# Patient Record
Sex: FEMALE | Race: WHITE | Employment: OTHER | URBAN - METROPOLITAN AREA
[De-identification: names, ages, dates, MRNs, and addresses within clinical notes are randomized per-mention and may not be internally consistent; named-entity substitution may affect disease eponyms.]

---

## 2017-11-07 ENCOUNTER — TELEPHONE (OUTPATIENT)
Dept: BARIATRICS/WEIGHT MGMT | Age: 52
End: 2017-11-07

## 2019-01-12 ENCOUNTER — APPOINTMENT (OUTPATIENT)
Dept: CT IMAGING | Age: 54
End: 2019-01-12
Payer: COMMERCIAL

## 2019-01-12 ENCOUNTER — APPOINTMENT (OUTPATIENT)
Dept: GENERAL RADIOLOGY | Age: 54
End: 2019-01-12
Payer: COMMERCIAL

## 2019-01-12 ENCOUNTER — HOSPITAL ENCOUNTER (EMERGENCY)
Age: 54
Discharge: HOME | End: 2019-01-13
Attending: EMERGENCY MEDICINE
Payer: COMMERCIAL

## 2019-01-12 DIAGNOSIS — R07.9 CHEST PAIN, UNSPECIFIED TYPE: ICD-10-CM

## 2019-01-12 DIAGNOSIS — R51.9 ACUTE NONINTRACTABLE HEADACHE, UNSPECIFIED HEADACHE TYPE: ICD-10-CM

## 2019-01-12 DIAGNOSIS — M79.10 MYALGIA: Primary | ICD-10-CM

## 2019-01-12 PROCEDURE — 85025 COMPLETE CBC W/AUTO DIFF WBC: CPT

## 2019-01-12 PROCEDURE — 80053 COMPREHEN METABOLIC PANEL: CPT

## 2019-01-12 PROCEDURE — 85730 THROMBOPLASTIN TIME PARTIAL: CPT

## 2019-01-12 PROCEDURE — 84484 ASSAY OF TROPONIN QUANT: CPT

## 2019-01-12 PROCEDURE — 70450 CT HEAD/BRAIN W/O DYE: CPT

## 2019-01-12 PROCEDURE — 93005 ELECTROCARDIOGRAM TRACING: CPT | Performed by: EMERGENCY MEDICINE

## 2019-01-12 PROCEDURE — 99285 EMERGENCY DEPT VISIT HI MDM: CPT

## 2019-01-12 PROCEDURE — 85610 PROTHROMBIN TIME: CPT

## 2019-01-12 PROCEDURE — 71046 X-RAY EXAM CHEST 2 VIEWS: CPT

## 2019-01-12 RX ORDER — OXCARBAZEPINE 600 MG/1
600 TABLET, FILM COATED ORAL 2 TIMES DAILY
Status: ON HOLD | COMMUNITY
End: 2020-01-21

## 2019-01-12 RX ORDER — DEXAMETHASONE SODIUM PHOSPHATE 10 MG/ML
10 INJECTION, SOLUTION INTRAMUSCULAR; INTRAVENOUS ONCE
Status: COMPLETED | OUTPATIENT
Start: 2019-01-12 | End: 2019-01-13

## 2019-01-12 RX ORDER — METOCLOPRAMIDE HYDROCHLORIDE 5 MG/ML
10 INJECTION INTRAMUSCULAR; INTRAVENOUS ONCE
Status: COMPLETED | OUTPATIENT
Start: 2019-01-12 | End: 2019-01-13

## 2019-01-12 RX ORDER — ACETAMINOPHEN 500 MG
1000 TABLET ORAL ONCE
Status: COMPLETED | OUTPATIENT
Start: 2019-01-12 | End: 2019-01-13

## 2019-01-12 RX ORDER — SPIRONOLACTONE 25 MG/1
25 TABLET ORAL 2 TIMES DAILY
Status: ON HOLD | COMMUNITY
End: 2020-01-21

## 2019-01-12 RX ORDER — 0.9 % SODIUM CHLORIDE 0.9 %
1000 INTRAVENOUS SOLUTION INTRAVENOUS ONCE
Status: COMPLETED | OUTPATIENT
Start: 2019-01-12 | End: 2019-01-13

## 2019-01-12 RX ORDER — CARVEDILOL 6.25 MG/1
12.5 TABLET ORAL 2 TIMES DAILY WITH MEALS
Status: ON HOLD | COMMUNITY
End: 2020-06-04 | Stop reason: DRUGHIGH

## 2019-01-12 RX ORDER — DIPHENHYDRAMINE HYDROCHLORIDE 50 MG/ML
12.5 INJECTION INTRAMUSCULAR; INTRAVENOUS ONCE
Status: COMPLETED | OUTPATIENT
Start: 2019-01-12 | End: 2019-01-13

## 2019-01-12 ASSESSMENT — ENCOUNTER SYMPTOMS
DIARRHEA: 0
WHEEZING: 0
RHINORRHEA: 0
VOMITING: 0
COUGH: 0
SHORTNESS OF BREATH: 0
ABDOMINAL PAIN: 0
NAUSEA: 0

## 2019-01-12 ASSESSMENT — PAIN DESCRIPTION - LOCATION: LOCATION: CHEST

## 2019-01-12 ASSESSMENT — PAIN SCALES - GENERAL: PAINLEVEL_OUTOF10: 8

## 2019-01-12 ASSESSMENT — PAIN DESCRIPTION - PAIN TYPE: TYPE: ACUTE PAIN

## 2019-01-13 VITALS
WEIGHT: 248 LBS | RESPIRATION RATE: 18 BRPM | SYSTOLIC BLOOD PRESSURE: 152 MMHG | BODY MASS INDEX: 43.94 KG/M2 | HEART RATE: 63 BPM | TEMPERATURE: 98.2 F | HEIGHT: 63 IN | OXYGEN SATURATION: 90 % | DIASTOLIC BLOOD PRESSURE: 74 MMHG

## 2019-01-13 LAB
A/G RATIO: 1.2 (ref 1.1–2.2)
ALBUMIN SERPL-MCNC: 3.7 G/DL (ref 3.4–5)
ALP BLD-CCNC: 84 U/L (ref 40–129)
ALT SERPL-CCNC: 17 U/L (ref 10–40)
ANION GAP SERPL CALCULATED.3IONS-SCNC: 10 MMOL/L (ref 3–16)
APTT: 36.1 SEC (ref 26–36)
AST SERPL-CCNC: 18 U/L (ref 15–37)
BASOPHILS ABSOLUTE: 0.1 K/UL (ref 0–0.2)
BASOPHILS RELATIVE PERCENT: 1.7 %
BILIRUB SERPL-MCNC: <0.2 MG/DL (ref 0–1)
BILIRUBIN URINE: NEGATIVE
BLOOD, URINE: NEGATIVE
BUN BLDV-MCNC: 13 MG/DL (ref 7–20)
CALCIUM SERPL-MCNC: 8.6 MG/DL (ref 8.3–10.6)
CHLORIDE BLD-SCNC: 97 MMOL/L (ref 99–110)
CLARITY: CLEAR
CO2: 26 MMOL/L (ref 21–32)
COLOR: YELLOW
CREAT SERPL-MCNC: <0.5 MG/DL (ref 0.6–1.1)
EKG ATRIAL RATE: 64 BPM
EKG DIAGNOSIS: NORMAL
EKG P AXIS: 57 DEGREES
EKG P-R INTERVAL: 172 MS
EKG Q-T INTERVAL: 426 MS
EKG QRS DURATION: 96 MS
EKG QTC CALCULATION (BAZETT): 439 MS
EKG R AXIS: 17 DEGREES
EKG T AXIS: -22 DEGREES
EKG VENTRICULAR RATE: 64 BPM
EOSINOPHILS ABSOLUTE: 0.3 K/UL (ref 0–0.6)
EOSINOPHILS RELATIVE PERCENT: 3.8 %
GFR AFRICAN AMERICAN: >60
GFR NON-AFRICAN AMERICAN: >60
GLOBULIN: 3.1 G/DL
GLUCOSE BLD-MCNC: 98 MG/DL (ref 70–99)
GLUCOSE URINE: NEGATIVE MG/DL
HCT VFR BLD CALC: 38 % (ref 36–48)
HEMOGLOBIN: 12.4 G/DL (ref 12–16)
INR BLD: 1.4 (ref 0.86–1.14)
KETONES, URINE: NEGATIVE MG/DL
LEUKOCYTE ESTERASE, URINE: NEGATIVE
LYMPHOCYTES ABSOLUTE: 2.7 K/UL (ref 1–5.1)
LYMPHOCYTES RELATIVE PERCENT: 34.4 %
MCH RBC QN AUTO: 28.5 PG (ref 26–34)
MCHC RBC AUTO-ENTMCNC: 32.6 G/DL (ref 31–36)
MCV RBC AUTO: 87.4 FL (ref 80–100)
MICROSCOPIC EXAMINATION: NORMAL
MONOCYTES ABSOLUTE: 0.5 K/UL (ref 0–1.3)
MONOCYTES RELATIVE PERCENT: 5.9 %
NEUTROPHILS ABSOLUTE: 4.3 K/UL (ref 1.7–7.7)
NEUTROPHILS RELATIVE PERCENT: 54.2 %
NITRITE, URINE: NEGATIVE
PDW BLD-RTO: 14.7 % (ref 12.4–15.4)
PH UA: 7
PLATELET # BLD: 213 K/UL (ref 135–450)
PMV BLD AUTO: 8.1 FL (ref 5–10.5)
POTASSIUM SERPL-SCNC: 3.9 MMOL/L (ref 3.5–5.1)
PROTEIN UA: NEGATIVE MG/DL
PROTHROMBIN TIME: 16 SEC (ref 9.8–13)
RBC # BLD: 4.35 M/UL (ref 4–5.2)
SODIUM BLD-SCNC: 133 MMOL/L (ref 136–145)
SPECIFIC GRAVITY UA: 1.01
TOTAL PROTEIN: 6.8 G/DL (ref 6.4–8.2)
TROPONIN: <0.01 NG/ML
URINE REFLEX TO CULTURE: NORMAL
URINE TYPE: NORMAL
UROBILINOGEN, URINE: 0.2 E.U./DL
WBC # BLD: 7.9 K/UL (ref 4–11)

## 2019-01-13 PROCEDURE — 93010 ELECTROCARDIOGRAM REPORT: CPT | Performed by: INTERNAL MEDICINE

## 2019-01-13 PROCEDURE — 6370000000 HC RX 637 (ALT 250 FOR IP): Performed by: PHYSICIAN ASSISTANT

## 2019-01-13 PROCEDURE — 96374 THER/PROPH/DIAG INJ IV PUSH: CPT

## 2019-01-13 PROCEDURE — 81003 URINALYSIS AUTO W/O SCOPE: CPT

## 2019-01-13 PROCEDURE — 96375 TX/PRO/DX INJ NEW DRUG ADDON: CPT

## 2019-01-13 PROCEDURE — 2580000003 HC RX 258: Performed by: PHYSICIAN ASSISTANT

## 2019-01-13 PROCEDURE — 6360000002 HC RX W HCPCS: Performed by: PHYSICIAN ASSISTANT

## 2019-01-13 RX ADMIN — SODIUM CHLORIDE 1000 ML: 9 INJECTION, SOLUTION INTRAVENOUS at 00:06

## 2019-01-13 RX ADMIN — ACETAMINOPHEN 1000 MG: 500 TABLET, FILM COATED ORAL at 00:05

## 2019-01-13 RX ADMIN — DEXAMETHASONE SODIUM PHOSPHATE 10 MG: 10 INJECTION, SOLUTION INTRAMUSCULAR; INTRAVENOUS at 00:06

## 2019-01-13 RX ADMIN — DIPHENHYDRAMINE HYDROCHLORIDE 12.5 MG: 50 INJECTION, SOLUTION INTRAMUSCULAR; INTRAVENOUS at 00:06

## 2019-01-13 RX ADMIN — METOCLOPRAMIDE 10 MG: 5 INJECTION, SOLUTION INTRAMUSCULAR; INTRAVENOUS at 00:06

## 2019-01-13 ASSESSMENT — PAIN SCALES - GENERAL
PAINLEVEL_OUTOF10: 6
PAINLEVEL_OUTOF10: 8

## 2019-01-14 ENCOUNTER — APPOINTMENT (OUTPATIENT)
Dept: CT IMAGING | Age: 54
DRG: 351 | End: 2019-01-14
Payer: MEDICAID

## 2019-01-14 ENCOUNTER — HOSPITAL ENCOUNTER (INPATIENT)
Age: 54
LOS: 2 days | Discharge: HOME | DRG: 351 | End: 2019-01-17
Attending: EMERGENCY MEDICINE | Admitting: INTERNAL MEDICINE
Payer: MEDICAID

## 2019-01-14 ENCOUNTER — APPOINTMENT (OUTPATIENT)
Dept: GENERAL RADIOLOGY | Age: 54
DRG: 351 | End: 2019-01-14
Payer: MEDICAID

## 2019-01-14 DIAGNOSIS — M54.50 CHRONIC LOW BACK PAIN WITHOUT SCIATICA, UNSPECIFIED BACK PAIN LATERALITY: ICD-10-CM

## 2019-01-14 DIAGNOSIS — R20.0 BILATERAL LEG NUMBNESS: Primary | ICD-10-CM

## 2019-01-14 DIAGNOSIS — G89.29 CHRONIC LOW BACK PAIN WITHOUT SCIATICA, UNSPECIFIED BACK PAIN LATERALITY: ICD-10-CM

## 2019-01-14 LAB
A/G RATIO: 1.3 (ref 1.1–2.2)
ALBUMIN SERPL-MCNC: 3.9 G/DL (ref 3.4–5)
ALP BLD-CCNC: 78 U/L (ref 40–129)
ALT SERPL-CCNC: 19 U/L (ref 10–40)
ANION GAP SERPL CALCULATED.3IONS-SCNC: 10 MMOL/L (ref 3–16)
APTT: 31.6 SEC (ref 26–36)
AST SERPL-CCNC: 17 U/L (ref 15–37)
BASOPHILS ABSOLUTE: 0.1 K/UL (ref 0–0.2)
BASOPHILS RELATIVE PERCENT: 0.6 %
BILIRUB SERPL-MCNC: <0.2 MG/DL (ref 0–1)
BILIRUBIN URINE: NEGATIVE
BLOOD, URINE: NEGATIVE
BUN BLDV-MCNC: 13 MG/DL (ref 7–20)
CALCIUM SERPL-MCNC: 8.7 MG/DL (ref 8.3–10.6)
CHLORIDE BLD-SCNC: 97 MMOL/L (ref 99–110)
CLARITY: CLEAR
CO2: 28 MMOL/L (ref 21–32)
COLOR: YELLOW
CREAT SERPL-MCNC: 0.7 MG/DL (ref 0.6–1.1)
EKG ATRIAL RATE: 57 BPM
EKG DIAGNOSIS: NORMAL
EKG P AXIS: 33 DEGREES
EKG P-R INTERVAL: 156 MS
EKG Q-T INTERVAL: 470 MS
EKG QRS DURATION: 90 MS
EKG QTC CALCULATION (BAZETT): 457 MS
EKG R AXIS: 0 DEGREES
EKG T AXIS: -6 DEGREES
EKG VENTRICULAR RATE: 57 BPM
EOSINOPHILS ABSOLUTE: 0.1 K/UL (ref 0–0.6)
EOSINOPHILS RELATIVE PERCENT: 1 %
GFR AFRICAN AMERICAN: >60
GFR NON-AFRICAN AMERICAN: >60
GLOBULIN: 3.1 G/DL
GLUCOSE BLD-MCNC: 83 MG/DL (ref 70–99)
GLUCOSE URINE: NEGATIVE MG/DL
HCG QUALITATIVE: NEGATIVE
HCT VFR BLD CALC: 37.1 % (ref 36–48)
HEMOGLOBIN: 12.2 G/DL (ref 12–16)
INR BLD: 1.1 (ref 0.86–1.14)
KETONES, URINE: NEGATIVE MG/DL
LEUKOCYTE ESTERASE, URINE: NEGATIVE
LYMPHOCYTES ABSOLUTE: 4 K/UL (ref 1–5.1)
LYMPHOCYTES RELATIVE PERCENT: 40.1 %
MCH RBC QN AUTO: 28.9 PG (ref 26–34)
MCHC RBC AUTO-ENTMCNC: 32.8 G/DL (ref 31–36)
MCV RBC AUTO: 88 FL (ref 80–100)
MICROSCOPIC EXAMINATION: NORMAL
MONOCYTES ABSOLUTE: 0.5 K/UL (ref 0–1.3)
MONOCYTES RELATIVE PERCENT: 5.5 %
NEUTROPHILS ABSOLUTE: 5.2 K/UL (ref 1.7–7.7)
NEUTROPHILS RELATIVE PERCENT: 52.8 %
NITRITE, URINE: NEGATIVE
PDW BLD-RTO: 14.6 % (ref 12.4–15.4)
PH UA: 6.5
PLATELET # BLD: 212 K/UL (ref 135–450)
PMV BLD AUTO: 8.7 FL (ref 5–10.5)
POTASSIUM REFLEX MAGNESIUM: 3.7 MMOL/L (ref 3.5–5.1)
PROTEIN UA: NEGATIVE MG/DL
PROTHROMBIN TIME: 12.5 SEC (ref 9.8–13)
RBC # BLD: 4.21 M/UL (ref 4–5.2)
SODIUM BLD-SCNC: 135 MMOL/L (ref 136–145)
SPECIFIC GRAVITY UA: 1.01
TOTAL PROTEIN: 7 G/DL (ref 6.4–8.2)
TROPONIN: <0.01 NG/ML
URINE REFLEX TO CULTURE: NORMAL
URINE TYPE: NORMAL
UROBILINOGEN, URINE: 0.2 E.U./DL
WBC # BLD: 9.9 K/UL (ref 4–11)

## 2019-01-14 PROCEDURE — G0378 HOSPITAL OBSERVATION PER HR: HCPCS

## 2019-01-14 PROCEDURE — 36415 COLL VENOUS BLD VENIPUNCTURE: CPT

## 2019-01-14 PROCEDURE — 70450 CT HEAD/BRAIN W/O DYE: CPT

## 2019-01-14 PROCEDURE — 99285 EMERGENCY DEPT VISIT HI MDM: CPT

## 2019-01-14 PROCEDURE — 96375 TX/PRO/DX INJ NEW DRUG ADDON: CPT

## 2019-01-14 PROCEDURE — 94760 N-INVAS EAR/PLS OXIMETRY 1: CPT

## 2019-01-14 PROCEDURE — 93010 ELECTROCARDIOGRAM REPORT: CPT | Performed by: INTERNAL MEDICINE

## 2019-01-14 PROCEDURE — 81003 URINALYSIS AUTO W/O SCOPE: CPT

## 2019-01-14 PROCEDURE — 84484 ASSAY OF TROPONIN QUANT: CPT

## 2019-01-14 PROCEDURE — 85610 PROTHROMBIN TIME: CPT

## 2019-01-14 PROCEDURE — 96374 THER/PROPH/DIAG INJ IV PUSH: CPT

## 2019-01-14 PROCEDURE — 83036 HEMOGLOBIN GLYCOSYLATED A1C: CPT

## 2019-01-14 PROCEDURE — 73110 X-RAY EXAM OF WRIST: CPT

## 2019-01-14 PROCEDURE — 6370000000 HC RX 637 (ALT 250 FOR IP): Performed by: INTERNAL MEDICINE

## 2019-01-14 PROCEDURE — 84703 CHORIONIC GONADOTROPIN ASSAY: CPT

## 2019-01-14 PROCEDURE — 85730 THROMBOPLASTIN TIME PARTIAL: CPT

## 2019-01-14 PROCEDURE — 6360000002 HC RX W HCPCS: Performed by: EMERGENCY MEDICINE

## 2019-01-14 PROCEDURE — 80053 COMPREHEN METABOLIC PANEL: CPT

## 2019-01-14 PROCEDURE — 71045 X-RAY EXAM CHEST 1 VIEW: CPT

## 2019-01-14 PROCEDURE — 93005 ELECTROCARDIOGRAM TRACING: CPT | Performed by: EMERGENCY MEDICINE

## 2019-01-14 PROCEDURE — 85025 COMPLETE CBC W/AUTO DIFF WBC: CPT

## 2019-01-14 PROCEDURE — 2580000003 HC RX 258: Performed by: INTERNAL MEDICINE

## 2019-01-14 RX ORDER — DIPHENHYDRAMINE HYDROCHLORIDE 50 MG/ML
25 INJECTION INTRAMUSCULAR; INTRAVENOUS ONCE
Status: COMPLETED | OUTPATIENT
Start: 2019-01-14 | End: 2019-01-14

## 2019-01-14 RX ORDER — SODIUM CHLORIDE 0.9 % (FLUSH) 0.9 %
10 SYRINGE (ML) INJECTION PRN
Status: DISCONTINUED | OUTPATIENT
Start: 2019-01-14 | End: 2019-01-17 | Stop reason: HOSPADM

## 2019-01-14 RX ORDER — SPIRONOLACTONE 25 MG/1
25 TABLET ORAL 2 TIMES DAILY
Status: DISCONTINUED | OUTPATIENT
Start: 2019-01-14 | End: 2019-01-17 | Stop reason: HOSPADM

## 2019-01-14 RX ORDER — GABAPENTIN 400 MG/1
1200 CAPSULE ORAL 3 TIMES DAILY
Status: DISCONTINUED | OUTPATIENT
Start: 2019-01-14 | End: 2019-01-17 | Stop reason: HOSPADM

## 2019-01-14 RX ORDER — FAMOTIDINE 20 MG/1
20 TABLET, FILM COATED ORAL 2 TIMES DAILY
Status: DISCONTINUED | OUTPATIENT
Start: 2019-01-14 | End: 2019-01-17 | Stop reason: HOSPADM

## 2019-01-14 RX ORDER — METOCLOPRAMIDE HYDROCHLORIDE 5 MG/ML
10 INJECTION INTRAMUSCULAR; INTRAVENOUS ONCE
Status: COMPLETED | OUTPATIENT
Start: 2019-01-14 | End: 2019-01-14

## 2019-01-14 RX ORDER — CARVEDILOL 6.25 MG/1
6.25 TABLET ORAL 2 TIMES DAILY WITH MEALS
Status: DISCONTINUED | OUTPATIENT
Start: 2019-01-15 | End: 2019-01-17 | Stop reason: HOSPADM

## 2019-01-14 RX ORDER — ONDANSETRON 2 MG/ML
4 INJECTION INTRAMUSCULAR; INTRAVENOUS EVERY 6 HOURS PRN
Status: DISCONTINUED | OUTPATIENT
Start: 2019-01-14 | End: 2019-01-17 | Stop reason: HOSPADM

## 2019-01-14 RX ORDER — PANTOPRAZOLE SODIUM 40 MG/1
40 TABLET, DELAYED RELEASE ORAL
Status: DISCONTINUED | OUTPATIENT
Start: 2019-01-15 | End: 2019-01-17 | Stop reason: HOSPADM

## 2019-01-14 RX ORDER — ONDANSETRON 4 MG/1
4 TABLET, ORALLY DISINTEGRATING ORAL EVERY 8 HOURS PRN
Status: DISCONTINUED | OUTPATIENT
Start: 2019-01-14 | End: 2019-01-17 | Stop reason: HOSPADM

## 2019-01-14 RX ORDER — SODIUM CHLORIDE 0.9 % (FLUSH) 0.9 %
10 SYRINGE (ML) INJECTION EVERY 12 HOURS SCHEDULED
Status: DISCONTINUED | OUTPATIENT
Start: 2019-01-14 | End: 2019-01-17 | Stop reason: HOSPADM

## 2019-01-14 RX ORDER — OXCARBAZEPINE 300 MG/1
600 TABLET, FILM COATED ORAL 2 TIMES DAILY
Status: DISCONTINUED | OUTPATIENT
Start: 2019-01-14 | End: 2019-01-17 | Stop reason: HOSPADM

## 2019-01-14 RX ORDER — TIZANIDINE 4 MG/1
4 TABLET ORAL EVERY 6 HOURS PRN
Status: ON HOLD | COMMUNITY
End: 2020-01-27 | Stop reason: HOSPADM

## 2019-01-14 RX ORDER — DEXAMETHASONE SODIUM PHOSPHATE 10 MG/ML
10 INJECTION, SOLUTION INTRAMUSCULAR; INTRAVENOUS ONCE
Status: COMPLETED | OUTPATIENT
Start: 2019-01-14 | End: 2019-01-14

## 2019-01-14 RX ORDER — ALLOPURINOL 100 MG/1
100 TABLET ORAL DAILY
Status: DISCONTINUED | OUTPATIENT
Start: 2019-01-15 | End: 2019-01-17 | Stop reason: HOSPADM

## 2019-01-14 RX ADMIN — RIVAROXABAN 20 MG: 20 TABLET, FILM COATED ORAL at 22:40

## 2019-01-14 RX ADMIN — GABAPENTIN 1200 MG: 400 CAPSULE ORAL at 22:36

## 2019-01-14 RX ADMIN — FAMOTIDINE 20 MG: 20 TABLET ORAL at 22:36

## 2019-01-14 RX ADMIN — SPIRONOLACTONE 25 MG: 25 TABLET, FILM COATED ORAL at 22:36

## 2019-01-14 RX ADMIN — Medication 10 ML: at 22:37

## 2019-01-14 RX ADMIN — DEXAMETHASONE SODIUM PHOSPHATE 10 MG: 10 INJECTION, SOLUTION INTRAMUSCULAR; INTRAVENOUS at 17:16

## 2019-01-14 RX ADMIN — OXCARBAZEPINE 600 MG: 300 TABLET, FILM COATED ORAL at 22:36

## 2019-01-14 RX ADMIN — DIPHENHYDRAMINE HYDROCHLORIDE 25 MG: 50 INJECTION, SOLUTION INTRAMUSCULAR; INTRAVENOUS at 17:16

## 2019-01-14 RX ADMIN — METOCLOPRAMIDE 10 MG: 5 INJECTION, SOLUTION INTRAMUSCULAR; INTRAVENOUS at 17:16

## 2019-01-14 ASSESSMENT — PAIN DESCRIPTION - DESCRIPTORS
DESCRIPTORS_2: STABBING
DESCRIPTORS: SHARP;SHOOTING
DESCRIPTORS_2: SHARP;SHOOTING

## 2019-01-14 ASSESSMENT — PAIN DESCRIPTION - PAIN TYPE
TYPE: ACUTE PAIN
TYPE: ACUTE PAIN
TYPE_2: NEUROPATHIC

## 2019-01-14 ASSESSMENT — PAIN DESCRIPTION - LOCATION
LOCATION_2: NECK
LOCATION_2: NECK
LOCATION: SHOULDER
LOCATION: LEG

## 2019-01-14 ASSESSMENT — PAIN DESCRIPTION - INTENSITY
RATING_2: 8
RATING_2: 8

## 2019-01-14 ASSESSMENT — PAIN SCALES - GENERAL
PAINLEVEL_OUTOF10: 4
PAINLEVEL_OUTOF10: 8

## 2019-01-14 ASSESSMENT — PAIN DESCRIPTION - ORIENTATION: ORIENTATION: RIGHT;LEFT

## 2019-01-14 ASSESSMENT — PAIN DESCRIPTION - PROGRESSION: CLINICAL_PROGRESSION: GRADUALLY IMPROVING

## 2019-01-14 ASSESSMENT — PAIN DESCRIPTION - FREQUENCY: FREQUENCY: CONTINUOUS

## 2019-01-14 ASSESSMENT — PAIN DESCRIPTION - ONSET: ONSET: ON-GOING

## 2019-01-14 ASSESSMENT — PAIN DESCRIPTION - DIRECTION: RADIATING_TOWARDS: LOWER SPINE

## 2019-01-15 ENCOUNTER — APPOINTMENT (OUTPATIENT)
Dept: MRI IMAGING | Age: 54
DRG: 351 | End: 2019-01-15
Payer: MEDICAID

## 2019-01-15 PROBLEM — R20.0 BILATERAL LEG NUMBNESS: Status: ACTIVE | Noted: 2019-01-15

## 2019-01-15 LAB
CHOLESTEROL, TOTAL: 268 MG/DL (ref 0–199)
ESTIMATED AVERAGE GLUCOSE: 119.8 MG/DL
HBA1C MFR BLD: 5.8 %
HDLC SERPL-MCNC: 55 MG/DL (ref 40–60)
LDL CHOLESTEROL CALCULATED: 192 MG/DL
TRIGL SERPL-MCNC: 103 MG/DL (ref 0–150)
VLDLC SERPL CALC-MCNC: 21 MG/DL

## 2019-01-15 PROCEDURE — 36415 COLL VENOUS BLD VENIPUNCTURE: CPT

## 2019-01-15 PROCEDURE — 1200000000 HC SEMI PRIVATE

## 2019-01-15 PROCEDURE — 6370000000 HC RX 637 (ALT 250 FOR IP): Performed by: INTERNAL MEDICINE

## 2019-01-15 PROCEDURE — 2580000003 HC RX 258: Performed by: PSYCHIATRY & NEUROLOGY

## 2019-01-15 PROCEDURE — A9579 GAD-BASE MR CONTRAST NOS,1ML: HCPCS | Performed by: PSYCHIATRY & NEUROLOGY

## 2019-01-15 PROCEDURE — G8979 MOBILITY GOAL STATUS: HCPCS

## 2019-01-15 PROCEDURE — 80061 LIPID PANEL: CPT

## 2019-01-15 PROCEDURE — G0378 HOSPITAL OBSERVATION PER HR: HCPCS

## 2019-01-15 PROCEDURE — 6360000004 HC RX CONTRAST MEDICATION: Performed by: PSYCHIATRY & NEUROLOGY

## 2019-01-15 PROCEDURE — 99255 IP/OBS CONSLTJ NEW/EST HI 80: CPT | Performed by: PSYCHIATRY & NEUROLOGY

## 2019-01-15 PROCEDURE — 97166 OT EVAL MOD COMPLEX 45 MIN: CPT

## 2019-01-15 PROCEDURE — G8978 MOBILITY CURRENT STATUS: HCPCS

## 2019-01-15 PROCEDURE — 2580000003 HC RX 258: Performed by: INTERNAL MEDICINE

## 2019-01-15 PROCEDURE — 97162 PT EVAL MOD COMPLEX 30 MIN: CPT

## 2019-01-15 PROCEDURE — 6370000000 HC RX 637 (ALT 250 FOR IP): Performed by: NURSE PRACTITIONER

## 2019-01-15 PROCEDURE — 70553 MRI BRAIN STEM W/O & W/DYE: CPT

## 2019-01-15 PROCEDURE — 97535 SELF CARE MNGMENT TRAINING: CPT

## 2019-01-15 RX ORDER — ACETAMINOPHEN 325 MG/1
650 TABLET ORAL EVERY 4 HOURS PRN
Status: DISCONTINUED | OUTPATIENT
Start: 2019-01-15 | End: 2019-01-17 | Stop reason: HOSPADM

## 2019-01-15 RX ORDER — LOPERAMIDE HYDROCHLORIDE 2 MG/1
2 CAPSULE ORAL 4 TIMES DAILY PRN
Status: DISCONTINUED | OUTPATIENT
Start: 2019-01-15 | End: 2019-01-17 | Stop reason: HOSPADM

## 2019-01-15 RX ORDER — TIZANIDINE 4 MG/1
4 TABLET ORAL EVERY 6 HOURS PRN
Status: DISCONTINUED | OUTPATIENT
Start: 2019-01-15 | End: 2019-01-17 | Stop reason: HOSPADM

## 2019-01-15 RX ORDER — 0.9 % SODIUM CHLORIDE 0.9 %
10 VIAL (ML) INJECTION
Status: COMPLETED | OUTPATIENT
Start: 2019-01-15 | End: 2019-01-15

## 2019-01-15 RX ADMIN — LOPERAMIDE HYDROCHLORIDE 2 MG: 2 CAPSULE ORAL at 15:30

## 2019-01-15 RX ADMIN — SPIRONOLACTONE 25 MG: 25 TABLET, FILM COATED ORAL at 10:00

## 2019-01-15 RX ADMIN — PANTOPRAZOLE SODIUM 40 MG: 40 TABLET, DELAYED RELEASE ORAL at 08:35

## 2019-01-15 RX ADMIN — OXCARBAZEPINE 600 MG: 300 TABLET, FILM COATED ORAL at 08:35

## 2019-01-15 RX ADMIN — Medication 10 ML: at 09:46

## 2019-01-15 RX ADMIN — CARVEDILOL 6.25 MG: 6.25 TABLET, FILM COATED ORAL at 08:35

## 2019-01-15 RX ADMIN — FAMOTIDINE 20 MG: 20 TABLET ORAL at 21:05

## 2019-01-15 RX ADMIN — FAMOTIDINE 20 MG: 20 TABLET ORAL at 08:35

## 2019-01-15 RX ADMIN — Medication 10 ML: at 08:35

## 2019-01-15 RX ADMIN — GABAPENTIN 1200 MG: 400 CAPSULE ORAL at 21:04

## 2019-01-15 RX ADMIN — ACETAMINOPHEN 650 MG: 325 TABLET, FILM COATED ORAL at 14:00

## 2019-01-15 RX ADMIN — GABAPENTIN 1200 MG: 400 CAPSULE ORAL at 08:35

## 2019-01-15 RX ADMIN — RIVAROXABAN 20 MG: 20 TABLET, FILM COATED ORAL at 17:05

## 2019-01-15 RX ADMIN — GABAPENTIN 1200 MG: 400 CAPSULE ORAL at 14:00

## 2019-01-15 RX ADMIN — OXCARBAZEPINE 600 MG: 300 TABLET, FILM COATED ORAL at 21:05

## 2019-01-15 RX ADMIN — TIZANIDINE 4 MG: 4 TABLET ORAL at 15:05

## 2019-01-15 RX ADMIN — CARVEDILOL 6.25 MG: 6.25 TABLET, FILM COATED ORAL at 17:05

## 2019-01-15 RX ADMIN — SPIRONOLACTONE 25 MG: 25 TABLET, FILM COATED ORAL at 21:05

## 2019-01-15 RX ADMIN — Medication 10 ML: at 21:05

## 2019-01-15 RX ADMIN — TIZANIDINE 4 MG: 4 TABLET ORAL at 00:47

## 2019-01-15 RX ADMIN — ALLOPURINOL 100 MG: 100 TABLET ORAL at 08:35

## 2019-01-15 RX ADMIN — TIZANIDINE 4 MG: 4 TABLET ORAL at 08:35

## 2019-01-15 RX ADMIN — GADOTERIDOL 22 ML: 279.3 INJECTION, SOLUTION INTRAVENOUS at 09:46

## 2019-01-15 ASSESSMENT — PAIN DESCRIPTION - PAIN TYPE
TYPE: ACUTE PAIN
TYPE: ACUTE PAIN
TYPE_2: NEUROPATHIC
TYPE: CHRONIC PAIN
TYPE: CHRONIC PAIN

## 2019-01-15 ASSESSMENT — PAIN DESCRIPTION - LOCATION
LOCATION: NECK
LOCATION: NECK
LOCATION_2: NECK
LOCATION: BACK;NECK
LOCATION: HEAD
LOCATION: SHOULDER

## 2019-01-15 ASSESSMENT — PAIN SCALES - GENERAL
PAINLEVEL_OUTOF10: 7
PAINLEVEL_OUTOF10: 5
PAINLEVEL_OUTOF10: 7
PAINLEVEL_OUTOF10: 5
PAINLEVEL_OUTOF10: 6
PAINLEVEL_OUTOF10: 5
PAINLEVEL_OUTOF10: 7

## 2019-01-15 ASSESSMENT — PAIN DESCRIPTION - ONSET: ONSET: ON-GOING

## 2019-01-15 ASSESSMENT — PAIN DESCRIPTION - DESCRIPTORS
DESCRIPTORS_2: STABBING
DESCRIPTORS: STABBING

## 2019-01-15 ASSESSMENT — PAIN DESCRIPTION - DIRECTION: RADIATING_TOWARDS: LOWER SPINE

## 2019-01-15 ASSESSMENT — PAIN DESCRIPTION - INTENSITY: RATING_2: 5

## 2019-01-15 ASSESSMENT — PAIN DESCRIPTION - FREQUENCY: FREQUENCY: CONTINUOUS

## 2019-01-15 ASSESSMENT — PAIN DESCRIPTION - ORIENTATION: ORIENTATION: LEFT

## 2019-01-15 ASSESSMENT — PAIN DESCRIPTION - PROGRESSION
CLINICAL_PROGRESSION: GRADUALLY IMPROVING
CLINICAL_PROGRESSION: GRADUALLY IMPROVING

## 2019-01-16 ENCOUNTER — APPOINTMENT (OUTPATIENT)
Dept: MRI IMAGING | Age: 54
DRG: 351 | End: 2019-01-16
Payer: MEDICAID

## 2019-01-16 PROCEDURE — 72146 MRI CHEST SPINE W/O DYE: CPT

## 2019-01-16 PROCEDURE — 97110 THERAPEUTIC EXERCISES: CPT

## 2019-01-16 PROCEDURE — 1200000000 HC SEMI PRIVATE

## 2019-01-16 PROCEDURE — 97530 THERAPEUTIC ACTIVITIES: CPT

## 2019-01-16 PROCEDURE — 72141 MRI NECK SPINE W/O DYE: CPT

## 2019-01-16 PROCEDURE — 6360000002 HC RX W HCPCS: Performed by: INTERNAL MEDICINE

## 2019-01-16 PROCEDURE — 2580000003 HC RX 258: Performed by: INTERNAL MEDICINE

## 2019-01-16 PROCEDURE — 6370000000 HC RX 637 (ALT 250 FOR IP): Performed by: INTERNAL MEDICINE

## 2019-01-16 PROCEDURE — 97535 SELF CARE MNGMENT TRAINING: CPT

## 2019-01-16 PROCEDURE — 97116 GAIT TRAINING THERAPY: CPT

## 2019-01-16 PROCEDURE — 99233 SBSQ HOSP IP/OBS HIGH 50: CPT | Performed by: PSYCHIATRY & NEUROLOGY

## 2019-01-16 PROCEDURE — 6370000000 HC RX 637 (ALT 250 FOR IP): Performed by: NURSE PRACTITIONER

## 2019-01-16 RX ORDER — HYDROCODONE BITARTRATE AND ACETAMINOPHEN 5; 325 MG/1; MG/1
1 TABLET ORAL ONCE
Status: COMPLETED | OUTPATIENT
Start: 2019-01-16 | End: 2019-01-16

## 2019-01-16 RX ADMIN — GABAPENTIN 1200 MG: 400 CAPSULE ORAL at 13:38

## 2019-01-16 RX ADMIN — ACETAMINOPHEN 650 MG: 325 TABLET, FILM COATED ORAL at 09:47

## 2019-01-16 RX ADMIN — OXCARBAZEPINE 600 MG: 300 TABLET, FILM COATED ORAL at 21:50

## 2019-01-16 RX ADMIN — TIZANIDINE 4 MG: 4 TABLET ORAL at 18:52

## 2019-01-16 RX ADMIN — GABAPENTIN 1200 MG: 400 CAPSULE ORAL at 09:47

## 2019-01-16 RX ADMIN — CARVEDILOL 6.25 MG: 6.25 TABLET, FILM COATED ORAL at 09:48

## 2019-01-16 RX ADMIN — CARVEDILOL 6.25 MG: 6.25 TABLET, FILM COATED ORAL at 17:32

## 2019-01-16 RX ADMIN — FAMOTIDINE 20 MG: 20 TABLET ORAL at 21:50

## 2019-01-16 RX ADMIN — Medication 10 ML: at 21:51

## 2019-01-16 RX ADMIN — FAMOTIDINE 20 MG: 20 TABLET ORAL at 09:48

## 2019-01-16 RX ADMIN — GABAPENTIN 1200 MG: 400 CAPSULE ORAL at 21:50

## 2019-01-16 RX ADMIN — HYDROCODONE BITARTRATE AND ACETAMINOPHEN 1 TABLET: 5; 325 TABLET ORAL at 13:38

## 2019-01-16 RX ADMIN — TIZANIDINE 4 MG: 4 TABLET ORAL at 01:27

## 2019-01-16 RX ADMIN — ACETAMINOPHEN 650 MG: 325 TABLET, FILM COATED ORAL at 01:27

## 2019-01-16 RX ADMIN — SPIRONOLACTONE 25 MG: 25 TABLET, FILM COATED ORAL at 21:50

## 2019-01-16 RX ADMIN — SPIRONOLACTONE 25 MG: 25 TABLET, FILM COATED ORAL at 09:47

## 2019-01-16 RX ADMIN — ONDANSETRON 4 MG: 2 INJECTION INTRAMUSCULAR; INTRAVENOUS at 09:48

## 2019-01-16 RX ADMIN — TIZANIDINE 4 MG: 4 TABLET ORAL at 09:47

## 2019-01-16 RX ADMIN — ALLOPURINOL 100 MG: 100 TABLET ORAL at 09:47

## 2019-01-16 RX ADMIN — RIVAROXABAN 20 MG: 20 TABLET, FILM COATED ORAL at 17:32

## 2019-01-16 RX ADMIN — OXCARBAZEPINE 600 MG: 300 TABLET, FILM COATED ORAL at 09:47

## 2019-01-16 RX ADMIN — PANTOPRAZOLE SODIUM 40 MG: 40 TABLET, DELAYED RELEASE ORAL at 06:18

## 2019-01-16 RX ADMIN — Medication 10 ML: at 09:48

## 2019-01-16 ASSESSMENT — PAIN DESCRIPTION - LOCATION
LOCATION: BACK
LOCATION_2: NECK
LOCATION: BACK;HEAD
LOCATION: BACK;HEAD
LOCATION: BACK
LOCATION_2: NECK
LOCATION: BACK;HEAD
LOCATION_2: NECK
LOCATION: BACK;HEAD
LOCATION: BACK

## 2019-01-16 ASSESSMENT — PAIN DESCRIPTION - DESCRIPTORS
DESCRIPTORS: STABBING
DESCRIPTORS_2: JABBING;STABBING
DESCRIPTORS_2: SHARP;SHOOTING
DESCRIPTORS: SHARP;SHOOTING
DESCRIPTORS_2: JABBING;STABBING
DESCRIPTORS: STABBING

## 2019-01-16 ASSESSMENT — PAIN SCALES - GENERAL
PAINLEVEL_OUTOF10: 8
PAINLEVEL_OUTOF10: 5
PAINLEVEL_OUTOF10: 5
PAINLEVEL_OUTOF10: 8
PAINLEVEL_OUTOF10: 5
PAINLEVEL_OUTOF10: 6
PAINLEVEL_OUTOF10: 6

## 2019-01-16 ASSESSMENT — PAIN DESCRIPTION - ORIENTATION
ORIENTATION: MID

## 2019-01-16 ASSESSMENT — PAIN DESCRIPTION - PAIN TYPE
TYPE: ACUTE PAIN
TYPE_2: NEUROPATHIC
TYPE: ACUTE PAIN
TYPE_2: NEUROPATHIC
TYPE: ACUTE PAIN

## 2019-01-16 ASSESSMENT — PAIN DESCRIPTION - INTENSITY
RATING_2: 8
RATING_2: 5
RATING_2: 8

## 2019-01-16 ASSESSMENT — PAIN DESCRIPTION - FREQUENCY: FREQUENCY: CONTINUOUS

## 2019-01-16 ASSESSMENT — PAIN DESCRIPTION - DIRECTION: RADIATING_TOWARDS: LOWER SPINE

## 2019-01-16 ASSESSMENT — PAIN DESCRIPTION - ONSET: ONSET: ON-GOING

## 2019-01-17 VITALS
WEIGHT: 245.8 LBS | SYSTOLIC BLOOD PRESSURE: 155 MMHG | BODY MASS INDEX: 43.55 KG/M2 | HEIGHT: 63 IN | DIASTOLIC BLOOD PRESSURE: 77 MMHG | OXYGEN SATURATION: 94 % | RESPIRATION RATE: 16 BRPM | HEART RATE: 64 BPM | TEMPERATURE: 98.6 F

## 2019-01-17 PROCEDURE — 97116 GAIT TRAINING THERAPY: CPT

## 2019-01-17 PROCEDURE — 6370000000 HC RX 637 (ALT 250 FOR IP): Performed by: INTERNAL MEDICINE

## 2019-01-17 PROCEDURE — 2580000003 HC RX 258: Performed by: INTERNAL MEDICINE

## 2019-01-17 PROCEDURE — 97530 THERAPEUTIC ACTIVITIES: CPT

## 2019-01-17 PROCEDURE — 6370000000 HC RX 637 (ALT 250 FOR IP): Performed by: NURSE PRACTITIONER

## 2019-01-17 RX ORDER — HYDROCODONE BITARTRATE AND ACETAMINOPHEN 5; 325 MG/1; MG/1
1 TABLET ORAL DAILY PRN
Status: DISCONTINUED | OUTPATIENT
Start: 2019-01-17 | End: 2019-01-17 | Stop reason: HOSPADM

## 2019-01-17 RX ORDER — HYDROCODONE BITARTRATE AND ACETAMINOPHEN 5; 325 MG/1; MG/1
1 TABLET ORAL DAILY PRN
Qty: 10 TABLET | Refills: 0 | Status: SHIPPED | OUTPATIENT
Start: 2019-01-17 | End: 2019-01-24

## 2019-01-17 RX ADMIN — ALLOPURINOL 100 MG: 100 TABLET ORAL at 09:04

## 2019-01-17 RX ADMIN — TIZANIDINE 4 MG: 4 TABLET ORAL at 00:59

## 2019-01-17 RX ADMIN — OXCARBAZEPINE 600 MG: 300 TABLET, FILM COATED ORAL at 09:04

## 2019-01-17 RX ADMIN — ACETAMINOPHEN 650 MG: 325 TABLET, FILM COATED ORAL at 00:59

## 2019-01-17 RX ADMIN — GABAPENTIN 1200 MG: 400 CAPSULE ORAL at 09:04

## 2019-01-17 RX ADMIN — SPIRONOLACTONE 25 MG: 25 TABLET, FILM COATED ORAL at 09:04

## 2019-01-17 RX ADMIN — PANTOPRAZOLE SODIUM 40 MG: 40 TABLET, DELAYED RELEASE ORAL at 06:06

## 2019-01-17 RX ADMIN — CARVEDILOL 6.25 MG: 6.25 TABLET, FILM COATED ORAL at 09:04

## 2019-01-17 RX ADMIN — FAMOTIDINE 20 MG: 20 TABLET ORAL at 09:04

## 2019-01-17 RX ADMIN — HYDROCODONE BITARTRATE AND ACETAMINOPHEN 1 TABLET: 5; 325 TABLET ORAL at 13:43

## 2019-01-17 RX ADMIN — GABAPENTIN 1200 MG: 400 CAPSULE ORAL at 13:45

## 2019-01-17 RX ADMIN — CARVEDILOL 6.25 MG: 6.25 TABLET, FILM COATED ORAL at 17:33

## 2019-01-17 RX ADMIN — TIZANIDINE 4 MG: 4 TABLET ORAL at 09:04

## 2019-01-17 RX ADMIN — Medication 10 ML: at 09:04

## 2019-01-17 ASSESSMENT — PAIN DESCRIPTION - FREQUENCY
FREQUENCY: CONTINUOUS

## 2019-01-17 ASSESSMENT — PAIN DESCRIPTION - INTENSITY
RATING_2: 7
RATING_2: 7
RATING_2: 5
RATING_2: 7

## 2019-01-17 ASSESSMENT — PAIN DESCRIPTION - PAIN TYPE
TYPE_2: ACUTE PAIN
TYPE_2: NEUROPATHIC
TYPE: ACUTE PAIN
TYPE_2: ACUTE PAIN

## 2019-01-17 ASSESSMENT — PAIN DESCRIPTION - ONSET
ONSET: ON-GOING

## 2019-01-17 ASSESSMENT — PAIN DESCRIPTION - ORIENTATION
ORIENTATION: MID

## 2019-01-17 ASSESSMENT — PAIN DESCRIPTION - LOCATION
LOCATION: HEAD
LOCATION_2: NECK
LOCATION: HEAD;BACK
LOCATION_2: NECK
LOCATION: HEAD;BACK
LOCATION: BACK
LOCATION_2: NECK

## 2019-01-17 ASSESSMENT — PAIN SCALES - GENERAL
PAINLEVEL_OUTOF10: 7
PAINLEVEL_OUTOF10: 7
PAINLEVEL_OUTOF10: 3
PAINLEVEL_OUTOF10: 6
PAINLEVEL_OUTOF10: 7
PAINLEVEL_OUTOF10: 5
PAINLEVEL_OUTOF10: 7

## 2019-01-17 ASSESSMENT — PAIN DESCRIPTION - DESCRIPTORS
DESCRIPTORS: STABBING
DESCRIPTORS_2: STABBING

## 2019-01-17 ASSESSMENT — PAIN DESCRIPTION - PROGRESSION
CLINICAL_PROGRESSION: NOT CHANGED
CLINICAL_PROGRESSION: NOT CHANGED

## 2019-12-03 ENCOUNTER — OFFICE VISIT (OUTPATIENT)
Dept: BARIATRICS/WEIGHT MGMT | Age: 54
End: 2019-12-03
Payer: MEDICARE

## 2019-12-03 VITALS
SYSTOLIC BLOOD PRESSURE: 135 MMHG | WEIGHT: 256.2 LBS | BODY MASS INDEX: 45.39 KG/M2 | DIASTOLIC BLOOD PRESSURE: 79 MMHG | OXYGEN SATURATION: 98 % | HEIGHT: 63 IN | HEART RATE: 77 BPM | RESPIRATION RATE: 18 BRPM

## 2019-12-03 DIAGNOSIS — E78.2 MIXED HYPERLIPIDEMIA: ICD-10-CM

## 2019-12-03 DIAGNOSIS — G47.30 SLEEP APNEA, UNSPECIFIED TYPE: ICD-10-CM

## 2019-12-03 DIAGNOSIS — E11.69 DIABETES MELLITUS TYPE 2 IN OBESE (HCC): ICD-10-CM

## 2019-12-03 DIAGNOSIS — E66.9 DIABETES MELLITUS TYPE 2 IN OBESE (HCC): ICD-10-CM

## 2019-12-03 DIAGNOSIS — Z98.84 S/P LAPAROSCOPIC SLEEVE GASTRECTOMY: ICD-10-CM

## 2019-12-03 DIAGNOSIS — E66.01 MORBID OBESITY WITH BMI OF 45.0-49.9, ADULT (HCC): ICD-10-CM

## 2019-12-03 DIAGNOSIS — I10 ESSENTIAL HYPERTENSION: ICD-10-CM

## 2019-12-03 DIAGNOSIS — Z98.84 S/P LAPAROSCOPIC SLEEVE GASTRECTOMY: Primary | ICD-10-CM

## 2019-12-03 LAB
A/G RATIO: 1.3 (ref 1.1–2.2)
ALBUMIN SERPL-MCNC: 3.9 G/DL (ref 3.4–5)
ALP BLD-CCNC: 78 U/L (ref 40–129)
ALT SERPL-CCNC: 11 U/L (ref 10–40)
ANION GAP SERPL CALCULATED.3IONS-SCNC: 13 MMOL/L (ref 3–16)
AST SERPL-CCNC: 13 U/L (ref 15–37)
BASOPHILS ABSOLUTE: 0.1 K/UL (ref 0–0.2)
BASOPHILS RELATIVE PERCENT: 1 %
BILIRUB SERPL-MCNC: <0.2 MG/DL (ref 0–1)
BUN BLDV-MCNC: 14 MG/DL (ref 7–20)
CALCIUM SERPL-MCNC: 8.7 MG/DL (ref 8.3–10.6)
CHLORIDE BLD-SCNC: 102 MMOL/L (ref 99–110)
CHOLESTEROL, TOTAL: 171 MG/DL (ref 0–199)
CO2: 26 MMOL/L (ref 21–32)
CREAT SERPL-MCNC: <0.5 MG/DL (ref 0.6–1.1)
EOSINOPHILS ABSOLUTE: 0.2 K/UL (ref 0–0.6)
EOSINOPHILS RELATIVE PERCENT: 3.6 %
FOLATE: >20 NG/ML (ref 4.78–24.2)
GFR AFRICAN AMERICAN: >60
GFR NON-AFRICAN AMERICAN: >60
GLOBULIN: 2.9 G/DL
GLUCOSE BLD-MCNC: 103 MG/DL (ref 70–99)
HCT VFR BLD CALC: 34.1 % (ref 36–48)
HDLC SERPL-MCNC: 41 MG/DL (ref 40–60)
HEMOGLOBIN: 10.7 G/DL (ref 12–16)
IRON SATURATION: 11 % (ref 15–50)
IRON: 32 UG/DL (ref 37–145)
LDL CHOLESTEROL CALCULATED: 98 MG/DL
LYMPHOCYTES ABSOLUTE: 2.5 K/UL (ref 1–5.1)
LYMPHOCYTES RELATIVE PERCENT: 36.6 %
MCH RBC QN AUTO: 24.4 PG (ref 26–34)
MCHC RBC AUTO-ENTMCNC: 31.3 G/DL (ref 31–36)
MCV RBC AUTO: 77.8 FL (ref 80–100)
MONOCYTES ABSOLUTE: 0.3 K/UL (ref 0–1.3)
MONOCYTES RELATIVE PERCENT: 5.1 %
NEUTROPHILS ABSOLUTE: 3.7 K/UL (ref 1.7–7.7)
NEUTROPHILS RELATIVE PERCENT: 53.7 %
PDW BLD-RTO: 16.5 % (ref 12.4–15.4)
PLATELET # BLD: 266 K/UL (ref 135–450)
PMV BLD AUTO: 8.6 FL (ref 5–10.5)
POTASSIUM SERPL-SCNC: 3.7 MMOL/L (ref 3.5–5.1)
RBC # BLD: 4.38 M/UL (ref 4–5.2)
SODIUM BLD-SCNC: 141 MMOL/L (ref 136–145)
TOTAL IRON BINDING CAPACITY: 295 UG/DL (ref 260–445)
TOTAL PROTEIN: 6.8 G/DL (ref 6.4–8.2)
TRIGL SERPL-MCNC: 162 MG/DL (ref 0–150)
TSH REFLEX: 2.55 UIU/ML (ref 0.27–4.2)
VITAMIN B-12: 809 PG/ML (ref 211–911)
VITAMIN D 25-HYDROXY: 30.6 NG/ML
VLDLC SERPL CALC-MCNC: 32 MG/DL
WBC # BLD: 6.9 K/UL (ref 4–11)

## 2019-12-03 PROCEDURE — 3044F HG A1C LEVEL LT 7.0%: CPT | Performed by: NURSE PRACTITIONER

## 2019-12-03 PROCEDURE — G8427 DOCREV CUR MEDS BY ELIG CLIN: HCPCS | Performed by: NURSE PRACTITIONER

## 2019-12-03 PROCEDURE — G8598 ASA/ANTIPLAT THER USED: HCPCS | Performed by: NURSE PRACTITIONER

## 2019-12-03 PROCEDURE — 3017F COLORECTAL CA SCREEN DOC REV: CPT | Performed by: NURSE PRACTITIONER

## 2019-12-03 PROCEDURE — 99213 OFFICE O/P EST LOW 20 MIN: CPT | Performed by: NURSE PRACTITIONER

## 2019-12-03 PROCEDURE — 2022F DILAT RTA XM EVC RTNOPTHY: CPT | Performed by: NURSE PRACTITIONER

## 2019-12-03 PROCEDURE — 1036F TOBACCO NON-USER: CPT | Performed by: NURSE PRACTITIONER

## 2019-12-03 PROCEDURE — G8484 FLU IMMUNIZE NO ADMIN: HCPCS | Performed by: NURSE PRACTITIONER

## 2019-12-03 PROCEDURE — G8417 CALC BMI ABV UP PARAM F/U: HCPCS | Performed by: NURSE PRACTITIONER

## 2019-12-03 RX ORDER — ESOMEPRAZOLE MAGNESIUM 40 MG/1
40 FOR SUSPENSION ORAL DAILY
Status: ON HOLD | COMMUNITY
End: 2020-06-04

## 2019-12-03 RX ORDER — FLUTICASONE PROPIONATE 50 MCG
1 SPRAY, SUSPENSION (ML) NASAL DAILY
COMMUNITY

## 2019-12-03 RX ORDER — FUROSEMIDE 40 MG/1
40 TABLET ORAL DAILY
Status: ON HOLD | COMMUNITY
End: 2020-06-08 | Stop reason: SDUPTHER

## 2019-12-03 RX ORDER — LIDOCAINE 50 MG/G
1 PATCH TOPICAL DAILY PRN
COMMUNITY

## 2019-12-03 RX ORDER — LOPERAMIDE HYDROCHLORIDE 2 MG/1
2 TABLET ORAL
Status: ON HOLD | COMMUNITY
Start: 2019-11-26 | End: 2020-06-04

## 2019-12-03 RX ORDER — METHOCARBAMOL 750 MG/1
750 TABLET, FILM COATED ORAL 4 TIMES DAILY
Status: ON HOLD | COMMUNITY
End: 2020-06-08 | Stop reason: SDUPTHER

## 2019-12-03 RX ORDER — GLUC/MSM/COLGN2/HYAL/ANTIARTH3 375-375-20
TABLET ORAL
Status: ON HOLD | COMMUNITY
Start: 2019-11-26 | End: 2020-06-04 | Stop reason: DRUGHIGH

## 2019-12-03 RX ORDER — TRAMADOL HYDROCHLORIDE 50 MG/1
50 TABLET ORAL EVERY 6 HOURS PRN
Status: ON HOLD | COMMUNITY
End: 2020-06-04 | Stop reason: ALTCHOICE

## 2019-12-03 ASSESSMENT — ENCOUNTER SYMPTOMS
RESPIRATORY NEGATIVE: 1
VOMITING: 1
EYES NEGATIVE: 1

## 2019-12-04 LAB
ESTIMATED AVERAGE GLUCOSE: 142.7 MG/DL
HBA1C MFR BLD: 6.6 %

## 2019-12-06 LAB
ALPHA-TOCOPHEROL: 11 MG/L (ref 5.5–18)
GAMMA-TOCOPHEROL: 3.2 MG/L (ref 0–6)
RETINYL PALMITATE: 0.03 MG/L (ref 0–0.1)
VITAMIN A LEVEL: 0.35 MG/L (ref 0.3–1.2)
VITAMIN A, INTERP: NORMAL

## 2019-12-07 LAB — VITAMIN B1 WHOLE BLOOD: 156 NMOL/L (ref 70–180)

## 2019-12-08 LAB — VITAMIN K1: 0.74 NMOL/L (ref 0.22–4.88)

## 2019-12-12 ENCOUNTER — TELEPHONE (OUTPATIENT)
Dept: BARIATRICS/WEIGHT MGMT | Age: 54
End: 2019-12-12

## 2019-12-12 DIAGNOSIS — D50.8 OTHER IRON DEFICIENCY ANEMIA: Primary | ICD-10-CM

## 2019-12-20 ENCOUNTER — TELEPHONE (OUTPATIENT)
Dept: BARIATRICS/WEIGHT MGMT | Age: 54
End: 2019-12-20

## 2019-12-20 NOTE — TELEPHONE ENCOUNTER
Patient called and stated that her pcp would not give her an iron infusion. Spoke with Meg Almodovar and he recommended the pt to take 325mg of iron pills 2 times a day.

## 2020-01-02 NOTE — TELEPHONE ENCOUNTER
Agree, she can try that first. She had previously told me that she did not tolerate oral iron. Please advise patient that she should follow up with her PCP and/or GI doctor for a recheck of her iron levels in the next 1-2 months, sooner if she is not tolerating the oral iron.  Thanks

## 2020-01-07 ENCOUNTER — OFFICE VISIT (OUTPATIENT)
Dept: BARIATRICS/WEIGHT MGMT | Age: 55
End: 2020-01-07
Payer: MEDICARE

## 2020-01-07 VITALS
HEART RATE: 76 BPM | HEIGHT: 63 IN | DIASTOLIC BLOOD PRESSURE: 76 MMHG | SYSTOLIC BLOOD PRESSURE: 123 MMHG | BODY MASS INDEX: 44.72 KG/M2 | WEIGHT: 252.4 LBS

## 2020-01-07 PROCEDURE — G8427 DOCREV CUR MEDS BY ELIG CLIN: HCPCS | Performed by: NURSE PRACTITIONER

## 2020-01-07 PROCEDURE — 3046F HEMOGLOBIN A1C LEVEL >9.0%: CPT | Performed by: NURSE PRACTITIONER

## 2020-01-07 PROCEDURE — G8484 FLU IMMUNIZE NO ADMIN: HCPCS | Performed by: NURSE PRACTITIONER

## 2020-01-07 PROCEDURE — 3017F COLORECTAL CA SCREEN DOC REV: CPT | Performed by: NURSE PRACTITIONER

## 2020-01-07 PROCEDURE — 1036F TOBACCO NON-USER: CPT | Performed by: NURSE PRACTITIONER

## 2020-01-07 PROCEDURE — G8417 CALC BMI ABV UP PARAM F/U: HCPCS | Performed by: NURSE PRACTITIONER

## 2020-01-07 PROCEDURE — 99213 OFFICE O/P EST LOW 20 MIN: CPT | Performed by: NURSE PRACTITIONER

## 2020-01-07 PROCEDURE — 2022F DILAT RTA XM EVC RTNOPTHY: CPT | Performed by: NURSE PRACTITIONER

## 2020-01-07 ASSESSMENT — ENCOUNTER SYMPTOMS
RESPIRATORY NEGATIVE: 1
EYES NEGATIVE: 1
VOMITING: 1

## 2020-01-07 NOTE — PROGRESS NOTES
Dietary Assessment Note    Vitals:   Vitals:    20 0816   BP: 123/76   Pulse: 76   Weight: 252 lb 6.4 oz (114.5 kg)   Height: 5' 3\" (1.6 m)    Patient lost 3.8 lbs over past past month. Total Weight Loss: 67.3 lbs    Labs reviewed: No new nutrition related labs     Protein intake: ~60 g/pro     Fluid intake: currently on a 72 oz fluid restriction per cardiologist and states she is following     Multivitamin/mineral intake: MVI 2 x day, Super B complex 2 x day     Calcium intake: Rx Calcium 600 mg 2 x day     Other: Fe    Exercise: Chair exercises 5-6 x week for 1 hour    Nutrition Assessment: 4 year 7 mo s/p sleeve post-op visit. Pt recently provided with 1200 kcal post op meal plan. Pt reports she started receiving \"renal\" frozen meals from [facility] [employer] that she eats for both lunch and dinner and plans to keep receiving. Breakfast: smoothie- slimfast powder made with fairlife milk     Snack: nothing     Lunch: [employer] meals - meat and 2 veggies with extra cheese     Snack: nothing     Dinner: [employer] meals - meat and 2 veggies with extra cheese     Snack: nothing     Fluids: decaf coffee with occasional sweetener, water with carl, soda, limited seltzer water, slimfast smoothies    Amount able to eat per sittin/2 - 3/4 cup/sitting. Pt states she is consuming smaller portions     Following 30/30/30 rule: Slowing down eating, does not eat and drink at the same time, takes sips prn     Food Intolerances/issues: tomato sauce, bread, some red meat, most juices, asparagus. Pt is allergic to shellfish. Client Concerns: states she throws up about 6 x week, constant GERD- improving with new medication.      Goals:   Avoid carbonated beverages  Continue with exercise  Continue meal planning and prepping  Add in protein based snack    Pt requesting additional copy of 1200 calorie post op meal plan and provided to pt    Plan: F/U annually and prn     Gilbert Olivas
 Iodine Anaphylaxis, Hives and Shortness Of Breath     INCLUDES BETADINE  Iv dye. Pt has not had any reaction w/ dye from CT scans for 10 years. Per dr. Guadalupe Goddard contrast (omni 300) is fine to use. 8/14/19 - lilibeth      Lurasidone Other (See Comments)    Lurasidone Hcl      Other reaction(s): Unknown    Nitrofurantoin Hives and Shortness Of Breath    Povidone-Iodine Hives and Nausea And Vomiting    Shellfish Allergy Anaphylaxis    Shellfish-Derived Products Anaphylaxis    Iodides      Pre treat with benadryl    Lamotrigine Hives    Macrobid [Nitrofurantoin Macrocrystal] Hives    Morphine      -Narcotics LOWERS SIEZURE THRESHLD    Pyridium [Phenazopyridine Hcl] Hives    Topiramate Other (See Comments)     Confusion      Vitals:    01/07/20 0816   BP: 123/76   Pulse: 76   Weight: 252 lb 6.4 oz (114.5 kg)   Height: 5' 3\" (1.6 m)       Body mass index is 44.71 kg/m².       Current Outpatient Medications:     esomeprazole Magnesium (NEXIUM) 40 MG PACK, Take 40 mg by mouth daily, Disp: , Rfl:     methocarbamol (ROBAXIN) 750 MG tablet, Take 750 mg by mouth 4 times daily, Disp: , Rfl:     furosemide (LASIX) 40 MG tablet, Take 40 mg by mouth 2 times daily, Disp: , Rfl:     metFORMIN (GLUCOPHAGE) 500 MG tablet, Take 500 mg by mouth 2 times daily (with meals), Disp: , Rfl:     lidocaine (LIDODERM) 5 %, Place 1 patch onto the skin daily 12 hours on, 12 hours off., Disp: , Rfl:     fluticasone (FLONASE) 50 MCG/ACT nasal spray, 1 spray by Each Nostril route daily, Disp: , Rfl:     diclofenac sodium (VOLTAREN) 1 % GEL, Apply 2 g topically 2 times daily, Disp: , Rfl:     traMADol (ULTRAM) 50 MG tablet, Take 50 mg by mouth every 6 hours as needed for Pain., Disp: , Rfl:     loperamide (IMODIUM A-D) 2 MG tablet, Take 2 tablets by mouth, Disp: , Rfl:     Calcium Carbonate-Vitamin D 600-200 MG-UNIT CAPS, One tablet 2 times a day., Disp: , Rfl:     tiZANidine (ZANAFLEX) 4 MG tablet, Take 4 mg by mouth every 6 hours

## 2020-01-20 ENCOUNTER — HOSPITAL ENCOUNTER (INPATIENT)
Age: 55
LOS: 7 days | Discharge: HOME | DRG: 885 | End: 2020-01-27
Attending: PSYCHIATRY & NEUROLOGY | Admitting: PSYCHIATRY & NEUROLOGY
Payer: MEDICARE

## 2020-01-20 ENCOUNTER — HOSPITAL ENCOUNTER (EMERGENCY)
Age: 55
Discharge: PSYCHIATRIC OR SUBSTANCE USE FACILITY | End: 2020-01-20
Attending: EMERGENCY MEDICINE
Payer: MEDICARE

## 2020-01-20 VITALS
TEMPERATURE: 97.2 F | WEIGHT: 252 LBS | OXYGEN SATURATION: 95 % | DIASTOLIC BLOOD PRESSURE: 84 MMHG | HEIGHT: 63 IN | BODY MASS INDEX: 44.65 KG/M2 | RESPIRATION RATE: 18 BRPM | SYSTOLIC BLOOD PRESSURE: 141 MMHG | HEART RATE: 78 BPM

## 2020-01-20 PROBLEM — F32.2 MDD (MAJOR DEPRESSIVE DISORDER), SEVERE (HCC): Status: ACTIVE | Noted: 2020-01-20

## 2020-01-20 LAB
A/G RATIO: 1 (ref 1.1–2.2)
ACETAMINOPHEN LEVEL: <5 UG/ML (ref 10–30)
ALBUMIN SERPL-MCNC: 3.6 G/DL (ref 3.4–5)
ALP BLD-CCNC: 71 U/L (ref 40–129)
ALT SERPL-CCNC: 16 U/L (ref 10–40)
AMPHETAMINE SCREEN, URINE: NORMAL
ANION GAP SERPL CALCULATED.3IONS-SCNC: 11 MMOL/L (ref 3–16)
AST SERPL-CCNC: 18 U/L (ref 15–37)
BARBITURATE SCREEN URINE: NORMAL
BASOPHILS ABSOLUTE: 0.1 K/UL (ref 0–0.2)
BASOPHILS RELATIVE PERCENT: 1.3 %
BENZODIAZEPINE SCREEN, URINE: NORMAL
BILIRUB SERPL-MCNC: <0.2 MG/DL (ref 0–1)
BILIRUBIN URINE: NEGATIVE
BLOOD, URINE: NEGATIVE
BUN BLDV-MCNC: 10 MG/DL (ref 7–20)
CALCIUM SERPL-MCNC: 8.7 MG/DL (ref 8.3–10.6)
CANNABINOID SCREEN URINE: NORMAL
CHLORIDE BLD-SCNC: 103 MMOL/L (ref 99–110)
CLARITY: CLEAR
CO2: 26 MMOL/L (ref 21–32)
COCAINE METABOLITE SCREEN URINE: NORMAL
COLOR: YELLOW
CREAT SERPL-MCNC: 0.5 MG/DL (ref 0.6–1.1)
EKG ATRIAL RATE: 81 BPM
EKG DIAGNOSIS: NORMAL
EKG P AXIS: 62 DEGREES
EKG P-R INTERVAL: 156 MS
EKG Q-T INTERVAL: 438 MS
EKG QRS DURATION: 92 MS
EKG QTC CALCULATION (BAZETT): 508 MS
EKG R AXIS: -10 DEGREES
EKG T AXIS: -65 DEGREES
EKG VENTRICULAR RATE: 81 BPM
EOSINOPHILS ABSOLUTE: 0.3 K/UL (ref 0–0.6)
EOSINOPHILS RELATIVE PERCENT: 4.3 %
ETHANOL: NORMAL MG/DL (ref 0–0.08)
GFR AFRICAN AMERICAN: >60
GFR NON-AFRICAN AMERICAN: >60
GLOBULIN: 3.5 G/DL
GLUCOSE BLD-MCNC: 127 MG/DL (ref 70–99)
GLUCOSE URINE: NEGATIVE MG/DL
HCT VFR BLD CALC: 37.6 % (ref 36–48)
HEMOGLOBIN: 12 G/DL (ref 12–16)
KETONES, URINE: NEGATIVE MG/DL
LEUKOCYTE ESTERASE, URINE: NEGATIVE
LYMPHOCYTES ABSOLUTE: 2.3 K/UL (ref 1–5.1)
LYMPHOCYTES RELATIVE PERCENT: 32 %
Lab: NORMAL
MCH RBC QN AUTO: 25.3 PG (ref 26–34)
MCHC RBC AUTO-ENTMCNC: 31.8 G/DL (ref 31–36)
MCV RBC AUTO: 79.7 FL (ref 80–100)
METHADONE SCREEN, URINE: NORMAL
MICROSCOPIC EXAMINATION: NORMAL
MONOCYTES ABSOLUTE: 0.4 K/UL (ref 0–1.3)
MONOCYTES RELATIVE PERCENT: 4.9 %
NEUTROPHILS ABSOLUTE: 4.2 K/UL (ref 1.7–7.7)
NEUTROPHILS RELATIVE PERCENT: 57.5 %
NITRITE, URINE: NEGATIVE
OPIATE SCREEN URINE: NORMAL
OXYCODONE URINE: NORMAL
PDW BLD-RTO: 22.6 % (ref 12.4–15.4)
PH UA: 5.5
PH UA: 5.5 (ref 5–8)
PHENCYCLIDINE SCREEN URINE: NORMAL
PLATELET # BLD: 236 K/UL (ref 135–450)
PMV BLD AUTO: 8.1 FL (ref 5–10.5)
POTASSIUM SERPL-SCNC: 3.4 MMOL/L (ref 3.5–5.1)
PROPOXYPHENE SCREEN: NORMAL
PROTEIN UA: NEGATIVE MG/DL
RBC # BLD: 4.72 M/UL (ref 4–5.2)
SALICYLATE, SERUM: <0.3 MG/DL (ref 15–30)
SODIUM BLD-SCNC: 140 MMOL/L (ref 136–145)
SPECIFIC GRAVITY UA: 1 (ref 1–1.03)
TOTAL PROTEIN: 7.1 G/DL (ref 6.4–8.2)
URINE REFLEX TO CULTURE: NORMAL
URINE TYPE: NORMAL
UROBILINOGEN, URINE: 0.2 E.U./DL
WBC # BLD: 7.3 K/UL (ref 4–11)

## 2020-01-20 PROCEDURE — G0480 DRUG TEST DEF 1-7 CLASSES: HCPCS

## 2020-01-20 PROCEDURE — 80053 COMPREHEN METABOLIC PANEL: CPT

## 2020-01-20 PROCEDURE — 93005 ELECTROCARDIOGRAM TRACING: CPT | Performed by: PHYSICIAN ASSISTANT

## 2020-01-20 PROCEDURE — 85025 COMPLETE CBC W/AUTO DIFF WBC: CPT

## 2020-01-20 PROCEDURE — 1240000000 HC EMOTIONAL WELLNESS R&B

## 2020-01-20 PROCEDURE — 80307 DRUG TEST PRSMV CHEM ANLYZR: CPT

## 2020-01-20 PROCEDURE — 93010 ELECTROCARDIOGRAM REPORT: CPT | Performed by: INTERNAL MEDICINE

## 2020-01-20 PROCEDURE — 81003 URINALYSIS AUTO W/O SCOPE: CPT

## 2020-01-20 PROCEDURE — 99291 CRITICAL CARE FIRST HOUR: CPT

## 2020-01-20 ASSESSMENT — ENCOUNTER SYMPTOMS
WHEEZING: 0
ABDOMINAL DISTENTION: 0
NAUSEA: 0
SHORTNESS OF BREATH: 0
COUGH: 0
DIARRHEA: 0
STRIDOR: 0
CONSTIPATION: 0
ABDOMINAL PAIN: 0
VOMITING: 0

## 2020-01-20 NOTE — ED NOTES
Provided resources for mental health treatment services at [facility]. Patient was receptive to information provided.       Oliver Catherine  01/20/20 9449

## 2020-01-20 NOTE — ED NOTES
Pt. Clothing and belongings collected, room made safe by removing all cords and equipment. ED tech at bedside for constant .       Cristhian Cruz RN  01/20/20 6496

## 2020-01-20 NOTE — ED PROVIDER NOTES
[facility]        Pt Name: Oscar Robles  MRN: [id number]  [place] 1965  Date of evaluation: 1/20/2020  Provider: Reese Velez PA-C  PCP: Phoebe Cadena MD    This patient was seen and evaluated by the attending physician Abdullahi Fang MD.      [address] [place] [address]       Chief Complaint   Patient presents with    Depression     pt states she has been battling depression x 1 week but worse the last 24hrs. pt has experienced this once before approx 3yrs ago. pt states she has thought out a plan. HISTORY OF PRESENT ILLNESS   (Location/Symptom, Timing/Onset, Context/Setting, Quality, Duration, Modifying Factors, Severity)  Note limiting factors. Oscar Robles is a 47 y.o. female with history of depression who presents to the emergency department stating that she has been feeling very depressed for the past week. There are certain circumstances in her life that are making her depression worse. Particularly, with family and financial issues, she has had difficulty coping with this. She has been feeling suicidal for the past 24 hours and has had several plans including running her car off the road, slitting her wrists or stabbing her abdomen, or putting a plastic bag over her head to suffocate herself. She has a family doctor but no current psychiatrist.    Nursing Notes were all reviewed and agreed with or any disagreements were addressed in the HPI. REVIEW OF SYSTEMS    (2-9 systems for level 4, 10 or more for level 5)     Review of Systems   Constitutional: Negative for chills and fever. HENT: Negative. Eyes: Negative for visual disturbance. Respiratory: Negative for cough, shortness of breath, wheezing and stridor. Cardiovascular: Negative for chest pain, palpitations and leg swelling.    Gastrointestinal: Negative for abdominal distention, abdominal pain, constipation, diarrhea, nausea and Physical Exam  Vitals signs and nursing note reviewed. Constitutional:       Appearance: Normal appearance. She is well-developed. She is not toxic-appearing or diaphoretic. HENT:      Head: Normocephalic and atraumatic. Right Ear: External ear normal.      Left Ear: External ear normal.      Nose: Nose normal.      Mouth/Throat:      Mouth: Mucous membranes are moist.      Pharynx: Oropharynx is clear. Eyes:      General: No scleral icterus. Right eye: No discharge. Left eye: No discharge. Extraocular Movements: Extraocular movements intact. Conjunctiva/sclera: Conjunctivae normal.      Pupils: Pupils are equal, round, and reactive to light. Neck:      Musculoskeletal: Normal range of motion. Cardiovascular:      Rate and Rhythm: Normal rate. Pulmonary:      Effort: Pulmonary effort is normal.      Breath sounds: Normal breath sounds. Abdominal:      General: Bowel sounds are normal.      Palpations: Abdomen is soft. Tenderness: There is no tenderness. Musculoskeletal: Normal range of motion. Skin:     General: Skin is warm and dry. Capillary Refill: Capillary refill takes less than 2 seconds. Coloration: Skin is not jaundiced or pale. Findings: No bruising, erythema, lesion or rash. Neurological:      General: No focal deficit present. Mental Status: She is alert and oriented to person, place, and time. Psychiatric:         Attention and Perception: Attention and perception normal.         Mood and Affect: Mood is anxious and depressed. Speech: Speech normal.         Behavior: Behavior normal. Behavior is cooperative. Thought Content: Thought content includes suicidal ideation. Thought content includes suicidal plan.          Cognition and Memory: Cognition and memory normal.         Judgment: Judgment normal.         DIAGNOSTIC RESULTS   LABS:    Labs Reviewed   CBC WITH AUTO DIFFERENTIAL - Abnormal; Notable for the Non-plain film images such as CT, Ultrasound and MRI are read by the radiologist. Plain radiographic images are visualized and preliminarily interpreted by the  ED Provider with the below findings:        Interpretation per the Radiologist below, if available at the time of this note:    No orders to display     No results found. PROCEDURES   Unless otherwise noted below, none     Procedures    CRITICAL CARE TIME   Critical Care  There was a high probability of life-threatening clinical deterioration in the patient's condition requiring my urgent intervention. Total critical care time with the patient was 31 minutes excluding separately reportable procedures. Critical care required due to patients SI with severe depression after a lengthy conversation with the patient, consideration of medical management, workup for clearance, suicide precautions and consultation for transfer. CONSULTS:  Consult for psych. I spoke with Dr. Lonny Elder from [facility] psych at [phone] and discussed the case in length. He has accepted this patient for transport to their facility. EMERGENCY DEPARTMENT COURSE and DIFFERENTIAL DIAGNOSIS/MDM:   Vitals:    Vitals:    01/20/20 1307 01/20/20 1422   BP: (!) 141/99 116/67   Pulse: 79 81   Resp: 17 16   Temp: 97.2 °F (36.2 °C)    SpO2: 96% 97%   Weight: 252 lb (114.3 kg)    Height: 5' 3\" (1.6 m)        Patient was given thefollowing medications:  Medications - No data to display    This patient presents with suicidal ideation and severe depression. She does have a plan. We do feel transfer to psychiatric facility is appropriate at this time given her presentation. My suspicion is low for overdose, DKA, hypoglycemia, acute intracranial abnormality or other concerning pathology. FINAL IMPRESSION      1.  Depression with suicidal ideation          DISPOSITION/PLAN   DISPOSITION Decision To Transfer 01/20/2020 02:36:14 PM      PATIENT REFERREDTO:  No follow-up provider specified.     DISCHARGE MEDICATIONS:  New Prescriptions    No medications on file       DISCONTINUED MEDICATIONS:  Discontinued Medications    No medications on file              (Please note that portions of this note were completed with a voice recognition program.  Efforts were made to edit the dictations but occasionally words are mis-transcribed.)    Patty Hull PA-C (electronically signed)            Patty Hull PA-C  01/20/20 [address] [place] [address], PRASHANTH  01/20/20 2567

## 2020-01-20 NOTE — ED PROVIDER NOTES
I independently performed a history and physical on Beth Branch. All diagnostic, treatment, and disposition decisions were made by myself in conjunction with the advanced practice provider. Briefly, this is a 47 y.o. female here for depression. For the past several days to weeks she is been feeling down and having progressive thoughts of suicide. A lot of family and psychosocial life stresses from job and from family has never had any attempted suicide in the past has not seen a psychiatrist here locally and is getting to a place where she is concerned she may actually attempt. On exam, WDWN F< NAD, + SI. Heart RRR, Lungs CTAB. No r/r/w. EKG  EKG reviewed by myself. Dated today at [id number]. Rate 81 sinus rhythm. Some ST depressions in lead II and some inversions in V2 and V3. These are new from 14 January 2019. Screenings            MDM  SI. Benign exam.  Doing medical clearance, will call psychiatry. Patient Referrals:  No follow-up provider specified. Discharge Medications:  New Prescriptions    No medications on file       FINAL IMPRESSION  1. Depression with suicidal ideation        Blood pressure 130/69, pulse 64, temperature 97.2 °F (36.2 °C), resp. rate 18, height 5' 3\" (1.6 m), weight 252 lb (114.3 kg), SpO2 98 %, not currently breastfeeding. For further details of [facility] emergency department encounter, please see documentation by advanced practice provider, Debbie Jones.        Mick Marshall MD  01/20/20 8710

## 2020-01-21 PROBLEM — E87.6 HYPOKALEMIA: Status: ACTIVE | Noted: 2020-01-21

## 2020-01-21 PROBLEM — I48.0 PAF (PAROXYSMAL ATRIAL FIBRILLATION) (HCC): Status: ACTIVE | Noted: 2020-01-21

## 2020-01-21 LAB
ANION GAP SERPL CALCULATED.3IONS-SCNC: 12 MMOL/L (ref 3–16)
BUN BLDV-MCNC: 11 MG/DL (ref 7–20)
CALCIUM SERPL-MCNC: 9.2 MG/DL (ref 8.3–10.6)
CHLORIDE BLD-SCNC: 99 MMOL/L (ref 99–110)
CO2: 28 MMOL/L (ref 21–32)
CREAT SERPL-MCNC: 0.6 MG/DL (ref 0.6–1.1)
EKG ATRIAL RATE: 72 BPM
EKG DIAGNOSIS: NORMAL
EKG P AXIS: 51 DEGREES
EKG P-R INTERVAL: 158 MS
EKG Q-T INTERVAL: 398 MS
EKG QRS DURATION: 92 MS
EKG QTC CALCULATION (BAZETT): 435 MS
EKG R AXIS: 7 DEGREES
EKG T AXIS: -66 DEGREES
EKG VENTRICULAR RATE: 72 BPM
GFR AFRICAN AMERICAN: >60
GFR NON-AFRICAN AMERICAN: >60
GLUCOSE BLD-MCNC: 96 MG/DL (ref 70–99)
MAGNESIUM: 2.1 MG/DL (ref 1.8–2.4)
POTASSIUM SERPL-SCNC: 3.9 MMOL/L (ref 3.5–5.1)
SODIUM BLD-SCNC: 139 MMOL/L (ref 136–145)

## 2020-01-21 PROCEDURE — 97535 SELF CARE MNGMENT TRAINING: CPT

## 2020-01-21 PROCEDURE — 6370000000 HC RX 637 (ALT 250 FOR IP): Performed by: PHYSICIAN ASSISTANT

## 2020-01-21 PROCEDURE — 93005 ELECTROCARDIOGRAM TRACING: CPT | Performed by: PSYCHIATRY & NEUROLOGY

## 2020-01-21 PROCEDURE — 80048 BASIC METABOLIC PNL TOTAL CA: CPT

## 2020-01-21 PROCEDURE — 6370000000 HC RX 637 (ALT 250 FOR IP): Performed by: PSYCHIATRY & NEUROLOGY

## 2020-01-21 PROCEDURE — 99223 1ST HOSP IP/OBS HIGH 75: CPT | Performed by: PSYCHIATRY & NEUROLOGY

## 2020-01-21 PROCEDURE — 1240000000 HC EMOTIONAL WELLNESS R&B

## 2020-01-21 PROCEDURE — 36415 COLL VENOUS BLD VENIPUNCTURE: CPT

## 2020-01-21 PROCEDURE — 93010 ELECTROCARDIOGRAM REPORT: CPT | Performed by: INTERNAL MEDICINE

## 2020-01-21 PROCEDURE — 83735 ASSAY OF MAGNESIUM: CPT

## 2020-01-21 PROCEDURE — 97165 OT EVAL LOW COMPLEX 30 MIN: CPT

## 2020-01-21 PROCEDURE — 99222 1ST HOSP IP/OBS MODERATE 55: CPT | Performed by: PHYSICIAN ASSISTANT

## 2020-01-21 RX ORDER — BENZTROPINE MESYLATE 1 MG/ML
2 INJECTION INTRAMUSCULAR; INTRAVENOUS 2 TIMES DAILY PRN
Status: DISCONTINUED | OUTPATIENT
Start: 2020-01-21 | End: 2020-01-27 | Stop reason: HOSPADM

## 2020-01-21 RX ORDER — HYDROXYZINE PAMOATE 25 MG/1
50 CAPSULE ORAL EVERY 6 HOURS PRN
Status: DISCONTINUED | OUTPATIENT
Start: 2020-01-21 | End: 2020-01-27 | Stop reason: HOSPADM

## 2020-01-21 RX ORDER — LIDOCAINE 4 G/G
1 PATCH TOPICAL DAILY
Status: DISCONTINUED | OUTPATIENT
Start: 2020-01-21 | End: 2020-01-27 | Stop reason: HOSPADM

## 2020-01-21 RX ORDER — GABAPENTIN 400 MG/1
1200 CAPSULE ORAL 3 TIMES DAILY
Status: DISCONTINUED | OUTPATIENT
Start: 2020-01-21 | End: 2020-01-27 | Stop reason: HOSPADM

## 2020-01-21 RX ORDER — CARVEDILOL 6.25 MG/1
12.5 TABLET ORAL 2 TIMES DAILY WITH MEALS
Status: DISCONTINUED | OUTPATIENT
Start: 2020-01-21 | End: 2020-01-27 | Stop reason: HOSPADM

## 2020-01-21 RX ORDER — DULOXETIN HYDROCHLORIDE 30 MG/1
30 CAPSULE, DELAYED RELEASE ORAL 2 TIMES DAILY
Status: DISCONTINUED | OUTPATIENT
Start: 2020-01-21 | End: 2020-01-24

## 2020-01-21 RX ORDER — CETIRIZINE HYDROCHLORIDE 10 MG/1
10 TABLET ORAL DAILY
Status: DISCONTINUED | OUTPATIENT
Start: 2020-01-21 | End: 2020-01-27 | Stop reason: HOSPADM

## 2020-01-21 RX ORDER — FLUTICASONE PROPIONATE 50 MCG
1 SPRAY, SUSPENSION (ML) NASAL DAILY
Status: DISCONTINUED | OUTPATIENT
Start: 2020-01-21 | End: 2020-01-27 | Stop reason: HOSPADM

## 2020-01-21 RX ORDER — METHOCARBAMOL 500 MG/1
750 TABLET, FILM COATED ORAL 4 TIMES DAILY
Status: DISCONTINUED | OUTPATIENT
Start: 2020-01-21 | End: 2020-01-22 | Stop reason: DRUGHIGH

## 2020-01-21 RX ORDER — OLANZAPINE 10 MG/1
10 INJECTION, POWDER, LYOPHILIZED, FOR SOLUTION INTRAMUSCULAR 3 TIMES DAILY PRN
Status: DISCONTINUED | OUTPATIENT
Start: 2020-01-21 | End: 2020-01-27 | Stop reason: HOSPADM

## 2020-01-21 RX ORDER — PANTOPRAZOLE SODIUM 40 MG/1
40 TABLET, DELAYED RELEASE ORAL
Status: DISCONTINUED | OUTPATIENT
Start: 2020-01-21 | End: 2020-01-27 | Stop reason: HOSPADM

## 2020-01-21 RX ORDER — OXCARBAZEPINE 300 MG/1
600 TABLET, FILM COATED ORAL 2 TIMES DAILY
Status: DISCONTINUED | OUTPATIENT
Start: 2020-01-21 | End: 2020-01-21

## 2020-01-21 RX ORDER — TRAMADOL HYDROCHLORIDE 50 MG/1
50 TABLET ORAL 2 TIMES DAILY PRN
Status: DISCONTINUED | OUTPATIENT
Start: 2020-01-21 | End: 2020-01-27 | Stop reason: HOSPADM

## 2020-01-21 RX ORDER — FUROSEMIDE 40 MG/1
40 TABLET ORAL DAILY
Status: DISCONTINUED | OUTPATIENT
Start: 2020-01-21 | End: 2020-01-27 | Stop reason: HOSPADM

## 2020-01-21 RX ORDER — TRAZODONE HYDROCHLORIDE 50 MG/1
50 TABLET ORAL NIGHTLY PRN
Status: DISCONTINUED | OUTPATIENT
Start: 2020-01-21 | End: 2020-01-27 | Stop reason: HOSPADM

## 2020-01-21 RX ORDER — LOPERAMIDE HYDROCHLORIDE 2 MG/1
2 CAPSULE ORAL 4 TIMES DAILY PRN
Status: DISCONTINUED | OUTPATIENT
Start: 2020-01-21 | End: 2020-01-27 | Stop reason: HOSPADM

## 2020-01-21 RX ORDER — MAGNESIUM HYDROXIDE/ALUMINUM HYDROXICE/SIMETHICONE 120; 1200; 1200 MG/30ML; MG/30ML; MG/30ML
30 SUSPENSION ORAL EVERY 6 HOURS PRN
Status: DISCONTINUED | OUTPATIENT
Start: 2020-01-21 | End: 2020-01-27 | Stop reason: HOSPADM

## 2020-01-21 RX ORDER — ACETAMINOPHEN 325 MG/1
650 TABLET ORAL EVERY 4 HOURS PRN
Status: DISCONTINUED | OUTPATIENT
Start: 2020-01-21 | End: 2020-01-27 | Stop reason: HOSPADM

## 2020-01-21 RX ORDER — CALCIUM CARBONATE 200(500)MG
500 TABLET,CHEWABLE ORAL DAILY
Status: DISCONTINUED | OUTPATIENT
Start: 2020-01-21 | End: 2020-01-27 | Stop reason: HOSPADM

## 2020-01-21 RX ORDER — OLANZAPINE 5 MG/1
5 TABLET ORAL EVERY 4 HOURS PRN
Status: DISCONTINUED | OUTPATIENT
Start: 2020-01-21 | End: 2020-01-27 | Stop reason: HOSPADM

## 2020-01-21 RX ORDER — SPIRONOLACTONE 25 MG/1
25 TABLET ORAL 2 TIMES DAILY
Status: DISCONTINUED | OUTPATIENT
Start: 2020-01-21 | End: 2020-01-21

## 2020-01-21 RX ORDER — GABAPENTIN 400 MG/1
1200 CAPSULE ORAL DAILY PRN
Status: DISCONTINUED | OUTPATIENT
Start: 2020-01-21 | End: 2020-01-27 | Stop reason: HOSPADM

## 2020-01-21 RX ADMIN — METFORMIN HYDROCHLORIDE 500 MG: 500 TABLET ORAL at 17:40

## 2020-01-21 RX ADMIN — METHOCARBAMOL TABLETS 750 MG: 500 TABLET, COATED ORAL at 20:22

## 2020-01-21 RX ADMIN — GABAPENTIN 1200 MG: 400 CAPSULE ORAL at 15:26

## 2020-01-21 RX ADMIN — CARVEDILOL 12.5 MG: 6.25 TABLET, FILM COATED ORAL at 17:40

## 2020-01-21 RX ADMIN — CALCIUM CARBONATE 500 MG: 500 TABLET, CHEWABLE ORAL at 15:33

## 2020-01-21 RX ADMIN — DULOXETINE HYDROCHLORIDE 30 MG: 30 CAPSULE, DELAYED RELEASE ORAL at 13:09

## 2020-01-21 RX ADMIN — FLUTICASONE PROPIONATE 1 SPRAY: 50 SPRAY, METERED NASAL at 13:13

## 2020-01-21 RX ADMIN — PANTOPRAZOLE SODIUM 40 MG: 40 TABLET, DELAYED RELEASE ORAL at 13:09

## 2020-01-21 RX ADMIN — FUROSEMIDE 40 MG: 40 TABLET ORAL at 12:24

## 2020-01-21 RX ADMIN — B-COMPLEX W/ C & FOLIC ACID TAB 1 TABLET: TAB at 13:10

## 2020-01-21 RX ADMIN — CARVEDILOL 12.5 MG: 6.25 TABLET, FILM COATED ORAL at 12:24

## 2020-01-21 RX ADMIN — CETIRIZINE HYDROCHLORIDE 10 MG: 10 TABLET ORAL at 13:10

## 2020-01-21 RX ADMIN — METHOCARBAMOL TABLETS 750 MG: 500 TABLET, COATED ORAL at 17:40

## 2020-01-21 RX ADMIN — DULOXETINE HYDROCHLORIDE 30 MG: 30 CAPSULE, DELAYED RELEASE ORAL at 20:21

## 2020-01-21 RX ADMIN — GABAPENTIN 1200 MG: 400 CAPSULE ORAL at 20:21

## 2020-01-21 RX ADMIN — RIVAROXABAN 20 MG: 20 TABLET, FILM COATED ORAL at 13:09

## 2020-01-21 RX ADMIN — METHOCARBAMOL TABLETS 750 MG: 500 TABLET, COATED ORAL at 13:10

## 2020-01-21 ASSESSMENT — SLEEP AND FATIGUE QUESTIONNAIRES
DO YOU USE A SLEEP AID: YES
DIFFICULTY FALLING ASLEEP: YES
SLEEP PATTERN: DIFFICULTY FALLING ASLEEP;DISTURBED/INTERRUPTED SLEEP;INSOMNIA;RESTLESSNESS
DO YOU HAVE DIFFICULTY SLEEPING: YES
AVERAGE NUMBER OF SLEEP HOURS: 6
DIFFICULTY FALLING ASLEEP: YES
SLEEP PATTERN: DIFFICULTY FALLING ASLEEP;DISTURBED/INTERRUPTED SLEEP;EARLY AWAKENING
DIFFICULTY ARISING: NO
DO YOU USE A SLEEP AID: NO
RESTFUL SLEEP: NO
DIFFICULTY STAYING ASLEEP: YES
DIFFICULTY STAYING ASLEEP: YES
DO YOU HAVE DIFFICULTY SLEEPING: YES
RESTFUL SLEEP: NO

## 2020-01-21 ASSESSMENT — PAIN SCALES - GENERAL: PAINLEVEL_OUTOF10: 3

## 2020-01-21 ASSESSMENT — PATIENT HEALTH QUESTIONNAIRE - PHQ9
2. FEELING DOWN, DEPRESSED OR HOPELESS: 2
3. TROUBLE FALLING OR STAYING ASLEEP: 0
1. LITTLE INTEREST OR PLEASURE IN DOING THINGS: 2
8. MOVING OR SPEAKING SO SLOWLY THAT OTHER PEOPLE COULD HAVE NOTICED. OR THE OPPOSITE, BEING SO FIGETY OR RESTLESS THAT YOU HAVE BEEN MOVING AROUND A LOT MORE THAN USUAL: 0
SUM OF ALL RESPONSES TO PHQ9 QUESTIONS 1 & 2: 4
SUM OF ALL RESPONSES TO PHQ QUESTIONS 1-9: 8
SUM OF ALL RESPONSES TO PHQ QUESTIONS 1-9: 8
6. FEELING BAD ABOUT YOURSELF - OR THAT YOU ARE A FAILURE OR HAVE LET YOURSELF OR YOUR FAMILY DOWN: 2
4. FEELING TIRED OR HAVING LITTLE ENERGY: 0
7. TROUBLE CONCENTRATING ON THINGS, SUCH AS READING THE NEWSPAPER OR WATCHING TELEVISION: 1
10. IF YOU CHECKED OFF ANY PROBLEMS, HOW DIFFICULT HAVE THESE PROBLEMS MADE IT FOR YOU TO DO YOUR WORK, TAKE CARE OF THINGS AT HOME, OR GET ALONG WITH OTHER PEOPLE: 1
9. THOUGHTS THAT YOU WOULD BE BETTER OFF DEAD, OR OF HURTING YOURSELF: 1
5. POOR APPETITE OR OVEREATING: 0

## 2020-01-21 ASSESSMENT — PAIN DESCRIPTION - PROGRESSION: CLINICAL_PROGRESSION: GRADUALLY WORSENING

## 2020-01-21 ASSESSMENT — LIFESTYLE VARIABLES
HISTORY_ALCOHOL_USE: NO
HISTORY_ALCOHOL_USE: NO

## 2020-01-21 ASSESSMENT — PAIN DESCRIPTION - ONSET: ONSET: ON-GOING

## 2020-01-21 ASSESSMENT — PAIN - FUNCTIONAL ASSESSMENT: PAIN_FUNCTIONAL_ASSESSMENT: ACTIVITIES ARE NOT PREVENTED

## 2020-01-21 ASSESSMENT — PAIN DESCRIPTION - ORIENTATION: ORIENTATION: LOWER

## 2020-01-21 ASSESSMENT — PAIN DESCRIPTION - DESCRIPTORS: DESCRIPTORS: ACHING

## 2020-01-21 ASSESSMENT — PAIN DESCRIPTION - PAIN TYPE: TYPE: CHRONIC PAIN

## 2020-01-21 ASSESSMENT — PAIN DESCRIPTION - LOCATION: LOCATION: BACK

## 2020-01-21 ASSESSMENT — PAIN DESCRIPTION - FREQUENCY: FREQUENCY: CONTINUOUS
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
## 2020-01-21 NOTE — PROGRESS NOTES
applicable and completed (first 3 are required if patient doesn't refuse):            ( )  Recognizing danger situations (included triggers and roadblocks)                    ( )  Coping skills (new ways to manage stress, exercise, relaxation techniques, changing routine, distraction)                                                           ( )  Basic information about quitting (benefits of quitting, techniques in how to quit, available resources  ( ) Referral for counseling faxed to [facility]                                           ( ) Patient refused counseling  (X ) Patient has not smoked in the last 30 days    Metabolic Screening:    Lab Results   Component Value Date    LABA1C 6.6 12/03/2019       Lab Results   Component Value Date    CHOL 171 12/03/2019    CHOL 268 (H) 01/15/2019     Lab Results   Component Value Date    TRIG 162 (H) 12/03/2019    TRIG 103 01/15/2019     Lab Results   Component Value Date    HDL 41 12/03/2019    HDL 55 01/15/2019     No components found for: LDLCAL  Lab Results   Component Value Date    LABVLDL 32 12/03/2019    LABVLDL 21 01/15/2019         There is no height or weight on file to calculate BMI. BP Readings from Last 2 Encounters:   01/21/20 (!) 161/81   01/20/20 (!) 141/84                 No medications were brought to the [facility] with her tonight. Patient oriented to surroundings and program expectations and copy of patient rights given. Received admission packet:  YES. Consents reviewed, signed YES. Refused NO. Patient verbalize understanding:  YES.     Patient education on precautions: YES                     Goal for Hospitalization:  \"I hope to feel better, and I'd like to get into outpatient counseling for when I leave the hospital.\"    Nick Pak RN

## 2020-01-21 NOTE — PLAN OF CARE
Pt has been visible and social on the milieu. She has been frustrated by some other patients who are being loud and talking inappropriately. She has attended groups and is focusing on feeling better. Will monitor.   Ericka BALTAZAR, RN-BC

## 2020-01-21 NOTE — GROUP NOTE
Group Therapy Note    Date: 1/21/2020    Group Start Time: 9253  Group End Time: 8701  [facility] [address] [facility]        Group Therapy Note    Attendees: 10         Patient's Goal:  Patient will complete worksheet on boundaries and will discuss how they apply to mental wellbeing. Notes:  Patient attended group. Completed the worksheet and discussed in group. Verbalized a basic understanding of boundaries and talked about problems she has had in the past with poor boundaries. Status After Intervention:  Improved    Participation Level:  Active Listener and Interactive    Participation Quality: Appropriate and Attentive    Speech:  normal    Thought Process/Content: Logical    Affective Functioning: Congruent    Mood: anxious, depressed     Level of consciousness:  Oriented x4    Response to Learning: Able to verbalize current knowledge/experience and Able to verbalize/acknowledge new learning    Endings: None Reported    Modes of Intervention: Education, Support, Socialization and Exploration    Discipline Responsible: /Counselor    Signature:  [facility]
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
## 2020-01-21 NOTE — PROGRESS NOTES
Inpatient Occupational Therapy  Evaluation and Treatment    Unit:  [facility]  Date:  1/21/2020  Patient Name:    Anjali Hair  Admitting diagnosis:  MDD (major depressive disorder), severe ([facility] [F32.2]  Admit Date:  1/20/2020  Precautions/Restrictions/WB Status/ Lines/ Wounds/ Oxygen:  Up as tolerated; hx seizures  Treatment Time:  10:24-11:45  Treatment Number:  1    Patient Goals for Therapy:  \" Learn better coping skills. \"  \"Improve communication with  and daughters. \"    Recommendations made to nsg for pts [facility] stay:  Recommend shower chair with back, BSC over toilet, hospital bed and supervision for shower. RW for use at night/mornings secondary to patients report of feeling more unstable when getting out of bed. Nsg verbalized understanding. Discharge Recommendations:  Home with assist prn    DME needs for discharge:   NA     AM-PAC Score:   22     Home Health S4 Level:   NA     ACLS:  Mode 4.6  Engaging Abilities and Following Safety Precautions When the Person Can Scan the Environment     DESCRIPTION:    30% Cognitive Assistance: The person may live alone with daily assistance to monitor personal safety and provide a daily allowance. Bills and other money management concerns require assistance. Reminders may be required to do household chores, attend familiar community events, or any other additions to household routines (30% minimum cognitive assistance). 8% Physical Assistance is needed for fine motor activities. PMHx:  Pt. Reports MS like symptoms for years ie balance issues, leg cramps, ileana horses. Plaques on temporal lobe found 2018. Back compression fx 2019. DDD, RA    Preadmission Environment:    Pt. Lives with  and has a service dog for seizures.   Home environment:   [address] Apartment     Steps to enter first floor:     No steps        Bathroom: Walk in [employer]  with Grab bars; emergency pull cords  Equipment owned: Rollator, manual W/C,  Shower Chair with back, [address],

## 2020-01-21 NOTE — ED NOTES
Report given to [facility] Care transport team.      Ish Bonilla, SALUD  01/20/20 5664

## 2020-01-21 NOTE — ED NOTES
Report received from THE [facility], pt has sitter at bedside due to sudical ideations, pt in gown with ties cut off. All removable equipment removed from room, call light within reach. Pt resting quietly in bed, no s/s distress noted. Pt states that she is ready to go and be settled, updated pt on poc. Will continue to monitor.              Gaby Kirkpatrick RN  01/20/20 1932

## 2020-01-22 PROCEDURE — 1240000000 HC EMOTIONAL WELLNESS R&B

## 2020-01-22 PROCEDURE — 2500000003 HC RX 250 WO HCPCS: Performed by: PSYCHIATRY & NEUROLOGY

## 2020-01-22 PROCEDURE — 6370000000 HC RX 637 (ALT 250 FOR IP): Performed by: PHYSICIAN ASSISTANT

## 2020-01-22 PROCEDURE — 99233 SBSQ HOSP IP/OBS HIGH 50: CPT | Performed by: PSYCHIATRY & NEUROLOGY

## 2020-01-22 PROCEDURE — 6370000000 HC RX 637 (ALT 250 FOR IP): Performed by: PSYCHIATRY & NEUROLOGY

## 2020-01-22 RX ORDER — QUETIAPINE FUMARATE 25 MG/1
25 TABLET, FILM COATED ORAL NIGHTLY
Status: DISCONTINUED | OUTPATIENT
Start: 2020-01-22 | End: 2020-01-23

## 2020-01-22 RX ORDER — METHOCARBAMOL 500 MG/1
1000 TABLET, FILM COATED ORAL 4 TIMES DAILY
Status: DISCONTINUED | OUTPATIENT
Start: 2020-01-22 | End: 2020-01-24

## 2020-01-22 RX ADMIN — PANTOPRAZOLE SODIUM 40 MG: 40 TABLET, DELAYED RELEASE ORAL at 05:01

## 2020-01-22 RX ADMIN — MICONAZOLE NITRATE: 20 POWDER TOPICAL at 12:21

## 2020-01-22 RX ADMIN — CETIRIZINE HYDROCHLORIDE 10 MG: 10 TABLET ORAL at 08:24

## 2020-01-22 RX ADMIN — RIVAROXABAN 20 MG: 20 TABLET, FILM COATED ORAL at 08:25

## 2020-01-22 RX ADMIN — CALCIUM CARBONATE 500 MG: 500 TABLET, CHEWABLE ORAL at 08:22

## 2020-01-22 RX ADMIN — DULOXETINE HYDROCHLORIDE 30 MG: 30 CAPSULE, DELAYED RELEASE ORAL at 21:10

## 2020-01-22 RX ADMIN — TRAMADOL HYDROCHLORIDE 50 MG: 50 TABLET, FILM COATED ORAL at 04:56

## 2020-01-22 RX ADMIN — DICLOFENAC 2 G: 10 GEL TOPICAL at 12:19

## 2020-01-22 RX ADMIN — DICLOFENAC 2 G: 10 GEL TOPICAL at 21:12

## 2020-01-22 RX ADMIN — METHOCARBAMOL TABLETS 1000 MG: 500 TABLET, COATED ORAL at 21:10

## 2020-01-22 RX ADMIN — METFORMIN HYDROCHLORIDE 500 MG: 500 TABLET ORAL at 17:40

## 2020-01-22 RX ADMIN — METHOCARBAMOL TABLETS 750 MG: 500 TABLET, COATED ORAL at 08:23

## 2020-01-22 RX ADMIN — GABAPENTIN 1200 MG: 400 CAPSULE ORAL at 08:22

## 2020-01-22 RX ADMIN — METHOCARBAMOL TABLETS 1000 MG: 500 TABLET, COATED ORAL at 17:40

## 2020-01-22 RX ADMIN — CARVEDILOL 12.5 MG: 6.25 TABLET, FILM COATED ORAL at 17:40

## 2020-01-22 RX ADMIN — B-COMPLEX W/ C & FOLIC ACID TAB 1 TABLET: TAB at 08:22

## 2020-01-22 RX ADMIN — CARVEDILOL 12.5 MG: 6.25 TABLET, FILM COATED ORAL at 08:22

## 2020-01-22 RX ADMIN — METFORMIN HYDROCHLORIDE 500 MG: 500 TABLET ORAL at 08:23

## 2020-01-22 RX ADMIN — GABAPENTIN 1200 MG: 400 CAPSULE ORAL at 14:43

## 2020-01-22 RX ADMIN — DULOXETINE HYDROCHLORIDE 30 MG: 30 CAPSULE, DELAYED RELEASE ORAL at 08:22

## 2020-01-22 RX ADMIN — GABAPENTIN 1200 MG: 400 CAPSULE ORAL at 21:10

## 2020-01-22 RX ADMIN — FUROSEMIDE 40 MG: 40 TABLET ORAL at 08:23

## 2020-01-22 RX ADMIN — FLUTICASONE PROPIONATE 1 SPRAY: 50 SPRAY, METERED NASAL at 08:24

## 2020-01-22 RX ADMIN — QUETIAPINE FUMARATE 25 MG: 25 TABLET ORAL at 21:10

## 2020-01-22 ASSESSMENT — PAIN SCALES - GENERAL
PAINLEVEL_OUTOF10: 0
PAINLEVEL_OUTOF10: 9

## 2020-01-22 NOTE — PROGRESS NOTES
HGB 12.0 01/20/2020    HCT 37.6 01/20/2020     01/20/2020    MCV 79.7 01/20/2020    MCH 25.3 01/20/2020    MCHC 31.8 01/20/2020    RDW 22.6 01/20/2020    LYMPHOPCT 32.0 01/20/2020    MONOPCT 4.9 01/20/2020    BASOPCT 1.3 01/20/2020    MONOSABS 0.4 01/20/2020    LYMPHSABS 2.3 01/20/2020    EOSABS 0.3 01/20/2020    BASOSABS 0.1 01/20/2020       Medications  Current Facility-Administered Medications: QUEtiapine (SEROQUEL) tablet 25 mg, 25 mg, Oral, Nightly  miconazole (MICOTIN) 2 % powder, , Topical, BID  acetaminophen (TYLENOL) tablet 650 mg, 650 mg, Oral, Q4H PRN  hydrOXYzine (VISTARIL) capsule 50 mg, 50 mg, Oral, Q6H PRN  OLANZapine (ZYPREXA) tablet 5 mg, 5 mg, Oral, Q4H PRN **OR** OLANZapine (ZYPREXA) injection 10 mg, 10 mg, Intramuscular, TID PRN  sterile water injection 2.1 mL, 2.1 mL, Intramuscular, Q4H PRN  traZODone (DESYREL) tablet 50 mg, 50 mg, Oral, Nightly PRN  benztropine mesylate (COGENTIN) injection 2 mg, 2 mg, Intramuscular, BID PRN  magnesium hydroxide (MILK OF MAGNESIA) 400 MG/5ML suspension 30 mL, 30 mL, Oral, Daily PRN  aluminum & magnesium hydroxide-simethicone (MAALOX) 200-200-20 MG/5ML suspension 30 mL, 30 mL, Oral, Q6H PRN  carvedilol (COREG) tablet 12.5 mg, 12.5 mg, Oral, BID WC  furosemide (LASIX) tablet 40 mg, 40 mg, Oral, Daily  rivaroxaban (XARELTO) tablet 20 mg, 20 mg, Oral, Daily with breakfast  DULoxetine (CYMBALTA) extended release capsule 30 mg, 30 mg, Oral, BID  gabapentin (NEURONTIN) capsule 1,200 mg, 1,200 mg, Oral, TID  gabapentin (NEURONTIN) capsule 1,200 mg, 1,200 mg, Oral, Daily PRN  traMADol (ULTRAM) tablet 50 mg, 50 mg, Oral, BID PRN  metFORMIN (GLUCOPHAGE) tablet 500 mg, 500 mg, Oral, BID WC  pantoprazole (PROTONIX) tablet 40 mg, 40 mg, Oral, QAM AC  fluticasone (FLONASE) 50 MCG/ACT nasal spray 1 spray, 1 spray, Each Nostril, Daily  cetirizine (ZYRTEC) tablet 10 mg, 10 mg, Oral, Daily  lidocaine 4 % external patch 1 patch, 1 patch, Transdermal, Daily  diclofenac sodium 1 % gel 2 g, 2 g, Topical, BID  loperamide (IMODIUM) capsule 2 mg, 2 mg, Oral, 4x Daily PRN  methocarbamol (ROBAXIN) tablet 750 mg, 750 mg, Oral, 4x Daily  vitamin B complex w/C 1 tablet, 1 tablet, Oral, Daily  calcium carbonate (TUMS) chewable tablet 500 mg, 500 mg, Oral, Daily    ASSESSMENT AND PLAN    Axis I: MDD, recurrent, severe, no psychosis. TRISTEN. Insomnia, secondary. Axis II: deferred  Axis III: see medical hx  Axis IV: poor coping skills, financial and occupational stressors, relational problems, poor support, medical issues      Tx plan:     1. Continue to monitor for effectiveness and possible side effects related to Cymbalta   2. Seroquel started, first dose this QHS  3. Pt to remain on unit at this time and will re -assess tomorrow     Prevent self injury, stabilize affect, restore sleep, treat depression, treat anxiety, establish/maintain aftercare, increase coping mechanisms, improve medication compliance. All conditions present on admission are being treated while pt is hospitalized.    Discussed PHP after discharge as part of transition back to the community.

## 2020-01-22 NOTE — GROUP NOTE
Group Therapy Note    Date: 1/22/2020    Group Start Time: 1100  Group End Time: 2579  Group Topic: Psychotherapy    [facility]    Jacquie Mcgarry, [facility]        Group Therapy Note    Attendees: 9        Patient's Goal:  Pt will improve interpersonal interactions through group processing and agenda setting.      Notes:  Pt participated in group discussion,offered supportive feedback and addressed her agenda within the group. Status After Intervention:  Improved    Participation Level:  Active Listener and Interactive    Participation Quality: Appropriate, Attentive, Sharing and Supportive      Speech:  normal      Thought Process/Content: Logical  Linear      Affective Functioning: Congruent      Mood: anxious and depressed      Level of consciousness:  Alert, Oriented x4 and Attentive      Response to Learning: Able to verbalize current knowledge/experience and Progressing to goal      Endings: None Reported    Modes of Intervention: Education, Support, Socialization, Exploration, Clarifying, Problem-solving, Activity, Movement, Confrontation, Limit-setting and Reality-testing      Discipline Responsible: /Counselor      Signature:  Jacquie Mcgarry, [facility]

## 2020-01-22 NOTE — PLAN OF CARE
[facility]  Initial Interdisciplinary Treatment Plan NOTE    Review Date & Time: 01/21/20 2879    Patient was not in treatment team    Admission Type:   Admission Type: Voluntary    Reason for admission:  Reason for Admission: depression is increasing over the last week      Estimated Length of Stay Update:  3-5 days  Estimated Discharge Date Update: 1/24/20-1/25/20    PATIENT STRENGTHS:  Patient Strengths Strengths: Communication, Positive Support, Social Skills, Employment, Medication Compliance  Patient Strengths and Limitations:Limitations: External locus of control, Difficulty problem solving/relies on others to help solve problems  Addictive Behavior:Addictive Behavior  In the past 3 months, have you felt or has someone told you that you have a problem with:  : None  Do you have a history of Chemical Use?: No  Do you have a history of Alcohol Use?: No  Do you have a history of Street Drug Abuse?: No  Histroy of Prescripton Drug Abuse?: No  Medical Problems:  Past Medical History:   Diagnosis Date    Depression     Diabetes mellitus ([facility] [address] [facility]     Fibromyalgia     Hyperlipidemia     Hypertension     Rheumatoid arthritis ([facility] [address] [facility]     Seizure ([facility] 7/14    NEUROLOGIST [facility]    Sleep apnea     cpap    Unspecified cerebral artery occlusion with cerebral infarction     7/12  NOT DEFINITE       EDUCATION:   Learner Progress Toward Treatment Goals: Reviewed goals and plan of care    Method: Individual    Outcome: Verbalized understanding    PATIENT GOALS: to get a game plan together    PLAN/TREATMENT RECOMMENDATIONS UPDATE: evaluate for treatment    GOALS UPDATE:   Time frame for Short-Term Goals: 2 days    Perla Flores RN

## 2020-01-22 NOTE — GROUP NOTE
Group Therapy Note    Date: 1/22/2020    Group Start Time: 1079  Group End Time: 1500  Group Topic: [address]        Group Therapy Note    Attendees: 9    Patient's Goal: to actively listen to music utilized during Mood Elevation intervention to discuss utilization of music as a coping skill and improve mood. Notes: Beth identified feeling \"painful\" at the beginning of group. Pt's goal was to feel \"less pain. \" Makayla Ferguson left group early and did not return. Status After Intervention:  Unchanged    Participation Level:  Active Listener    Participation Quality: Appropriate      Speech:  normal      Thought Process/Content: Linear      Affective Functioning: Constricted/Restricted      Mood: anxious and depressed      Level of consciousness:  Alert, Oriented x4 and Attentive      Response to Learning: Progressing to goal      Endings: None Reported    Modes of Intervention: Education, Support, Problem-solving, Activity and Media      Discipline Responsible: Psychoeducational Specialist      Signature:  REGGIE Granados

## 2020-01-22 NOTE — PLAN OF CARE
Pt has been visible and medication compliant. She has been participate in group and socializes with peers. Will continue to monitor.   Yoli BALTAZAR, RN-BC

## 2020-01-23 PROCEDURE — 6370000000 HC RX 637 (ALT 250 FOR IP): Performed by: PSYCHIATRY & NEUROLOGY

## 2020-01-23 PROCEDURE — 99233 SBSQ HOSP IP/OBS HIGH 50: CPT | Performed by: PSYCHIATRY & NEUROLOGY

## 2020-01-23 PROCEDURE — 6370000000 HC RX 637 (ALT 250 FOR IP): Performed by: PHYSICIAN ASSISTANT

## 2020-01-23 PROCEDURE — 1240000000 HC EMOTIONAL WELLNESS R&B

## 2020-01-23 RX ORDER — DIMETHICONE, OXYBENZONE, AND PADIMATE O 2; 2.5; 6.6 G/100G; G/100G; G/100G
STICK TOPICAL PRN
Status: DISCONTINUED | OUTPATIENT
Start: 2020-01-23 | End: 2020-01-27 | Stop reason: HOSPADM

## 2020-01-23 RX ORDER — QUETIAPINE FUMARATE 25 MG/1
50 TABLET, FILM COATED ORAL NIGHTLY
Status: DISCONTINUED | OUTPATIENT
Start: 2020-01-23 | End: 2020-01-24

## 2020-01-23 RX ORDER — LIDOCAINE 4 G/G
1 PATCH TOPICAL DAILY
Status: DISCONTINUED | OUTPATIENT
Start: 2020-01-23 | End: 2020-01-27 | Stop reason: HOSPADM

## 2020-01-23 RX ADMIN — MICONAZOLE NITRATE: 20 POWDER TOPICAL at 22:56

## 2020-01-23 RX ADMIN — FLUTICASONE PROPIONATE 1 SPRAY: 50 SPRAY, METERED NASAL at 08:41

## 2020-01-23 RX ADMIN — GABAPENTIN 1200 MG: 400 CAPSULE ORAL at 21:09

## 2020-01-23 RX ADMIN — CETIRIZINE HYDROCHLORIDE 10 MG: 10 TABLET ORAL at 08:33

## 2020-01-23 RX ADMIN — QUETIAPINE FUMARATE 50 MG: 25 TABLET ORAL at 21:09

## 2020-01-23 RX ADMIN — Medication: at 13:05

## 2020-01-23 RX ADMIN — MICONAZOLE NITRATE: 20 POWDER TOPICAL at 08:39

## 2020-01-23 RX ADMIN — DICLOFENAC 2 G: 10 GEL TOPICAL at 22:57

## 2020-01-23 RX ADMIN — DULOXETINE HYDROCHLORIDE 30 MG: 30 CAPSULE, DELAYED RELEASE ORAL at 08:31

## 2020-01-23 RX ADMIN — FUROSEMIDE 40 MG: 40 TABLET ORAL at 08:33

## 2020-01-23 RX ADMIN — METHOCARBAMOL TABLETS 1000 MG: 500 TABLET, COATED ORAL at 08:33

## 2020-01-23 RX ADMIN — GABAPENTIN 1200 MG: 400 CAPSULE ORAL at 14:43

## 2020-01-23 RX ADMIN — DULOXETINE HYDROCHLORIDE 30 MG: 30 CAPSULE, DELAYED RELEASE ORAL at 21:09

## 2020-01-23 RX ADMIN — PANTOPRAZOLE SODIUM 40 MG: 40 TABLET, DELAYED RELEASE ORAL at 05:38

## 2020-01-23 RX ADMIN — METHOCARBAMOL TABLETS 1000 MG: 500 TABLET, COATED ORAL at 13:06

## 2020-01-23 RX ADMIN — GABAPENTIN 1200 MG: 400 CAPSULE ORAL at 08:34

## 2020-01-23 RX ADMIN — METFORMIN HYDROCHLORIDE 500 MG: 500 TABLET ORAL at 08:33

## 2020-01-23 RX ADMIN — RIVAROXABAN 20 MG: 20 TABLET, FILM COATED ORAL at 08:40

## 2020-01-23 RX ADMIN — METHOCARBAMOL TABLETS 1000 MG: 500 TABLET, COATED ORAL at 18:26

## 2020-01-23 RX ADMIN — METHOCARBAMOL TABLETS 1000 MG: 500 TABLET, COATED ORAL at 21:09

## 2020-01-23 RX ADMIN — CARVEDILOL 12.5 MG: 6.25 TABLET, FILM COATED ORAL at 08:29

## 2020-01-23 RX ADMIN — LOPERAMIDE HYDROCHLORIDE 2 MG: 2 CAPSULE ORAL at 08:55

## 2020-01-23 RX ADMIN — CALCIUM CARBONATE 500 MG: 500 TABLET, CHEWABLE ORAL at 08:34

## 2020-01-23 RX ADMIN — B-COMPLEX W/ C & FOLIC ACID TAB 1 TABLET: TAB at 08:31

## 2020-01-23 RX ADMIN — METFORMIN HYDROCHLORIDE 500 MG: 500 TABLET ORAL at 18:27

## 2020-01-23 RX ADMIN — CARVEDILOL 12.5 MG: 6.25 TABLET, FILM COATED ORAL at 18:27

## 2020-01-23 RX ADMIN — DICLOFENAC 2 G: 10 GEL TOPICAL at 08:36

## 2020-01-23 NOTE — GROUP NOTE
Group Therapy Note    Date: 1/22/2020    Group Start Time: 2030  Group End Time: 2100  Group Topic: Wrap-Up    [facility]        Group Therapy Note    Attendees: Goals and importance of goal setting discussed. Night time milieu activities discussed.            Patient's Goal:  Try to make it to all groups, learn new tools to deal with life, focus on my own boat    Notes:  Successful     Status After Intervention:  Improved    Participation Level: Interactive    Participation Quality: Appropriate      Speech:  normal      Thought Process/Content: Logical      Affective Functioning: Congruent      Mood: good      Level of consciousness:  Alert and Oriented x4      Response to Learning: Progressing to goal      Endings: None Reported    Modes of Intervention: Support      Discipline Responsible: [address], [facility] [employer]      Signature:  Bailey Robertson

## 2020-01-23 NOTE — PLAN OF CARE
Problem: Altered Mood, Depressive Behavior:  Goal: Able to verbalize and/or display a decrease in depressive symptoms  Description  Able to verbalize and/or display a decrease in depressive symptoms  Outcome: Ongoing   Patient stated that her pain was worse today but did say that her mood is somewhat better and the admission is helping her. Patient is pleasant, cooperative, attending groups denies suicidal ideation.

## 2020-01-23 NOTE — PROGRESS NOTES
PRESSURE RANGE:  Systolic (21UKR), ROLO:823 , Min:114 , GU   ; Diastolic (55IOD), PAR:09, Min:63, Max:81    Medications  Current Facility-Administered Medications: QUEtiapine (SEROQUEL) tablet 50 mg, 50 mg, Oral, Nightly  lidocaine 4 % external patch 1 patch, 1 patch, Transdermal, Daily  medicated lip balm (BLISTEX/CARMEX) stick, , Topical, PRN  miconazole (MICOTIN) 2 % powder, , Topical, BID  methocarbamol (ROBAXIN) tablet 1,000 mg, 1,000 mg, Oral, 4x Daily  acetaminophen (TYLENOL) tablet 650 mg, 650 mg, Oral, Q4H PRN  hydrOXYzine (VISTARIL) capsule 50 mg, 50 mg, Oral, Q6H PRN  OLANZapine (ZYPREXA) tablet 5 mg, 5 mg, Oral, Q4H PRN **OR** OLANZapine (ZYPREXA) injection 10 mg, 10 mg, Intramuscular, TID PRN  sterile water injection 2.1 mL, 2.1 mL, Intramuscular, Q4H PRN  traZODone (DESYREL) tablet 50 mg, 50 mg, Oral, Nightly PRN  benztropine mesylate (COGENTIN) injection 2 mg, 2 mg, Intramuscular, BID PRN  magnesium hydroxide (MILK OF MAGNESIA) 400 MG/5ML suspension 30 mL, 30 mL, Oral, Daily PRN  aluminum & magnesium hydroxide-simethicone (MAALOX) 200-200-20 MG/5ML suspension 30 mL, 30 mL, Oral, Q6H PRN  carvedilol (COREG) tablet 12.5 mg, 12.5 mg, Oral, BID WC  furosemide (LASIX) tablet 40 mg, 40 mg, Oral, Daily  rivaroxaban (XARELTO) tablet 20 mg, 20 mg, Oral, Daily with breakfast  DULoxetine (CYMBALTA) extended release capsule 30 mg, 30 mg, Oral, BID  gabapentin (NEURONTIN) capsule 1,200 mg, 1,200 mg, Oral, TID  gabapentin (NEURONTIN) capsule 1,200 mg, 1,200 mg, Oral, Daily PRN  traMADol (ULTRAM) tablet 50 mg, 50 mg, Oral, BID PRN  metFORMIN (GLUCOPHAGE) tablet 500 mg, 500 mg, Oral, BID WC  pantoprazole (PROTONIX) tablet 40 mg, 40 mg, Oral, QAM AC  fluticasone (FLONASE) 50 MCG/ACT nasal spray 1 spray, 1 spray, Each Nostril, Daily  cetirizine (ZYRTEC) tablet 10 mg, 10 mg, Oral, Daily  lidocaine 4 % external patch 1 patch, 1 patch, Transdermal, Daily  diclofenac sodium 1 % gel 2 g, 2 g, Topical, BID  loperamide (IMODIUM) capsule 2 mg, 2 mg, Oral, 4x Daily PRN  vitamin B complex w/C 1 tablet, 1 tablet, Oral, Daily  calcium carbonate (TUMS) chewable tablet 500 mg, 500 mg, Oral, Daily    ASSESSMENT AND PLAN    Axis I: MDD, recurrent, severe, no psychosis. TRISTEN. Insomnia, secondary. Axis II: deferred  Axis III: see medical hx  Axis IV: poor coping skills, financial and occupational stressors, relational problems, poor support, medical issues      Tx plan:    1. Continue to monitor for effectiveness and possible side effects related to Cymbalta   2. Increased available seroquel  3. Pt to remain on unit at this time and will re -assess tomorrow     Prevent self injury, stabilize affect, restore sleep, treat depression, treat anxiety, establish/maintain aftercare, increase coping mechanisms, improve medication compliance.  All conditions present on admission are being treated while pt is hospitalized. Discussed PHP after discharge as part of transition back to the community.      Total time with patient was 35 minutes and more than 50 % of that time was spent counseling the patient on their symptoms, treatment, and expected goals.

## 2020-01-23 NOTE — PLAN OF CARE
Pt has been visible on unit, social with female peers. Pleasant. Denied SI/HI. Denied hallucinations, paranoia. Said that today was [facility] what a baby does in a diaper\" because she was in pain all day. Talked abt her spinal injuries and her job (she secretly hoped that her boss would fire her). She reported has a verbally abusive  and that when she complained to the boss it was minimized. 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 Dislikes it, looking for another type of work. Attended group. Went to bed shortly after.

## 2020-01-24 PROCEDURE — 99231 SBSQ HOSP IP/OBS SF/LOW 25: CPT | Performed by: PHYSICIAN ASSISTANT

## 2020-01-24 PROCEDURE — 6370000000 HC RX 637 (ALT 250 FOR IP): Performed by: PSYCHIATRY & NEUROLOGY

## 2020-01-24 PROCEDURE — 99233 SBSQ HOSP IP/OBS HIGH 50: CPT | Performed by: PSYCHIATRY & NEUROLOGY

## 2020-01-24 PROCEDURE — 1240000000 HC EMOTIONAL WELLNESS R&B

## 2020-01-24 PROCEDURE — 6370000000 HC RX 637 (ALT 250 FOR IP): Performed by: PHYSICIAN ASSISTANT

## 2020-01-24 RX ORDER — QUETIAPINE FUMARATE 100 MG/1
100 TABLET, FILM COATED ORAL NIGHTLY
Status: DISCONTINUED | OUTPATIENT
Start: 2020-01-24 | End: 2020-01-27 | Stop reason: HOSPADM

## 2020-01-24 RX ORDER — METHOCARBAMOL 500 MG/1
1500 TABLET, FILM COATED ORAL EVERY 6 HOURS PRN
Status: DISCONTINUED | OUTPATIENT
Start: 2020-01-24 | End: 2020-01-27 | Stop reason: HOSPADM

## 2020-01-24 RX ORDER — DULOXETIN HYDROCHLORIDE 30 MG/1
60 CAPSULE, DELAYED RELEASE ORAL 2 TIMES DAILY
Status: DISCONTINUED | OUTPATIENT
Start: 2020-01-24 | End: 2020-01-27 | Stop reason: HOSPADM

## 2020-01-24 RX ADMIN — METHOCARBAMOL TABLETS 1500 MG: 500 TABLET, COATED ORAL at 20:56

## 2020-01-24 RX ADMIN — RIVAROXABAN 20 MG: 20 TABLET, FILM COATED ORAL at 08:26

## 2020-01-24 RX ADMIN — B-COMPLEX W/ C & FOLIC ACID TAB 1 TABLET: TAB at 08:27

## 2020-01-24 RX ADMIN — CARVEDILOL 12.5 MG: 6.25 TABLET, FILM COATED ORAL at 16:30

## 2020-01-24 RX ADMIN — CARVEDILOL 12.5 MG: 6.25 TABLET, FILM COATED ORAL at 08:27

## 2020-01-24 RX ADMIN — TRAMADOL HYDROCHLORIDE 50 MG: 50 TABLET, FILM COATED ORAL at 10:12

## 2020-01-24 RX ADMIN — CETIRIZINE HYDROCHLORIDE 10 MG: 10 TABLET ORAL at 08:26

## 2020-01-24 RX ADMIN — METHOCARBAMOL TABLETS 1500 MG: 500 TABLET, COATED ORAL at 14:01

## 2020-01-24 RX ADMIN — METFORMIN HYDROCHLORIDE 500 MG: 500 TABLET ORAL at 16:30

## 2020-01-24 RX ADMIN — DULOXETINE HYDROCHLORIDE 60 MG: 30 CAPSULE, DELAYED RELEASE ORAL at 20:53

## 2020-01-24 RX ADMIN — MICONAZOLE NITRATE: 20 POWDER TOPICAL at 20:52

## 2020-01-24 RX ADMIN — CALCIUM CARBONATE 500 MG: 500 TABLET, CHEWABLE ORAL at 08:27

## 2020-01-24 RX ADMIN — FLUTICASONE PROPIONATE 1 SPRAY: 50 SPRAY, METERED NASAL at 08:24

## 2020-01-24 RX ADMIN — GABAPENTIN 1200 MG: 400 CAPSULE ORAL at 08:26

## 2020-01-24 RX ADMIN — QUETIAPINE FUMARATE 100 MG: 100 TABLET ORAL at 20:53

## 2020-01-24 RX ADMIN — METFORMIN HYDROCHLORIDE 500 MG: 500 TABLET ORAL at 08:27

## 2020-01-24 RX ADMIN — MICONAZOLE NITRATE: 20 POWDER TOPICAL at 08:25

## 2020-01-24 RX ADMIN — METHOCARBAMOL TABLETS 1000 MG: 500 TABLET, COATED ORAL at 08:27

## 2020-01-24 RX ADMIN — FUROSEMIDE 40 MG: 40 TABLET ORAL at 08:26

## 2020-01-24 RX ADMIN — DICLOFENAC 2 G: 10 GEL TOPICAL at 08:24

## 2020-01-24 RX ADMIN — GABAPENTIN 1200 MG: 400 CAPSULE ORAL at 14:01

## 2020-01-24 RX ADMIN — PANTOPRAZOLE SODIUM 40 MG: 40 TABLET, DELAYED RELEASE ORAL at 06:17

## 2020-01-24 RX ADMIN — GABAPENTIN 1200 MG: 400 CAPSULE ORAL at 20:52

## 2020-01-24 RX ADMIN — DULOXETINE HYDROCHLORIDE 30 MG: 30 CAPSULE, DELAYED RELEASE ORAL at 08:27

## 2020-01-24 ASSESSMENT — PAIN DESCRIPTION - PAIN TYPE: TYPE: CHRONIC PAIN

## 2020-01-24 ASSESSMENT — PAIN DESCRIPTION - FREQUENCY: FREQUENCY: CONTINUOUS

## 2020-01-24 ASSESSMENT — PAIN DESCRIPTION - LOCATION: LOCATION: BACK

## 2020-01-24 ASSESSMENT — PAIN DESCRIPTION - PROGRESSION: CLINICAL_PROGRESSION: GRADUALLY WORSENING

## 2020-01-24 ASSESSMENT — PAIN - FUNCTIONAL ASSESSMENT: PAIN_FUNCTIONAL_ASSESSMENT: ACTIVITIES ARE NOT PREVENTED

## 2020-01-24 ASSESSMENT — PAIN DESCRIPTION - ORIENTATION: ORIENTATION: LOWER

## 2020-01-24 ASSESSMENT — PAIN SCALES - GENERAL
PAINLEVEL_OUTOF10: 9
PAINLEVEL_OUTOF10: 0

## 2020-01-24 ASSESSMENT — PAIN DESCRIPTION - DESCRIPTORS: DESCRIPTORS: ACHING

## 2020-01-24 ASSESSMENT — PAIN DESCRIPTION - ONSET: ONSET: ON-GOING

## 2020-01-24 NOTE — GROUP NOTE
Group Therapy Note    Date: 1/24/2020    Group Start Time: 1000  Group End Time: 4049  Group Topic: Cognitive Skills    [address] [facility], [facility]; [place]      Patient's Goal:  Identify triggers and coping skills. Attendees: 12    Pts engaged in a cognitive skills group talking about what triggers them and coping skills to deal with the anxiety that comes from the triggers. Beth left group after 10 minutes. Status After Intervention:  Improved    Participation Level:  Active Listener    Participation Quality: Appropriate      Speech:  normal      Thought Process/Content: Logical      Affective Functioning: Constricted/Restricted      Mood: depressed and irritable      Level of consciousness:  Alert and Oriented x4      Response to Learning: Able to retain information      Endings: None Reported    Modes of Intervention: Education, Support, Socialization, Exploration and Problem-solving      Discipline Responsible: /Counselor      Signature:  ZORAIDA Baltazar

## 2020-01-24 NOTE — GROUP NOTE
Group Therapy Note    Date: 1/24/2020    Group Start Time: 1110  Group End Time: 4615  Group Topic: Psychotherapy    [facility]    Jacquie Mcgarry, [facility]        Group Therapy Note    Attendees: 12             Patient's Goal:  Pt will improve interpersonal interactions through group processing and agenda setting.      Notes:  Pt participated in group discussion,offered supportive feedback and addressed her agenda within the group. Status After Intervention:  Improved    Participation Level:  Active Listener and Interactive    Participation Quality: Appropriate, Attentive, Sharing and Supportive      Speech:  normal      Thought Process/Content: Logical  Linear      Affective Functioning: Congruent      Mood: anxious and depressed      Level of consciousness:  Alert, Oriented x4 and Attentive      Response to Learning: Able to verbalize current knowledge/experience, Able to verbalize/acknowledge new learning, Able to retain information, Capable of insight, Able to change behavior and Progressing to goal      Endings: None Reported    Modes of Intervention: Education, Support, Socialization, Exploration, Clarifying, Problem-solving, Activity, Movement, Confrontation, Limit-setting and Reality-testing      Discipline Responsible: /Counselor      Signature:  Jacquie Mcgarry, [facility]

## 2020-01-24 NOTE — GROUP NOTE
Group Therapy Note    Date: 1/23/2020    Group Start Time: 2020  Group End Time: 2050  Group Topic: Wrap-Up    [facility]        Group Therapy Note    Attendees: Goals and importance of goal setting discussed. Night time milieu activities discussed.          Patient's Goal:  Med change    Notes:  Successful     Status After Intervention:  Improved    Participation Level: Interactive    Participation Quality: Appropriate      Speech:  normal      Thought Process/Content: Logical      Affective Functioning: Congruent      Mood: dysphoric      Level of consciousness:  Alert and Oriented x4      Response to Learning: Progressing to goal      Endings: None Reported    Modes of Intervention: Support      Discipline Responsible: [employer]      Signature:  Praneeth Patel

## 2020-01-24 NOTE — PLAN OF CARE
Problem: Altered Mood, Depressive Behavior:  Goal: Able to verbalize and/or display a decrease in depressive symptoms  Description  Able to verbalize and/or display a decrease in depressive symptoms  1/24/2020 1501 by Claudeen Caves, RN  Outcome: Ongoing   Patient has been quietly visible on unit and social with select peers this shift. Pt denies SI/HI/AVH at this time. Pt reports mood as \"Good. I think the meds I'm on are finally working. \" Pt has been compliant with meds and cooperative with staff. Pt engaged in tx and is attending groups. Will continue to monitor for safety.

## 2020-01-24 NOTE — PROGRESS NOTES
500 mg, Oral, Daily    ASSESSMENT AND PLAN    Axis I: MDD, recurrent, severe, no psychosis. TRISTEN. Insomnia, secondary. Axis II: deferred  Axis III: see medical hx  Axis IV: poor coping skills, financial and occupational stressors, relational problems, poor support, medical issues      Tx plan:    1. Continue to monitor for effectiveness and possible side effects related to Cymbalta   2. Increased Cymbalta and seroquel  3. Pt to remain on unit at this time and will re -assess MONday with possible discharge MONday     Prevent self injury, stabilize affect, restore sleep, treat depression, treat anxiety, establish/maintain aftercare, increase coping mechanisms, improve medication compliance.  All conditions present on admission are being treated while pt is hospitalized.        Total time with patient was 35 minutes and more than 50 % of that time was spent counseling the patient on their symptoms, treatment, and expected goals.

## 2020-01-25 PROCEDURE — 99233 SBSQ HOSP IP/OBS HIGH 50: CPT | Performed by: NURSE PRACTITIONER

## 2020-01-25 PROCEDURE — 6370000000 HC RX 637 (ALT 250 FOR IP): Performed by: PHYSICIAN ASSISTANT

## 2020-01-25 PROCEDURE — 1240000000 HC EMOTIONAL WELLNESS R&B

## 2020-01-25 PROCEDURE — 6370000000 HC RX 637 (ALT 250 FOR IP): Performed by: PSYCHIATRY & NEUROLOGY

## 2020-01-25 RX ADMIN — METFORMIN HYDROCHLORIDE 500 MG: 500 TABLET ORAL at 08:39

## 2020-01-25 RX ADMIN — QUETIAPINE FUMARATE 100 MG: 100 TABLET ORAL at 21:25

## 2020-01-25 RX ADMIN — METFORMIN HYDROCHLORIDE 500 MG: 500 TABLET ORAL at 17:57

## 2020-01-25 RX ADMIN — METHOCARBAMOL TABLETS 1500 MG: 500 TABLET, COATED ORAL at 15:50

## 2020-01-25 RX ADMIN — CETIRIZINE HYDROCHLORIDE 10 MG: 10 TABLET ORAL at 08:39

## 2020-01-25 RX ADMIN — CALCIUM CARBONATE 500 MG: 500 TABLET, CHEWABLE ORAL at 08:39

## 2020-01-25 RX ADMIN — FLUTICASONE PROPIONATE 1 SPRAY: 50 SPRAY, METERED NASAL at 10:45

## 2020-01-25 RX ADMIN — B-COMPLEX W/ C & FOLIC ACID TAB 1 TABLET: TAB at 08:39

## 2020-01-25 RX ADMIN — METHOCARBAMOL TABLETS 1500 MG: 500 TABLET, COATED ORAL at 22:23

## 2020-01-25 RX ADMIN — DULOXETINE HYDROCHLORIDE 60 MG: 30 CAPSULE, DELAYED RELEASE ORAL at 21:24

## 2020-01-25 RX ADMIN — CARVEDILOL 12.5 MG: 6.25 TABLET, FILM COATED ORAL at 10:45

## 2020-01-25 RX ADMIN — PANTOPRAZOLE SODIUM 40 MG: 40 TABLET, DELAYED RELEASE ORAL at 06:11

## 2020-01-25 RX ADMIN — TRAMADOL HYDROCHLORIDE 50 MG: 50 TABLET, FILM COATED ORAL at 22:23

## 2020-01-25 RX ADMIN — GABAPENTIN 1200 MG: 400 CAPSULE ORAL at 13:07

## 2020-01-25 RX ADMIN — CARVEDILOL 12.5 MG: 6.25 TABLET, FILM COATED ORAL at 17:57

## 2020-01-25 RX ADMIN — MICONAZOLE NITRATE: 20 POWDER TOPICAL at 08:45

## 2020-01-25 RX ADMIN — FUROSEMIDE 40 MG: 40 TABLET ORAL at 08:39

## 2020-01-25 RX ADMIN — METHOCARBAMOL TABLETS 1500 MG: 500 TABLET, COATED ORAL at 08:44

## 2020-01-25 RX ADMIN — GABAPENTIN 1200 MG: 400 CAPSULE ORAL at 08:39

## 2020-01-25 RX ADMIN — TRAMADOL HYDROCHLORIDE 50 MG: 50 TABLET, FILM COATED ORAL at 10:50

## 2020-01-25 RX ADMIN — GABAPENTIN 1200 MG: 400 CAPSULE ORAL at 21:25

## 2020-01-25 RX ADMIN — DULOXETINE HYDROCHLORIDE 60 MG: 30 CAPSULE, DELAYED RELEASE ORAL at 08:39

## 2020-01-25 ASSESSMENT — PAIN SCALES - GENERAL
PAINLEVEL_OUTOF10: 4
PAINLEVEL_OUTOF10: 8
PAINLEVEL_OUTOF10: 5
PAINLEVEL_OUTOF10: 5

## 2020-01-25 ASSESSMENT — PAIN - FUNCTIONAL ASSESSMENT: PAIN_FUNCTIONAL_ASSESSMENT: ACTIVITIES ARE NOT PREVENTED

## 2020-01-25 ASSESSMENT — PAIN DESCRIPTION - FREQUENCY: FREQUENCY: CONTINUOUS

## 2020-01-25 ASSESSMENT — PAIN DESCRIPTION - PAIN TYPE: TYPE: CHRONIC PAIN

## 2020-01-25 ASSESSMENT — PAIN DESCRIPTION - LOCATION: LOCATION: BACK;NECK

## 2020-01-25 ASSESSMENT — PAIN DESCRIPTION - DESCRIPTORS: DESCRIPTORS: ACHING

## 2020-01-25 ASSESSMENT — PAIN DESCRIPTION - ONSET: ONSET: ON-GOING

## 2020-01-25 NOTE — PLAN OF CARE
Cleo Ariza has been visible on this unit tonight. She is pleasant and cooperative. She denies suicidal or homicidal ideations. She denies any form of hallucinations. She states her depression has improved but is still experiencing some anxiety, mostly over certain patients in the milieu being monopolizing in group. She does endorse continued neck and back discomfort and has taken robaxin for pain/spasm.

## 2020-01-25 NOTE — PLAN OF CARE
Problem: Altered Mood, Depressive Behavior:  Goal: Able to verbalize and/or display a decrease in depressive symptoms  Description  Able to verbalize and/or display a decrease in depressive symptoms  Outcome: Ongoing  Note:   Pt denies SI/HI/AVH, states she feels better overall. Pt has been pleasant and cooperative. Visible and social, more so with staff than with peers. Has voiced no complaints. Reports sleeping well last night. Medication compliant.

## 2020-01-25 NOTE — PROGRESS NOTES
Comments: + interactions, + contribution, and + engagement.         Time: 3857-6063      Type of Group: wrap-up/relaxation      Level of Participation: 12/23

## 2020-01-26 PROCEDURE — 6370000000 HC RX 637 (ALT 250 FOR IP): Performed by: PSYCHIATRY & NEUROLOGY

## 2020-01-26 PROCEDURE — 6370000000 HC RX 637 (ALT 250 FOR IP): Performed by: PHYSICIAN ASSISTANT

## 2020-01-26 PROCEDURE — 1240000000 HC EMOTIONAL WELLNESS R&B

## 2020-01-26 RX ADMIN — CALCIUM CARBONATE 500 MG: 500 TABLET, CHEWABLE ORAL at 09:02

## 2020-01-26 RX ADMIN — ACETAMINOPHEN 650 MG: 325 TABLET, FILM COATED ORAL at 18:34

## 2020-01-26 RX ADMIN — MICONAZOLE NITRATE: 20 POWDER TOPICAL at 09:04

## 2020-01-26 RX ADMIN — CETIRIZINE HYDROCHLORIDE 10 MG: 10 TABLET ORAL at 09:02

## 2020-01-26 RX ADMIN — CARVEDILOL 12.5 MG: 6.25 TABLET, FILM COATED ORAL at 09:03

## 2020-01-26 RX ADMIN — CARVEDILOL 12.5 MG: 6.25 TABLET, FILM COATED ORAL at 18:31

## 2020-01-26 RX ADMIN — QUETIAPINE FUMARATE 100 MG: 100 TABLET ORAL at 20:21

## 2020-01-26 RX ADMIN — METFORMIN HYDROCHLORIDE 500 MG: 500 TABLET ORAL at 18:31

## 2020-01-26 RX ADMIN — MICONAZOLE NITRATE: 20 POWDER TOPICAL at 20:21

## 2020-01-26 RX ADMIN — TRAMADOL HYDROCHLORIDE 50 MG: 50 TABLET, FILM COATED ORAL at 10:40

## 2020-01-26 RX ADMIN — DULOXETINE HYDROCHLORIDE 60 MG: 30 CAPSULE, DELAYED RELEASE ORAL at 20:21

## 2020-01-26 RX ADMIN — GABAPENTIN 1200 MG: 400 CAPSULE ORAL at 09:03

## 2020-01-26 RX ADMIN — METHOCARBAMOL TABLETS 1500 MG: 500 TABLET, COATED ORAL at 18:34

## 2020-01-26 RX ADMIN — DICLOFENAC 2 G: 10 GEL TOPICAL at 20:23

## 2020-01-26 RX ADMIN — FUROSEMIDE 40 MG: 40 TABLET ORAL at 09:03

## 2020-01-26 RX ADMIN — PANTOPRAZOLE SODIUM 40 MG: 40 TABLET, DELAYED RELEASE ORAL at 06:43

## 2020-01-26 RX ADMIN — GABAPENTIN 1200 MG: 400 CAPSULE ORAL at 13:25

## 2020-01-26 RX ADMIN — B-COMPLEX W/ C & FOLIC ACID TAB 1 TABLET: TAB at 09:02

## 2020-01-26 RX ADMIN — METHOCARBAMOL TABLETS 1500 MG: 500 TABLET, COATED ORAL at 09:27

## 2020-01-26 RX ADMIN — METFORMIN HYDROCHLORIDE 500 MG: 500 TABLET ORAL at 09:03

## 2020-01-26 RX ADMIN — GABAPENTIN 1200 MG: 400 CAPSULE ORAL at 20:21

## 2020-01-26 RX ADMIN — DULOXETINE HYDROCHLORIDE 60 MG: 30 CAPSULE, DELAYED RELEASE ORAL at 09:02

## 2020-01-26 RX ADMIN — FLUTICASONE PROPIONATE 1 SPRAY: 50 SPRAY, METERED NASAL at 09:05

## 2020-01-26 ASSESSMENT — PAIN SCALES - GENERAL
PAINLEVEL_OUTOF10: 4
PAINLEVEL_OUTOF10: 8
PAINLEVEL_OUTOF10: 2
PAINLEVEL_OUTOF10: 4

## 2020-01-26 ASSESSMENT — PAIN DESCRIPTION - DESCRIPTORS: DESCRIPTORS: ACHING

## 2020-01-26 ASSESSMENT — PAIN DESCRIPTION - ONSET: ONSET: ON-GOING

## 2020-01-26 ASSESSMENT — PAIN DESCRIPTION - PAIN TYPE: TYPE: CHRONIC PAIN

## 2020-01-26 ASSESSMENT — PAIN DESCRIPTION - FREQUENCY: FREQUENCY: CONTINUOUS

## 2020-01-26 ASSESSMENT — PAIN DESCRIPTION - LOCATION: LOCATION: BACK

## 2020-01-26 ASSESSMENT — PAIN DESCRIPTION - PROGRESSION: CLINICAL_PROGRESSION: NOT CHANGED

## 2020-01-26 ASSESSMENT — PAIN - FUNCTIONAL ASSESSMENT: PAIN_FUNCTIONAL_ASSESSMENT: ACTIVITIES ARE NOT PREVENTED

## 2020-01-26 ASSESSMENT — PAIN DESCRIPTION - ORIENTATION: ORIENTATION: UPPER;MID;LOWER

## 2020-01-26 NOTE — PROGRESS NOTES
methocarbamol, medicated lip balm, acetaminophen, hydrOXYzine, OLANZapine **OR** OLANZapine, sterile water, traZODone, benztropine mesylate, magnesium hydroxide, aluminum & magnesium hydroxide-simethicone, gabapentin, traMADol, loperamide     Objective:     PE:    BP (!) 143/64   Pulse 81   Temp 98.1 °F (36.7 °C) (Oral)   Resp 16   SpO2 95%       Motor / Gait: Uses walker to ambulate, normal gait and station    Mental Status Examination:    Appearance: WF, appears stated age, in chair, wearing personal attire, good grooming and hygiene   Behavior/Attitude Toward Examiner: Cooperative, attentive and good eye contact  Speech: Spontaneous, normal rate, normal volume and well articulated   Mood: \"Things are going better\"  Affect: Blunted   Thought Processes: Logical, linear  Thought Content: Denies SI, denies HI, no delusions voiced, no obsessions  Perceptions: Denies AVH, did not appear to be RTIS  Attention: Intact  Abstraction: Intact  Cognition: Average SHIRA, alert and oriented to person, place, time, and situation, recall intact  Insight: Fair insight   Judgment: Fair judgment      LAB: Reviewed labs from last 24 hours      Dx:   Primary Psychiatric (DSM V) Diagnosis: MDD, recurrent, severe, no psychosis  Secondary Psychiatric (DSM V) Diagnoses: TRISTEN, insomnia  Chemical Dependency Diagnoses: None  Active Medical Diagnoses: Paroxysmal a-fib, abnormal EKG, HTN, chronic diastolic CHF, hypokalemia, DM type II, seizure disorder, chronic pain, history of CVA, history of gastric sleeve procedure    All conditions detailed above are being treated while patient is hospitalized. Tx Plan: Generally: prevent self injury/aggression towards others, stabilize mood/anxiety/psychotic/behavioral disturbance, establish/maintain aftercare, increase coping mechanisms, improve medication compliance. All conditions present on admission are being treated while pt is hospitalized. Primary Psychiatric Issues:   1.   MDD, recurrent, severe, no psychosis  - Currently on Cymbalta 60 mg BID, Gabapentin 1200 mg TID, Seroquel 100 mg HS; reports feeling that medications are helpful, will continue unchanged at this time    Chemical Dependency Issues:  - No issues    Medical Problems:  Internal medicine has been consulted. Appreciate recs. #Paroxysmal atrial fibrillation  -She is currently in sinus rhythm. Continue Coreg and Xarelto     #Abnormal EKG   - with T wave abnormalities in anterior and lateral leads. I requested an EKG from her primary cardiologist office, on review of this EKG which was obtained 1/2019, no significant changes are noted  -She has no chest pain or any other cardiac symptoms     #Hypertension  -Resume Coreg     #Chronic diastolic CHF  -Continue Lasix 40 mg daily     #Hypokalemia  -Resolved on repeat labs     #Diabetes mellitus type 2  -Resume metformin twice daily     #Seizure disorder  -Continue Neurontin     #Chronic pain  -Continue medications as she takes at home     #History of CVA  -Continue Xarelto     #history of gastric sleeve procedure 2015  - cont vitamins    Code Status: Full Code    Disposition:    -Housing: Lives with   -Current outpatient follow-up: None    Estimated Length of Stay: 2-3 days     Criteria for Discharge:  Not suicidal, not homicidal, not grossly psychotic, behavioral disturbance improved, sleeping well, mood/affect stable, eating well, aftercare arranged. Total face to face time with patient was 30 minutes and more than 50 % of that time was spent counseling the patient on their symptoms, treatment and expected goals.     Tino Harper, MPH, APRN, PMHNP-BC  01/26/20

## 2020-01-26 NOTE — PLAN OF CARE
Problem: Altered Mood, Depressive Behavior:  Goal: Able to verbalize and/or display a decrease in depressive symptoms  Description  Able to verbalize and/or display a decrease in depressive symptoms  1/26/2020 1248 by Oneyda Veliz RN  Outcome: Ongoing  Note:   Pt denies SI/HI/AVH. Reports stress and anxiety related to  and his drinking. States overall she feels better, just needs to get things figured out with her husbands situation. Pleasant and cooperative with staff. Visible and social with peers and staff.

## 2020-01-26 NOTE — GROUP NOTE
Group Therapy Note    Date: 1/26/2020    Group Start Time: 1550  Group End Time: 6961  Group Topic: Healthy Living/Wellness    [facility], RN        Group Therapy Note    Attendees: 15/23         Patient's Goal:  Create a \"wellness wall\" of positivity and coping skills. Status After Intervention:  Unchanged    Participation Level:  Active Listener and Interactive    Participation Quality: Appropriate, Attentive, Sharing and Supportive      Speech:  normal      Thought Process/Content: Logical  Linear      Affective Functioning: Congruent      Mood: euthymic      Level of consciousness:  Alert, Oriented x4 and Attentive      Response to Learning: Able to verbalize current knowledge/experience, Able to verbalize/acknowledge new learning and Able to retain information      Endings: None Reported    Modes of Intervention: Support, Socialization and Exploration      Discipline Responsible: Registered Nurse      Signature:  Billy Plascencia RN

## 2020-01-26 NOTE — BH NOTE
Group Therapy Note    Date: 1/26/2020  Start Time: 20:00  End Time:  21:00  Number of Participants: 20    Type of Group: Recreational  Wrap up  Wellness Binder Information  Module Name:  /  Session Number:  /    Patient's Cornelia Romero coping skills    Notes:  Continuing to work on goal    Status After Intervention:  Unchanged    Participation Level:  Active Listener and Interactive    Participation Quality: Appropriate, Attentive and Sharing      Speech:  pressured      Thought Process/Content: Logical      Affective Functioning: Blunted      Mood: anxious and depressed      Level of consciousness:  Alert, Oriented x4 and Attentive      Response to Learning: Able to change behavior      Endings: None Reported    Modes of Intervention: Socialization and Problem-solving      Discipline Responsible: [address], [employer] Tech      Signature:  Rosellen Severe

## 2020-01-27 VITALS
SYSTOLIC BLOOD PRESSURE: 118 MMHG | RESPIRATION RATE: 16 BRPM | TEMPERATURE: 98.2 F | OXYGEN SATURATION: 95 % | DIASTOLIC BLOOD PRESSURE: 59 MMHG | HEART RATE: 67 BPM

## 2020-01-27 PROCEDURE — 6370000000 HC RX 637 (ALT 250 FOR IP): Performed by: PHYSICIAN ASSISTANT

## 2020-01-27 PROCEDURE — 5130000000 HC BRIDGE APPOINTMENT

## 2020-01-27 PROCEDURE — 99239 HOSP IP/OBS DSCHRG MGMT >30: CPT | Performed by: PSYCHIATRY & NEUROLOGY

## 2020-01-27 PROCEDURE — 6370000000 HC RX 637 (ALT 250 FOR IP): Performed by: PSYCHIATRY & NEUROLOGY

## 2020-01-27 RX ORDER — QUETIAPINE FUMARATE 100 MG/1
100 TABLET, FILM COATED ORAL NIGHTLY
Qty: 30 TABLET | Refills: 0 | Status: ON HOLD | OUTPATIENT
Start: 2020-01-27 | End: 2020-06-08 | Stop reason: SDUPTHER

## 2020-01-27 RX ORDER — DULOXETIN HYDROCHLORIDE 60 MG/1
60 CAPSULE, DELAYED RELEASE ORAL 2 TIMES DAILY
Qty: 60 CAPSULE | Refills: 0 | Status: ON HOLD | OUTPATIENT
Start: 2020-01-27 | End: 2020-06-04 | Stop reason: DRUGHIGH

## 2020-01-27 RX ADMIN — METHOCARBAMOL TABLETS 1500 MG: 500 TABLET, COATED ORAL at 09:06

## 2020-01-27 RX ADMIN — B-COMPLEX W/ C & FOLIC ACID TAB 1 TABLET: TAB at 09:06

## 2020-01-27 RX ADMIN — CALCIUM CARBONATE 500 MG: 500 TABLET, CHEWABLE ORAL at 09:06

## 2020-01-27 RX ADMIN — TRAMADOL HYDROCHLORIDE 50 MG: 50 TABLET, FILM COATED ORAL at 00:51

## 2020-01-27 RX ADMIN — FUROSEMIDE 40 MG: 40 TABLET ORAL at 09:07

## 2020-01-27 RX ADMIN — CARVEDILOL 12.5 MG: 6.25 TABLET, FILM COATED ORAL at 09:06

## 2020-01-27 RX ADMIN — CETIRIZINE HYDROCHLORIDE 10 MG: 10 TABLET ORAL at 09:06

## 2020-01-27 RX ADMIN — GABAPENTIN 1200 MG: 400 CAPSULE ORAL at 14:03

## 2020-01-27 RX ADMIN — TRAMADOL HYDROCHLORIDE 50 MG: 50 TABLET, FILM COATED ORAL at 09:08

## 2020-01-27 RX ADMIN — METFORMIN HYDROCHLORIDE 500 MG: 500 TABLET ORAL at 09:07

## 2020-01-27 RX ADMIN — PANTOPRAZOLE SODIUM 40 MG: 40 TABLET, DELAYED RELEASE ORAL at 06:22

## 2020-01-27 RX ADMIN — DULOXETINE HYDROCHLORIDE 60 MG: 30 CAPSULE, DELAYED RELEASE ORAL at 09:06

## 2020-01-27 RX ADMIN — GABAPENTIN 1200 MG: 400 CAPSULE ORAL at 09:07

## 2020-01-27 RX ADMIN — FLUTICASONE PROPIONATE 1 SPRAY: 50 SPRAY, METERED NASAL at 09:11

## 2020-01-27 ASSESSMENT — PAIN SCALES - GENERAL
PAINLEVEL_OUTOF10: 8
PAINLEVEL_OUTOF10: 7
PAINLEVEL_OUTOF10: 3

## 2020-01-27 NOTE — GROUP NOTE
Group Therapy Note    Date: 1/27/2020    Group Start Time: 1415  Group End Time: 9086  Group Topic: Psychoeducation    [facility]        Group Therapy Note    Attendees: 18         Patient's Goal:  Patients were invited to participate in an Art Therapy / Psycho-Education group. Patients were invited to reflect on, with an art material of their choosing, what they wanted / needed to let go of and what they wanted to hold on to in their lives. Towards the end of session, patients were invited to share and process their work with the group. Notes:  Melodie Walton engaged in art making, participated in appropriate conversation with peers, and shared artwork with the group. Status After Intervention:  Improved    Participation Level:  Active Listener and Interactive    Participation Quality: Appropriate, Attentive and Sharing      Speech:  normal      Thought Process/Content: Logical      Affective Functioning: Constricted/Restricted      Mood: anxious and depressed      Level of consciousness:  Alert, Oriented x4 and Attentive      Response to Learning: Able to verbalize current knowledge/experience, Able to verbalize/acknowledge new learning, Able to retain information and Capable of insight      Endings: None Reported    Modes of Intervention: Education, Support, Socialization and Exploration      Discipline Responsible: Psychoeducational Specialist      Signature:  Katrina Rodríguez [facility]

## 2020-01-27 NOTE — BH NOTE
Group Therapy Note    Date: 1/27/2020  Start Time: 20:00  End Time:  21:00  Number of Participants: 19    Type of Group: Recreational  Wrap up    Jamin Dawson Information  Module Name:  /  Session Number:  /    Patient's Goal:  Go to group    Notes:  Goal completed    Status After Intervention:  Improved    Participation Level:  Active Listener and Interactive    Participation Quality: Appropriate, Attentive and Sharing      Speech:  pressured      Thought Process/Content: Logical      Affective Functioning: Exaggerated      Mood: anxious      Level of consciousness:  Alert and Attentive      Response to Learning: Able to change behavior      Endings: None Reported    Modes of Intervention: Socialization and Problem-solving      Discipline Responsible: [address], AQUA P[employer] Tech      Signature:  Brendan Douglas

## 2020-01-27 NOTE — FLOWSHEET NOTE
Group Therapy Note    Date: 1/27/2020  Start Time: 1300  End Time:  1400  Number of Participants: 7    Type of Group: Spiritism  Service    Notes:  Pt present for [employer]. Pt actively participated and shared during group. Participation Level:  Active Listener and Interactive    Participation Quality: Appropriate, Attentive and Sharing      Speech:  normal and hesitant      Affective Functioning: Blunted      Endings: None Reported    Modes of Intervention: Support, Socialization and Exploration      Discipline Responsible: Chaplain Andrew Mendiola       01/27/20 1356   Encounter Summary   Services provided to: Patient   Continue Visiting   (1/27 Spiritism Service)   Complexity of Encounter Moderate   Length of Encounter 1 hour

## 2020-01-27 NOTE — BH NOTE
[facility][1]  Discharge Note    Pt discharged with followings belongings:       Valuables sent home with patient. Valuables retrieved from safe, Security envelope number:  yes and returned to patient. Patient education on aftercare instructions: yes  Information faxed to NA by NA Patient verbalize understanding of AVS:  yes.     Status EXAM upon discharge:  Status and Exam  Normal: Yes  Facial Expression: Brightened  Affect: Appropriate  Level of Consciousness: Alert  Mood:Normal: Yes  Mood: Depressed  Motor Activity:Normal: Yes  Motor Activity: Decreased  Interview Behavior: Cooperative  Preception: [place] to Person, Nabila Cables to Time, [place] to Place, [place] to Situation  Attention:Normal: Yes  Attention: Distractible  Thought Processes: Circumstantial  Thought Content:Normal: Yes  Thought Content: Preoccupations(worried about husbands alcoholism)  Hallucinations: None  Delusions: No  Memory:Normal: Yes  Insight and Judgment: No  Insight and Judgment: Poor Insight  Present Suicidal Ideation: No  Present Homicidal Ideation: No      Metabolic Screening:    Lab Results   Component Value Date    LABA1C 6.6 12/03/2019       Lab Results   Component Value Date    CHOL 171 12/03/2019    CHOL 268 (H) 01/15/2019     Lab Results   Component Value Date    TRIG 162 (H) 12/03/2019    TRIG 103 01/15/2019     Lab Results   Component Value Date    HDL 41 12/03/2019    HDL 55 01/15/2019     No components found for: [facility]  Lab Results   Component Value Date    LABVLDL 32 12/03/2019    LABVLDL 21 01/15/2019       Jill Cano RN
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
## 2020-02-10 ENCOUNTER — HOSPITAL ENCOUNTER (OUTPATIENT)
Dept: NUCLEAR MEDICINE | Age: 55
Discharge: HOME | End: 2020-02-10
Payer: MEDICARE

## 2020-02-10 PROCEDURE — 78264 GASTRIC EMPTYING IMG STUDY: CPT

## 2020-02-10 PROCEDURE — A9541 TC99M SULFUR COLLOID: HCPCS | Performed by: INTERNAL MEDICINE

## 2020-02-10 PROCEDURE — 3430000000 HC RX DIAGNOSTIC RADIOPHARMACEUTICAL: Performed by: INTERNAL MEDICINE

## 2020-02-10 RX ADMIN — Medication 0.7 MILLICURIE: at 08:19

## 2020-02-11 ENCOUNTER — OFFICE VISIT (OUTPATIENT)
Dept: BARIATRICS/WEIGHT MGMT | Age: 55
End: 2020-02-11
Payer: MEDICARE

## 2020-02-11 VITALS
SYSTOLIC BLOOD PRESSURE: 100 MMHG | BODY MASS INDEX: 44.01 KG/M2 | WEIGHT: 248.4 LBS | DIASTOLIC BLOOD PRESSURE: 68 MMHG | HEIGHT: 63 IN | HEART RATE: 70 BPM

## 2020-02-11 PROCEDURE — G8484 FLU IMMUNIZE NO ADMIN: HCPCS | Performed by: NURSE PRACTITIONER

## 2020-02-11 PROCEDURE — 3017F COLORECTAL CA SCREEN DOC REV: CPT | Performed by: NURSE PRACTITIONER

## 2020-02-11 PROCEDURE — 99213 OFFICE O/P EST LOW 20 MIN: CPT | Performed by: NURSE PRACTITIONER

## 2020-02-11 PROCEDURE — 1036F TOBACCO NON-USER: CPT | Performed by: NURSE PRACTITIONER

## 2020-02-11 PROCEDURE — G8417 CALC BMI ABV UP PARAM F/U: HCPCS | Performed by: NURSE PRACTITIONER

## 2020-02-11 PROCEDURE — 1111F DSCHRG MED/CURRENT MED MERGE: CPT | Performed by: NURSE PRACTITIONER

## 2020-02-11 PROCEDURE — G8427 DOCREV CUR MEDS BY ELIG CLIN: HCPCS | Performed by: NURSE PRACTITIONER

## 2020-02-11 ASSESSMENT — ENCOUNTER SYMPTOMS
EYES NEGATIVE: 1
VOMITING: 1
RESPIRATORY NEGATIVE: 1

## 2020-02-11 NOTE — PROGRESS NOTES
Bridget Burns lost 4 lbs over the past month    Current treatment plan:   Patient is not on medication. Negative side effects from medications? no  Patient is not on Optifast.   Patient is  on diet and exercise only plan. Treatment plan details: 1200 calorie post op meal plan    Is patient adhering to diet/meal plan?: no    Carbohydrate consumption: n/a    Breakfast: protein shake - Nectar with water    Snack: none OR Ritz pb crax    Lunch: tomato soup - 1/2c OR pb and banana and apple sandwich    Snack: none    Dinner: beef veg soup  - homemade low sodium - 1/2cup    Snack: none OR protein shake    Fluids: water    Consuming at least 64oz of calorie free fluids? Yes; 72oz fluid restriction    Participating in intentional exercise?  Yes Details: 4x week chair aerobics OR Timothy Armaan    Plan/Goals: focus on a variety of foods as tolerated; small protein based foods as tolerated    Handouts: none    María Quintanilla

## 2020-02-11 NOTE — PATIENT INSTRUCTIONS
release gas and can expand the stomach.  Continue to keep temptation from your kitchen- Keep your pantry and kitchen cabinets cleaned out of those dangerous foods that might tempt you after surgery (chips, cookies, candy, etc.).  Continue to increase your exercise program- Increase your daily physical activity. Aim for 5-6 days per week for 30 minutes. Walking is an easy way to get started with exercising. Exercise is going to be a regular part of your life after surgery.  Make sure you have a good support system- There will be many changes and adjustments to make after surgery. It is important to have a supportive friend, family member or co-worker, etc. with whom you can talk. Continue to attend [facility]) Weight Management support groups as they can be helpful in maintaining behaviors. In addition, it is the responsibility of the patient to schedule and follow up on labs and tests completed after surgery. Results will be reviewed at each visit. **You may receive a survey regarding the care you received during your visit. Your input is valuable to us. We encourage you to complete and return your survey. We hope you will choose us in the future for your healthcare needs.

## 2020-02-11 NOTE — PROGRESS NOTES
[facility]) Physicians   [employer] [facility] [employer]    Subjective:      Patient ID: Pat Benavides is a 47 y.o. female    HPI    4 years 9 months s/p sleeve gastrectomy (surgery 2019). She is here for medical weight loss. She has the 1200 calorie post op meal plan to follow. Pat Benavides is a very pleasant 47 y.o. female , Body mass index is 44 kg/m². [facility] Patient denies any nausea, fevers, chills, shortness of breath, chest pain,constipation or urinary symptoms. Denies any heartburn nor dysphagia. She has seen [facility] for her vomiting. She had an EGD done and a gastric emptying study. She will follow up with them today about the results. She is still vomiting but has been more intentional about getting her nutrition, especially protein in. She was recently in ER and hospitalized for depression. She reports her medications were changed and she is now feeling really well. She feels motivated towards her weight loss and excited she lost weight.     Past Medical History:   Diagnosis Date    Depression     Diabetes mellitus ([address]     Fibromyalgia     Hyperlipidemia     Hypertension     Rheumatoid arthritis ([address]     Seizure ([facility] [address]     NEUROLOGIST ATA    Sleep apnea     cpap    Unspecified cerebral artery occlusion with cerebral infarction       NOT DEFINITE     Past Surgical History:   Procedure Laterality Date    APPENDECTOMY      BREAST BIOPSY  [id number]   [place]    HYSTERECTOMY  2010    LAPAROTOMY  ,     SLEEVE GASTRECTOMY  5/19/15    GASTRECTOMY SLEEVE LAPAROSCOPIC           TONSILLECTOMY  1985    UPPER GASTROINTESTINAL ENDOSCOPY  1/30/15     Family History   Adopted: Yes     Social History     Tobacco Use    Smoking status: Former Smoker     Last attempt to quit: 2004     Years since quittin.1    Smokeless tobacco: Never Used   Substance Use Topics    Alcohol use: Not Currently     I counseled the patient on the importance of not smoking and risks of ETOH. Allergies   Allergen Reactions    Ace Inhibitors Anaphylaxis, Other (See Comments), Hives and Shortness Of Breath     ANGIOEDEMA  lisinopril  angiodema  ANGIOEDEMA  ANGIOEDEMA      Iodine Anaphylaxis, Hives and Shortness Of Breath     INCLUDES BETADINE  Iv dye. Pt has not had any reaction w/ dye from CT scans for 10 years. Per dr. Francheska Rucker contrast (omni 300) is fine to use. 8/14/19 - lilibeth      Lurasidone Other (See Comments)    Lurasidone Hcl      Other reaction(s): Unknown    Nitrofurantoin Hives and Shortness Of Breath    Povidone-Iodine Hives and Nausea And Vomiting    Shellfish Allergy Anaphylaxis    Shellfish-Derived Products Anaphylaxis    Iodides      Pre treat with benadryl    Lamotrigine Hives    Macrobid [Nitrofurantoin Macrocrystal] Hives    Morphine      -Narcotics LOWERS SIEZURE THRESHLD    Pyridium [Phenazopyridine Hcl] Hives    Topiramate Other (See Comments)     Confusion      Vitals:    02/11/20 0928   BP: 100/68   Pulse: 70   Weight: 248 lb 6.4 oz (112.7 kg)   Height: 5' 3\" (1.6 m)       Body mass index is 44 kg/m².       Current Outpatient Medications:     DULoxetine (CYMBALTA) 60 MG extended release capsule, Take 1 capsule by mouth 2 times daily, Disp: 60 capsule, Rfl: 0    QUEtiapine (SEROQUEL) 100 MG tablet, Take 1 tablet by mouth nightly, Disp: 30 tablet, Rfl: 0    esomeprazole Magnesium (NEXIUM) 40 MG PACK, Take 40 mg by mouth daily, Disp: , Rfl:     methocarbamol (ROBAXIN) 750 MG tablet, Take 750-1,500 mg by mouth every 6 hours as needed (Muscle spasm) , Disp: , Rfl:     furosemide (LASIX) 40 MG tablet, Take 40 mg by mouth daily , Disp: , Rfl:     metFORMIN (GLUCOPHAGE) 500 MG tablet, Take 500 mg by mouth 2 times daily (with meals), Disp: , Rfl:     lidocaine (LIDODERM) 5 %, Place 1 patch onto the skin daily 12 hours on, 12 hours off., Disp: , Rfl:     fluticasone (FLONASE) 50 MCG/ACT nasal spray, 1 spray by Each Nostril route daily, Disp: , Rfl:     diclofenac sodium (VOLTAREN) 1 % GEL, Apply 2 g topically 2 times daily, Disp: , Rfl:     traMADol (ULTRAM) 50 MG tablet, Take 50 mg by mouth every 6 hours as needed for Pain., Disp: , Rfl:     loperamide (IMODIUM A-D) 2 MG tablet, Take 2 tablets by mouth, Disp: , Rfl:     Calcium Carbonate-Vitamin D 600-200 MG-UNIT CAPS, One tablet 2 times a day., Disp: , Rfl:     carvedilol (COREG) 6.25 MG tablet, Take 12.5 mg by mouth 2 times daily (with meals) , Disp: , Rfl:     rivaroxaban (XARELTO) 20 MG TABS tablet, Take 20 mg by mouth daily (with breakfast) , Disp: , Rfl:     Ondansetron HCl (ZOFRAN PO), Take 1 tablet by mouth as needed, Disp: , Rfl:     allopurinol (ZYLOPRIM) 100 MG tablet, Take 100 mg by mouth daily. , Disp: , Rfl:     gabapentin (NEURONTIN) 300 MG capsule, Take 1,200 mg by mouth 3 times daily. ., Disp: , Rfl:     B Complex-C (SUPER B COMPLEX PO), Take 1 tablet by mouth every morning., Disp: , Rfl:         Review of Systems   Constitutional: Negative. HENT: Negative. Eyes: Negative. Respiratory: Negative. Cardiovascular: Negative. Gastrointestinal: Positive for vomiting. Skin: Negative. Neurological: Negative. Objective:    Physical Exam  Constitutional:       Appearance: She is well-developed. HENT:      Head: Normocephalic and atraumatic. Eyes:      Pupils: Pupils are equal, round, and reactive to light. Neck:      Musculoskeletal: Normal range of motion. Cardiovascular:      Rate and Rhythm: Normal rate. Pulmonary:      Effort: Pulmonary effort is normal.   Abdominal:      Palpations: Abdomen is soft. Tenderness: There is no abdominal tenderness. Musculoskeletal: Normal range of motion. Neurological:      Mental Status: She is alert and oriented to person, place, and time. Psychiatric:         Behavior: Behavior normal.         Thought Content:  Thought content normal.         Judgment: Judgment normal. Assessment and Plan:   Patient is 4  Years 9 months s/p sleeve gastrectomy, down 4lbs with a total weight loss of 71.3lbs. She is here for medical weight management. The patient's current Body mass index is 44 kg/m². (2/11/20). She is doing well, denies dysphagia or reflux. She will continue to see [place] [facility] for her vomiting symptoms. She  is getting adequate fluids. She was given 1200 calorie postop meal plan to follow. She is not yet following this but trying to be more mindful. .She  is exercising daily at home. Encouraged continued physical activity. We discussed possibility of doing the [place] wellness program at her next visit if she was tolerating more of her foods by then (ensure she is getting enough calories to exercise). She will focus on following the meal plan. She will follow up with GI about her test results. She will follow up with psychiatry about her mood. She  did meet with the registered dietitian for continued follow up. I agree with recommendations and plan. We will see her back in 2 months for continued follow up. I have spent 15 min counseling patient.

## 2020-06-03 ENCOUNTER — HOSPITAL ENCOUNTER (INPATIENT)
Age: 55
LOS: 5 days | Discharge: HOME | DRG: 885 | End: 2020-06-08
Attending: PSYCHIATRY & NEUROLOGY | Admitting: PSYCHIATRY & NEUROLOGY
Payer: COMMERCIAL

## 2020-06-03 ENCOUNTER — HOSPITAL ENCOUNTER (EMERGENCY)
Age: 55
Discharge: ANOTHER HOSPITAL | End: 2020-06-03
Attending: EMERGENCY MEDICINE
Payer: COMMERCIAL

## 2020-06-03 VITALS
TEMPERATURE: 98.6 F | BODY MASS INDEX: 40.04 KG/M2 | SYSTOLIC BLOOD PRESSURE: 120 MMHG | RESPIRATION RATE: 18 BRPM | HEIGHT: 63 IN | WEIGHT: 226 LBS | DIASTOLIC BLOOD PRESSURE: 70 MMHG | OXYGEN SATURATION: 96 % | HEART RATE: 90 BPM

## 2020-06-03 PROBLEM — F33.9 MAJOR DEPRESSIVE DISORDER, RECURRENT (HCC): Status: ACTIVE | Noted: 2020-06-03

## 2020-06-03 LAB
ABSOLUTE EOS #: 0.21 K/UL (ref 0–0.44)
ABSOLUTE IMMATURE GRANULOCYTE: 0.04 K/UL (ref 0–0.3)
ABSOLUTE LYMPH #: 2.75 K/UL (ref 1.1–3.7)
ABSOLUTE MONO #: 0.5 K/UL (ref 0.1–1.2)
AMPHETAMINE SCREEN URINE: NEGATIVE
BARBITURATE SCREEN URINE: NEGATIVE
BASOPHILS # BLD: 1 % (ref 0–2)
BASOPHILS ABSOLUTE: 0.07 K/UL (ref 0–0.2)
BENZODIAZEPINE SCREEN, URINE: NEGATIVE
BUPRENORPHINE URINE: NORMAL
CANNABINOID SCREEN URINE: NEGATIVE
COCAINE METABOLITE, URINE: NEGATIVE
DIFFERENTIAL TYPE: ABNORMAL
EOSINOPHILS RELATIVE PERCENT: 2 % (ref 1–4)
HCT VFR BLD CALC: 41.2 % (ref 36.3–47.1)
HEMOGLOBIN: 12.9 G/DL (ref 11.9–15.1)
IMMATURE GRANULOCYTES: 0 %
LYMPHOCYTES # BLD: 29 % (ref 24–43)
MCH RBC QN AUTO: 28.2 PG (ref 25.2–33.5)
MCHC RBC AUTO-ENTMCNC: 31.3 G/DL (ref 28.4–34.8)
MCV RBC AUTO: 90.2 FL (ref 82.6–102.9)
MDMA URINE: NORMAL
METHADONE SCREEN, URINE: NEGATIVE
METHAMPHETAMINE, URINE: NORMAL
MONOCYTES # BLD: 5 % (ref 3–12)
NRBC AUTOMATED: 0 PER 100 WBC
OPIATES, URINE: NEGATIVE
OXYCODONE SCREEN URINE: NEGATIVE
PDW BLD-RTO: 15.3 % (ref 11.8–14.4)
PHENCYCLIDINE, URINE: NEGATIVE
PLATELET # BLD: 225 K/UL (ref 138–453)
PLATELET ESTIMATE: ABNORMAL
PMV BLD AUTO: 10.4 FL (ref 8.1–13.5)
PROPOXYPHENE, URINE: NORMAL
RBC # BLD: 4.57 M/UL (ref 3.95–5.11)
RBC # BLD: ABNORMAL 10*6/UL
SARS-COV-2, PCR: NORMAL
SARS-COV-2, RAPID: NOT DETECTED
SARS-COV-2: NORMAL
SEG NEUTROPHILS: 63 % (ref 36–65)
SEGMENTED NEUTROPHILS ABSOLUTE COUNT: 5.92 K/UL (ref 1.5–8.1)
SOURCE: NORMAL
TEST INFORMATION: NORMAL
TRICYCLIC ANTIDEPRESSANTS, UR: NORMAL
WBC # BLD: 9.5 K/UL (ref 3.5–11.3)
WBC # BLD: ABNORMAL 10*3/UL

## 2020-06-03 PROCEDURE — 80307 DRUG TEST PRSMV CHEM ANLYZR: CPT

## 2020-06-03 PROCEDURE — 99284 EMERGENCY DEPT VISIT MOD MDM: CPT

## 2020-06-03 PROCEDURE — 1240000000 HC EMOTIONAL WELLNESS R&B

## 2020-06-03 PROCEDURE — 80076 HEPATIC FUNCTION PANEL: CPT

## 2020-06-03 PROCEDURE — 80048 BASIC METABOLIC PNL TOTAL CA: CPT

## 2020-06-03 PROCEDURE — 85025 COMPLETE CBC W/AUTO DIFF WBC: CPT

## 2020-06-03 PROCEDURE — U0002 COVID-19 LAB TEST NON-CDC: HCPCS

## 2020-06-03 PROCEDURE — G0480 DRUG TEST DEF 1-7 CLASSES: HCPCS

## 2020-06-03 RX ORDER — NICOTINE 21 MG/24HR
1 PATCH, TRANSDERMAL 24 HOURS TRANSDERMAL DAILY
Status: DISCONTINUED | OUTPATIENT
Start: 2020-06-04 | End: 2020-06-08 | Stop reason: HOSPADM

## 2020-06-03 RX ORDER — TRAZODONE HYDROCHLORIDE 50 MG/1
50 TABLET ORAL NIGHTLY PRN
Status: DISCONTINUED | OUTPATIENT
Start: 2020-06-04 | End: 2020-06-08 | Stop reason: HOSPADM

## 2020-06-03 ASSESSMENT — ENCOUNTER SYMPTOMS
ABDOMINAL PAIN: 0
CONSTIPATION: 0
COUGH: 0
SINUS PRESSURE: 0
NAUSEA: 0
COLOR CHANGE: 0
WHEEZING: 0
VOMITING: 0
SORE THROAT: 0
DIARRHEA: 0
RHINORRHEA: 0
SHORTNESS OF BREATH: 0

## 2020-06-04 PROBLEM — F33.2 SEVERE EPISODE OF RECURRENT MAJOR DEPRESSIVE DISORDER, WITHOUT PSYCHOTIC FEATURES (HCC): Status: ACTIVE | Noted: 2020-06-03

## 2020-06-04 PROCEDURE — 90792 PSYCH DIAG EVAL W/MED SRVCS: CPT | Performed by: REGISTERED NURSE

## 2020-06-04 PROCEDURE — 6370000000 HC RX 637 (ALT 250 FOR IP): Performed by: REGISTERED NURSE

## 2020-06-04 PROCEDURE — 6370000000 HC RX 637 (ALT 250 FOR IP): Performed by: PSYCHIATRY & NEUROLOGY

## 2020-06-04 PROCEDURE — 1240000000 HC EMOTIONAL WELLNESS R&B

## 2020-06-04 RX ORDER — PANTOPRAZOLE SODIUM 40 MG/1
40 TABLET, DELAYED RELEASE ORAL
Status: DISCONTINUED | OUTPATIENT
Start: 2020-06-05 | End: 2020-06-08 | Stop reason: HOSPADM

## 2020-06-04 RX ORDER — DICLOFENAC POTASSIUM 50 MG/1
50 TABLET, FILM COATED ORAL DAILY PRN
COMMUNITY
End: 2021-08-18

## 2020-06-04 RX ORDER — IBUPROFEN 400 MG/1
400 TABLET ORAL 2 TIMES DAILY PRN
Status: DISCONTINUED | OUTPATIENT
Start: 2020-06-04 | End: 2020-06-08 | Stop reason: HOSPADM

## 2020-06-04 RX ORDER — ONDANSETRON 4 MG/1
4 TABLET, ORALLY DISINTEGRATING ORAL EVERY 8 HOURS PRN
Status: DISCONTINUED | OUTPATIENT
Start: 2020-06-04 | End: 2020-06-08 | Stop reason: HOSPADM

## 2020-06-04 RX ORDER — MAGNESIUM OXIDE 400 MG/1
400 TABLET ORAL DAILY
Status: DISCONTINUED | OUTPATIENT
Start: 2020-06-04 | End: 2020-06-04 | Stop reason: CLARIF

## 2020-06-04 RX ORDER — UBIDECARENONE 100 MG
100 CAPSULE ORAL 3 TIMES DAILY
COMMUNITY
End: 2021-07-15

## 2020-06-04 RX ORDER — HYDROXYZINE PAMOATE 25 MG/1
25 CAPSULE ORAL 3 TIMES DAILY PRN
Status: ON HOLD | COMMUNITY
End: 2020-06-08 | Stop reason: HOSPADM

## 2020-06-04 RX ORDER — METHOCARBAMOL 750 MG/1
750 TABLET, FILM COATED ORAL 4 TIMES DAILY
Status: DISCONTINUED | OUTPATIENT
Start: 2020-06-04 | End: 2020-06-08 | Stop reason: HOSPADM

## 2020-06-04 RX ORDER — LOPERAMIDE HYDROCHLORIDE 2 MG/1
2 CAPSULE ORAL 4 TIMES DAILY PRN
COMMUNITY

## 2020-06-04 RX ORDER — ESOMEPRAZOLE MAGNESIUM 40 MG/1
40 CAPSULE, DELAYED RELEASE ORAL
Status: ON HOLD | COMMUNITY
End: 2020-06-08 | Stop reason: SDUPTHER

## 2020-06-04 RX ORDER — ONDANSETRON 4 MG/1
4 TABLET, ORALLY DISINTEGRATING ORAL EVERY 8 HOURS PRN
COMMUNITY

## 2020-06-04 RX ORDER — QUETIAPINE FUMARATE 100 MG/1
100 TABLET, FILM COATED ORAL NIGHTLY
Status: DISCONTINUED | OUTPATIENT
Start: 2020-06-04 | End: 2020-06-08 | Stop reason: HOSPADM

## 2020-06-04 RX ORDER — CARVEDILOL 12.5 MG/1
12.5 TABLET ORAL 2 TIMES DAILY WITH MEALS
Status: ON HOLD | COMMUNITY
End: 2020-06-08 | Stop reason: SDUPTHER

## 2020-06-04 RX ORDER — FUROSEMIDE 40 MG/1
40 TABLET ORAL DAILY
Status: DISCONTINUED | OUTPATIENT
Start: 2020-06-04 | End: 2020-06-08 | Stop reason: HOSPADM

## 2020-06-04 RX ORDER — GABAPENTIN 600 MG/1
600 TABLET ORAL 3 TIMES DAILY
Status: DISCONTINUED | OUTPATIENT
Start: 2020-06-04 | End: 2020-06-08 | Stop reason: HOSPADM

## 2020-06-04 RX ORDER — LIDOCAINE 4 G/G
1 PATCH TOPICAL DAILY
Status: DISCONTINUED | OUTPATIENT
Start: 2020-06-04 | End: 2020-06-08 | Stop reason: HOSPADM

## 2020-06-04 RX ORDER — UBIDECARENONE 100 MG
100 CAPSULE ORAL DAILY
Status: DISCONTINUED | OUTPATIENT
Start: 2020-06-04 | End: 2020-06-08 | Stop reason: HOSPADM

## 2020-06-04 RX ORDER — DICLOFENAC POTASSIUM 50 MG/1
50 TABLET, FILM COATED ORAL DAILY PRN
Status: DISCONTINUED | OUTPATIENT
Start: 2020-06-04 | End: 2020-06-04 | Stop reason: CLARIF

## 2020-06-04 RX ORDER — AMITRIPTYLINE HYDROCHLORIDE 10 MG/1
10 TABLET, FILM COATED ORAL NIGHTLY
Status: ON HOLD | COMMUNITY
End: 2020-06-08 | Stop reason: SDUPTHER

## 2020-06-04 RX ORDER — CETIRIZINE HYDROCHLORIDE 10 MG/1
10 TABLET ORAL DAILY
Status: DISCONTINUED | OUTPATIENT
Start: 2020-06-04 | End: 2020-06-08 | Stop reason: HOSPADM

## 2020-06-04 RX ORDER — DULOXETIN HYDROCHLORIDE 30 MG/1
60 CAPSULE, DELAYED RELEASE ORAL 2 TIMES DAILY
Status: DISCONTINUED | OUTPATIENT
Start: 2020-06-04 | End: 2020-06-08 | Stop reason: HOSPADM

## 2020-06-04 RX ORDER — AMITRIPTYLINE HYDROCHLORIDE 10 MG/1
10 TABLET, FILM COATED ORAL NIGHTLY
Status: DISCONTINUED | OUTPATIENT
Start: 2020-06-04 | End: 2020-06-08 | Stop reason: HOSPADM

## 2020-06-04 RX ORDER — GABAPENTIN 600 MG/1
1200 TABLET ORAL 3 TIMES DAILY
Status: ON HOLD | COMMUNITY
End: 2020-06-08 | Stop reason: HOSPADM

## 2020-06-04 RX ORDER — MAGNESIUM OXIDE 400 MG/1
400 TABLET ORAL DAILY
Status: ON HOLD | COMMUNITY
End: 2020-06-08 | Stop reason: HOSPADM

## 2020-06-04 RX ORDER — CARVEDILOL 12.5 MG/1
12.5 TABLET ORAL 2 TIMES DAILY WITH MEALS
Status: DISCONTINUED | OUTPATIENT
Start: 2020-06-04 | End: 2020-06-08 | Stop reason: HOSPADM

## 2020-06-04 RX ORDER — LOPERAMIDE HYDROCHLORIDE 2 MG/1
2 CAPSULE ORAL 4 TIMES DAILY PRN
Status: DISCONTINUED | OUTPATIENT
Start: 2020-06-04 | End: 2020-06-08 | Stop reason: HOSPADM

## 2020-06-04 RX ORDER — CETIRIZINE HYDROCHLORIDE 10 MG/1
10 TABLET ORAL DAILY
COMMUNITY

## 2020-06-04 RX ORDER — DULOXETIN HYDROCHLORIDE 30 MG/1
60 CAPSULE, DELAYED RELEASE ORAL 2 TIMES DAILY
Status: ON HOLD | COMMUNITY
End: 2020-06-08 | Stop reason: HOSPADM

## 2020-06-04 RX ADMIN — CARVEDILOL 12.5 MG: 12.5 TABLET, FILM COATED ORAL at 11:13

## 2020-06-04 RX ADMIN — CETIRIZINE HYDROCHLORIDE 10 MG: 10 TABLET, FILM COATED ORAL at 12:41

## 2020-06-04 RX ADMIN — QUETIAPINE FUMARATE 100 MG: 100 TABLET ORAL at 21:16

## 2020-06-04 RX ADMIN — METHOCARBAMOL 750 MG: 750 TABLET ORAL at 17:40

## 2020-06-04 RX ADMIN — RIVAROXABAN 20 MG: 20 TABLET, FILM COATED ORAL at 17:40

## 2020-06-04 RX ADMIN — METFORMIN HYDROCHLORIDE 500 MG: 500 TABLET ORAL at 10:32

## 2020-06-04 RX ADMIN — DULOXETINE 60 MG: 30 CAPSULE, DELAYED RELEASE ORAL at 12:41

## 2020-06-04 RX ADMIN — Medication 100 MG: at 12:41

## 2020-06-04 RX ADMIN — DULOXETINE 60 MG: 30 CAPSULE, DELAYED RELEASE ORAL at 21:16

## 2020-06-04 RX ADMIN — AMITRIPTYLINE HYDROCHLORIDE 10 MG: 10 TABLET, FILM COATED ORAL at 21:16

## 2020-06-04 RX ADMIN — FUROSEMIDE 40 MG: 40 TABLET ORAL at 12:42

## 2020-06-04 RX ADMIN — GABAPENTIN 600 MG: 600 TABLET, FILM COATED ORAL at 12:41

## 2020-06-04 RX ADMIN — TRAZODONE HYDROCHLORIDE 50 MG: 50 TABLET ORAL at 21:16

## 2020-06-04 RX ADMIN — GABAPENTIN 600 MG: 600 TABLET, FILM COATED ORAL at 21:16

## 2020-06-04 RX ADMIN — METFORMIN HYDROCHLORIDE 500 MG: 500 TABLET ORAL at 17:40

## 2020-06-04 RX ADMIN — METHOCARBAMOL 750 MG: 750 TABLET ORAL at 21:16

## 2020-06-04 RX ADMIN — Medication 400 MG: at 10:32

## 2020-06-04 RX ADMIN — METHOCARBAMOL 750 MG: 750 TABLET ORAL at 12:41

## 2020-06-04 ASSESSMENT — SLEEP AND FATIGUE QUESTIONNAIRES
DO YOU USE A SLEEP AID: YES
DIFFICULTY FALLING ASLEEP: YES
DO YOU HAVE DIFFICULTY SLEEPING: NO
RESTFUL SLEEP: NO
DIFFICULTY ARISING: NO
SLEEP PATTERN: DIFFICULTY FALLING ASLEEP
DIFFICULTY STAYING ASLEEP: YES
AVERAGE NUMBER OF SLEEP HOURS: 8

## 2020-06-04 ASSESSMENT — LIFESTYLE VARIABLES
HISTORY_ALCOHOL_USE: NO
HISTORY_ALCOHOL_USE: NO

## 2020-06-04 ASSESSMENT — PAIN SCALES - GENERAL: PAINLEVEL_OUTOF10: 0

## 2020-06-04 NOTE — GROUP NOTE
Group Therapy Note    Date: 6/4/2020    Group Start Time: 1330  Group End Time: 1532  Group Topic: Psychoeducation    [facility]    Ni Trejo        Group Therapy Note    Attendees: 6/17         Patient's Goal:  To increase interpersonal interaction     Notes:      Status After Intervention:  Improved    Participation Level:  Active Listener and Interactive    Participation Quality: Appropriate, Attentive and Sharing      Speech:  normal      Thought Process/Content: Logical  Linear      Affective Functioning: Congruent      Mood: euthymic      Level of consciousness:  Alert, Oriented x4 and Attentive      Response to Learning: Able to verbalize current knowledge/experience, Able to verbalize/acknowledge new learning, Able to retain information and Progressing to goal      Endings: None Reported    Modes of Intervention: Education, Support, Socialization, Exploration, Clarifying, Problem-solving and Activity      Discipline Responsible: Psychoeducational Specialist      Signature:  Ni Trejo

## 2020-06-04 NOTE — PROGRESS NOTES
Pharmacy Medication History Note      List of current medications patient is taking is complete. Source of information: [address], [employer], [employer], [employer], Patient    Changes made to medication list:  Medications removed (include reason, ex. therapy complete or physician discontinued, noncompliance):  Diclofenac gel (list clean up), Loperamide (list clean up), Tramadol (therapy completed)    Medications added/doses adjusted: Added Amitriptyline 10 mg nightly  Added Cetirizine 10 mg daily  Added Co-Enzyme Q10 100 mg daily  Added Diclofenac 50 mg daily as needed, may repeat in 6 hours (max 3 tablets per day)  Added Hydroxyzine 25 mg three times daily as needed  Added Magnesium 400 mg daily  Adjusted Carvedilol to 12.5 mg twice daily    Other notes (ex. Recent course of antibiotics, Coumadin dosing):  Patient reports a history of gastric sleeve. Patient requested Calcium with Vitamin D removed from her list as she prefers to use a specific brand and does not want it ordered during admission. Patient reports she is on Xarelto for a history of Afib and stroke. Denies use of other OTC or herbal medications. Please let me know if you have any questions about this encounter. Thank you!     Electronically signed by Hesham Aldana, [facility] on 6/4/2020 at 9:32 AM
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
## 2020-06-04 NOTE — PLAN OF CARE
Problem: Altered Mood, Depressive Behavior:  Goal: Able to verbalize and/or display a decrease in depressive symptoms  Description: Able to verbalize and/or display a decrease in depressive symptoms  6/4/2020 1308 by Karli Calderon RN  Outcome: Ongoing  Pt states that she has been feeling depressed. She said that she has been having a lot of pain. She said that she has disc in her back and knees that cause her pain. She said that she has been living in a shelter. She talked about the pain and stress from housing has been increasing her depression and anxiety. Problem: Altered Mood, Depressive Behavior:  Goal: Ability to disclose and discuss suicidal ideas will improve  Description: Ability to disclose and discuss suicidal ideas will improve  6/4/2020 1308 by Karli Alvarado RN  Outcome: Ongoing  Pt denied current suicidal ideations. Problem: Altered Mood, Depressive Behavior:  Goal: Able to verbalize support systems  Description: Able to verbalize support systems  6/4/2020 1308 by Karli Calderon RN  Outcome: Ongoing  Pt states that she has a support system. Problem: Pain:  Goal: Pain level will decrease  Description: Pain level will decrease  6/4/2020 1308 by Karli Alvarado RN  Outcome: Ongoing  Pt continues to have pain. Problem: Pain:  Goal: Control of acute pain  Description: Control of acute pain  6/4/2020 1308 by Karli Calderon RN  Outcome: Ongoing  Pt continues to have pain. Problem: Pain:  Goal: Control of chronic pain  Description: Control of chronic pain  6/4/2020 1308 by Karli Calderon RN  Outcome: Ongoing  Pt continues to have pain.

## 2020-06-04 NOTE — GROUP NOTE
Group Therapy Note    Date: 6/4/2020    Group Start Time: 1430  Group End Time: 7664  Group Topic: Healthy Living/Wellness    [facility]    Patient refused to attend health and wellness group at 1430 after encouragement from staff. 1:1 talk time offered by staff as alternative to group session.

## 2020-06-04 NOTE — ED PROVIDER NOTES
Abnormal; Notable for the following components:       Result Value    RDW 15.3 (*)     All other components within normal limits   BASIC METABOLIC PANEL - Abnormal; Notable for the following components:    Glucose 141 (*)     Potassium 3.5 (*)     All other components within normal limits   HEPATIC FUNCTION PANEL - Abnormal; Notable for the following components: Total Bilirubin 0.19 (*)     All other components within normal limits   TOX SCR, BLD, ED - Abnormal; Notable for the following components:    Acetaminophen Level <12 (*)     Salicylate Lvl <1 (*)     All other components within normal limits   URINE DRUG SCREEN       All other labs were within normal range or not returned as of this dictation. EMERGENCY DEPARTMENT COURSE and DIFFERENTIAL DIAGNOSIS/MDM:   Vitals:    Vitals:    06/03/20 1855   BP: 120/70   Pulse: 90   Resp: 18   Temp: 98.6 °F (37 °C)   TempSrc: Oral   SpO2: 96%   Weight: 226 lb (102.5 kg)   Height: 5' 3\" (1.6 m)       Medical Decision Making: medically cleared for psych. SPoke with Marlin Park and she accepts pt at [address] [place] [address],[address]      1. Suicidal ideations    2. Depression, unspecified depression type          DISPOSITION/PLAN   DISPOSITION        PATIENT REFERRED TO:   No follow-up provider specified.     DISCHARGE MEDICATIONS:     New Prescriptions    No medications on file           (Please note that portions of this note were completed with a voice recognition program.  Efforts were made to edit the dictations but occasionally words are mis-transcribed.)    [facility] NP, APRN - CNP  Certified Nurse Practitioner          KANG Salazar CNP  06/03/20 6240

## 2020-06-04 NOTE — GROUP NOTE
Group Therapy Note    Date: 6/4/2020    Group Start Time: 1100  Group End Time: 7900  Group Topic: Psychoeducation    NIRMAL Sexton    Patient refused to attend self-esteem group at 1100 after encouragement from staff. 1:1 talk time offered by staff as alternative to group session.

## 2020-06-04 NOTE — PLAN OF CARE
[facility]  Initial Interdisciplinary Treatment Plan NO      Original treatment plan Date & Time: 6/4/2020 0950    Admission Type:  Admission Type:  Involuntary    Reason for admission:   Reason for Admission: Patient suicidal to cut wrists    Estimated Length of Stay:  5-7days  Estimated Discharge Date: to be determined by physician    PATIENT STRENGTHS:  Patient Strengths:Strengths: Medication Compliance, Positive Support, Motivated  Patient Strengths and Limitations:Limitations: Multiple barriers to leisure interests, Difficult relationships / poor social skills  Addictive Behavior: Addictive Behavior  In the past 3 months, have you felt or has someone told you that you have a problem with:  : None  Do you have a history of Chemical Use?: No  Do you have a history of Alcohol Use?: No  Do you have a history of Street Drug Abuse?: No  Histroy of Prescripton Drug Abuse?: No  Medical Problems:  Past Medical History:   Diagnosis Date    Cancer ([facility]     Depression     [facility]     Fibromyalgia     Hyperlipidemia     Hypertension     Rheumatoid arthritis ([facility]     Seizure ([facility] 7/14    NEUROLOGIST ATA    Sleep apnea     cpap    Unspecified cerebral artery occlusion with cerebral infarction     7/12  NOT DEFINITE     Status EXAM:Status and Exam  Normal: Yes  Facial Expression: Flat  Affect: Appropriate  Level of Consciousness: Alert  Mood:Normal: No  Mood: Depressed, Sad, Anxious  Motor Activity:Normal: Yes  Interview Behavior: Cooperative  Preception: [place] to Person, Othelia Grim to Time, [place] to Place, [place] to Situation  Attention:Normal: Yes  Thought Content:Normal: Yes  Hallucinations: None  Delusions: No  Memory:Normal: Yes  Insight and Judgment: No  Insight and Judgment: Poor Judgment, Poor Insight  Present Suicidal Ideation: No  Present Homicidal Ideation: No    EDUCATION:   Learner Progress Toward Treatment Goals: reviewed group plans and strategies for care    Method:group therapy, medication compliance, individualized assessments and care planning    Outcome: needs reinforcement    PATIENT GOALS: to be discussed with patient within 72 hours    PLAN/TREATMENT RECOMMENDATIONS:     continue group therapy , medications compliance, goal setting, individualized assessments and care, continue to monitor pt on unit      SHORT-TERM GOALS:   Time frame for Short-Term Goals: 5-7 days    LONG-TERM GOALS:  Time frame for Long-Term Goals: 6 months  Members Present in Team Meeting: See Signature Sheet    Warren Villafuerte

## 2020-06-04 NOTE — VIRTUAL HEALTH
occlusion with cerebral infarction       NOT DEFINITE      Past Surgical History:   Procedure Laterality Date    APPENDECTOMY  1985    BREAST BIOPSY  [id number]     SECTION      CHOLECYSTECTOMY  1985    HYSTERECTOMY  2010    [address] [address]    SLEEVE GASTRECTOMY  5/19/15    GASTRECTOMY SLEEVE LAPAROSCOPIC           TONSILLECTOMY      UPPER GASTROINTESTINAL ENDOSCOPY  1/30/15     Neurologic Exam    See H&P for further physical examination. SOCIAL HISTORY   Carla Burch reports she was adopted at birth and raised by both adopted parents. She reports she graduated high school and has some college. 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 She states she is  but  from her second . She states she recently moved to the [facility] area with her daughter and two grandchildren. The apartment they had secured fell through so they are currently living in a shelter.      Social History     Socioeconomic History    Marital status:      Spouse name: Not on file    Number of children: Not on file    Years of education: Not on file    Highest education level: Not on file   Occupational History    Occupation: Disabled   Social Needs    Financial resource strain: Not on file    Food insecurity     Worry: Not on file     Inability: Not on file   [employer] needs     Medical: Not on file     Non-medical: Not on file   Tobacco Use    Smoking status: Former Smoker     Last attempt to quit: 2004     Years since quittin.4    Smokeless tobacco: Never Used   Substance and Sexual Activity    Alcohol use: Yes     Comment: occassion    Drug use: No    Sexual activity: Yes     Partners: Male   Lifestyle    Physical activity     Days per week: Not on file     Minutes per session: Not on file    Stress: Not on file   Relationships    Social connections     Talks on phone: Not on file     Gets together: Not on file     Attends Sabianism service: Not on file Active member of club or organization: Not on file     Attends meetings of clubs or organizations: Not on file     Relationship status: Not on file    Intimate partner violence     Fear of current or ex partner: Not on file     Emotionally abused: Not on file     Physically abused: Not on file     Forced sexual activity: Not on file   Other Topics Concern    Not on file   Social History Narrative    Not on file         Mental Status  Pt. was alert, fully oriented, and cooperative. Appearance and hygiene weredisheveled, poor hygiene . Mood was depressed. Affect was \"dysthymic and poorly reactive Thought process was linear and well-organized. Patient denied any hallucinations or paranoia. Patient denied suicidal ideations. Patient denied homicidal ideations . Patient's gross cognitive functions were impaired. Insight and judgement were poor. Both recent and remote memory were intact. Psychomotor status was slowed     Labs  Recent Results (from the past 72 hour(s))   Urine Drug Screen    Collection Time: 06/03/20  7:23 PM   Result Value Ref Range    Amphetamine Screen, Ur NEGATIVE NEGATIVE    Barbiturate Screen, Ur NEGATIVE NEGATIVE    Benzodiazepine Screen, Urine NEGATIVE NEGATIVE    Cocaine Metabolite, Urine NEGATIVE NEGATIVE    Methadone Screen, Urine NEGATIVE NEGATIVE    Opiates, Urine NEGATIVE NEGATIVE    Phencyclidine, Urine NEGATIVE NEGATIVE    Propoxyphene, Urine NOT REPORTED NEGATIVE    Cannabinoid Scrn, Ur NEGATIVE NEGATIVE    Oxycodone Screen, Ur NEGATIVE NEGATIVE    Methamphetamine, Urine NOT REPORTED NEGATIVE    Tricyclic Antidepressants, Urine NOT REPORTED NEGATIVE    MDMA, Urine NOT REPORTED NEGATIVE    Buprenorphine Urine NOT REPORTED NEGATIVE    Test Information       Assay provides medical screening only. The absence of expected drug(s) and/or metabolite(s) may indicate diluted or adulterated urine, limitations of testing or timing of collection.    CBC Auto Differential    Collection Collection Time: 06/03/20  7:35 PM   Result Value Ref Range    Acetaminophen Level <10 (L) 10 - 30 ug/mL    Ethanol <10 <10 mg/dL    Ethanol percent <0.566 <5.572 %    Salicylate Lvl <1 (L) 3 - 10 mg/dL    Toxic Tricyclic Sc,Blood POSITIVE (A) NEGATIVE   COVID-19    Collection Time: 06/03/20  9:15 PM   Result Value Ref Range    SARS-CoV-2          SARS-CoV-2, Rapid Not Detected Not Detected    Source . NASOPHARYNGEAL SWAB     SARS-CoV-2, PCR               Diagnostic Impression  Active Problems:    Major depressive disorder, recurrent ([facility]  Resolved Problems:    * No resolved hospital problems. *          Medications   nicotine  1 patch Transdermal Daily     nicotine polacrilex, traZODone    Treatment Plan:     Admit to inpatient psychiatric treatment   Supportive therapy with medication management. Reviewed risks and benefits as well as potential side effects with patient.  Restart home medications.  Therapeutic activities and groups   Follow up at [facility] after symptoms stabilized    Estimated length of stay: 5-7 days    KANG Gilbert - [facility]  Psychiatric Advanced Practice Nurse  Unique voice recognition software used in portions of this document. Occasionally words are mis-transcribed      Patient Location:  [address] [place] [address]    Provider Location ([facility]): [place], [place]    This virtual visit was conducted via interactive/real-time audio/video.

## 2020-06-04 NOTE — BH NOTE
`[facility]  Admission Note     Admission Type:   Admission Type:  Involuntary    Reason for admission:  Reason for Admission: Patient suicidal to cut wrists    PATIENT STRENGTHS:  Strengths: Medication Compliance, Positive Support, Motivated    Patient Strengths and Limitations:  Limitations: Difficult relationships / poor social skills, Multiple barriers to leisure interests    Addictive Behavior:   Addictive Behavior  In the past 3 months, have you felt or has someone told you that you have a problem with:  : None  Do you have a history of Chemical Use?: No  Do you have a history of Alcohol Use?: No  Do you have a history of Street Drug Abuse?: No  Histroy of Prescripton Drug Abuse?: No    Medical Problems:   Past Medical History:   Diagnosis Date    [facility]     Depression     [facility] ([facility]     Fibromyalgia     Hyperlipidemia     Hypertension     [facility] ([facility]     Seizure ([facility] 7/14    NEUROLOGIST ATA    Sleep apnea     cpap    Unspecified cerebral artery occlusion with cerebral infarction     7/12  NOT DEFINITE       Status EXAM:  Status and Exam  Normal: Yes  Facial Expression: Flat  Affect: Appropriate  Level of Consciousness: Alert  Mood:Normal: No  Mood: Depressed, Sad, Anxious  Motor Activity:Normal: Yes  Interview Behavior: Cooperative  Preception: [place] to Person, Faythe Chimes to Time, [place] to Place, [place] to Situation  Attention:Normal: Yes  Thought Content:Normal: Yes  Hallucinations: None  Delusions: No  Memory:Normal: Yes  Insight and Judgment: No  Insight and Judgment: Poor Judgment, Poor Insight  Present Suicidal Ideation: No  Present Homicidal Ideation: No    Tobacco Screening:  Practical Counseling, on admission, kristina X, if applicable and completed (first 3 are required if patient doesn't refuse):            ( )  Recognizing danger situations (included triggers and roadblocks)                    ( )  Coping skills (new ways to manage stress, exercise, relaxation techniques, changing routine, distraction)                                                           ( )  Basic information about quitting (benefits of quitting, techniques in how to quit, available resources  ( ) Referral for counseling faxed to [facility]                                           ( ) Patient refused counseling  (X ) Patient has not smoked in the last 30 days    Metabolic Screening:    Lab Results   Component Value Date    LABA1C 6.6 12/03/2019       Lab Results   Component Value Date    CHOL 171 12/03/2019    CHOL 268 (H) 01/15/2019     Lab Results   Component Value Date    TRIG 162 (H) 12/03/2019    TRIG 103 01/15/2019     Lab Results   Component Value Date    HDL 41 12/03/2019    HDL 55 01/15/2019     No components found for: LDLCAL  Lab Results   Component Value Date    LABVLDL 32 12/03/2019    LABVLDL 21 01/15/2019         Body mass index is 40.39 kg/m². BP Readings from Last 2 Encounters:   06/04/20 117/79   06/03/20 120/70           Pt admitted with followings belongings:  Dentures: None  Vision - Corrective Lenses: Glasses  Hearing Aid: None  Jewelry: None  Body Piercings Removed: No  Clothing: Footwear, Dress, Undergarments (Comment), Sweater  Were All Patient Medications Collected?: No  Other Valuables: Cell phone, Other (Comment)(charging cord with adapter)     Valuables placed in safe in security envelope, number:  [id number]. Patient oriented to surroundings and program expectations and copy of patient rights given. Received admission packet:  yes. Consents reviewed, signed Yes. Refused No. Patient verbalize understanding:  Yes    Patient education on precautions: Yes    Patient was pink[facility]. Patient was verbalizing suicidal thoughts to cut wrists because feeling overwhelmed due to sudden change in health insurance which the patient states is not a good as her previous health insurance coverage.  Patient denies any homicidal thoughts or any audio or visual hallucination. Patient was oriented to the unit. Patient was offered food and water. Patient was changed into hospital pants, gown and socks. Patient was wanded for safety. Patient denies any additional needs at this time. 15 minute safety checks were initiated.                     Khoa Glasgow RN

## 2020-06-05 PROCEDURE — 99232 SBSQ HOSP IP/OBS MODERATE 35: CPT | Performed by: PSYCHIATRY & NEUROLOGY

## 2020-06-05 PROCEDURE — 6370000000 HC RX 637 (ALT 250 FOR IP): Performed by: PSYCHIATRY & NEUROLOGY

## 2020-06-05 PROCEDURE — 1240000000 HC EMOTIONAL WELLNESS R&B

## 2020-06-05 PROCEDURE — 6370000000 HC RX 637 (ALT 250 FOR IP): Performed by: REGISTERED NURSE

## 2020-06-05 RX ORDER — CARVEDILOL 12.5 MG/1
12.5 TABLET ORAL ONCE
Status: COMPLETED | OUTPATIENT
Start: 2020-06-05 | End: 2020-06-05

## 2020-06-05 RX ADMIN — METHOCARBAMOL 750 MG: 750 TABLET ORAL at 18:02

## 2020-06-05 RX ADMIN — Medication 400 MG: at 08:14

## 2020-06-05 RX ADMIN — RIVAROXABAN 20 MG: 20 TABLET, FILM COATED ORAL at 18:02

## 2020-06-05 RX ADMIN — CETIRIZINE HYDROCHLORIDE 10 MG: 10 TABLET, FILM COATED ORAL at 08:14

## 2020-06-05 RX ADMIN — METFORMIN HYDROCHLORIDE 500 MG: 500 TABLET ORAL at 18:01

## 2020-06-05 RX ADMIN — TRAZODONE HYDROCHLORIDE 50 MG: 50 TABLET ORAL at 21:20

## 2020-06-05 RX ADMIN — CARVEDILOL 12.5 MG: 12.5 TABLET, FILM COATED ORAL at 22:09

## 2020-06-05 RX ADMIN — METHOCARBAMOL 750 MG: 750 TABLET ORAL at 21:20

## 2020-06-05 RX ADMIN — METHOCARBAMOL 750 MG: 750 TABLET ORAL at 08:15

## 2020-06-05 RX ADMIN — Medication 100 MG: at 08:15

## 2020-06-05 RX ADMIN — GABAPENTIN 600 MG: 600 TABLET, FILM COATED ORAL at 21:21

## 2020-06-05 RX ADMIN — METHOCARBAMOL 750 MG: 750 TABLET ORAL at 13:43

## 2020-06-05 RX ADMIN — AMITRIPTYLINE HYDROCHLORIDE 10 MG: 10 TABLET, FILM COATED ORAL at 21:21

## 2020-06-05 RX ADMIN — DULOXETINE 60 MG: 30 CAPSULE, DELAYED RELEASE ORAL at 08:14

## 2020-06-05 RX ADMIN — FUROSEMIDE 40 MG: 40 TABLET ORAL at 08:18

## 2020-06-05 RX ADMIN — PANTOPRAZOLE SODIUM 40 MG: 40 TABLET, DELAYED RELEASE ORAL at 08:17

## 2020-06-05 RX ADMIN — GABAPENTIN 600 MG: 600 TABLET, FILM COATED ORAL at 13:43

## 2020-06-05 RX ADMIN — METFORMIN HYDROCHLORIDE 500 MG: 500 TABLET ORAL at 08:15

## 2020-06-05 RX ADMIN — DULOXETINE 60 MG: 30 CAPSULE, DELAYED RELEASE ORAL at 21:21

## 2020-06-05 RX ADMIN — QUETIAPINE FUMARATE 100 MG: 100 TABLET ORAL at 21:20

## 2020-06-05 RX ADMIN — GABAPENTIN 600 MG: 600 TABLET, FILM COATED ORAL at 08:14

## 2020-06-05 NOTE — GROUP NOTE
Group Therapy Note    Date: 6/5/2020    Group Start Time: 8771  Group End Time: 6974  Group Topic: Psychoeducation    BARBARA JacoboS    Group Therapy Note    Attendees: 7    Patient's Goal:  Pt will demonstrate improved interpersonal skills    Notes:  Pt attended group and participated    Status After Intervention:  Improved    Participation Level:  Active Listener and Interactive    Participation Quality: Appropriate, Attentive, Sharing and Supportive      Speech:  normal      Thought Process/Content: Logical  Linear      Affective Functioning: Constricted/Restricted      Mood: Euthymic      Level of consciousness:  Alert, Oriented x4 and Attentive      Response to Learning: Able to verbalize current knowledge/experience, Able to verbalize/acknowledge new learning, Able to retain information, Capable of insight, Able to change behavior and Progressing to goal      Endings: None Reported    Modes of Intervention: Education, Support, Socialization, Exploration, Problem-solving and Activity      Discipline Responsible: Psychoeducational Specialist      Signature:  Santiago Vasquez [place]

## 2020-06-05 NOTE — GROUP NOTE
Group Therapy Note    Date: 6/5/2020    Group Start Time: 1100  Group End Time: 3182  Group Topic: Recreational    [facility]    Ni Trejo        Group Therapy Note    Attendees: 5/17         Patient's Goal: To increase interpersonal interaction      Notes:      Status After Intervention:  Improved    Participation Level:  Active Listener and Interactive    Participation Quality: Appropriate, Attentive and Sharing      Speech:  normal      Thought Process/Content: Logical  Linear      Affective Functioning: Congruent      Mood: euthymic      Level of consciousness:  Alert, Oriented x4 and Attentive      Response to Learning: Able to verbalize current knowledge/experience, Able to verbalize/acknowledge new learning, Able to retain information and Progressing to goal      Endings: None Reported    Modes of Intervention: Education, Support, Socialization, Exploration, Clarifying, Problem-solving and Activity      Discipline Responsible: Psychoeducational Specialist      Signature:  Ni Trejo

## 2020-06-05 NOTE — PLAN OF CARE
Problem: Altered Mood, Depressive Behavior:  Goal: Ability to disclose and discuss suicidal ideas will improve  Description: Ability to disclose and discuss suicidal ideas will improve  6/5/2020 1105 by Liliana Stewart LPN  Outcome: Ongoing     Problem: Altered Mood, Depressive Behavior:  Goal: Able to verbalize support systems  Description: Able to verbalize support systems  6/5/2020 1105 by Liliana Stewart LPN  Outcome: Ongoing    Patient denies thoughts of self harm and is agreeable to seeking out staff should thoughts of self harm arise. Safe environment maintained. 15 minute checks for safety continued per unit policy. Will continue to monitor for safety and provide support and reassurance as needed.

## 2020-06-05 NOTE — BH NOTE
1:1 interview done via telehealth with Dr. Ashley Haywood discussed meds and plan for after care. She would like to get discharged.

## 2020-06-05 NOTE — PROGRESS NOTES
mg, Oral, BID  pantoprazole (PROTONIX) tablet 40 mg, 40 mg, Oral, QAM AC  furosemide (LASIX) tablet 40 mg, 40 mg, Oral, Daily  gabapentin (NEURONTIN) tablet 600 mg, 600 mg, Oral, TID  lidocaine 4 % external patch 1 patch, 1 patch, Topical, Daily  metFORMIN (GLUCOPHAGE) tablet 500 mg, 500 mg, Oral, BID WC  methocarbamol (ROBAXIN) tablet 750 mg, 750 mg, Oral, 4x Daily  ondansetron (ZOFRAN-ODT) disintegrating tablet 4 mg, 4 mg, Oral, Q8H PRN  QUEtiapine (SEROQUEL) tablet 100 mg, 100 mg, Oral, Nightly  magnesium oxide (MAG-OX) tablet 400 mg, 400 mg, Oral, Daily  rivaroxaban (XARELTO) tablet 20 mg, 20 mg, Oral, Dinner  loperamide (IMODIUM) capsule 2 mg, 2 mg, Oral, 4x Daily PRN  ibuprofen (ADVIL;MOTRIN) tablet 400 mg, 400 mg, Oral, BID PRN  nicotine (NICODERM CQ) 14 MG/24HR 1 patch, 1 patch, Transdermal, Daily  nicotine polacrilex (NICORETTE) gum 2 mg, 2 mg, Oral, PRN  traZODone (DESYREL) tablet 50 mg, 50 mg, Oral, Nightly PRN    Recent Results (from the past 72 hour(s))   Urine Drug Screen    Collection Time: 06/03/20  7:23 PM   Result Value Ref Range    Amphetamine Screen, Ur NEGATIVE NEGATIVE    Barbiturate Screen, Ur NEGATIVE NEGATIVE    Benzodiazepine Screen, Urine NEGATIVE NEGATIVE    Cocaine Metabolite, Urine NEGATIVE NEGATIVE    Methadone Screen, Urine NEGATIVE NEGATIVE    Opiates, Urine NEGATIVE NEGATIVE    Phencyclidine, Urine NEGATIVE NEGATIVE    Propoxyphene, Urine NOT REPORTED NEGATIVE    Cannabinoid Scrn, Ur NEGATIVE NEGATIVE    Oxycodone Screen, Ur NEGATIVE NEGATIVE    Methamphetamine, Urine NOT REPORTED NEGATIVE    Tricyclic Antidepressants, Urine NOT REPORTED NEGATIVE    MDMA, Urine NOT REPORTED NEGATIVE    Buprenorphine Urine NOT REPORTED NEGATIVE    Test Information       Assay provides medical screening only. The absence of expected drug(s) and/or metabolite(s) may indicate diluted or adulterated urine, limitations of testing or timing of collection.    CBC Auto Differential    Collection Time: Collection Time: 06/03/20  7:35 PM   Result Value Ref Range    Acetaminophen Level <10 (L) 10 - 30 ug/mL    Ethanol <10 <10 mg/dL    Ethanol percent <9.966 <3.204 %    Salicylate Lvl <1 (L) 3 - 10 mg/dL    Toxic Tricyclic Sc,Blood POSITIVE (A) NEGATIVE   COVID-19    Collection Time: 06/03/20  9:15 PM   Result Value Ref Range    SARS-CoV-2          SARS-CoV-2, Rapid Not Detected Not Detected    Source . NASOPHARYNGEAL SWAB     SARS-CoV-2, PCR             ASSESSMENT     Principal Problem:    Severe episode of recurrent major depressive disorder, without psychotic features ([facility]  Resolved Problems:    * No resolved hospital problems. *      PLAN    · Continue current medications. · Continue unit milieu and group psychotherapy.     Electronically Signed by Brenda Figueroa MD , 6/5/2020 4:07 PM

## 2020-06-05 NOTE — PLAN OF CARE
[address] [place] [address]  Day 3 Interdisciplinary Treatment Plan NOTE    Review Date & Time: 6/5/2020 1321    Admission Type:   Admission Type:  Involuntary    Reason for admission:  Reason for Admission: Patient suicidal to cut wrists  Estimated Length of Stay: 5-7 days  Estimated Discharge Date Update: to be determined by physician    PATIENT STRENGTHS:  Patient Strengths Strengths: Communication, No significant Physical Illness  Patient Strengths and Limitations:Limitations: Hopeless about future, Tendency to isolate self  Addictive Behavior:Addictive Behavior  In the past 3 months, have you felt or has someone told you that you have a problem with:  : None  Do you have a history of Chemical Use?: No  Do you have a history of Alcohol Use?: No  Do you have a history of Street Drug Abuse?: No  Histroy of Prescripton Drug Abuse?: No  Medical Problems:  Past Medical History:   Diagnosis Date    Breast cancer ([facility]     Colon cancer ([facility]     stage IV    Depression     Diabetes mellitus ([facility]     Fibromyalgia     Hyperlipidemia     Hypertension     Rheumatoid arthritis ([facility]     Seizure ([facility] 7/14    NEUROLOGIST ATA    Sleep apnea     cpap    Unspecified cerebral artery occlusion with cerebral infarction     7/12  NOT DEFINITE       Risk:  Fall RiskTotal: 55  Pj Scale Pj Scale Score: 23  BVC Total: 0  Change in scores no Changes to plan of Care no    Status EXAM:   Status and Exam  Normal: Yes  Facial Expression: Brightened  Affect: Appropriate  Level of Consciousness: Alert  Mood:Normal: Yes  Mood: Depressed, Anxious, Sad  Motor Activity:Normal: Yes  Interview Behavior: Cooperative  Preception: [place] to Person, [place] to Time, [place] to Place, [place] to Situation  Attention:Normal: Yes  Thought Processes: Blocking  Thought Content:Normal: Yes  Hallucinations: None  Delusions: No  Memory:Normal: Yes  Insight and Judgment: Yes  Insight and Judgment: Poor Judgment  Present Suicidal Ideation: No  Present

## 2020-06-06 PROCEDURE — 6370000000 HC RX 637 (ALT 250 FOR IP): Performed by: PSYCHIATRY & NEUROLOGY

## 2020-06-06 PROCEDURE — 6370000000 HC RX 637 (ALT 250 FOR IP): Performed by: REGISTERED NURSE

## 2020-06-06 PROCEDURE — 1240000000 HC EMOTIONAL WELLNESS R&B

## 2020-06-06 RX ADMIN — TRAZODONE HYDROCHLORIDE 50 MG: 50 TABLET ORAL at 21:44

## 2020-06-06 RX ADMIN — QUETIAPINE FUMARATE 100 MG: 100 TABLET ORAL at 21:44

## 2020-06-06 RX ADMIN — DULOXETINE 60 MG: 30 CAPSULE, DELAYED RELEASE ORAL at 21:44

## 2020-06-06 RX ADMIN — METFORMIN HYDROCHLORIDE 500 MG: 500 TABLET ORAL at 17:50

## 2020-06-06 RX ADMIN — METHOCARBAMOL 750 MG: 750 TABLET ORAL at 21:44

## 2020-06-06 RX ADMIN — METHOCARBAMOL 750 MG: 750 TABLET ORAL at 12:58

## 2020-06-06 RX ADMIN — CETIRIZINE HYDROCHLORIDE 10 MG: 10 TABLET, FILM COATED ORAL at 08:36

## 2020-06-06 RX ADMIN — GABAPENTIN 600 MG: 600 TABLET, FILM COATED ORAL at 14:10

## 2020-06-06 RX ADMIN — GABAPENTIN 600 MG: 600 TABLET, FILM COATED ORAL at 21:43

## 2020-06-06 RX ADMIN — METHOCARBAMOL 750 MG: 750 TABLET ORAL at 17:50

## 2020-06-06 RX ADMIN — CARVEDILOL 12.5 MG: 12.5 TABLET, FILM COATED ORAL at 17:50

## 2020-06-06 RX ADMIN — METHOCARBAMOL 750 MG: 750 TABLET ORAL at 08:37

## 2020-06-06 RX ADMIN — Medication 100 MG: at 08:37

## 2020-06-06 RX ADMIN — DULOXETINE 60 MG: 30 CAPSULE, DELAYED RELEASE ORAL at 08:36

## 2020-06-06 RX ADMIN — GABAPENTIN 600 MG: 600 TABLET, FILM COATED ORAL at 08:37

## 2020-06-06 RX ADMIN — CARVEDILOL 12.5 MG: 12.5 TABLET, FILM COATED ORAL at 08:37

## 2020-06-06 RX ADMIN — PANTOPRAZOLE SODIUM 40 MG: 40 TABLET, DELAYED RELEASE ORAL at 08:37

## 2020-06-06 RX ADMIN — Medication 400 MG: at 08:36

## 2020-06-06 RX ADMIN — IBUPROFEN 400 MG: 400 TABLET, FILM COATED ORAL at 17:53

## 2020-06-06 RX ADMIN — AMITRIPTYLINE HYDROCHLORIDE 10 MG: 10 TABLET, FILM COATED ORAL at 21:44

## 2020-06-06 RX ADMIN — METFORMIN HYDROCHLORIDE 500 MG: 500 TABLET ORAL at 08:36

## 2020-06-06 RX ADMIN — IBUPROFEN 400 MG: 400 TABLET, FILM COATED ORAL at 11:38

## 2020-06-06 RX ADMIN — RIVAROXABAN 20 MG: 20 TABLET, FILM COATED ORAL at 17:50

## 2020-06-06 ASSESSMENT — PAIN SCALES - GENERAL
PAINLEVEL_OUTOF10: 6
PAINLEVEL_OUTOF10: 6

## 2020-06-06 NOTE — VIRTUAL HEALTH
Department of Psychiatry  Attending Progress Note      SUBJECTIVE:  Found her in better mood. Her mood has started improving gradually. Using coping skills. Eating and sleeping well. OBJECTIVE    Physical  VITALS:  BP (!) 117/56   Pulse 69   Temp 97.5 °F (36.4 °C) (Oral)   Resp 14   Ht 5' 3\" (1.6 m)   Wt 228 lb (103.4 kg)   BMI 40.39 kg/m²     Mental Status Examination:    Pt. was alert, fully oriented, and cooperative. Appearance and hygiene weredisheveled, poor hygiene . Mood was depressed. Affect was \"dysthymic and poorly reactive Thought process was linear and well-organized. Patient denied any hallucinations or paranoia. Patient denied suicidal ideations. Patient denied homicidal ideations . Patient's gross cognitive functions were impaired. Insight and judgement were poor. Both recent and remote memory were intact.     Psychomotor status was slowed       Medications  Current Facility-Administered Medications: amitriptyline (ELAVIL) tablet 10 mg, 10 mg, Oral, Nightly  carvedilol (COREG) tablet 12.5 mg, 12.5 mg, Oral, BID WC  cetirizine (ZYRTEC) tablet 10 mg, 10 mg, Oral, Daily  coenzyme Q10 capsule 100 mg, 100 mg, Oral, Daily  DULoxetine (CYMBALTA) extended release capsule 60 mg, 60 mg, Oral, BID  pantoprazole (PROTONIX) tablet 40 mg, 40 mg, Oral, QAM AC  furosemide (LASIX) tablet 40 mg, 40 mg, Oral, Daily  gabapentin (NEURONTIN) tablet 600 mg, 600 mg, Oral, TID  lidocaine 4 % external patch 1 patch, 1 patch, Topical, Daily  metFORMIN (GLUCOPHAGE) tablet 500 mg, 500 mg, Oral, BID WC  methocarbamol (ROBAXIN) tablet 750 mg, 750 mg, Oral, 4x Daily  ondansetron (ZOFRAN-ODT) disintegrating tablet 4 mg, 4 mg, Oral, Q8H PRN  QUEtiapine (SEROQUEL) tablet 100 mg, 100 mg, Oral, Nightly  magnesium oxide (MAG-OX) tablet 400 mg, 400 mg, Oral, Daily  rivaroxaban (XARELTO) tablet 20 mg, 20 mg, Oral, Dinner  loperamide (IMODIUM) capsule 2 mg, 2 mg, Oral, 4x Daily PRN  ibuprofen (ADVIL;MOTRIN) tablet 400 mg, 400 mg,

## 2020-06-07 PROCEDURE — 1240000000 HC EMOTIONAL WELLNESS R&B

## 2020-06-07 PROCEDURE — 6370000000 HC RX 637 (ALT 250 FOR IP): Performed by: PSYCHIATRY & NEUROLOGY

## 2020-06-07 PROCEDURE — 6370000000 HC RX 637 (ALT 250 FOR IP): Performed by: REGISTERED NURSE

## 2020-06-07 RX ADMIN — METHOCARBAMOL 750 MG: 750 TABLET ORAL at 12:55

## 2020-06-07 RX ADMIN — DULOXETINE 60 MG: 30 CAPSULE, DELAYED RELEASE ORAL at 22:02

## 2020-06-07 RX ADMIN — RIVAROXABAN 20 MG: 20 TABLET, FILM COATED ORAL at 17:43

## 2020-06-07 RX ADMIN — GABAPENTIN 600 MG: 600 TABLET, FILM COATED ORAL at 13:48

## 2020-06-07 RX ADMIN — PANTOPRAZOLE SODIUM 40 MG: 40 TABLET, DELAYED RELEASE ORAL at 08:18

## 2020-06-07 RX ADMIN — QUETIAPINE FUMARATE 100 MG: 100 TABLET ORAL at 22:03

## 2020-06-07 RX ADMIN — METHOCARBAMOL 750 MG: 750 TABLET ORAL at 17:43

## 2020-06-07 RX ADMIN — METHOCARBAMOL 750 MG: 750 TABLET ORAL at 22:02

## 2020-06-07 RX ADMIN — METHOCARBAMOL 750 MG: 750 TABLET ORAL at 08:17

## 2020-06-07 RX ADMIN — METFORMIN HYDROCHLORIDE 500 MG: 500 TABLET ORAL at 17:43

## 2020-06-07 RX ADMIN — Medication 400 MG: at 08:17

## 2020-06-07 RX ADMIN — Medication 100 MG: at 08:17

## 2020-06-07 RX ADMIN — GABAPENTIN 600 MG: 600 TABLET, FILM COATED ORAL at 08:18

## 2020-06-07 RX ADMIN — CETIRIZINE HYDROCHLORIDE 10 MG: 10 TABLET, FILM COATED ORAL at 08:18

## 2020-06-07 RX ADMIN — TRAZODONE HYDROCHLORIDE 50 MG: 50 TABLET ORAL at 22:03

## 2020-06-07 RX ADMIN — METFORMIN HYDROCHLORIDE 500 MG: 500 TABLET ORAL at 08:18

## 2020-06-07 RX ADMIN — AMITRIPTYLINE HYDROCHLORIDE 10 MG: 10 TABLET, FILM COATED ORAL at 22:03

## 2020-06-07 RX ADMIN — LOPERAMIDE HYDROCHLORIDE 2 MG: 2 CAPSULE ORAL at 10:09

## 2020-06-07 RX ADMIN — GABAPENTIN 600 MG: 600 TABLET, FILM COATED ORAL at 22:03

## 2020-06-07 RX ADMIN — FUROSEMIDE 40 MG: 40 TABLET ORAL at 08:22

## 2020-06-07 RX ADMIN — CARVEDILOL 12.5 MG: 12.5 TABLET, FILM COATED ORAL at 17:43

## 2020-06-07 RX ADMIN — CARVEDILOL 12.5 MG: 12.5 TABLET, FILM COATED ORAL at 08:18

## 2020-06-07 RX ADMIN — DULOXETINE 60 MG: 30 CAPSULE, DELAYED RELEASE ORAL at 08:18

## 2020-06-07 NOTE — GROUP NOTE
Group Therapy Note    Date: 6/7/2020    Group Start Time: 1000  Group End Time: 6207  Group Topic: Psychoeducation    EZEQUIEL Carter LSW        Group Therapy Note    Attendees: 7/21         Patient offered group therapy but declined.   1:1 offered      Signature:  EZEQUIEL Cloud LSW

## 2020-06-07 NOTE — VIRTUAL HEALTH
Department of Psychiatry  Attending Progress Note      SUBJECTIVE:  Found her in better mood. Her mood has started improving gradually. Using coping skills. Eating and sleeping well. OBJECTIVE    Physical  VITALS:  /74   Pulse 65   Temp 97.6 °F (36.4 °C) (Oral)   Resp 14   Ht 5' 3\" (1.6 m)   Wt 228 lb (103.4 kg)   BMI 40.39 kg/m²     Mental Status Examination:    Pt. was alert, fully oriented, and cooperative. Appearance and hygiene weredisheveled, poor hygiene . Mood was depressed. Affect was \"dysthymic and poorly reactive Thought process was linear and well-organized. Patient denied any hallucinations or paranoia. Patient denied suicidal ideations. Patient denied homicidal ideations . Patient's gross cognitive functions were impaired. Insight and judgement were poor. Both recent and remote memory were intact.     Psychomotor status was slowed       Medications  Current Facility-Administered Medications: amitriptyline (ELAVIL) tablet 10 mg, 10 mg, Oral, Nightly  carvedilol (COREG) tablet 12.5 mg, 12.5 mg, Oral, BID WC  cetirizine (ZYRTEC) tablet 10 mg, 10 mg, Oral, Daily  coenzyme Q10 capsule 100 mg, 100 mg, Oral, Daily  DULoxetine (CYMBALTA) extended release capsule 60 mg, 60 mg, Oral, BID  pantoprazole (PROTONIX) tablet 40 mg, 40 mg, Oral, QAM AC  furosemide (LASIX) tablet 40 mg, 40 mg, Oral, Daily  gabapentin (NEURONTIN) tablet 600 mg, 600 mg, Oral, TID  lidocaine 4 % external patch 1 patch, 1 patch, Topical, Daily  metFORMIN (GLUCOPHAGE) tablet 500 mg, 500 mg, Oral, BID WC  methocarbamol (ROBAXIN) tablet 750 mg, 750 mg, Oral, 4x Daily  ondansetron (ZOFRAN-ODT) disintegrating tablet 4 mg, 4 mg, Oral, Q8H PRN  QUEtiapine (SEROQUEL) tablet 100 mg, 100 mg, Oral, Nightly  magnesium oxide (MAG-OX) tablet 400 mg, 400 mg, Oral, Daily  rivaroxaban (XARELTO) tablet 20 mg, 20 mg, Oral, Dinner  loperamide (IMODIUM) capsule 2 mg, 2 mg, Oral, 4x Daily PRN  ibuprofen (ADVIL;MOTRIN) tablet 400 mg, 400 mg, Oral, BID PRN  nicotine (NICODERM CQ) 14 MG/24HR 1 patch, 1 patch, Transdermal, Daily  nicotine polacrilex (NICORETTE) gum 2 mg, 2 mg, Oral, PRN  traZODone (DESYREL) tablet 50 mg, 50 mg, Oral, Nightly PRN    ASSESSMENT AND PLAN    Will continue to adjust medications accordingly. Patient Location:  [address] [place] [address]    Provider Location ([facility] [place], [place]    This virtual visit was conducted via interactive/real-time audio/video.

## 2020-06-07 NOTE — GROUP NOTE
Group Therapy Note    Date: 6/7/2020    Group Start Time: 0900  Group End Time: 0920  Group Topic: Community Meeting    [facility] [address] [place], [place]        Group Therapy Note    Attendees: 9         Patient's Goal:  To increase interpersonal skills     Notes:  Patient attended group and participated     Status After Intervention:  Improved    Participation Level:  Active Listener and Interactive    Participation Quality: Appropriate, Attentive and Sharing      Speech:  normal      Thought Process/Content: Logical      Affective Functioning: Congruent      Mood: euthymic      Level of consciousness:  Alert and Oriented x4      Response to Learning: Progressing to goal      Endings: None Reported    Modes of Intervention: Education, Support and Socialization      Discipline Responsible: Psychoeducational Specialist      Signature:  Armando Melara

## 2020-06-08 VITALS
RESPIRATION RATE: 14 BRPM | DIASTOLIC BLOOD PRESSURE: 91 MMHG | TEMPERATURE: 97.6 F | BODY MASS INDEX: 40.4 KG/M2 | HEIGHT: 63 IN | HEART RATE: 78 BPM | WEIGHT: 228 LBS | SYSTOLIC BLOOD PRESSURE: 134 MMHG

## 2020-06-08 LAB
ACETAMINOPHEN LEVEL: <10 UG/ML (ref 10–30)
ALBUMIN SERPL-MCNC: 3.7 G/DL (ref 3.5–5.2)
ALBUMIN/GLOBULIN RATIO: 1.1 (ref 1–2.5)
ALP BLD-CCNC: 68 U/L (ref 35–104)
ALT SERPL-CCNC: 11 U/L (ref 5–33)
ANION GAP SERPL CALCULATED.3IONS-SCNC: 16 MMOL/L (ref 9–17)
AST SERPL-CCNC: 18 U/L
BILIRUB SERPL-MCNC: 0.19 MG/DL (ref 0.3–1.2)
BILIRUBIN DIRECT: 0.09 MG/DL
BILIRUBIN, INDIRECT: 0.1 MG/DL (ref 0–1)
BUN BLDV-MCNC: 9 MG/DL (ref 6–20)
BUN/CREAT BLD: ABNORMAL (ref 9–20)
CALCIUM SERPL-MCNC: 9 MG/DL (ref 8.6–10.4)
CHLORIDE BLD-SCNC: 100 MMOL/L (ref 98–107)
CO2: 24 MMOL/L (ref 20–31)
CREAT SERPL-MCNC: 0.67 MG/DL (ref 0.5–0.9)
ETHANOL PERCENT: <0.01 %
ETHANOL: <10 MG/DL
GFR AFRICAN AMERICAN: >60 ML/MIN
GFR NON-AFRICAN AMERICAN: >60 ML/MIN
GFR SERPL CREATININE-BSD FRML MDRD: ABNORMAL ML/MIN/{1.73_M2}
GFR SERPL CREATININE-BSD FRML MDRD: ABNORMAL ML/MIN/{1.73_M2}
GLOBULIN: ABNORMAL G/DL (ref 1.5–3.8)
GLUCOSE BLD-MCNC: 141 MG/DL (ref 70–99)
POTASSIUM SERPL-SCNC: 3.5 MMOL/L (ref 3.7–5.3)
SALICYLATE LEVEL: <1 MG/DL (ref 3–10)
SODIUM BLD-SCNC: 140 MMOL/L (ref 135–144)
TOTAL PROTEIN: 7 G/DL (ref 6.4–8.3)
TOXIC TRICYCLIC SC,BLOOD: NEGATIVE

## 2020-06-08 PROCEDURE — 6370000000 HC RX 637 (ALT 250 FOR IP): Performed by: REGISTERED NURSE

## 2020-06-08 PROCEDURE — 99238 HOSP IP/OBS DSCHRG MGMT 30/<: CPT | Performed by: PSYCHIATRY & NEUROLOGY

## 2020-06-08 RX ORDER — QUETIAPINE FUMARATE 100 MG/1
100 TABLET, FILM COATED ORAL NIGHTLY
Qty: 30 TABLET | Refills: 0 | Status: SHIPPED | OUTPATIENT
Start: 2020-06-08 | End: 2021-08-18

## 2020-06-08 RX ORDER — METHOCARBAMOL 750 MG/1
750 TABLET, FILM COATED ORAL 4 TIMES DAILY
Qty: 40 TABLET | Refills: 0 | Status: SHIPPED | OUTPATIENT
Start: 2020-06-08 | End: 2020-06-18

## 2020-06-08 RX ORDER — CARVEDILOL 12.5 MG/1
12.5 TABLET ORAL 2 TIMES DAILY WITH MEALS
Qty: 60 TABLET | Refills: 0 | Status: SHIPPED | OUTPATIENT
Start: 2020-06-08

## 2020-06-08 RX ORDER — ESOMEPRAZOLE MAGNESIUM 40 MG/1
40 CAPSULE, DELAYED RELEASE ORAL
Qty: 30 CAPSULE | Refills: 0 | Status: SHIPPED | OUTPATIENT
Start: 2020-06-08

## 2020-06-08 RX ORDER — GABAPENTIN 600 MG/1
600 TABLET ORAL 3 TIMES DAILY
Qty: 90 TABLET | Refills: 0 | Status: SHIPPED | OUTPATIENT
Start: 2020-06-08 | End: 2022-03-28

## 2020-06-08 RX ORDER — AMITRIPTYLINE HYDROCHLORIDE 10 MG/1
10 TABLET, FILM COATED ORAL NIGHTLY
Qty: 30 TABLET | Refills: 0 | Status: SHIPPED | OUTPATIENT
Start: 2020-06-08 | End: 2020-07-31

## 2020-06-08 RX ORDER — DULOXETIN HYDROCHLORIDE 60 MG/1
60 CAPSULE, DELAYED RELEASE ORAL 2 TIMES DAILY
Qty: 60 CAPSULE | Refills: 0 | Status: SHIPPED | OUTPATIENT
Start: 2020-06-08 | End: 2021-12-21

## 2020-06-08 RX ORDER — TRAZODONE HYDROCHLORIDE 50 MG/1
50 TABLET ORAL NIGHTLY PRN
Qty: 30 TABLET | Refills: 0 | Status: SHIPPED | OUTPATIENT
Start: 2020-06-08

## 2020-06-08 RX ORDER — FUROSEMIDE 40 MG/1
40 TABLET ORAL DAILY
Qty: 30 TABLET | Refills: 0 | Status: SHIPPED | OUTPATIENT
Start: 2020-06-08

## 2020-06-08 RX ADMIN — METFORMIN HYDROCHLORIDE 500 MG: 500 TABLET ORAL at 08:38

## 2020-06-08 RX ADMIN — DULOXETINE 60 MG: 30 CAPSULE, DELAYED RELEASE ORAL at 08:37

## 2020-06-08 RX ADMIN — CETIRIZINE HYDROCHLORIDE 10 MG: 10 TABLET, FILM COATED ORAL at 08:37

## 2020-06-08 RX ADMIN — Medication 100 MG: at 08:43

## 2020-06-08 RX ADMIN — PANTOPRAZOLE SODIUM 40 MG: 40 TABLET, DELAYED RELEASE ORAL at 08:38

## 2020-06-08 RX ADMIN — METHOCARBAMOL 750 MG: 750 TABLET ORAL at 09:26

## 2020-06-08 RX ADMIN — Medication 400 MG: at 08:37

## 2020-06-08 RX ADMIN — FUROSEMIDE 40 MG: 40 TABLET ORAL at 08:39

## 2020-06-08 RX ADMIN — CARVEDILOL 12.5 MG: 12.5 TABLET, FILM COATED ORAL at 08:42

## 2020-06-08 RX ADMIN — GABAPENTIN 600 MG: 600 TABLET, FILM COATED ORAL at 08:37

## 2020-06-08 NOTE — GROUP NOTE
Group Therapy Note    Date: 6/8/2020    Group Start Time: 1100  Group End Time: 9467  Group Topic: Psychoeducation    MARY JANE BHI JEREMIAH    China Jamil        Group Therapy Note    Attendees: 9/21         Patient's Goal: To increase interpersonal interaction      Notes:      Status After Intervention:  Improved    Participation Level:  Active Listener and Interactive    Participation Quality: Appropriate, Attentive and Sharing      Speech:  normal      Thought Process/Content: Logical  Linear      Affective Functioning: Congruent      Mood: euthymic      Level of consciousness:  Alert, Oriented x4 and Attentive      Response to Learning: Able to verbalize current knowledge/experience, Able to verbalize/acknowledge new learning, Able to retain information and Progressing to goal      Endings: None Reported    Modes of Intervention: Education, Support, Socialization, Exploration, Clarifying, Problem-solving and Activity      Discipline Responsible: Psychoeducational Specialist      Signature:  China Jamil

## 2020-06-09 NOTE — DISCHARGE SUMMARY
retardation. She also reports she has been very down and frustrated with life. She reports she has a number of medical illnesses and has had difficulty finding new medical providers since moving to [place]. She states she has not been able to get refills of her medications she she has been taking half doses. She states when she took Cymbalta and Seroquel as prescribed her mood was very stable. Hospital Course:   Upon admission, Henrry Lundberg was provided a safe secure environment, introduced to unit milieu. Patient participated in groups and individual therapies. Medications where adjusted to improve her depression. The patient tolerated the changes with no side effects. .       After few days of hospital care, patient began to feel improvement. Depression lifted, thoughts to harm self ceased. Sleep improved, appetite was good. On morning rounds 6/8/2020, patient endorsed feeling ready for discharge. Patient denies suicidal or homicidal ideations, denies hallucinations or delusions. Denies SE's from meds. It was decided that pt had achieved maximum benefit from hospital care and can be discharged. Consults:   None    Significant Diagnostic Studies: Routine labs and diagnostics    Treatments: Psychotropic medications, therapy with group, milieu, and 1:1 with nurses, social workers, PA-C/CNP, and Attending physician. Discharge Medications:  Discharge Medication List as of 6/8/2020 11:25 AM      START taking these medications    Details   traZODone (DESYREL) 50 MG tablet Take 1 tablet by mouth nightly as needed for Sleep, Disp-30 tablet, R-0Normal      magnesium oxide (MAG-OX) 400 (241.3 Mg) MG TABS tablet Take 1 tablet by mouth daily, Disp-30 tablet, R-0Normal         CONTINUE these medications which have CHANGED    Details   gabapentin (NEURONTIN) 600 MG tablet Take 1 tablet by mouth 3 times daily for 30 days. , Disp-90 tablet, R-0Normal      amitriptyline (ELAVIL) 10 MG tablet Take 1 tablet by mouth nightly, Disp-30 tablet, R-0Normal      DULoxetine (CYMBALTA) 60 MG extended release capsule Take 1 capsule by mouth 2 times daily, Disp-60 capsule, R-0Normal      metFORMIN (GLUCOPHAGE) 500 MG tablet Take 1 tablet by mouth 2 times daily (with meals), Disp-60 tablet, R-0Normal      rivaroxaban (XARELTO) 20 MG TABS tablet Take 1 tablet by mouth Daily with supper, Disp-30 tablet, R-0Normal      QUEtiapine (SEROQUEL) 100 MG tablet Take 1 tablet by mouth nightly, Disp-30 tablet, R-0Normal      carvedilol (COREG) 12.5 MG tablet Take 1 tablet by mouth 2 times daily (with meals), Disp-60 tablet, R-0Normal      furosemide (LASIX) 40 MG tablet Take 1 tablet by mouth daily, Disp-30 tablet, R-0Normal      methocarbamol (ROBAXIN) 750 MG tablet Take 1 tablet by mouth 4 times daily for 10 days, Disp-40 tablet, R-0Normal      esomeprazole (NEXIUM) 40 MG delayed release capsule Take 1 capsule by mouth every morning (before breakfast), Disp-30 capsule, R-0Normal         CONTINUE these medications which have NOT CHANGED    Details   ondansetron (ZOFRAN-ODT) 4 MG disintegrating tablet Take 4 mg by mouth every 8 hours as needed for Nausea or VomitingHistorical Med      cetirizine (ZYRTEC) 10 MG tablet Take 10 mg by mouth dailyHistorical Med      coenzyme Q10 100 MG CAPS capsule Take 100 mg by mouth 3 times dailyHistorical Med      diclofenac (CATAFLAM) 50 MG tablet Take 50 mg by mouth daily as needed (headache) May repeat in 6 hours, max 3 tablets per dayHistorical Med      SUPER B COMPLEX/C PO Take by mouth dailyHistorical Med      loperamide (IMODIUM) 2 MG capsule Take 2 mg by mouth 4 times daily as needed for Diarrhea Takes two capsules at onsetHistorical Med      lidocaine (LIDODERM) 5 % Place 1 patch onto the skin daily 12 hours on, 12 hours off. Historical Med      fluticasone (FLONASE) 50 MCG/ACT nasal spray 1 spray by Each Nostril route dailyHistorical Med         STOP taking these medications       calcium carbonate-vitamin D (CALCIUM 600 + D) 600-400 MG-UNIT TABS per tab Comments:   Reason for Stopping:         hydrOXYzine (VISTARIL) 25 MG capsule Comments:   Reason for Stopping:         magnesium oxide (MAG-OX) 400 MG tablet Comments:   Reason for Stopping:         esomeprazole Magnesium (NEXIUM) 40 MG PACK Comments:   Reason for Stopping:         diclofenac sodium (VOLTAREN) 1 % GEL Comments:   Reason for Stopping:         loperamide (IMODIUM A-D) 2 MG tablet Comments:   Reason for Stopping:                  Core Measures statement:   Not applicable      Discharge Exam:  Level of consciousness:  Within normal limits  Appearance: Street clothes, seated, with good grooming  Behavior/Motor: No abnormalities noted  Attitude toward examiner:  Cooperative, attentive, good eye contact  Speech:  spontaneous, normal rate, normal volume and well articulated  Mood:  euthymic  Affect:  Mood-congruent with normal range  Thought processes:  linear, goal directed and coherent  Thought content:  No Homocidal ideation  Suicidal Ideation:  No suicidal ideation  Delusions:  no evidence of delusions  Perceptual Disturbance:  denies any perceptual disturbance  Cognition:  Intact  Memory: age appropriate  Insight & Judgement: fair  Medication side effects:  Denies any. Disposition: home    Patient Instructions: Activity: activity as tolerated    Follow-up as scheduled with psychiatric care within 1 week (see discharge papers)    Time Spent: 35 minutes    Engagement: Patient displayed a good level of engagement with the treatments offered during this admission.        Discharge planning, findings, and recommendations were discussed with and approved by Dr. Ashley Crabtree MD    Signed:  Ashley Crabtree   6/8/2020  11:44 PM

## 2020-06-09 NOTE — CARE COORDINATION
spray  Commonly known as:  FLONASE  Notes to patient:  Allergies      furosemide 40 MG tablet  Commonly known as:  LASIX  Take 1 tablet by mouth daily  Notes to patient:  Water pill      lidocaine 5 %  Commonly known as:  LIDODERM  Notes to patient:  pain     loperamide 2 MG capsule  Commonly known as:  IMODIUM  Notes to patient:   For loose stools      metFORMIN 500 MG tablet  Commonly known as:  GLUCOPHAGE  Take 1 tablet by mouth 2 times daily (with meals)  Notes to patient:  Blood sugar      methocarbamol 750 MG tablet  Commonly known as:  ROBAXIN  Take 1 tablet by mouth 4 times daily for 10 days  Notes to patient:  Relax muscles      ondansetron 4 MG disintegrating tablet  Commonly known as:  ZOFRAN-ODT  Notes to patient:  Nausea      QUEtiapine 100 MG tablet  Commonly known as:  SEROQUEL  Take 1 tablet by mouth nightly  Notes to patient:  Clear thoughts      SUPER B COMPLEX/C PO  Notes to patient:  Supplement         STOP taking these medications    hydrOXYzine 25 MG capsule  Commonly known as:  VISTARIL     magnesium oxide 400 MG tablet  Commonly known as:  MAG-OX     traMADol 50 MG tablet  Commonly known as:  ULTRAM           Where to Get Your Medications      These medications were sent to [address] [phone] - F [phone]  [address] [place] [address], [address] [place] [address]    Phone:  [phone]   · amitriptyline 10 MG tablet  · carvedilol 12.5 MG tablet  · DULoxetine 60 MG extended release capsule  · esomeprazole 40 MG delayed release capsule  · furosemide 40 MG tablet  · gabapentin 600 MG tablet  · magnesium oxide 400 (241.3 Mg) MG Tabs tablet  · metFORMIN 500 MG tablet  · methocarbamol 750 MG tablet  · QUEtiapine 100 MG tablet  · rivaroxaban 20 MG Tabs tablet  · traZODone 50 MG tablet         Follow Up Appointment: [facility]   via phone   On 6/9/2020  @ 4pm for intake and 5pm for [address] [facility] [address]    Boyfriend will transport

## 2020-07-10 ENCOUNTER — TELEPHONE (OUTPATIENT)
Dept: GYNECOLOGIC ONCOLOGY | Age: 55
End: 2020-07-10

## 2020-07-10 NOTE — TELEPHONE ENCOUNTER
Called pt to get her scheduled with a different provider. Patient states she is being seen at the [facility].

## 2020-07-20 ENCOUNTER — OFFICE VISIT (OUTPATIENT)
Dept: ORTHOPEDIC SURGERY | Age: 55
End: 2020-07-20
Payer: COMMERCIAL

## 2020-07-20 VITALS
BODY MASS INDEX: 38.99 KG/M2 | SYSTOLIC BLOOD PRESSURE: 112 MMHG | HEART RATE: 72 BPM | WEIGHT: 228.4 LBS | HEIGHT: 64 IN | DIASTOLIC BLOOD PRESSURE: 74 MMHG

## 2020-07-20 PROCEDURE — 99203 OFFICE O/P NEW LOW 30 MIN: CPT | Performed by: ORTHOPAEDIC SURGERY

## 2020-07-20 ASSESSMENT — ENCOUNTER SYMPTOMS
ABDOMINAL PAIN: 0
CHEST TIGHTNESS: 0
APNEA: 0
COLOR CHANGE: 0
VOMITING: 0
SHORTNESS OF BREATH: 0
CONSTIPATION: 0
NAUSEA: 0
ABDOMINAL DISTENTION: 0
COUGH: 0
DIARRHEA: 0

## 2020-07-20 NOTE — PROGRESS NOTES
for chest pain, palpitations and leg swelling. Gastrointestinal: Negative for abdominal distention, abdominal pain, constipation, diarrhea, nausea and vomiting. Genitourinary: Negative for difficulty urinating, dysuria and hematuria. Musculoskeletal: Positive for arthralgias, gait problem and joint swelling. Negative for myalgias. Skin: Negative for color change and rash. Neurological: Negative for dizziness, weakness, numbness and headaches. Psychiatric/Behavioral: Positive for sleep disturbance. Past Medical History:    Past Medical History:   Diagnosis Date    [facility]     Colon cancer ([facility]     stage IV    Depression     Diabetes mellitus ([facility]     Fibromyalgia     Hyperlipidemia     Hypertension     Rheumatoid arthritis ([facility]     [facility] 7/14    NEUROLOGIST ATA    Sleep apnea     cpap    Unspecified cerebral artery occlusion with cerebral infarction     7/12  NOT DEFINITE       Past Surgical History:    Past Surgical History:   Procedure Laterality Date    APPENDECTOMY  1985    BREAST BIOPSY  [id number]   [facility]    HYSTERECTOMY  2010    LAPAROTOMY  1988, 1990    SLEEVE GASTRECTOMY  5/19/15    GASTRECTOMY SLEEVE LAPAROSCOPIC           TONSILLECTOMY  1985    UPPER GASTROINTESTINAL ENDOSCOPY  1/30/15       CurrentMedications:   Current Outpatient Medications   Medication Sig Dispense Refill    gabapentin (NEURONTIN) 600 MG tablet Take 1 tablet by mouth 3 times daily for 30 days.  90 tablet 0    amitriptyline (ELAVIL) 10 MG tablet Take 1 tablet by mouth nightly 30 tablet 0    DULoxetine (CYMBALTA) 60 MG extended release capsule Take 1 capsule by mouth 2 times daily 60 capsule 0    traZODone (DESYREL) 50 MG tablet Take 1 tablet by mouth nightly as needed for Sleep 30 tablet 0    metFORMIN (GLUCOPHAGE) 500 MG tablet Take 1 tablet by mouth 2 times daily (with meals) 60 tablet 0    rivaroxaban (XARELTO) 20 MG TABS tablet Take 1 tablet by mouth Daily with supper 30 tablet 0    QUEtiapine (SEROQUEL) 100 MG tablet Take 1 tablet by mouth nightly 30 tablet 0    carvedilol (COREG) 12.5 MG tablet Take 1 tablet by mouth 2 times daily (with meals) 60 tablet 0    furosemide (LASIX) 40 MG tablet Take 1 tablet by mouth daily 30 tablet 0    magnesium oxide (MAG-OX) 400 (241.3 Mg) MG TABS tablet Take 1 tablet by mouth daily 30 tablet 0    esomeprazole (NEXIUM) 40 MG delayed release capsule Take 1 capsule by mouth every morning (before breakfast) 30 capsule 0    ondansetron (ZOFRAN-ODT) 4 MG disintegrating tablet Take 4 mg by mouth every 8 hours as needed for Nausea or Vomiting      cetirizine (ZYRTEC) 10 MG tablet Take 10 mg by mouth daily      coenzyme Q10 100 MG CAPS capsule Take 100 mg by mouth 3 times daily      diclofenac (CATAFLAM) 50 MG tablet Take 50 mg by mouth daily as needed (headache) May repeat in 6 hours, max 3 tablets per day      SUPER B COMPLEX/C PO Take by mouth daily      loperamide (IMODIUM) 2 MG capsule Take 2 mg by mouth 4 times daily as needed for Diarrhea Takes two capsules at onset      lidocaine (LIDODERM) 5 % Place 1 patch onto the skin daily 12 hours on, 12 hours off.  fluticasone (FLONASE) 50 MCG/ACT nasal spray 1 spray by Each Nostril route daily       No current facility-administered medications for this visit. Allergies:    Ace inhibitors; Iodine; Lurasidone; Lurasidone hcl; Nitrofurantoin; Povidone-iodine; Shellfish allergy; Shellfish-derived products; Iodides; Lamotrigine; Macrobid [nitrofurantoin macrocrystal];  Morphine; Pyridium [phenazopyridine hcl]; and Topiramate    Social History:   Social History     Socioeconomic History    Marital status:      Spouse name: None    Number of children: None    Years of education: None    Highest education level: None   Occupational History    Occupation: Disabled   Social Needs    Financial resource strain: None    Food insecurity     Worry: None     Inability: None    Transportation needs     Medical: None     Non-medical: None   Tobacco Use    Smoking status: Former Smoker     Last attempt to quit: 2004     Years since quittin.5    Smokeless tobacco: Never Used   Substance and Sexual Activity    Alcohol use: Yes     Comment: occassion    Drug use: No    Sexual activity: Yes     Partners: Male   Lifestyle    Physical activity     Days per week: None     Minutes per session: None    Stress: None   Relationships    Social connections     Talks on phone: None     Gets together: None     Attends Bahai service: None     Active member of club or organization: None     Attends meetings of clubs or organizations: None     Relationship status: None    Intimate partner violence     Fear of current or ex partner: None     Emotionally abused: None     Physically abused: None     Forced sexual activity: None   Other Topics Concern    None   Social History Narrative    None       Family History:  Family History   Adopted: Yes       Vitals:   /74   Pulse 72   Ht 5' 3.75\" (1.619 m)   Wt 228 lb 6.4 oz (103.6 kg)   BMI 39.51 kg/m²  Body mass index is 39.51 kg/m². Physical Examination:     Orthopedics:    GENERAL: Alert and oriented X3 in no acute distress. SKIN: Intact without lesions or ulcerations. NEURO: Intact to sensory and motor testing. VASC: Capillary refill is less than 3 seconds. KNEE EXAM    LOCATION: Right Knee  GEN: Alert and oriented X 3, in no acute distress. GAIT: The patient's gait was observed while entering the exam room and was noted to be antalgic. The extremity is in valgus alignment. SKIN: Intact without rashes, lesions, or ulcerations. No obvious deformity or swelling. NEURO: The patient responds to light touch throughout right LE. Patellar and Achilles reflexes are 2/4. VASC: The right LE is neurovascularly intact with 2/4 DP and 2/4 PT pulses. Brisk capillary refill.   ROM: 0/120 degrees. There is mild effusion. MUSC: Good quad tone. LIGAMENT: Lachman's test is 1+ with Good endpoint. Anterior drawer Negative. Posterior drawer Negative. There is No varus instability at 0 degrees and No varus instability at 30 degrees. There is No valgus instability at 0 degrees and No valgus instability at 30 degrees. SPECIAL: Conor test is positive with an audible click, no clunks, no crepitation, and with pain. PALP: There is medial and lateral joint line pain. Mainly medial joint pain noted. Palpable Baker's Cyst on the posterior aspect of the right knee. Medial patellofemoral pain noted. KNEE EXAM    LOCATION: Left Knee  GEN: Alert and oriented X 3, in no acute distress. GAIT: The patient's gait was observed while entering the exam room and was noted to be antalgic. The extremity is in valgus alignment. SKIN: Intact without rashes, lesions, or ulcerations. No obvious deformity or swelling. Spider veins noted on the posterior lateral aspect of the left knee. NEURO: The patient responds to light touch throughout left LE. Patellar and Achilles reflexes are 2/4. VASC: The left LE is neurovascularly intact with 2/4 DP and 2/4 PT pulses. Brisk capillary refill. ROM: 0/125 degrees. There is mild effusion. MUSC: Good quad tone. LIGAMENT: Lachman's test is 1+ with Good endpoint. Anterior drawer Negative. Posterior drawer Negative. There is No varus instability at 0 degrees and No varus instability at 30 degrees. There is No valgus instability at 0 degrees and No valgus instability at 30 degrees. SPECIAL: Cnoor test is negative with no clunks, no crepitation, but with pain. PALP: There is medial and lateral joint line pain. More lateral joint line pain. Mild palpable Baker's cyst noted. Lateral calf pain tenderness. Assessment:     1. Tear of medial meniscus of right knee, unspecified tear type, unspecified whether old or current tear, initial encounter    2.  Primary osteoarthritis of both knees      Procedures:    Procedure: no  Radiology:   X-rays taken today were reviewed with the patient. Previous x-rays and MRI were also reviewed with the patient. The previous MRI was done on 05/13/2020 revealing a small tear of the right medial meniscus. There is thinning and fissuring of the cartilage in the central medial femoral condyle and a small focus of chondrosis at the anterior medial femoral condyle. KNEE X-RAY    4 views of the bilateral knees including AP, bilateral tunnel, and lateral in the upright position, and skyline views reveal anatomic alignment with no fracture or dislocation. Kellgren grade 1 changes of osteoarthritis (joint space narrowing, osteophyte, subchondral sclerosis, bony deformity/cyst) of the medial compartment(s). No osseous loose bodies. No bony erosion or periosteal reaction. No soft tissue masses. Impression: Mild osteoarthritis of the bilateral knees. Plan:   Treatment : I reviewed the X-ray and MRI with the patient. We discussed the etiologies and natural histories of medial meniscus tear and osteoarthritis of the right knee. We discussed the various treatment alternatives including anti-inflammatory medications, physical therapy, injections, further imaging studies and as a last result surgery. During today's visit, I explained to the patient that the MRI does show that there is a medial meniscus tear present. I explained to the patient that I can look in with a lighted telescope to either trim out the tear or repair the meniscus tear. The knee scope procedure will help with the catching, locking, and sharp stabbing pain she is experiencing. However, the knee scope will not fix her pre-existing arthritis, so it is normal to still have the arthritic type of pain post surgery.  I then explained to the patient that if she is not ready for surgery then we can try more cortisone injections and go back to physical therapy for strengthening exercises. The patient states that she is tired with how her right knee currently is and does not want to continue to live with how it is. Therefore, the patient has opted for a Right knee arthroscopy. The risks/benefits/alternatives and potential complications have been discussed with the patient. We also had a long discussion regarding the procedure itself and expectation of the recovery. All questions and concerns with addressed. Patient was instructed to call our office with any question or concerns prior to the procedure occurs. A physical therapy prescription was not given. Patient should return to the clinic one week before surgery for her pre-operative discussion to follow up with Rasta Valentin D.O. . The patient will call the office immediately with any problems. No orders of the defined types were placed in this encounter. No orders of the defined types were placed in this encounter. Parminder Prado MS, AT, ATC, am scribing for and in the presence of Rasta Valentin D.O.. 7/26/2020  5:22 PM        Electronically signed by Manuel Abad DO, on 7/26/2020 at 5:22 PM             I, Rasta Valentin DO, have personally seen this patient and I have reviewed the CC, PMH, FHX and Social History as provided by other clinical staff. I reassessed the HPI and ROS as scribed by Anat Barr ATC in my presence and it is both accurate and complete. Thereafter, I personally performed the PE, reviewed the imaging and established the DX and POC. I agree with the documentation provided by the . I have reviewed all documentation in its entirety prior to providing my signature indicating agreement. Any areas of disagreement are noted on the chart.     Electronically signed by Manuel Abad DO on 7/26/2020 at 5:23 PM

## 2020-07-20 NOTE — PATIENT INSTRUCTIONS
Patient Education        Knee Arthritis: Exercises  Introduction  Here are some examples of exercises for you to try. The exercises may be suggested for a condition or for rehabilitation. Start each exercise slowly. Ease off the exercises if you start to have pain. You will be told when to start these exercises and which ones will work best for you. How to do the exercises  Knee flexion with heel slide   1. Lie on your back with your knees bent. 2. Slide your heel back by bending your affected knee as far as you can. Then hook your other foot around your ankle to help pull your heel even farther back. 3. Hold for about 6 seconds, then rest for up to 10 seconds. 4. Repeat 8 to 12 times. 5. Switch legs and repeat steps 1 through 4, even if only one knee is sore. Quad sets   1. Sit with your affected leg straight and supported on the floor or a firm bed. Place a small, rolled-up towel under your knee. Your other leg should be bent, with that foot flat on the floor. 2. Tighten the thigh muscles of your affected leg by pressing the back of your knee down into the towel. 3. Hold for about 6 seconds, then rest for up to 10 seconds. 4. Repeat 8 to 12 times. 5. Switch legs and repeat steps 1 through 4, even if only one knee is sore. Straight-leg raises to the front   1. Lie on your back with your good knee bent so that your foot rests flat on the floor. Your affected leg should be straight. Make sure that your low back has a normal curve. You should be able to slip your hand in between the floor and the small of your back, with your palm touching the floor and your back touching the back of your hand. 2. Tighten the thigh muscles in your affected leg by pressing the back of your knee flat down to the floor. Hold your knee straight. 3. Keeping the thigh muscles tight and your leg straight, lift your affected leg up so that your heel is about 12 inches off the floor.  Hold for about 6 seconds, then lower slowly. 4. Relax for up to 10 seconds between repetitions. 5. Repeat 8 to 12 times. 6. Switch legs and repeat steps 1 through 5, even if only one knee is sore. Active knee flexion   1. Lie on your stomach with your knees straight. If your kneecap is uncomfortable, roll up a washcloth and put it under your leg just above your kneecap. 2. Lift the foot of your affected leg by bending the knee so that you bring the foot up toward your buttock. If this motion hurts, try it without bending your knee quite as far. This may help you avoid any painful motion. 3. Slowly move your leg up and down. 4. Repeat 8 to 12 times. 5. Switch legs and repeat steps 1 through 4, even if only one knee is sore. Quadriceps stretch (facedown)   1. Lie flat on your stomach, and rest your face on the floor. 2. Wrap a towel or belt strap around the lower part of your affected leg. Then use the towel or belt strap to slowly pull your heel toward your buttock until you feel a stretch. 3. Hold for about 15 to 30 seconds, then relax your leg against the towel or belt strap. 4. Repeat 2 to 4 times. 5. Switch legs and repeat steps 1 through 4, even if only one knee is sore. Stationary exercise bike   1. If you do not have a stationary exercise bike at home, you can find one to ride at your local health club or community center. 2. Adjust the height of the bike seat so that your knee is slightly bent when your leg is extended downward. If your knee hurts when the pedal reaches the top, you can raise the seat so that your knee does not bend as much. 3. Start slowly. At first, try to do 5 to 10 minutes of cycling with little to no resistance. Then increase your time and the resistance bit by bit until you can do 20 to 30 minutes without pain. 4. If you start to have pain, rest your knee until your pain gets back to the level that is normal for you. Or cycle for less time or with less effort.     Follow-up care is a key part of your treatment and safety. Be sure to make and go to all appointments, and call your doctor if you are having problems. It's also a good idea to know your test results and keep a list of the medicines you take. Where can you learn more? Go to [email or web address]://woodyeb.[email or web address]. org and sign in to your [employer] account. Enter C159 in the [employer] box to learn more about \"Knee Arthritis: Exercises. \"     If you do not have an account, please click on the \"Sign Up Now\" link. Current as of: March 2, 2020               Content Version: 12.5  © 3869-9370 [employer]. Care instructions adapted under license by [address] If you have questions about a medical condition or this instruction, always ask your healthcare professional. [address] any warranty or liability for your use of this information.

## 2020-07-31 ENCOUNTER — HOSPITAL ENCOUNTER (OUTPATIENT)
Dept: PREADMISSION TESTING | Age: 55
Discharge: HOME | End: 2020-08-04
Payer: COMMERCIAL

## 2020-07-31 VITALS
RESPIRATION RATE: 16 BRPM | HEIGHT: 63 IN | TEMPERATURE: 97.1 F | BODY MASS INDEX: 39.84 KG/M2 | OXYGEN SATURATION: 99 % | HEART RATE: 80 BPM | WEIGHT: 224.87 LBS

## 2020-07-31 LAB
ANION GAP SERPL CALCULATED.3IONS-SCNC: 9 MMOL/L (ref 9–17)
BUN BLDV-MCNC: 10 MG/DL (ref 6–20)
BUN/CREAT BLD: 16 (ref 9–20)
CALCIUM SERPL-MCNC: 8.9 MG/DL (ref 8.6–10.4)
CHLORIDE BLD-SCNC: 102 MMOL/L (ref 98–107)
CO2: 30 MMOL/L (ref 20–31)
CREAT SERPL-MCNC: 0.61 MG/DL (ref 0.5–0.9)
ESTIMATED AVERAGE GLUCOSE: 114 MG/DL
GFR AFRICAN AMERICAN: >60 ML/MIN
GFR NON-AFRICAN AMERICAN: >60 ML/MIN
GFR SERPL CREATININE-BSD FRML MDRD: ABNORMAL ML/MIN/{1.73_M2}
GFR SERPL CREATININE-BSD FRML MDRD: ABNORMAL ML/MIN/{1.73_M2}
GLUCOSE BLD-MCNC: 104 MG/DL (ref 70–99)
HBA1C MFR BLD: 5.6 % (ref 4–6)
HCT VFR BLD CALC: 41.6 % (ref 36.3–47.1)
HEMOGLOBIN: 12.8 G/DL (ref 11.9–15.1)
MCH RBC QN AUTO: 28 PG (ref 25.2–33.5)
MCHC RBC AUTO-ENTMCNC: 30.8 G/DL (ref 28.4–34.8)
MCV RBC AUTO: 91 FL (ref 82.6–102.9)
NRBC AUTOMATED: 0 PER 100 WBC
PDW BLD-RTO: 14.3 % (ref 11.8–14.4)
PLATELET # BLD: 211 K/UL (ref 138–453)
PMV BLD AUTO: 10.4 FL (ref 8.1–13.5)
POTASSIUM SERPL-SCNC: 3.8 MMOL/L (ref 3.7–5.3)
RBC # BLD: 4.57 M/UL (ref 3.95–5.11)
SODIUM BLD-SCNC: 141 MMOL/L (ref 135–144)
WBC # BLD: 7.4 K/UL (ref 3.5–11.3)

## 2020-07-31 PROCEDURE — 85027 COMPLETE CBC AUTOMATED: CPT

## 2020-07-31 PROCEDURE — 83036 HEMOGLOBIN GLYCOSYLATED A1C: CPT

## 2020-07-31 PROCEDURE — 36415 COLL VENOUS BLD VENIPUNCTURE: CPT

## 2020-07-31 PROCEDURE — 93005 ELECTROCARDIOGRAM TRACING: CPT | Performed by: ANESTHESIOLOGY

## 2020-07-31 PROCEDURE — 80048 BASIC METABOLIC PNL TOTAL CA: CPT

## 2020-07-31 RX ORDER — METHOCARBAMOL 750 MG/1
1500 TABLET, FILM COATED ORAL 3 TIMES DAILY
COMMUNITY
End: 2022-02-22

## 2020-07-31 RX ORDER — CEFAZOLIN SODIUM 2 G/50ML
2 SOLUTION INTRAVENOUS ONCE
Status: CANCELLED | OUTPATIENT
Start: 2020-08-14

## 2020-07-31 ASSESSMENT — PAIN SCALES - GENERAL: PAINLEVEL_OUTOF10: 7

## 2020-07-31 ASSESSMENT — PAIN DESCRIPTION - ONSET: ONSET: ON-GOING

## 2020-07-31 ASSESSMENT — PAIN DESCRIPTION - ORIENTATION: ORIENTATION: RIGHT

## 2020-07-31 ASSESSMENT — PAIN DESCRIPTION - LOCATION: LOCATION: KNEE

## 2020-07-31 ASSESSMENT — PAIN DESCRIPTION - PAIN TYPE: TYPE: CHRONIC PAIN

## 2020-07-31 ASSESSMENT — PAIN DESCRIPTION - DESCRIPTORS: DESCRIPTORS: STABBING

## 2020-07-31 ASSESSMENT — PAIN DESCRIPTION - PROGRESSION: CLINICAL_PROGRESSION: GRADUALLY WORSENING

## 2020-07-31 ASSESSMENT — PAIN DESCRIPTION - FREQUENCY: FREQUENCY: CONTINUOUS

## 2020-07-31 NOTE — H&P
History and Physical    Pt Name: Taryn Pierre  MRN: [id number]  YOB: 1965  Date of evaluation: 7/31/2020    PROGRESS NOTE:    THIS IS BRAYDON HEAD ,A 48 YO PATIENT OF DR. STEEN WHO PRESENTS TO PRE-ADMISSION TESTING PRIOR TO A RIGHT KNEE ARTHROSCOPY WITH PARTIAL MENISCECTOMY WITH DEBRIDEMENT. THE PATIENT HAS HAD KNEE PAIN 29 YEARS WITH A FALL AND KNEE INJURY. AT THE TIME OF THE FALL THE PATIENT WAS MORBIDLY OBESE. SHE HAS HAD SUBSEQUENT GASTRIC SLEEVE BYPASS. SHE CURRENTLY HAS A BMI OF 39.8. SHE WILL  SEE A CARDIOLOGIST AND A NEUROLOGIST FOR CLEARANCES PRIOR TO THE SURGERY. THE PATIENT HAD A STROKE IN 2012, SHE HAS HAD EPISODIC ATRIAL FIBRILLATION OVER THE YEARS. SHE IS ON XARELTO  CURRENTLY. SHE HAS A LOOP RECORDER. SHE WILL SEE THE CARDIOLOGIST ON Wednesday 8/5/2020. SHE WILL SEE DR Yaa Blank ON Monday 8/3/2020. SHE ALSO HAS DIABETES. SHE MONITORS BLOOD SUGARS IN THE AM , RANGES 68 TO 75. FUNCTIONAL ASSESSMENT:   1. IS ABLE TO WALK 2 CITY BLOCKS WITHOUT SHORTNESS OF BREATH OF CHEST PAIN. 2. SHE WOULD BE SHORT OF BREATH BUT WITHOUT CHEST PAIN CLIMBING 2 FLIGHTS OF STAIRS. 3.SHE IS ABLE TO WALK UP AN INCLINE WITHOUT SHORTNESS OF BREATH OR CHEST PAIN. SHE DOES HAVE CHRONIC KNEE PAIN WITH POPPING AND CRACKLING. THE PAIN IS AT 6/10 WHILE SITTING AND UP TO 8 OR 9/10 WHEN WALKING. SHE DOES HAVE A CONTUSION WITH ABRASION ON HER RIGHT KNEE FROM A FALL DURING A SEIZURE , WHICH IS KNOWN TO DR. STEEN. SHE IS TREATING IT WITH TRIPLE ANTIBIOTIC OINTMENT AND SCRUBS. SHE WILL SEE DR. STEEN AGAIN PRIOR TO SURGERY. PHYSICAL EXAM:  ALERT . ORIENTED PLEASANT. HEART REGULAR RATE AND RHYTHM   LUNGS CLEAR   ABDOMEN OBESE , SOFT , NON-TENDER, BOWEL SOUNDS AUDIBLE X 4   PEDAL PULSES + 2 BILATERALLY NO CALF TENDERNESS NO EDEMA  KNEES ARE ENLARGED BILATERALLY.       [x] Please see the ORTHOPEDIC OFFICE PROGRESS NOTE OF 7/20/2020 by Dr. Nicholas Rodríguez  in [facility] which meets the criteria for the admission History and Physical and is copied below. KANG Dhillon, SPARKLEP-BC  Electronically signed 7/31/2020 at 10:00 AM                    Related encounter: Office Visit from 7/20/2020 in [facility] and [facility] All Collapse All     Show:Clear all  [x]Manual[x]Template[]Copied    Added by:  Aurelio Ulloa 20, DO    []Lizbeth for details        [facility] [address]  Dept: [phone]  Dept Fax: [phone]                                                                    Bilateral Knee - New Patient      Subjective:           Chief Complaint   Patient presents with    Knee Pain       Bilateral Knee pain     HPI:      Freedom Perrin presents today for Bilateral knee pain. The pain has been present for 2 weeks with the left knee and at least 30 years for the right knee. The patient states that 29 years ago she fractured her right patella by a fall while carrying her children. The patient has tried knee braces, crutches, heat/ice, physical therapy, rest, Tramadol, Gabapentin, Mobic, and Motrin with no improvement. The pain is now described as Achy and Sharp. There is pain with weight bearing. The knee has swelled. There is painful popping and clicking. The knee has caught or locked up like something is getting stuck deep inside her knee. The knee has not given out. It is stiff upon arising from sitting. It is painful to go up and down stairs and sit for a prolonged time. The patient has had a cortisone injection. Her last cortisone injection was about a year ago. The patient has not tried a lubrication injection. The patient has tried physical therapy in 2016 and 2018 for her right knee. The patient has not had surgery. The patient has a history of MS. She also is on medications to treat for seizures. The patient's right knee is worse than her left. There is more painful popping, locking, and sharp stabbing pain in her right knee versus left knee. Physical therapy and injections have not helped with the patient's right knee pain. ROS:   Review of Systems   Constitutional: Positive for activity change. Negative for appetite change, fatigue and fever. Respiratory: Negative for apnea, cough, chest tightness and shortness of breath. Cardiovascular: Negative for chest pain, palpitations and leg swelling. Gastrointestinal: Negative for abdominal distention, abdominal pain, constipation, diarrhea, nausea and vomiting. Genitourinary: Negative for difficulty urinating, dysuria and hematuria. Musculoskeletal: Positive for arthralgias, gait problem and joint swelling. Negative for myalgias. Skin: Negative for color change and rash. Neurological: Negative for dizziness, weakness, numbness and headaches. Psychiatric/Behavioral: Positive for sleep disturbance.          Past Medical History:    Past Medical History[]Expand by Default        Past Medical History:   Diagnosis Date    [facility]      Colon cancer ([facility] [facility]       stage IV    Depression      Diabetes mellitus ([facility] [facility]      Fibromyalgia      Hyperlipidemia      Hypertension      Rheumatoid arthritis ([facility] [facility]      Seizure ([facility] [address]      NEUROLOGIST ATA    Sleep apnea       cpap    Unspecified cerebral artery occlusion with cerebral infarction         NOT DEFINITE           Past Surgical History:    Past Surgical History[]Expand by Default         Past Surgical History:   Procedure Laterality Date    APPENDECTOMY       BREAST BIOPSY   [id number]     [facility]    HYSTERECTOMY   2010    LAPAROTOMY   ,     SLEEVE GASTRECTOMY   5/19/15     GASTRECTOMY SLEEVE LAPAROSCOPIC           TONSILLECTOMY       UPPER GASTROINTESTINAL ENDOSCOPY   1/30/15           CurrentMedications:   Current Facility-Administered Medications Current Outpatient Medications   Medication Sig Dispense Refill    gabapentin (NEURONTIN) 600 MG tablet Take 1 tablet by mouth 3 times daily for 30 days. 90 tablet 0    amitriptyline (ELAVIL) 10 MG tablet Take 1 tablet by mouth nightly 30 tablet 0    DULoxetine (CYMBALTA) 60 MG extended release capsule Take 1 capsule by mouth 2 times daily 60 capsule 0    traZODone (DESYREL) 50 MG tablet Take 1 tablet by mouth nightly as needed for Sleep 30 tablet 0    metFORMIN (GLUCOPHAGE) 500 MG tablet Take 1 tablet by mouth 2 times daily (with meals) 60 tablet 0    rivaroxaban (XARELTO) 20 MG TABS tablet Take 1 tablet by mouth Daily with supper 30 tablet 0    QUEtiapine (SEROQUEL) 100 MG tablet Take 1 tablet by mouth nightly 30 tablet 0    carvedilol (COREG) 12.5 MG tablet Take 1 tablet by mouth 2 times daily (with meals) 60 tablet 0    furosemide (LASIX) 40 MG tablet Take 1 tablet by mouth daily 30 tablet 0    magnesium oxide (MAG-OX) 400 (241.3 Mg) MG TABS tablet Take 1 tablet by mouth daily 30 tablet 0    esomeprazole (NEXIUM) 40 MG delayed release capsule Take 1 capsule by mouth every morning (before breakfast) 30 capsule 0    ondansetron (ZOFRAN-ODT) 4 MG disintegrating tablet Take 4 mg by mouth every 8 hours as needed for Nausea or Vomiting        cetirizine (ZYRTEC) 10 MG tablet Take 10 mg by mouth daily        coenzyme Q10 100 MG CAPS capsule Take 100 mg by mouth 3 times daily        diclofenac (CATAFLAM) 50 MG tablet Take 50 mg by mouth daily as needed (headache) May repeat in 6 hours, max 3 tablets per day        SUPER B COMPLEX/C PO Take by mouth daily        loperamide (IMODIUM) 2 MG capsule Take 2 mg by mouth 4 times daily as needed for Diarrhea Takes two capsules at onset        lidocaine (LIDODERM) 5 % Place 1 patch onto the skin daily 12 hours on, 12 hours off.         fluticasone (FLONASE) 50 MCG/ACT nasal spray 1 spray by Each Nostril route daily          No current facility-administered capillary refill. ROM: 0/125 degrees. There is mild effusion. MUSC: Good quad tone. LIGAMENT: Lachman's test is 1+ with Good endpoint. Anterior drawer Negative. Posterior drawer Negative. There is No varus instability at 0 degrees and No varus instability at 30 degrees. There is No valgus instability at 0 degrees and No valgus instability at 30 degrees. SPECIAL: Conor test is negative with no clunks, no crepitation, but with pain. PALP: There is medial and lateral joint line pain. More lateral joint line pain. Mild palpable Baker's cyst noted. Lateral calf pain tenderness. Assessment:      1. Tear of medial meniscus of right knee, unspecified tear type, unspecified whether old or current tear, initial encounter    2. Primary osteoarthritis of both knees      Procedures:    Procedure: no  Radiology:   X-rays taken today were reviewed with the patient. Previous x-rays and MRI were also reviewed with the patient. The previous MRI was done on 05/13/2020 revealing a small tear of the right medial meniscus. There is thinning and fissuring of the cartilage in the central medial femoral condyle and a small focus of chondrosis at the anterior medial femoral condyle. KNEE X-RAY     4 views of the right knee including AP, bilateral tunnel, and lateral in the upright position, and skyline views reveal anatomic alignment with no fracture or dislocation. Kellgren grade 1 changes of osteoarthritis (joint space narrowing, osteophyte, subchondral sclerosis, bony deformity/cyst) of the medial compartment(s). No osseous loose bodies. No bony erosion or periosteal reaction. No soft tissue masses. Impression: Mild osteoarthritis of the right knee. Plan:   Treatment : I reviewed the X-ray and MRI with the patient. We discussed the etiologies and natural histories of medial meniscus tear and osteoarthritis of the right knee.  We discussed the various treatment alternatives including anti-inflammatory medications, physical therapy, injections, further imaging studies and as a last result surgery. During today's visit, I explained to the patient that the MRI does show that there is a medial meniscus tear present. I explained to the patient that I can look in with a lighted telescope to either trim out the tear or repair the meniscus tear. The knee scope procedure will help with the catching, locking, and sharp stabbing pain she is experiencing. However, the knee scope will not fix her pre-existing arthritis, so it is normal to still have the arthritic type of pain post surgery. I then explained to the patient that if she is not ready for surgery then we can try more cortisone injections and go back to physical therapy for strengthening exercises. The patient states that she is tired with how her right knee currently is and does not want to continue to live with how it is. Therefore, the patient has opted for a Right knee arthroscopy. The risks/benefits/alternatives and potential complications have been discussed with the patient. We also had a long discussion regarding the procedure itself and expectation of the recovery. All questions and concerns with addressed. Patient was instructed to call our office with any question or concerns prior to the procedure occurs. A physical therapy prescription was not given. Patient should return to the clinic one week before surgery for her pre-operative discussion to follow up with Javi Burns D.O. . The patient will call the office immediately with any problems. Encounter Medications    No orders of the defined types were placed in this encounter. No orders of the defined types were placed in this encounter.        Grace Kerr MS, AT, ATC, am scribing for and in the presence of Javi Bunrs D.O.. 7/26/2020  5:22 PM           Electronically signed by Charlene Gillespie DO, on 7/26/2020 at 5:22 PM                  IJavi DO, have personally seen this patient and I have reviewed the CC, PMH, FHX and Social History as provided by other clinical staff. I reassessed the HPI and ROS as scribed by Rishi Amador ATC in my presence and it is both accurate and complete. Thereafter, I personally performed the PE, reviewed the imaging and established the DX and POC. I agree with the documentation provided by the . I have reviewed all documentation in its entirety prior to providing my signature indicating agreement. Any areas of disagreement are noted on the chart.      Electronically signed by Mane Lion DO on 7/26/2020 at 5:23 PM                  Revision History

## 2020-07-31 NOTE — H&P (VIEW-ONLY)
History and Physical    Pt Name: Charlie Morrison  MRN: [id number]  YOB: 1965  Date of evaluation: 7/31/2020    PROGRESS NOTE:    THIS IS BRAYDON HEAD ,A 46 YO PATIENT OF DR. STEEN WHO PRESENTS TO PRE-ADMISSION TESTING PRIOR TO A RIGHT KNEE ARTHROSCOPY WITH PARTIAL MENISCECTOMY WITH DEBRIDEMENT. THE PATIENT HAS HAD KNEE PAIN 29 YEARS WITH A FALL AND KNEE INJURY. AT THE TIME OF THE FALL THE PATIENT WAS MORBIDLY OBESE. SHE HAS HAD SUBSEQUENT GASTRIC SLEEVE BYPASS. SHE CURRENTLY HAS A BMI OF 39.8. SHE WILL  SEE A CARDIOLOGIST AND A NEUROLOGIST FOR CLEARANCES PRIOR TO THE SURGERY. THE PATIENT HAD A STROKE IN 2012, SHE HAS HAD EPISODIC ATRIAL FIBRILLATION OVER THE YEARS. SHE IS ON XARELTO  CURRENTLY. SHE HAS A LOOP RECORDER. SHE WILL SEE THE CARDIOLOGIST ON Wednesday 8/5/2020. SHE WILL SEE DR Eri Roman ON Monday 8/3/2020. SHE ALSO HAS DIABETES. SHE MONITORS BLOOD SUGARS IN THE AM , RANGES 68 TO 75. FUNCTIONAL ASSESSMENT:   1. IS ABLE TO WALK 2 CITY BLOCKS WITHOUT SHORTNESS OF BREATH OF CHEST PAIN. 2. SHE WOULD BE SHORT OF BREATH BUT WITHOUT CHEST PAIN CLIMBING 2 FLIGHTS OF STAIRS. 3.SHE IS ABLE TO WALK UP AN INCLINE WITHOUT SHORTNESS OF BREATH OR CHEST PAIN. SHE DOES HAVE CHRONIC KNEE PAIN WITH POPPING AND CRACKLING. THE PAIN IS AT 6/10 WHILE SITTING AND UP TO 8 OR 9/10 WHEN WALKING. SHE DOES HAVE A CONTUSION WITH ABRASION ON HER RIGHT KNEE FROM A FALL DURING A SEIZURE , WHICH IS KNOWN TO DR. STEEN. SHE IS TREATING IT WITH TRIPLE ANTIBIOTIC OINTMENT AND SCRUBS. SHE WILL SEE DR. STEEN AGAIN PRIOR TO SURGERY. PHYSICAL EXAM:  ALERT . ORIENTED PLEASANT. HEART REGULAR RATE AND RHYTHM   LUNGS CLEAR   ABDOMEN OBESE , SOFT , NON-TENDER, BOWEL SOUNDS AUDIBLE X 4   PEDAL PULSES + 2 BILATERALLY NO CALF TENDERNESS NO EDEMA  KNEES ARE ENLARGED BILATERALLY.       [x] Please see the ORTHOPEDIC OFFICE PROGRESS NOTE OF 7/20/2020 by Dr. Zohreh Cedillo  in [facility] which meets the criteria for the admission History and Physical and is copied below. KANG Peng, TK-BC  Electronically signed 7/31/2020 at 10:00 AM                    Related encounter: Office Visit from 7/20/2020 in [address] [place] [address] and [facility] All Collapse All     Show:Clear all  [x]Manual[x]Template[]Copied    Added by:  Aurelio Lugo 20, DO    []Lizbeth for details        [place] [address]  Dept: [phone]  Dept Fax: [phone]                                                                    Bilateral Knee - New Patient      Subjective:           Chief Complaint   Patient presents with    Knee Pain       Bilateral Knee pain     HPI:      Cody Cole presents today for Bilateral knee pain. The pain has been present for 2 weeks with the left knee and at least 30 years for the right knee. The patient states that 29 years ago she fractured her right patella by a fall while carrying her children. The patient has tried knee braces, crutches, heat/ice, physical therapy, rest, Tramadol, Gabapentin, Mobic, and Motrin with no improvement. The pain is now described as Achy and Sharp. There is pain with weight bearing. The knee has swelled. There is painful popping and clicking. The knee has caught or locked up like something is getting stuck deep inside her knee. The knee has not given out. It is stiff upon arising from sitting. It is painful to go up and down stairs and sit for a prolonged time. The patient has had a cortisone injection. Her last cortisone injection was about a year ago. The patient has not tried a lubrication injection. The patient has tried physical therapy in 2016 and 2018 for her right knee. The patient has not had surgery. The patient has a history of MS. She also is on medications to treat for seizures. The patient's right knee is worse than her left. Current Outpatient Medications   Medication Sig Dispense Refill    gabapentin (NEURONTIN) 600 MG tablet Take 1 tablet by mouth 3 times daily for 30 days. 90 tablet 0    amitriptyline (ELAVIL) 10 MG tablet Take 1 tablet by mouth nightly 30 tablet 0    DULoxetine (CYMBALTA) 60 MG extended release capsule Take 1 capsule by mouth 2 times daily 60 capsule 0    traZODone (DESYREL) 50 MG tablet Take 1 tablet by mouth nightly as needed for Sleep 30 tablet 0    metFORMIN (GLUCOPHAGE) 500 MG tablet Take 1 tablet by mouth 2 times daily (with meals) 60 tablet 0    rivaroxaban (XARELTO) 20 MG TABS tablet Take 1 tablet by mouth Daily with supper 30 tablet 0    QUEtiapine (SEROQUEL) 100 MG tablet Take 1 tablet by mouth nightly 30 tablet 0    carvedilol (COREG) 12.5 MG tablet Take 1 tablet by mouth 2 times daily (with meals) 60 tablet 0    furosemide (LASIX) 40 MG tablet Take 1 tablet by mouth daily 30 tablet 0    magnesium oxide (MAG-OX) 400 (241.3 Mg) MG TABS tablet Take 1 tablet by mouth daily 30 tablet 0    esomeprazole (NEXIUM) 40 MG delayed release capsule Take 1 capsule by mouth every morning (before breakfast) 30 capsule 0    ondansetron (ZOFRAN-ODT) 4 MG disintegrating tablet Take 4 mg by mouth every 8 hours as needed for Nausea or Vomiting        cetirizine (ZYRTEC) 10 MG tablet Take 10 mg by mouth daily        coenzyme Q10 100 MG CAPS capsule Take 100 mg by mouth 3 times daily        diclofenac (CATAFLAM) 50 MG tablet Take 50 mg by mouth daily as needed (headache) May repeat in 6 hours, max 3 tablets per day        SUPER B COMPLEX/C PO Take by mouth daily        loperamide (IMODIUM) 2 MG capsule Take 2 mg by mouth 4 times daily as needed for Diarrhea Takes two capsules at onset        lidocaine (LIDODERM) 5 % Place 1 patch onto the skin daily 12 hours on, 12 hours off.         fluticasone (FLONASE) 50 MCG/ACT nasal spray 1 spray by Each Nostril route daily          No current facility-administered medications for this visit. Allergies:    Ace inhibitors; Iodine; Lurasidone; Lurasidone hcl; Nitrofurantoin; Povidone-iodine; Shellfish allergy; Shellfish-derived products; Iodides; Lamotrigine; Macrobid [nitrofurantoin macrocrystal]; Morphine; Pyridium [phenazopyridine hcl]; and Topiramate     Social History:   Social History[]Expand by [employer]   Social History            Socioeconomic History    Marital status:        Spouse name: None    Number of children: None    Years of education: None    Highest education level: None   Occupational History    Occupation: Disabled   Social Needs    Financial resource strain: None    Food insecurity       Worry: None       Inability: None    Transportation needs       Medical: None       Non-medical: None   Tobacco Use    Smoking status: Former Smoker       Last attempt to quit: 2004       Years since quittin.5    Smokeless tobacco: Never Used   Substance and Sexual Activity    Alcohol use: Yes       Comment: occassion    Drug use: No    Sexual activity: Yes       Partners: Male   Lifestyle    Physical activity       Days per week: None       Minutes per session: None    Stress: None   Relationships    Social connections       Talks on phone: None       Gets together: None       Attends Mosque service: None       Active member of club or organization: None       Attends meetings of clubs or organizations: None       Relationship status: None    Intimate partner violence       Fear of current or ex partner: None       Emotionally abused: None       Physically abused: None       Forced sexual activity: None   Other Topics Concern    None   Social History Narrative    None           Family History:  Family History[]Expand by [employer]   Family History   Adopted: Yes           Vitals:   /74   Pulse 72   Ht 5' 3.75\" (1.619 m)   Wt 228 lb 6.4 oz (103.6 kg)   BMI 39.51 kg/m²  Body mass index is 39.51 kg/m².   Physical Examination:      Orthopedics:     GENERAL: Alert and oriented X3 in no acute distress. SKIN: Intact without lesions or ulcerations. NEURO: Intact to sensory and motor testing. VASC: Capillary refill is less than 3 seconds. KNEE EXAM     LOCATION: Right Knee  GEN: Alert and oriented X 3, in no acute distress. GAIT: The patient's gait was observed while entering the exam room and was noted to be antalgic. The extremity is in valgus alignment. SKIN: Intact without rashes, lesions, or ulcerations. No obvious deformity or swelling. NEURO: The patient responds to light touch throughout right LE. Patellar and Achilles reflexes are 2/4. VASC: The right LE is neurovascularly intact with 2/4 DP and 2/4 PT pulses. Brisk capillary refill. ROM: 0/120 degrees. There is mild effusion. MUSC: Good quad tone. LIGAMENT: Lachman's test is 1+ with Good endpoint. Anterior drawer Negative. Posterior drawer Negative. There is No varus instability at 0 degrees and No varus instability at 30 degrees. There is No valgus instability at 0 degrees and No valgus instability at 30 degrees. SPECIAL: oCnor test is positive with an audible click, no clunks, no crepitation, and with pain. PALP: There is medial and lateral joint line pain. Mainly medial joint pain noted. Palpable Baker's Cyst on the posterior aspect of the right knee. Medial patellofemoral pain noted. KNEE EXAM     LOCATION: Left Knee  GEN: Alert and oriented X 3, in no acute distress. GAIT: The patient's gait was observed while entering the exam room and was noted to be antalgic. The extremity is in valgus alignment. SKIN: Intact without rashes, lesions, or ulcerations. No obvious deformity or swelling. Spider veins noted on the posterior lateral aspect of the left knee. NEURO: The patient responds to light touch throughout left LE. Patellar and Achilles reflexes are 2/4. VASC: The left LE is neurovascularly intact with 2/4 DP and 2/4 PT pulses.  Brisk capillary refill. ROM: 0/125 degrees. There is mild effusion. MUSC: Good quad tone. LIGAMENT: Lachman's test is 1+ with Good endpoint. Anterior drawer Negative. Posterior drawer Negative. There is No varus instability at 0 degrees and No varus instability at 30 degrees. There is No valgus instability at 0 degrees and No valgus instability at 30 degrees. SPECIAL: Conor test is negative with no clunks, no crepitation, but with pain. PALP: There is medial and lateral joint line pain. More lateral joint line pain. Mild palpable Baker's cyst noted. Lateral calf pain tenderness. Assessment:      1. Tear of medial meniscus of right knee, unspecified tear type, unspecified whether old or current tear, initial encounter    2. Primary osteoarthritis of both knees      Procedures:    Procedure: no  Radiology:   X-rays taken today were reviewed with the patient. Previous x-rays and MRI were also reviewed with the patient. The previous MRI was done on 05/13/2020 revealing a small tear of the right medial meniscus. There is thinning and fissuring of the cartilage in the central medial femoral condyle and a small focus of chondrosis at the anterior medial femoral condyle. KNEE X-RAY     4 views of the right knee including AP, bilateral tunnel, and lateral in the upright position, and skyline views reveal anatomic alignment with no fracture or dislocation. Kellgren grade 1 changes of osteoarthritis (joint space narrowing, osteophyte, subchondral sclerosis, bony deformity/cyst) of the medial compartment(s). No osseous loose bodies. No bony erosion or periosteal reaction. No soft tissue masses. Impression: Mild osteoarthritis of the right knee. Plan:   Treatment : I reviewed the X-ray and MRI with the patient. We discussed the etiologies and natural histories of medial meniscus tear and osteoarthritis of the right knee.  We discussed the various treatment alternatives including anti-inflammatory medications, physical therapy, injections, further imaging studies and as a last result surgery. During today's visit, I explained to the patient that the MRI does show that there is a medial meniscus tear present. I explained to the patient that I can look in with a lighted telescope to either trim out the tear or repair the meniscus tear. The knee scope procedure will help with the catching, locking, and sharp stabbing pain she is experiencing. However, the knee scope will not fix her pre-existing arthritis, so it is normal to still have the arthritic type of pain post surgery. I then explained to the patient that if she is not ready for surgery then we can try more cortisone injections and go back to physical therapy for strengthening exercises. The patient states that she is tired with how her right knee currently is and does not want to continue to live with how it is. Therefore, the patient has opted for a Right knee arthroscopy. The risks/benefits/alternatives and potential complications have been discussed with the patient. We also had a long discussion regarding the procedure itself and expectation of the recovery. All questions and concerns with addressed. Patient was instructed to call our office with any question or concerns prior to the procedure occurs. A physical therapy prescription was not given. Patient should return to the clinic one week before surgery for her pre-operative discussion to follow up with Alexsandra Benjamin D.O. . The patient will call the office immediately with any problems. Encounter Medications    No orders of the defined types were placed in this encounter. No orders of the defined types were placed in this encounter.        Lily Bland MS, AT, ATC, am scribing for and in the presence of Alexsandra Benjamin D.O.. 7/26/2020  5:22 PM           Electronically signed by Kenyon Brittle, DO, on 7/26/2020 at 5:22 PM                  I, Alexsandra Benjamin DO, have personally seen this

## 2020-07-31 NOTE — PRE-PROCEDURE INSTRUCTIONS
[facility] Friday, August 14,2020  at 05:30 AM      When you arrive to hospital pull to front of hospital and call [phone]. No visitors in surgery area    Continue to take your home medications as you normally do up to and including the night before surgery with the exception of any blood thinning medications. Please stop any blood thinning medications as directed by your surgeon or prescribing physician. Failure to stop certain medications may interfere with your scheduled surgery. These may include:  Aspirin, Warfarin (Coumadin), Clopidogrel (Plavix), Ibuprofen (Motrin, Advil), Naproxen (Aleve), Meloxicam (Mobic), Celecoxib (Celebrex), Eliquis, Pradaxa, Xarelto, Effient, Fish Oil, Herbal supplements. . Stop Xarelto per direction of Cardiologist before surgery        Please take the following medication(s) the day of surgery with a small sip of water:  Gabapentin,carvedilol,nexium        Please use your inhaler(s) if needed and bring your inhaler(s) from home the day of surgery. PREPARING FOR YOUR SURGERY:     Before surgery, you can play an important role in your own health. Because skin is not sterile, we need to be sure that your skin is as free of germs as possible before surgery by carefully washing before surgery. Preparing or prepping skin before surgery can reduce the risk of a surgical site infection.   Do not shave the area of your body where your surgery will be performed unless you received specific permission from your physician. You will need to shower at home the night before surgery and the morning of surgery with a special soap called chlorhexidine gluconate (CHG*). *Not to be used by people allergic to Chlorhexidine Gluconate (CHG). Following these instructions will help you be sure that your skin is clean before surgery. Instructions on cleaning your skin before surgery:      The night before your surgery:      You will need to shower with warm water (not hot) and the CHG soap.  Use a clean wash cloth and a clean towel. Have clean clothes available to put on after the shower.   First wash your hair with regular shampoo. Rinse your hair and body thoroughly to remove the shampoo.  Wash your face and genital area (private parts) with your regular soap or water only. Thoroughly rinse your body with warm water from the neck down.  Turn water off to prevent rinsing the soap off too soon.  With a clean wet washcloth and half of the CHG soap in the bottle, lather your entire body from the neck down. Do not use CHG soap near your eyes or ears to avoid injury to those areas.  Wash thoroughly, paying special attention to the area where your surgery will be performed.  Wash your body gently for five (5) minutes. Avoid scrubbing your skin too hard.  Turn the water back on and rinse your body thoroughly.  Pat yourself dry with a clean, soft towel. Do not apply lotion, cream or powder.  Dress with clean freshly washed clothes. The morning of surgery:     Repeat shower following steps above - using remaining half of CHG soap in bottle. Patient Instructions:    Colin If you are having any type of anesthesia you are to have nothing to eat or drink after midnight the night before your surgery. This includes gum, hard candy, mints, water or smoking or chewing tobacco.  The only exception to this is a small sip of water to take with any morning dose of heart, blood pressure, or seizure medications. No alcoholic beverages for 24 hours prior to surgery.  Brush your teeth but do not swallow water.  Bring your eyeglasses and case with you. No contacts are to be worn the day of surgery. You also may bring your hearing aids. Most surgical procedures involving anesthesia will require that you remove your dentures prior to surgery.  .    · Do not wear any jewelry or body piercings day of surgery.   Also, NO lotion, perfume or deodorant to be used the day of surgery. No nail polish on the operative extremity (arm/leg surgeries)    · If you are staying overnight with us, please bring a small bag of necessary personal items.  Please wear loose, comfortable clothing. If you are potentially going to have a cast or brace bring clothing that will fit over them.  In case of illness - If you have cold or flu like symptoms (high fever, runny nose, sore throat, cough, etc.) rash, nausea, vomiting, loose stools, and/or recent contact with someone who has a contagious disease (chicken pox, measles, etc.) Please call your doctor before coming to the hospital.         Day of Surgery/Procedure:    As a patient at [place] [address] you can expect quality medical and nursing care that is centered on your individual needs. Our goal is to make your surgical experience as comfortable as possible    . Transportation After Your Surgery/Procedure: You will need a friend or family member to drive you home after your procedure. Your  must be 25years of age or older and able to sign off on your discharge instructions. A taxi cab or any other form of public transportation is not acceptable. Your friend or family member must stay at the hospital throughout your procedure. Someone must remain with you for the first 24 hours after your surgery if you receive anesthesia or medication. If you do not have someone to stay with you, your procedure may be cancelled.       If you have any other questions regarding your procedure or the day of surgery, please call [phone]      _________________________  ____________________________  Signature (Patient)              Signature (Provider) & date

## 2020-08-01 LAB
EKG ATRIAL RATE: 68 BPM
EKG P AXIS: 49 DEGREES
EKG P-R INTERVAL: 154 MS
EKG Q-T INTERVAL: 448 MS
EKG QRS DURATION: 88 MS
EKG QTC CALCULATION (BAZETT): 476 MS
EKG R AXIS: 11 DEGREES
EKG T AXIS: -98 DEGREES
EKG VENTRICULAR RATE: 68 BPM

## 2020-08-06 ENCOUNTER — OFFICE VISIT (OUTPATIENT)
Dept: ORTHOPEDIC SURGERY | Age: 55
End: 2020-08-06
Payer: COMMERCIAL

## 2020-08-06 VITALS
DIASTOLIC BLOOD PRESSURE: 67 MMHG | SYSTOLIC BLOOD PRESSURE: 113 MMHG | HEIGHT: 63 IN | HEART RATE: 80 BPM | WEIGHT: 224 LBS | BODY MASS INDEX: 39.69 KG/M2

## 2020-08-06 PROCEDURE — G8427 DOCREV CUR MEDS BY ELIG CLIN: HCPCS | Performed by: ORTHOPAEDIC SURGERY

## 2020-08-06 PROCEDURE — G8417 CALC BMI ABV UP PARAM F/U: HCPCS | Performed by: ORTHOPAEDIC SURGERY

## 2020-08-06 PROCEDURE — 99213 OFFICE O/P EST LOW 20 MIN: CPT | Performed by: ORTHOPAEDIC SURGERY

## 2020-08-06 PROCEDURE — 3017F COLORECTAL CA SCREEN DOC REV: CPT | Performed by: ORTHOPAEDIC SURGERY

## 2020-08-06 PROCEDURE — 1036F TOBACCO NON-USER: CPT | Performed by: ORTHOPAEDIC SURGERY

## 2020-08-06 ASSESSMENT — ENCOUNTER SYMPTOMS
VOMITING: 0
COLOR CHANGE: 0
APNEA: 0
CONSTIPATION: 0
SHORTNESS OF BREATH: 0
ABDOMINAL PAIN: 0
DIARRHEA: 0
ABDOMINAL DISTENTION: 0
NAUSEA: 0
CHEST TIGHTNESS: 0
COUGH: 0
RESPIRATORY NEGATIVE: 1

## 2020-08-06 NOTE — PROGRESS NOTES
[address] [facility]  [address] [place] [id number]  Dept: [phone]  Dept Fax: [phone]          Right Knee - Follow Up     Subjective:     Chief Complaint   Patient presents with    Pre-op Exam     right knee arthroscopy DOS- 8/14/20     HPI:     Katie Coker presents today for  right knee pain. The pain has been present for at least 30 years for the right knee. The patient states that 29 years ago she fractured her right patella by a fall while carrying her children. The patient has tried knee braces, crutches, heat/ice, physical therapy, rest, Tramadol, Gabapentin, Mobic, and Motrin with no improvement. The pain is now described as Achy and Sharp. There is pain with weight bearing. The knee has swelled. There is painful popping and clicking. The knee has caught or locked up like something is getting stuck deep inside her knee. The knee has not given out. It is stiff upon arising from sitting. It is painful to go up and down stairs and sit for a prolonged time. The patient has had a cortisone injection. Her last cortisone injection was about a year ago. The patient has not tried a lubrication injection. The patient has tried physical therapy in 2016 and 2018 for her right knee. The patient has not had surgery. The patient has a history of MS. She also is on medications to treat for seizures. The patient's right knee is worse than her left. There is more painful popping, locking, and sharp stabbing pain in her right knee versus left knee. Physical therapy and injections have not helped with the patient's right knee pain. She is here for preop discussion for a right knee arthroscopy with medial meniscectomy and debridement. She has an abrasion on the front of her knee from a fall that is getting better. She is using Hibiclens twice a day on the wound and she has been using Neosporin and covering the wound with a Band-Aid.   She states that it seems like the wound is getting more moist but it is improving daily. ROS:   Review of Systems   Constitutional: Positive for activity change. Negative for appetite change, fatigue and fever. Respiratory: Negative. Negative for apnea, cough, chest tightness and shortness of breath. Cardiovascular: Negative. Negative for chest pain, palpitations and leg swelling. Gastrointestinal: Negative for abdominal distention, abdominal pain, constipation, diarrhea, nausea and vomiting. Genitourinary: Negative for difficulty urinating, dysuria and hematuria. Musculoskeletal: Positive for arthralgias, gait problem and joint swelling. Negative for myalgias. Skin: Negative for color change and rash. Neurological: Negative for dizziness, weakness, numbness and headaches. Psychiatric/Behavioral: Positive for sleep disturbance.        Past Medical History:    Past Medical History:   Diagnosis Date    Atrial fibrillation ([address]     Breast cancer ([address]     bilateral remission since 2012    C. difficile colitis     2015    Colon cancer ([address]     stage IV remission since 2016    Depression     Diabetes mellitus ([address]     Diverticulosis     Fibromyalgia     Hyperlipidemia     Hypertension     Multiple sclerosis ([address]     Rheumatoid arthritis ([address]     Seizure ([address] 7/    last seizure 7/18/2020  first seizure 1992 after head injury    Unspecified cerebral artery occlusion with cerebral infarction     7/12  NOT DEFINITE    Wears glasses        Past Surgical History:    Past Surgical History:   Procedure Laterality Date    APPENDECTOMY  1985    BACK SURGERY      BREAST BIOPSY  [id number]    BREAST LUMPECTOMY      bilateral [id number]     CARDIAC SURGERY      cardiac cath negative in 2018   [address], COLON, DIAGNOSTIC      HYSTERECTOMY  2010   [address] [place] [address] times daily for 30 days. (Patient taking differently: Take 1,200 mg by mouth 3 times daily. ) 90 tablet 0    DULoxetine (CYMBALTA) 60 MG extended release capsule Take 1 capsule by mouth 2 times daily 60 capsule 0     No current facility-administered medications for this visit. Allergies:    Ace inhibitors; Iodine; Lurasidone; Lurasidone hcl; Nitrofurantoin; Povidone-iodine; Shellfish allergy; Shellfish-derived products; Iodides; Lamotrigine; Macrobid [nitrofurantoin macrocrystal];  Morphine; Pyridium [phenazopyridine hcl]; and Topiramate    Social History:   Social History     Socioeconomic History    Marital status:      Spouse name: Not on file    Number of children: Not on file    Years of education: Not on file    Highest education level: Not on file   Occupational History    Occupation: Disabled   Social Needs    Financial resource strain: Not on file    Food insecurity     Worry: Not on file     Inability: Not on file   [employer] needs     Medical: Not on file     Non-medical: Not on file   Tobacco Use    Smoking status: Former Smoker     Last attempt to quit: 2020     Years since quittin.0    Smokeless tobacco: Never Used   Substance and Sexual Activity    Alcohol use: Yes     Comment: occassion    Drug use: No    Sexual activity: Yes     Partners: Male   Lifestyle    Physical activity     Days per week: Not on file     Minutes per session: Not on file    Stress: Not on file   Relationships    Social connections     Talks on phone: Not on file     Gets together: Not on file     Attends [employer] service: Not on file     Active member of club or organization: Not on file     Attends meetings of clubs or organizations: Not on file     Relationship status: Not on file    Intimate partner violence     Fear of current or ex partner: Not on file     Emotionally abused: Not on file     Physically abused: Not on file     Forced sexual activity: Not on file   Other Topics Concern    Not on file   Social History Narrative    Not on file       Family History:  Family History   Adopted: Yes       Vitals:   /67   Pulse 80   Ht 5' 3\" (1.6 m)   Wt 224 lb (101.6 kg)   BMI 39.68 kg/m²  Body mass index is 39.68 kg/m². Physical Examination:     Orthopedics:    GENERAL: Alert and oriented X3 in no acute distress. SKIN: Intact without lesions or ulcerations. NEURO: Intact to sensory and motor testing. VASC: Capillary refill is less than 3 seconds. KNEE EXAM     LOCATION: Right Knee  GEN: Alert and oriented X 3, in no acute distress. GAIT: The patient's gait was observed while entering the exam room and was noted to be antalgic. The extremity is in valgus alignment. SKIN: Anterior skin abrasion healing  (see picture). NEURO: The patient responds to light touch throughout right LE. Patellar and Achilles reflexes are 2/4. VASC: The right LE is neurovascularly intact with 2/4 DP and 2/4 PT pulses. Brisk capillary refill. ROM: 0/120 degrees. There is mild effusion. MUSC: Good quad tone. LIGAMENT: Lachman's test is 1+ with Good endpoint. Anterior drawer Negative. Posterior drawer Negative. There is No varus instability at 0 degrees and No varus instability at 30 degrees. There is No valgus instability at 0 degrees and No valgus instability at 30 degrees. SPECIAL: Conor test is positive with an audible click, no clunks, no crepitation, and with pain. PALP: There is medial and lateral joint line pain. Mainly medial joint pain noted. Palpable Baker's Cyst on the posterior aspect of the right knee. Medial patellofemoral pain noted. Assessment:     1.  Tear of medial meniscus of right knee, unspecified tear type, unspecified whether old or current tear, subsequent encounter      Procedures:    Procedure: no  Radiology:     KNEE X-RAY         4 views of the right knee including AP, bilateral tunnel, and lateral in    the upright position, and skyline views reveal anatomic alignment with no    fracture or dislocation. Kellgren grade 1 changes of osteoarthritis (joint    space narrowing, osteophyte, subchondral sclerosis, bony deformity/cyst)    of the medial compartment(s). No osseous loose bodies. No bony erosion or    periosteal reaction. No soft tissue masses.           Impression: Mild osteoarthritis of the right knee. MRI is in the media. Plan:   Surgical Plan: We discussed the natural etiologies and histories of a medial meniscal tear. We also discussed the various alternatives of medical treatment including physical therapy, injections, anti-inflammatory drugs and as a last resort surgery. During today's face to face encounter, we discussed the details of undergoing a right knee arthroscopy with medial meniscectomy and debridement surgery. I explained to the patient that She should not eat anything after midnight the night before surgery. She should wash her body with the Hibiclens soap that She was given to reduce the risks of infection. I also instructed the patient not to shave at all this week because we do not want any nicks to develop on the skin for it will increase the risk of infection. The patient will need the following durable medical equipment: a front wheeled walker, toilet commode, and a shower chair to aid in their post operative course. In addition, I explained to the patient that like any other surgery, there are risks, such as: infection, pain, nerve and artery damage, bleeding, lack of muscle stimulus, stiffness, and lastly, a need for further surgery. At this time, the patient has opted for and has decided to undergo a right total knee arthroscopy with medial meniscectomy and debridement surgery. A consent form was signed by the patient today. All the details of the procedure were discussed with the patient and all questions and concerns were answered.  The patient was also instructed to make sure that they stop any NSAID's the week before surgery to reduce the risk of complications and to prevent the impairment of the natural healing process of the body. She was also instructed to get up and get moving every hour She is awake to prevent the occurrence of a blood clot. We also discussed the wound on the front of her knee and if that becomes more red or irritated or has not healed before surgery we may need to cancel. She will either send us pictures in my chart or she will come in next Thursday for us to take a peek at it to determine if she can proceed with surgery next Friday. Patient should return to the office 1 week after surgery for a post operative f/u appointment with Cameron Parks PA-C. No orders of the defined types were placed in this encounter. No orders of the defined types were placed in this encounter. Cameron MAN PA-C, am scribing for and in the presence of Maurice WASHINGTON. 8/9/2020  12:37 PM      IMaurice DO, have personally seen this patient and I have reviewed the CC, PMH, FHX and Social History as provided by other clinical staff. I reassessed the HPI and ROS as scribed by Cameron Parks PA-C in my presence and it is both accurate and complete. Thereafter, I personally performed the PE, reviewed the imaging and established the DX and POC. I agree with the documentation provided by the Physician Assistant. I have reviewed all documentation in its entirety prior to providing my signature indicating agreement. Any areas of disagreement are noted on the chart.     Electronically signed by Eduardo Alexis DO on 8/9/2020 at 12:38 PM        Electronically signed by Eduardo Alexis DO, on 8/9/2020 at 12:37 PM

## 2020-08-10 ENCOUNTER — HOSPITAL ENCOUNTER (OUTPATIENT)
Dept: PREADMISSION TESTING | Age: 55
Setting detail: SPECIMEN
Discharge: HOME | End: 2020-08-14
Payer: COMMERCIAL

## 2020-08-10 PROCEDURE — U0003 INFECTIOUS AGENT DETECTION BY NUCLEIC ACID (DNA OR RNA); SEVERE ACUTE RESPIRATORY SYNDROME CORONAVIRUS 2 (SARS-COV-2) (CORONAVIRUS DISEASE [COVID-19]), AMPLIFIED PROBE TECHNIQUE, MAKING USE OF HIGH THROUGHPUT TECHNOLOGIES AS DESCRIBED BY CMS-2020-01-R: HCPCS

## 2020-08-11 LAB — SARS-COV-2, NAA: NOT DETECTED

## 2020-08-13 ENCOUNTER — OFFICE VISIT (OUTPATIENT)
Dept: ORTHOPEDIC SURGERY | Age: 55
End: 2020-08-13
Payer: COMMERCIAL

## 2020-08-13 VITALS
DIASTOLIC BLOOD PRESSURE: 60 MMHG | HEART RATE: 80 BPM | SYSTOLIC BLOOD PRESSURE: 88 MMHG | WEIGHT: 228.2 LBS | HEIGHT: 63 IN | BODY MASS INDEX: 40.43 KG/M2

## 2020-08-13 PROCEDURE — 3017F COLORECTAL CA SCREEN DOC REV: CPT | Performed by: ORTHOPAEDIC SURGERY

## 2020-08-13 PROCEDURE — G8427 DOCREV CUR MEDS BY ELIG CLIN: HCPCS | Performed by: ORTHOPAEDIC SURGERY

## 2020-08-13 PROCEDURE — 99214 OFFICE O/P EST MOD 30 MIN: CPT | Performed by: ORTHOPAEDIC SURGERY

## 2020-08-13 PROCEDURE — 4004F PT TOBACCO SCREEN RCVD TLK: CPT | Performed by: ORTHOPAEDIC SURGERY

## 2020-08-13 PROCEDURE — G8417 CALC BMI ABV UP PARAM F/U: HCPCS | Performed by: ORTHOPAEDIC SURGERY

## 2020-08-13 ASSESSMENT — ENCOUNTER SYMPTOMS
ABDOMINAL PAIN: 0
CONSTIPATION: 0
APNEA: 0
SHORTNESS OF BREATH: 0
ABDOMINAL DISTENTION: 0
COUGH: 0
NAUSEA: 0
CHEST TIGHTNESS: 0
COLOR CHANGE: 0
DIARRHEA: 0
VOMITING: 0

## 2020-08-13 NOTE — PROGRESS NOTES
[address] [facility]  [address] [place] [id number]  Dept: [phone]  Dept Fax: [phone]          Right Knee - Follow Up     Subjective:     Chief Complaint   Patient presents with    Knee Pain     right knee skin check before Sx     HPI:     Alina Smith presents today for Right knee pain. The pain has been present for 30 years. The patient recalls a specific injury where the knee was hit off the ground when she was carrying her children and she fractured her patella. The patient has tried knee braces, crutches, heat, ice, rest, tramadol, Mobic, motrin and gabapentin with no improvement. The pain is now described as Achy and Sharp. There is pain on weight bearing. The knee has swelled. There is painful popping and clicking. The knee has caught and locked up. She states that it feels like something is deep within the knee. The knee has not given out. It is stiff upon arising from sitting. It is painful to go up and down stairs and sit for a prolonged time. The patient has had a cortisone injection a year ago with good pain relief. The patient has not tried a lubrication injection. The patient has not tried physical therapy recently but she has done it in 2016 and 2018. The patient has not had surgery. Patient is here today to have her skin checked from the abrasion that she has over there patella tendon. ROS:   Review of Systems   Constitutional: Positive for activity change. Negative for appetite change, fatigue and fever. Respiratory: Negative for apnea, cough, chest tightness and shortness of breath. Cardiovascular: Negative for chest pain, palpitations and leg swelling. Gastrointestinal: Negative for abdominal distention, abdominal pain, constipation, diarrhea, nausea and vomiting. Genitourinary: Negative for difficulty urinating, dysuria and hematuria. Musculoskeletal: Positive for arthralgias.  Negative for gait problem, joint swelling and myalgias. Skin: Negative for color change and rash. Neurological: Negative for dizziness, weakness, numbness and headaches. Psychiatric/Behavioral: Negative for sleep disturbance.      Past Medical History:    Past Medical History:   Diagnosis Date    [facility]     Breast cancer ([facility]     bilateral remission since 2012    C. difficile colitis     2015    Colon cancer ([facility]     stage IV remission since 2016    Depression     Diabetes mellitus ([facility]     Diverticulosis     Fibromyalgia     Hyperlipidemia     Hypertension     Multiple sclerosis ([facility]     Rheumatoid arthritis ([facility]     Seizure ([facility] 7/    last seizure 7/18/2020  first seizure 1992 after head injury    Unspecified cerebral artery occlusion with cerebral infarction     7/12  NOT DEFINITE    Wears glasses      Past Surgical History:    Past Surgical History:   Procedure Laterality Date    APPENDECTOMY  1985    BACK SURGERY      BREAST BIOPSY  [id number]    BREAST LUMPECTOMY      bilateral [id number]     CARDIAC SURGERY      cardiac cath negative in 2018   [facility]    COLONOSCOPY      ENDOSCOPY, COLON, DIAGNOSTIC      HYSTERECTOMY  2010    INSERTABLE CARDIAC MONITOR      KNEE ARTHROSCOPY Right 08/14/2020    RIGHT KNEE ARTHROSCOPY WITH PARTIAL MEDIAL MENISCECTOMY WITH DEBRIDEMENT (Right     LAPAROTOMY  1988, 1990    SIGMOIDECTOMY  2016    for cancer    SLEEVE GASTRECTOMY  5/19/15    GASTRECTOMY SLEEVE LAPAROSCOPIC           TONSILLECTOMY  1985    UPPER GASTROINTESTINAL ENDOSCOPY  1/30/15     Current Medications:   Current Outpatient Medications   Medication Sig Dispense Refill    methocarbamol (ROBAXIN) 750 MG tablet Take 1,500 mg by mouth 3 times daily      traZODone (DESYREL) 50 MG tablet Take 1 tablet by mouth nightly as needed for Sleep 30 tablet 0    metFORMIN (GLUCOPHAGE) 500 MG tablet Take 1 tablet by mouth 2 times daily (with meals) 60 tablet 0    rivaroxaban (XARELTO) 20 MG TABS tablet Take 1 tablet by mouth Daily with supper 30 tablet 0    QUEtiapine (SEROQUEL) 100 MG tablet Take 1 tablet by mouth nightly 30 tablet 0    carvedilol (COREG) 12.5 MG tablet Take 1 tablet by mouth 2 times daily (with meals) 60 tablet 0    furosemide (LASIX) 40 MG tablet Take 1 tablet by mouth daily 30 tablet 0    magnesium oxide (MAG-OX) 400 (241.3 Mg) MG TABS tablet Take 1 tablet by mouth daily 30 tablet 0    esomeprazole (NEXIUM) 40 MG delayed release capsule Take 1 capsule by mouth every morning (before breakfast) 30 capsule 0    ondansetron (ZOFRAN-ODT) 4 MG disintegrating tablet Take 4 mg by mouth every 8 hours as needed for Nausea or Vomiting      cetirizine (ZYRTEC) 10 MG tablet Take 10 mg by mouth daily      coenzyme Q10 100 MG CAPS capsule Take 100 mg by mouth 3 times daily      diclofenac (CATAFLAM) 50 MG tablet Take 50 mg by mouth daily as needed (headache) May repeat in 6 hours, max 3 tablets per day      SUPER B COMPLEX/C PO Take by mouth daily      loperamide (IMODIUM) 2 MG capsule Take 2 mg by mouth 4 times daily as needed for Diarrhea Takes two capsules at onset      lidocaine (LIDODERM) 5 % Place 1 patch onto the skin daily 12 hours on, 12 hours off.  fluticasone (FLONASE) 50 MCG/ACT nasal spray 1 spray by Each Nostril route daily      HYDROcodone-acetaminophen (NORCO) 5-325 MG per tablet Take 1 tablet by mouth every 6 hours as needed for Pain for up to 7 days. Intended supply: 7 days. Take lowest dose possible to manage pain 28 tablet 0    gabapentin (NEURONTIN) 600 MG tablet Take 1 tablet by mouth 3 times daily for 30 days. (Patient taking differently: Take 1,200 mg by mouth 3 times daily. ) 90 tablet 0    DULoxetine (CYMBALTA) 60 MG extended release capsule Take 1 capsule by mouth 2 times daily 60 capsule 0     No current facility-administered medications for this visit. Allergies:    Ace inhibitors;  Betadine [povidone iodine]; Iodine; Lurasidone; Lurasidone hcl; Nitrofurantoin; Povidone-iodine; Shellfish allergy; Shellfish-derived products; Iodides; Lamotrigine; Macrobid [nitrofurantoin macrocrystal];  Morphine; Pyridium [phenazopyridine hcl]; and Topiramate    Social History:   Social History     Socioeconomic History    Marital status:      Spouse name: Not on file    Number of children: Not on file    Years of education: Not on file    Highest education level: Not on file   Occupational History    Occupation: Disabled   Social Needs    Financial resource strain: Not on file    Food insecurity     Worry: Not on file     Inability: Not on file   [employer] needs     Medical: Not on file     Non-medical: Not on file   Tobacco Use    Smoking status: Current Every Day Smoker     Packs/day: 1.00     Years: 20.00     Pack years: 20.00     Last attempt to quit: 2020     Years since quittin.0    Smokeless tobacco: Never Used   Substance and Sexual Activity    Alcohol use: Yes     Comment: occassion    Drug use: No    Sexual activity: Yes     Partners: Male   Lifestyle    Physical activity     Days per week: Not on file     Minutes per session: Not on file    Stress: Not on file   Relationships    Social connections     Talks on phone: Not on file     Gets together: Not on file     Attends Mu-ism service: Not on file     Active member of club or organization: Not on file     Attends meetings of clubs or organizations: Not on file     Relationship status: Not on file    Intimate partner violence     Fear of current or ex partner: Not on file     Emotionally abused: Not on file     Physically abused: Not on file     Forced sexual activity: Not on file   Other Topics Concern    Not on file   Social History Narrative    Not on file     Family History:  Family History   Adopted: Yes     Vitals:   BP 88/60   Pulse 80   Ht 5' 3\" (1.6 m)   Wt 228 lb 3.2 oz (103.5 kg)   BMI 40.42 kg/m² Body mass index is 40.42 kg/m². Physical Examination:     Orthopedics:    GENERAL: Alert and oriented X3 in no acute distress. SKIN: Intact without lesions or ulcerations. Healing abrasion noted over the patella tendon. NEURO: Intact to sensory and motor testing. VASC: Capillary refill is less than 3 seconds. KNEE EXAM    LOCATION: Right Knee  GEN: Alert and oriented X 3, in no acute distress. GAIT: The patient's gait was observed while entering the exam room and was noted to be non antalgic. The extremity is in anatomic alignment. SKIN: Intact without rashes, lesions, or ulcerations. No obvious deformity or swelling. NEURO: The patient responds to light touch throughout right LE. Patellar and Achilles reflexes are 2/4. VASC: The right LE is neurovascularly intact with 2/4 DP and 2/4 PT pulses. Brisk capillary refill. ROM: 0/122 degrees. There is mild effusion. MUSC: good quad tone  LIGAMENT: Lachman's test is 1+ with Good endpoint. Anterior drawer Negative. Posterior drawer Negative. There is No varus instability at 0 degrees and No varus instability at 30 degrees. There is No valgus instability at 0 degrees and No valgus instability at 30 degrees. SPECIAL: Conor test is positive with no clunks and no crepitation but there is pain. PALP: There is medial joint line pain. Assessment:     1. Tear of medial meniscus of right knee, unspecified tear type, unspecified whether old or current tear, subsequent encounter    2. Primary osteoarthritis of both knees      Procedures:    Procedure: no  Radiology:   X-ray was reviewed from 07/20/2020. Plan:   Surgical Plan: We discussed the natural etiologies and histories of a medial meniscal tear in the right knee. We also discussed the various alternatives of medical treatment including physical therapy, injections, anti-inflammatory drugs and as a last resort surgery.  During today's face to face encounter, we discussed the details of undergoing a right knee arthroscopic surgery. I explained to the patient that we can either give the knee a few more weeks to get better or we can carry out the surgery tomorrow. However, I informed her that if we maria c out the surgery, there are risks that she must consider since the abrasion has not completely healed. The risks include, infection, pain, nerve and or artery damage or a need for a further surgery. I then asked her if she would like to still have the surgery tomorrow and the patient stated that she would like to still have the surgery. I then explained to the patient that she should not eat anything after midnight the night before surgery. She should wash her body with the Hibiclens soap that She was given to reduce the risks of infection. I also instructed the patient not to shave at all this week because we do not want any nicks to develop on the skin for it will increase the risk of infection. In addition, I explained to the patient that like any other surgery, there are risks, such as: infection, pain, nerve and artery damage, bleeding, lack of muscle stimulus, stiffness, and lastly, a need for further surgery. I also explained to the patient that She will be offered a nerve block and I highly recommend it to patients because it helps reduce the pain during surgery and after surgery. However, it is up to the patient to make that choice for the nerve block and the patient was made aware of that. At this time, the patient has opted for and has decided to undergo a right knee arthroscopy with medial meniscectomy and debridement surgery. A consent form was signed by the patient today. All the details of the procedure were discussed with the patient and all questions and concerns were answered. The patient was also instructed to make sure that they stop any NSAID's the week before surgery to reduce the risk of complications and to prevent the impairment of the natural healing process of the body.  Lastly, the patient was informed that She should elevate her leg past the level of her heart to reduce her swelling, she should get up and get moving every hour she is awake to prevent the occurrence of a blood clot. Patient should return to the office 1 week after surgery for a post operative f/u appointment with Rody Alfonso PA-C. No orders of the defined types were placed in this encounter. No orders of the defined types were placed in this encounter. Nathen MAN, am scribing for and in the presence of Renny Lay D.O. 8/16/2020  5:01 PM      IRenny DO, have personally seen this patient and I have reviewed the CC, PMH, FHX and Social History as provided by other clinical staff. I reassessed the HPI and ROS as scribed by Freda Ames [employer]mark in my presence and it is both accurate and complete. Thereafter, I personally performed the PE, reviewed the imaging and established the DX and POC. I agree with the documentation provided by the Medical Scribe. I have reviewed all documentation in its entirety prior to providing my signature indicating agreement. Any areas of disagreement are noted on the chart.     Electronically signed by Adeline Ulloa DO on 8/16/2020 at 5:01 PM        Electronically signed by Adeline Ulloa DO, on 8/16/2020 at 5:01 PM

## 2020-08-14 ENCOUNTER — ANESTHESIA (OUTPATIENT)
Dept: OPERATING ROOM | Age: 55
End: 2020-08-14
Payer: COMMERCIAL

## 2020-08-14 ENCOUNTER — ANESTHESIA EVENT (OUTPATIENT)
Dept: OPERATING ROOM | Age: 55
End: 2020-08-14
Payer: COMMERCIAL

## 2020-08-14 ENCOUNTER — APPOINTMENT (OUTPATIENT)
Dept: GENERAL RADIOLOGY | Age: 55
End: 2020-08-14
Attending: ORTHOPAEDIC SURGERY
Payer: COMMERCIAL

## 2020-08-14 ENCOUNTER — HOSPITAL ENCOUNTER (OUTPATIENT)
Age: 55
Setting detail: OBSERVATION
Discharge: HOME | End: 2020-08-15
Attending: ORTHOPAEDIC SURGERY | Admitting: ORTHOPAEDIC SURGERY
Payer: COMMERCIAL

## 2020-08-14 VITALS — DIASTOLIC BLOOD PRESSURE: 47 MMHG | SYSTOLIC BLOOD PRESSURE: 82 MMHG | TEMPERATURE: 97.7 F | OXYGEN SATURATION: 100 %

## 2020-08-14 PROBLEM — R09.02 POSTOPERATIVE HYPOXIA: Status: ACTIVE | Noted: 2020-08-14

## 2020-08-14 PROBLEM — Z98.890 POSTOPERATIVE HYPOXIA: Status: ACTIVE | Noted: 2020-08-14

## 2020-08-14 LAB
ABSOLUTE EOS #: <0.03 K/UL (ref 0–0.44)
ABSOLUTE IMMATURE GRANULOCYTE: 0.08 K/UL (ref 0–0.3)
ABSOLUTE LYMPH #: 1.19 K/UL (ref 1.1–3.7)
ABSOLUTE MONO #: 0.24 K/UL (ref 0.1–1.2)
ANION GAP SERPL CALCULATED.3IONS-SCNC: 15 MMOL/L (ref 9–17)
BASOPHILS # BLD: 0 % (ref 0–2)
BASOPHILS ABSOLUTE: <0.03 K/UL (ref 0–0.2)
BNP INTERPRETATION: ABNORMAL
BUN BLDV-MCNC: 15 MG/DL (ref 6–20)
BUN/CREAT BLD: 14 (ref 9–20)
CALCIUM SERPL-MCNC: 8.6 MG/DL (ref 8.6–10.4)
CHLORIDE BLD-SCNC: 96 MMOL/L (ref 98–107)
CO2: 26 MMOL/L (ref 20–31)
CREAT SERPL-MCNC: 1.08 MG/DL (ref 0.5–0.9)
DIFFERENTIAL TYPE: ABNORMAL
EOSINOPHILS RELATIVE PERCENT: 0 % (ref 1–4)
FIO2: 28
GFR AFRICAN AMERICAN: >60 ML/MIN
GFR NON-AFRICAN AMERICAN: 53 ML/MIN
GFR SERPL CREATININE-BSD FRML MDRD: ABNORMAL ML/MIN/{1.73_M2}
GFR SERPL CREATININE-BSD FRML MDRD: ABNORMAL ML/MIN/{1.73_M2}
GLUCOSE BLD-MCNC: 148 MG/DL (ref 70–99)
GLUCOSE BLD-MCNC: 98 MG/DL (ref 65–105)
HCT VFR BLD CALC: 37.4 % (ref 36.3–47.1)
HEMOGLOBIN: 11.6 G/DL (ref 11.9–15.1)
IMMATURE GRANULOCYTES: 1 %
LYMPHOCYTES # BLD: 11 % (ref 24–43)
MCH RBC QN AUTO: 28.2 PG (ref 25.2–33.5)
MCHC RBC AUTO-ENTMCNC: 31 G/DL (ref 28.4–34.8)
MCV RBC AUTO: 91 FL (ref 82.6–102.9)
MONOCYTES # BLD: 2 % (ref 3–12)
MYOGLOBIN: 37 NG/ML (ref 25–58)
MYOGLOBIN: 37 NG/ML (ref 25–58)
NEGATIVE BASE EXCESS, ART: ABNORMAL (ref 0–2)
NRBC AUTOMATED: 0 PER 100 WBC
O2 DEVICE/FLOW/%: ABNORMAL
PATIENT TEMP: 37
PDW BLD-RTO: 14.6 % (ref 11.8–14.4)
PLATELET # BLD: 210 K/UL (ref 138–453)
PLATELET ESTIMATE: ABNORMAL
PMV BLD AUTO: 10.5 FL (ref 8.1–13.5)
POC HCO3: 29.9 MMOL/L (ref 22–27)
POC O2 SATURATION: 92 %
POC PCO2 TEMP: ABNORMAL MM HG
POC PCO2: 49 MM HG (ref 32–45)
POC PH TEMP: ABNORMAL
POC PH: 7.4 (ref 7.35–7.45)
POC PO2 TEMP: ABNORMAL MM HG
POC PO2: 66 MM HG (ref 75–95)
POSITIVE BASE EXCESS, ART: 5 (ref 0–2)
POTASSIUM SERPL-SCNC: 4 MMOL/L (ref 3.7–5.3)
PRO-BNP: 770 PG/ML
RBC # BLD: 4.11 M/UL (ref 3.95–5.11)
RBC # BLD: ABNORMAL 10*6/UL
SEG NEUTROPHILS: 86 % (ref 36–65)
SEGMENTED NEUTROPHILS ABSOLUTE COUNT: 9.85 K/UL (ref 1.5–8.1)
SODIUM BLD-SCNC: 137 MMOL/L (ref 135–144)
TCO2 (CALC), ART: 31 MMOL/L (ref 23–28)
TROPONIN INTERP: NORMAL
TROPONIN INTERP: NORMAL
TROPONIN T: NORMAL NG/ML
TROPONIN T: NORMAL NG/ML
TROPONIN, HIGH SENSITIVITY: 6 NG/L (ref 0–14)
TROPONIN, HIGH SENSITIVITY: 7 NG/L (ref 0–14)
WBC # BLD: 11.4 K/UL (ref 3.5–11.3)
WBC # BLD: ABNORMAL 10*3/UL

## 2020-08-14 PROCEDURE — 7100000001 HC PACU RECOVERY - ADDTL 15 MIN: Performed by: ORTHOPAEDIC SURGERY

## 2020-08-14 PROCEDURE — 6360000002 HC RX W HCPCS: Performed by: FAMILY MEDICINE

## 2020-08-14 PROCEDURE — 2580000003 HC RX 258: Performed by: NURSE PRACTITIONER

## 2020-08-14 PROCEDURE — 29881 ARTHRS KNE SRG MNISECTMY M/L: CPT | Performed by: ORTHOPAEDIC SURGERY

## 2020-08-14 PROCEDURE — 3700000000 HC ANESTHESIA ATTENDED CARE: Performed by: ORTHOPAEDIC SURGERY

## 2020-08-14 PROCEDURE — 83880 ASSAY OF NATRIURETIC PEPTIDE: CPT

## 2020-08-14 PROCEDURE — 83874 ASSAY OF MYOGLOBIN: CPT

## 2020-08-14 PROCEDURE — 2500000003 HC RX 250 WO HCPCS: Performed by: NURSE ANESTHETIST, CERTIFIED REGISTERED

## 2020-08-14 PROCEDURE — 6360000002 HC RX W HCPCS: Performed by: ORTHOPAEDIC SURGERY

## 2020-08-14 PROCEDURE — 84484 ASSAY OF TROPONIN QUANT: CPT

## 2020-08-14 PROCEDURE — 82947 ASSAY GLUCOSE BLOOD QUANT: CPT

## 2020-08-14 PROCEDURE — 2709999900 HC NON-CHARGEABLE SUPPLY: Performed by: ORTHOPAEDIC SURGERY

## 2020-08-14 PROCEDURE — 36600 WITHDRAWAL OF ARTERIAL BLOOD: CPT

## 2020-08-14 PROCEDURE — 36415 COLL VENOUS BLD VENIPUNCTURE: CPT

## 2020-08-14 PROCEDURE — 6360000002 HC RX W HCPCS: Performed by: ANESTHESIOLOGY

## 2020-08-14 PROCEDURE — 85025 COMPLETE CBC W/AUTO DIFF WBC: CPT

## 2020-08-14 PROCEDURE — 6360000002 HC RX W HCPCS: Performed by: NURSE ANESTHETIST, CERTIFIED REGISTERED

## 2020-08-14 PROCEDURE — 94761 N-INVAS EAR/PLS OXIMETRY MLT: CPT

## 2020-08-14 PROCEDURE — 3600000013 HC SURGERY LEVEL 3 ADDTL 15MIN: Performed by: ORTHOPAEDIC SURGERY

## 2020-08-14 PROCEDURE — 7100000000 HC PACU RECOVERY - FIRST 15 MIN: Performed by: ORTHOPAEDIC SURGERY

## 2020-08-14 PROCEDURE — 99219 PR INITIAL OBSERVATION CARE/DAY 50 MINUTES: CPT | Performed by: NURSE PRACTITIONER

## 2020-08-14 PROCEDURE — 97162 PT EVAL MOD COMPLEX 30 MIN: CPT

## 2020-08-14 PROCEDURE — 93005 ELECTROCARDIOGRAM TRACING: CPT | Performed by: NURSE PRACTITIONER

## 2020-08-14 PROCEDURE — 71046 X-RAY EXAM CHEST 2 VIEWS: CPT

## 2020-08-14 PROCEDURE — 3600000003 HC SURGERY LEVEL 3 BASE: Performed by: ORTHOPAEDIC SURGERY

## 2020-08-14 PROCEDURE — 82803 BLOOD GASES ANY COMBINATION: CPT

## 2020-08-14 PROCEDURE — 6370000000 HC RX 637 (ALT 250 FOR IP): Performed by: NURSE PRACTITIONER

## 2020-08-14 PROCEDURE — 97110 THERAPEUTIC EXERCISES: CPT

## 2020-08-14 PROCEDURE — G0378 HOSPITAL OBSERVATION PER HR: HCPCS

## 2020-08-14 PROCEDURE — 80048 BASIC METABOLIC PNL TOTAL CA: CPT

## 2020-08-14 PROCEDURE — 2700000000 HC OXYGEN THERAPY PER DAY

## 2020-08-14 PROCEDURE — 97116 GAIT TRAINING THERAPY: CPT

## 2020-08-14 PROCEDURE — 6370000000 HC RX 637 (ALT 250 FOR IP): Performed by: FAMILY MEDICINE

## 2020-08-14 PROCEDURE — 3700000001 HC ADD 15 MINUTES (ANESTHESIA): Performed by: ORTHOPAEDIC SURGERY

## 2020-08-14 PROCEDURE — 2580000003 HC RX 258: Performed by: ANESTHESIOLOGY

## 2020-08-14 RX ORDER — DEXAMETHASONE SODIUM PHOSPHATE 10 MG/ML
INJECTION INTRAMUSCULAR; INTRAVENOUS PRN
Status: DISCONTINUED | OUTPATIENT
Start: 2020-08-14 | End: 2020-08-14 | Stop reason: SDUPTHER

## 2020-08-14 RX ORDER — LIDOCAINE HYDROCHLORIDE 20 MG/ML
INJECTION, SOLUTION EPIDURAL; INFILTRATION; INTRACAUDAL; PERINEURAL PRN
Status: DISCONTINUED | OUTPATIENT
Start: 2020-08-14 | End: 2020-08-14 | Stop reason: SDUPTHER

## 2020-08-14 RX ORDER — GABAPENTIN 400 MG/1
1200 CAPSULE ORAL 3 TIMES DAILY
Status: DISCONTINUED | OUTPATIENT
Start: 2020-08-14 | End: 2020-08-15 | Stop reason: HOSPADM

## 2020-08-14 RX ORDER — ONDANSETRON 2 MG/ML
4 INJECTION INTRAMUSCULAR; INTRAVENOUS
Status: DISCONTINUED | OUTPATIENT
Start: 2020-08-14 | End: 2020-08-14 | Stop reason: HOSPADM

## 2020-08-14 RX ORDER — PROPOFOL 10 MG/ML
INJECTION, EMULSION INTRAVENOUS PRN
Status: DISCONTINUED | OUTPATIENT
Start: 2020-08-14 | End: 2020-08-14 | Stop reason: SDUPTHER

## 2020-08-14 RX ORDER — PANTOPRAZOLE SODIUM 40 MG/1
40 TABLET, DELAYED RELEASE ORAL
Status: DISCONTINUED | OUTPATIENT
Start: 2020-08-15 | End: 2020-08-15 | Stop reason: HOSPADM

## 2020-08-14 RX ORDER — HYDROMORPHONE HCL 110MG/55ML
0.25 PATIENT CONTROLLED ANALGESIA SYRINGE INTRAVENOUS EVERY 5 MIN PRN
Status: DISCONTINUED | OUTPATIENT
Start: 2020-08-14 | End: 2020-08-14 | Stop reason: HOSPADM

## 2020-08-14 RX ORDER — QUETIAPINE FUMARATE 100 MG/1
100 TABLET, FILM COATED ORAL NIGHTLY
Status: DISCONTINUED | OUTPATIENT
Start: 2020-08-14 | End: 2020-08-15 | Stop reason: HOSPADM

## 2020-08-14 RX ORDER — CARVEDILOL 12.5 MG/1
12.5 TABLET ORAL 2 TIMES DAILY WITH MEALS
Status: DISCONTINUED | OUTPATIENT
Start: 2020-08-14 | End: 2020-08-15 | Stop reason: HOSPADM

## 2020-08-14 RX ORDER — SODIUM CHLORIDE 9 MG/ML
INJECTION, SOLUTION INTRAVENOUS CONTINUOUS
Status: DISCONTINUED | OUTPATIENT
Start: 2020-08-14 | End: 2020-08-15 | Stop reason: HOSPADM

## 2020-08-14 RX ORDER — KETOROLAC TROMETHAMINE 30 MG/ML
30 INJECTION, SOLUTION INTRAMUSCULAR; INTRAVENOUS ONCE
Status: COMPLETED | OUTPATIENT
Start: 2020-08-14 | End: 2020-08-14

## 2020-08-14 RX ORDER — POTASSIUM CHLORIDE 20 MEQ/1
40 TABLET, EXTENDED RELEASE ORAL PRN
Status: DISCONTINUED | OUTPATIENT
Start: 2020-08-14 | End: 2020-08-15 | Stop reason: HOSPADM

## 2020-08-14 RX ORDER — PROMETHAZINE HYDROCHLORIDE 12.5 MG/1
12.5 TABLET ORAL EVERY 6 HOURS PRN
Status: DISCONTINUED | OUTPATIENT
Start: 2020-08-14 | End: 2020-08-15 | Stop reason: HOSPADM

## 2020-08-14 RX ORDER — FENTANYL CITRATE 50 UG/ML
INJECTION, SOLUTION INTRAMUSCULAR; INTRAVENOUS PRN
Status: DISCONTINUED | OUTPATIENT
Start: 2020-08-14 | End: 2020-08-14 | Stop reason: SDUPTHER

## 2020-08-14 RX ORDER — MIDAZOLAM HYDROCHLORIDE 1 MG/ML
INJECTION INTRAMUSCULAR; INTRAVENOUS PRN
Status: DISCONTINUED | OUTPATIENT
Start: 2020-08-14 | End: 2020-08-14 | Stop reason: SDUPTHER

## 2020-08-14 RX ORDER — SODIUM CHLORIDE 9 MG/ML
INJECTION, SOLUTION INTRAVENOUS CONTINUOUS
Status: DISCONTINUED | OUTPATIENT
Start: 2020-08-14 | End: 2020-08-14

## 2020-08-14 RX ORDER — CEFAZOLIN SODIUM 2 G/50ML
2 SOLUTION INTRAVENOUS ONCE
Status: DISCONTINUED | OUTPATIENT
Start: 2020-08-14 | End: 2020-08-14 | Stop reason: HOSPADM

## 2020-08-14 RX ORDER — SODIUM CHLORIDE 0.9 % (FLUSH) 0.9 %
10 SYRINGE (ML) INJECTION EVERY 12 HOURS SCHEDULED
Status: DISCONTINUED | OUTPATIENT
Start: 2020-08-14 | End: 2020-08-15 | Stop reason: HOSPADM

## 2020-08-14 RX ORDER — IBUPROFEN 400 MG/1
400 TABLET ORAL DAILY PRN
Status: DISCONTINUED | OUTPATIENT
Start: 2020-08-14 | End: 2020-08-15 | Stop reason: HOSPADM

## 2020-08-14 RX ORDER — FLUTICASONE PROPIONATE 50 MCG
1 SPRAY, SUSPENSION (ML) NASAL DAILY
Status: DISCONTINUED | OUTPATIENT
Start: 2020-08-14 | End: 2020-08-15 | Stop reason: HOSPADM

## 2020-08-14 RX ORDER — MAGNESIUM SULFATE 1 G/100ML
1 INJECTION INTRAVENOUS PRN
Status: DISCONTINUED | OUTPATIENT
Start: 2020-08-14 | End: 2020-08-15 | Stop reason: HOSPADM

## 2020-08-14 RX ORDER — NICOTINE 21 MG/24HR
1 PATCH, TRANSDERMAL 24 HOURS TRANSDERMAL DAILY PRN
Status: DISCONTINUED | OUTPATIENT
Start: 2020-08-14 | End: 2020-08-14

## 2020-08-14 RX ORDER — LOPERAMIDE HYDROCHLORIDE 2 MG/1
2 CAPSULE ORAL 4 TIMES DAILY PRN
Status: DISCONTINUED | OUTPATIENT
Start: 2020-08-14 | End: 2020-08-15 | Stop reason: HOSPADM

## 2020-08-14 RX ORDER — TRAZODONE HYDROCHLORIDE 50 MG/1
50 TABLET ORAL NIGHTLY PRN
Status: DISCONTINUED | OUTPATIENT
Start: 2020-08-14 | End: 2020-08-15 | Stop reason: HOSPADM

## 2020-08-14 RX ORDER — OXYCODONE HYDROCHLORIDE AND ACETAMINOPHEN 5; 325 MG/1; MG/1
1 TABLET ORAL EVERY 4 HOURS PRN
Status: DISCONTINUED | OUTPATIENT
Start: 2020-08-14 | End: 2020-08-15 | Stop reason: HOSPADM

## 2020-08-14 RX ORDER — LIDOCAINE HYDROCHLORIDE 10 MG/ML
1 INJECTION, SOLUTION EPIDURAL; INFILTRATION; INTRACAUDAL; PERINEURAL
Status: DISCONTINUED | OUTPATIENT
Start: 2020-08-14 | End: 2020-08-14 | Stop reason: HOSPADM

## 2020-08-14 RX ORDER — ACETAMINOPHEN 325 MG/1
650 TABLET ORAL EVERY 6 HOURS PRN
Status: DISCONTINUED | OUTPATIENT
Start: 2020-08-14 | End: 2020-08-15 | Stop reason: HOSPADM

## 2020-08-14 RX ORDER — METHOCARBAMOL 750 MG/1
1500 TABLET, FILM COATED ORAL 3 TIMES DAILY
Status: DISCONTINUED | OUTPATIENT
Start: 2020-08-14 | End: 2020-08-15 | Stop reason: HOSPADM

## 2020-08-14 RX ORDER — POTASSIUM CHLORIDE 7.45 MG/ML
10 INJECTION INTRAVENOUS PRN
Status: DISCONTINUED | OUTPATIENT
Start: 2020-08-14 | End: 2020-08-15 | Stop reason: HOSPADM

## 2020-08-14 RX ORDER — FENTANYL CITRATE 50 UG/ML
25 INJECTION, SOLUTION INTRAMUSCULAR; INTRAVENOUS EVERY 5 MIN PRN
Status: DISCONTINUED | OUTPATIENT
Start: 2020-08-14 | End: 2020-08-14 | Stop reason: HOSPADM

## 2020-08-14 RX ORDER — HYDROCODONE BITARTRATE AND ACETAMINOPHEN 5; 325 MG/1; MG/1
1 TABLET ORAL EVERY 6 HOURS PRN
Qty: 28 TABLET | Refills: 0 | Status: SHIPPED | OUTPATIENT
Start: 2020-08-14 | End: 2020-08-21

## 2020-08-14 RX ORDER — CETIRIZINE HYDROCHLORIDE 10 MG/1
10 TABLET ORAL DAILY
Status: DISCONTINUED | OUTPATIENT
Start: 2020-08-14 | End: 2020-08-15 | Stop reason: HOSPADM

## 2020-08-14 RX ORDER — ONDANSETRON 4 MG/1
4 TABLET, ORALLY DISINTEGRATING ORAL EVERY 8 HOURS PRN
Status: DISCONTINUED | OUTPATIENT
Start: 2020-08-14 | End: 2020-08-15 | Stop reason: HOSPADM

## 2020-08-14 RX ORDER — POLYETHYLENE GLYCOL 3350 17 G/17G
17 POWDER, FOR SOLUTION ORAL DAILY PRN
Status: DISCONTINUED | OUTPATIENT
Start: 2020-08-14 | End: 2020-08-15 | Stop reason: HOSPADM

## 2020-08-14 RX ORDER — DULOXETIN HYDROCHLORIDE 60 MG/1
60 CAPSULE, DELAYED RELEASE ORAL 2 TIMES DAILY
Status: DISCONTINUED | OUTPATIENT
Start: 2020-08-14 | End: 2020-08-15 | Stop reason: HOSPADM

## 2020-08-14 RX ORDER — ROPIVACAINE HYDROCHLORIDE 5 MG/ML
INJECTION, SOLUTION EPIDURAL; INFILTRATION; PERINEURAL
Status: DISPENSED
Start: 2020-08-14 | End: 2020-08-14

## 2020-08-14 RX ORDER — FUROSEMIDE 40 MG/1
40 TABLET ORAL DAILY
Status: DISCONTINUED | OUTPATIENT
Start: 2020-08-14 | End: 2020-08-15 | Stop reason: HOSPADM

## 2020-08-14 RX ORDER — SODIUM CHLORIDE 0.9 % (FLUSH) 0.9 %
10 SYRINGE (ML) INJECTION PRN
Status: DISCONTINUED | OUTPATIENT
Start: 2020-08-14 | End: 2020-08-15 | Stop reason: HOSPADM

## 2020-08-14 RX ORDER — ROPIVACAINE HYDROCHLORIDE 5 MG/ML
INJECTION, SOLUTION EPIDURAL; INFILTRATION; PERINEURAL PRN
Status: DISCONTINUED | OUTPATIENT
Start: 2020-08-14 | End: 2020-08-14 | Stop reason: HOSPADM

## 2020-08-14 RX ORDER — ROCURONIUM BROMIDE 10 MG/ML
INJECTION, SOLUTION INTRAVENOUS PRN
Status: DISCONTINUED | OUTPATIENT
Start: 2020-08-14 | End: 2020-08-14 | Stop reason: SDUPTHER

## 2020-08-14 RX ORDER — SODIUM CHLORIDE 0.9 % (FLUSH) 0.9 %
10 SYRINGE (ML) INJECTION PRN
Status: DISCONTINUED | OUTPATIENT
Start: 2020-08-14 | End: 2020-08-14 | Stop reason: HOSPADM

## 2020-08-14 RX ORDER — HYDROMORPHONE HCL 110MG/55ML
0.5 PATIENT CONTROLLED ANALGESIA SYRINGE INTRAVENOUS EVERY 5 MIN PRN
Status: DISCONTINUED | OUTPATIENT
Start: 2020-08-14 | End: 2020-08-14 | Stop reason: HOSPADM

## 2020-08-14 RX ORDER — FENTANYL CITRATE 50 UG/ML
50 INJECTION, SOLUTION INTRAMUSCULAR; INTRAVENOUS EVERY 5 MIN PRN
Status: DISCONTINUED | OUTPATIENT
Start: 2020-08-14 | End: 2020-08-14 | Stop reason: HOSPADM

## 2020-08-14 RX ORDER — SODIUM CHLORIDE, SODIUM LACTATE, POTASSIUM CHLORIDE, CALCIUM CHLORIDE 600; 310; 30; 20 MG/100ML; MG/100ML; MG/100ML; MG/100ML
INJECTION, SOLUTION INTRAVENOUS CONTINUOUS
Status: DISCONTINUED | OUTPATIENT
Start: 2020-08-14 | End: 2020-08-15 | Stop reason: HOSPADM

## 2020-08-14 RX ORDER — ONDANSETRON 2 MG/ML
4 INJECTION INTRAMUSCULAR; INTRAVENOUS EVERY 6 HOURS PRN
Status: DISCONTINUED | OUTPATIENT
Start: 2020-08-14 | End: 2020-08-15 | Stop reason: HOSPADM

## 2020-08-14 RX ORDER — ACETAMINOPHEN 650 MG/1
650 SUPPOSITORY RECTAL EVERY 6 HOURS PRN
Status: DISCONTINUED | OUTPATIENT
Start: 2020-08-14 | End: 2020-08-15 | Stop reason: HOSPADM

## 2020-08-14 RX ORDER — UBIDECARENONE 100 MG
100 CAPSULE ORAL 3 TIMES DAILY
Status: DISCONTINUED | OUTPATIENT
Start: 2020-08-14 | End: 2020-08-14 | Stop reason: RX

## 2020-08-14 RX ORDER — SODIUM CHLORIDE 0.9 % (FLUSH) 0.9 %
10 SYRINGE (ML) INJECTION EVERY 12 HOURS SCHEDULED
Status: DISCONTINUED | OUTPATIENT
Start: 2020-08-14 | End: 2020-08-14 | Stop reason: HOSPADM

## 2020-08-14 RX ADMIN — FENTANYL CITRATE 25 MCG: 50 INJECTION, SOLUTION INTRAMUSCULAR; INTRAVENOUS at 09:26

## 2020-08-14 RX ADMIN — SODIUM CHLORIDE, POTASSIUM CHLORIDE, SODIUM LACTATE AND CALCIUM CHLORIDE: 600; 310; 30; 20 INJECTION, SOLUTION INTRAVENOUS at 06:18

## 2020-08-14 RX ADMIN — PHENYLEPHRINE HYDROCHLORIDE 100 MCG: 10 INJECTION INTRAVENOUS at 07:35

## 2020-08-14 RX ADMIN — SODIUM CHLORIDE: 9 INJECTION, SOLUTION INTRAVENOUS at 14:49

## 2020-08-14 RX ADMIN — MIDAZOLAM 2 MG: 1 INJECTION INTRAMUSCULAR; INTRAVENOUS at 07:10

## 2020-08-14 RX ADMIN — CETIRIZINE HYDROCHLORIDE 10 MG: 10 TABLET, FILM COATED ORAL at 15:33

## 2020-08-14 RX ADMIN — CARVEDILOL 12.5 MG: 12.5 TABLET, FILM COATED ORAL at 15:32

## 2020-08-14 RX ADMIN — OXYCODONE HYDROCHLORIDE AND ACETAMINOPHEN 1 TABLET: 5; 325 TABLET ORAL at 15:19

## 2020-08-14 RX ADMIN — LIDOCAINE HYDROCHLORIDE 100 MG: 20 INJECTION, SOLUTION EPIDURAL; INFILTRATION; INTRACAUDAL; PERINEURAL at 07:14

## 2020-08-14 RX ADMIN — GABAPENTIN 1200 MG: 400 CAPSULE ORAL at 15:31

## 2020-08-14 RX ADMIN — MAGNESIUM GLUCONATE 500 MG ORAL TABLET 400 MG: 500 TABLET ORAL at 15:19

## 2020-08-14 RX ADMIN — TRAZODONE HYDROCHLORIDE 50 MG: 50 TABLET ORAL at 21:32

## 2020-08-14 RX ADMIN — FENTANYL CITRATE 100 MCG: 50 INJECTION, SOLUTION INTRAMUSCULAR; INTRAVENOUS at 07:14

## 2020-08-14 RX ADMIN — QUETIAPINE FUMARATE 100 MG: 100 TABLET ORAL at 20:52

## 2020-08-14 RX ADMIN — OXYCODONE HYDROCHLORIDE AND ACETAMINOPHEN 1 TABLET: 5; 325 TABLET ORAL at 19:15

## 2020-08-14 RX ADMIN — RIVAROXABAN 20 MG: 20 TABLET, FILM COATED ORAL at 17:48

## 2020-08-14 RX ADMIN — PHENYLEPHRINE HYDROCHLORIDE 100 MCG: 10 INJECTION INTRAVENOUS at 07:49

## 2020-08-14 RX ADMIN — PHENYLEPHRINE HYDROCHLORIDE 200 MCG: 10 INJECTION INTRAVENOUS at 07:26

## 2020-08-14 RX ADMIN — PROPOFOL 170 MG: 10 INJECTION, EMULSION INTRAVENOUS at 07:14

## 2020-08-14 RX ADMIN — DEXAMETHASONE SODIUM PHOSPHATE 10 MG: 10 INJECTION INTRAMUSCULAR; INTRAVENOUS at 07:21

## 2020-08-14 RX ADMIN — PHENYLEPHRINE HYDROCHLORIDE 100 MCG: 10 INJECTION INTRAVENOUS at 07:56

## 2020-08-14 RX ADMIN — METHOCARBAMOL TABLETS 1500 MG: 750 TABLET, COATED ORAL at 20:53

## 2020-08-14 RX ADMIN — DULOXETINE HYDROCHLORIDE 60 MG: 60 CAPSULE, DELAYED RELEASE ORAL at 20:52

## 2020-08-14 RX ADMIN — KETOROLAC TROMETHAMINE 30 MG: 30 INJECTION, SOLUTION INTRAMUSCULAR at 15:33

## 2020-08-14 RX ADMIN — FUROSEMIDE 40 MG: 40 TABLET ORAL at 15:19

## 2020-08-14 RX ADMIN — FLUTICASONE PROPIONATE 1 SPRAY: 50 SPRAY, METERED NASAL at 15:19

## 2020-08-14 RX ADMIN — METHOCARBAMOL TABLETS 1500 MG: 750 TABLET, COATED ORAL at 15:34

## 2020-08-14 RX ADMIN — GABAPENTIN 1200 MG: 400 CAPSULE ORAL at 20:52

## 2020-08-14 RX ADMIN — ROCURONIUM BROMIDE 15 MG: 10 INJECTION, SOLUTION INTRAVENOUS at 07:15

## 2020-08-14 RX ADMIN — METFORMIN HYDROCHLORIDE 500 MG: 500 TABLET ORAL at 15:33

## 2020-08-14 ASSESSMENT — PAIN SCALES - GENERAL
PAINLEVEL_OUTOF10: 8
PAINLEVEL_OUTOF10: 4
PAINLEVEL_OUTOF10: 10
PAINLEVEL_OUTOF10: 8
PAINLEVEL_OUTOF10: 0
PAINLEVEL_OUTOF10: 10
PAINLEVEL_OUTOF10: 4
PAINLEVEL_OUTOF10: 6
PAINLEVEL_OUTOF10: 0
PAINLEVEL_OUTOF10: 2
PAINLEVEL_OUTOF10: 2
PAINLEVEL_OUTOF10: 6
PAINLEVEL_OUTOF10: 3
PAINLEVEL_OUTOF10: 4
PAINLEVEL_OUTOF10: 3
PAINLEVEL_OUTOF10: 0

## 2020-08-14 ASSESSMENT — PAIN DESCRIPTION - DESCRIPTORS
DESCRIPTORS: SHARP;THROBBING
DESCRIPTORS: STABBING;ACHING;SHARP
DESCRIPTORS: ACHING

## 2020-08-14 ASSESSMENT — PULMONARY FUNCTION TESTS
PIF_VALUE: 22
PIF_VALUE: 23
PIF_VALUE: 22
PIF_VALUE: 23
PIF_VALUE: 23
PIF_VALUE: 11
PIF_VALUE: 1
PIF_VALUE: 23
PIF_VALUE: 23
PIF_VALUE: 21
PIF_VALUE: 21
PIF_VALUE: 5
PIF_VALUE: 22
PIF_VALUE: 23
PIF_VALUE: 22
PIF_VALUE: 2
PIF_VALUE: 9
PIF_VALUE: 2
PIF_VALUE: 22
PIF_VALUE: 1
PIF_VALUE: 23
PIF_VALUE: 22
PIF_VALUE: 0
PIF_VALUE: 23
PIF_VALUE: 1
PIF_VALUE: 2
PIF_VALUE: 22
PIF_VALUE: 20
PIF_VALUE: 3
PIF_VALUE: 9
PIF_VALUE: 22
PIF_VALUE: 0
PIF_VALUE: 23
PIF_VALUE: 3
PIF_VALUE: 9
PIF_VALUE: 2
PIF_VALUE: 23
PIF_VALUE: 23
PIF_VALUE: 1
PIF_VALUE: 0
PIF_VALUE: 15
PIF_VALUE: 19
PIF_VALUE: 23
PIF_VALUE: 23
PIF_VALUE: 18
PIF_VALUE: 20
PIF_VALUE: 4
PIF_VALUE: 22
PIF_VALUE: 9
PIF_VALUE: 3
PIF_VALUE: 2
PIF_VALUE: 23
PIF_VALUE: 2
PIF_VALUE: 2
PIF_VALUE: 4
PIF_VALUE: 22
PIF_VALUE: 22
PIF_VALUE: 24
PIF_VALUE: 22
PIF_VALUE: 2
PIF_VALUE: 23
PIF_VALUE: 2

## 2020-08-14 ASSESSMENT — PAIN DESCRIPTION - PAIN TYPE
TYPE: SURGICAL PAIN

## 2020-08-14 ASSESSMENT — PAIN - FUNCTIONAL ASSESSMENT
PAIN_FUNCTIONAL_ASSESSMENT: PREVENTS OR INTERFERES SOME ACTIVE ACTIVITIES AND ADLS
PAIN_FUNCTIONAL_ASSESSMENT: 0-10

## 2020-08-14 ASSESSMENT — PAIN DESCRIPTION - LOCATION
LOCATION: KNEE

## 2020-08-14 ASSESSMENT — PAIN DESCRIPTION - PROGRESSION
CLINICAL_PROGRESSION: NOT CHANGED
CLINICAL_PROGRESSION: NOT CHANGED

## 2020-08-14 ASSESSMENT — PAIN DESCRIPTION - FREQUENCY: FREQUENCY: CONTINUOUS

## 2020-08-14 ASSESSMENT — PAIN DESCRIPTION - ONSET: ONSET: ON-GOING

## 2020-08-14 ASSESSMENT — PAIN DESCRIPTION - ORIENTATION
ORIENTATION: RIGHT
ORIENTATION: RIGHT

## 2020-08-14 NOTE — PROGRESS NOTES
Patient seen by Dr Casey Madison at the bedside , pt falls to sleep and her sa02 falls to the low 80s.  She awakens easily , using incentive with out difficulty , remains alert and oriented

## 2020-08-14 NOTE — ANESTHESIA PRE PROCEDURE
Department of Anesthesiology  Preprocedure Note       Name:  Katie Coker   Age:  47 y.o.  :  1965                                          MRN:  [id number]         Date:  2020      Surgeon: Dahlia Kirby):  Lopez Mckeon DO    Procedure: Procedure(s):  RIGHT KNEE ARTHROSCOPY WITH PARTIAL MEDIAL MENISCECTOMY WITH DEBRIDEMENT    Medications prior to admission:   Prior to Admission medications    Medication Sig Start Date End Date Taking? Authorizing Provider   methocarbamol (ROBAXIN) 750 MG tablet Take 1,500 mg by mouth 3 times daily   Yes Historical Provider, MD   gabapentin (NEURONTIN) 600 MG tablet Take 1 tablet by mouth 3 times daily for 30 days. Patient taking differently: Take 1,200 mg by mouth 3 times daily.   20 Yes Annamarie Genao MD   DULoxetine (CYMBALTA) 60 MG extended release capsule Take 1 capsule by mouth 2 times daily 20 Yes Annamarie Genao MD   metFORMIN (GLUCOPHAGE) 500 MG tablet Take 1 tablet by mouth 2 times daily (with meals) 20  Yes Annamarie Genao MD   QUEtiapine (SEROQUEL) 100 MG tablet Take 1 tablet by mouth nightly 20  Yes Annamarie Genao MD   carvedilol (COREG) 12.5 MG tablet Take 1 tablet by mouth 2 times daily (with meals) 20  Yes Annamarie Genao MD   furosemide (LASIX) 40 MG tablet Take 1 tablet by mouth daily 20  Yes Annamarie Genao MD   esomeprazole (NEXIUM) 40 MG delayed release capsule Take 1 capsule by mouth every morning (before breakfast) 20  Yes Annamarie Genao MD   ondansetron (ZOFRAN-ODT) 4 MG disintegrating tablet Take 4 mg by mouth every 8 hours as needed for Nausea or Vomiting   Yes Historical Provider, MD   diclofenac (CATAFLAM) 50 MG tablet Take 50 mg by mouth daily as needed (headache) May repeat in 6 hours, max 3 tablets per day   Yes Historical Provider, MD   SUPER B COMPLEX/C PO Take by mouth daily   Yes Historical Provider, MD   lidocaine (LIDODERM) 5 % Place 1 patch onto the skin daily 12 hours on, 12 hours off. Yes Historical Provider, MD   fluticasone (FLONASE) 50 MCG/ACT nasal spray 1 spray by Each Nostril route daily   Yes Historical Provider, MD   traZODone (DESYREL) 50 MG tablet Take 1 tablet by mouth nightly as needed for Sleep 6/8/20   Krystyna Wallace MD   rivaroxaban (XARELTO) 20 MG TABS tablet Take 1 tablet by mouth Daily with supper 6/8/20   Krystyna Wallace MD   magnesium oxide (MAG-OX) 400 (241.3 Mg) MG TABS tablet Take 1 tablet by mouth daily 6/9/20   Krystyna Wallace MD   cetirizine (ZYRTEC) 10 MG tablet Take 10 mg by mouth daily    Historical Provider, MD   coenzyme Q10 100 MG CAPS capsule Take 100 mg by mouth 3 times daily    Historical Provider, MD   loperamide (IMODIUM) 2 MG capsule Take 2 mg by mouth 4 times daily as needed for Diarrhea Takes two capsules at onset    Historical Provider, MD       Current medications:    Current Facility-Administered Medications   Medication Dose Route Frequency Provider Last Rate Last Dose    lactated ringers infusion   Intravenous Continuous Raffi Brooks  mL/hr at 08/14/20 0618      sodium chloride flush 0.9 % injection 10 mL  10 mL Intravenous 2 times per day Raffi Brooks MD        sodium chloride flush 0.9 % injection 10 mL  10 mL Intravenous PRN Raffi Brooks MD        lidocaine PF 1 % injection 1 mL  1 mL Intradermal Once PRN Raffi Brooks MD        ceFAZolin (ANCEF) 2 g in dextrose 3 % 50 mL IVPB (duplex)  2 g Intravenous Once Pepper Alexander, DO        ropivacaine (NAROPIN) 0.5% injection                Allergies: Allergies   Allergen Reactions    Ace Inhibitors Anaphylaxis, Other (See Comments), Hives and Shortness Of Breath     ANGIOEDEMA  lisinopril  angiodema  ANGIOEDEMA  ANGIOEDEMA      Betadine [Povidone Iodine] Hives    Iodine Anaphylaxis, Hives and Shortness Of Breath     INCLUDES BETADINE  Iv dye. Pt has not had any reaction w/ dye from CT scans for 10 years. Per dr. Stephany Flores contrast (omni 300) is fine to use. 8/14/19 -       Lurasidone Other (See Comments)    Lurasidone Hcl      Other reaction(s): Unknown    Nitrofurantoin Hives and Shortness Of Breath    Povidone-Iodine Hives and Nausea And Vomiting    Shellfish Allergy Anaphylaxis    Shellfish-Derived Products Anaphylaxis    Iodides      Pre treat with benadryl    Lamotrigine Hives    Macrobid [Nitrofurantoin Macrocrystal] Hives    Morphine      -Narcotics LOWERS SIEZURE THRESHLD    Pyridium [Phenazopyridine Hcl] Hives    Topiramate Other (See Comments)     Confusion        Problem List:    Patient Active Problem List   Diagnosis Code    Hypertension I10    Hyperlipidemia E78.5    Urinary incontinence R32    Diabetes ([address] E11.9    Arrhythmia I49.9    Arthritis M19.90    Fibromyalgia M79.7    Sleep apnea G47.30    Stroke ([facility]) I63.9    Migraine G43.909    Seizure ([facility]) R56.9    Back pain, chronic M54.9, G89.29    Gout M10.9    Morbid obesity with BMI of 50.0-59.9, adult ([facility]) E66.01, Z68.43    Gastroparesis K31.84    Leg numbness R20.0    Bilateral leg numbness R20.0    MDD (major depressive disorder), severe ([facility]) F32.2    PAF (paroxysmal atrial fibrillation) ([facility]) I48.0    Hypokalemia E87.6    Chronic pain syndrome G89.4    Severe episode of recurrent major depressive disorder, without psychotic features ([address] F33.2       Past Medical History:        Diagnosis Date    Atrial fibrillation ([address]     Breast cancer ([address]     bilateral remission since 2012    C. difficile colitis     2015    Colon cancer ([address]     stage IV remission since 2016    Depression     Diabetes mellitus ([facility])     Diverticulosis     Fibromyalgia     Hyperlipidemia     Hypertension     Multiple sclerosis ([address]     Rheumatoid arthritis ([address]     Seizure ([address] 7/    last seizure 7/18/2020  first seizure 1992 after head injury    Unspecified cerebral artery occlusion with cerebral infarction     7/12  NOT DEFINITE    Wears glasses        Past Surgical History:        Procedure Laterality Date    APPENDECTOMY  1985    BACK SURGERY      BREAST BIOPSY  3103-6116    BREAST LUMPECTOMY      bilateral [id number]     CARDIAC SURGERY      cardiac cath negative in 2018   [facility]    COLONOSCOPY      ENDOSCOPY, COLON, DIAGNOSTIC      HYSTERECTOMY  2010    INSERTABLE CARDIAC MONITOR      LAPAROTOMY  ,     SIGMOIDECTOMY  2016    for cancer    SLEEVE GASTRECTOMY  5/19/15    GASTRECTOMY SLEEVE LAPAROSCOPIC           TONSILLECTOMY  1985    UPPER GASTROINTESTINAL ENDOSCOPY  1/30/15       Social History:    Social History     Tobacco Use    Smoking status: Former Smoker     Last attempt to quit: 2020     Years since quittin.0    Smokeless tobacco: Never Used   Substance Use Topics    Alcohol use: Yes     Comment: occassion                                Counseling given: Not Answered      Vital Signs (Current):   Vitals:    20 0554 20 0554   BP:  103/62   Pulse:  85   Resp:  18   Temp:  96.2 °F (35.7 °C)   TempSrc:  Temporal   SpO2:  94%   Weight: 224 lb (101.6 kg)    Height: 5' 3\" (1.6 m)                                               BP Readings from Last 3 Encounters:   20 103/62   20 88/60   20 113/67       NPO Status: Time of last liquid consumption: 0000                        Time of last solid consumption:                         Date of last liquid consumption: 20                        Date of last solid food consumption: 20    BMI:   Wt Readings from Last 3 Encounters:   20 224 lb (101.6 kg)   20 228 lb 3.2 oz (103.5 kg)   20 224 lb (101.6 kg)     Body mass index is 39.68 kg/m².     CBC:   Lab Results   Component Value Date    WBC 7.4 2020    RBC 4.57 2020    HGB 12.8 2020    HCT 41.6 2020    MCV 91.0 2020    RDW 14.3 2020     2020       CMP:   Lab Results   Component Value Date  07/31/2020    K 3.8 07/31/2020    K 3.7 01/14/2019     07/31/2020    CO2 30 07/31/2020    BUN 10 07/31/2020    CREATININE 0.61 07/31/2020    GFRAA >60 07/31/2020    AGRATIO 1.0 01/20/2020    LABGLOM >60 07/31/2020    GLUCOSE 104 07/31/2020    PROT 7.0 06/03/2020    CALCIUM 8.9 07/31/2020    BILITOT 0.19 06/03/2020    ALKPHOS 68 06/03/2020    AST 18 06/03/2020    ALT 11 06/03/2020       POC Tests: No results for input(s): POCGLU, POCNA, POCK, POCCL, POCBUN, POCHEMO, POCHCT in the last 72 hours. Coags:   Lab Results   Component Value Date    PROTIME 12.5 01/14/2019    INR 1.10 01/14/2019    APTT 31.6 01/14/2019       HCG (If Applicable): No results found for: PREGTESTUR, PREGSERUM, HCG, HCGQUANT     ABGs: No results found for: PHART, PO2ART, ASC4LGD, XLS0GBF, BEART, U6VJWFJY     Type & Screen (If Applicable):  No results found for: LABABO, LABRH    Drug/Infectious Status (If Applicable):  No results found for: HIV, HEPCAB    COVID-19 Screening (If Applicable):   Lab Results   Component Value Date    COVID19 Not Detected 08/10/2020         Anesthesia Evaluation   no history of anesthetic complications:   Airway: Mallampati: II  TM distance: >3 FB   Neck ROM: full  Mouth opening: > = 3 FB Dental:          Pulmonary:   (+) sleep apnea:                             Cardiovascular:    (+) hypertension:, dysrhythmias: atrial fibrillation, hyperlipidemia    (-)  angina and  BRITTON       Beta Blocker:  Dose within 24 Hrs         Neuro/Psych:   (+) CVA:, neuromuscular disease: multiple sclerosis,             GI/Hepatic/Renal:   (+) GERD:,           Endo/Other:    (+) DiabetesType II DM, , : arthritis: rheumatoid. , .                 Abdominal:           Vascular:                                        Anesthesia Plan      general     ASA 3       Induction: intravenous. Anesthetic plan and risks discussed with patient.                       Jasmeet Chávez MD   8/14/2020

## 2020-08-14 NOTE — H&P
[address]    HISTORY AND PHYSICAL EXAMINATION            Date:   8/14/2020  Patient name:  Oscar Pickens  Date of admission:  8/14/2020  5:44 AM  MRN:   [id number]  Account:  [de-identified]  YOB: 1965  PCP:    Jeremias Smith  Room:   2014/2014-02  Code Status:    Full Code    Chief Complaint:     Positional nocturnal hypoxia    History Obtained From:     patient    History of Present Illness:     Oscar Pickens is a 47 y.o. Non-/non  female who presents with No chief complaint on file. and is admitted to the hospital for the management of Postoperative hypoxia. Patient reports to our facility today for an elective outpatient procedure. Operative notes are reviewed and patient procedure apparently went very well. Patient has been able to ambulate in her postoperative complaints well managed. However while the patient was in recovery she was found to be significantly hypoxic with a resting SPO2 on room air of less than 85% while sleeping. Patient has required supplemental oxygenation since that time. When patient arouses and engages in breathing activities her oxygen saturation stabilizes. Patient has a longstanding history of severe morbid obesity and she has required CPAP for obstructive sleep apnea in the past.  Patient reports that she has not used her sleep apnea machine for several years since she lost a significant amount of weight. Patient reports that she no longer has her sleep apnea machine either. Due to the patient's recent anesthesia event and her complicated medical history primary surgical team and anesthesia has requested that internal medicine admit the patient overnight for observation. As the patient's oxygen saturations will increase with respiratory exercises PE is clinically unlikely. Patient denies chest pain or shortness of breath.   At this time patient's only complaint is mild postsurgical discomfort. Of additional note is the patient reported that over the past several weeks/months she has been experiencing exertional dyspnea that is relieved by rest.  No chest pain during these episodes. Past Medical History:     Past Medical History:   Diagnosis Date    Atrial fibrillation ([address]     Breast cancer ([facility]     bilateral remission since 2012    C. difficile colitis     2015    Colon cancer ([facility]     stage IV remission since 2016    Depression     Diabetes mellitus ([facility]     Diverticulosis     Fibromyalgia     Hyperlipidemia     Hypertension     Multiple sclerosis ([facility]     Rheumatoid arthritis ([facility]     Seizure ([facility] 7/    last seizure 7/18/2020  first seizure 1992 after head injury    Unspecified cerebral artery occlusion with cerebral infarction     7/12  NOT DEFINITE    Wears glasses         Past Surgical History:     Past Surgical History:   Procedure Laterality Date    APPENDECTOMY  1985    BACK SURGERY      BREAST BIOPSY  [id number]    BREAST LUMPECTOMY      bilateral [id number]     CARDIAC SURGERY      card[facility] cath negative in 2018   [facility]    COLONOSCOPY      ENDOSCOPY, COLON, DIAGNOSTIC      HYSTERECTOMY  2010    INSERTABLE CARDIAC MONITOR      KNEE ARTHROSCOPY Right 08/14/2020    RIGHT KNEE ARTHROSCOPY WITH PARTIAL MEDIAL MENISCECTOMY WITH DEBRIDEMENT (Right     LAPAROTOMY  1988, 1990    SIGMOIDECTOMY  2016    for cancer    SLEEVE GASTRECTOMY  5/19/15    GASTRECTOMY SLEEVE LAPAROSCOPIC           TONSILLECTOMY  1985    UPPER GASTROINTESTINAL ENDOSCOPY  1/30/15        Medications Prior to Admission:     Prior to Admission medications    Medication Sig Start Date End Date Taking? Authorizing Provider   HYDROcodone-acetaminophen (NORCO) 5-325 MG per tablet Take 1 tablet by mouth every 6 hours as needed for Pain for up to 7 days. Intended supply: 7 days.  Take lowest dose possible to manage pain MG TABS tablet Take 1 tablet by mouth daily 6/9/20   Guillermo Pagan MD   cetirizine (ZYRTEC) 10 MG tablet Take 10 mg by mouth daily    Historical Provider, MD   coenzyme Q10 100 MG CAPS capsule Take 100 mg by mouth 3 times daily    Historical Provider, MD   loperamide (IMODIUM) 2 MG capsule Take 2 mg by mouth 4 times daily as needed for Diarrhea Takes two capsules at onset    Historical Provider, MD        Allergies:     Ace inhibitors; Betadine [povidone iodine]; Iodine; Lurasidone; Lurasidone hcl; Nitrofurantoin; Povidone-iodine; Shellfish allergy; Shellfish-derived products; Iodides; Lamotrigine; Macrobid [nitrofurantoin macrocrystal]; Morphine; Pyridium [phenazopyridine hcl]; and Topiramate    Social History:     Tobacco:    reports that she quit smoking about 4 weeks ago. She has never used smokeless tobacco.  Alcohol:      reports current alcohol use. Drug Use:  reports no history of drug use. Family History:     Family History   Adopted: Yes       Review of Systems:     Positive and Negative as described in HPI.     CONSTITUTIONAL:  negative for fevers, chills, sweats, fatigue, weight loss  HEENT:  negative for vision, hearing changes, runny nose, throat pain  RESPIRATORY:  negative for shortness of breath, cough, congestion, wheezing  CARDIOVASCULAR:  negative for chest pain, palpitations  GASTROINTESTINAL:  negative for nausea, vomiting, diarrhea, constipation, change in bowel habits, abdominal pain   GENITOURINARY:  negative for difficulty of urination, burning with urination, frequency   INTEGUMENT:  negative for rash, skin lesions, easy bruising   HEMATOLOGIC/LYMPHATIC:  negative for swelling/edema   ALLERGIC/IMMUNOLOGIC:  negative for urticaria , itching  ENDOCRINE:  negative increase in drinking, increase in urination, hot or cold intolerance  MUSCULOSKELETAL:  negative joint pains, muscle aches, swelling of joints  NEUROLOGICAL:  negative for headaches, dizziness, lightheadedness, numbness, pain, tingling extremities  BEHAVIOR/PSYCH:  negative for depression, anxiety    Physical Exam:   BP (!) 147/83   Pulse 72   Temp 98.3 °F (36.8 °C) (Oral)   Resp 18   Ht 5' 3\" (1.6 m)   Wt 224 lb (101.6 kg)   SpO2 96%   BMI 39.68 kg/m²   Temp (24hrs), Av.5 °F (35.3 °C), Min:89.2 °F (31.8 °C), Max:98.3 °F (36.8 °C)    Recent Labs     20  0821   POCGLU 98       Intake/Output Summary (Last 24 hours) at 2020 1503  Last data filed at 2020 1400  Gross per 24 hour   Intake 1120 ml   Output 5 ml   Net 1115 ml       General Appearance:  alert, well appearing, and in no acute distress  Mental status: oriented to person, place, and time  Head:  normocephalic, atraumatic  Eye: no icterus, redness, pupils equal and reactive, extraocular eye movements intact, conjunctiva clear  Ear: normal external ear, no discharge, hearing intact  Nose:  no drainage noted  Mouth: mucous membranes moist  Neck: supple, no carotid bruits, thyroid not palpable  Lungs: Bilateral equal air entry, clear to auscultation, no wheezing, rales or rhonchi, normal effort  Cardiovascular: normal rate, regular rhythm, no murmur, gallop, rub. Abdomen: Soft, nontender, nondistended, normal bowel sounds, no hepatomegaly or splenomegaly  Neurologic: There are no new focal motor or sensory deficits, normal muscle tone and bulk, no abnormal sensation, normal speech, cranial nerves II through XII grossly intact  Skin: No gross lesions, rashes, bruising or bleeding on exposed skin area  Extremities:  peripheral pulses palpable, no pedal edema or calf pain with palpation  Psych: normal affect     Investigations:      Laboratory Testing:  Recent Results (from the past 24 hour(s))   POC Glucose Fingerstick    Collection Time: 20  8:21 AM   Result Value Ref Range    POC Glucose 98 65 - 105 mg/dL       Imaging/Diagnostics:  No results found.     Assessment :      Hospital Problems           Last Modified POA    * (Principal) Postoperative hypoxia 8/14/2020 Yes    Hypertension 8/14/2020 Yes    Hyperlipidemia 8/14/2020 Yes    Fibromyalgia 8/14/2020 Yes    Sleep apnea 8/14/2020 Yes    Multiple sclerosis ([facility] 8/14/2020 Yes    Rheumatoid arthritis ([facility] 8/14/2020 Yes    Diabetes mellitus ([facility] 8/14/2020 Yes          Plan:     Patient status observation in the Med/Surge    Supplemental oxygen as needed  Chest x-ray, add BNP  Check troponin, recommend outpatient echo unless condition change dictates otherwise  Encourage cough and deep breathe exercises  Incentive spirometry  Home medications for hypertension, hyperlipidemia, or myalgia, MS, RA, diabetes  BiPAP during at bedtime if required by respiratory assessment  Will require outpatient PFTs and sleep study. Anticipate discharge within 24 hours provided hypoxia resolves. Consultations:   IP CONSULT TO HOSPITALIST     Patient is admitted as inpatient status because of co-morbidities listed above, severity of signs and symptoms as outlined, requirement for current medical therapies and most importantly because of direct risk to patient if care not provided in a hospital setting. Expected length of stay > 48 hours.     KANG Rodriguez NP  8/14/2020  3:03 PM    Copy sent to Dr. Axel Hinkle

## 2020-08-14 NOTE — ANESTHESIA POSTPROCEDURE EVALUATION
Department of Anesthesiology  Postprocedure Note    Patient: Evelyn Rollins  MRN: [id number]  YOB: 1965  Date of evaluation: 8/14/2020  Time:  2:42 PM     Procedure Summary     Date:  08/14/20 Room / Location:  [address] - INPATIENT    Anesthesia Start:  0710 Anesthesia Stop:  0825    Procedure:  RIGHT KNEE ARTHROSCOPY WITH PARTIAL MEDIAL MENISCECTOMY WITH DEBRIDEMENT (Right ) Diagnosis:  (DX RIGHT KNEE MEDIAL MENISCAL TEAR)    Surgeon:  Elfego Carvajal DO Responsible Provider:  Jah Melara MD    Anesthesia Type:  general ASA Status:  3          Anesthesia Type: general    Kiersten Phase I: Kiersten Score: 9    Kiersten Phase II:      Last vitals: Reviewed and per EMR flowsheets.        Anesthesia Post Evaluation    Patient location during evaluation: PACU  Level of consciousness: awake and alert  Complications: no

## 2020-08-14 NOTE — BRIEF OP NOTE
Brief Postoperative Note      Patient: Daryle Legacy  YOB: 1965  MRN: [id number]    Date of Procedure: 8/14/2020    Pre-Op Diagnosis: RIGHT KNEE MEDIAL MENISCAL TEAR    Post-Op Diagnosis:   1. Right knee posterior horn medial meniscus tear  2. 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 Right knee patellofemoral grade III-IV chondromalacia       Procedure(s):  1. RIGHT KNEE ARTHROSCOPY WITH PARTIAL MEDIAL MENISCECTOMY & DEBRIDEMENT    Surgeon(s):  Werner Dumont DO    Assistant:  Surgical Assistant: Severa Repine  Resident: Morales Mercer DO; Victor Manuel Castaneda DO    Anesthesia: General    Estimated Blood Loss (mL): 5mL    Fluids: 700mL crystalloids    TT: 33UAP    Complications: None    Specimens:   * No specimens in log *    Implants:  * No implants in log *      Drains: * No LDAs found *    Findings: Right knee posterior horn medial meniscus tear, grade IV MFC chondromalacia, grade III-IV patellofemoral chondromlacia. See op note for details.     Electronically signed by Victor Manuel Castaneda DO on 8/14/2020 at 8:15 AM

## 2020-08-14 NOTE — PROGRESS NOTES
Pt admitted to room 2014per good  condition from PACU. Alert & oreinted X 4. 96% on 2L NC. VSS. Pain to right knee 7/10. No order for pain  Meds,  notified  Oneyda Rowan to room and surroundings  Bed in lowest position, wheels locked, 2/4 side rails up  Call light in reach, room free of clutter, adequate lighting provided  Denies any further questions at this time  Instructed to call out with any questions/concerns/new onset of pain and/or n/v   White board updated  Continue to monitor with hourly rounding  STAY WITH ME protocol initiated   Bed alarm on/Fall Risk signs in place/Fall risk sticker to wrist band  Non-skid socks on/at bedside  [address] in place for patient/family to view & ask questions.

## 2020-08-14 NOTE — PLAN OF CARE
Problem: Falls - Risk of:  Goal: Will remain free from falls  Description: Will remain free from falls  Outcome: Ongoing    Siderails up x 2  Hourly rounding  Call light in reach  Instructed to call for assist before attempting out of bed.   Remains free from falls and accidental injury at this time   Floor free from obstacles  Bed is locked and in lowest position  Adequate lighting provided  Bed alarm on, [place] star and Stay with Me signs posted     Problem: Breathing Pattern - Ineffective:  Goal: Ability to achieve and maintain a regular respiratory rate will improve  Description: Ability to achieve and maintain a regular respiratory rate will improve  Outcome: Ongoing        Problem: Falls - Risk of:  Goal: Absence of physical injury  Description: Absence of physical injury  Outcome: Ongoing     Problem: Pain:  Goal: Pain level will decrease  Description: Pain level will decrease  Outcome: Ongoing     Problem: Pain:  Goal: Control of acute pain  Description: Control of acute pain  Outcome: Ongoing     Problem: Pain:  Goal: Control of chronic pain  Description: Control of chronic pain  Outcome: Ongoing

## 2020-08-14 NOTE — PROGRESS NOTES
Direct oral anticoagulant (DOAC) - Initial Pharmacy Review  PLAN    No obvious intervention needed. Benitainio Fothergill Scott  2020  3:00 PM    ASSESSMENT   Patient chart reviewed for indication and appropriateness of dose and therapy of Rivaroxaban.:  Indication: AFib. PATIENT INFORMATION   Body mass index is 39.68 kg/m². Wt Readings from Last 1 Encounters:   20 224 lb (101.6 kg)     Some sources recommend that DOACs be avoided in weight < 50 or > 120 kg, or BMI > 40 whenever possible; however. some smaller studies suggest that DOACs may be safe and efficacious in this population. No results for input(s): CREATININE in the last 72 hours. No results for input(s): HGB, HCT, PLT in the last 72 hours. No results for input(s): INR in the last 72 hours. Weight  kg ? BMI Less than   40 kg/m2 Calculated CrCl  Using ABW (mL/min) Other anticoagulants on MAR Drug interactions  (since admission) reviewed   yes    Wt Readings from Last 1 Encounters:   20 224 lb (101.6 kg)    yes    Body mass index is 39.68 kg/m². 120 ml/min        (140-age)*ABW    72 * sCr    X 0.85 for females No concurrent anticoagulants ordered.  No interactions/no new drug interactions identified requiring action.                 -----------------------------------------------------------------------------------------------------------------    CRCL Calculation for DOACs  (use ABW)    CrCl male:  (140-Age) * ABW           For female:  (140-Age) * ABW * 0.85                       72  *  sCr                                              72 * sCr         Rivaroxaban (Xarelto®)  - (CrCl calculated on ABW)  Indication Dose (Oral)  Adjustment  (CrCl calculated on ABW) Drug Interactions   NVAF 20mg daily   (with evening meal) CrCl <  50 mL/min = 15mg/day (with evening meal)   Strong P-gp/CY inducers (Avoid combination  Apalutamide, CarBAMazepine, Enzalutamide, Fosphenytoin, Lumacaftor, Mitotane, PHENobarbital, Phenytoin, Primidone, RifAMPin    P-gp/CY inhibitors (Avoid combination)  Clarithromycin, Itraconazole, Ketoconazole (systemic), Ombitasvir, Paritaprevir, Ritonavir, Rimini's wort   DVT/PE Treatment 15mg BID x21d, then 20mg daily (take with food) CrCl< 15 =Avoid use    DVT/PE  Recurrence Preventions 20mg daily (or 10 mg daily  after at least 6 months of treatment) CrCl< 15 =Avoid use    DVT prevention PostOp Knee: 10mg daily x 12-14d   (up to 35 days suggested)  Hip: 10mg daily x 35 days CrCl <15 =Avoid use    CAD/PAD  (Chronic, stable) 2.5mg BID with once daily ASA 75-100mg No adjustment for > 15 mL/min. Use with caution in severe renal impairment. * Valery CARRERO et al. J Am Soc Nephrol 2017. doi:10.1681/ASN. [id number]  *Roxi CRUZ et al. Circulation 2018.  WDS:03.2488/KRNXXSOEIBKHTK  Jazmynemark Vargas et al. [facility] 2019  Ansona Cake M, et al. Blood 0581;583:[id number]  Saniya Vigil, et al. CHEST 2018  Laith Huynh, et al. Zohreh Pharmacother 2019  [facility] [place]vivi PAEZ al. Circulation 2018

## 2020-08-14 NOTE — PROGRESS NOTES
Physical Therapy    Facility/Department: [facility]  Initial Assessment    NAME: Kasey Primrose  : 1965  MRN: [id number]    Date of Service: 2020    Discharge Recommendations:  Home with assist PRN, Outpatient PT        Assessment   Body structures, Functions, Activity limitations: Decreased functional mobility ; Decreased ROM; Decreased strength;Decreased endurance;Decreased sensation;Decreased balance; Increased pain  Assessment: Recommend home with assist and OP PT when cleared by physician. Patient will benefit from PT to improve gait, balance, ROM, and strength. Prognosis: Good  Decision Making: Medium Complexity  PT Education: Goals;PT Role;Plan of Care;Home Exercise Program;General Safety;Transfer Training;Gait Training;Precautions  Patient Education: Patient educated on written HEP and WBAT with RW. Patient educated to call for assist getting up to prevent falls. REQUIRES PT FOLLOW UP: Yes  Activity Tolerance  Activity Tolerance: Patient Tolerated treatment well       Patient Diagnosis(es): The encounter diagnosis was S/P arthroscopic partial medial meniscectomy. has a past medical history of Atrial fibrillation ([facility] Breast cancer ([address] C. difficile colitis, Colon cancer ([address] Depression, Diabetes mellitus ([address] Diverticulosis, Fibromyalgia, Hyperlipidemia, Hypertension, Multiple sclerosis ([address] Rheumatoid arthritis ([address] Seizure ([address] Unspecified cerebral artery occlusion with cerebral infarction, and Wears glasses. has a past surgical history that includes Hysterectomy (); Cholecystectomy (); Appendectomy (); Tonsillectomy ();  section (); laparotomy (, ); Upper gastrointestinal endoscopy (1/30/15); Sleeve Gastrectomy (5/19/15); Cardiac surgery; Breast biopsy ([id number]); Breast lumpectomy; Colonoscopy; Endoscopy, colon, diagnostic; back surgery; sigmoidectomy ();  Insertable Cardiac Monitor; and Knee arthroscopy (Right, 08/14/2020). Restrictions  Restrictions/Precautions  Restrictions/Precautions: Weight Bearing, General Precautions  Required Braces or Orthoses?: No  Lower Extremity Weight Bearing Restrictions  Right Lower Extremity Weight Bearing: Weight Bearing As Tolerated  Position Activity Restriction  Other position/activity restrictions: 2 liters 02  Vision/Hearing  Vision: Impaired  Vision Exceptions: Wears glasses at all times  Hearing: Within functional limits     Subjective  General  Chart Reviewed: Yes  Patient assessed for rehabilitation services?: Yes  Additional Pertinent Hx: MS, gastric bypass,  Family / Caregiver Present: No  Diagnosis: Right knee arthroscopy with partial menisectomy, right knee debridement  Follows Commands: Within Functional Limits  General Comment  Comments: SALUD Arriola reports patient appropriate for PT eval.  Subjective  Subjective: Patient pleasant and agreeable to PT.      Pre Treatment Pain Screening  Intervention List: Patient able to continue with treatment    Orientation  Orientation  Overall Orientation Status: Within Normal Limits  Social/Functional History  Social/Functional History  Lives With: Significant other  Type of Home: Apartment(Senior apartment)  Home Layout: One level  Home Access: (1 small threshold)  Bathroom Shower/Tub: Tub/Shower unit  Bathroom Toilet: Handicap height  Bathroom Equipment: Grab bars in [address] [place]: Cane(rollator)  ADL Assistance: Independent  Homemaking Assistance: Independent  Ambulation Assistance: Independent  Transfer Assistance: Independent  Active : Yes  Occupation: Retired  Type of occupation: restaurant  Leisure & Hobbies: wood finishing  Additional Comments: 1 fall after drinking alcohol  Cognition   Cognition  Overall Cognitive Status: WNL    Objective     Observation/Palpation  Observation: Right knee wrapped    AROM RLE (degrees)  RLE General AROM: Hip and ankle WFL, knee limited due to pain due to not having pain medication for several hours because of low SP02 in recovery. AROM LLE (degrees)  LLE AROM : WFL  AROM RUE (degrees)  RUE AROM : WFL  AROM LUE (degrees)  LUE AROM : WFL  Strength RLE  Comment: hip WFL, ankle WFL, knee not assessed due to pain  Strength LLE  Strength LLE: WFL  Strength RUE  Strength RUE: WFL  Strength LUE  Strength LUE: WFL  Tone RLE  RLE Tone: Normotonic  Tone LLE  LLE Tone: Normotonic  Motor Control  Gross Motor?: WFL  Sensation  Overall Sensation Status: (BLE neuropathy)  Bed mobility  Rolling to Left: Independent  Rolling to Right: Independent  Supine to Sit: Modified independent  Sit to Supine: Modified independent  Scooting: Modified independent  Transfers  Sit to Stand: Supervision  Stand to sit: Supervision  Bed to Chair: Supervision(RW)  Stand Pivot Transfers: Supervision(RW)  Ambulation  Ambulation?: Yes  WB Status: WBAT  Ambulation 1  Surface: level tile  Device: Rolling Walker  Assistance: Stand by assistance  Gait Deviations: Slow María Elena; Increased LELIA  Distance: 50 feet  Comments: Distance limited by SOB and knee pain     Balance  Sitting - Static: Good  Sitting - Dynamic: Good  Standing - Static: Good;-(RW)  Standing - Dynamic: Good;-(RW)        Plan   Plan  Times per week: 1-2x/d, 7 d/wk  Current Treatment Recommendations: Strengthening, ROM, Balance Training, Functional Mobility Training, Transfer Training, Gait Training, Safety Education & Training, Patient/Caregiver Education & Training, Home Exercise Program  Safety Devices  Type of devices:  All fall risk precautions in place, Gait belt, Bed alarm in place, Nurse notified, Left in bed, Call light within reach    G-Code       OutComes Score    AM-PAC Score  AM-PAC Inpatient Mobility Raw Score : 18 (08/14/20 1605)  AM-PAC Inpatient T-Scale Score : 43.63 (08/14/20 1605)  Mobility Inpatient CMS 0-100% Score: 46.58 (08/14/20 1605)  Mobility Inpatient CMS G-Code Modifier : CK (08/14/20 1605)          Goals  Short term goals  Time Frame for Short term goals: 12 visits  Short term goal 1: Patient will be indep with transfers. Short term goal 2: Patient will amb 200 feet indep with RW. Short term goal 3: Patient will be indep with HEP.   Patient Goals   Patient goals : Return home       Therapy Time   Individual Concurrent Group Co-treatment   Time In 7946         Time Out 1540         Minutes 49         Timed Code Treatment Minutes: [address] [facility] [address], PT

## 2020-08-14 NOTE — INTERVAL H&P NOTE
History and Physical Update    Pt Name: Helen Guillen  MRN: [id number]  YOB: 1965  Date of evaluation: 8/14/2020      [x] I have reviewed the H&P by Anabelle Schuler CNP dated 7/31/20 which meets the criteria for an Interval History and Physical note. [x] I have examined  Helen Guillen  There are no changes to the patient who is scheduled for a right knee arthroscopy with partial meniscectomy and debridement by Dr Franca Hutchinson for right knee medial meniscal tear. The patient denies health changes, fever, chills, productive cough, SOB, chest pain, open sores or wounds. +DM POC BS 10    Hx Atrial Fib with loop recorder  On anticoagulant therapy Followed by Cardiologist Dr Faustina Collier and preoperatively cleared for surgery \"month ago when last seen\"   Last Xarelto and Co Q10 8/10/20  Hx seizures. Follows by Neurologist Dr Foster Juárez Last seizure weeks ago known to Dr Guerita Kim and sustained a right knee abrasion which has since healed. Vital signs: /62   Pulse 85   Temp 96.2 °F (35.7 °C) (Temporal)   Resp 18   Ht 5' 3\" (1.6 m)   Wt 224 lb (101.6 kg)   SpO2 94%   BMI 39.68 kg/m²     Allergies:  Ace inhibitors; Iodine; Lurasidone; Lurasidone hcl; Nitrofurantoin; Povidone-iodine; Shellfish allergy; Shellfish-derived products; Iodides; Lamotrigine; Macrobid [nitrofurantoin macrocrystal]; Morphine; Pyridium [phenazopyridine hcl]; and Topiramate    Medications:    Prior to Admission medications    Medication Sig Start Date End Date Taking? Authorizing Provider   methocarbamol (ROBAXIN) 750 MG tablet Take 1,500 mg by mouth 3 times daily   Yes Historical Provider, MD   gabapentin (NEURONTIN) 600 MG tablet Take 1 tablet by mouth 3 times daily for 30 days. Patient taking differently: Take 1,200 mg by mouth 3 times daily.   6/8/20 8/14/20 Yes Trell Alberto MD   DULoxetine (CYMBALTA) 60 MG extended release capsule Take 1 capsule by mouth 2 times daily 6/8/20 8/14/20 Yes Trell Alberto MD   metFORMIN oriented x3, in no acute distress. Heart: Heart sounds are normal.  HR 85 regular rate and rhythm at present (Hx A Fib) No murmur, gallop or rub. Lungs: Normal respiratory effort with equal expansion, unlabored and clear to auscultation without wheezes or rales bilaterally   Abdomen: obese with folds, soft, nontender, nondistended with bowel sounds .    Skin: healed right knee abrasion  Skin intact w/o visible lesions or rash      Labs:  Recent Labs     07/31/20  0953   HGB 12.8   HCT 41.6   WBC 7.4   MCV 91.0         K 3.8      CO2 30   BUN 10   CREATININE 0.61   GLUCOSE 104*       Recent Labs     08/10/20  1030   [address] Not Detected       Myak, Vena Human  XANDER KAPADIA-BC  Electronically signed 8/14/2020 at 6:05 AM

## 2020-08-14 NOTE — OP NOTE
[id number] [facility] [address]                [address] [place] [address]                                OPERATIVE REPORT    PATIENT NAME: Karan Grace                    :        1965  MED REC NO:   [id number]                             ROOM:  ACCOUNT NO:   [de-identified]                           ADMIT DATE: 2020  PROVIDER:     Travis Beard    DATE OF PROCEDURE:  2020    PREOPERATIVE DIAGNOSES:  Right knee medial meniscus tear, right knee  chondromalacia. 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 PROCEDURES PERFORMED:  Right knee arthroscopy with partial medial  meniscectomy and debridement, medial femoral condyle, and medial  trochlea and patella. SURGEON:  Gila Hansen. Virginia Ralph DO    ANESTHETIC:  General.    RESIDENTS:  Milka Carlin DO and Farhad Dia DO    BLOOD LOSS:  5 mL. FLUIDS GIVEN:  700 mL of crystalloid. TOTAL TOURNIQUET TIME:  33 minutes. COMPLICATIONS:  No known. DISPOSITION:  To postanesthesia care unit in stable condition. INDICATIONS:  The patient is a 68-year-old female with knee pain,  failing conservative therapy. She understands the risks and benefits of  the procedure. Informed consent was signed. Operative site was marked. Preoperative antibiotics were given. PROCEDURE IN DETAIL:  The patient was taken to the operative suite and  placed in a supine position on the operating table. General inhalation  anesthetic with endotracheal intubation was performed. Exam under  anesthesia revealed a full range of motion with no ligamentous  instability. A well-padded tourniquet was placed on the right proximal  thigh. Right leg was placed in the arthroscopic leg trujillo. Left leg  was padded under the buttock with a pillow under with a free leg trujillo.   The foot of bed was dropped 90 degrees. The patient was prepped and  draped in normal sterile fashion. Surgical time-out was performed. Two-portal arthroscopy technique was performed. Suprapatellar pouch was  free of loose bodies. The medial and lateral gutters were free of loose  bodies. There was a small plica band medially that was excised and that  could be rubbing on the medial trochlea. There was a small area of  grade 3 chondromalacia in superolateral aspect of the patella. There  was a large area of grade 3 and 4 chondromalacia from the medial femoral  trochlear groove to the medial trochlea utilizing a suction shaver. These areas were debrided back to a stable border so that it would not  produce loose bodies or chondral flaps could cause mechanical symptoms. Lateral compartment was entered. Lateral femoral condyle, lateral  tibial plateau and lateral meniscus were intact to visualization and  probing. The interchondral notch was entered. The ACL and PCL were  intact to visualization and probing. Posterior medial compartment was  entered. There were no loose bodies present. Medial compartment was  entered. There was a small tear at the posterior horn of the medial  meniscus from the meniscal root to the posterior third portion of the  meniscus. It could be minimally pulled into the joint utilizing a  suction shaver. This was resected back to a stable border. At this  time, there was a large grade 4 chondromalacia area on the medial  femoral condyle that was 12 x 15 mm. There were chondral flaps that  could cause mechanical symptoms, this was debrided back to a stable  border. The knee was routinely arthroscoped. There were no loose  bodies present through both the medial and lateral portal.  All fluid  was aspirated from the joint, joint was injected with 30 mL of 0.5%  ropivacaine. Portals were closed with 3-0 nylon suture. Tourniquet was  dropped and there was rapid cap refill.   Sterile dressing was placed. Polar Care was placed and Ace wrap was placed. The patient was awakened  by the Department of Anesthesia and transferred to the postanesthesia  care unit in stable condition.         Dulce Maria Armenta    D: 08/14/2020 8:22:50       T: 08/14/2020 9:47:55     [email or web address]  Job#: [id number]     Doc#: [id number]    CC:

## 2020-08-15 VITALS
HEART RATE: 75 BPM | TEMPERATURE: 98.1 F | WEIGHT: 232.5 LBS | RESPIRATION RATE: 16 BRPM | DIASTOLIC BLOOD PRESSURE: 78 MMHG | BODY MASS INDEX: 41.2 KG/M2 | OXYGEN SATURATION: 94 % | SYSTOLIC BLOOD PRESSURE: 143 MMHG | HEIGHT: 63 IN

## 2020-08-15 PROCEDURE — 97110 THERAPEUTIC EXERCISES: CPT

## 2020-08-15 PROCEDURE — G0378 HOSPITAL OBSERVATION PER HR: HCPCS

## 2020-08-15 PROCEDURE — 97116 GAIT TRAINING THERAPY: CPT

## 2020-08-15 PROCEDURE — 6370000000 HC RX 637 (ALT 250 FOR IP): Performed by: NURSE PRACTITIONER

## 2020-08-15 PROCEDURE — 94761 N-INVAS EAR/PLS OXIMETRY MLT: CPT

## 2020-08-15 PROCEDURE — 6370000000 HC RX 637 (ALT 250 FOR IP): Performed by: FAMILY MEDICINE

## 2020-08-15 PROCEDURE — 2580000003 HC RX 258: Performed by: NURSE PRACTITIONER

## 2020-08-15 PROCEDURE — 99225 PR SBSQ OBSERVATION CARE/DAY 25 MINUTES: CPT | Performed by: FAMILY MEDICINE

## 2020-08-15 RX ADMIN — OXYCODONE HYDROCHLORIDE AND ACETAMINOPHEN 1 TABLET: 5; 325 TABLET ORAL at 04:44

## 2020-08-15 RX ADMIN — GABAPENTIN 1200 MG: 400 CAPSULE ORAL at 14:10

## 2020-08-15 RX ADMIN — GABAPENTIN 1200 MG: 400 CAPSULE ORAL at 08:45

## 2020-08-15 RX ADMIN — OXYCODONE HYDROCHLORIDE AND ACETAMINOPHEN 1 TABLET: 5; 325 TABLET ORAL at 00:00

## 2020-08-15 RX ADMIN — FLUTICASONE PROPIONATE 1 SPRAY: 50 SPRAY, METERED NASAL at 08:46

## 2020-08-15 RX ADMIN — OXYCODONE HYDROCHLORIDE AND ACETAMINOPHEN 1 TABLET: 5; 325 TABLET ORAL at 14:10

## 2020-08-15 RX ADMIN — PANTOPRAZOLE SODIUM 40 MG: 40 TABLET, DELAYED RELEASE ORAL at 06:08

## 2020-08-15 RX ADMIN — METFORMIN HYDROCHLORIDE 500 MG: 500 TABLET ORAL at 08:45

## 2020-08-15 RX ADMIN — OXYCODONE HYDROCHLORIDE AND ACETAMINOPHEN 1 TABLET: 5; 325 TABLET ORAL at 08:52

## 2020-08-15 RX ADMIN — METHOCARBAMOL TABLETS 1500 MG: 750 TABLET, COATED ORAL at 14:16

## 2020-08-15 RX ADMIN — METHOCARBAMOL TABLETS 1500 MG: 750 TABLET, COATED ORAL at 08:45

## 2020-08-15 RX ADMIN — MAGNESIUM GLUCONATE 500 MG ORAL TABLET 400 MG: 500 TABLET ORAL at 08:46

## 2020-08-15 RX ADMIN — FUROSEMIDE 40 MG: 40 TABLET ORAL at 08:45

## 2020-08-15 RX ADMIN — DULOXETINE HYDROCHLORIDE 60 MG: 60 CAPSULE, DELAYED RELEASE ORAL at 08:45

## 2020-08-15 RX ADMIN — CETIRIZINE HYDROCHLORIDE 10 MG: 10 TABLET, FILM COATED ORAL at 08:45

## 2020-08-15 RX ADMIN — SODIUM CHLORIDE: 9 INJECTION, SOLUTION INTRAVENOUS at 04:45

## 2020-08-15 RX ADMIN — CARVEDILOL 12.5 MG: 12.5 TABLET, FILM COATED ORAL at 08:45

## 2020-08-15 ASSESSMENT — PAIN DESCRIPTION - DESCRIPTORS
DESCRIPTORS: ACHING

## 2020-08-15 ASSESSMENT — PAIN DESCRIPTION - LOCATION
LOCATION: KNEE

## 2020-08-15 ASSESSMENT — PAIN DESCRIPTION - PAIN TYPE
TYPE: SURGICAL PAIN

## 2020-08-15 ASSESSMENT — ENCOUNTER SYMPTOMS
BLOOD IN STOOL: 0
ABDOMINAL PAIN: 0
VOMITING: 0
RHINORRHEA: 0
CHEST TIGHTNESS: 0
CONSTIPATION: 0
NAUSEA: 0
WHEEZING: 0
COUGH: 0
SHORTNESS OF BREATH: 0
DIARRHEA: 0

## 2020-08-15 ASSESSMENT — PAIN SCALES - GENERAL
PAINLEVEL_OUTOF10: 7
PAINLEVEL_OUTOF10: 5
PAINLEVEL_OUTOF10: 4
PAINLEVEL_OUTOF10: 5
PAINLEVEL_OUTOF10: 4
PAINLEVEL_OUTOF10: 0
PAINLEVEL_OUTOF10: 7
PAINLEVEL_OUTOF10: 0
PAINLEVEL_OUTOF10: 7
PAINLEVEL_OUTOF10: 7

## 2020-08-15 ASSESSMENT — PAIN DESCRIPTION - ORIENTATION
ORIENTATION: RIGHT

## 2020-08-15 ASSESSMENT — PAIN DESCRIPTION - FREQUENCY
FREQUENCY: INTERMITTENT
FREQUENCY: INTERMITTENT

## 2020-08-15 ASSESSMENT — PAIN DESCRIPTION - PROGRESSION
CLINICAL_PROGRESSION: GRADUALLY WORSENING
CLINICAL_PROGRESSION: GRADUALLY WORSENING

## 2020-08-15 ASSESSMENT — PAIN DESCRIPTION - ONSET
ONSET: ON-GOING
ONSET: ON-GOING

## 2020-08-15 NOTE — PROGRESS NOTES
surgery; sigmoidectomy (2016); Insertable Cardiac Monitor; and Knee arthroscopy (Right, 08/14/2020). Restrictions  Restrictions/Precautions  Restrictions/Precautions: Weight Bearing, General Precautions  Required Braces or Orthoses?: No  Lower Extremity Weight Bearing Restrictions  Right Lower Extremity Weight Bearing: Weight Bearing As Tolerated  Position Activity Restriction  Other position/activity restrictions: 2 liters 02  Subjective   General  Chart Reviewed: Yes  Family / Caregiver Present: No  Pain Assessment  Pain Level: 5  Pain Type: Surgical pain  Pain Location: Knee  Pain Orientation: Right       Orientation  Orientation  Overall Orientation Status: Within Normal Limits  Cognition   Cognition  Overall Cognitive Status: WNL  Objective   Bed mobility  Bridging: Supervision  Rolling to Left: Modified independent  Rolling to Right: Modified independent  Supine to Sit: Modified independent  Sit to Supine: Modified independent  Transfers  Sit to Stand: Modified independent  Stand to sit: Modified independent  Stand Pivot Transfers: Modified independent  Squat Pivot Transfers: Modified independent  Lateral Transfers: Modified independent  Ambulation  Ambulation?: Yes  Ambulation 1  Surface: level tile  Device: Rolling Walker  Assistance: Stand by assistance  Quality of Gait: No LOB  Gait Deviations: Decreased step length;Decreased step height;Slow María Elena  Distance: 120 ft  Comments: Pt tolerated WB in R LE better today with pain meds. Educated pt on safety with ambulation with use of the walker to prevent falls. Exercises  Heelslides: x 20 limited AROM due to R knee  Knee Long Arc Quad: x 20  Ankle Pumps: x 20                        G-Code     OutComes Score                                                     AM-PAC Score             Goals  Short term goals  Time Frame for Short term goals: 12 visits  Short term goal 1: Patient will be indep with transfers.   Short term goal 2: Patient will amb 200 feet indep with RW. Short term goal 3: Patient will be indep with HEP. Patient Goals   Patient goals : Return home    Plan    Plan  Times per week: 1-2x/d, 7 d/wk  Current Treatment Recommendations: Strengthening, ROM, Balance Training, Functional Mobility Training, Transfer Training, Gait Training, Safety Education & Training, Patient/Caregiver Education & Training, Home Exercise Program  Safety Devices  Type of devices:  All fall risk precautions in place, Call light within reach, Gait belt, Left in bed     Therapy Time   Individual Concurrent Group Co-treatment   Time In 0945         Time Out 1029         Minutes 40                 Susu Jack, PTA

## 2020-08-15 NOTE — PROGRESS NOTES
[facility]PATIENT      Daily Progress Note     Admit Date: 8/14/2020  Bed/Room No.  2014/2014-02  Admitting Physician : Akhil Cruz MD  Code Status :[address] Day:  LOS: 0 days   Chief Complaint:     No chief complaint on file. Principal Problem:    Postoperative hypoxia  Active Problems:    Hypertension    Hyperlipidemia    Fibromyalgia    Sleep apnea    Multiple sclerosis (HCC)    Rheumatoid arthritis ([address]    Diabetes mellitus ([address]  Resolved Problems:    * No resolved hospital problems. *    Subjective : Interval History/Significant events :  08/15/20    Patient had hypoxia overnight on pulse oximetry. Patient denies any dizziness, lightness, dyspnea at rest.  She is eating and drinking okay. Pain in knee is controlled. Patient denies any chest pain, nausea, vomiting. Vitals - Stable afebrile  Labs -EKG reviewed, negative for ischemia. Troponin is negative. Pulse ox stable on room air. Nursing notes , Consults notes reviewed. Overnight events and updates discussed with Nursing staff . Background History:         Preet Boyd is 47 y.o. female  Who was admitted to the hospital on 8/14/2020 for treatment of Postoperative hypoxia. PMH:  Past Medical History:   Diagnosis Date    Atrial fibrillation ([address]     Breast cancer ([address]     bilateral remission since 2012    C. difficile colitis     2015    Colon cancer ([address]     stage IV remission since 2016    Depression     Diabetes mellitus ([address]     Diverticulosis     Fibromyalgia     Hyperlipidemia     Hypertension     Multiple sclerosis ([address]     Rheumatoid arthritis ([address]     Seizure ([address] 7/    last seizure 7/18/2020  first seizure 1992 after head injury    Unspecified cerebral artery occlusion with cerebral infarction     7/12  NOT DEFINITE    Wears glasses       Allergies:    Allergies   Allergen Reactions    Ace Inhibitors Anaphylaxis, Other (See Comments), Hives and Shortness Of Breath ANGIOEDEMA  lisinopril  angiodema  ANGIOEDEMA  ANGIOEDEMA      Betadine [Povidone Iodine] Hives    Iodine Anaphylaxis, Hives and Shortness Of Breath     INCLUDES BETADINE  Iv dye. Pt has not had any reaction w/ dye from CT scans for 10 years. Per dr. Isaac Red contrast (omni 300) is fine to use. 8/14/19 - lilibeth      Lurasidone Other (See Comments)    Lurasidone Hcl      Other reaction(s): Unknown    Nitrofurantoin Hives and Shortness Of Breath    Povidone-Iodine Hives and Nausea And Vomiting    Shellfish Allergy Anaphylaxis    Shellfish-Derived Products Anaphylaxis    Iodides      Pre treat with benadryl    Lamotrigine Hives    Macrobid [Nitrofurantoin Macrocrystal] Hives    Morphine      -Narcotics LOWERS SIEZURE THRESHLD    Pyridium [Phenazopyridine Hcl] Hives    Topiramate Other (See Comments)     Confusion       Medications :  carvedilol, 12.5 mg, Oral, BID WC  cetirizine, 10 mg, Oral, Daily  DULoxetine, 60 mg, Oral, BID  pantoprazole, 40 mg, Oral, QAM AC  fluticasone, 1 spray, Each Nostril, Daily  furosemide, 40 mg, Oral, Daily  gabapentin, 1,200 mg, Oral, TID  magnesium oxide, 400 mg, Oral, Daily  metFORMIN, 500 mg, Oral, BID WC  methocarbamol, 1,500 mg, Oral, TID  QUEtiapine, 100 mg, Oral, Nightly  rivaroxaban, 20 mg, Oral, Dinner  sodium chloride flush, 10 mL, Intravenous, 2 times per day        Review of Systems   Review of Systems   Constitutional: Negative for appetite change, fatigue, fever and unexpected weight change. HENT: Negative for congestion, rhinorrhea and sneezing. Eyes: Negative for visual disturbance. Respiratory: Negative for cough, chest tightness, shortness of breath and wheezing. Cardiovascular: Negative for chest pain and palpitations. Gastrointestinal: Negative for abdominal pain, blood in stool, constipation, diarrhea, nausea and vomiting. Genitourinary: Negative for dysuria, enuresis, frequency and hematuria.    Musculoskeletal: Negative for arthralgias and myalgias. Skin: Negative for rash. Neurological: Negative for dizziness, weakness, light-headedness and headaches. Hematological: Does not bruise/bleed easily. Psychiatric/Behavioral: Negative for dysphoric mood and sleep disturbance. Objective :      Current Vitals : Temp: 98.1 °F (36.7 °C),  Pulse: 75, Resp: 16, BP: (!) 143/78, SpO2: 93 %  Last 24 Hrs Vitals   Patient Vitals for the past 24 hrs:   BP Temp Temp src Pulse Resp SpO2 Weight   08/15/20 0817 (!) 143/78 98.1 °F (36.7 °C) Oral 75 16 93 % --   08/15/20 0415 117/62 98.1 °F (36.7 °C) Oral 69 18 96 % --   08/15/20 0054 (!) 111/49 97.7 °F (36.5 °C) Oral 68 18 94 % --   08/14/20 2130 -- -- -- -- -- -- 232 lb 8 oz (105.5 kg)   08/14/20 1903 113/61 97.9 °F (36.6 °C) Oral 95 18 93 % --   08/14/20 1710 -- -- -- -- -- 93 % --   08/14/20 1430 (!) 147/83 98.3 °F (36.8 °C) Oral 72 18 96 % --   08/14/20 1400 135/75 97.5 °F (36.4 °C) Temporal 75 18 95 % --   08/14/20 1345 116/66 -- -- 76 18 94 % --   08/14/20 1330 132/75 -- -- 71 15 95 % --   08/14/20 1315 123/72 -- -- 73 15 92 % --   08/14/20 1300 129/71 -- -- 72 14 92 % --   08/14/20 1245 130/71 -- -- 71 14 91 % --   08/14/20 1230 132/73 -- -- 68 13 92 % --   08/14/20 1215 131/71 -- -- 73 23 92 % --   08/14/20 1200 135/77 -- -- 71 12 93 % --   08/14/20 1145 138/71 -- -- 75 15 (!) 79 % --   08/14/20 1130 134/71 -- -- 72 10 92 % --   08/14/20 1115 128/66 -- -- 74 13 93 % --   08/14/20 1100 128/79 -- -- 73 17 91 % --   08/14/20 1045 135/79 -- -- 70 17 98 % --   08/14/20 1030 120/68 -- -- 74 16 (!) 85 % --   08/14/20 1000 112/72 -- -- 80 16 99 % --   08/14/20 0945 102/77 -- -- 76 16 93 % --   08/14/20 0930 120/73 -- -- 79 17 96 % --   08/14/20 0915 125/62 -- -- 80 14 97 % --   08/14/20 0900 115/60 -- -- 78 12 97 % --     Intake / output   08/14 0701 - 08/15 0700  In: 3088.5 [P.O.:1200; I.V.:1888.5]  Out: 2405 [Urine:2400]  Physical Exam:  Physical Exam  Vitals signs and nursing note reviewed.    Constitutional: General: She is not in acute distress. Appearance: She is not diaphoretic. HENT:      Head: Normocephalic and atraumatic. Nose:      Right Sinus: No maxillary sinus tenderness or frontal sinus tenderness. Left Sinus: No maxillary sinus tenderness or frontal sinus tenderness. Mouth/Throat:      Pharynx: No oropharyngeal exudate. Eyes:      General: No scleral icterus. Conjunctiva/sclera: Conjunctivae normal.      Pupils: Pupils are equal, round, and reactive to light. Neck:      Musculoskeletal: Full passive range of motion without pain and neck supple. Thyroid: No thyromegaly. Vascular: No JVD. Cardiovascular:      Rate and Rhythm: Normal rate and regular rhythm. Pulses:           Dorsalis pedis pulses are 2+ on the right side and 2+ on the left side. Heart sounds: Normal heart sounds. No murmur. Pulmonary:      Effort: Pulmonary effort is normal.      Breath sounds: Normal breath sounds. No wheezing or rales. Abdominal:      Palpations: Abdomen is soft. There is no mass. Tenderness: There is no abdominal tenderness. Lymphadenopathy:      Head:      Right side of head: No submandibular adenopathy. Left side of head: No submandibular adenopathy. Cervical: No cervical adenopathy. Skin:     General: Skin is warm. Neurological:      Mental Status: She is alert and oriented to person, place, and time. Motor: No tremor. Psychiatric:         Behavior: Behavior is cooperative.            Laboratory findings:    Recent Labs     08/14/20 2130   WBC 11.4*   HGB 11.6*   HCT 37.4        Recent Labs     08/14/20  2130      K 4.0   CL 96*   CO2 26   GLUCOSE 148*   BUN 15   CREATININE 1.08*   CALCIUM 8.6     No results for input(s): PROT, LABALBU, LABA1C, S9ZZTRA, C9NAIXF, FT4, TSH, AST, ALT, LDH, GGT, ALKPHOS, BILITOT, BILIDIR, AMMONIA, AMYLASE, LIPASE, LACTATE, CHOL, HDL, LDLCHOLESTEROL, CHOLHDLRATIO, TRIG, VLDL, BNP, TROPONINI,

## 2020-08-15 NOTE — PROGRESS NOTES
Pt. Discharged to home with personal belongings and script. No needs at this time. Pt. To contact family medicine re: need for outpatient sleep study. Appropriate

## 2020-08-15 NOTE — DISCHARGE SUMMARY
[facility] - INPATIENT      Discharge Summary     Patient ID: Paige Muniz  :  1965   MRN: [id number]     ACCOUNT:  [de-identified]   Patient Location :   Patient's PCP: Sera Chavis Date: 2020   Discharge Date:  08/15/20     Length of Stay: 0  Code Status:  Full Code  Admitting Physician: Alexa Yousif DO  Discharge Physician: Gala Dancer, MD     Active Discharge Diagnosis :     Primary Problem  Postoperative hypoxia      [place] Problems    Diagnosis Date Noted    Postoperative hypoxia [R09.02, Z98.890] 2020    Multiple sclerosis ([facility] [G35]     Rheumatoid arthritis ([facility] [M06.9]     Diabetes mellitus ([facility] [E11.9]     Fibromyalgia [M79.7] 2014    Hyperlipidemia [E78.5] 2014    Hypertension [I10] 2014    Sleep apnea [G47.30] 2014       Admission Condition:  poor     Discharged Condition: stable    Hospital Stay:     Hospital Course:  Paige Muniz is a 47 y.o. female who was admitted for the management of   Postoperative hypoxia     Patient is scheduled right knee arthroplasty as outpatient. She developed postoperative hypoxia requiring BiPAP. She was admitted for further evaluation observation. Evaluation was negative for acute coronary syndrome, pneumonia, pneumothorax, CHF. ABG showed hypoxemic respiratory failure without hypercarbia. Patient was treated with supplemental oxygen. Oxygen was weaned after breathing improved. Patient was oxygen dependent and had sleep apnea before. She was morbidly obese weighing more than 500 pounds. Patient had significant weight loss and had negative sleep study 7 months before. Home O2 desaturation screen was performed and did not require oxygen at discharge. Significant therapeutic interventions:   Post operative hypoxia due to anesthesia - evaluation negative for CHF, hypercapnic resp failure , Acute coronary syndrome , arrhythmia .   Home O2 desaturation screen performed and does not require home oxygen. Patient has underlying sleep apnea which has resolved after significant weight loss. Patient had a negative to sleep study several months before. MS  Hypertension -well-controlled  Hyperlipidemia-continue statin  H/o Colon cancer - in remission   H/o bilateral breast cancer-in remission  Current smoker-recommend complete abstinence  Fibromyalgia     Significant Diagnostic Studies:   Labs / Micro:/Radiology  Recent Labs     08/14/20  2130 07/31/20  0953   WBC 11.4* 7.4   HGB 11.6* 12.8   HCT 37.4 41.6   MCV 91.0 91.0    211     Labs Renal Latest Ref Rng & Units 8/14/2020 7/31/2020 6/3/2020 1/21/2020 1/20/2020   BUN 6 - 20 mg/dL 15 10 9 11 10   Cr 0.50 - 0.90 mg/dL 1.08(H) 0.61 0.67 0.6 0.5(L)   K 3.7 - 5.3 mmol/L 4.0 3.8 3.5(L) 3.9 3.4(L)   Na 135 - 144 mmol/L 137 141 140 139 140     Lab Results   Component Value Date    ALT 11 06/03/2020    AST 18 06/03/2020    ALKPHOS 68 06/03/2020    BILITOT 0.19 (L) 06/03/2020     No results found for: TSHFT4, TSH  No results found for: HAV, HEPAIGM, HEPBIGM, HEPBCAB, HBEAG, HEPCAB  Lab Results   Component Value Date    COLORU YELLOW 01/20/2020    NITRU Negative 01/20/2020    GLUCOSEU Negative 01/20/2020    KETUA Negative 01/20/2020    UROBILINOGEN 0.2 01/20/2020    BILIRUBINUR Negative 01/20/2020     Lab Results   Component Value Date    LABA1C 5.6 07/31/2020     Lab Results   Component Value Date     07/31/2020     Lab Results   Component Value Date    INR 1.10 01/14/2019    INR 1.40 (H) 01/12/2019    PROTIME 12.5 01/14/2019    PROTIME 16.0 (H) 01/12/2019       Xr Chest (2 Vw)    Result Date: 8/14/2020  No radiographic evidence of acute cardiopulmonary disease.              Consultations:    Consults:     Final Specialist Recommendations/Findings:   IP CONSULT TO HOSPITALIST      The patient was seen and examined on day of discharge and this discharge summary is in conjunction with any daily progress note from day of discharge. Discharge plan:     Disposition: Home    Physician Follow Up:     Lindsey Evelin, [address] [facility]  [phone]    Go to  appointment 8/21/20 @11;15AM    Starr Deal  [address]  [address] [facility]  [address] [place] [address]  [phone]      faxed over the script for shower chair and they can run benefits on monday. Donn Uriarte Requiring Further Evaluation/Follow Up POST HOSPITALIZATION/Incidental Findings: Follow-up with PCP    Diet: diabetic diet    Activity: As tolerated. Instructions to Patient: Smoking cessation    Discharge Medications:      Medication List      START taking these medications    HYDROcodone-acetaminophen 5-325 MG per tablet  Commonly known as:  Norco  Take 1 tablet by mouth every 6 hours as needed for Pain for up to 7 days. Intended supply: 7 days. Take lowest dose possible to manage pain        CHANGE how you take these medications    gabapentin 600 MG tablet  Commonly known as:  NEURONTIN  Take 1 tablet by mouth 3 times daily for 30 days.   What changed:  how much to take        CONTINUE taking these medications    carvedilol 12.5 MG tablet  Commonly known as:  COREG  Take 1 tablet by mouth 2 times daily (with meals)     cetirizine 10 MG tablet  Commonly known as:  ZYRTEC     coenzyme Q10 100 MG Caps capsule     diclofenac 50 MG tablet  Commonly known as:  CATAFLAM     DULoxetine 60 MG extended release capsule  Commonly known as:  CYMBALTA  Take 1 capsule by mouth 2 times daily     esomeprazole 40 MG delayed release capsule  Commonly known as:  NexIUM  Take 1 capsule by mouth every morning (before breakfast)     fluticasone 50 MCG/ACT nasal spray  Commonly known as:  FLONASE     furosemide 40 MG tablet  Commonly known as:  LASIX  Take 1 tablet by mouth daily     lidocaine 5 %  Commonly known as:  LIDODERM     loperamide 2 MG capsule  Commonly known as:  IMODIUM     magnesium oxide 400 (241.3 Mg) MG Tabs tablet  Commonly known as:  MAG-OX  Take 1 tablet by mouth daily     metFORMIN 500 MG tablet  Commonly known as:  GLUCOPHAGE  Take 1 tablet by mouth 2 times daily (with meals)     methocarbamol 750 MG tablet  Commonly known as:  ROBAXIN     ondansetron 4 MG disintegrating tablet  Commonly known as:  ZOFRAN-ODT     QUEtiapine 100 MG tablet  Commonly known as:  SEROQUEL  Take 1 tablet by mouth nightly     rivaroxaban 20 MG Tabs tablet  Commonly known as:  Xarelto  Take 1 tablet by mouth Daily with supper     SUPER B COMPLEX/C PO     traZODone 50 MG tablet  Commonly known as:  DESYREL  Take 1 tablet by mouth nightly as needed for Sleep           Where to Get Your Medications      You can get these medications from any pharmacy    Bring a paper prescription for each of these medications  HYDROcodone-acetaminophen 5-325 MG per tablet         Time Spent on discharge is  33 mins in patient examination, evaluation, counseling as well as medication reconciliation, prescriptions for required medications, discharge plan and follow up. Electronically signed by   Theo rPeston MD  8/15/2020        Thank you Dr. Krishna Liao for the opportunity to be involved in this patient's care.

## 2020-08-15 NOTE — CARE COORDINATION
Case Management Initial Discharge Plan  Parminder Jeana,         Readmission Risk              Risk of Unplanned Readmission:        0             Met with:patient to discuss discharge plans. Information verified: address, contacts, phone number, , insurance Yes  PCP: Marquis Patiño  Date of last visit: last week     Insurance Provider: [address] [place] [address] dual     Discharge Planning  Current Residence:  Apartment A   Living Arrangements:  Spouse/Significant Other        Home is an apartment complex that is a single story dwelling. A half step to enter her apartment     Support Systems:  Spouse/Significant Other , lives with boyfriend Roberta Grandchild [phone]      Current Services PTA:  None    Agency: none       Patient able to perform ADL's:Independent  DME in home:  rollator  DME used to aid ambulation prior to admission:   Garth Libman at times   DME used during admission:  [employer] Needed:  Outpatient PT/OT    Pharmacy: [facility] Medications:  No  Does patient want to participate in local refill/ meds to beds program?  Yes    Patient agreeable to home care: No  [place] of choice provided:  n/a      Type of Home Care Services:  None  Patient expects to be discharged to:  home    Prior SNF/Rehab Placement and Facility: none   Agreeable to SNF/Rehab: No  [place] of choice provided: n/a   Evaluation: n/a    Expected Discharge date:  08/15/20  Follow Up Appointment: Best Day/ Time:  any     Transportation provider: per family  Transportation arrangements needed for discharge: No    Discharge Plan:   Met with patient to follow up on plan of care. Prior to admission independent and active . Her boyfriend and daughter can assist with any needs. She is s/p Right knee arthroscopy with partial medial meniscectomy and debridement, medial femoral condyle, and medial trochlea and patella.  She was up with therapy and they are recommending outpatient therapy       Patient is requesting a shower chair as she gave her chair to her ex. She stated the chair was over a year old. Explained that insurance may not cover a new one but would send to [facility] to have them run benefits. Also explained to patient that [facility] cannot run with insurance until Monday and if accepts shower chair before benefits checked she could end up with OOP expense. She verbalized understanding.     Electronically signed by Eduardo Lim RN on 8/15/20 at 10:15 AM EDT

## 2020-08-16 LAB
EKG ATRIAL RATE: 78 BPM
EKG P AXIS: 72 DEGREES
EKG P-R INTERVAL: 162 MS
EKG Q-T INTERVAL: 428 MS
EKG QRS DURATION: 92 MS
EKG QTC CALCULATION (BAZETT): 487 MS
EKG R AXIS: 26 DEGREES
EKG T AXIS: -103 DEGREES
EKG VENTRICULAR RATE: 78 BPM

## 2020-08-16 PROCEDURE — 93010 ELECTROCARDIOGRAM REPORT: CPT | Performed by: INTERNAL MEDICINE

## 2020-08-17 LAB — GLUCOSE BLD-MCNC: 107 MG/DL (ref 65–105)

## 2020-08-21 ENCOUNTER — OFFICE VISIT (OUTPATIENT)
Dept: ORTHOPEDIC SURGERY | Age: 55
End: 2020-08-21

## 2020-08-21 VITALS
SYSTOLIC BLOOD PRESSURE: 133 MMHG | WEIGHT: 232 LBS | DIASTOLIC BLOOD PRESSURE: 81 MMHG | HEART RATE: 66 BPM | BODY MASS INDEX: 41.11 KG/M2 | HEIGHT: 63 IN

## 2020-08-21 PROCEDURE — 99024 POSTOP FOLLOW-UP VISIT: CPT | Performed by: PHYSICIAN ASSISTANT

## 2020-08-21 ASSESSMENT — ENCOUNTER SYMPTOMS
CONSTIPATION: 0
ABDOMINAL PAIN: 0
DIARRHEA: 0
COUGH: 0
COLOR CHANGE: 0
APNEA: 0
SHORTNESS OF BREATH: 0
NAUSEA: 0
VOMITING: 0
ABDOMINAL DISTENTION: 0
CHEST TIGHTNESS: 0

## 2020-08-21 NOTE — PROGRESS NOTES
[facility]  [address]  [facility] [address]  Dept: [phone]  Dept Fax: [phone]        Post Operative Follow Up    Subjective:     Chief Complaint   Patient presents with    Knee Pain     right knee scope DOS- 8/14/20     Post Op Surgery:     The patient is here for a follow up after having a Right knee arthroscopy with partial medial meniscectomy and debridement, medial femoral condyle, and medial trochlea and patella. The date of surgery was on 08/14/2020. Therefore we are 1 week post op. Currently the patient's pain is controlled with norco. Physical therapy was not started. Patient presents today with a cane that is in good repair. Patient states that she is doing well but she would like to have a physical therapy script for [employer] so she may start going to PT. Review of Systems   Constitutional: Positive for activity change. Negative for appetite change, fatigue and fever. Respiratory: Negative for apnea, cough, chest tightness and shortness of breath. Cardiovascular: Negative for chest pain, palpitations and leg swelling. Gastrointestinal: Negative for abdominal distention, abdominal pain, constipation, diarrhea, nausea and vomiting. Genitourinary: Negative for difficulty urinating, dysuria and hematuria. Musculoskeletal: Positive for arthralgias. Negative for gait problem, joint swelling and myalgias. Skin: Negative for color change and rash. Neurological: Negative for dizziness, weakness, numbness and headaches. Psychiatric/Behavioral: Negative for sleep disturbance. I have reviewed the CC, HPI, ROS, PMH, FHX, Social History, and if not present in this note, I have reviewed in the patient's chart. I agree with the documentation provided by other staff and have reviewed their documentation prior to providing my signature indicating agreement.   Vitals:   /81   Pulse 66   Ht 5' 3\" (1.6 m)   Wt 232 lb (105.2 kg)   BMI 41.10 kg/m²  Body mass index is 41.1 kg/m². Physical Examination:     Orthopedics:    GENERAL: Alert and oriented X3 in no acute distress. SKIN: Intact without lesions or ulcerations. Portal sites are healing well with no signs of infection. NEURO: Intact to sensory and motor testing. VASC: Capillary refill is less than 3 seconds. Post Op Exam:    LOCATION: Right Knee  SITE: Distal neurocirculatory status is intact. EXAM: Sensation is intact to light touch, there is full motor function of the extremity. MUSC: Decreased quad tone  ROM: 0/102 degrees  Procedures:     Procedure:  No    Radiology:   X-ray was reviewed from 07/20/2020. Assessment:     1. Postop check    2. Primary osteoarthritis of both knees    3. Tear of medial meniscus of right knee, unspecified tear type, unspecified whether old or current tear, subsequent encounter      Plan:   Post Op Treatment : Patient had the treatment regimen reviewed today 1 week s/p Right knee arthroscopy with partial medial meniscectomy and debridement, medial femoral condyle, and medial trochlea and patella. I reviewed the films with the patient. We discussed some of the etiologies and natural histories of s/p Right knee arthroscopy. We also discussed the various treatment alternatives including anti-inflammatory medications, physical therapy, injections, further imaging studies and as a last resort surgery. During today's visit, I explained to the patient that the knee looks good but I will make sure that she gets the outpatient physical therapy prescription so she may increase her ROM and the quadriceps strength over the next few weeks. I also told the patient that she should work on elevating the knee past the level of her heart for twenty minutes three times a day to help reduce her swelling in the knee.  I also told her that I will give her a tubigrip as well to help reduce the swelling so she may increase her ROM within the knee. The patient then stated that she understands the plan and at this time, she has opted for a Tubigrip and a outpatient physical therapy prescription. The patient then informed me that she has degenerative disc disease and she has friends who have had a DEXA scan and she was wondering if she should get one. I then asked her if she has had one done before and she stated that she has not. I then told her that she should normally get one at 48years old and then if it is normal, she should get one every 5 years. I also told her that she should follow up with her PCP to get the DEXA scan done. The patient then stated that she understands. Lastly, the patient was informed that they may now wash their incision with soap and water but they should refrain from submerging their incision due to the risk of infection. So that means no pools, baths, lakes, hot tubs or ponds. Patient should return to the office in 3 weeks to f/u with Estela Escobar D.O. . The patient will call the office immediately with any problems that may arise. No orders of the defined types were placed in this encounter. Orders Placed This Encounter   Procedures   [facility] Therapy [facility]     Referral Priority:   Routine     Referral Type:   Eval and Treat     Referral Reason:   Specialty Services Required     Requested Specialty:   Physical Therapy     Number of Visits Requested:   1     Jim MAN, lorenza scribing for and in the presence of Damon Landeros PA-C. 8/21/2020  10:17 AM      Damon MAN PA-C, have personally seen this patient, reviewed the chart including history, and imaging if done. I personally  performed the physical exam and obtained any needed additional history. I placed orders, performed or supervised procedures and developed the treatment plan.     Electronically signed by Shaheed Vences PA-C, on 8/21/2020 at 10:18 AM      Electronically signed by Shaheed Vences PA-C, on 8/21/2020 at 10:17 AM

## 2020-08-25 ENCOUNTER — HOSPITAL ENCOUNTER (OUTPATIENT)
Dept: PHYSICAL THERAPY | Facility: CLINIC | Age: 55
Setting detail: THERAPIES SERIES
Discharge: HOME | End: 2020-08-25
Payer: COMMERCIAL

## 2020-08-25 NOTE — FLOWSHEET NOTE
[] [address]  [address]    [phone]  F: [phone]   [x] [address]  [facility]   [address]  P: [phone]  F: [phone]  [] [place]  [address] [facility]  P: [phone]  F: [phone]  [] [address] [facility]   [address]   [place]: [phone]  F: [phone]   [] [place] [address] [facility] [address]  [place]: [phone]   F: [phone]     Physical Therapy Cancel/No Show note    Date: 2020  Patient: Paige Muniz  : 1965  MRN: [id number]    Cancels/No Shows to date:     For today's appointment patient:    [x]  Cancelled    [] Rescheduled appointment    [] No-show     Reason given by patient:    []  Patient ill    []  Conflicting appointment    [] No transportation      [] Conflict with work    [] No reason given    [] Weather related    [] [id number]    [x] Other:      Comments: conflicting appt; eval  rescheduled for 20       [x] Next appointment was confirmed    Electronically signed by: Nav Kendrick PT

## 2020-08-27 ENCOUNTER — HOSPITAL ENCOUNTER (OUTPATIENT)
Dept: PHYSICAL THERAPY | Facility: CLINIC | Age: 55
Setting detail: THERAPIES SERIES
Discharge: HOME | End: 2020-08-27
Payer: COMMERCIAL

## 2020-08-27 PROCEDURE — 97161 PT EVAL LOW COMPLEX 20 MIN: CPT

## 2020-08-27 NOTE — PLAN OF CARE
[x] [address] [facility]  Outpatient Rehabilitation &  Therapy  [address] [facility] [address]   [address]  P: [phone]  F: [phone]       Physical Therapy Plan of Care    Date:  2020  Patient: William Aschoff  : 1965  MRN: [id number]  Physician: Damon Landeros PA-C                                Insurance: [employer] benefits  Medical Diagnosis: OA B knees; tear medial meniscus R knee  Rehab Codes: M25.561; M25.562; M25.661; R29.3; R29.6; M62.591; M62.592; M25.461  Onset date: 2020                         Next Dr's appt.: 2020     Assessment:  Patient would benefit from skilled physical therapy services in order to: improve flexibility and strength of the R knee so that it can become her main source of weightbearing and stability for when she has surgery on the L knee.     Problems:    [x]? ? Pain: 6/10 R knee with history of locking with activity  [x]? ? ROM: -10A/0-104 supine R knee  [x]? ? Strength: B LE weakness with R quadriceps more involved at this time  [x]? ? Function: 71.87% deficit per LEFI   [x]? Other:  Edema R knee and ecchymosis R LE           STG: (to be met in 6  treatments)  1. ? Pain: below 6/10 with initiation of clinic and home program  2. ? ROM: 10 degrees A/PROM R knee  3. ? Strength: tolerate moderate strength program for R LE (and L) to prepare for increased R weightbearing and L knee surgery  4. ? Function: ambulate on level surfaces without device for at least 1/4 mile without increase in pain  5. Patient to be independent with home exercise program as demonstrated by performance with correct form without cues. 6. Demonstrate Knowledge of fall prevention - pt has phone with her at home and when out. MET 2020  7. Poor balance  LTG: (to be met in 12 treatments)  1. Below 3/10 R knee with daily activities  2. Be able to ascend/descend stairs with full weightbearing through R LE and be able to sit in a chair with good R LE control  3.  ROM R LE at least 0-125 degrees  4. Function below 50% restricted per LEFI  5. No ecchymosis and minimal edema R knee  6. At least 4+/5 strength so she is able to use R LE primarily with stairs and sit/stand  7. SLS at least 5 seconds on R LE so she is able to do daily activities such as go up stairs and have good balance to do so.                    Patient goals:\"full range back\" in R knee     Rehab Potential:  [x]? Good  []? Fair  []? Poor    Suggested Professional Referral:  [x]? No  []? Yes:  Barriers to Goal Achievement:  []? No  [x]? Yes: chronic medical/physical conditions  Domestic Concerns:  [x]? No  []? Yes:     Treatment Plan:  [x]? Therapeutic Exercise   29563                 [x]? Therapeutic Activity  78560 [x]? Vasopneumatic cold with compression  57299               [x]? Gait Training    70011     [x]? Neuromuscular Re-education  [id number]     [x]? Manual Therapy  57914     [x]? Instruction in HEP                 Frequency:  2 x/week for 12 visits         Electronically signed by: Juani Lorenzo PT        Physician Signature:________________________________Date:__________________  By signing above or cosigning this note, I have reviewed this plan of care and certify a need for medically necessary rehabilitation services.      *PLEASE SIGN ABOVE AND FAX BACK ALL PAGES*

## 2020-08-27 NOTE — CONSULTS
[x] [address] [facility]  Outpatient Rehabilitation &  Therapy  [facility] [address]   [address]  P: [phone]  F: [phone]     Physical Therapy Lower Extremity Evaluation    Date:  2020  Patient: Cody Cole  : 1965  MRN: [id number]  Physician: Zeus Haynes PA-C     Insurance: [employer] dual benefits  Medical Diagnosis: OA B knees; tear medial meniscus R knee  Rehab Codes: M25.561; M25.562; M25.661; R29.3; R29.6; M62.591; M62.592; M25.461  Onset date: 2020    Next Dr's appt.: 2020     SURGERY DATE: 2020     Subjective:   CC: painful R inferior knee and posteriorly. Pt with swelling in R knee. Has constant numbness, may be due to MS. Pt recovering from meniscectomy/arthroscopy R knee. HPI: (onset date) pt had R knee surgery mid August and is to have L TKA at the end of September due to severe arthritis. Pt with several baker's cysts posterior R knee. R knee needs to be strong for when she has surgery on the L. Prior to surgery she would have locking R knee, especially with going up steps. She has fallen up steps due to locking. Can only lead with L leg going up/down. PMHx: [x] Diabetes [x] HTN     [x] Heart Fqgwriyl-A-yow-inplanted lip recorder [x] Cancer: colon, breast [x] Arthritis: RA/OA, fibromyalgia, DDD [x] Other: MS, depression; gastrectomy, sigmoidectomy, seizures              [x] Refer to full medical chart  In EPIC       Comorbidities:   [x] Obesity fibromyalgia    DDD   [x] Stroke  MS    [x] Rheumatic disease     Tests: [x] X-Ray: 2020  4 views of the right knee including AP, bilateral tunnel, and lateral in    the upright position, and skyline views reveal anatomic alignment with no    fracture or dislocation. Kellgren grade 1 changes of osteoarthritis (joint    space narrowing, osteophyte, subchondral sclerosis, bony deformity/cyst)    of the medial compartment(s). No osseous loose bodies. No bony erosion or    periosteal reaction.  No soft tissue masses.           Impression: Mild osteoarthritis of the right knee.            Medications: [x] Refer to full medical record  [x] Other: gabapentin, robaxin, trazodone  Allergies:      [x] Refer to full medical record     Function:    Patient lives with: boyfriend   In what type of home [x]  One story   apartment   Employer    Job Status    [x] Disability    Work activities/duties Not very active due to multiple issues: spine/knees;; does some light standing and supine knee exercises 1-2 times per day; likes to do [employer]; has returned to driving       ADL/IADL Previous level of function Current level of function Who currently assists the patient with task   Bathing  [x] Independent [x] Independent Has a grab bar   Dress/grooming [x] Independent [x] Independent      Driving [x] Independent [x] Independent    Grocery shop/meal prep [x] Independent [x] Independent      Gait Prior level of function Current level of function    [x] Independent [x] Independent   Device: [x] Independent [x] Independent - just eliminated use of straight cane 2 days ago     Pain:  [x] Yes   Location: R and L knee Pain Rating: (0-10 scale) 6/10  Pain altered Tx:    [x] No      Symptoms:  [x] Improving   Better:      [x] Lying    [x] Other: elevating legs, ice  Worse:   [x] Bend   [x] Other:unsupported sitting increases back pain; , stairs, squatting-unable      Objective:    ROM  ° A/P STRENGTH    Left Right Left Right   Hip Flex WFL Mild tight 4+ 4+   Ext       ER WFL WFL     IR WFL WFL     ABD       ADD       Knee Flex 132 104* 4 4   Supine Ext 0 0     Sitting Ext -10 -10 4 4-*   Ankle DF  10 4+ 4   PF   4 4   INV   4- 4+   EVER   4- 4+   HS  60     *pain    OBSERVATION No Deficit Deficit Not Tested Comments   Posture       Forward Head [] [x] []    Rounded Shoulders [] [x] []    Kyphosis [x] [] []    Lordosis [] [x] [] decreased   Lateral Shift [x] [] []    Genu Valgus [] [x] []    Genu Varus [x] [] []    Genu Recurvatum [x] prepare for increased R weightbearing and L knee surgery  4. ? Function: ambulate on level surfaces without device for at least 1/4 mile without increase in pain  5. Patient to be independent with home exercise program as demonstrated by performance with correct form without cues. 6. Demonstrate Knowledge of fall prevention - pt has phone with her at home and when out. MET 8/27/2020  7. Poor balance  LTG: (to be met in 12 treatments)  1. Below 3/10 R knee with daily activities  2. Be able to ascend/descend stairs with full weightbearing through R LE and be able to sit in a chair with good R LE control  3. ROM R LE at least 0-125 degrees  4. Function below 50% restricted per LEFI  5. No ecchymosis and minimal edema R knee  6. At least 4+/5 strength so she is able to use R LE primarily with stairs and sit/stand  7. SLS at least 5 seconds on R LE so she is able to do daily activities such as go up stairs and have good balance to do so. Patient goals:\"full range back\" in R knee    Rehab Potential:  [x] Good  [] Fair  [] Poor   Suggested Professional Referral:  [x] No  [] Yes:  Barriers to Goal Achievement:  [] No  [x] Yes: chronic medical/physical conditions  Domestic Concerns:  [x] No  [] Yes:    Pt. Education:  [x] Plans/Goals, Risks/Benefits discussed  [x] Home exercise program- pt doing HEP    Method of Education: [x] Verbal  [x] Demo  [x] Written: K-taping  Comprehension of Education:  [x] Verbalizes understanding. [x] Demonstrates understanding. [] Needs Review. [] Demonstrates/verbalizes understanding of HEP/Ed previously given.     Treatment Plan:  [x] Therapeutic Exercise   67103     [x] Therapeutic Activity  97314 [x] Vasopneumatic cold with compression  95484    [x] Gait Training   [id number]    [x] Neuromuscular Re-education  88870    [x] Manual Therapy  91821    [x] Instruction in HEP         Frequency:  2 x/week for 12 visits        Todays Treatment:  Modalities:   Precautions: L knee severe OA - waiting for TKA  Exercises:  Exercise Reps/ Time Weight/ Level Comments                                 Other: k-taping to R lower leg with drainage to posterior R knee. Instructed in wear/removal.    Pt with understanding of HEP that the physician gave her and is regularly doing them. Specific Instructions for next treatment: progress with R knee patellar mobilizations, ROM, strengthening to tolerance; can include L LE: end with vasocompression. R knee      Evaluation Complexity:  History (Personal factors, comorbidities) [] 0 [] 1-2 [x] 3+   Exam (limitations, restrictions) [] 1-2 [] 3 [x] 4+   Clinical presentation (progression) [x] Stable [] Evolving  [] Unstable   Decision Making [x] Low [] Moderate [] High    [x] Low Complexity [] Moderate Complexity [] High Complexity       Treatment Charges: Mins Units   [x] Evaluation       [x]  Low       []  Moderate       []  High 40 1   []  Modalities     [x]  Ther Exercise 5 0   []  Manual Therapy     []  Ther Activities     []  Aquatics     []  Vasocompression     []  Other     MEDICARE TOTAL as of 8/27/2020: $82.82    TOTAL TREATMENT TIME: 45 min    Time in: 10:10 am   Time Out: 11:01 am    Electronically signed by: Taniya Winters PT        Physician Signature:________________________________Date:__________________  By signing above or cosigning this note, I have reviewed this plan of care and certify a need for medically necessary rehabilitation services.      *PLEASE SIGN ABOVE AND FAX BACK ALL PAGES*

## 2020-08-31 ENCOUNTER — APPOINTMENT (OUTPATIENT)
Dept: PHYSICAL THERAPY | Facility: CLINIC | Age: 55
End: 2020-08-31
Payer: COMMERCIAL

## 2020-09-03 ENCOUNTER — OFFICE VISIT (OUTPATIENT)
Dept: ORTHOPEDIC SURGERY | Age: 55
End: 2020-09-03
Payer: COMMERCIAL

## 2020-09-03 VITALS
HEART RATE: 74 BPM | WEIGHT: 232 LBS | SYSTOLIC BLOOD PRESSURE: 132 MMHG | BODY MASS INDEX: 41.11 KG/M2 | DIASTOLIC BLOOD PRESSURE: 82 MMHG | HEIGHT: 63 IN

## 2020-09-03 PROCEDURE — 99213 OFFICE O/P EST LOW 20 MIN: CPT | Performed by: ORTHOPAEDIC SURGERY

## 2020-09-03 PROCEDURE — 3017F COLORECTAL CA SCREEN DOC REV: CPT | Performed by: ORTHOPAEDIC SURGERY

## 2020-09-03 PROCEDURE — 4004F PT TOBACCO SCREEN RCVD TLK: CPT | Performed by: ORTHOPAEDIC SURGERY

## 2020-09-03 PROCEDURE — G8417 CALC BMI ABV UP PARAM F/U: HCPCS | Performed by: ORTHOPAEDIC SURGERY

## 2020-09-03 PROCEDURE — G8427 DOCREV CUR MEDS BY ELIG CLIN: HCPCS | Performed by: ORTHOPAEDIC SURGERY

## 2020-09-03 ASSESSMENT — ENCOUNTER SYMPTOMS
CONSTIPATION: 0
NAUSEA: 0
RESPIRATORY NEGATIVE: 1
ABDOMINAL PAIN: 0
SHORTNESS OF BREATH: 0
APNEA: 0
ABDOMINAL DISTENTION: 0
COLOR CHANGE: 0
DIARRHEA: 0
VOMITING: 0
COUGH: 0
CHEST TIGHTNESS: 0

## 2020-09-03 NOTE — PROGRESS NOTES
[facility]  [address] [place] [address]  Dept: [phone]  Dept Fax: [phone]          {RIGHT OR LEFT:76727} Knee - {Blank multiple:09442::\"New Patient\",\"Follow Up\"}     Subjective:     Chief Complaint   Patient presents with    Knee Pain     Left knee pain     HPI:     The patient is here for a follow up after having a Right knee arthroscopy with partial medial meniscectomy and debridement, medial femoral condyle, and medial trochlea and patella. The date of surgery was on 08/14/2020. Therefore we are 4.5 weeks post op. Currently the patient's pain is controlled with norco. Physical therapy was not started. Patient presents today with a cane that is in good repair.  Patient states that she is doing well but she would like to have a physical therapy script for [email or web address] so she may start going to PT.    ROS:   Review of Systems    Past Medical History:    Past Medical History:   Diagnosis Date    Atrial fibrillation ([address]     Breast cancer ([address]     bilateral remission since 2012    C. difficile colitis     2015    Colon cancer ([address]     stage IV remission since 2016    Depression     Diabetes mellitus ([address]     Diverticulosis     Fibromyalgia     Hyperlipidemia     Hypertension     Multiple sclerosis ([address]     Rheumatoid arthritis ([address]     Seizure ([address] 7/    last seizure 7/18/2020  first seizure 1992 after head injury    Unspecified cerebral artery occlusion with cerebral infarction     7/12  NOT DEFINITE    Wears glasses        Past Surgical History:    Past Surgical History:   Procedure Laterality Date    APPENDECTOMY  1985    BACK SURGERY      BREAST BIOPSY  9749-9858    BREAST LUMPECTOMY      bilateral [id number]     CARDIAC SURGERY      cardiac cath negative in 2018   [address], COLON, DIAGNOSTIC      HYSTERECTOMY of club or organization: None     Attends meetings of clubs or organizations: None     Relationship status: None    Intimate partner violence     Fear of current or ex partner: None     Emotionally abused: None     Physically abused: None     Forced sexual activity: None   Other Topics Concern    None   Social History Narrative    None       Family History:  Family History   Adopted: Yes       Vitals:   /82   Pulse 74   Ht 5' 3\" (1.6 m)   Wt 232 lb (105.2 kg)   BMI 41.10 kg/m²  Body mass index is 41.1 kg/m². Physical Examination:     Orthopedics:    GENERAL: Alert and oriented X3 in no acute distress. SKIN: Intact without lesions or ulcerations. NEURO: Intact to sensory and motor testing. VASC: Capillary refill is less than 3 seconds. KNEE EXAM    LOCATION: {RIGHT OR LEFT:22001} Knee  GEN: Alert and oriented X 3, in no acute distress. GAIT: The patient's gait was observed while entering the exam room and was noted to be {Blank single:49109::\"non\"} antalgic. The extremity is in {Blank single:64199::\"anatomic\",\"varus\",\"valgus\"} alignment. SKIN: Intact without rashes, lesions, or ulcerations. No obvious deformity or swelling. NEURO: The patient responds to light touch throughout {RIGHT LEFT:48839} LE. Patellar and Achilles reflexes are 2/4. VASC: The {RIGHT LEFT:32366} LE is neurovascularly intact with 2/4 DP and 2/4 PT pulses. Brisk capillary refill. ROM: ***/*** degrees. There is {mild/mod/sev:746933} effusion. MUSC: {DECREASED/INCREASED/UNCHANGED:15617::\"good\",\"slightly decreased\"} quad tone  LIGAMENT: Lachman's test is {Pos/neg/trace/123:105260::\"Negative\"} with {Prognosis:1673836860} endpoint. Anterior drawer {Pos/neg/trace/123:984384::\"Negative\"}. Posterior drawer {Pos/neg/trace/123:576054::\"Negative\"}. There is {NO/1+/2+:19598} varus instability at 0 degrees and {NO/1+/2+:19598} varus instability at 30 degrees.  There is {NO/1+/2+:45020} valgus instability at 0 degrees and {NO/1+/2+:15554} valgus instability at 30 degrees. SPECIAL: Conor test is {Blank single:19197::\"negative\",\"positive\"} with *** clunks, *** crepitation, and *** pain. PALP: There is {Blank single:19197::\"medial\",\"lateral\"} joint line pain. Assessment:   No diagnosis found. Procedures:    Procedure: {YES/NO:06039}  Radiology:   No results found. Plan:   Treatment : I reviewed the {Blank multiple:19196::\"X-ray\",\"MRI\",\"CT Scan\"} with the patient and I informed them that the *** . We discussed the etiologies and natural histories of ***. We discussed the various treatment alternatives including anti-inflammatory medications, physical therapy, injections, further imaging studies and as a last result surgery. During today's visit, *** . The patient has opted for ***. A physical therapy prescription {WAS/WAS NOT:0472629039::\"was not\"} given. A Rx of *** {WAS/WAS NOT:5277539213::\"was not\"} given. Patient should return to the clinic in {#:21285} {days/wks/mos/yrs:781212} to follow up with {Blank single:19197::\"Paul Saez Pam. O.\",\" Jill Torres PA-C\"}. The patient will call the office immediately with any problems. No orders of the defined types were placed in this encounter. No orders of the defined types were placed in this encounter. I, {Blank single:19197::\"Edward Day V\",\"Xiao Platt, MS, AT, ATC\",\"Gabino Soares, MS, AT, ATC\"}, am scribing for and in the presence of {Blank single:19197::\"Kirk Lindalou Goldberg D. O.\",\"Xiao Nunez PA-C\"}.  9/3/2020  4:18 PM    ***    Electronically signed by Lisseth Allen PA-C, on 9/3/2020 at 4:18 PM

## 2020-09-03 NOTE — PROGRESS NOTES
132/82   Pulse 74   Ht 5' 3\" (1.6 m)   Wt 232 lb (105.2 kg)   BMI 41.10 kg/m²  Body mass index is 41.1 kg/m². Physical Examination:     Orthopedics:    GENERAL: Alert and oriented X3 in no acute distress. SKIN: Intact without lesions or ulcerations. Incision is healing well with no signs of infection   NEURO: Intact to sensory and motor testing. VASC: Capillary refill is less than 3 seconds. Post Op Exam:    LOCATION: Right knee  SITE: Distal neurocirculatory status is intact. EXAM: Sensation is intact to light touch, there is full motor function of the extremity. PALP: mild pain over the incisions  ROM: 0/137 degrees  Procedures:     Procedure:  No    Radiology:   No results found. Assessment:     1. S/P arthroscopic surgery of right knee      Plan:   Post Op Treatment : Patient had the treatment regimen reviewed today 4.5 weeks s/p Right knee arthroscopy with partial medial meniscectomy and debridement, medial femoral condyle, and medial trochlea and patella. I reviewed the films with the patient. We discussed some of the etiologies and natural histories of recovery after a knee arthroscopy. We also discussed the various treatment alternatives including anti-inflammatory medications, physical therapy, injections, further imaging studies and as a last resort surgery. During today's visit, we discussed that she is doing really well. She wanted to discuss options for the left knee. Patient should return to the office in 4 weeks to f/u with  Ny Souza PA-C. The patient will call the office immediately with any problems that may arise. No orders of the defined types were placed in this encounter. No orders of the defined types were placed in this encounter.       John Escoto PA-C, am scribing for and in the presence of Vern Stevens D.OAudrey. 9/3/2020  4:28 PM      ***    Electronically signed by Bhargav Wahl PA-C, on 9/3/2020 at 4:28 PM

## 2020-09-04 ASSESSMENT — ENCOUNTER SYMPTOMS
ABDOMINAL PAIN: 0
COLOR CHANGE: 0
NAUSEA: 0
CHEST TIGHTNESS: 0
COUGH: 0
ABDOMINAL DISTENTION: 0
CONSTIPATION: 0
DIARRHEA: 0
APNEA: 0
SHORTNESS OF BREATH: 0
VOMITING: 0
RESPIRATORY NEGATIVE: 1

## 2020-09-04 NOTE — PROGRESS NOTES
[address] [facility]  [address] [place] [address]  Dept: [phone]  Dept Fax: [phone]          Left Knee - Follow Up     Subjective:     Chief Complaint   Patient presents with    Knee Pain     Left knee pain     HPI:     Seema Parker presents today for  left knee pain. The pain has been present for 2 weeks with the left knee and at least 30 years for the right knee. The patient states that 29 years ago she fractured her right patella by a fall while carrying her children. The patient has tried knee braces, crutches, heat/ice, physical therapy, rest, Tramadol, Gabapentin, Mobic, and Motrin with no improvement. The pain is now described as Achy and Sharp. There is pain with weight bearing. The knee has swelled. There is painful popping and clicking. The knee has caught or locked up like something is getting stuck deep inside her knee. The knee has not given out. It is stiff upon arising from sitting. It is painful to go up and down stairs and sit for a prolonged time. The patient has had a cortisone injection. Her last cortisone injection was about a year ago. The patient has not tried a lubrication injection. The patient has tried physical therapy in 2016 and 2018 for her bilateral knees. The patient has not had surgery. The patient has a history of MS. She also is on medications to treat for seizures. The patient's right knee is worse than her left. There is more painful popping, locking, and sharp stabbing pain in her left knee. Physical therapy and injections have not helped with the patient's left knee pain. Right knee arthroscopy with partial medial meniscectomy and debridement, medial femoral condyle, and medial trochlea and patella. The date of surgery was on 08/14/2020. The patient is feeling much better and would like to consider a surgery on her left knee.       ROS:   Review of Systems   Constitutional: Positive for ARTHROSCOPY WITH PARTIAL MEDIAL MENISCECTOMY WITH DEBRIDEMENT performed by Elfego Carvajal DO at [address] [place] [address] SIGMOIDECTOMY  2016    for cancer    SLEEVE GASTRECTOMY  5/19/15    GASTRECTOMY SLEEVE LAPAROSCOPIC           TONSILLECTOMY  1985    UPPER GASTROINTESTINAL ENDOSCOPY  1/30/15       CurrentMedications:   Current Outpatient Medications   Medication Sig Dispense Refill    methocarbamol (ROBAXIN) 750 MG tablet Take 1,500 mg by mouth 3 times daily      traZODone (DESYREL) 50 MG tablet Take 1 tablet by mouth nightly as needed for Sleep 30 tablet 0    metFORMIN (GLUCOPHAGE) 500 MG tablet Take 1 tablet by mouth 2 times daily (with meals) 60 tablet 0    rivaroxaban (XARELTO) 20 MG TABS tablet Take 1 tablet by mouth Daily with supper 30 tablet 0    QUEtiapine (SEROQUEL) 100 MG tablet Take 1 tablet by mouth nightly 30 tablet 0    carvedilol (COREG) 12.5 MG tablet Take 1 tablet by mouth 2 times daily (with meals) 60 tablet 0    furosemide (LASIX) 40 MG tablet Take 1 tablet by mouth daily 30 tablet 0    magnesium oxide (MAG-OX) 400 (241.3 Mg) MG TABS tablet Take 1 tablet by mouth daily 30 tablet 0    esomeprazole (NEXIUM) 40 MG delayed release capsule Take 1 capsule by mouth every morning (before breakfast) 30 capsule 0    ondansetron (ZOFRAN-ODT) 4 MG disintegrating tablet Take 4 mg by mouth every 8 hours as needed for Nausea or Vomiting      cetirizine (ZYRTEC) 10 MG tablet Take 10 mg by mouth daily      coenzyme Q10 100 MG CAPS capsule Take 100 mg by mouth 3 times daily      diclofenac (CATAFLAM) 50 MG tablet Take 50 mg by mouth daily as needed (headache) May repeat in 6 hours, max 3 tablets per day      SUPER B COMPLEX/C PO Take by mouth daily      loperamide (IMODIUM) 2 MG capsule Take 2 mg by mouth 4 times daily as needed for Diarrhea Takes two capsules at onset      lidocaine (LIDODERM) 5 % Place 1 patch onto the skin daily 12 hours on, 12 hours off.       fluticasone the patient has some osteoarthritis. We discussed the etiologies and natural histories of acute medial meniscal tear. We discussed the various treatment alternatives including anti-inflammatory medications, physical therapy, injections, further imaging studies and as a last result surgery. During today's visit, we discussed that after having her right knee arthroscopy her left knee is the worst at this time. For us to get more information, I believe we need to get an MRI of her left knee. The patient has opted for getting a left knee MRI. Patient should return to the clinic after her MRI to follow up with Glo Luna D.O. . The patient will call the office immediately with any problems. No orders of the defined types were placed in this encounter. Orders Placed This Encounter   Procedures    MRI KNEE LEFT WO CONTRAST     Standing Status:   Future     Standing Expiration Date:   9/3/2021     Order Specific Question:   Reason for exam:     Answer:   mechanical catching and locking       Elbert MAN PA-C, am scribing for and in the presence of Glo Luan D.O.. 9/7/2020  7:16 PM      IGlo DO, have personally seen this patient and I have reviewed the CC, PMH, FHX and Social History as provided by other clinical staff. I reassessed the HPI and ROS as scribed by Elbert Leslie PA-C in my presence and it is both accurate and complete. Thereafter, I personally performed the PE, reviewed the imaging and established the DX and POC. I agree with the documentation provided by the Physician Assistant. I have reviewed all documentation in its entirety prior to providing my signature indicating agreement. Any areas of disagreement are noted on the chart.     Electronically signed by Conchita Apodaca DO on 9/7/2020 at 7:17 PM        Electronically signed by Conchita Apodaca DO, on 9/7/2020 at 7:16 PM

## 2020-09-11 ENCOUNTER — HOSPITAL ENCOUNTER (OUTPATIENT)
Dept: PHYSICAL THERAPY | Facility: CLINIC | Age: 55
Setting detail: THERAPIES SERIES
Discharge: HOME | End: 2020-09-11
Payer: COMMERCIAL

## 2020-09-11 PROCEDURE — 97110 THERAPEUTIC EXERCISES: CPT

## 2020-09-11 PROCEDURE — 97016 VASOPNEUMATIC DEVICE THERAPY: CPT

## 2020-09-11 NOTE — FLOWSHEET NOTE
[] [facility]) [facility] &  Therapy  [address]  [phone]  F: [phone] [x] [address]  [place] [address]   [address]  P: [phone]  F: [phone] [] [address]  Therapy  [address]  P: [phone]  F: [phone] [] [address]  [facility]   [address]   [place]: [phone]  F: [phone]      Physical Therapy Daily Treatment Note    Date:  2020  Patient Name:  Justine Contreras    :  1965  MRN: [id number]  Physician: Adam Nevarez PA-C                                Insurance: [employer] dual benefits (12 visits approved 2020-10/30/2020)  Medical Diagnosis: OA B knees; tear medial meniscus R knee  Rehab Codes: M25.561; M25.562; M25.661; R29.3; R29.6; M62.591; M62.592; M25.461  Onset date: 2020                         Next 's appt.: 2020   Visit# / total visits: 2/    Cancels/No Shows: 1/0    SURGERY DATE: 2020    Subjective:    Pain:  [x] Yes  [] No Location: B knees Pain Rating: (0-10 scale) 6/10 B knees  Pain altered Tx:  [x] No  [] Yes  Action:  Comments: Pt reports she is having pain in B knees today. She reports it is more on the outside of the L knee and inside of the R knee.      Objective:  Modalities: Game Ready to R knee on min pressure and CP on L knee for 15' at end of treatment  Precautions: L knee severe OA - waiting for L TKA; pt is s/p R knee surgery   Exercises: Performed exercises bilaterally   Exercise Reps/ Time Weight/ Level Comments   NuStep 5'           Supine:      HS stretch 3x20\"ea     Quad Set  10\"x10ea     Heel Slides x10ea     SLR x10ea     SAQ x10ea           Sidelying:      Hip abduction  x10ea           Prone:      Hip extension  x10ea     HS curls x10ea           Standing:      Heel raises x15     Squats  x10     3 way hip x10ea     Marching  x10ea     HS curls  x10ea                       Other:     Specific Instructions for next treatment: progress with R knee patellar mobilizations, ROM, strengthening to tolerance; can include L LE: end with vasocompression. R knee    Treatment Charges: Mins Units   []  Modalities     [x]  Ther Exercise 30 2   []  Manual Therapy     []  Ther Activities     []  Aquatics     [x]  Vasocompression 15 1   []  Other     Total Treatment time 45 3   MEDICARE TOTAL as of 9/11/2020: $145.12    Assessment: [x] Progressing toward goals. Pt had a good tolerance to all exercises performed today. All exercises performed today were done bilaterally. Pt reported having a better tolerance to standing exercises versus mat exercises. Pt able to demonstrate good ROM on R knee, but was more painful and limited on L knee. Finished treatment with vascompression to R knee and CP to L knee to help with soreness. Pt reported feeling good at end of treatment. [] No change. [] Other:  [x] Patient would continue to benefit from skilled physical therapy services in order to: increase ROM and strength in B knees and decrease pain. STG: (to be met in 6  treatments)  1. ? Pain: below 6/10 with initiation of clinic and home program  2. ? ROM: 10 degrees A/PROM R knee  3. ? Strength: tolerate moderate strength program for R LE (and L) to prepare for increased R weightbearing and L knee surgery  4. ? Function: ambulate on level surfaces without device for at least 1/4 mile without increase in pain  5. Patient to be independent with home exercise program as demonstrated by performance with correct form without cues. 6. Demonstrate Knowledge of fall prevention - pt has phone with her at home and when out. MET 8/27/2020  7. Poor balance  LTG: (to be met in 12 treatments)  1. Below 3/10 R knee with daily activities  2.  Be able to ascend/descend stairs with full weightbearing through R LE and be able to sit in a chair with good R LE control  3. ROM R LE at least 0-125 degrees  4. Function below 50% restricted per LEFI  5. No ecchymosis and minimal edema R knee  6. At least 4+/5 strength so she is able to use R LE primarily with stairs and sit/stand  7. SLS at least 5 seconds on R LE so she is able to do daily activities such as go up stairs and have good balance to do so. Pt. Education:  [x] Yes  [] No  [x] Reviewed Prior HEP/Ed  Method of Education: [x] Verbal  [x] Demo  [] Written  Comprehension of Education:  [x] Verbalizes understanding. [x] Demonstrates understanding. [] Needs review. [x] Demonstrates/verbalizes HEP/Ed previously given. Plan: [x] Continue current frequency toward long and short term goals. [] Specific Instructions for subsequent treatments: continue to progress as tolerated.        Time FELDER:9052            Time Out: 3406F    Electronically signed by:  Edmond Jay PTA

## 2020-09-14 ENCOUNTER — OFFICE VISIT (OUTPATIENT)
Dept: ORTHOPEDIC SURGERY | Age: 55
End: 2020-09-14

## 2020-09-14 VITALS
BODY MASS INDEX: 41.11 KG/M2 | DIASTOLIC BLOOD PRESSURE: 75 MMHG | HEIGHT: 63 IN | HEART RATE: 88 BPM | SYSTOLIC BLOOD PRESSURE: 120 MMHG | WEIGHT: 232 LBS

## 2020-09-14 PROCEDURE — 99024 POSTOP FOLLOW-UP VISIT: CPT | Performed by: ORTHOPAEDIC SURGERY

## 2020-09-14 ASSESSMENT — ENCOUNTER SYMPTOMS
APNEA: 0
CONSTIPATION: 0
CHEST TIGHTNESS: 0
NAUSEA: 0
COUGH: 0
ABDOMINAL DISTENTION: 0
SHORTNESS OF BREATH: 0
COLOR CHANGE: 0
DIARRHEA: 0
ABDOMINAL PAIN: 0
VOMITING: 0

## 2020-09-14 NOTE — PROGRESS NOTES
[facility]  [address]  [facility] [address]  Dept: [phone]  Dept Fax: [phone]        Post Operative Follow Up    Subjective:     Chief Complaint   Patient presents with    Knee Pain     Right knee scope DOS- 8/14/20     Post Op Surgery:     The patient is here for a follow up after having a Right knee arthroscopy with partial medial meniscectomy and debridement, medial femoral condyle, and medial trochlea and patella. . The date of surgery was on 08/14/2020. Therefore we are 4 weeks post op. Currently the patient's pain is controlled with norco. Physical therapy was started. Patient presents today with no ambulatory devices. Patient states that she is doing well but her knee still tends to hurt sometimes after a lot of activity. She also states that her left knee seems to be getting a bit worse due to her increased activity. Patient states that she tried to get her MRI but she never received a call from the schedulers to schedule the MRI of the left knee. Patient also states that she will be going to [place] in December and she would like to have her knees injected before she attends so she may have good pain relief when she is down there. Review of Systems   Constitutional: Positive for activity change. Negative for appetite change, fatigue and fever. Respiratory: Negative for apnea, cough, chest tightness and shortness of breath. Cardiovascular: Negative for chest pain, palpitations and leg swelling. Gastrointestinal: Negative for abdominal distention, abdominal pain, constipation, diarrhea, nausea and vomiting. Genitourinary: Negative for difficulty urinating, dysuria and hematuria. Musculoskeletal: Positive for arthralgias and joint swelling. Negative for gait problem and myalgias. Skin: Negative for color change and rash. Neurological: Negative for dizziness, weakness, numbness and headaches. Psychiatric/Behavioral: Negative for sleep disturbance. I have reviewed the CC, HPI, ROS, PMH, FHX, Social History, and if not present in this note, I have reviewed in the patient's chart. I agree with the documentation provided by other staff and have reviewed their documentation prior to providing my signature indicating agreement. Vitals:   /75   Pulse 88   Ht 5' 3\" (1.6 m)   Wt 232 lb (105.2 kg)   BMI 41.10 kg/m²  Body mass index is 41.1 kg/m². Physical Examination:     Orthopedics:    GENERAL: Alert and oriented X3 in no acute distress. SKIN: Intact without lesions or ulcerations. Portal sites are well healed with no signs of infection. NEURO: Intact to sensory and motor testing. VASC: Capillary refill is less than 3 seconds. Post Op Exam:    LOCATION: Right knee  SITE: Distal neurocirculatory status is intact. EXAM: Sensation is intact to light touch, there is full motor function of the extremity. GAIT: The patient's gait was observed while entering the exam room and was noted to be non antalgic. The extremity is in anatomic alignment. SKIN: Intact without rashes, lesions, or ulcerations. No obvious deformity or swelling. NEURO: The patient responds to light touch throughout right LE. Patellar and Achilles reflexes are 2/4. VASC: The right LE is neurovascularly intact with 2/4 DP and 2/4 PT pulses. Brisk capillary refill. ROM: 0/126 degrees. There is mild effusion. No extension lag. MUSC: good quad tone  LIGAMENT: Lachman's test is Negative with Good endpoint. Anterior drawer Negative. Posterior drawer Negative. There is No varus instability at 0 degrees and No varus instability at 30 degrees. There is No valgus instability at 0 degrees and No valgus instability at 30 degrees. SPECIAL: Conor test is negative with no clunks, no crepitation, and no pain. PALP: There is no joint line pain.     Procedures:     Procedure:  No    Radiology:   X-ray was reviewed from 07/20/2020. Assessment:     1. S/P left knee arthroscopy    2. Acute medial meniscus tear of left knee, subsequent encounter      Plan:   Post Op Treatment : Patient had the treatment regimen reviewed today 4 weeks s/p Right knee arthroscopy with partial medial meniscectomy and debridement, medial femoral condyle, and medial trochlea and patella. I reviewed the films with the patient. We discussed some of the etiologies and natural histories of s/p Right knee arthroscopy. We also discussed the various treatment alternatives including anti-inflammatory medications, physical therapy, injections, further imaging studies and as a last resort surgery. During today's visit, I explained to the patient that her knee looks good and I feel she should finish up therapy. Then once she is done with therapy, I told her that she should continue doing her home exercises. I further explained to her that we will consider doing cortisone and lubricating injections at a later date to help her get good pain relief. The patient then stated that she should would like to have an MRI ordered for her left knee because her left knee has been giving out and she has been having increased pain as well. I then informed her that we ordered it at her last visit and I do not know why radiology did not call her. From there, I told her that we will try to get the imaging done by calling them ourselves today and then we will get her scheduled as well. The patient then stated that she understands the plan and she will get the MRI of the left knee done. Patient should return to the office after the MRI of the left knee and she is to f/u with [facility] D. O[facility] for an MRI review. The patient will call the office immediately with any problems that may arise. No orders of the defined types were placed in this encounter.     Orders Placed This Encounter   Procedures    MRI KNEE LEFT WO CONTRAST     Standing Status:   Future     Standing Expiration Date: 9/14/2021     Dl MAN Day V, am scribing for and in the presence of Joseph Hamilton D.O. 9/20/2020  12:24 PM        IJoseph DO, have personally seen this patient and I have reviewed the CC, PMH, FHX and Social History as provided by other clinical staff. I reassessed the HPI and ROS as scribed by Freda Moore Scribe in my presence and it is both accurate and complete. Thereafter, I personally performed the PE, reviewed the imaging and established the DX and POC. I agree with the documentation provided by the Medical Scribe. I have reviewed all documentation in its entirety prior to providing my signature indicating agreement. Any areas of disagreement are noted on the chart.     Electronically signed by Effie Munoz DO on 9/20/2020 at 12:24 PM        Electronically signed by Effie Munoz DO, on 9/20/2020 at 12:24 PM

## 2020-09-18 ENCOUNTER — HOSPITAL ENCOUNTER (OUTPATIENT)
Dept: PHYSICAL THERAPY | Facility: CLINIC | Age: 55
Setting detail: THERAPIES SERIES
Discharge: HOME | End: 2020-09-18
Payer: COMMERCIAL

## 2020-09-18 PROCEDURE — 97110 THERAPEUTIC EXERCISES: CPT

## 2020-09-18 NOTE — FLOWSHEET NOTE
[] [facility]  [address]  [phone]  F: [phone] [x] [address]  [facility]   [address]  P: [phone]  F: [phone] [] [address]  Therapy  [address]  P: [phone]  F: [phone] [] [address]  [facility]   [address]   [place]: [phone]  F: [phone]      Physical Therapy Daily Treatment Note    Date:  2020  Patient Name:  Khari Fulton    :  1965  MRN: [id number]  Physician: Shay Alford PA-C                                Insurance: [employer] dual benefits (12 visits approved 2020-10/30/2020)  Medical Diagnosis: OA B knees; tear medial meniscus R knee  Rehab Codes: M25.561; M25.562; M25.661; R29.3; R29.6; M62.591; M62.592; M25.461  Onset date: 2020                         Next Dr's appt.: 2020   Visit# / total visits: 3    Cancels/No Shows:     SURGERY DATE: 2020    Subjective:    Pain:  [x] Yes  [] No Location: B knees Pain Rating: (0-10 scale) 4/10 B knees  Pain altered Tx:  [x] No  [] Yes  Action:  Comments: States the doctor said she could discharge from PT and continue with HEP on own, patient states she is also dealing with other medical issues such as possible COPD and is busy with those appointments.     Objective:  Modalities: Game Ready to R knee on min pressure and CP on L knee for 15' at end of treatment Not today  Precautions: L knee severe OA - waiting for L TKA; pt is s/p R knee surgery   Exercises: Performed exercises bilaterally   Exercise Reps/ Time Weight/ Level Comments   NuStep 5'           Supine:      HS stretch 3x20\"ea     Quad Set  10\"x10ea     Heel Slides x10ea     SLR x10ea     SAQ x10ea           Sidelying:      Hip abduction  x10ea           Prone:      Hip extension x10ea     HS curls x10ea           Standing:      Heel raises x15     Squats  3x pain  Unable due to pain in left knee   3 way hip x10ea     Marching  x10ea     HS curls  x10ea                       Other:     Specific Instructions for next treatment:    Treatment Charges: Mins Units   []  Modalities     [x]  Ther Exercise 38 3   []  Manual Therapy     []  Ther Activities     []  Aquatics     []  Vasocompression     []  Other     Total Treatment time 38 3   MEDICARE TOTAL as of 9/18/2020: $209.86    Assessment: [x] Progressing toward goals. All exercises performed today were done bilaterally, patient demonstrated good exercise technique. Unable to complete squats due to left knee pain. Issued updated HEP and reviewed with patient to continue on own. Left knee more painful than right at end of session, rates right knee at 5/10. Patient reports pain under incision site in right knee, feels hard to touch. Improved strength and ROM since initial eval, see STG below. [] No change. [] Other:  [x] Patient would continue to benefit from skilled physical therapy services in order to: increase  strength in B knees and decrease pain. STG: (to be met in 6  treatments)  1. ? Pain: below 6/10 with initiation of clinic and home program 9/18/20- average 6/10 right knee pain with PT  2. ? ROM: 10 degrees A/PROM R knee 9/18/20 MET  0-125 (eval: 0-104)  3. ? Strength: tolerate moderate strength program for R LE (and L) to prepare for increased R weightbearing and L knee surgery 9/18/20- Right hip flexion 4+/5 (eval: 4+/5), right knee flexion 5/5 (4/5), knee knee extension 4+/5 (4-/5*), ankle DF 5/5 (4/5), PF 5/5 (4/5)  4. ? Function: ambulate on level surfaces without device for at least 1/4 mile without increase in pain 9/18/20- can walk ~10 minutes before pain increases to 8/10  5. Patient to be independent with home exercise program as demonstrated by performance with correct form without cues.   6. Demonstrate Knowledge of fall prevention - pt has phone with her at home and when out. MET 2020  7. Poor balance  LTG: (to be met in 12 treatments): NOT MET ONLY SEEN 3 TIMES  1. Below 3/10 R knee with daily activities  2. Be able to ascend/descend stairs with full weightbearing through R LE and be able to sit in a chair with good R LE control  3. ROM R LE at least 0-125 degrees  4. Function below 50% restricted per LEFI  5. No ecchymosis and minimal edema R knee  6. At least 4+/5 strength so she is able to use R LE primarily with stairs and sit/stand  7. SLS at least 5 seconds on R LE so she is able to do daily activities such as go up stairs and have good balance to do so. Pt. Education:  [x] Yes  [] No  [x] Reviewed Prior HEP/Ed  Method of Education: [x] Verbal  [x] Demo  [x] Written  Comprehension of Education:  [x] Verbalizes understanding. [x] Demonstrates understanding. [] Needs review. [x] Demonstrates/verbalizes HEP/Ed previously given. Plan: [] Continue current frequency toward long and short term goals.     [] Specific Instructions for subsequent treatments:     Discharge to HEP    Time B            Time Out: 10:10am    Electronically signed by:  Charmaine Manley PTA

## 2020-09-21 NOTE — DISCHARGE SUMMARY
[x] [address] [facility]  Outpatient Rehabilitation &  Therapy  [facility]   [address]  P: [phone]  F: [phone]     Physical Therapy Discharge Note    Date: 2020      Patient: Evelyn Rollins  : 1965  MRN: [id number]    Cony Blum PA-C                                Insurance: [employer] dual benefits (12 visits approved 2020-10/30/2020)  Medical Diagnosis: OA B knees; tear medial meniscus R knee  Rehab Codes: M25.561; M25.562; M25.661; R29.3; R29.6; M62.591; M62.592; M25.461  Onset date: 2020                         Next Dr's appt.: 2020   Visit# / total visits: 3/12                      Cancels/No Shows:      SURGERY DATE: 2020  Date of initial visit: 2020                Date of final visit: 2020      Subjective:  Pain:  [x]? Yes  []? No   Location: B knees       Pain Rating: (0-10 scale) 4/10 B knees  Pain altered Tx:  [x]? No  []? Yes  Action:  Comments: States the doctor said she could discharge from PT and continue with HEP on own, patient states she is also dealing with other medical issues such as possible COPD and is busy with those appointments. Assessment:   All exercises performed today were done bilaterally, patient demonstrated good exercise technique. Unable to complete squats due to left knee pain. Issued updated HEP and reviewed with patient to continue on own. Left knee more painful than right at end of session, rates right knee at 5/10. Patient reports pain under incision site in right knee, feels hard to touch. Improved strength and ROM since initial eval, see STG below.                     STG: (to be met in 6  treatments)  1. ? Pain: below 6/10 with initiation of clinic and home program 20- average 6/10 right knee pain with PT  2. ? ROM: 10 degrees A/PROM R knee 20 MET  0-125 (eval: 0-104)  3. ? Strength: tolerate moderate strength program for R LE (and L) to prepare for increased R weightbearing and L knee surgery 9/18/20- Right hip flexion 4+/5 (eval: 4+/5), right knee flexion 5/5 (4/5), knee knee extension 4+/5 (4-/5*), ankle DF 5/5 (4/5), PF 5/5 (4/5)  4. ? Function: ambulate on level surfaces without device for at least 1/4 mile without increase in pain 9/18/20- can walk ~10 minutes before pain increases to 8/10  5. Patient to be independent with home exercise program as demonstrated by performance with correct form without cues. 6. Demonstrate Knowledge of fall prevention - pt has phone with her at home and when out. MET 8/27/2020  7. Poor balance  LTG: (to be met in 12 treatments): NOT MET ONLY SEEN 3 TIMES  8. Below 3/10 R knee with daily activities  9. Be able to ascend/descend stairs with full weightbearing through R LE and be able to sit in a chair with good R LE control  10. ROM R LE at least 0-125 degrees  11. Function below 50% restricted per LEFI  12. No ecchymosis and minimal edema R knee  13. At least 4+/5 strength so she is able to use R LE primarily with stairs and sit/stand  14. SLS at least 5 seconds on R LE so she is able to do daily activities such as go up stairs and have good balance to do so.       Treatment to Date:  [x] Therapeutic Exercise      [x] Instruction in Home Exercise Program                       [x] Vasocompression                Discharge Status:        [x] Pt to continue exercise/home instructions independently. Electronically signed by Dinora Tate PT on 9/21/2020 at 6:03 PM      If you have any questions or concerns, please don't hesitate to call.   Thank you for your referral.

## 2020-09-22 ENCOUNTER — APPOINTMENT (OUTPATIENT)
Dept: CT IMAGING | Age: 55
End: 2020-09-22
Payer: COMMERCIAL

## 2020-09-22 ENCOUNTER — HOSPITAL ENCOUNTER (EMERGENCY)
Age: 55
Discharge: HOME | End: 2020-09-22
Attending: EMERGENCY MEDICINE
Payer: COMMERCIAL

## 2020-09-22 VITALS
BODY MASS INDEX: 39.55 KG/M2 | OXYGEN SATURATION: 100 % | TEMPERATURE: 98.2 F | SYSTOLIC BLOOD PRESSURE: 159 MMHG | HEIGHT: 63 IN | DIASTOLIC BLOOD PRESSURE: 95 MMHG | HEART RATE: 74 BPM | RESPIRATION RATE: 14 BRPM | WEIGHT: 223.2 LBS

## 2020-09-22 PROCEDURE — 6370000000 HC RX 637 (ALT 250 FOR IP): Performed by: NURSE PRACTITIONER

## 2020-09-22 PROCEDURE — 99283 EMERGENCY DEPT VISIT LOW MDM: CPT

## 2020-09-22 PROCEDURE — 72131 CT LUMBAR SPINE W/O DYE: CPT

## 2020-09-22 RX ORDER — LIDOCAINE 4 G/G
1 PATCH TOPICAL ONCE
Status: DISCONTINUED | OUTPATIENT
Start: 2020-09-22 | End: 2020-09-22 | Stop reason: HOSPADM

## 2020-09-22 ASSESSMENT — ENCOUNTER SYMPTOMS: BACK PAIN: 1

## 2020-09-22 ASSESSMENT — PAIN SCALES - GENERAL: PAINLEVEL_OUTOF10: 9

## 2020-09-22 NOTE — ED PROVIDER NOTES
[facility] ED  EMERGENCY DEPARTMENT ENCOUNTER      Pt Name: Maria Dolores Brown  MRN: [id number]  [place] 1965  Date of evaluation: 9/22/2020  Provider: Rondell Frankel, [address]       Chief Complaint   Patient presents with    Fall    Back Pain         HISTORY Farida  (Location/Symptom, Timing/Onset, Context/Setting, Quality, Duration, Modifying Factors, Severity.)   Maria Dolores Brown is a 54 y.o. female who presents to the emergency department for evaluation of lower back pain after she missed a step walking out of her daughter's house and fell striking her lower back on a concrete step. Denies hitting her head. No loss of bowel or bladder control. Patient states she has a history of L1 compression fracture. Rates her pain 8 out of 10. Patient states she took tramadol and Robaxin at home prior to arrival.      Nursing Notes were reviewed.     PASTMEDICAL HISTORY     Past Medical History:   Diagnosis Date    Atrial fibrillation ([address]     Breast cancer ([address]     bilateral remission since 2012    C. difficile colitis     2015    Colon cancer ([address]     stage IV remission since 2016    Depression     Diabetes mellitus ([address]     Diverticulosis     Fibromyalgia     Hyperlipidemia     Hypertension     Multiple sclerosis ([address]     Rheumatoid arthritis ([address]     Seizure ([address] 7/    last seizure 7/18/2020  first seizure 1992 after head injury    Unspecified cerebral artery occlusion with cerebral infarction     7/12  NOT DEFINITE    Wears glasses          SURGICAL HISTORY       Past Surgical History:   Procedure Laterality Date    APPENDECTOMY  1985    BACK SURGERY      BREAST BIOPSY  [id number]    BREAST LUMPECTOMY      bilateral [id number]     CARDIAC SURGERY      cardiac cath negative in 2018   [facility], COLON, DIAGNOSTIC      HYSTERECTOMY  2010    INSERTABLE CARDIAC MONITOR      KNEE ARTHROSCOPY Right 08/14/2020    RIGHT KNEE ARTHROSCOPY WITH PARTIAL MEDIAL MENISCECTOMY WITH DEBRIDEMENT (Right     KNEE ARTHROSCOPY Right 8/14/2020    RIGHT KNEE ARTHROSCOPY WITH PARTIAL MEDIAL MENISCECTOMY WITH DEBRIDEMENT performed by Pavan Acuña DO at [address] [place] [address] SIGMOIDECTOMY  2016    for cancer    SLEEVE GASTRECTOMY  5/19/15    GASTRECTOMY SLEEVE [facility]    UPPER GASTROINTESTINAL ENDOSCOPY  1/30/15         CURRENT MEDICATIONS     Current Discharge Medication List      CONTINUE these medications which have NOT CHANGED    Details   methocarbamol (ROBAXIN) 750 MG tablet Take 1,500 mg by mouth 3 times daily      gabapentin (NEURONTIN) 600 MG tablet Take 1 tablet by mouth 3 times daily for 30 days.   Qty: 90 tablet, Refills: 0      DULoxetine (CYMBALTA) 60 MG extended release capsule Take 1 capsule by mouth 2 times daily  Qty: 60 capsule, Refills: 0      traZODone (DESYREL) 50 MG tablet Take 1 tablet by mouth nightly as needed for Sleep  Qty: 30 tablet, Refills: 0      metFORMIN (GLUCOPHAGE) 500 MG tablet Take 1 tablet by mouth 2 times daily (with meals)  Qty: 60 tablet, Refills: 0      rivaroxaban (XARELTO) 20 MG TABS tablet Take 1 tablet by mouth Daily with supper  Qty: 30 tablet, Refills: 0      QUEtiapine (SEROQUEL) 100 MG tablet Take 1 tablet by mouth nightly  Qty: 30 tablet, Refills: 0      carvedilol (COREG) 12.5 MG tablet Take 1 tablet by mouth 2 times daily (with meals)  Qty: 60 tablet, Refills: 0      furosemide (LASIX) 40 MG tablet Take 1 tablet by mouth daily  Qty: 30 tablet, Refills: 0      magnesium oxide (MAG-OX) 400 (241.3 Mg) MG TABS tablet Take 1 tablet by mouth daily  Qty: 30 tablet, Refills: 0      esomeprazole (NEXIUM) 40 MG delayed release capsule Take 1 capsule by mouth every morning (before breakfast)  Qty: 30 capsule, Refills: 0      ondansetron (ZOFRAN-ODT) 4 MG disintegrating tablet Take 4 mg by mouth every 8 hours as needed for Nausea or Vomiting      cetirizine (ZYRTEC) 10 MG tablet Take 10 mg by mouth daily      coenzyme Q10 100 MG CAPS capsule Take 100 mg by mouth 3 times daily      diclofenac (CATAFLAM) 50 MG tablet Take 50 mg by mouth daily as needed (headache) May repeat in 6 hours, max 3 tablets per day      SUPER B COMPLEX/C PO Take by mouth daily      loperamide (IMODIUM) 2 MG capsule Take 2 mg by mouth 4 times daily as needed for Diarrhea Takes two capsules at onset      lidocaine (LIDODERM) 5 % Place 1 patch onto the skin daily 12 hours on, 12 hours off. fluticasone (FLONASE) 50 MCG/ACT nasal spray 1 spray by Each Nostril route daily             ALLERGIES     Ace inhibitors; Betadine [povidone iodine]; Iodine; Lurasidone; Lurasidone hcl; Nitrofurantoin; Povidone-iodine; Shellfish allergy; Shellfish-derived products; Iodides; Lamotrigine; Macrobid [nitrofurantoin macrocrystal];  Morphine; Pyridium [phenazopyridine hcl]; and Topiramate    FAMILY HISTORY       Family History   Adopted: Yes          SOCIAL HISTORY       Social History     Socioeconomic History    Marital status:      Spouse name: None    Number of children: None    Years of education: None    Highest education level: None   Occupational History    Occupation: Disabled   Social Needs    Financial resource strain: None    Food insecurity     Worry: None     Inability: None    Transportation needs     Medical: None     Non-medical: None   Tobacco Use    Smoking status: Current Every Day Smoker     Packs/day: 1.00     Years: 20.00     Pack years: 20.00     Last attempt to quit: 2020     Years since quittin.1    Smokeless tobacco: Never Used   Substance and Sexual Activity    Alcohol use: Yes     Comment: occassion    Drug use: No    Sexual activity: Yes     Partners: Male   Lifestyle    Physical activity     Days per week: None     Minutes per session: None    Stress: None   Relationships    Social connections     Talks on phone: None     Gets together: None     Attends [employer] service: None     Active member of club or organization: None     Attends meetings of clubs or organizations: None     Relationship status: None    Intimate partner violence     Fear of current or ex partner: None     Emotionally abused: None     Physically abused: None     Forced sexual activity: None   Other Topics Concern    None   Social History Narrative    None         REVIEW OF SYSTEMS    (2-9 systems for level 4, 10 or more for level 5)     Review of Systems   Musculoskeletal: Positive for back pain. All other systems reviewed and are negative. Except as noted above the remainder of the review of systems was reviewed and negative. PHYSICAL EXAM    (up to 7 for level 4, 8 or more for level 5)     ED Triage Vitals   BP Temp Temp src Pulse Resp SpO2 Height Weight   09/22/20 1816 09/22/20 1816 -- 09/22/20 1816 09/22/20 1816 09/22/20 1816 09/22/20 1813 09/22/20 1813   (!) 159/95 98.2 °F (36.8 °C)  74 14 100 % 5' 3\" (1.6 m) 223 lb 3.2 oz (101.2 kg)       Physical Exam  Constitutional:       Appearance: Normal appearance. She is well-developed, well-groomed and normal weight. HENT:      Head: Normocephalic. Right Ear: External ear normal.      Left Ear: External ear normal.      Nose: Nose normal.      Mouth/Throat:      Mouth: Mucous membranes are moist.   Eyes:      Extraocular Movements: Extraocular movements intact. Conjunctiva/sclera: Conjunctivae normal.      Pupils: Pupils are equal, round, and reactive to light. Neck:      Musculoskeletal: Normal range of motion. Pulmonary:      Effort: Pulmonary effort is normal. No respiratory distress. Musculoskeletal: Normal range of motion. Lumbar back: She exhibits tenderness and pain. Back:    Skin:     General: Skin is warm and dry. Capillary Refill: Capillary refill takes less than 2 seconds. Findings: No bruising or erythema.    Neurological:      Mental

## 2020-09-23 NOTE — ED PROVIDER NOTES
eMERGENCY dEPARTMENT eNCOUnter   [address] Name: Justine Contreras  MRN: [id number]  [place] 1965  Date of evaluation: 9/23/20     Justine Contreras is a 54 y.o. female with CC: Fall and Back Pain      Based on the medical record the care appears appropriate. I was personally available for consultation in the Emergency Department.     Jg Matthews MD  Attending Emergency Physician                  Angie Montelongo MD  09/23/20 [id number]

## 2020-10-01 ENCOUNTER — HOSPITAL ENCOUNTER (OUTPATIENT)
Dept: MRI IMAGING | Age: 55
Discharge: HOME | End: 2020-10-03
Payer: COMMERCIAL

## 2020-10-01 PROCEDURE — 73721 MRI JNT OF LWR EXTRE W/O DYE: CPT

## 2020-10-05 ENCOUNTER — TELEPHONE (OUTPATIENT)
Dept: ORTHOPEDIC SURGERY | Age: 55
End: 2020-10-05

## 2020-10-05 NOTE — TELEPHONE ENCOUNTER
----- Message from Nancy Whitaker DO sent at 10/4/2020  8:02 PM EDT -----  Call with results, appt to review  ----- Message -----  From: Uzeil Payne Incoming Radiant Results From [email or web address]  Sent: 10/1/2020   7:47 AM EDT  To: Nancy Whitaker DO

## 2020-10-05 NOTE — TELEPHONE ENCOUNTER
Made an appt with [facility]IC \[facility] [place] [facility]\"" for follow up and review of MRI

## 2020-10-15 ENCOUNTER — OFFICE VISIT (OUTPATIENT)
Dept: ORTHOPEDIC SURGERY | Age: 55
End: 2020-10-15
Payer: COMMERCIAL

## 2020-10-15 VITALS — HEIGHT: 63 IN | WEIGHT: 222 LBS | BODY MASS INDEX: 39.34 KG/M2 | TEMPERATURE: 98.1 F

## 2020-10-15 PROCEDURE — G8417 CALC BMI ABV UP PARAM F/U: HCPCS | Performed by: ORTHOPAEDIC SURGERY

## 2020-10-15 PROCEDURE — 99213 OFFICE O/P EST LOW 20 MIN: CPT | Performed by: ORTHOPAEDIC SURGERY

## 2020-10-15 PROCEDURE — 4004F PT TOBACCO SCREEN RCVD TLK: CPT | Performed by: ORTHOPAEDIC SURGERY

## 2020-10-15 PROCEDURE — G8427 DOCREV CUR MEDS BY ELIG CLIN: HCPCS | Performed by: ORTHOPAEDIC SURGERY

## 2020-10-15 PROCEDURE — 3017F COLORECTAL CA SCREEN DOC REV: CPT | Performed by: ORTHOPAEDIC SURGERY

## 2020-10-15 PROCEDURE — G8484 FLU IMMUNIZE NO ADMIN: HCPCS | Performed by: ORTHOPAEDIC SURGERY

## 2020-10-15 RX ORDER — BUDESONIDE AND FORMOTEROL FUMARATE DIHYDRATE 160; 4.5 UG/1; UG/1
AEROSOL RESPIRATORY (INHALATION)
COMMUNITY
Start: 2020-09-17

## 2020-10-15 ASSESSMENT — ENCOUNTER SYMPTOMS
CONSTIPATION: 0
VOMITING: 0
COLOR CHANGE: 0
APNEA: 0
ABDOMINAL DISTENTION: 0
NAUSEA: 0
DIARRHEA: 0
CHEST TIGHTNESS: 0
ABDOMINAL PAIN: 0
SHORTNESS OF BREATH: 0
COUGH: 0

## 2020-10-15 NOTE — PROGRESS NOTES
[address] [facility]ICINE  [address] [place] [address]  Dept: [phone]  Dept Fax: [phone]          Left Knee - Follow Up     Subjective:     Chief Complaint   Patient presents with    Knee Pain     Left     HPI:     Blaine Bunn presents today for Left knee pain. The pain has been present for 8 weeks. The patient recalls no specific injury. The patient has tried knee braces, crutches, heat/ice, physical therapy, rest, Tramadol, Gabapentin, Mobic, and Motrin with minimal improvement. The pain is now described as Achy and Sharp . There is pain on weight bearing. The knee has swelled. There is painful popping and clicking. The knee has caught or locked up and she states the sensation feels like there is something being caught inside the knee The knee has not given out. It is stiff upon arising from sitting. It is painful to go up and down stairs and sit for a prolonged time. The patient has not had a cortisone injection this year. The patient has not tried a lubrication injection. The patient has tried physical therapy. The patient has not had surgery on this knee but she has had surgery on her right knee and she states that her right knee feels great. Since her right knee feels good, the patient states that she would like to consider surgery on her left knee to help alleviate her left knee pain. ROS:   Review of Systems   Constitutional: Positive for activity change. Negative for appetite change, fatigue and fever. Respiratory: Negative for apnea, cough, chest tightness and shortness of breath. Cardiovascular: Negative for chest pain, palpitations and leg swelling. Gastrointestinal: Negative for abdominal distention, abdominal pain, constipation, diarrhea, nausea and vomiting. Genitourinary: Negative for difficulty urinating, dysuria and hematuria. Musculoskeletal: Positive for arthralgias.  Negative for gait problem, joint swelling and myalgias. Skin: Negative for color change and rash. Neurological: Negative for dizziness, weakness, numbness and headaches. Psychiatric/Behavioral: Negative for sleep disturbance.      Past Medical History:    Past Medical History:   Diagnosis Date    [facility] ([facility]     Breast cancer ([facility]     bilateral remission since 2012    C. difficile colitis     2015    Colon cancer ([facility]     stage IV remission since 2016    Depression     Diabetes mellitus ([facility]     Diverticulosis     Fibromyalgia     Hyperlipidemia     Hypertension     Multiple sclerosis ([facility]     Rheumatoid arthritis ([facility]     Seizure ([facility] 7/    last seizure 7/18/2020  first seizure 1992 after head injury    Unspecified cerebral artery occlusion with cerebral infarction     7/12  NOT DEFINITE    Wears glasses      Past Surgical History:    Past Surgical History:   Procedure Laterality Date    APPENDECTOMY  1985    BACK SURGERY      BREAST BIOPSY  [id number]    BREAST LUMPECTOMY      bilateral [id number]     CARDIAC SURGERY      cardiac cath negative in 2018   [address], [place], DIAGNOSTIC      HYSTERECTOMY  2010    INSERTABLE CARDIAC MONITOR      KNEE ARTHROSCOPY Right 08/14/2020    RIGHT KNEE ARTHROSCOPY WITH PARTIAL MEDIAL MENISCECTOMY WITH DEBRIDEMENT (Right     KNEE ARTHROSCOPY Right 8/14/2020    RIGHT KNEE ARTHROSCOPY WITH PARTIAL MEDIAL MENISCECTOMY WITH DEBRIDEMENT performed by Mitchel Pitts DO at [address] [place] [address]  2016    for cancer    SLEEVE GASTRECTOMY  5/19/15    GASTRECTOMY SLEEVE LAPAROSCOPIC           TONSILLECTOMY  1985    UPPER GASTROINTESTINAL ENDOSCOPY  1/30/15     Current Medications:   Current Outpatient Medications   Medication Sig Dispense Refill    budesonide-formoterol (SYMBICORT) 160-4.5 MCG/ACT AERO INHALE 2 PUFFS BID UTD      methocarbamol (ROBAXIN) 750 MG inhibitors; Betadine [povidone iodine]; Iodine; Lurasidone; Lurasidone hcl; Nitrofurantoin; Povidone-iodine; Shellfish allergy; Shellfish-derived products; Iodides; Lamotrigine; Macrobid [nitrofurantoin macrocrystal]; Morphine; Pyridium [phenazopyridine hcl]; and Topiramate    Social History:   Social History     Socioeconomic History    Marital status:      Spouse name: None    Number of children: None    Years of education: None    Highest education level: None   Occupational History    Occupation: Disabled   Social Needs    Financial resource strain: None    Food insecurity     Worry: None     Inability: None    Transportation needs     Medical: None     Non-medical: None   Tobacco Use    Smoking status: Current Every Day Smoker     Packs/day: 1.00     Years: 20.00     Pack years: 20.00     Last attempt to quit: 2020     Years since quittin.2    Smokeless tobacco: Never Used   Substance and Sexual Activity    Alcohol use: Yes     Comment: occassion    Drug use: No    Sexual activity: Yes     Partners: Male   Lifestyle    Physical activity     Days per week: None     Minutes per session: None    Stress: None   Relationships    Social connections     Talks on phone: None     Gets together: None     Attends Sabianism service: None     Active member of club or organization: None     Attends meetings of clubs or organizations: None     Relationship status: None    Intimate partner violence     Fear of current or ex partner: None     Emotionally abused: None     Physically abused: None     Forced sexual activity: None   Other Topics Concern    None   Social History Narrative    None     Family History:  Family History   Adopted: Yes     Vitals:   Temp 98.1 °F (36.7 °C)   Ht 5' 3\" (1.6 m)   Wt 222 lb (100.7 kg)   BMI 39.33 kg/m²  Body mass index is 39.33 kg/m². Physical Examination:     Orthopedics:    GENERAL: Alert and oriented X3 in no acute distress.   SKIN: Intact without lesions or ulcerations. NEURO: Intact to sensory and motor testing. VASC: Capillary refill is less than 3 seconds. KNEE EXAM    LOCATION: Left Knee  GEN: Alert and oriented X 3, in no acute distress. GAIT: The patient's gait was observed while entering the exam room and was noted to be non antalgic. The extremity is in anatomic alignment. SKIN: Intact without rashes, lesions, or ulcerations. No obvious deformity or swelling. NEURO: The patient responds to light touch throughout left LE. Patellar and Achilles reflexes are 2/4. VASC: The left LE is neurovascularly intact with 2/4 DP and 2/4 PT pulses. Brisk capillary refill. ROM: 0/116 degrees. There is no effusion. MUSC: slightly decreased quad tone  LIGAMENT: Lachman's test is Negative with Good endpoint. Anterior drawer Negative. Posterior drawer Negative. There is No varus instability at 0 degrees and No varus instability at 30 degrees. There is No valgus instability at 0 degrees and No valgus instability at 30 degrees. SPECIAL: Conor test is positive with no clunks and no crepitation but there is pain. PALP: There is medial joint line pain. Medial plica pain and medial fat pad pain. Assessment:     1. Acute lateral meniscus tear of left knee, subsequent encounter      Procedures:    Procedure: no  Radiology:   Mri Knee Left Wo Contrast    Result Date: 10/4/2020  No medial meniscus tear identified. Suspected horizontal tear of the lateral meniscus body. The posterior root attachment of the lateral meniscus appears attenuated and may be partially injured. Cruciate and collateral ligaments are intact. Tricompartmental osteoarthrosis with areas of cartilage loss as described above. X-ray was reviewed from 07/20/2020. Plan:   Treatment : I reviewed the X-ray and MRI with the patient. We discussed the etiologies and natural histories of Acute medial meniscus tear in the left knee.  We discussed the various treatment alternatives including anti-inflammatory medications, physical therapy, injections, further imaging studies and as a last result surgery. During today's visit, I explained to the patient that there is a potential that I can help her knee feel better by doing the same surgery on this knee like I did on the other knee. However, I informed her that the knee has arthritis and I am not sure if the surgery with the scope will help alleviate the arthritic pain she has been experiencing. But if I do the arthroscopic surgery, I informed her that my goal would be to eliminate the catching and locking that she has within the knee. Right now at this point in time, I explained to her that I do not feel a total knee replacement would be good to do because her arthritis has not reached the end point but I would say, in about 5-10 years, it is highly likely that she may need a total knee replacement but I do not feel she needs that kind of surgery right now. But I do think she will have at least some pain relief with reduced catching and locking if we do the arthroscopic surgery instead. I then asked the patient what direction she would like to go and she stated that she would like to proceed with the arthroscopic surgery instead of the total knee replacement because she is tired of dealing with the catching and locking that she has in her knee. I then informed her that I understand. So at this time, the patient has opted for and has elected to proceed with a left knee arthroscopic surgery with partial lateral meniscectomy and debridement. The risks/benefits/alternatives and potential complications have been discussed with the patient. We also had a long discussion regarding the procedure itself and expectation of the recovery. All questions and concerns were addressed. Patient was instructed to call our office with any question or concerns prior to the procedure occurs.  Patient should return to the clinic 1 week after surgery to follow up with [facility]. The patient will call the office immediately with any problems. No orders of the defined types were placed in this encounter. No orders of the defined types were placed in this encounter. I, Ileana Hartman, am scribing for and in the presence of Kit Camarena D.O. 10/17/2020  2:36 PM      IKit DO, have personally seen this patient and I have reviewed the CC, PMH, FHX and Social History as provided by other clinical staff. I reassessed the HPI and ROS as scribed by Ileana Hartman Medical Scribe in my presence and it is both accurate and complete. Thereafter, I personally performed the PE, reviewed the imaging and established the DX and POC. I agree with the documentation provided by the Medical Scribe. I have reviewed all documentation in its entirety prior to providing my signature indicating agreement. Any areas of disagreement are noted on the chart.     Electronically signed by Anjel Augustine DO on 10/17/2020 at 2:36 PM        Electronically signed by Anjel Augustine DO, on 10/17/2020 at 2:36 PM

## 2020-10-30 ENCOUNTER — HOSPITAL ENCOUNTER (OUTPATIENT)
Dept: PREADMISSION TESTING | Age: 55
Discharge: HOME | End: 2020-11-03
Payer: COMMERCIAL

## 2020-10-30 LAB
ABSOLUTE EOS #: 0.16 K/UL (ref 0–0.44)
ABSOLUTE IMMATURE GRANULOCYTE: 0.03 K/UL (ref 0–0.3)
ABSOLUTE LYMPH #: 2.54 K/UL (ref 1.1–3.7)
ABSOLUTE MONO #: 0.48 K/UL (ref 0.1–1.2)
ANION GAP SERPL CALCULATED.3IONS-SCNC: 9 MMOL/L (ref 9–17)
BASOPHILS # BLD: 1 % (ref 0–2)
BASOPHILS ABSOLUTE: 0.06 K/UL (ref 0–0.2)
BUN BLDV-MCNC: 19 MG/DL (ref 6–20)
BUN/CREAT BLD: 34 (ref 9–20)
CALCIUM SERPL-MCNC: 8.9 MG/DL (ref 8.6–10.4)
CHLORIDE BLD-SCNC: 97 MMOL/L (ref 98–107)
CO2: 33 MMOL/L (ref 20–31)
CREAT SERPL-MCNC: 0.56 MG/DL (ref 0.5–0.9)
DIFFERENTIAL TYPE: ABNORMAL
EOSINOPHILS RELATIVE PERCENT: 2 % (ref 1–4)
ESTIMATED AVERAGE GLUCOSE: 120 MG/DL
GFR AFRICAN AMERICAN: >60 ML/MIN
GFR NON-AFRICAN AMERICAN: >60 ML/MIN
GFR SERPL CREATININE-BSD FRML MDRD: ABNORMAL ML/MIN/{1.73_M2}
GFR SERPL CREATININE-BSD FRML MDRD: ABNORMAL ML/MIN/{1.73_M2}
GLUCOSE BLD-MCNC: 108 MG/DL (ref 70–99)
HBA1C MFR BLD: 5.8 % (ref 4–6)
HCT VFR BLD CALC: 37.6 % (ref 36.3–47.1)
HEMOGLOBIN: 11.9 G/DL (ref 11.9–15.1)
IMMATURE GRANULOCYTES: 0 %
LYMPHOCYTES # BLD: 29 % (ref 24–43)
MCH RBC QN AUTO: 27.9 PG (ref 25.2–33.5)
MCHC RBC AUTO-ENTMCNC: 31.6 G/DL (ref 28.4–34.8)
MCV RBC AUTO: 88.1 FL (ref 82.6–102.9)
MONOCYTES # BLD: 6 % (ref 3–12)
NRBC AUTOMATED: 0 PER 100 WBC
PDW BLD-RTO: 15.1 % (ref 11.8–14.4)
PLATELET # BLD: 240 K/UL (ref 138–453)
PLATELET ESTIMATE: ABNORMAL
PMV BLD AUTO: 9.6 FL (ref 8.1–13.5)
POTASSIUM SERPL-SCNC: 3.3 MMOL/L (ref 3.7–5.3)
RBC # BLD: 4.27 M/UL (ref 3.95–5.11)
RBC # BLD: ABNORMAL 10*6/UL
SEG NEUTROPHILS: 62 % (ref 36–65)
SEGMENTED NEUTROPHILS ABSOLUTE COUNT: 5.43 K/UL (ref 1.5–8.1)
SODIUM BLD-SCNC: 139 MMOL/L (ref 135–144)
WBC # BLD: 8.7 K/UL (ref 3.5–11.3)
WBC # BLD: ABNORMAL 10*3/UL

## 2020-10-30 PROCEDURE — 85025 COMPLETE CBC W/AUTO DIFF WBC: CPT

## 2020-10-30 PROCEDURE — 36415 COLL VENOUS BLD VENIPUNCTURE: CPT

## 2020-10-30 PROCEDURE — 80048 BASIC METABOLIC PNL TOTAL CA: CPT

## 2020-10-30 PROCEDURE — 83036 HEMOGLOBIN GLYCOSYLATED A1C: CPT

## 2020-10-30 RX ORDER — ALBUTEROL SULFATE 90 UG/1
2 AEROSOL, METERED RESPIRATORY (INHALATION) EVERY 6 HOURS PRN
COMMUNITY

## 2020-10-30 NOTE — PRE-PROCEDURE INSTRUCTIONS
Covid testing on 11/2/20 at 12:10pm main entrance, please stay in your vehicle and a simon will come to you. ARRIVE AT [place] [address] ON Friday, 11/6/20 at 5:30 AM  On arrival please call [phone]. Continue to take your home medications as you normally do up to and including the night before surgery with the exception of any blood thinning medications. Please stop any blood thinning medications as directed by your surgeon or prescribing physician. Failure to stop certain medications may interfere with your scheduled surgery. These may include:  Aspirin, Warfarin (Coumadin), Clopidogrel (Plavix), Ibuprofen (Motrin, Advil), Naproxen (Aleve), Meloxicam (Mobic), Celecoxib (Celebrex), Diclofenac, Eliquis, Pradaxa, Xarelto, Effient, Fish Oil, Herbal supplements. Tylenol/Acetaminophen is okay. If you are diabetic, do not take any of your diabetic medications by mouth the morning of surgery. If you are taking insulin contact the doctor that manages your diabetes for instructions about any changes to your insulin dosages the day before surgery. Do not inject insulin or other injectable diabetic medications the morning of surgery unless otherwise instructed by the doctor who manages your diabetes. Patient instructed to stop Xarelto 3 days prior to surgery per Cardiologist per pt. Please take the following medication(s) the day of surgery with a small sip of water:  Carvedilol, Nexium, Gabapentin, Cymbalta. Please use your inhalers at home the day of surgery. PREPARING FOR YOUR SURGERY:     Before surgery, you can play an important role in your own health. Because skin is not sterile, we need to be sure that your skin is as free of germs as possible before surgery by carefully washing before surgery. Preparing or prepping skin before surgery can reduce the risk of a surgical site infection.   Do not shave the area of your body where your surgery will be performed unless you received specific permission from your physician. You will need to shower at home the night before surgery and the morning of surgery with a special soap called chlorhexidine gluconate (CHG*). *Not to be used by people allergic to Chlorhexidine Gluconate (CHG). Following these instructions will help you be sure that your skin is clean before surgery. Instructions on cleaning your skin before surgery: The night before your surgery:      You will need to shower with warm water (not hot) and the CHG soap.  Use a clean wash cloth and a clean towel. Have clean clothes available to put on after the shower.    First wash your hair with regular shampoo. Rinse your hair and body thoroughly to remove the shampoo. Susiine Gonzales Wash your face with your regular soap or water only. Thoroughly rinse your body with warm water from the neck down.  Turn water off to prevent rinsing the soap off too soon.  With a clean wet washcloth and half of the CHG soap in the bottle, lather your entire body from the neck down. Do not use CHG soap near your eyes or ears to avoid injury to those areas.  Wash thoroughly, paying special attention to the area where your surgery will be performed.  Wash your body gently for five (5) minutes. Avoid scrubbing your skin too hard.  Turn the water back on and rinse your body thoroughly.  Pat yourself dry with a clean, soft towel. Do not apply lotion, cream or powder.  Dress with clean freshly washed clothes. The morning of surgery:     Repeat shower following steps above - using remaining half of CHG soap in bottle. Patient Instructions:    [place] If you are having any type of anesthesia you are to have nothing to eat or drink after midnight the night before your surgery.   This includes gum, mints, water or smoking or chewing tobacco.  The only exception to this is a small sip of water to take with any morning dose of heart, blood pressure, or seizure medications. No alcoholic beverages for 24 hours prior to surgery.  Bring a list of all medications you take, along with the dose of the medications and how often you take it. If more convenient bring the pharmacy bottles in a zip lock bag.  Brush your teeth but do not swallow water.  Bring your eyeglasses and case with you. No contacts are to be worn the day of surgery. You also may bring your hearing aids. Most surgical procedures involving anesthesia will require that you remove your dentures prior to surgery.  If you are on C-PAP or Bi-PAP at home and plan on staying in the hospital overnight for your surgery please bring the machine with you. · Do not wear any jewelry or body piercings day of surgery. Also, NO lotion, perfume or deodorant to be used the day of surgery. No nail polish on the operative extremity (arm/leg surgeries)    · Do not bring any valuables such as jewelry, cash, or credit cards. If you are staying overnight with us, please bring a small bag of personal items.  Please wear loose, comfortable clothing. If you are potentially going to have a cast or brace bring clothing that will fit over them.  In case of illness - If you have cold or flu like symptoms (high fever, runny nose, sore throat, cough, etc.) rash, nausea, vomiting, loose stools, and/or recent contact with someone who has a contagious disease (chicken pox, measles, etc.) Please call your doctor before coming to the hospital.     If your child is having surgery please make arrangements for any other children to be cared for at home on the day of surgery. Other children are not permitted in recovery room and we want you to be able to spend time with the patient. If other arrangements are not available then we suggest that you have a second adult to stay in the waiting room.        Day of Surgery/Procedure:    As a patient at Health system you can expect quality medical and nursing care that is centered on your individual needs. Our goal is to make your surgical experience as comfortable as possible    . Transportation After Your Surgery/Procedure: You will need a friend or family member to drive you home after your procedure. Your  must be 25years of age or older and able to sign off on your discharge instructions. A taxi cab or any other form of public transportation is not acceptable. Your friend or family member must stay at the hospital throughout your procedure. Someone must remain with you for the first 24 hours after your surgery if you receive anesthesia or medication. If you do not have someone to stay with you, your procedure may be cancelled.       If you have any other questions regarding your procedure or the day of surgery, please call [phone]      _________________________  ____________________________  Signature (Patient)              Signature (Provider) & date

## 2020-10-30 NOTE — H&P
History and Physical    Pt Name: Marcos Mireles  MRN: [id number]  YOB: 1965  Date of evaluation: 10/30/2020      THIS IS BRAYDON HEAD ,A 55 YO FEMALE WHO PRESENTS TODAY FOR A PRE-TESTING APPOINTMENT PRIOR TO A LEFT KNEE ARTHROSCOPY WITH PARTIAL MENISCAL REPAIR AND DEBRIDEMENT ON 11/6/2020 BY DR. STEEN FOR TREATMENT OF LEFT KNEE PAIN,AND STIFFNESS. THE KNEE CATCHES , AND LOCKS , CAUSING HER TO LOSE BALANCE AND FALL. SHE HAS FALLEN 4 X DUE TO KNEE PROBLEMS , THE PAIN IS CONTINUALLY AT A 6/10, WORSE WHEN SHE DOES A LOT OF WALKING OR WEIGHT BEARING. SHE PRESENT FOR SURGICAL CORRECTION. THE PATIENT HAS DIABETES, HER LAST A1C WAS 5.7. SHE IS ON XARELTO  DUE TO ATRIAL FIBRILLATION. THIS  WILL BE HELD FOR 3 DAYS PRIOR TO SURGERY. SHE HAS A HISTORY OF STROKE. SHE HAS CARDIAC AND NEUROLOGY CLEARANCES ON THE SHORT CHART. [x] Please see the [facility] ORTHOPEDIC PROGRESS NOTE OF 10/16/2020  by Dr. Gavin Holm  in [facility] which meets the criteria for the admission History and Physical and is copied below. Yun Parker, KANG, ACNP-BC  Electronically signed 10/30/2020 at 1:43 PM                  Physician    Specialty:  Orthopedic Surgery    Progress Notes      Signed    Encounter Date:  10/15/2020          Related encounter: Office Visit from 10/15/2020 in [address] [place] [address] and Sports Medicine          Signed        Expand All Collapse All     Show:Clear all  [x]Manual[x]Template[x]Copied    Added by:  [x]Frederick Guerrero[x]Paul Ly DO    []Lizbeth for details        [facility] [address]  Dept: [phone]  Dept Fax: [phone]            Left Knee - Follow Up      Subjective:           Chief Complaint   Patient presents with    Knee Pain       Left      HPI:      Marcos Mireles presents today for Left knee pain. The pain has been present for 8 weeks. The patient recalls no specific injury.  The patient has tried knee braces, crutches, heat/ice, physical therapy, rest, Tramadol, Gabapentin, Mobic, and Motrin with minimal improvement. The pain is now described as Achy and Sharp . There is pain on weight bearing. The knee has swelled. There is painful popping and clicking. The knee has caught or locked up and she states the sensation feels like there is something being caught inside the knee The knee has not given out. It is stiff upon arising from sitting. It is painful to go up and down stairs and sit for a prolonged time. The patient has not had a cortisone injection this year. The patient has not tried a lubrication injection. The patient has tried physical therapy. The patient has not had surgery on this knee but she has had surgery on her right knee and she states that her right knee feels great. Since her right knee feels good, the patient states that she would like to consider surgery on her left knee to help alleviate her left knee pain. ROS:   Review of Systems   Constitutional: Positive for activity change. Negative for appetite change, fatigue and fever. Respiratory: Negative for apnea, cough, chest tightness and shortness of breath. Cardiovascular: Negative for chest pain, palpitations and leg swelling. Gastrointestinal: Negative for abdominal distention, abdominal pain, constipation, diarrhea, nausea and vomiting. Genitourinary: Negative for difficulty urinating, dysuria and hematuria. Musculoskeletal: Positive for arthralgias. Negative for gait problem, joint swelling and myalgias. Skin: Negative for color change and rash. Neurological: Negative for dizziness, weakness, numbness and headaches. Psychiatric/Behavioral: Negative for sleep disturbance.       Past Medical History:    Past Medical History[]Expand by Default        Past Medical History:   Diagnosis Date    [facility]      [facility]       bilateral remission since 2012    C. difficile colitis       2015    Colon cancer ([facility]       stage IV remission since 2016    Depression      Diabetes mellitus ([facility]      Diverticulosis      Fibromyalgia      Hyperlipidemia      Hypertension      Multiple sclerosis ([facility]      Rheumatoid arthritis ([facility]      Seizure ([facility] 7/     last seizure 7/18/2020  first seizure 1992 after head injury    Unspecified cerebral artery occlusion with cerebral infarction       7/12  NOT DEFINITE    Wears glasses          Past Surgical History:    Past Surgical History[]Expand by Default         Past Surgical History:   Procedure Laterality Date   [facility] BREAST BIOPSY   [id number]    BREAST LUMPECTOMY         bilateral [id number]     CARDIAC SURGERY         cardiac cath negative in 2018   [address], COLON, DIAGNOSTIC        HYSTERECTOMY   2010    INSERTABLE CARDIAC MONITOR        KNEE ARTHROSCOPY Right 08/14/2020     RIGHT KNEE ARTHROSCOPY WITH PARTIAL MEDIAL MENISCECTOMY WITH DEBRIDEMENT (Right     KNEE ARTHROSCOPY Right 8/14/2020     RIGHT KNEE ARTHROSCOPY WITH PARTIAL MEDIAL MENISCECTOMY WITH DEBRIDEMENT performed by Jose Dickey DO at [address]   2016     for cancer    SLEEVE GASTRECTOMY   5/19/15     GASTRECTOMY SLEEVE LAPAROSCOPIC           TONSILLECTOMY   1985    UPPER GASTROINTESTINAL ENDOSCOPY   1/30/15        Current Medications:   Current Facility-Administered Medications          Current Outpatient Medications   Medication Sig Dispense Refill    budesonide-formoterol (SYMBICORT) 160-4.5 MCG/ACT AERO INHALE 2 PUFFS BID UTD        methocarbamol (ROBAXIN) 750 MG tablet Take 1,500 mg by mouth 3 times daily        traZODone (DESYREL) 50 MG tablet Take 1 tablet by mouth nightly as needed for Sleep 30 tablet 0    metFORMIN (GLUCOPHAGE) 500 MG tablet Take 1 tablet by mouth 2 times daily (with meals) 60 tablet 0    rivaroxaban (XARELTO) 20 MG TABS tablet Take 1 tablet by mouth Daily with supper 30 tablet 0    QUEtiapine (SEROQUEL) 100 MG tablet Take 1 tablet by mouth nightly 30 tablet 0    carvedilol (COREG) 12.5 MG tablet Take 1 tablet by mouth 2 times daily (with meals) 60 tablet 0    furosemide (LASIX) 40 MG tablet Take 1 tablet by mouth daily 30 tablet 0    esomeprazole (NEXIUM) 40 MG delayed release capsule Take 1 capsule by mouth every morning (before breakfast) 30 capsule 0    ondansetron (ZOFRAN-ODT) 4 MG disintegrating tablet Take 4 mg by mouth every 8 hours as needed for Nausea or Vomiting        cetirizine (ZYRTEC) 10 MG tablet Take 10 mg by mouth daily        diclofenac (CATAFLAM) 50 MG tablet Take 50 mg by mouth daily as needed (headache) May repeat in 6 hours, max 3 tablets per day        SUPER B COMPLEX/C PO Take by mouth daily        loperamide (IMODIUM) 2 MG capsule Take 2 mg by mouth 4 times daily as needed for Diarrhea Takes two capsules at onset        lidocaine (LIDODERM) 5 % Place 1 patch onto the skin daily 12 hours on, 12 hours off.  fluticasone (FLONASE) 50 MCG/ACT nasal spray 1 spray by Each Nostril route daily        gabapentin (NEURONTIN) 600 MG tablet Take 1 tablet by mouth 3 times daily for 30 days. (Patient taking differently: Take 1,200 mg by mouth 3 times daily. ) 90 tablet 0    DULoxetine (CYMBALTA) 60 MG extended release capsule Take 1 capsule by mouth 2 times daily 60 capsule 0    magnesium oxide (MAG-OX) 400 (241.3 Mg) MG TABS tablet Take 1 tablet by mouth daily 30 tablet 0    coenzyme Q10 100 MG CAPS capsule Take 100 mg by mouth 3 times daily          No current facility-administered medications for this visit. Allergies:    Ace inhibitors; Betadine [povidone iodine]; Iodine; Lurasidone; Lurasidone hcl; Nitrofurantoin; Povidone-iodine; Shellfish allergy; Shellfish-derived products; Iodides; Lamotrigine;  Macrobid [nitrofurantoin macrocrystal]; Morphine; Pyridium [phenazopyridine hcl]; and Topiramate     Social History:   Social History[]Expand by Relayr   Social History            Socioeconomic History    Marital status:        Spouse name: None    Number of children: None    Years of education: None    Highest education level: None   Occupational History    Occupation: Disabled   Social Needs    Financial resource strain: None    Food insecurity       Worry: None       Inability: None    Transportation needs       Medical: None       Non-medical: None   Tobacco Use    Smoking status: Current Every Day Smoker       Packs/day: 1.00       Years: 20.00       Pack years: 20.00       Last attempt to quit: 2020       Years since quittin.2    Smokeless tobacco: Never Used   Substance and Sexual Activity    Alcohol use: Yes       Comment: occassion    Drug use: No    Sexual activity: Yes       Partners: Male   Lifestyle    Physical activity       Days per week: None       Minutes per session: None    Stress: None   Relationships    Social connections       Talks on phone: None       Gets together: None       Attends Restorationism service: None       Active member of club or organization: None       Attends meetings of clubs or organizations: None       Relationship status: None    Intimate partner violence       Fear of current or ex partner: None       Emotionally abused: None       Physically abused: None       Forced sexual activity: None   Other Topics Concern    None   Social History Narrative    None        Family History:  Family History[]Expand by Relayr   Family History   Adopted: Yes        Vitals:   Temp 98.1 °F (36.7 °C)   Ht 5' 3\" (1.6 m)   Wt 222 lb (100.7 kg)   BMI 39.33 kg/m²  Body mass index is 39.33 kg/m². Physical Examination:      Orthopedics:     GENERAL: Alert and oriented X3 in no acute distress. SKIN: Intact without lesions or ulcerations. NEURO: Intact to sensory and motor testing. VASC: Capillary refill is less than 3 seconds. KNEE EXAM     LOCATION: Left Knee  GEN: Alert and oriented X 3, in no acute distress. GAIT: The patient's gait was observed while entering the exam room and was noted to be non antalgic. The extremity is in anatomic alignment. SKIN: Intact without rashes, lesions, or ulcerations. No obvious deformity or swelling. NEURO: The patient responds to light touch throughout left LE. Patellar and Achilles reflexes are 2/4. VASC: The left LE is neurovascularly intact with 2/4 DP and 2/4 PT pulses. Brisk capillary refill. ROM: 0/116 degrees. There is no effusion. MUSC: slightly decreased quad tone  LIGAMENT: Lachman's test is Negative with Good endpoint. Anterior drawer Negative. Posterior drawer Negative. There is No varus instability at 0 degrees and No varus instability at 30 degrees. There is No valgus instability at 0 degrees and No valgus instability at 30 degrees. SPECIAL: Conor test is positive with no clunks and no crepitation but there is pain. PALP: There is medial joint line pain. Medial plica pain and medial fat pad pain. Assessment:      1. Acute lateral meniscus tear of left knee, subsequent encounter       Procedures:    Procedure: no  Radiology:   Mri Knee Left Wo Contrast     Result Date: 10/4/2020  No medial meniscus tear identified. Suspected horizontal tear of the lateral meniscus body. The posterior root attachment of the lateral meniscus appears attenuated and may be partially injured. Cruciate and collateral ligaments are intact. Tricompartmental osteoarthrosis with areas of cartilage loss as described above. X-ray was reviewed from 07/20/2020. Plan:   Treatment : I reviewed the X-ray and MRI with the patient. We discussed the etiologies and natural histories of Acute medial meniscus tear in the left knee.  We discussed the various treatment alternatives including anti-inflammatory medications, physical therapy, injections, further imaging studies and as a last result surgery. During today's visit, I explained to the patient that there is a potential that I can help her knee feel better by doing the same surgery on this knee like I did on the other knee. However, I informed her that the knee has arthritis and I am not sure if the surgery with the scope will help alleviate the arthritic pain she has been experiencing. But if I do the arthroscopic surgery, I informed her that my goal would be to eliminate the catching and locking that she has within the knee. Right now at this point in time, I explained to her that I do not feel a total knee replacement would be good to do because her arthritis has not reached the end point but I would say, in about 5-10 years, it is highly likely that she may need a total knee replacement but I do not feel she needs that kind of surgery right now. But I do think she will have at least some pain relief with reduced catching and locking if we do the arthroscopic surgery instead. I then asked the patient what direction she would like to go and she stated that she would like to proceed with the arthroscopic surgery instead of the total knee replacement because she is tired of dealing with the catching and locking that she has in her knee. I then informed her that I understand. So at this time, the patient has opted for and has elected to proceed with a left knee arthroscopic surgery with partial lateral meniscectomy and debridement. The risks/benefits/alternatives and potential complications have been discussed with the patient. We also had a long discussion regarding the procedure itself and expectation of the recovery. All questions and concerns were addressed. Patient was instructed to call our office with any question or concerns prior to the procedure occurs. Patient should return to the clinic 1 week after surgery to follow up with Wolf Bangura PA-C.  The patient will call the office immediately with any

## 2020-11-02 ENCOUNTER — HOSPITAL ENCOUNTER (OUTPATIENT)
Dept: PREADMISSION TESTING | Age: 55
Setting detail: SPECIMEN
Discharge: HOME | End: 2020-11-06
Payer: COMMERCIAL

## 2020-11-02 PROCEDURE — U0003 INFECTIOUS AGENT DETECTION BY NUCLEIC ACID (DNA OR RNA); SEVERE ACUTE RESPIRATORY SYNDROME CORONAVIRUS 2 (SARS-COV-2) (CORONAVIRUS DISEASE [COVID-19]), AMPLIFIED PROBE TECHNIQUE, MAKING USE OF HIGH THROUGHPUT TECHNOLOGIES AS DESCRIBED BY CMS-2020-01-R: HCPCS

## 2020-11-03 LAB
SARS-COV-2, RAPID: NORMAL
SARS-COV-2: NORMAL
SARS-COV-2: NOT DETECTED
SOURCE: NORMAL

## 2020-11-06 ENCOUNTER — ANESTHESIA (OUTPATIENT)
Dept: OPERATING ROOM | Age: 55
End: 2020-11-06
Payer: COMMERCIAL

## 2020-11-06 ENCOUNTER — HOSPITAL ENCOUNTER (OUTPATIENT)
Age: 55
Setting detail: OUTPATIENT SURGERY
Discharge: HOME | End: 2020-11-06
Attending: ORTHOPAEDIC SURGERY | Admitting: ORTHOPAEDIC SURGERY
Payer: COMMERCIAL

## 2020-11-06 ENCOUNTER — ANESTHESIA EVENT (OUTPATIENT)
Dept: OPERATING ROOM | Age: 55
End: 2020-11-06
Payer: COMMERCIAL

## 2020-11-06 VITALS — SYSTOLIC BLOOD PRESSURE: 119 MMHG | DIASTOLIC BLOOD PRESSURE: 56 MMHG | TEMPERATURE: 97.5 F | OXYGEN SATURATION: 100 %

## 2020-11-06 VITALS
DIASTOLIC BLOOD PRESSURE: 66 MMHG | BODY MASS INDEX: 39.34 KG/M2 | WEIGHT: 222 LBS | TEMPERATURE: 97.7 F | OXYGEN SATURATION: 95 % | RESPIRATION RATE: 17 BRPM | SYSTOLIC BLOOD PRESSURE: 129 MMHG | HEIGHT: 63 IN | HEART RATE: 92 BPM

## 2020-11-06 LAB
GLUCOSE BLD-MCNC: 106 MG/DL (ref 65–105)
GLUCOSE BLD-MCNC: 112 MG/DL (ref 65–105)

## 2020-11-06 PROCEDURE — 6360000002 HC RX W HCPCS: Performed by: NURSE ANESTHETIST, CERTIFIED REGISTERED

## 2020-11-06 PROCEDURE — 2580000003 HC RX 258: Performed by: ANESTHESIOLOGY

## 2020-11-06 PROCEDURE — 7100000001 HC PACU RECOVERY - ADDTL 15 MIN: Performed by: ORTHOPAEDIC SURGERY

## 2020-11-06 PROCEDURE — 2709999900 HC NON-CHARGEABLE SUPPLY: Performed by: ORTHOPAEDIC SURGERY

## 2020-11-06 PROCEDURE — 7100000000 HC PACU RECOVERY - FIRST 15 MIN: Performed by: ORTHOPAEDIC SURGERY

## 2020-11-06 PROCEDURE — 29881 ARTHRS KNE SRG MNISECTMY M/L: CPT | Performed by: ORTHOPAEDIC SURGERY

## 2020-11-06 PROCEDURE — 7100000011 HC PHASE II RECOVERY - ADDTL 15 MIN: Performed by: ORTHOPAEDIC SURGERY

## 2020-11-06 PROCEDURE — 6360000002 HC RX W HCPCS: Performed by: ORTHOPAEDIC SURGERY

## 2020-11-06 PROCEDURE — 82947 ASSAY GLUCOSE BLOOD QUANT: CPT

## 2020-11-06 PROCEDURE — 6360000002 HC RX W HCPCS: Performed by: ANESTHESIOLOGY

## 2020-11-06 PROCEDURE — 7100000010 HC PHASE II RECOVERY - FIRST 15 MIN: Performed by: ORTHOPAEDIC SURGERY

## 2020-11-06 PROCEDURE — 3700000000 HC ANESTHESIA ATTENDED CARE: Performed by: ORTHOPAEDIC SURGERY

## 2020-11-06 PROCEDURE — 3600000013 HC SURGERY LEVEL 3 ADDTL 15MIN: Performed by: ORTHOPAEDIC SURGERY

## 2020-11-06 PROCEDURE — 2500000003 HC RX 250 WO HCPCS: Performed by: NURSE ANESTHETIST, CERTIFIED REGISTERED

## 2020-11-06 PROCEDURE — 3700000001 HC ADD 15 MINUTES (ANESTHESIA): Performed by: ORTHOPAEDIC SURGERY

## 2020-11-06 PROCEDURE — 3600000003 HC SURGERY LEVEL 3 BASE: Performed by: ORTHOPAEDIC SURGERY

## 2020-11-06 RX ORDER — ONDANSETRON 2 MG/ML
4 INJECTION INTRAMUSCULAR; INTRAVENOUS
Status: DISCONTINUED | OUTPATIENT
Start: 2020-11-06 | End: 2020-11-06 | Stop reason: HOSPADM

## 2020-11-06 RX ORDER — FENTANYL CITRATE 50 UG/ML
50 INJECTION, SOLUTION INTRAMUSCULAR; INTRAVENOUS EVERY 5 MIN PRN
Status: DISCONTINUED | OUTPATIENT
Start: 2020-11-06 | End: 2020-11-06 | Stop reason: HOSPADM

## 2020-11-06 RX ORDER — LIDOCAINE HYDROCHLORIDE 10 MG/ML
1 INJECTION, SOLUTION EPIDURAL; INFILTRATION; INTRACAUDAL; PERINEURAL
Status: DISCONTINUED | OUTPATIENT
Start: 2020-11-07 | End: 2020-11-06 | Stop reason: HOSPADM

## 2020-11-06 RX ORDER — MIDAZOLAM HYDROCHLORIDE 1 MG/ML
INJECTION INTRAMUSCULAR; INTRAVENOUS PRN
Status: DISCONTINUED | OUTPATIENT
Start: 2020-11-06 | End: 2020-11-06 | Stop reason: SDUPTHER

## 2020-11-06 RX ORDER — SODIUM CHLORIDE, SODIUM LACTATE, POTASSIUM CHLORIDE, CALCIUM CHLORIDE 600; 310; 30; 20 MG/100ML; MG/100ML; MG/100ML; MG/100ML
INJECTION, SOLUTION INTRAVENOUS CONTINUOUS
Status: DISCONTINUED | OUTPATIENT
Start: 2020-11-07 | End: 2020-11-06 | Stop reason: HOSPADM

## 2020-11-06 RX ORDER — OXYCODONE HYDROCHLORIDE AND ACETAMINOPHEN 5; 325 MG/1; MG/1
1 TABLET ORAL EVERY 6 HOURS PRN
Qty: 28 TABLET | Refills: 0 | Status: SHIPPED | OUTPATIENT
Start: 2020-11-06 | End: 2020-11-13

## 2020-11-06 RX ORDER — HYDROMORPHONE HCL 110MG/55ML
0.5 PATIENT CONTROLLED ANALGESIA SYRINGE INTRAVENOUS EVERY 5 MIN PRN
Status: DISCONTINUED | OUTPATIENT
Start: 2020-11-06 | End: 2020-11-06 | Stop reason: HOSPADM

## 2020-11-06 RX ORDER — PROPOFOL 10 MG/ML
INJECTION, EMULSION INTRAVENOUS PRN
Status: DISCONTINUED | OUTPATIENT
Start: 2020-11-06 | End: 2020-11-06 | Stop reason: SDUPTHER

## 2020-11-06 RX ORDER — PHENYLEPHRINE HCL IN 0.9% NACL 1 MG/10 ML
SYRINGE (ML) INTRAVENOUS PRN
Status: DISCONTINUED | OUTPATIENT
Start: 2020-11-06 | End: 2020-11-06 | Stop reason: SDUPTHER

## 2020-11-06 RX ORDER — FENTANYL CITRATE 50 UG/ML
INJECTION, SOLUTION INTRAMUSCULAR; INTRAVENOUS PRN
Status: DISCONTINUED | OUTPATIENT
Start: 2020-11-06 | End: 2020-11-06 | Stop reason: SDUPTHER

## 2020-11-06 RX ORDER — DEXAMETHASONE SODIUM PHOSPHATE 10 MG/ML
INJECTION, SOLUTION INTRAMUSCULAR; INTRAVENOUS PRN
Status: DISCONTINUED | OUTPATIENT
Start: 2020-11-06 | End: 2020-11-06 | Stop reason: SDUPTHER

## 2020-11-06 RX ORDER — ONDANSETRON 2 MG/ML
INJECTION INTRAMUSCULAR; INTRAVENOUS PRN
Status: DISCONTINUED | OUTPATIENT
Start: 2020-11-06 | End: 2020-11-06 | Stop reason: SDUPTHER

## 2020-11-06 RX ORDER — ROPIVACAINE HYDROCHLORIDE 5 MG/ML
INJECTION, SOLUTION EPIDURAL; INFILTRATION; PERINEURAL PRN
Status: DISCONTINUED | OUTPATIENT
Start: 2020-11-06 | End: 2020-11-06 | Stop reason: ALTCHOICE

## 2020-11-06 RX ORDER — HYDROMORPHONE HCL 110MG/55ML
0.25 PATIENT CONTROLLED ANALGESIA SYRINGE INTRAVENOUS EVERY 5 MIN PRN
Status: DISCONTINUED | OUTPATIENT
Start: 2020-11-06 | End: 2020-11-06 | Stop reason: HOSPADM

## 2020-11-06 RX ORDER — LIDOCAINE HYDROCHLORIDE 20 MG/ML
INJECTION, SOLUTION EPIDURAL; INFILTRATION; INTRACAUDAL; PERINEURAL PRN
Status: DISCONTINUED | OUTPATIENT
Start: 2020-11-06 | End: 2020-11-06 | Stop reason: SDUPTHER

## 2020-11-06 RX ORDER — FENTANYL CITRATE 50 UG/ML
25 INJECTION, SOLUTION INTRAMUSCULAR; INTRAVENOUS EVERY 5 MIN PRN
Status: DISCONTINUED | OUTPATIENT
Start: 2020-11-06 | End: 2020-11-06 | Stop reason: HOSPADM

## 2020-11-06 RX ADMIN — Medication 50 MCG: at 08:14

## 2020-11-06 RX ADMIN — DEXAMETHASONE SODIUM PHOSPHATE 10 MG: 10 INJECTION INTRAMUSCULAR; INTRAVENOUS at 08:07

## 2020-11-06 RX ADMIN — FENTANYL CITRATE 50 MCG: 50 INJECTION, SOLUTION INTRAMUSCULAR; INTRAVENOUS at 09:37

## 2020-11-06 RX ADMIN — Medication 100 MCG: at 08:08

## 2020-11-06 RX ADMIN — SODIUM CHLORIDE, POTASSIUM CHLORIDE, SODIUM LACTATE AND CALCIUM CHLORIDE: 600; 310; 30; 20 INJECTION, SOLUTION INTRAVENOUS at 06:33

## 2020-11-06 RX ADMIN — Medication 50 MCG: at 07:59

## 2020-11-06 RX ADMIN — MIDAZOLAM 4 MG: 1 INJECTION INTRAMUSCULAR; INTRAVENOUS at 07:55

## 2020-11-06 RX ADMIN — PROPOFOL 150 MG: 10 INJECTION, EMULSION INTRAVENOUS at 07:59

## 2020-11-06 RX ADMIN — LIDOCAINE HYDROCHLORIDE 60 MG: 20 INJECTION, SOLUTION EPIDURAL; INFILTRATION; INTRACAUDAL; PERINEURAL at 07:59

## 2020-11-06 RX ADMIN — CEFAZOLIN 2 G: 10 INJECTION, POWDER, FOR SOLUTION INTRAVENOUS at 08:05

## 2020-11-06 RX ADMIN — FENTANYL CITRATE 25 MCG: 50 INJECTION, SOLUTION INTRAMUSCULAR; INTRAVENOUS at 09:53

## 2020-11-06 RX ADMIN — FENTANYL CITRATE 25 MCG: 50 INJECTION, SOLUTION INTRAMUSCULAR; INTRAVENOUS at 09:46

## 2020-11-06 RX ADMIN — ONDANSETRON 4 MG: 2 INJECTION, SOLUTION INTRAMUSCULAR; INTRAVENOUS at 08:07

## 2020-11-06 ASSESSMENT — PAIN DESCRIPTION - LOCATION
LOCATION: KNEE
LOCATION: KNEE
LOCATION: KNEE;LEG

## 2020-11-06 ASSESSMENT — PULMONARY FUNCTION TESTS
PIF_VALUE: 18
PIF_VALUE: 22
PIF_VALUE: 18
PIF_VALUE: 3
PIF_VALUE: 22
PIF_VALUE: 26
PIF_VALUE: 18
PIF_VALUE: 19
PIF_VALUE: 16
PIF_VALUE: 23
PIF_VALUE: 22
PIF_VALUE: 1
PIF_VALUE: 1
PIF_VALUE: 22
PIF_VALUE: 22
PIF_VALUE: 4
PIF_VALUE: 18
PIF_VALUE: 16
PIF_VALUE: 22
PIF_VALUE: 16
PIF_VALUE: 17
PIF_VALUE: 18
PIF_VALUE: 18
PIF_VALUE: 2
PIF_VALUE: 16
PIF_VALUE: 16
PIF_VALUE: 18
PIF_VALUE: 22
PIF_VALUE: 18
PIF_VALUE: 18
PIF_VALUE: 2
PIF_VALUE: 16
PIF_VALUE: 18
PIF_VALUE: 2
PIF_VALUE: 13
PIF_VALUE: 18
PIF_VALUE: 18
PIF_VALUE: 22
PIF_VALUE: 4
PIF_VALUE: 16
PIF_VALUE: 1
PIF_VALUE: 18
PIF_VALUE: 16
PIF_VALUE: 18
PIF_VALUE: 16
PIF_VALUE: 18
PIF_VALUE: 18
PIF_VALUE: 22
PIF_VALUE: 18
PIF_VALUE: 16
PIF_VALUE: 1
PIF_VALUE: 11
PIF_VALUE: 18
PIF_VALUE: 22
PIF_VALUE: 16
PIF_VALUE: 22
PIF_VALUE: 18
PIF_VALUE: 16

## 2020-11-06 ASSESSMENT — PAIN SCALES - GENERAL
PAINLEVEL_OUTOF10: 4
PAINLEVEL_OUTOF10: 9
PAINLEVEL_OUTOF10: 9
PAINLEVEL_OUTOF10: 6
PAINLEVEL_OUTOF10: 4

## 2020-11-06 ASSESSMENT — PAIN DESCRIPTION - ORIENTATION
ORIENTATION: LEFT;OUTER
ORIENTATION: LEFT
ORIENTATION: LEFT

## 2020-11-06 ASSESSMENT — PAIN DESCRIPTION - DESCRIPTORS
DESCRIPTORS: ACHING
DESCRIPTORS: CONSTANT
DESCRIPTORS: CONSTANT;CRAMPING

## 2020-11-06 ASSESSMENT — PAIN DESCRIPTION - PAIN TYPE: TYPE: CHRONIC PAIN

## 2020-11-06 ASSESSMENT — PAIN DESCRIPTION - FREQUENCY: FREQUENCY: CONTINUOUS

## 2020-11-06 NOTE — ANESTHESIA PRE PROCEDURE
Department of Anesthesiology  Preprocedure Note       Name:  Sirisha Mendez   Age:  54 y.o.  :  1965                                          MRN:  [id number]         Date:  2020      Surgeon: Avis Morrison):  Giselle Priest DO    Procedure: Procedure(s):  LEFT KNEE ARTHROSCOPY WITH PARTIAL LATERAL MENISCAL TEAR REPAIR AND DEBRIDENT    Medications prior to admission:   Prior to Admission medications    Medication Sig Start Date End Date Taking? Authorizing Provider   albuterol sulfate HFA (VENTOLIN HFA) 108 (90 Base) MCG/ACT inhaler Inhale 2 puffs into the lungs every 6 hours as needed for Wheezing   Yes Historical Provider, MD   budesonide-formoterol (SYMBICORT) 160-4.5 MCG/ACT AERO INHALE 2 PUFFS BID UTD 20  Yes Historical Provider, MD   methocarbamol (ROBAXIN) 750 MG tablet Take 1,500 mg by mouth 3 times daily   Yes Historical Provider, MD   gabapentin (NEURONTIN) 600 MG tablet Take 1 tablet by mouth 3 times daily for 30 days. Patient taking differently: Take 1,200 mg by mouth 3 times daily.   20 Yes Nita Lama MD   DULoxetine (CYMBALTA) 60 MG extended release capsule Take 1 capsule by mouth 2 times daily 20 Yes Nita Lama MD   traZODone (DESYREL) 50 MG tablet Take 1 tablet by mouth nightly as needed for Sleep 20  Yes Nita Lama MD   metFORMIN (GLUCOPHAGE) 500 MG tablet Take 1 tablet by mouth 2 times daily (with meals) 20  Yes Nita Lama MD   QUEtiapine (SEROQUEL) 100 MG tablet Take 1 tablet by mouth nightly 20  Yes Nita Lama MD   carvedilol (COREG) 12.5 MG tablet Take 1 tablet by mouth 2 times daily (with meals) 20  Yes Nita Lama MD   furosemide (LASIX) 40 MG tablet Take 1 tablet by mouth daily 20  Yes Nita Lama MD   esomeprazole (NEXIUM) 40 MG delayed release capsule Take 1 capsule by mouth every morning (before breakfast) 20  Yes Nita Lama MD   ondansetron (ZOFRAN-ODT) 4 MG INCLUDES BETADINE  Iv dye. Pt has not had any reaction w/ dye from CT scans for 10 years. Per dr. Maurice Krishna contrast (omni 300) is fine to use.  8/14/19 - lilibeth      Lurasidone Other (See Comments)    Lurasidone Hcl      Other reaction(s): Unknown    Shellfish Allergy Anaphylaxis    Shellfish-Derived Products Anaphylaxis    Iodides      Pre treat with benadryl    Lamotrigine Hives    Macrobid [Nitrofurantoin Macrocrystal] Hives    Morphine      -Narcotics LOWERS SIEZURE THRESHLD    Pyridium [Phenazopyridine Hcl] Hives    Topiramate Other (See Comments)     Confusion        Problem List:    Patient Active Problem List   Diagnosis Code    Hypertension I10    Hyperlipidemia E78.5    Urinary incontinence R32    Diabetes ([facility] [address] E11.9    Arrhythmia I49.9    Arthritis M19.90    Fibromyalgia M79.7    Sleep apnea G47.30    Stroke ([facility]) I63.9    Migraine G43.909    Seizure ([facility]) R56.9    Back pain, chronic M54.9, G89.29    Gout M10.9    Morbid obesity with BMI of 50.0-59.9, adult ([facility]) E66.01, Z68.43    Gastroparesis K31.84    Leg numbness R20.0    Bilateral leg numbness R20.0    MDD (major depressive disorder), severe ([facility]) F32.2    PAF (paroxysmal atrial fibrillation) ([facility]) I48.0    Hypokalemia E87.6    Chronic pain syndrome G89.4    Severe episode of recurrent major depressive disorder, without psychotic features ([facility] [address] F33.2    Postoperative hypoxia R09.02, Z98.890    Multiple sclerosis (HCC) G35    Rheumatoid arthritis ([facility] [address] M06.9    Diabetes mellitus ([facility]) E11.9    S/P arthroscopic partial medial meniscectomy Z98.890       Past Medical History:        Diagnosis Date    Atrial fibrillation ([facility] [address]     Breast cancer ([address]     bilateral remission since 2012    C. difficile colitis     2015    Colon cancer ([facility] [address]     stage IV remission since 2016    Depression     Diabetes mellitus (HCC)     Diverticulosis     Fibromyalgia     Hyperlipidemia     Hypertension     Multiple sclerosis ([facility] [address]     Rheumatoid arthritis ([facility] [address]     Seizure ([facility] [address]     last seizure 2020  first seizure 1992 after head injury    Unspecified cerebral artery occlusion with cerebral infarction       NOT DEFINITE    Wears glasses        Past Surgical History:        Procedure Laterality Date    APPENDECTOMY  1985    BACK SURGERY      BREAST BIOPSY  [id number]    BREAST LUMPECTOMY      bilateral [id number],[id number]     CARDIAC SURGERY      cardiac cath negative in 2018   [facility]    COLONOSCOPY      ENDOSCOPY, COLON, DIAGNOSTIC      HYSTERECTOMY      INSERTABLE CARDIAC MONITOR      KNEE ARTHROSCOPY Right 2020    RIGHT KNEE ARTHROSCOPY WITH PARTIAL MEDIAL MENISCECTOMY WITH DEBRIDEMENT (Right     KNEE ARTHROSCOPY Right 2020    RIGHT KNEE ARTHROSCOPY WITH PARTIAL MEDIAL MENISCECTOMY WITH DEBRIDEMENT performed by Storm Jimenez DO at [address] [place] [address] SIGMOIDECTOMY  2016    for cancer    SLEEVE GASTRECTOMY  5/19/15    GASTRECTOMY SLEEVE LAPAROSCOPIC           TONSILLECTOMY  1985    UPPER GASTROINTESTINAL ENDOSCOPY  1/30/15       Social History:    Social History     Tobacco Use    Smoking status: Former Smoker     Packs/day: 1.00     Years: 20.00     Pack years: 20.00     Last attempt to quit: 2020     Years since quittin.3    Smokeless tobacco: Never Used   Substance Use Topics    Alcohol use: Yes     Comment: occassion                                Counseling given: Not Answered      Vital Signs (Current):   Vitals:    20 0615   BP: 122/73   Pulse: 78   Resp: 20   Temp: 96.4 °F (35.8 °C)   TempSrc: Temporal   SpO2: 94%   Weight: 222 lb (100.7 kg)   Height: 5' 3\" (1.6 m)                                              BP Readings from Last 3 Encounters:   20 122/73   20 (!) 159/95   20 120/75       NPO Status: Time of last liquid consumption: 2359                        Time of last solid consumption: 2359                        Date of last liquid consumption: 11/05/20                        Date of last solid food consumption: 11/05/20    BMI:   Wt Readings from Last 3 Encounters:   11/06/20 222 lb (100.7 kg)   10/15/20 222 lb (100.7 kg)   09/22/20 223 lb 3.2 oz (101.2 kg)     Body mass index is 39.33 kg/m².     CBC:   Lab Results   Component Value Date    WBC 8.7 10/30/2020    RBC 4.27 10/30/2020    HGB 11.9 10/30/2020    HCT 37.6 10/30/2020    MCV 88.1 10/30/2020    RDW 15.1 10/30/2020     10/30/2020       CMP:   Lab Results   Component Value Date     10/30/2020    K 3.3 10/30/2020    K 3.7 01/14/2019    CL 97 10/30/2020    CO2 33 10/30/2020    BUN 19 10/30/2020    CREATININE 0.56 10/30/2020    GFRAA >60 10/30/2020    AGRATIO 1.0 01/20/2020    LABGLOM >60 10/30/2020    GLUCOSE 108 10/30/2020    PROT 7.0 06/03/2020    CALCIUM 8.9 10/30/2020    BILITOT 0.19 06/03/2020    ALKPHOS 68 06/03/2020    AST 18 06/03/2020    ALT 11 06/03/2020       POC Tests:   Recent Labs     11/06/20  0625   POCGLU 112*       Coags:   Lab Results   Component Value Date    PROTIME 12.5 01/14/2019    INR 1.10 01/14/2019    APTT 31.6 01/14/2019       HCG (If Applicable): No results found for: PREGTESTUR, PREGSERUM, HCG, HCGQUANT     ABGs: No results found for: PHART, PO2ART, VXI0BHE, PLF1KOD, BEART, V0WHVQTP     Type & Screen (If Applicable):  No results found for: LABABO, LABRH    Drug/Infectious Status (If Applicable):  No results found for: HIV, HEPCAB    COVID-19 Screening (If Applicable):   Lab Results   Component Value Date    COVID19 Not Detected 11/02/2020    COVID19 Not Detected 08/10/2020         Anesthesia Evaluation   no history of anesthetic complications:   Airway: Mallampati: II  TM distance: >3 FB   Neck ROM: full  Mouth opening: > = 3 FB Dental:          Pulmonary:                              Cardiovascular:    (+) hypertension:, dysrhythmias: atrial fibrillation, hyperlipidemia                  Neuro/Psych: (+) seizures: well controlled, CVA: no interval change, neuromuscular disease: multiple sclerosis,             GI/Hepatic/Renal:             Endo/Other:    (+) DiabetesType II DM, , .                 Abdominal:           Vascular:                                        Anesthesia Plan      general     ASA 3       Induction: intravenous. Anesthetic plan and risks discussed with patient.                       Ghazala Del Rio MD   11/6/2020

## 2020-11-06 NOTE — H&P
History and Physical Update    Pt Name: Sepideh Chaves  MRN: [id number]  YOB: 1965  Date of evaluation: 11/6/2020      [x] I have reviewed the orthopedic surgery progress note found in Epic by Dr. Shana Das from 10/15/2020 which meets the criteria for an Interval History and Physical note. [x] I have examined  Sepideh Chaves a 54 y.o., female who is scheduled for a left knee arthroscopy with partial lateral meniscal tear repair and debridement by Dr. Shana Das due to left knee partial lateral meniscal tear. The patient denies health changes since her appointment with Dr. Shana Das on 10/15/2020. Pt denies fever, chills, productive cough, SOB, chest pain, open sores, rashes, and wounds. History of diabetes. Pt's POC blood glucose today is 112. Diclofenac was last taken on 10/23/2020. Coenzyme Q10 was last taken on 10/30/2020. History of A-fib and possible CVA. Xarelto was last taken on 11/02/2020. The pt followed-up with Alpesh Hodges PA-C from cardiology on 10/22/2020 (Please see the cardiology note in Care Everywhere). Vital signs: /73   Pulse 78   Temp 96.4 °F (35.8 °C) (Temporal)   Resp 20   Ht 5' 3\" (1.6 m)   Wt 222 lb (100.7 kg)   SpO2 94%   BMI 39.33 kg/m²      Allergies:  Ace inhibitors; Betadine swab aid [povidone-iodine]; Betadine [povidone iodine]; Iodine; Lurasidone; Lurasidone hcl; Shellfish allergy; Shellfish-derived products; Iodides; Lamotrigine; Macrobid [nitrofurantoin macrocrystal];  Morphine; Pyridium [phenazopyridine hcl]; and Topiramate      Past Medical History:     Past Medical History:   Diagnosis Date    [facility]     Breast cancer ([facility]     bilateral remission since 2012    C. difficile colitis     2015    Colon cancer ([facility]     stage IV remission since 2016    Depression     Diabetes mellitus ([facility]     Diverticulosis     Fibromyalgia     Hyperlipidemia     Hypertension     Multiple sclerosis ([facility]     Rheumatoid arthritis ([facility]  Seizure ([address] 7/    last seizure 7/18/2020  first seizure 1992 after head injury    Unspecified cerebral artery occlusion with cerebral infarction     7/12  NOT DEFINITE    Wears glasses         Past Surgical History:     Past Surgical History:   Procedure Laterality Date    APPENDECTOMY  1985    BACK SURGERY      BREAST BIOPSY  8103-5168    BREAST LUMPECTOMY      bilateral [id number]     CARDIAC SURGERY      cardiac cath negative in 2018   [facility]    COLONOSCOPY      ENDOSCOPY, COLON, DIAGNOSTIC      HYSTERECTOMY  2010    INSERTABLE CARDIAC MONITOR      KNEE ARTHROSCOPY Right 08/14/2020    RIGHT KNEE ARTHROSCOPY WITH PARTIAL MEDIAL MENISCECTOMY WITH DEBRIDEMENT (Right     KNEE ARTHROSCOPY Right 8/14/2020    RIGHT KNEE ARTHROSCOPY WITH PARTIAL MEDIAL MENISCECTOMY WITH DEBRIDEMENT performed by Panfilo Davison DO at [address] [place] [address]  2016    for cancer    SLEEVE GASTRECTOMY  5/19/15    GASTRECTOMY SLEEVE LAPAROSCOPIC           TONSILLECTOMY  1985    UPPER GASTROINTESTINAL ENDOSCOPY  1/30/15        Social History:     Tobacco:    reports that she quit smoking about 4 months ago. She has a 20.00 pack-year smoking history. She has never used smokeless tobacco.  Alcohol:      reports current alcohol use. Drug Use:  reports no history of drug use. Family History:     Family History   Adopted: Yes       Medications:    Prior to Admission medications    Medication Sig Start Date End Date Taking?  Authorizing Provider   albuterol sulfate HFA (VENTOLIN HFA) 108 (90 Base) MCG/ACT inhaler Inhale 2 puffs into the lungs every 6 hours as needed for Wheezing   Yes Historical Provider, MD   budesonide-formoterol (SYMBICORT) 160-4.5 MCG/ACT AERO INHALE 2 PUFFS BID UTD 9/17/20  Yes Historical Provider, MD   methocarbamol (ROBAXIN) 750 MG tablet Take 1,500 mg by mouth 3 times daily   Yes Historical Provider, MD   gabapentin (NEURONTIN) 600 MG tablet Take 1 tablet by mouth 3 times daily for 30 days. Patient taking differently: Take 1,200 mg by mouth 3 times daily. 6/8/20 11/6/20 Yes Niraj Chowdhury MD   DULoxetine (CYMBALTA) 60 MG extended release capsule Take 1 capsule by mouth 2 times daily 6/8/20 11/6/20 Yes Niraj Chowdhury MD   traZODone (DESYREL) 50 MG tablet Take 1 tablet by mouth nightly as needed for Sleep 6/8/20  Yes Niraj Chowdhury MD   metFORMIN (GLUCOPHAGE) 500 MG tablet Take 1 tablet by mouth 2 times daily (with meals) 6/8/20  Yes Niraj Chowdhury MD   QUEtiapine (SEROQUEL) 100 MG tablet Take 1 tablet by mouth nightly 6/8/20  Yes Niraj Chowdhury MD   carvedilol (COREG) 12.5 MG tablet Take 1 tablet by mouth 2 times daily (with meals) 6/8/20  Yes Niraj Chowdhury MD   furosemide (LASIX) 40 MG tablet Take 1 tablet by mouth daily 6/8/20  Yes Niraj Chowdhury MD   esomeprazole (NEXIUM) 40 MG delayed release capsule Take 1 capsule by mouth every morning (before breakfast) 6/8/20  Yes Niraj Chowdhury MD   ondansetron (ZOFRAN-ODT) 4 MG disintegrating tablet Take 4 mg by mouth every 8 hours as needed for Nausea or Vomiting   Yes Historical Provider, MD   cetirizine (ZYRTEC) 10 MG tablet Take 10 mg by mouth daily   Yes Historical Provider, MD   SUPER B COMPLEX/C PO Take by mouth daily   Yes Historical Provider, MD   loperamide (IMODIUM) 2 MG capsule Take 2 mg by mouth 4 times daily as needed for Diarrhea Takes two capsules at onset   Yes Historical Provider, MD   lidocaine (LIDODERM) 5 % Place 1 patch onto the skin daily 12 hours on, 12 hours off.    Yes Historical Provider, MD   fluticasone (FLONASE) 50 MCG/ACT nasal spray 1 spray by Each Nostril route daily   Yes Historical Provider, MD   rivaroxaban (XARELTO) 20 MG TABS tablet Take 1 tablet by mouth Daily with supper 6/8/20   Niraj Chowdhury MD   magnesium oxide (MAG-OX) 400 (241.3 Mg) MG TABS tablet Take 1 tablet by mouth daily 6/9/20   Niraj Chowdhury, MD   coenzyme Q10 100 MG CAPS capsule Take 100 mg by mouth 3 times daily    Historical Provider, MD   diclofenac (CATAFLAM) 50 MG tablet Take 50 mg by mouth daily as needed (headache) May repeat in 6 hours, max 3 tablets per day    Historical Provider, MD       This is a 54 y.o. female who is pleasant, cooperative, alert and oriented x 3, in no acute distress. Obese. Heart: Irregular rhythm. Regular rate without murmur, gallop, or rub. (History of A-fib). Lungs: Normal respiratory effort, unlabored, and clear to auscultation without wheezes or rales bilaterally. Abdomen: Soft, nontender, nondistended with active bowel sounds. Pedal pulses: 2+ bilaterally. Labs:  Recent Labs     10/30/20  1349   HGB 11.9   HCT 37.6   WBC 8.7   MCV 88.1         K 3.3*   CL 97*   CO2 33*   BUN 19   CREATININE 0.56   GLUCOSE 108*       Recent Labs     11/02/20  1100   COVID19 Not Detected                     Lesli KAPADIA, TESS-BC  Electronically signed 11/6/2020 at [address]    Physician    Specialty:  Orthopedic Surgery    Progress Notes      Signed    Encounter Date:  10/15/2020          Related encounter: Office Visit from 10/15/2020 in [address] [place] [address] and Sports Medicine          Signed        Expand All Collapse All     Show:Clear all  [x]Manual[x]Template[x]Copied    Added by:  [x]Frederick Guerrero[x]Paul Anderson DO    []Lizbeth for details        [facility] [address]  Dept: [phone]  Dept Fax: [phone]            Left Knee - Follow Up      Subjective:           Chief Complaint   Patient presents with    Knee Pain       Left      HPI:      Othelia Aase presents today for Left knee pain. The pain has been present for 8 weeks. The patient recalls no specific injury.  The patient has tried knee braces, crutches, heat/ice, physical therapy, rest, Tramadol, Gabapentin, Mobic, and Motrin with minimal improvement. The pain is now described as Achy and Sharp . There is pain on weight bearing. The knee has swelled. There is painful popping and clicking. The knee has caught or locked up and she states the sensation feels like there is something being caught inside the knee The knee has not given out. It is stiff upon arising from sitting. It is painful to go up and down stairs and sit for a prolonged time. The patient has not had a cortisone injection this year. The patient has not tried a lubrication injection. The patient has tried physical therapy. The patient has not had surgery on this knee but she has had surgery on her right knee and she states that her right knee feels great. Since her right knee feels good, the patient states that she would like to consider surgery on her left knee to help alleviate her left knee pain. ROS:   Review of Systems   Constitutional: Positive for activity change. Negative for appetite change, fatigue and fever. Respiratory: Negative for apnea, cough, chest tightness and shortness of breath. Cardiovascular: Negative for chest pain, palpitations and leg swelling. Gastrointestinal: Negative for abdominal distention, abdominal pain, constipation, diarrhea, nausea and vomiting. Genitourinary: Negative for difficulty urinating, dysuria and hematuria. Musculoskeletal: Positive for arthralgias. Negative for gait problem, joint swelling and myalgias. Skin: Negative for color change and rash. Neurological: Negative for dizziness, weakness, numbness and headaches. Psychiatric/Behavioral: Negative for sleep disturbance.       Past Medical History:    Past Medical History        Past Medical History:   Diagnosis Date    Atrial fibrillation ([facility]      [facility] cancer ([facility]       bilateral remission since 2012    C. difficile colitis       2015    Colon cancer ([facility]       stage IV remission since 2016    Depression      Diabetes mellitus ([facility]      Diverticulosis      Fibromyalgia      Hyperlipidemia      Hypertension      Multiple sclerosis ([facility]      Rheumatoid arthritis ([facility]      Seizure ([facility] 7/     last seizure 7/18/2020  first seizure 1992 after head injury    Unspecified cerebral artery occlusion with cerebral infarction       7/12  NOT DEFINITE    Wears glasses          Past Surgical History:    Past Surgical History         Past Surgical History:   Procedure Laterality Date   [address]        BREAST BIOPSY   [id number]    BREAST LUMPECTOMY         bilateral 1994,0003,5074     CARDIAC SURGERY         cardiac cath negative in 2018   [address], COLON, DIAGNOSTIC        HYSTERECTOMY   2010    INSERTABLE CARDIAC MONITOR        KNEE ARTHROSCOPY Right 08/14/2020     RIGHT KNEE ARTHROSCOPY WITH PARTIAL MEDIAL MENISCECTOMY WITH DEBRIDEMENT (Right     KNEE ARTHROSCOPY Right 8/14/2020     RIGHT KNEE ARTHROSCOPY WITH PARTIAL MEDIAL MENISCECTOMY WITH DEBRIDEMENT performed by Jose Ramon Daniel DO at [address] [place] [address] SIGMOIDECTOMY   2016     for cancer    SLEEVE GASTRECTOMY   5/19/15     GASTRECTOMY SLEEVE LAPAROSCOPIC           TONSILLECTOMY   1985    UPPER GASTROINTESTINAL ENDOSCOPY   1/30/15        Current Medications:   Current Facility-Administered Medications          Current Outpatient Medications   Medication Sig Dispense Refill    budesonide-formoterol (SYMBICORT) 160-4.5 MCG/ACT AERO INHALE 2 PUFFS BID UTD        methocarbamol (ROBAXIN) 750 MG tablet Take 1,500 mg by mouth 3 times daily        traZODone (DESYREL) 50 MG tablet Take 1 tablet by mouth nightly as needed for Sleep 30 tablet 0    metFORMIN (GLUCOPHAGE) 500 MG tablet Take 1 tablet by mouth 2 times daily (with meals) 60 tablet 0    rivaroxaban (XARELTO) 20 MG TABS tablet Take 1 tablet by mouth Daily with supper 30 tablet 0  QUEtiapine (SEROQUEL) 100 MG tablet Take 1 tablet by mouth nightly 30 tablet 0    carvedilol (COREG) 12.5 MG tablet Take 1 tablet by mouth 2 times daily (with meals) 60 tablet 0    furosemide (LASIX) 40 MG tablet Take 1 tablet by mouth daily 30 tablet 0    esomeprazole (NEXIUM) 40 MG delayed release capsule Take 1 capsule by mouth every morning (before breakfast) 30 capsule 0    ondansetron (ZOFRAN-ODT) 4 MG disintegrating tablet Take 4 mg by mouth every 8 hours as needed for Nausea or Vomiting        cetirizine (ZYRTEC) 10 MG tablet Take 10 mg by mouth daily        diclofenac (CATAFLAM) 50 MG tablet Take 50 mg by mouth daily as needed (headache) May repeat in 6 hours, max 3 tablets per day        SUPER B COMPLEX/C PO Take by mouth daily        loperamide (IMODIUM) 2 MG capsule Take 2 mg by mouth 4 times daily as needed for Diarrhea Takes two capsules at onset        lidocaine (LIDODERM) 5 % Place 1 patch onto the skin daily 12 hours on, 12 hours off.  fluticasone (FLONASE) 50 MCG/ACT nasal spray 1 spray by Each Nostril route daily        gabapentin (NEURONTIN) 600 MG tablet Take 1 tablet by mouth 3 times daily for 30 days. (Patient taking differently: Take 1,200 mg by mouth 3 times daily. ) 90 tablet 0    DULoxetine (CYMBALTA) 60 MG extended release capsule Take 1 capsule by mouth 2 times daily 60 capsule 0    magnesium oxide (MAG-OX) 400 (241.3 Mg) MG TABS tablet Take 1 tablet by mouth daily 30 tablet 0    coenzyme Q10 100 MG CAPS capsule Take 100 mg by mouth 3 times daily          No current facility-administered medications for this visit. Allergies:    Ace inhibitors; Betadine [povidone iodine]; Iodine; Lurasidone; Lurasidone hcl; Nitrofurantoin; Povidone-iodine; Shellfish allergy; Shellfish-derived products; Iodides; Lamotrigine; Macrobid [nitrofurantoin macrocrystal];  Morphine; Pyridium [phenazopyridine hcl]; and Topiramate     Social History:   Social History   Social History Socioeconomic History    Marital status:        Spouse name: None    Number of children: None    Years of education: None    Highest education level: None   Occupational History    Occupation: Disabled   Social Needs    Financial resource strain: None    Food insecurity       Worry: None       Inability: None    Transportation needs       Medical: None       Non-medical: None   Tobacco Use    Smoking status: Current Every Day Smoker       Packs/day: 1.00       Years: 20.00       Pack years: 20.00       Last attempt to quit: 2020       Years since quittin.2    Smokeless tobacco: Never Used   Substance and Sexual Activity    Alcohol use: Yes       Comment: occassion    Drug use: No    Sexual activity: Yes       Partners: Male   Lifestyle    Physical activity       Days per week: None       Minutes per session: None    Stress: None   Relationships    Social connections       Talks on phone: None       Gets together: None       Attends Judaism service: None       Active member of club or organization: None       Attends meetings of clubs or organizations: None       Relationship status: None    Intimate partner violence       Fear of current or ex partner: None       Emotionally abused: None       Physically abused: None       Forced sexual activity: None   Other Topics Concern    None   Social History Narrative    None        Family History:  Family History   Family History   Adopted: Yes        Vitals:   Temp 98.1 °F (36.7 °C)   Ht 5' 3\" (1.6 m)   Wt 222 lb (100.7 kg)   BMI 39.33 kg/m²  Body mass index is 39.33 kg/m². Physical Examination:      Orthopedics:     GENERAL: Alert and oriented X3 in no acute distress. SKIN: Intact without lesions or ulcerations. NEURO: Intact to sensory and motor testing. VASC: Capillary refill is less than 3 seconds. KNEE EXAM     LOCATION: Left Knee  GEN: Alert and oriented X 3, in no acute distress.   GAIT: The patient's gait was observed while entering the exam room and was noted to be non antalgic. The extremity is in anatomic alignment. SKIN: Intact without rashes, lesions, or ulcerations. No obvious deformity or swelling. NEURO: The patient responds to light touch throughout left LE. Patellar and Achilles reflexes are 2/4. VASC: The left LE is neurovascularly intact with 2/4 DP and 2/4 PT pulses. Brisk capillary refill. ROM: 0/116 degrees. There is no effusion. MUSC: slightly decreased quad tone  LIGAMENT: Lachman's test is Negative with Good endpoint. Anterior drawer Negative. Posterior drawer Negative. There is No varus instability at 0 degrees and No varus instability at 30 degrees. There is No valgus instability at 0 degrees and No valgus instability at 30 degrees. SPECIAL: Conor test is positive with no clunks and no crepitation but there is pain. PALP: There is medial joint line pain. Medial plica pain and medial fat pad pain. Assessment:      1. Acute lateral meniscus tear of left knee, subsequent encounter       Procedures:    Procedure: no  Radiology:   Mri Knee Left Wo Contrast     Result Date: 10/4/2020  No medial meniscus tear identified. Suspected horizontal tear of the lateral meniscus body. The posterior root attachment of the lateral meniscus appears attenuated and may be partially injured. Cruciate and collateral ligaments are intact. Tricompartmental osteoarthrosis with areas of cartilage loss as described above. X-ray was reviewed from 07/20/2020. Plan:   Treatment : I reviewed the X-ray and MRI with the patient. We discussed the etiologies and natural histories of Acute medial meniscus tear in the left knee. We discussed the various treatment alternatives including anti-inflammatory medications, physical therapy, injections, further imaging studies and as a last result surgery.  During today's visit, I explained to the patient that there is a potential that I can help her knee feel better by encounter. I, Prateek Nation, am scribing for and in the presence of Vickey Blake D.O. 10/17/2020  2:36 PM        I, Vickey Blake DO, have personally seen this patient and I have reviewed the CC, PMH, FHX and Social History as provided by other clinical staff. I reassessed the HPI and ROS as scribed by Freda Ingram [employer] in my presence and it is both accurate and complete. Thereafter, I personally performed the PE, reviewed the imaging and established the DX and POC. I agree with the documentation provided by the Medical Scribe. I have reviewed all documentation in its entirety prior to providing my signature indicating agreement. Any areas of disagreement are noted on the chart.      Electronically signed by Oni Lopez DO on 10/17/2020 at 2:36 PM           Electronically signed by Oni Lopez DO, on 10/17/2020 at 2:36 PM            Revision History

## 2020-11-06 NOTE — ANESTHESIA POSTPROCEDURE EVALUATION
Department of Anesthesiology  Postprocedure Note    Patient: Mariaelena Brewer  MRN: [id number]  YOB: 1965  Date of evaluation: 11/6/2020  Time:  10:05 AM     Procedure Summary     Date:  11/06/20 Room / Location:  [address] [place] [address] / [place] [address]    Anesthesia Start:  4324 Anesthesia Stop:  2862    Procedure:  LEFT KNEE ARTHROSCOPY WITH PARTIAL LATERAL MENISCAL TEAR REPAIR AND [address] (Left Knee) Diagnosis:  (DX    LEFT KNEE PARTIAL LATERAL MENISCAL TEAR)    Surgeon:  Jose Ramon Daniel DO Responsible Provider:  Kong Aviles MD    Anesthesia Type:  general ASA Status:  3          Anesthesia Type: general    Kiersten Phase I: Kiersten Score: 8    Kiersten Phase II:      Last vitals: Reviewed and per EMR flowsheets.        Anesthesia Post Evaluation    Patient location during evaluation: PACU  Complications: no

## 2020-11-13 ENCOUNTER — OFFICE VISIT (OUTPATIENT)
Dept: ORTHOPEDIC SURGERY | Age: 55
End: 2020-11-13

## 2020-11-13 VITALS — HEIGHT: 63 IN | BODY MASS INDEX: 39.34 KG/M2 | WEIGHT: 222 LBS | TEMPERATURE: 96.8 F

## 2020-11-13 PROCEDURE — 99024 POSTOP FOLLOW-UP VISIT: CPT | Performed by: PHYSICIAN ASSISTANT

## 2020-11-13 RX ORDER — OXYCODONE HYDROCHLORIDE AND ACETAMINOPHEN 5; 325 MG/1; MG/1
1 TABLET ORAL EVERY 6 HOURS PRN
Qty: 28 TABLET | Refills: 0 | Status: SHIPPED | OUTPATIENT
Start: 2020-11-13 | End: 2020-11-20

## 2020-11-13 ASSESSMENT — ENCOUNTER SYMPTOMS
COUGH: 0
CHEST TIGHTNESS: 0
CONSTIPATION: 0
VOMITING: 0
NAUSEA: 0
APNEA: 0
SHORTNESS OF BREATH: 0
RESPIRATORY NEGATIVE: 1
ABDOMINAL DISTENTION: 0
ABDOMINAL PAIN: 0
DIARRHEA: 0
COLOR CHANGE: 0

## 2020-11-16 ENCOUNTER — HOSPITAL ENCOUNTER (OUTPATIENT)
Dept: PHYSICAL THERAPY | Facility: CLINIC | Age: 55
Setting detail: THERAPIES SERIES
Discharge: HOME | End: 2020-11-16
Payer: COMMERCIAL

## 2020-11-17 ENCOUNTER — HOSPITAL ENCOUNTER (OUTPATIENT)
Dept: PHYSICAL THERAPY | Facility: CLINIC | Age: 55
Setting detail: THERAPIES SERIES
Discharge: HOME | End: 2020-11-17
Payer: COMMERCIAL

## 2020-11-17 NOTE — CARE COORDINATION
[] [facility]) - [facility] &  Therapy  [address]    [phone]  F: [phone]   [] [address]   [address]  P: [phone]  F: [phone]  [] [address] &  Therapy  [address]  P: [phone]  F: [phone] [] [address]  P: [phone]  F: [phone]  [x] [address]  [address] [facility] [address]   [address]   [place]: [phone]  F: [phone]   [] [facility]  [address] [facility] [address]  [place]: [phone]   F: [phone]     Physical Therapy Cancel/No Show note    Date: 2020  Patient: Thang Batres  : 1965  MRN: [id number]    Cancels/No Shows to date: 2    For today's appointment patient:    []  Cancelled    [] Rescheduled appointment    [x] No-show     Reason given by patient:    []  Patient ill    []  Conflicting appointment    [] No transportation      [] Conflict with work    [] No reason given    [] Weather related    [] COVID-19    [x] Other:   Pt cancelled on  and rescheduled for today , no showed this morning for her appt.     Comments:        [] Next appointment was confirmed    Electronically signed by: Chevy Cavazos, PT

## 2020-11-29 NOTE — OP NOTE
Operative Note      Patient: Sirisha Mendez  YOB: 1965  MRN: [id number]    Date of Procedure: 11/6/2020    Pre-Op Diagnosis: DX    LEFT KNEE PARTIAL LATERAL MENISCAL TEAR    Post-Op Diagnosis: Left knee lateral meniscus tear, grade 4 tricompartmental degenerative changes, medial plica band, multiple loose bodies. Procedure(s):  LEFT KNEE ARTHROSCOPY WITH PARTIAL LATERAL MENISCAL TEAR REPAIR AND [id number] [facility],[address]    Surgeon(s):  Giselle Priest DO    Assistant:   Resident: May Velasquez DO; Mark Ontiveros DO    Anesthesia: General    Estimated Blood Loss (mL): Minimal    Complications: None    Specimens:   * No specimens in log *    Implants:  * No implants in log *      Drains: * No LDAs found *    Findings: See postoperative diagnosis    Detailed Description of Procedure:   Patient has left knee pain and mechanical symptoms failing conservative treatment. She understands the risk and benefits of the procedure. Informed consent was signed. Operative site was marked. Preoperative antibiotics were given. Patient was taken to the operative suite and placed in the supine position on the operative table. General inhalation anesthetic with endotracheal intubation was performed. Exam under anesthesia revealed a stable knee. A well-padded tourniquet was placed on the left proximal thigh. Left leg was placed in arthroscopic leg trujillo. Right leg was padded underneath the buttock with a pillow and a free leg trujillo. Foot of the bed was dropped 90 degrees. Patient was prepped and draped in normal sterile fashion. 2 portal arthroscopic technique was utilized. Suprapatellar pouch medial lateral gutters had multiple loose bodies present. They were less than 0.5 mm except for one encounter later in the procedure. They were suctioned from the gutters and suprapatellar pouch. 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 There was grade III chondromalacia of the patella. Utilizing a suction thick shaver through both the medial and lateral portals the undersurface of the patella and femoral trochlea were debrided back to a stable border. There was a large grade C plica band present. It was resected back to stable border so would no longer impinged within the patellofemoral joint. Medial compartment was entered. There was grade II chondromalacia of the medial tibial plateau. There was loose bodies underneath the medial meniscus that were suctioned from the joint. 1 of these was larger than 5 mm and had to be shaved into smaller pieces to be removed. The medial meniscus otherwise was intact. The medial femoral condyle was intact. The intercondylar notch was entered. At the anterior lateral aspect of the intercondylar notch there was tearing of the anterior horn of the lateral meniscus noted. This was involved into the base of the ACL. The ACL itself was intact. This was to visualization probing and stress testing. Lateral compartment was entered. There was a tear as noted of the anterior horn of the lateral meniscus with cystic changes. Utilizing a suction shaver this was resected back to a stable border leaving the anterior horn of the lateral meniscus attachment intact. It removed approximately 25% of the anterior horn. There was a multiple other loose bodies within the lateral compartment that were greater than 5 mm in size. These were required suction shaving to break into smaller pieces to be removed from the joint. There was grade IV chondromalacia on the lateral femoral condyle with chondral flaps. This was extensive and involved approximately three fourths of the weightbearing surface. This was debrided back to a stable border so that could no longer cause loose bodies are mechanical symptoms. At this time the knee was again routinely arthroscope there is no loose bodies present. It was injected with 30 mL of 0.5% ropivacaine.   All instruments removed to the knee. Portals were closed with 3-0 nylon suture. Tourniquet was dropped and there is rapid cap refill. Sterile dressing was placed. Sponge and needle count was correct. Bulky knee dressing was placed and patient was awakened given by department of anesthesia.     Electronically signed by Darlene Wahl DO on 11/29/2020 at 5:31 PM

## 2020-12-03 ENCOUNTER — OFFICE VISIT (OUTPATIENT)
Dept: ORTHOPEDIC SURGERY | Age: 55
End: 2020-12-03
Payer: COMMERCIAL

## 2020-12-03 VITALS
TEMPERATURE: 96.8 F | DIASTOLIC BLOOD PRESSURE: 74 MMHG | WEIGHT: 222 LBS | BODY MASS INDEX: 39.34 KG/M2 | HEIGHT: 63 IN | HEART RATE: 88 BPM | SYSTOLIC BLOOD PRESSURE: 116 MMHG

## 2020-12-03 PROCEDURE — 99024 POSTOP FOLLOW-UP VISIT: CPT | Performed by: ORTHOPAEDIC SURGERY

## 2020-12-03 PROCEDURE — 20611 DRAIN/INJ JOINT/BURSA W/US: CPT | Performed by: ORTHOPAEDIC SURGERY

## 2020-12-03 RX ORDER — METHYLPREDNISOLONE ACETATE 40 MG/ML
40 INJECTION, SUSPENSION INTRA-ARTICULAR; INTRALESIONAL; INTRAMUSCULAR; SOFT TISSUE ONCE
Status: COMPLETED | OUTPATIENT
Start: 2020-12-03 | End: 2020-12-03

## 2020-12-03 RX ORDER — LIDOCAINE HYDROCHLORIDE 10 MG/ML
4 INJECTION, SOLUTION INFILTRATION; PERINEURAL ONCE
Status: COMPLETED | OUTPATIENT
Start: 2020-12-03 | End: 2020-12-03

## 2020-12-03 RX ADMIN — LIDOCAINE HYDROCHLORIDE 4 ML: 10 INJECTION, SOLUTION INFILTRATION; PERINEURAL at 17:38

## 2020-12-03 RX ADMIN — METHYLPREDNISOLONE ACETATE 40 MG: 40 INJECTION, SUSPENSION INTRA-ARTICULAR; INTRALESIONAL; INTRAMUSCULAR; SOFT TISSUE at 17:38

## 2020-12-03 ASSESSMENT — ENCOUNTER SYMPTOMS
SHORTNESS OF BREATH: 0
CONSTIPATION: 0
ABDOMINAL PAIN: 0
COUGH: 0
CHEST TIGHTNESS: 0
VOMITING: 0
APNEA: 0
COLOR CHANGE: 0
ABDOMINAL DISTENTION: 0
NAUSEA: 0
DIARRHEA: 0

## 2020-12-03 NOTE — PROGRESS NOTES
[facility]  [address] [facility] [address]  Dept: [phone]  Dept Fax: [phone]        Post Operative Follow Up    Subjective:     Chief Complaint   Patient presents with    Knee Pain     Left knee scope DOS- 11/6/20     Post Op Surgery:     The patient is here for a follow up after having a Left knee arthroscopy with partial lateral meniscal repair, Plica excision and removal of loose bodies. The date of surgery was on 11/06/2020. Therefore we are 4 weeks post op. Currently the patient's pain is controlled with percocet. Physical therapy was started. Patient presents today with no ambulatory devices. Patient states that she is doing well and her knee feels a lot better now than it did before surgery. Patient also mentioned that her right knee has been a bit more tender recently and it is more tender today than the left. She states that she would like to have her right knee injection. Review of Systems   Constitutional: Positive for activity change. Negative for appetite change, fatigue and fever. Respiratory: Negative for apnea, cough, chest tightness and shortness of breath. Cardiovascular: Negative for chest pain, palpitations and leg swelling. Gastrointestinal: Negative for abdominal distention, abdominal pain, constipation, diarrhea, nausea and vomiting. Genitourinary: Negative for difficulty urinating, dysuria and hematuria. Musculoskeletal: Positive for arthralgias. Negative for gait problem, joint swelling and myalgias. Skin: Negative for color change and rash. Neurological: Negative for dizziness, weakness, numbness and headaches. Psychiatric/Behavioral: Negative for sleep disturbance. I have reviewed the CC, HPI, ROS, PMH, FHX, Social History, and if not present in this note, I have reviewed in the patient's chart.  I agree with the documentation provided by other staff and have reviewed with the documentation provided by other staff and have reviewed their documentation prior to providing my signature indicating agreement. Vitals:   /74   Pulse 88   Temp 96.8 °F (36 °C)   Ht 5' 3\" (1.6 m)   Wt 222 lb (100.7 kg)   BMI 39.33 kg/m²  Body mass index is 39.33 kg/m². Assessment:     1. Primary osteoarthritis of both knees    2. S/P arthroscopic surgery of left knee      Procedure:   Procedure: Yes    Regular Knee Injection    Location: Right Knee  Procedure: Corticosteroid injection into the knee. The patient was placed in the Supine position on the exam table. The superior lateral portal was identified and marked. The skin was prepped with betadine in a sterile fashion. Utilizing ultrasound for precise placement and clean technique with sterile gloves a 5cc solution containing 4cc of 1.0% Lidocaine with 1cc containing 40mg of Depo-medrol  was injected. There was no resistance to the injection. The wound was cleansed and a band-aid was placed. the patient tolerated the procedure without difficulty. Adverse reactions to the injection were discussed with the patient including signs of infection (increasing pain, redness, swelling) and the patient was instructed to call immediately if experiencing any of these symptoms. Plan:   Patient should return to the clinic in 4 weeks to follow up with  Parveen Riggs PA-C for a Synvisc one lubricating injection. The patient will call the office immediately with any problems. Orders Placed This Encounter   Medications    lidocaine 1 % injection 4 mL    methylPREDNISolone acetate (DEPO-MEDROL) injection 40 mg     No orders of the defined types were placed in this encounter. Martha Esquivel Day V, am scribing for and in the presence of Hiro Fitzpatrick D.O. 12/6/2020  4:57 PM      I, Hiro Fitzpatrick DO, have personally seen this patient and I have reviewed the CC, PMH, FHX and Social History as provided by other clinical staff.  I reassessed the HPI and ROS as scribed by Freda Morelibmark in my presence and it is both accurate and complete. Thereafter, I personally performed the PE, reviewed the imaging and established the DX and POC. I agree with the documentation provided by the Medical Scribe. I have reviewed all documentation in its entirety prior to providing my signature indicating agreement. Any areas of disagreement are noted on the chart.     Electronically signed by Janey Moore DO on 12/6/2020 at 4:57 PM        Electronically signed by Janey Moore DO, on 12/6/2020 at 4:57 PM

## 2020-12-16 ENCOUNTER — TELEPHONE (OUTPATIENT)
Dept: ORTHOPEDIC SURGERY | Age: 55
End: 2020-12-16

## 2021-02-19 DIAGNOSIS — M25.561 RIGHT KNEE PAIN, UNSPECIFIED CHRONICITY: Primary | ICD-10-CM

## 2021-02-22 ENCOUNTER — OFFICE VISIT (OUTPATIENT)
Dept: ORTHOPEDIC SURGERY | Age: 56
End: 2021-02-22
Payer: COMMERCIAL

## 2021-02-22 VITALS
HEIGHT: 63 IN | SYSTOLIC BLOOD PRESSURE: 126 MMHG | TEMPERATURE: 97.3 F | HEART RATE: 77 BPM | WEIGHT: 222 LBS | DIASTOLIC BLOOD PRESSURE: 82 MMHG | BODY MASS INDEX: 39.34 KG/M2

## 2021-02-22 DIAGNOSIS — M17.0 PRIMARY OSTEOARTHRITIS OF BOTH KNEES: Primary | ICD-10-CM

## 2021-02-22 DIAGNOSIS — Z98.890 S/P ARTHROSCOPIC SURGERY OF LEFT KNEE: ICD-10-CM

## 2021-02-22 DIAGNOSIS — Z98.890 S/P ARTHROSCOPIC SURGERY OF RIGHT KNEE: ICD-10-CM

## 2021-02-22 PROCEDURE — 3017F COLORECTAL CA SCREEN DOC REV: CPT | Performed by: ORTHOPAEDIC SURGERY

## 2021-02-22 PROCEDURE — G8417 CALC BMI ABV UP PARAM F/U: HCPCS | Performed by: ORTHOPAEDIC SURGERY

## 2021-02-22 PROCEDURE — 99213 OFFICE O/P EST LOW 20 MIN: CPT | Performed by: ORTHOPAEDIC SURGERY

## 2021-02-22 PROCEDURE — 1036F TOBACCO NON-USER: CPT | Performed by: ORTHOPAEDIC SURGERY

## 2021-02-22 PROCEDURE — 20610 DRAIN/INJ JOINT/BURSA W/O US: CPT | Performed by: ORTHOPAEDIC SURGERY

## 2021-02-22 PROCEDURE — G8427 DOCREV CUR MEDS BY ELIG CLIN: HCPCS | Performed by: ORTHOPAEDIC SURGERY

## 2021-02-22 PROCEDURE — G8484 FLU IMMUNIZE NO ADMIN: HCPCS | Performed by: ORTHOPAEDIC SURGERY

## 2021-02-22 RX ORDER — METHYLPHENIDATE HYDROCHLORIDE 20 MG/1
20 TABLET ORAL 2 TIMES DAILY
COMMUNITY

## 2021-02-22 RX ORDER — LIDOCAINE HYDROCHLORIDE 10 MG/ML
8 INJECTION, SOLUTION INFILTRATION; PERINEURAL ONCE
Status: COMPLETED | OUTPATIENT
Start: 2021-02-22 | End: 2021-02-22

## 2021-02-22 RX ORDER — METHYLPREDNISOLONE ACETATE 40 MG/ML
40 INJECTION, SUSPENSION INTRA-ARTICULAR; INTRALESIONAL; INTRAMUSCULAR; SOFT TISSUE ONCE
Status: COMPLETED | OUTPATIENT
Start: 2021-02-22 | End: 2021-02-22

## 2021-02-22 RX ADMIN — METHYLPREDNISOLONE ACETATE 40 MG: 40 INJECTION, SUSPENSION INTRA-ARTICULAR; INTRALESIONAL; INTRAMUSCULAR; SOFT TISSUE at 15:03

## 2021-02-22 RX ADMIN — LIDOCAINE HYDROCHLORIDE 8 ML: 10 INJECTION, SOLUTION INFILTRATION; PERINEURAL at 15:03

## 2021-02-22 ASSESSMENT — ENCOUNTER SYMPTOMS
CONSTIPATION: 0
APNEA: 0
DIARRHEA: 0
NAUSEA: 0
ABDOMINAL DISTENTION: 0
ABDOMINAL PAIN: 0
COLOR CHANGE: 0
COUGH: 0
VOMITING: 0
SHORTNESS OF BREATH: 0
CHEST TIGHTNESS: 0

## 2021-02-22 NOTE — PROGRESS NOTES
[address] [facility]  [address] [place] [id number]  Dept: [phone]  Dept Fax: [phone]                                                      Bilateral Knee - Follow Up     Subjective:     Chief Complaint   Patient presents with    Knee Pain     Bilateral knee pain, left scope DOS- 11/6/20, right cortisone- 12/3/20     HPI:     Mona  presents today with Bilateral knee pain. The pain has been present for 1 month. The patient recalls no specific injury or trauma to either of the knees. The patient has tried Voltaren Gel with mild improvement. The pain is now described as Achy and Sharp. There is pain with weight bearing. The knees have not swelled. There is painful popping and clicking. The knees have caught or locked up. The knees have not given out. It is stiff upon arising from sitting. It is painful to go up and down stairs and sit for a prolonged time. The patient has had a cortisone injection. The patient's last cortisone injection was on 12/3/2020 in the right knee. The patient has not tried a lubrication injection. The patient has tried physical therapy after her surgeries. The patient has had surgery. The patient had a left knee arthroscopy surgery on 11/06/2020 and a right knee arthroscopy surgery on 08/14/2020. The patient cannot take NSAIDs due to a gastric sleeve, and due to her being on blood thinners. ROS:   Review of Systems   Constitutional: Positive for activity change. Negative for appetite change, fatigue and fever. Respiratory: Negative for apnea, cough, chest tightness and shortness of breath. Cardiovascular: Negative for chest pain, palpitations and leg swelling. Gastrointestinal: Negative for abdominal distention, abdominal pain, constipation, diarrhea, nausea and vomiting. Genitourinary: Negative for difficulty urinating, dysuria and hematuria. Musculoskeletal: Positive for arthralgias. Negative for gait problem, joint swelling and myalgias. Skin: Negative for color change and rash. Neurological: Negative for dizziness, weakness, numbness and headaches. Psychiatric/Behavioral: Negative for sleep disturbance.        Past Medical History:    Past Medical History:   Diagnosis Date    Atrial fibrillation ([address]     Breast cancer ([address]     bilateral remission since 2012    C. difficile colitis     2015    Colon cancer ([address]     stage IV remission since 2016    Depression     Diabetes mellitus ([address]     Diverticulosis     Fibromyalgia     Hyperlipidemia     Hypertension     Multiple sclerosis ([address]     Rheumatoid arthritis ([address]     Seizure ([address] 7/    last seizure 7/18/2020  first seizure 1992 after head injury    Unspecified cerebral artery occlusion with cerebral infarction     7/12  NOT DEFINITE    Wears glasses        Past Surgical History:    Past Surgical History:   Procedure Laterality Date    APPENDECTOMY  1985    BACK SURGERY      BREAST BIOPSY  [id number]    BREAST LUMPECTOMY      bilateral [id number]     CARDIAC SURGERY      cardiac cath negative in 2018   [facility]    COLONOSCOPY      ENDOSCOPY, COLON, DIAGNOSTIC      HYSTERECTOMY  2010    INSERTABLE CARDIAC MONITOR      KNEE ARTHROSCOPY Right 08/14/2020    RIGHT KNEE ARTHROSCOPY WITH PARTIAL MEDIAL MENISCECTOMY WITH DEBRIDEMENT (Right     KNEE ARTHROSCOPY Right 8/14/2020    RIGHT KNEE ARTHROSCOPY WITH PARTIAL MEDIAL MENISCECTOMY WITH DEBRIDEMENT performed by Maribel Nickerson DO at [facility] ARTHROSCOPY Left 11/6/2020    LEFT KNEE ARTHROSCOPY WITH PARTIAL LATERAL MENISCAL TEAR REPAIR AND [address] [facility],[address] performed by Maribel Nickerson DO at [place] [address] SIGMOIDECTOMY  2016    for cancer    SLEEVE GASTRECTOMY  5/19/15    GASTRECTOMY SLEEVE LAPAROSCOPIC           TONSILLECTOMY  1985    UPPER GASTROINTESTINAL ENDOSCOPY  1/30/15 CurrentMedications:   Current Outpatient Medications   Medication Sig Dispense Refill    methylphenidate (RITALIN) 20 MG tablet Take 20 mg by mouth 2 times daily.  albuterol sulfate HFA (VENTOLIN HFA) 108 (90 Base) MCG/ACT inhaler Inhale 2 puffs into the lungs every 6 hours as needed for Wheezing      budesonide-formoterol (SYMBICORT) 160-4.5 MCG/ACT AERO INHALE 2 PUFFS BID UTD      methocarbamol (ROBAXIN) 750 MG tablet Take 1,500 mg by mouth 3 times daily      traZODone (DESYREL) 50 MG tablet Take 1 tablet by mouth nightly as needed for Sleep 30 tablet 0    metFORMIN (GLUCOPHAGE) 500 MG tablet Take 1 tablet by mouth 2 times daily (with meals) 60 tablet 0    rivaroxaban (XARELTO) 20 MG TABS tablet Take 1 tablet by mouth Daily with supper 30 tablet 0    QUEtiapine (SEROQUEL) 100 MG tablet Take 1 tablet by mouth nightly 30 tablet 0    carvedilol (COREG) 12.5 MG tablet Take 1 tablet by mouth 2 times daily (with meals) 60 tablet 0    furosemide (LASIX) 40 MG tablet Take 1 tablet by mouth daily 30 tablet 0    magnesium oxide (MAG-OX) 400 (241.3 Mg) MG TABS tablet Take 1 tablet by mouth daily 30 tablet 0    esomeprazole (NEXIUM) 40 MG delayed release capsule Take 1 capsule by mouth every morning (before breakfast) 30 capsule 0    ondansetron (ZOFRAN-ODT) 4 MG disintegrating tablet Take 4 mg by mouth every 8 hours as needed for Nausea or Vomiting      cetirizine (ZYRTEC) 10 MG tablet Take 10 mg by mouth daily      coenzyme Q10 100 MG CAPS capsule Take 100 mg by mouth 3 times daily      diclofenac (CATAFLAM) 50 MG tablet Take 50 mg by mouth daily as needed (headache) May repeat in 6 hours, max 3 tablets per day      SUPER B COMPLEX/C PO Take by mouth daily      loperamide (IMODIUM) 2 MG capsule Take 2 mg by mouth 4 times daily as needed for Diarrhea Takes two capsules at onset      lidocaine (LIDODERM) 5 % Place 1 patch onto the skin daily 12 hours on, 12 hours off.       fluticasone (FLONASE) 50 MCG/ACT nasal spray 1 spray by Each Nostril route daily      gabapentin (NEURONTIN) 600 MG tablet Take 1 tablet by mouth 3 times daily for 30 days. (Patient taking differently: Take 1,200 mg by mouth 3 times daily. ) 90 tablet 0    DULoxetine (CYMBALTA) 60 MG extended release capsule Take 1 capsule by mouth 2 times daily 60 capsule 0     No current facility-administered medications for this visit.         Allergies:    Ace inhibitors, Betadine swab aid [povidone-iodine], Betadine [povidone iodine], Iodine, Lurasidone, Lurasidone hcl, Shellfish allergy, Shellfish-derived products, Iodides, Lamotrigine, Macrobid [nitrofurantoin macrocrystal], Morphine, Pyridium [phenazopyridine hcl], and Topiramate    Social History:   Social History     Socioeconomic History    Marital status: Legally      Spouse name: None    Number of children: None    Years of education: None    Highest education level: None   Occupational History    Occupation: Disabled   Social Needs    Financial resource strain: None    Food insecurity     Worry: None     Inability: None    Transportation needs     Medical: None     Non-medical: None   Tobacco Use    Smoking status: Former Smoker     Packs/day: 1.00     Years: 20.00     Pack years: 20.00     Quit date: 2020     Years since quittin.6    Smokeless tobacco: Never Used   Substance and Sexual Activity    Alcohol use: Yes     Comment: occassion    Drug use: No    Sexual activity: Yes     Partners: Male   Lifestyle    Physical activity     Days per week: None     Minutes per session: None    Stress: None   Relationships    Social connections     Talks on phone: None     Gets together: None     Attends Buddhism service: None     Active member of club or organization: None     Attends meetings of clubs or organizations: None     Relationship status: None    Intimate partner violence     Fear of current or ex partner: None     Emotionally abused: None     Physically abused: None     Forced sexual activity: None   Other Topics Concern    None   Social History Narrative    None       Family History:  Family History   Adopted: Yes       Vitals:   /82   Pulse 77   Temp 97.3 °F (36.3 °C)   Ht 5' 3\" (1.6 m)   Wt 222 lb (100.7 kg)   BMI 39.33 kg/m²  Body mass index is 39.33 kg/m². Physical Examination:     Orthopedics:    GENERAL: Alert and oriented X3 in no acute distress. SKIN: Intact without lesions or ulcerations. NEURO: Intact to sensory and motor testing. VASC: Capillary refill is less than 3 seconds. KNEE EXAM    LOCATION: Bilateral Knee  GEN: Alert and oriented X 3, in no acute distress. GAIT: The patient's gait was observed while entering the exam room and was noted to be antalgic. The extremity is in anatomic alignment. SKIN: Intact without rashes, lesions, or ulcerations. No obvious deformity or swelling. NEURO: The patient responds to light touch throughout bilateral LE. Patellar and Achilles reflexes are 2/4. VASC: The bilateral LE is neurovascularly intact with 2/4 DP and 2/4 PT pulses. Brisk capillary refill. ROM: 0/116 degrees on the right and 0/120 degrees on the left. There is a minimal effusion with both knees. MUSC: Good quad tone. Decreased quad tone on the left. LIGAMENT: Lachman's test is Negative with Good endpoint. Anterior drawer Negative. Posterior drawer Negative. There is No varus instability at 0 degrees and No varus instability at 30 degrees. There is No valgus instability at 0 degrees and No valgus instability at 30 degrees. SPECIAL: Conor test is negative with pain and popping with the right knee and pain with the left knee. PALP: There is medial joint line pain. Assessment:     1. Primary osteoarthritis of both knees    2. S/P arthroscopic surgery of left knee    3.  S/P arthroscopic surgery of right knee      Procedures:    Procedure: yes    Regular Knee Injection    Location: Bilateral Knees  Procedure: Corticosteroid injection into both knees. The patient was placed in the Supine position on the exam table. The superior lateral portal was identified and marked. The skin was prepped with betadine in a sterile fashion. Utilizing ultrasound for precise placement and clean technique with sterile gloves a 5cc solution containing 4cc of 1.0% Lidocaine with 1cc containing 40mg of Depo-medrol  was injected. There was no resistance to the injection. The wound was cleansed and a band-aid was placed. the patient tolerated the procedure without difficulty. Adverse reactions to the injection were discussed with the patient including signs of infection (increasing pain, redness, swelling) and the patient was instructed to call immediately if experiencing any of these symptoms. Radiology:   X-rays taken today were reviewed with the patient. KNEE X-RAY    4 views of the bilateral knee including AP, bilateral tunnel, and lateral in the upright position, and skyline views reveal anatomic alignment with no fracture or dislocation. Kellgren grade II changes of osteoarthritis (joint space narrowing, osteophyte, subchondral sclerosis, bony deformity/cyst) of the tri compartment(s). No osseous loose bodies. No bony erosion or periosteal reaction. No soft tissue masses. Proximal pole of patella is spurring. Impression: Moderate osteoarthritis of both knees. Plan:   Treatment : I reviewed the X-ray with the patient. We discussed the etiologies and natural histories of primary osteoarthritis of both knees. We discussed the various treatment alternatives including anti-inflammatory medications, physical therapy, injections, further imaging studies and as a last result surgery. During today's visit, I explained to the patient that she is approved for a bilateral Synvisc injection. However, we do not have any Synvisc injections in stock so we will have to wait to get the medicine in.  I then told her that we can do cortisone encounter. No orders of the defined types were placed in this encounter. Patrick Salcedo MS, AT, ATC, am scribing for and in the presence of Darrick Schirmer D.O.. 2/22/2021  2:43 PM        Electronically signed by Biju Johnson ATC, on 2/22/2021 at 2:43 PM             I, Darrick Schirmer DO, have personally seen this patient and I have reviewed the CC, PMH, FHX and Social History as provided by other clinical staff. I reassessed the HPI and ROS as scribed by Biju Johnson ATC in my presence and it is both accurate and complete. Thereafter, I personally performed the PE, reviewed the imaging and established the DX and POC. I agree with the documentation provided by the . I have reviewed all documentation in its entirety prior to providing my signature indicating agreement. Any areas of disagreement are noted on the chart.     Electronically signed by Xiao Okeefe DO on 2/28/2021 at 6:24 PM

## 2021-03-11 ENCOUNTER — OFFICE VISIT (OUTPATIENT)
Dept: ORTHOPEDIC SURGERY | Age: 56
End: 2021-03-11
Payer: COMMERCIAL

## 2021-03-11 VITALS
BODY MASS INDEX: 39.16 KG/M2 | HEIGHT: 63 IN | HEART RATE: 77 BPM | WEIGHT: 221 LBS | DIASTOLIC BLOOD PRESSURE: 80 MMHG | TEMPERATURE: 97.3 F | SYSTOLIC BLOOD PRESSURE: 131 MMHG

## 2021-03-11 DIAGNOSIS — M17.12 PRIMARY OSTEOARTHRITIS OF LEFT KNEE: Primary | ICD-10-CM

## 2021-03-11 DIAGNOSIS — S80.12XA CONTUSION OF LEFT LOWER LEG, INITIAL ENCOUNTER: ICD-10-CM

## 2021-03-11 PROCEDURE — G8484 FLU IMMUNIZE NO ADMIN: HCPCS | Performed by: ORTHOPAEDIC SURGERY

## 2021-03-11 PROCEDURE — G8417 CALC BMI ABV UP PARAM F/U: HCPCS | Performed by: ORTHOPAEDIC SURGERY

## 2021-03-11 PROCEDURE — 99213 OFFICE O/P EST LOW 20 MIN: CPT | Performed by: ORTHOPAEDIC SURGERY

## 2021-03-11 PROCEDURE — 3017F COLORECTAL CA SCREEN DOC REV: CPT | Performed by: ORTHOPAEDIC SURGERY

## 2021-03-11 PROCEDURE — 1036F TOBACCO NON-USER: CPT | Performed by: ORTHOPAEDIC SURGERY

## 2021-03-11 PROCEDURE — G8427 DOCREV CUR MEDS BY ELIG CLIN: HCPCS | Performed by: ORTHOPAEDIC SURGERY

## 2021-03-11 ASSESSMENT — ENCOUNTER SYMPTOMS
DIARRHEA: 0
RESPIRATORY NEGATIVE: 1
NAUSEA: 0
COLOR CHANGE: 0
ABDOMINAL PAIN: 0
APNEA: 0
ABDOMINAL DISTENTION: 0
COUGH: 0
VOMITING: 0
SHORTNESS OF BREATH: 0
CHEST TIGHTNESS: 0
CONSTIPATION: 0

## 2021-03-11 NOTE — PROGRESS NOTES
rash.   Neurological: Negative for dizziness, weakness, numbness and headaches. Psychiatric/Behavioral: Negative for sleep disturbance.        Past Medical History:    Past Medical History:   Diagnosis Date    [facility]     Breast cancer ([facility]     bilateral remission since 2012    C. difficile colitis     2015    Colon cancer ([facility]     stage IV remission since 2016    Depression     Diabetes mellitus ([facility]     Diverticulosis     Fibromyalgia     Hyperlipidemia     Hypertension     Multiple sclerosis ([facility]     Rheumatoid arthritis ([facility]     Seizure ([facility] 7/    last seizure 7/18/2020  first seizure 1992 after head injury    Unspecified cerebral artery occlusion with cerebral infarction     7/12  NOT DEFINITE    Wears glasses        Past Surgical History:    Past Surgical History:   Procedure Laterality Date    APPENDECTOMY  1985    BACK SURGERY      BREAST BIOPSY  [id number]    BREAST LUMPECTOMY      bilateral [id number]     CARDIAC SURGERY      cardiac cath negative in 2018   [facility]    COLONOSCOPY      ENDOSCOPY, COLON, DIAGNOSTIC      HYSTERECTOMY  2010    INSERTABLE CARDIAC MONITOR      KNEE ARTHROSCOPY Right 08/14/2020    RIGHT KNEE ARTHROSCOPY WITH PARTIAL MEDIAL MENISCECTOMY WITH DEBRIDEMENT (Right     KNEE ARTHROSCOPY Right 8/14/2020    RIGHT KNEE ARTHROSCOPY WITH PARTIAL MEDIAL MENISCECTOMY WITH DEBRIDEMENT performed by Tatiana Mendoza DO at [facility] ARTHROSCOPY Left 11/6/2020    LEFT KNEE ARTHROSCOPY WITH PARTIAL LATERAL MENISCAL TEAR REPAIR AND [address] [facility] performed by Tatiana Mendoza DO at [place] [address] SIGMOIDECTOMY  2016    for cancer    SLEEVE GASTRECTOMY  5/19/15    GASTRECTOMY SLEEVE LAPAROSCOPIC           TONSILLECTOMY  1985    UPPER GASTROINTESTINAL ENDOSCOPY  1/30/15       CurrentMedications:   Current Outpatient Medications   Medication Sig Dispense Refill    methylphenidate tablet by mouth 3 times daily for 30 days. (Patient taking differently: Take 1,200 mg by mouth 3 times daily. ) 90 tablet 0    DULoxetine (CYMBALTA) 60 MG extended release capsule Take 1 capsule by mouth 2 times daily 60 capsule 0     No current facility-administered medications for this visit.         Allergies:    Ace inhibitors, Betadine swab aid [povidone-iodine], Betadine [povidone iodine], Iodine, Lurasidone, Lurasidone hcl, Shellfish allergy, Shellfish-derived products, Iodides, Lamotrigine, Macrobid [nitrofurantoin macrocrystal], Morphine, Pyridium [phenazopyridine hcl], and Topiramate    Social History:   Social History     Socioeconomic History    Marital status: Legally      Spouse name: None    Number of children: None    Years of education: None    Highest education level: None   Occupational History    Occupation: Disabled   Social Needs    Financial resource strain: None    Food insecurity     Worry: None     Inability: None    Transportation needs     Medical: None     Non-medical: None   Tobacco Use    Smoking status: Former Smoker     Packs/day: 1.00     Years: 20.00     Pack years: 20.00     Quit date: 2020     Years since quittin.6    Smokeless tobacco: Never Used   Substance and Sexual Activity    Alcohol use: Yes     Comment: occassion    Drug use: No    Sexual activity: Yes     Partners: Male   Lifestyle    Physical activity     Days per week: None     Minutes per session: None    Stress: None   Relationships    Social connections     Talks on phone: None     Gets together: None     Attends Yazdanism service: None     Active member of club or organization: None     Attends meetings of clubs or organizations: None     Relationship status: None    Intimate partner violence     Fear of current or ex partner: None     Emotionally abused: None     Physically abused: None     Forced sexual activity: None   Other Topics Concern    None   Social History Narrative    None       Family History:  Family History   Adopted: Yes       Vitals:   /80   Pulse 77   Temp 97.3 °F (36.3 °C)   Ht 5' 3\" (1.6 m)   Wt 221 lb (100.2 kg)   BMI 39.15 kg/m²  Body mass index is 39.15 kg/m². Physical Examination:     Orthopedics:    GENERAL: Alert and oriented X3 in no acute distress. SKIN: Intact without lesions or ulcerations. NEURO: Intact to sensory and motor testing. VASC: Capillary refill is less than 3 seconds. KNEE EXAM    LOCATION: Left Knee  GEN: Alert and oriented X 3, in no acute distress. GAIT: The patient's gait was observed while entering the exam room and was noted to be antalgic. The extremity is in anatomic alignment. SKIN: Intact without rashes, lesions, or ulcerations. No obvious deformity or swelling. NEURO: The patient responds to light touch throughout bilateral LE. Patellar and Achilles reflexes are 2/4. VASC: The bilateral LE is neurovascularly intact with 2/4 DP and 2/4 PT pulses. Brisk capillary refill. ROM: 0/105 degrees. There is mild effusion. MUSC: decreased quad tone  LIGAMENT:  There is No varus instability at 0 degrees and No varus instability at 30 degrees. There is No valgus instability at 0 degrees and No valgus instability at 30 degrees. SPECIAL: Conor test is positive with no clunks, + crepitation, and + pain. PALP: There is lateral joint line pain. Proximal fibula pain to palpation. Pain and swelling of the anterior compartment of the left lower leg. No decrease in pulses or pallor noted  Assessment:     1. Primary osteoarthritis of left knee    2. Contusion of left lower leg, initial encounter      Procedures:    Procedure: no  Radiology:   KNEE X-RAY    4 views of the left knee including AP, bilateral tunnel, and lateral in the upright position, and skyline views reveal anatomic alignment with no fracture or dislocation.   Kellgren grade II changes of osteoarthritis (joint space narrowing, osteophyte, subchondral sclerosis, bony deformity/cyst) of the medial compartment(s). No osseous loose bodies. No bony erosion or periosteal reaction. No soft tissue masses. Impression: Mild osteoarthritis of the left knee. Plan:   Treatment : I reviewed the X-ray with the patient and I informed them that the x-ray showed no fractures. We discussed the etiologies and natural histories of a contusion of the left lower leg. We discussed the various treatment alternatives including anti-inflammatory medications, physical therapy, injections, further imaging studies and as a last result surgery. During today's visit, we discussed that if her leg continues to swell and she begins having numbness in her foot or severe amount of pain then we have to be concerned about a compartment syndrome. I would suggest that she hold her Xarelto tonight, ice and compress. If that area becomes tighter and more painful she should report back to the office first thing tomorrow morning at 8:30 or go directly to [facility] emergency room. The patient has opted for ice, elevation and holding her Xarelto. Patient should return to the clinic tomorrow if her pain gets worse with  Annie Martinez PA-C. The patient will call the office immediately with any problems. No orders of the defined types were placed in this encounter. Orders Placed This Encounter   Procedures    XR KNEE LEFT (MIN 4 VIEWS)     Standing Status:   Future     Number of Occurrences:   1     Standing Expiration Date:   3/11/2022       Annie MAN PA-C, am scribing for and in the presence of Jamari Granger D.O.. 3/12/2021  4:04 PM      Jamari MAN DO, have personally seen this patient and I have reviewed the CC, PMH, FHX and Social History as provided by other clinical staff. I reassessed the HPI and ROS as scribed by Annie Martinez PA-C in my presence and it is both accurate and complete. Thereafter, I personally performed the PE, reviewed the imaging and established the DX and POC.  I agree with the documentation provided by the Physician Assistant. I have reviewed all documentation in its entirety prior to providing my signature indicating agreement. Any areas of disagreement are noted on the chart.     Electronically signed by Grace Krishna DO on 3/14/2021 at 6:30 PM        Electronically signed by Madhavi Mora PA-C, on 3/12/2021 at 4:04 PM

## 2021-03-12 ENCOUNTER — TELEPHONE (OUTPATIENT)
Dept: ORTHOPEDIC SURGERY | Age: 56
End: 2021-03-12

## 2021-03-25 ENCOUNTER — OFFICE VISIT (OUTPATIENT)
Dept: ORTHOPEDIC SURGERY | Age: 56
End: 2021-03-25
Payer: COMMERCIAL

## 2021-03-25 VITALS
TEMPERATURE: 97.2 F | HEART RATE: 78 BPM | OXYGEN SATURATION: 95 % | DIASTOLIC BLOOD PRESSURE: 76 MMHG | SYSTOLIC BLOOD PRESSURE: 135 MMHG | WEIGHT: 227 LBS | HEIGHT: 63 IN | BODY MASS INDEX: 40.22 KG/M2

## 2021-03-25 DIAGNOSIS — M17.11 PRIMARY OSTEOARTHRITIS OF RIGHT KNEE: ICD-10-CM

## 2021-03-25 DIAGNOSIS — S80.12XA CONTUSION OF LEFT LOWER LEG, INITIAL ENCOUNTER: ICD-10-CM

## 2021-03-25 DIAGNOSIS — M17.12 PRIMARY OSTEOARTHRITIS OF LEFT KNEE: Primary | ICD-10-CM

## 2021-03-25 PROCEDURE — 99213 OFFICE O/P EST LOW 20 MIN: CPT | Performed by: ORTHOPAEDIC SURGERY

## 2021-03-25 PROCEDURE — 1036F TOBACCO NON-USER: CPT | Performed by: ORTHOPAEDIC SURGERY

## 2021-03-25 PROCEDURE — G8484 FLU IMMUNIZE NO ADMIN: HCPCS | Performed by: ORTHOPAEDIC SURGERY

## 2021-03-25 PROCEDURE — 20611 DRAIN/INJ JOINT/BURSA W/US: CPT | Performed by: ORTHOPAEDIC SURGERY

## 2021-03-25 PROCEDURE — 3017F COLORECTAL CA SCREEN DOC REV: CPT | Performed by: ORTHOPAEDIC SURGERY

## 2021-03-25 PROCEDURE — G8417 CALC BMI ABV UP PARAM F/U: HCPCS | Performed by: ORTHOPAEDIC SURGERY

## 2021-03-25 PROCEDURE — G8427 DOCREV CUR MEDS BY ELIG CLIN: HCPCS | Performed by: ORTHOPAEDIC SURGERY

## 2021-03-25 RX ORDER — LIDOCAINE HYDROCHLORIDE 10 MG/ML
4 INJECTION, SOLUTION INFILTRATION; PERINEURAL ONCE
Status: COMPLETED | OUTPATIENT
Start: 2021-03-25 | End: 2021-03-25

## 2021-03-25 RX ADMIN — LIDOCAINE HYDROCHLORIDE 4 ML: 10 INJECTION, SOLUTION INFILTRATION; PERINEURAL at 12:07

## 2021-03-25 RX ADMIN — LIDOCAINE HYDROCHLORIDE 4 ML: 10 INJECTION, SOLUTION INFILTRATION; PERINEURAL at 12:06

## 2021-03-25 ASSESSMENT — ENCOUNTER SYMPTOMS
RESPIRATORY NEGATIVE: 1
CONSTIPATION: 0
COUGH: 0
NAUSEA: 0
APNEA: 0
CHEST TIGHTNESS: 0
ABDOMINAL PAIN: 0
ABDOMINAL DISTENTION: 0
DIARRHEA: 0
SHORTNESS OF BREATH: 0
COLOR CHANGE: 0
VOMITING: 0

## 2021-03-25 NOTE — PROGRESS NOTES
[address] AND SPORTS MEDICINE  [address] [place] [id number]  Dept: [phone]  Dept Fax: [phone]          Left Knee - Follow Up     Subjective:     Chief Complaint   Patient presents with    Follow-up     2 wk f/u bilat knee. Pt states her baker cyst have returned. Left knee is the worst. Right knee is tolerable. Cortisone injection gave some relief until pt fell. HPI:     Nan Benson presents today for Left knee pain. The pain has been present for 12 hours. The patient recalls a specific injury where she was cleaning a spare bedroom and she was stepping over a box and hit her leg on a weight. The patient has tried ice, ibuprofen, cane and walker with mild improvement. The pain is now described as sharp on the outside of her leg. There is  pain on weight bearing. The knee has not swelled. There is not painful popping and clicking. The knee has not caught or locked up. The knee has given out. It is  stiff upon arising from sitting. It is  painful to go up and down stairs and sit for a prolonged time. The patient has had a cortisone injection on 2/21/2021 with good relief until the fall. The patient has not tried a lubrication injection. The patient has tried physical therapy. The patient has had surgery on 11/6/2020. She had a LEFT KNEE ARTHROSCOPY WITH PARTIAL LATERAL MENISCAL TEAR REPAIR AND DEBRIDENT. She has had two falls since her last appt. She says the knee is \"popping and locking\". Her leg is still painful 8/10. She presents today with a cane. ROS:   Review of Systems   Constitutional: Positive for activity change. Negative for appetite change, fatigue and fever. Respiratory: Negative. Negative for apnea, cough, chest tightness and shortness of breath. Cardiovascular: Negative. Negative for chest pain, palpitations and leg swelling.    Gastrointestinal: Negative for abdominal distention, abdominal pain, constipation, diarrhea, nausea and vomiting. Genitourinary: Negative for difficulty urinating, dysuria and hematuria. Musculoskeletal: Positive for arthralgias, gait problem and joint swelling. Negative for myalgias. Skin: Negative for color change and rash. Neurological: Positive for numbness (neuropathy). Negative for dizziness, weakness and headaches. Psychiatric/Behavioral: Positive for sleep disturbance.        Past Medical History:    Past Medical History:   Diagnosis Date    Atrial fibrillation ([address]     Breast cancer ([address]     bilateral remission since 2012    C. difficile colitis     2015    Colon cancer ([address]     stage IV remission since 2016    Depression     Diabetes mellitus ([address]     Diverticulosis     Fibromyalgia     Hyperlipidemia     Hypertension     Multiple sclerosis ([address]     Rheumatoid arthritis ([address]     Seizure ([facility] 7/    last seizure 7/18/2020  first seizure 1992 after head injury    Unspecified cerebral artery occlusion with cerebral infarction     7/12  NOT DEFINITE    Wears glasses        Past Surgical History:    Past Surgical History:   Procedure Laterality Date    APPENDECTOMY  1985    BACK SURGERY      BREAST BIOPSY  [id number]    BREAST LUMPECTOMY      bilateral [id number]     CARDIAC SURGERY      cardiac cath negative in 2018   [address], COLON, DIAGNOSTIC      HYSTERECTOMY  2010    INSERTABLE CARDIAC MONITOR      KNEE ARTHROSCOPY Right 08/14/2020    RIGHT KNEE ARTHROSCOPY WITH PARTIAL MEDIAL MENISCECTOMY WITH DEBRIDEMENT (Right     KNEE ARTHROSCOPY Right 8/14/2020    RIGHT KNEE ARTHROSCOPY WITH PARTIAL MEDIAL MENISCECTOMY WITH DEBRIDEMENT performed by Kaela Ronquillo DO at [address] [facility] ARTHROSCOPY Left 11/6/2020    LEFT KNEE ARTHROSCOPY WITH PARTIAL LATERAL MENISCAL TEAR REPAIR AND [address],[address] performed by Kaela Ronquillo DO at [place] [address] SIGMOIDECTOMY  2016 Take by mouth daily      loperamide (IMODIUM) 2 MG capsule Take 2 mg by mouth 4 times daily as needed for Diarrhea Takes two capsules at onset      lidocaine (LIDODERM) 5 % Place 1 patch onto the skin daily 12 hours on, 12 hours off.  fluticasone (FLONASE) 50 MCG/ACT nasal spray 1 spray by Each Nostril route daily      gabapentin (NEURONTIN) 600 MG tablet Take 1 tablet by mouth 3 times daily for 30 days. (Patient taking differently: Take 1,200 mg by mouth 3 times daily. ) 90 tablet 0     No current facility-administered medications for this visit.         Allergies:    Ace inhibitors, Betadine swab aid [povidone-iodine], Betadine [povidone iodine], Iodine, Lurasidone, Lurasidone hcl, Shellfish allergy, Shellfish-derived products, Iodides, Lamotrigine, Macrobid [nitrofurantoin macrocrystal], Morphine, Pyridium [phenazopyridine hcl], and Topiramate    Social History:   Social History     Socioeconomic History    Marital status: Legally      Spouse name: None    Number of children: None    Years of education: None    Highest education level: None   Occupational History    Occupation: Disabled   Social Needs    Financial resource strain: None    Food insecurity     Worry: None     Inability: None    Transportation needs     Medical: None     Non-medical: None   Tobacco Use    Smoking status: Former Smoker     Packs/day: 1.00     Years: 20.00     Pack years: 20.00     Quit date: 2020     Years since quittin.7    Smokeless tobacco: Never Used   Substance and Sexual Activity    Alcohol use: Yes     Comment: occassion    Drug use: No    Sexual activity: Yes     Partners: Male   Lifestyle    Physical activity     Days per week: None     Minutes per session: None    Stress: None   Relationships    Social connections     Talks on phone: None     Gets together: None     Attends Islam service: None     Active member of club or organization: None     Attends meetings of clubs or Procedures:    Procedure: yes    Joint aspiration of the Left knee. The patient was placed in the supine position on the exam table. The superior lateral portal was identified and marked. The skin was prepped with betadine in a sterile fashion. Utilizing ultrasound for precise placement and clean technique with sterile gloves a 5cc solution containing 5cc of 1.0% Lidocaine was injected. There was no resistance to the injection. Thereafter the skin was prepped with betadine in a sterile fashion once again and the joint was aspirated with a 60cc syringe. After aspirating the joint, 15 cc's of yellow tinged synovial fluid was removed from the knee. The wound was then cleansed and a band-aid was placed over the superior lateral portal site. The patient tolerated the procedure without difficulty. Adverse reactions to the aspiration were discussed with the patient including signs of infection (increasing pain, redness, swelling) and the patient was instructed to call immediately if experiencing any of these symptoms. Synvisc One Injection    Location: Bilateral Knee  Procedure: I discussed in detail the risks, benefits and complications of the Synvisc one injection which included but are not limited to infection, skin reactions, hot swollen, and anaphylaxis with the patient. Donal Cunningham verbalized understanding and they have agreed for the Synvisc ONE injection into the bilateral knee. The patient was placed in the supine position on the exam table. The junction of the superior portion of the patella and the lateral portion of the patella was identified and marked. The skin was prepped with betadine in a sterile fashion. Under sterile conditions while utilizing sterile technique, a Panjo ultrasound unit with a variable frequency linear transducer was utilized for precise placement of a 22 gauge needle and 4 cc's of 1% lidocaine was injected as a local anesthetic.  Thereafter, utilizing the Panjo ultrasound, a 18 gauge needle was used to inject 6 ml of Synvisc using the superior lateral approach. There was no resistance to injection. The injection site was cleansed and a Band-Aid was placed over the injection site. The patient tolerated the procedure well without difficulty. Adverse reactions of the injection was discussed with the patient including signs of infection, increasing pain, redness, swelling, warmth, chills or fever and the patient was instructed to call immediately with any of these symptoms. Ultrasound images of the injection site were recorded throughout the procedure and are saved on the SD card which is stored in the Deep-Secure ultrasound unit. All images were downloaded and stored in patient's chart for permanent record. Radiology:   No results found. Plan:   Treatment : I reviewed the X-ray. We discussed the etiologies and natural histories of Primary knee osteoarthritis and hematoma of the left lower leg. We discussed the various treatment alternatives including anti-inflammatory medications, physical therapy, injections, further imaging studies and as a last result surgery. During today's visit, we discussed that I am much less concerned about the tightness that she had in her anterior compartment of her left leg because of being on the blood thinners after her fall couple of weeks ago. It is too soon to do another cortisone injection into the left knee but we do have the Synvisc now that we have been approved for for both knees. We can do that today. I would also suggest aspirating her left knee to get the fluid out of there at so hopefully will be less painful. I also advised that going back to physical therapy would be beneficial due to her multiple falls and for the collection of blood that was in her anterior compartment of her left lower leg. The patient has opted for a Synvisc-One injection into the bilateral knees knee to help promote healthier joint fluid production.  The injection site should never get red, hot, or swollen but if it does, the patient may be experiencing a reaction to the  Synvisc-One injection and should contact our office right away. The patient should not submerge the injection site in water for a minimum of 24 hours to avoid infection. This means no lakes, pools, ponds, or hot tubs for 24 hours. The patient can do the Synvic-One injection once every 6 months and 15 days as needed. If the injections stop working and do not give the patient relief, the patient should consider surgical interventions to produce long term relief. A physical therapy prescription was given. A Rx  was not given. We spent over 20 minutes with this patient. Patient should return to the clinic in 8 weeks to follow up with  Roosevelt Nova PA-C. The patient will call the office immediately with any problems. Orders Placed This Encounter   Medications    Hylan injection 48 mg    lidocaine 1 % injection 4 mL    lidocaine 1 % injection 4 mL    Hylan injection 48 mg       Orders Placed This Encounter   Procedures   [facility] Physical Therapy [facility]     Referral Priority:   Routine     Referral Type:   Eval and Treat     Referral Reason:   Specialty Services Required     Requested Specialty:   Physical Therapy     Number of Visits Requested:   1       Roosevelt MAN PA-C, am scribing for and in the presence of Faustina Seth D.O.. 3/25/2021  2:57 PM      IFaustina DO, have personally seen this patient and I have reviewed the CC, PMH, FHX and Social History as provided by other clinical staff. I reassessed the HPI and ROS as scribed by Roosevelt Nova PA-C in my presence and it is both accurate and complete. Thereafter, I personally performed the PE, reviewed the imaging and established the DX and POC. I agree with the documentation provided by the Physician Assistant. I have reviewed all documentation in its entirety prior to providing my signature indicating agreement.  Any areas of disagreement are noted on the chart.     Electronically signed by Angie Barrera DO on 4/3/2021 at 11:05 PM        Electronically signed by Jair Nazario PA-C, on 3/25/2021 at 2:57 PM

## 2021-03-31 ENCOUNTER — HOSPITAL ENCOUNTER (OUTPATIENT)
Dept: PHYSICAL THERAPY | Facility: CLINIC | Age: 56
Setting detail: THERAPIES SERIES
Discharge: HOME | End: 2021-03-31
Payer: COMMERCIAL

## 2021-03-31 NOTE — FLOWSHEET NOTE
[] [facility]) - [facility] &  Therapy  [address]    [phone]  F: [phone]   [] [address]   [address]  P: [phone]  F: [phone]  [] [address] &  Therapy  [address]  P: [phone]  F: [phone] [] [address]  P: [phone]  F: [phone]  [x] [address]  [address] [address]   [address]   [place]: [phone]  F: [phone]   [] [place] [address] [facility] [address]  [place]: [phone]   F: [phone]     Physical Therapy Cancel/No Show note    Date: 3/31/2021  Patient: Minesh Velez  : 1965  MRN: [id number]    Cancels/No Shows to date: 1    For today's appointment patient:    []  Cancelled    [] Rescheduled appointment    [x] No-show     Reason given by patient:    []  Patient ill    []  Conflicting appointment    [] No transportation      [] Conflict with work    [x] No reason given    [] Weather related    [] OLD-12    [] Other:      Comments:        [] Next appointment was confirmed    Electronically signed by: Froylan Beltrán

## 2021-04-14 ENCOUNTER — TELEPHONE (OUTPATIENT)
Dept: ORTHOPEDIC SURGERY | Age: 56
End: 2021-04-14

## 2021-04-14 NOTE — TELEPHONE ENCOUNTER
Advised patient she should be going to therapy. She states [facility] and she was taking care of him so that's why she canceled her PT appt. She will be calling to schedule another appt with that Monday of next week. Advised tylenol and ibuprofen for her pain as well as ice and elevation. She is using her walker for ambulation at this time.

## 2021-04-14 NOTE — TELEPHONE ENCOUNTER
Patient called having a lot problems/pain with her left knee. Patient asking for phone call to try and figure out what else can be done.

## 2021-05-26 ENCOUNTER — OFFICE VISIT (OUTPATIENT)
Dept: ORTHOPEDIC SURGERY | Age: 56
End: 2021-05-26
Payer: COMMERCIAL

## 2021-05-26 VITALS
HEIGHT: 63 IN | DIASTOLIC BLOOD PRESSURE: 88 MMHG | BODY MASS INDEX: 42.74 KG/M2 | SYSTOLIC BLOOD PRESSURE: 147 MMHG | WEIGHT: 241.2 LBS | HEART RATE: 74 BPM

## 2021-05-26 DIAGNOSIS — M17.11 PRIMARY OSTEOARTHRITIS OF RIGHT KNEE: Primary | ICD-10-CM

## 2021-05-26 PROCEDURE — 3017F COLORECTAL CA SCREEN DOC REV: CPT | Performed by: PHYSICIAN ASSISTANT

## 2021-05-26 PROCEDURE — G8417 CALC BMI ABV UP PARAM F/U: HCPCS | Performed by: PHYSICIAN ASSISTANT

## 2021-05-26 PROCEDURE — G8427 DOCREV CUR MEDS BY ELIG CLIN: HCPCS | Performed by: PHYSICIAN ASSISTANT

## 2021-05-26 PROCEDURE — 20611 DRAIN/INJ JOINT/BURSA W/US: CPT | Performed by: PHYSICIAN ASSISTANT

## 2021-05-26 PROCEDURE — 99213 OFFICE O/P EST LOW 20 MIN: CPT | Performed by: PHYSICIAN ASSISTANT

## 2021-05-26 PROCEDURE — 1036F TOBACCO NON-USER: CPT | Performed by: PHYSICIAN ASSISTANT

## 2021-05-26 RX ORDER — METHYLPREDNISOLONE ACETATE 40 MG/ML
40 INJECTION, SUSPENSION INTRA-ARTICULAR; INTRALESIONAL; INTRAMUSCULAR; SOFT TISSUE ONCE
Status: COMPLETED | OUTPATIENT
Start: 2021-05-26 | End: 2021-05-26

## 2021-05-26 RX ORDER — LIDOCAINE HYDROCHLORIDE 10 MG/ML
4 INJECTION, SOLUTION INFILTRATION; PERINEURAL ONCE
Status: COMPLETED | OUTPATIENT
Start: 2021-05-26 | End: 2021-05-26

## 2021-05-26 RX ADMIN — METHYLPREDNISOLONE ACETATE 40 MG: 40 INJECTION, SUSPENSION INTRA-ARTICULAR; INTRALESIONAL; INTRAMUSCULAR; SOFT TISSUE at 10:54

## 2021-05-26 RX ADMIN — LIDOCAINE HYDROCHLORIDE 4 ML: 10 INJECTION, SOLUTION INFILTRATION; PERINEURAL at 10:53

## 2021-05-26 ASSESSMENT — ENCOUNTER SYMPTOMS
APNEA: 0
COUGH: 0
COLOR CHANGE: 0
SHORTNESS OF BREATH: 0
CHEST TIGHTNESS: 0
CONSTIPATION: 0
VOMITING: 0
NAUSEA: 0
DIARRHEA: 0
ABDOMINAL PAIN: 0
ABDOMINAL DISTENTION: 0

## 2021-05-26 NOTE — PROGRESS NOTES
[address] [facility] SPORTS [facility]ICINE  [address] [place] [id number]  Dept: [phone]  Dept Fax: [phone]          Bilateral Knee - Follow Up     Subjective:     Chief Complaint   Patient presents with    Knee Pain     B knee Synvisc 3/25/21 Cortisone2/21/21      HPI:     Taryn Pierre presents today for Bilateral knee pain where the left knee is worse than the right. The pain has been present for a year in the left knee but it got worse int he last 8 weeks and the pain in the right knee has been present for 1 year but it has gotten worse over the past 3 months. The patient recalls a specific injury where the left knee gave out when she went to [address] [place] [address] in the past 8 weeks but she also has hit her leg on a weight back on 03/10/2021 when she was stepping over a box in her room when she was cleaning her spare bedroom. The right knee also was hit off the ground in the past 8 weeks. The patient has tried a cane, ice, ibuprofen and a walker with no improvement. The pain is now described as [place] and [place]. There is pain on weight bearing. The knees have swelled. There is painful popping and clicking. The left knee has mechanically caught or locked up but her right knee has not caught or locked up. The left knee has given out but not the right knee. They are stiff upon arising from sitting. It is painful to go up and down stairs and sit for a prolonged time. The patient has had a cortisone injection on 02/21/2021 with good pain relief until she fell in March. The patient has tried a Synvisc one lubrication injection on 03/25/2021 with good pain relief. The patient has tried physical therapy but since her two falls she had in the past 4-6 weeks, she has not went back to physical therapy. The patient has had surgery on 11/06/2020 and it was a left knee arthroscopy with partial lateral meniscal tear and debridement.  Therefore she is 6 months post op. The patient also has had a Right knee arthroscopy with partial medial meniscectomy and debridement, medial femoral condyle, and medial trochlea and patella. The date of surgery was on 08/14/2020. Therefore she is 9 months post op. Patient states that she has been having a few issues since her last visit with us. She states that her and her boyfriend is currently breaking up because she has had to take care of him a lot the last three to four months, she also states that her left knee gave out at the food court at [facility] and she also has been admitted at the hospital twice since she last saw us and she states that she was admitted because of depression and manic episodes. ROS:   Review of Systems   Constitutional: Positive for activity change. Negative for appetite change, fatigue and fever. Respiratory: Negative for apnea, cough, chest tightness and shortness of breath. Cardiovascular: Negative for chest pain, palpitations and leg swelling. Gastrointestinal: Negative for abdominal distention, abdominal pain, constipation, diarrhea, nausea and vomiting. Genitourinary: Negative for difficulty urinating, dysuria and hematuria. Musculoskeletal: Positive for arthralgias. Negative for gait problem, joint swelling and myalgias. Skin: Negative for color change and rash. Neurological: Negative for dizziness, weakness, numbness and headaches. Psychiatric/Behavioral: Negative for sleep disturbance.      Past Medical History:    Past Medical History:   Diagnosis Date    Atrial fibrillation ([address]     Breast cancer ([address]     bilateral remission since 2012    C. difficile colitis     2015    Colon cancer ([address]     stage IV remission since 2016    Depression     Diabetes mellitus ([address]     Diverticulosis     Fibromyalgia     Hyperlipidemia     Hypertension     Multiple sclerosis ([address]     Rheumatoid arthritis ([address]     Se[facility] 7/    last seizure 7/18/2020  first seizure 1992 after head injury    Unspecified cerebral artery occlusion with cerebral infarction     7/12  NOT DEFINITE    Wears glasses      Past Surgical History:    Past Surgical History:   Procedure Laterality Date    APPENDECTOMY  1985    BACK SURGERY      BREAST BIOPSY  [id number]    BREAST LUMPECTOMY      bilateral [id number]     CARDIAC SURGERY      card[facility] negative in 2018   [facility]    COLONOSCOPY      ENDOSCOPY, COLON, DIAGNOSTIC      HYSTERECTOMY  2010    INSERTABLE CARDIAC MONITOR      KNEE ARTHROSCOPY Right 08/14/2020    RIGHT KNEE ARTHROSCOPY WITH PARTIAL MEDIAL MENISCECTOMY WITH DEBRIDEMENT (Right     KNEE ARTHROSCOPY Right 8/14/2020    RIGHT KNEE ARTHROSCOPY WITH PARTIAL MEDIAL MENISCECTOMY WITH DEBRIDEMENT performed by Brunilda Ly DO at [facility] ARTHROSCOPY Left 11/6/2020    LEFT KNEE ARTHROSCOPY WITH PARTIAL LATERAL MENISCAL TEAR REPAIR AND [address] performed by Brunilda Ly DO at [address] [place] [address] SIGMOIDECTOMY  2016    for cancer    SLEEVE GASTRECTOMY  5/19/15    GASTRECTOMY SLEEVE LAPAROSCOPIC           TONSILLECTOMY  1985    UPPER GASTROINTESTINAL ENDOSCOPY  1/30/15     Current Medications:   Current Outpatient Medications   Medication Sig Dispense Refill    methylphenidate (RITALIN) 20 MG tablet Take 20 mg by mouth 2 times daily.  albuterol sulfate HFA (VENTOLIN HFA) 108 (90 Base) MCG/ACT inhaler Inhale 2 puffs into the lungs every 6 hours as needed for Wheezing      budesonide-formoterol (SYMBICORT) 160-4.5 MCG/ACT AERO INHALE 2 PUFFS BID UTD      methocarbamol (ROBAXIN) 750 MG tablet Take 1,500 mg by mouth 3 times daily      gabapentin (NEURONTIN) 600 MG tablet Take 1 tablet by mouth 3 times daily for 30 days.  (Patient taking differently: Take 1,200 mg by mouth 3 times daily. ) 90 tablet 0    DULoxetine (CYMBALTA) 60 MG extended release capsule Take 1 capsule by mouth 2 times daily 60 capsule 0    traZODone (DESYREL) 50 MG tablet Take 1 tablet by mouth nightly as needed for Sleep 30 tablet 0    metFORMIN (GLUCOPHAGE) 500 MG tablet Take 1 tablet by mouth 2 times daily (with meals) 60 tablet 0    rivaroxaban (XARELTO) 20 MG TABS tablet Take 1 tablet by mouth Daily with supper 30 tablet 0    QUEtiapine (SEROQUEL) 100 MG tablet Take 1 tablet by mouth nightly 30 tablet 0    carvedilol (COREG) 12.5 MG tablet Take 1 tablet by mouth 2 times daily (with meals) 60 tablet 0    furosemide (LASIX) 40 MG tablet Take 1 tablet by mouth daily 30 tablet 0    magnesium oxide (MAG-OX) 400 (241.3 Mg) MG TABS tablet Take 1 tablet by mouth daily 30 tablet 0    esomeprazole (NEXIUM) 40 MG delayed release capsule Take 1 capsule by mouth every morning (before breakfast) 30 capsule 0    ondansetron (ZOFRAN-ODT) 4 MG disintegrating tablet Take 4 mg by mouth every 8 hours as needed for Nausea or Vomiting      cetirizine (ZYRTEC) 10 MG tablet Take 10 mg by mouth daily      coenzyme Q10 100 MG CAPS capsule Take 100 mg by mouth 3 times daily      diclofenac (CATAFLAM) 50 MG tablet Take 50 mg by mouth daily as needed (headache) May repeat in 6 hours, max 3 tablets per day      SUPER B COMPLEX/C PO Take by mouth daily      loperamide (IMODIUM) 2 MG capsule Take 2 mg by mouth 4 times daily as needed for Diarrhea Takes two capsules at onset      lidocaine (LIDODERM) 5 % Place 1 patch onto the skin daily 12 hours on, 12 hours off.  fluticasone (FLONASE) 50 MCG/ACT nasal spray 1 spray by Each Nostril route daily       No current facility-administered medications for this visit.      Allergies:    Ace inhibitors, Betadine swab aid [povidone-iodine], Betadine [povidone iodine], Iodine, Lurasidone, Lurasidone hcl, Shellfish allergy, Shellfish-derived products, Iodides, Lamotrigine, Macrobid [nitrofurantoin macrocrystal], Morphine, Pyridium [phenazopyridine hcl], and Topiramate    Social History:   Social History Socioeconomic History    Marital status: Legally      Spouse name: None    Number of children: None    Years of education: None    Highest education level: None   Occupational History    Occupation: Disabled   Tobacco Use    Smoking status: Former Smoker     Packs/day: 1.00     Years: 20.00     Pack years: 20.00     Quit date: 2020     Years since quittin.8    Smokeless tobacco: Never Used   Vaping Use    Vaping Use: Never used   Substance and Sexual Activity    Alcohol use: Yes     Comment: occassion    Drug use: No    Sexual activity: Yes     Partners: Male   Other Topics Concern    None   Social History Narrative    None     Social Determinants of Health     Financial Resource Strain:     Difficulty of Paying Living Expenses:    Food Insecurity:     Worried About Running Out of Food in the Last Year:     [address] in the Last Year:    Transportation Needs:     Lack of Transportation (Medical):  Lack of Transportation (Non-Medical):    Physical Activity:     Days of Exercise per Week:     Minutes of Exercise per Session:    Stress:     Feeling of Stress :    Social Connections:     Frequency of Communication with Friends and Family:     Frequency of Social Gatherings with Friends and Family:     Attends Shinto Services:     Active Member of Clubs or Organizations:     Attends Club or Organization Meetings:     Marital Status:    Intimate Partner Violence:     Fear of Current or Ex-Partner:     Emotionally Abused:     Physically Abused:     Sexually Abused:      Family History:  Family History   Adopted: Yes     Vitals:   BP (!) 147/88   Pulse 74   Ht 5' 3\" (1.6 m)   Wt 241 lb 3.2 oz (109.4 kg)   BMI 42.73 kg/m²  Body mass index is 42.73 kg/m². Physical Examination:     Orthopedics:    GENERAL: Alert and oriented X3 in no acute distress. SKIN: Intact without lesions or ulcerations. NEURO: Intact to sensory and motor testing.    VASC: Capillary refill is less than 3 seconds. KNEE EXAM    LOCATION: Bilateral Knee  GEN: Alert and oriented X 3, in no acute distress. GAIT: The patient's gait was observed while entering the exam room and was noted to be antalgic. The extremity is in anatomic alignment. SKIN: Intact without rashes, lesions, or ulcerations. No obvious deformity or swelling. NEURO: The patient responds to light touch throughout bilateral LE. Patellar and Achilles reflexes are 2/4. VASC: The bilateral LE is neurovascularly intact with 2/4 DP and 2/4 PT pulses. Brisk capillary refill. ROM: 0/125 degrees. There is no effusion. MUSC: decreased quad tone  LIGAMENT: There is No varus instability at 0 degrees and No varus instability at 30 degrees. There is No valgus instability at 0 degrees and No valgus instability at 30 degrees. SPECIAL: Conor test is negative with no clunks and no pain but there is crepitation in the bilateral knee. PALP: There is lateral joint line pain in the left knee. Assessment:     1. Primary osteoarthritis of right knee      Procedures:    Procedure: yes    Regular Knee Injection    Location: Right Knee  Procedure: I discussed in detail the risks, benefits and complications of the corticosteroid injection which included but are not limited to: infection, skin reactions, hot swollen, and anaphylaxis with the patient. Tomas Alaniz verbalized understanding and they have agreed to have the corticosteroid injection into the right knee. The patient was placed in the Supine position on the exam table. The superior lateral portal was identified and marked with a ball point pen. The skin was prepped with betadine in a sterile fashion. Utilizing a [employer] ultrasound unit with a variable frequency linear transducer and clean technique with sterile gloves, a 5 cc solution containing 4 cc of 1.0% Lidocaine with 1 cc containing 40 mg of Depo-medrol  was injected. There was no resistance to the injection.  The wound was cleansed and a band-aid was placed. the patient tolerated the procedure without difficulty. Adverse reactions to the injection were discussed with the patient including signs of infection (increasing pain, redness, swelling) and the patient was instructed to call immediately if experiencing any of these symptoms. Images of the injection site were recorded throughout the procedure and are saved on the SD card which is stored in the [facility] ultrasound unit. All images were downloaded and stored in patient's chart. Radiology:   X-ray was reviewed from 03/11/2021 for the left knee. X-ray was reviewed from 02/22/2021 for the right knee. Plan:   Treatment : I reviewed the X-ray with the patient. We discussed the etiologies and natural histories of Primary osteoarthritis of the bilateral knee. We discussed the various treatment alternatives including anti-inflammatory medications, physical therapy, injections, further imaging studies and as a last result surgery. During today's visit, I explained to the patient that since her left knee is still mechanically catching and locking with dull achy pain, the only thing that will work for her pain is a total knee replacement. Dr. Jah Lucas needs to be in agreement that the only thing this can work is a knee replacement. He did say that he wanted her to go to physical therapy to work on quad strengthening so I am going to give her another prescription for that since she has not done that yet. I then asked the patient if she feels ready to have her left knee replaced and the patient stated that she would like to have it replaced because the knee is causing her to fall. I then explained to her that we will have her meet with our surgery scheduler and we will get the ball rolling to replace the left knee.  However, she needs to understand that she will need to work on small amounts of weight loss because her BMI was over 40 today and with that, she will be at higher risk of having complications if we do the surgery. She states she is gained 18 pounds since starting a new medication. I also explained to her that she should attend physical therapy so they may help increase her strength and her ROM before we do the surgery. This way she does not have difficulty recovering after surgery due to poor quadriceps strength. The patient then stated that she understands. So at this time, the patient has opted for a physical therapy prescription and a cortisone injection into the joint capsule of the Right knee to help reduce inflammation and pain. The injection site should never get red, hot, or swollen and if it does the patient will contact our office right away. The patient may experience a increase in soreness the first 24-48 hours due to a cortisone flair and can take anti-inflammatories for a short period of time to reduce that soreness. The patient should not submerge the injection site in water for a minimum of 24 hours to avoid infection. This means no lakes, pools, ponds, or hot tubs for 24 hours. If the patient is diabetic the injection may increase their blood sugar for up to one week. If the injections stop working and do not give the patient relief the patient should consider surgical interventions to produce long term relief. The patient also has opted for and and has elected to proceed with a left total knee replacement surgery. The risks/benefits/alternatives and potential complications have been discussed with the patient. We also had a long discussion regarding the procedure itself and the expectation of the recovery. All questions and concerns were addressed. Patient was instructed to call our office with any question or concerns prior to the procedure occurs. A physical therapy prescription was given. Patient should return to the clinic in 6 weeks to follow up with Antony Prom D. O. and at that visit, they will discuss proceeding with a left total knee arthroplasty.  The patient will call the office immediately with any problems. Orders Placed This Encounter   Medications    methylPREDNISolone acetate (DEPO-MEDROL) injection 40 mg    lidocaine 1 % injection 4 mL     Orders Placed This Encounter   Procedures   [facility]     Referral Priority:   Routine     Referral Type:   Eval and Treat     Referral Reason:   Specialty Services Required     Requested Specialty:   Physical Therapy     Number of Visits Requested:   1     Harley MAN am scribing for and in the presence of Jeanine Pena PA-C. 5/26/2021  11:41 AM    Jeanine MAN PA-C, have personally seen this patient, reviewed the chart including history, and imaging if done. I personally  performed the physical exam and obtained any needed additional history. I placed orders, performed or supervised procedures and developed the treatment plan.     Electronically signed by Declan Giles PA-C, on 5/26/2021 at 11:48 AM      Electronically signed by Verenice Huerta, on 5/26/2021 at 11:41 AM

## 2021-06-02 ENCOUNTER — HOSPITAL ENCOUNTER (OUTPATIENT)
Dept: PHYSICAL THERAPY | Facility: CLINIC | Age: 56
Setting detail: THERAPIES SERIES
Discharge: HOME | End: 2021-06-02
Payer: COMMERCIAL

## 2021-06-02 PROCEDURE — 97161 PT EVAL LOW COMPLEX 20 MIN: CPT

## 2021-06-02 NOTE — CONSULTS
[x] [facility]  Outpatient Rehabilitation &  Therapy  [facility]   [place]: [phone]  F: [phone]      Physical Therapy Lower Extremity Evaluation    Date:  2021  Patient: Korina Lay  : 1965  MRN: [id number]  Physician: Lila Gutierres   Insurance: Karolyn Bridger Dual  Medical Diagnosis: R knee OA  Rehab Codes: M25.561, R26.89  Onset date: 2021 exacerbation  Next Dr's appt.: 21    Subjective:   CC:Beth Brittonlex Schmitt presents today for Bilateral knee pain where the left knee is worse than the right. The pain has been present for a year in the left knee but it got worse int he last 8 weeks and the pain in the right knee has been present for 1 year but it has gotten worse over the past 3 months. The patient recalls a specific injury where the left knee gave out when she went to [address] [place] [address] in the past 8 weeks but she also has hit her leg on a weight back on 03/10/2021 when she was stepping over a box in her room when she was cleaning her spare bedroom. The right knee also was hit off the ground in the past 8 weeks. The patient has tried a cane, ice, ibuprofen and a walker with no improvement. The pain is now described as Wale Gutter and Sharp. There is pain on weight bearing. The knees have swelled. There is painful popping and clicking. The left knee has mechanically caught or locked up but her right knee has not caught or locked up. The left knee has given out but not the right knee. They are stiff upon arising from sitting. It is painful to go up and down stairs and sit for a prolonged time. The patient has had a cortisone injection on 2021 with good pain relief until she fell in March. The patient has tried a Synvisc one lubrication injection on 2021 with good pain relief. The patient has tried physical therapy but since her two falls she had in the past 4-6 weeks, she has not went back to physical therapy.  The patient has had surgery on 11/06/2020 and it was a left knee arthroscopy with partial lateral meniscal tear and debridement. Therefore she is 6 months post op. The patient also has had a Right knee arthroscopy with partial medial meniscectomy and debridement, medial femoral condyle, and medial trochlea and patella. The date of surgery was on 08/14/2020. Therefore she is 9 months post op. Patient states that she has been having a few issues since her last visit with us. She states that her and her boyfriend is currently breaking up because she has had to take care of him a lot the last three to four months, she also states that her left knee gave out at the food court at [facility] and she also has been admitted at the hospital twice since she last saw us and she states that she was admitted because of depression and manic episodes    R knee scope, L knee scope. Also had injections but nothing has seemed to help. PMHx: [] Unremarkable [] Diabetes [] HTN  [] Pacemaker   [] MI/Heart Problems [] Cancer [] Arthritis [] Other:              [x] Refer to full medical chart  In EPIC       Comorbidities:   [x] Obesity [] Dialysis  [] N/A   [x] Asthma/COPD [] Dementia [x] Other: White matter disease- N/T, blurred vision, fatigue with heat   [x] Stroke [] Sleep apnea [x] Other:brittle bone   [] Vascular disease [] Rheumatic disease [] Other:     Tests: [] X-Ray: [x] MRI:     Impression: Moderate osteoarthritis of both knees.        [] Other:    Medications: [x] Refer to full medical record [] None [] Other:  Allergies:      [x] Refer to full medical record [] None [] Other:    Function:  Hand Dominance  [x] Right  [] Left  Patient lives with: boyfriend   In what type of home [x]  One story   [] Two story   [] Split level   Number of stairs to enter 1 step in   Bathroom has a []  Tub only  [x] Tub/shower combo   [] Walk in shower    []  Grab bars   Washing machine is on [x]  Main level   [] Second level   [] Basement opposite end of complex   Job Status []  Normal duty   [] Light duty   [] Off due to condition    []  Retired   [x] Not employed   [] Disability  [] Other:  []  Return to work:       ADL/IADL Previous level of function Current level of function Who currently assists the patient with task   Bathing  [x] Independent  [] Assist [] Independent  [] Assist    Dress/grooming [] Independent  [x] Assist [] Independent  [x] Assist Boyfriend depending on if its a bad day.  All slip on shoes   Transfer/mobility [x] Independent  [] Assist [x] Independent  [] Assist    Feeding [x] Independent  [] Assist [x] Independent  [] Assist    Toileting [x] Independent  [] Assist [x] Independent  [] Assist    Driving [x] Independent  [] Assist [x] Independent  [] Assist    Housekeeping [x] Independent  [] Assist [x] Independent  [x] Assist    Grocery shop/meal prep [x] Independent  [] Assist [] Independent  [] Assist      Gait Prior level of function Current level of function    [x] Independent  [] Assist [x] Independent  [] Assist   Device: [x] Independent [x] Independent    [] Straight Cane [] Quad cane [] Straight Cane [] Quad cane    [] Standard walker [] Rolling walker   [] 4 wheeled walker [] Standard walker [] Rolling walker   [] 4 wheeled walker    [] Wheelchair [] Wheelchair     Pain:  [x] Yes  [] No Location: R knee Pain Rating: (0-10 scale) 6-8/10  Pain altered Tx:  [x] Yes  [] No  Action:  Symptoms:  [] Improving [x] Worsening [] Same  Objective:    ROM  ° A/P STRENGTH    Left Right Left Right   Hip Flex WNL WNL     Ext 10 10 3+ 3+   ER WNL WNL 5 5   ABD WNL WNL 4- 4-   Knee Flex WNL* WNL*     Ext WNL* WNL*     Ankle DF 5 5     PF WNL WNL     INV WNL WNL     EVER WNL WNL     * pain with full flexion and extension    OBSERVATION No Deficit Deficit Not Tested Comments   Posture       Forward Head [] [] []    Rounded Shoulders [] [] []    Palpation [] [x] [] R knee medial joint line tenderness, L knee lateral joint line tenderness   Sensation [] [x] [] Intermittent N/T arms legs   Edema [x] [] []    Neurological [x] [] []    Patellar Mobility [x] [] []    Gait [] [x] [] Analysis:  Dynamic genu valgus B and B valgus collapse at foot/ankle with early heel off on R       FUNCTION Normal Difficult Unable   Sitting [x] [] []   Standing [] [x] []   Ambulation [] [x] []   Groom/Dress [] [x] []   Lift/Carry [] [x] []   Stairs [] [x] []   Bending [] [x] []   Squat [] [x] []   Kneel [] [x] []         FUNCTIONAL TESTS PAIN NO PAIN COMMENTS   Step Test 4 [x] [] Max upper extremity support to avoid loaded knee flexion due to poor eccentric control and pain     Functional Test: LEFI Score: 84% functionally impaired     Comments:    Assessment:  Patient would benefit from skilled physical therapy services in order to: Increase ankle DF and hip extension ROM, as well as increase hip and   Problems:    [x] ? Pain:  [x] ? ROM:  [x] ? Strength:  [x] ? Function:  [] Other:       STG: (to be met in 9 treatments)  1. ? Pain: 5/10 or less pain B knees to allow for greater ease with ADLs   2. ? ROM: Ankle DF 10 deg knee straight and hip extension 20 deg to facilitate normal gait  3. ? Strength:5/5 hip abduction and hip extension for greater ease with ADLs  4. ? Function: LEFI 50% disability  5. Patient to be independent with home exercise program as demonstrated by performance with correct form without cues. LTG: (to be met in 18 treatments)  1. Pt to demonstrate good eccentric quad control on 6 in step with <5/10 knee pain and little to no genu valgus to allow normal stair negotiation  2.  LEFI <30% disability                   Patient goals:Get stronger     Rehab Potential:  [] Good  [x] Fair  [] Poor   Suggested Professional Referral:  [x] No  [] Yes:  Barriers to Goal Achievement:  [x] No  [] Yes:  Domestic Concerns:  [x] No  [] Yes:    Pt. Education:  [x] Plans/Goals, Risks/Benefits discussed  [] Home exercise program    Method of Education: [x] Verbal  [] Demo  [] Written  Comprehension of Education:  [x] Verbalizes understanding. [] Demonstrates understanding. [] Needs Review. [] Demonstrates/verbalizes understanding of HEP/Ed previously given. Treatment Plan:  [x] Therapeutic Exercise   18668  [] Iontophoresis: 4 mg/mL Dexamethasone Sodium Phosphate  mAmin  83391   [] Therapeutic Activity  55064 [] Vasopneumatic cold with compression  15971    [x] Gait Training   61788 [] Ultrasound   75361   [] Neuromuscular Re-education  05043 [] Electrical Stimulation Unattended  49810   [x] Manual Therapy  29708 [] Electrical Stimulation Attended  53145   [x] Instruction in HEP  [] Lumbar/Cervical Traction  81362   [] Aquatic Therapy   45350 [x] Cold/hotpack    [] Massage   27769      [] Dry Needling, 1 or 2 muscles  43171   [] Biofeedback, first 15 minutes   35676  [] Biofeedback, additional 15 minutes   35885 [] Dry Needling, 3 or more muscles  04101       Frequency:  2 x/week for 18 visits        Todays Treatment:  Evaluation only pending approval       Evaluation Complexity:  History (Personal factors, comorbidities) [] 0 [] 1-2 [x] 3+   Exam (limitations, restrictions) [x] 1-2 [] 3 [] 4+   Clinical presentation (progression) [x] Stable [] Evolving  [] Unstable   Decision Making [x] Low [] Moderate [] High    [x] Low Complexity [] Moderate Complexity [] High Complexity       Treatment Charges: Mins Units   [x] Evaluation       [x]  Low       []  Moderate       []  High 25 1   []  Modalities     []  Ther Exercise     []  Manual Therapy     []  Ther Activities     []  Aquatics     []  Vasocompression     []  Other       TOTAL TREATMENT TIME: 25    Time in:0810   Time YCA:8202    Electronically signed by: Katty Marrero PT        Physician Signature:________________________________Date:__________________  By signing above or cosigning this note, I have reviewed this plan of care and certify a need for medically necessary rehabilitation services.      *PLEASE SIGN ABOVE AND FAX BACK ALL PAGES*

## 2021-06-09 ENCOUNTER — HOSPITAL ENCOUNTER (OUTPATIENT)
Dept: PHYSICAL THERAPY | Facility: CLINIC | Age: 56
Setting detail: THERAPIES SERIES
Discharge: HOME | End: 2021-06-09
Payer: COMMERCIAL

## 2021-06-09 NOTE — FLOWSHEET NOTE
[x] [facility] [place] [facility]""[facility]  Outpatient Rehabilitation &  Therapy  [facility]   [place]: [phone]  F: [phone]      Physical Therapy Cancel/No Show note    Date: 2021  Patient: Zoltan Hensley  : 1965  MRN: [id number]    Cancels/No Shows to date: 1    For today's appointment patient:    [x] Cancelled    [] Rescheduled appointment    [] No-show     Reason given by patient:    []  Patient ill    []  Conflicting appointment    [] No transportation      [] Conflict with work    [] No reason given    [] Weather related    [] COVID-19    [x] Other:      Comments: Patient stated she had a migraine and confirmed  appointment.         [] Next appointment was confirmed    Electronically signed by: Bhargav Bates PTA

## 2021-06-11 ENCOUNTER — HOSPITAL ENCOUNTER (OUTPATIENT)
Dept: PHYSICAL THERAPY | Facility: CLINIC | Age: 56
Setting detail: THERAPIES SERIES
Discharge: HOME | End: 2021-06-11
Payer: COMMERCIAL

## 2021-06-11 PROCEDURE — 97110 THERAPEUTIC EXERCISES: CPT

## 2021-06-11 NOTE — FLOWSHEET NOTE
[] [facility]) - [facility] &  Therapy  [address]  [phone]  F: [phone] [] [address]  [place] [address]   [address]  P: [phone]  F: [phone] [] [address] &  Therapy  [address]  P: [phone]  F: [phone] [] [address]  P: [phone]  F: [phone] [x] [address]  [facility]   [address]   [place]: [phone]  F: [phone]      Physical Therapy Daily Treatment Note    Date:  2021  Patient Name:  Korina Lay    :  1965  MRN: [id number]  Patient: Korina Lay                    : 1965                      MRN: [id number]  Physician: Lila Gutierres                                   Insurance: [employer]olyn [employer] Dual  Medical Diagnosis: R knee OA                    Rehab Codes: M25.561, R26.89  Onset date: 2021 exacerbation             Next Dr's appt.: 21  Visit# / total visits: 2     Cancels/No Shows: 1/0    Subjective:    Pain:  [x] Yes  [] No Location: bilateral knees R>L  Pain Rating: (0-10 scale) 6/10  Pain altered Tx:  [] No  [] Yes  Action:  Comments: Patient reports soreness to her knees bilaterally this date.     Objective:  Precautions: Standard   Exercises:  Exercise    Bilat knee OA  Reps/ Time Weight/ Level Comments         NuStep  6'            Calf S SB  3x30\"     HS S  3x30\"  bilat          Clamshells  x15   bilat    Sidelying ABD x15   bilat          Mini Squats at counter  x15      Hip Ext/abd at counter  x15           Lateral steps in // bars  4L  Upson  Band at ankles          Total Gym Squats/HR  x15           Hip flexor S standing    Next          Other:      Treatment Charges: Mins Units   []  Modalities     [x]  Ther Exercise 4 55   []  Manual Therapy []  Ther Activities     []  Aquatics     []  Vasocompression     []  Other     Total Treatment time 4        Assessment: [x] Progressing toward goals. Focus on hip strength at this time, incorporating exercises that patient can transfer into her current home program. Patient was provided HEP this date. She reports intermittent hip fatigue with exercises and increase soreness at end of session. Patient to be scheduled 1x/week until her R knee replacement scheduled August 10th to maximize strength and mobility prior to surgery. [] No change. [] Other:  [x] Patient would continue to benefit from skilled physical therapy services in order to: Increase ankle DF and hip extension ROM, as well as increase hip and knee strength to ease ADL's    STG/LTG  STG: (to be met in 9 treatments)  1. ? Pain: 5/10 or less pain B knees to allow for greater ease with ADLs   2. ? ROM: Ankle DF 10 deg knee straight and hip extension 20 deg to facilitate normal gait  3. ? Strength:5/5 hip abduction and hip extension for greater ease with ADLs  4. ? Function: LEFI 50% disability  5. Patient to be independent with home exercise program as demonstrated by performance with correct form without cues.     LTG: (to be met in 18 treatments)  1. Pt to demonstrate good eccentric quad control on 6 in step with <5/10 knee pain and little to no genu valgus to allow normal stair negotiation  2. LEFI <30% disability    Pt. Education:  [x] Yes  [] No  [] Reviewed Prior HEP/Ed    Provided HEP below 6/11/21   Access Code: [email or web address]  URL: [email or web address]  Date: 06/11/2021  Prepared by: Constantino Patton    Exercises  Supine Bridge - 1 x daily - 7 x weekly - 10 reps - 3 sets  Clamshell - 1 x daily - 7 x weekly - 10 reps - 3 sets  Sidelying Hip Abduction - 1 x daily - 7 x weekly - 10 reps - 3 sets  Standing Partial Squat - 1 x daily - 7 x weekly - 10 reps - 3 sets  Standing Hip Extension with Counter Support - 1 x daily - 7 x weekly - 10 reps - 3 sets  Standing Hip Abduction with Counter Support - 1 x daily - 7 x weekly - 10 reps - 3 sets    Method of Education: [x] Verbal  [x] Demo  [] Written  Comprehension of Education:  [x] Verbalizes understanding. [x] Demonstrates understanding. [x] Needs review. [] Demonstrates/verbalizes HEP/Ed previously given. Plan: [x] Continue current frequency toward long and short term goals.     [x] Specific Instructions for subsequent treatments: assess HEP compliance, hip extension ROM, progress strength program       Time In:10:55            Time Out: 11:50    Electronically signed by:  Samir Aguirre, PT

## 2021-06-18 ENCOUNTER — HOSPITAL ENCOUNTER (OUTPATIENT)
Dept: PHYSICAL THERAPY | Facility: CLINIC | Age: 56
Setting detail: THERAPIES SERIES
Discharge: HOME | End: 2021-06-18
Payer: COMMERCIAL

## 2021-06-18 NOTE — FLOWSHEET NOTE
[x] Located within [facility]  Outpatient Rehabilitation &  Therapy  [facility]   [place]: [phone]  F: [phone]      Physical Therapy Cancel/No Show note    Date: 2021  Patient: Quang Gill  : 1965  MRN: [id number]    Cancels/No Shows to date: 1    For today's appointment patient:    [x] Cancelled    [] Rescheduled appointment    [] No-show     Reason given by patient:    []  Patient ill    []  Conflicting appointment    [] No transportation      [] Conflict with work    [] No reason given    [] Weather related    [] COVID-19    [x] Other:      Comments: Patient stated she had a migraine.       [x] Next appointment was confirmed    Electronically signed by: Abdelrahman Harrell PTA

## 2021-06-25 ENCOUNTER — HOSPITAL ENCOUNTER (OUTPATIENT)
Dept: PHYSICAL THERAPY | Facility: CLINIC | Age: 56
Setting detail: THERAPIES SERIES
Discharge: HOME | End: 2021-06-25
Payer: COMMERCIAL

## 2021-06-25 PROCEDURE — 97110 THERAPEUTIC EXERCISES: CPT

## 2021-06-25 NOTE — FLOWSHEET NOTE
[x] [facility]  Outpatient Rehabilitation &  Therapy  [facility]   [place]: [phone]  F: [phone]      Physical Therapy Daily Treatment Note    Date:  2021  Patient Name:  Quang Gill    :  1965  MRN: [id number]  Patient: Quang Gill                    : 1965                      MRN: [id number]  Physician: Nitin Willson                                   Insurance: Jennyfer Grey Dual  Medical Diagnosis: R knee OA                    Rehab Codes: M25.561, R26.89  Onset date: 2021 exacerbation             Next Dr's appt.: 21, DOS L knee 08/10/21  Visit# / total visits: 3/12     Cancels/No Shows: 1/0    Subjective:    Pain:  [x] Yes  [] No Location: bilateral knees L>R  Pain Rating: (0-10 scale) 6-8/10  Pain altered Tx:  [x] No  [] Yes  Action:  Comments: Patient reports 6/10 pain in L knee and increased pain 8/10 after Nustep. Objective:  Precautions: Standard   Exercises:  Exercise    Bilat knee OA  Reps/ Time Weight/ Level Comments         NuStep  6'            Calf S SB  3x30\"     HS step S  3x30\"     Hip flexor standing step S 3x30\"           Supine HS S 3x30\"     Supine gastroc stretch 3x30\"     Clamshells  x15      Sidelying ABD x15            Hip extension  2x10     Hip abduction  2x10     Side stepping 5L  Orange  Band at ankles    TGym Squats  2x10 L16    TGym HR 2x10 L16                Other:      Treatment Charges: Mins Units   []  Modalities     [x]  Ther Exercise 35 2   []  Manual Therapy     []  Ther Activities     []  Aquatics     []  Vasocompression     []  Other     Total Treatment time 35 2       Assessment: [x] Progressing toward goals. Pt able to tolerate program noting increased knee pain with all ex's except stretching. Added HEP handout to add stretching to home program. Will continue to progress ex's as able to increase strength in L knee before knee surgery on .       [] No change.      [] Other:  [x]

## 2021-07-02 ENCOUNTER — HOSPITAL ENCOUNTER (OUTPATIENT)
Dept: PHYSICAL THERAPY | Facility: CLINIC | Age: 56
Setting detail: THERAPIES SERIES
Discharge: HOME | End: 2021-07-02
Payer: COMMERCIAL

## 2021-07-02 PROCEDURE — 97110 THERAPEUTIC EXERCISES: CPT

## 2021-07-02 NOTE — FLOWSHEET NOTE
[x] [facility]  Outpatient Rehabilitation &  Therapy  [facility]   [place]: [phone]  F: [phone]      Physical Therapy Daily Treatment Note    Date:  2021  Patient Name:  Javier Cheatham    :  1965  MRN: [id number]  Physician: Kimberly Davis                                   Insurance: [employer] Torres Dual  Medical Diagnosis: R knee OA                    Rehab Codes: M25.561, R26.89  Onset date: 2021 exacerbation             Next Dr's appt.: 21, DOS L knee 08/10/21  Visit# / total visits:      Cancels/No Shows: 1/0    Subjective:    Pain:  [x] Yes  [] No Location: bilateral knees L>R  Pain Rating: (0-10 scale) 5/10  Pain altered Tx:  [x] No  [] Yes  Action:  Comments: Patient reports 5/10 pain in both knees and 9/10 in R great toe because she accidentally kicked her foot on a door. Objective:  Precautions: Standard   Exercises:  Exercise    Bilat knee OA  Reps/ Time Weight/ Level Comments         NuStep  10'            Calf S SB  3x30\"     HS step S  3x30\"           LAQ 2x10 bilat    Supine HS S 3x30\"     SLR 2x10 bilat    Clamshells  2x10 bilat    Sidelying ABD 2x10 bilat          4- way hip x10 orange    TGym Squats  2x10 L18    TGym HR 2x10 L18 Not today due to great toe pain on RLE               Other:      Treatment Charges: Mins Units   []  Modalities     [x]  Ther Exercise 35 2   []  Manual Therapy     []  Ther Activities     []  Aquatics     []  Vasocompression     []  Other     Total Treatment time 35 2       Assessment: [x] Progressing toward goals. Progressed program pt with pain throughout but able to complete all exercises. Administered HEP handout to begin 4-way hip at home. Will continue to progress ex's as able to increase strength in L knee before knee surgery on .        [] No change. [] Other:  [x] Patient would continue to benefit from skilled physical therapy services in order to:  Increase ankle DF and hip

## 2021-07-08 ENCOUNTER — OFFICE VISIT (OUTPATIENT)
Dept: ORTHOPEDIC SURGERY | Age: 56
End: 2021-07-08
Payer: COMMERCIAL

## 2021-07-08 VITALS
BODY MASS INDEX: 42.52 KG/M2 | RESPIRATION RATE: 14 BRPM | HEIGHT: 63 IN | HEART RATE: 80 BPM | WEIGHT: 240 LBS | DIASTOLIC BLOOD PRESSURE: 76 MMHG | TEMPERATURE: 98.3 F | SYSTOLIC BLOOD PRESSURE: 127 MMHG

## 2021-07-08 DIAGNOSIS — M17.12 PRIMARY OSTEOARTHRITIS OF LEFT KNEE: Primary | ICD-10-CM

## 2021-07-08 DIAGNOSIS — M17.11 PRIMARY OSTEOARTHRITIS OF RIGHT KNEE: ICD-10-CM

## 2021-07-08 PROCEDURE — 3017F COLORECTAL CA SCREEN DOC REV: CPT | Performed by: ORTHOPAEDIC SURGERY

## 2021-07-08 PROCEDURE — 99213 OFFICE O/P EST LOW 20 MIN: CPT | Performed by: ORTHOPAEDIC SURGERY

## 2021-07-08 PROCEDURE — 1036F TOBACCO NON-USER: CPT | Performed by: ORTHOPAEDIC SURGERY

## 2021-07-08 PROCEDURE — G8427 DOCREV CUR MEDS BY ELIG CLIN: HCPCS | Performed by: ORTHOPAEDIC SURGERY

## 2021-07-08 PROCEDURE — G8417 CALC BMI ABV UP PARAM F/U: HCPCS | Performed by: ORTHOPAEDIC SURGERY

## 2021-07-08 NOTE — PROGRESS NOTES
[facility] [address] [facility]  [address] [place] [address]  Dept: [phone]  Dept Fax: [phone]          Bilateral Knee - Follow up    Subjective:     Chief Complaint   Patient presents with    Follow-up     L Knee     HPI:     Natacha Salgado presents today for Bilateral knee pain where the left knee is worse than the right. The pain has been present for a year in the left knee but it got worse int he last 8 weeks and the pain in the right knee has been present for 1 year but it has gotten worse over the past 3 months. The patient recalls a specific injury where the left knee gave out when she went to [address] [place] [address] in the past 8 weeks but she also has hit her leg on a weight back on 03/10/2021 when she was stepping over a box in her room when she was cleaning her spare bedroom. The right knee also was hit off the ground in the past 8 weeks. The patient has tried a cane, ice, ibuprofen and a walker with no improvement. The pain is now described as Josephus Offer and Sharp. There is pain on weight bearing. The knees have swelled. There is painful popping and clicking. The left knee has mechanically caught or locked up but her right knee has not caught or locked up. The left knee has given out but not the right knee. They are stiff upon arising from sitting. It is painful to go up and down stairs and sit for a prolonged time. The patient has had a cortisone injection on 02/21/2021 with good pain relief until she fell in March. The patient has tried a Synvisc one lubrication injection on 03/25/2021 with good pain relief. The patient has tried physical therapy but since her two falls she had in the past 4-6 weeks, she has not went back to physical therapy. The patient has had surgery on 11/06/2020 and it was a left knee arthroscopy with partial lateral meniscal tear and debridement. Therefore she is 8 months post op.  The patient also has had a Right knee arthroscopy with partial medial meniscectomy and debridement, medial femoral condyle, and medial trochlea and patella. The date of surgery was on 08/14/2020. Therefore she is 11 months post op. She had a her right knee was given a lubrication injection on March 26th with 2 weeks of moderate relief of pain. She admits severe pain in her left knee. She is currently in the process of obtaining medical clearance for surgery. She has received clearance from her dentist and PCP and is awaiting clearance from cardiology. Reports coming to therapy every Friday and doing exercises. She admits she has fallen multiple times due to her knee locking up. ROS:   Review of Systems   Constitutional: Positive for activity change. Musculoskeletal: Positive for arthralgias. All other systems reviewed and are negative.       Past Medical History:    Past Medical History:   Diagnosis Date    Atrial fibrillation ([address]     Breast cancer ([address]     bilateral remission since 2012    C. difficile colitis     2015    Colon cancer ([address]     stage IV remission since 2016    Depression     Diabetes mellitus ([address]     Diverticulosis     Fibromyalgia     Hyperlipidemia     Hypertension     Multiple sclerosis ([address]     Rheumatoid arthritis ([address]     Seizure ([address] 7/    last seizure 7/18/2020  first seizure 1992 after head injury    Unspecified cerebral artery occlusion with cerebral infarction     7/12  NOT DEFINITE    Wears glasses        Past Surgical History:    Past Surgical History:   Procedure Laterality Date    APPENDECTOMY  1985    BACK SURGERY      BREAST BIOPSY  [id number]    BREAST LUMPECTOMY      bilateral [id number]     CARDIAC SURGERY      cardiac cath negative in 2018   [address], COLON, DIAGNOSTIC      HYSTERECTOMY  2010    INSERTABLE CARDIAC MONITOR      KNEE ARTHROSCOPY Right 08/14/2020    RIGHT KNEE ARTHROSCOPY WITH PARTIAL MEDIAL MENISCECTOMY WITH DEBRIDEMENT (Right     KNEE ARTHROSCOPY Right 8/14/2020    RIGHT KNEE ARTHROSCOPY WITH PARTIAL MEDIAL MENISCECTOMY WITH DEBRIDEMENT performed by Girish England DO at [address] ARTHROSCOPY Left 11/6/2020    LEFT KNEE ARTHROSCOPY WITH PARTIAL LATERAL MENISCAL TEAR REPAIR AND [address] performed by Girish England DO at [address] [address] SIGMOIDECTOMY  2016    for cancer    SLEEVE GASTRECTOMY  5/19/15    GASTRECTOMY SLEEVE LAPAROSCOPIC           TONSILLECTOMY  1985    UPPER GASTROINTESTINAL ENDOSCOPY  1/30/15       CurrentMedications:   Current Outpatient Medications   Medication Sig Dispense Refill    methylphenidate (RITALIN) 20 MG tablet Take 20 mg by mouth 2 times daily.  albuterol sulfate HFA (VENTOLIN HFA) 108 (90 Base) MCG/ACT inhaler Inhale 2 puffs into the lungs every 6 hours as needed for Wheezing      budesonide-formoterol (SYMBICORT) 160-4.5 MCG/ACT AERO INHALE 2 PUFFS BID UTD      methocarbamol (ROBAXIN) 750 MG tablet Take 1,500 mg by mouth 3 times daily      gabapentin (NEURONTIN) 600 MG tablet Take 1 tablet by mouth 3 times daily for 30 days.  (Patient taking differently: Take 1,200 mg by mouth 3 times daily. ) 90 tablet 0    DULoxetine (CYMBALTA) 60 MG extended release capsule Take 1 capsule by mouth 2 times daily 60 capsule 0    traZODone (DESYREL) 50 MG tablet Take 1 tablet by mouth nightly as needed for Sleep 30 tablet 0    metFORMIN (GLUCOPHAGE) 500 MG tablet Take 1 tablet by mouth 2 times daily (with meals) 60 tablet 0    rivaroxaban (XARELTO) 20 MG TABS tablet Take 1 tablet by mouth Daily with supper 30 tablet 0    QUEtiapine (SEROQUEL) 100 MG tablet Take 1 tablet by mouth nightly 30 tablet 0    carvedilol (COREG) 12.5 MG tablet Take 1 tablet by mouth 2 times daily (with meals) 60 tablet 0    furosemide (LASIX) 40 MG tablet Take 1 tablet by mouth daily 30 tablet 0    magnesium oxide (MAG-OX) 400 (241.3 Mg) MG TABS tablet Take 1 tablet by mouth daily 30 tablet 0    esomeprazole (NEXIUM) 40 MG delayed release capsule Take 1 capsule by mouth every morning (before breakfast) 30 capsule 0    ondansetron (ZOFRAN-ODT) 4 MG disintegrating tablet Take 4 mg by mouth every 8 hours as needed for Nausea or Vomiting      cetirizine (ZYRTEC) 10 MG tablet Take 10 mg by mouth daily      coenzyme Q10 100 MG CAPS capsule Take 100 mg by mouth 3 times daily      diclofenac (CATAFLAM) 50 MG tablet Take 50 mg by mouth daily as needed (headache) May repeat in 6 hours, max 3 tablets per day      SUPER B COMPLEX/C PO Take by mouth daily      loperamide (IMODIUM) 2 MG capsule Take 2 mg by mouth 4 times daily as needed for Diarrhea Takes two capsules at onset      lidocaine (LIDODERM) 5 % Place 1 patch onto the skin daily 12 hours on, 12 hours off.  fluticasone (FLONASE) 50 MCG/ACT nasal spray 1 spray by Each Nostril route daily       No current facility-administered medications for this visit.        Allergies:    Ace inhibitors, Betadine swab aid [povidone-iodine], Betadine [povidone iodine], Iodine, Lurasidone, Lurasidone hcl, Shellfish allergy, Shellfish-derived products, Iodides, Lamotrigine, Macrobid [nitrofurantoin macrocrystal], Morphine, Pyridium [phenazopyridine hcl], and Topiramate    Social History:   Social History     Socioeconomic History    Marital status: Legally      Spouse name: None    Number of children: None    Years of education: None    Highest education level: None   Occupational History    Occupation: Disabled   Tobacco Use    Smoking status: Former Smoker     Packs/day: 1.00     Years: 20.00     Pack years: 20.00     Quit date: 2020     Years since quittin.0    Smokeless tobacco: Never Used   Vaping Use    Vaping Use: Never used   Substance and Sexual Activity    Alcohol use: Yes     Comment: occassion    Drug use: No    Sexual activity: Yes     Partners: Male   Other Topics Concern    None   Social History Narrative    None     Social Determinants of Health     Financial Resource Strain:     Difficulty of Paying Living Expenses:    Food Insecurity:     Worried About Running Out of Food in the Last Year:     [address] in the Last Year:    Transportation Needs:     Lack of Transportation (Medical):  Lack of Transportation (Non-Medical):    Physical Activity:     Days of Exercise per Week:     Minutes of Exercise per Session:    Stress:     Feeling of Stress :    Social Connections:     Frequency of Communication with Friends and Family:     Frequency of Social Gatherings with Friends and Family:     Attends Spiritism Services:     Active Member of Clubs or Organizations:     Attends Club or Organization Meetings:     Marital Status:    Intimate Partner Violence:     Fear of Current or Ex-Partner:     Emotionally Abused:     Physically Abused:     Sexually Abused:        Family History:  Family History   Adopted: Yes       Vitals:   /76   Pulse 80   Temp 98.3 °F (36.8 °C)   Resp 14   Ht 5' 3\" (1.6 m)   Wt 240 lb (108.9 kg)   BMI 42.51 kg/m²  Body mass index is 42.51 kg/m². Physical Examination:     Orthopedics:    GENERAL: Alert and oriented X3 in no acute distress. SKIN: Intact without lesions or ulcerations. NEURO: Intact to sensory and motor testing. VASC: Capillary refill is less than 3 seconds. KNEE EXAM    LOCATION: Left Knee  GEN: Alert and oriented X 3, in no acute distress. GAIT: The patient's gait was observed while entering the exam room and was noted to be antalgic. The extremity is in valgus alignment. SKIN: Intact without rashes, lesions, or ulcerations. No obvious deformity or swelling. NEURO: The patient responds to light touch throughout left LE. Patellar and Achilles reflexes are 2/4. VASC: The left LE is neurovascularly intact with 2/4 DP and 2/4 PT pulses. Brisk capillary refill. ROM: 0/126 degrees.  There is no effusion. MUSC: good quad tone  LIGAMENT: Lachman's test is Negative with Good endpoint. Anterior drawer Negative. Posterior drawer Negative. There is No varus instability at 0 degrees and No varus instability at 30 degrees. There is No valgus instability at 0 degrees and No valgus instability at 30 degrees. SPECIAL: Conor test ispostive with no clunks, no crepitation, and pain. PALP: There is lateral compartment and patellofemoral joint line pain. Assessment:     1. Primary osteoarthritis of left knee    2. Primary osteoarthritis of right knee      Procedures:    Procedure: no  Radiology:   No results found. Plan:   Treatment :  I reviewed the X-ray with the patient. We discussed the etiologies and natural histories of Primary osteoarthritis of the bilateral knee. We discussed the etiologies and natural histories of  Primary osteoarthritis of the bilateral knee. . We discussed the various treatment alternatives including anti-inflammatory medications, physical therapy, injections, further imaging studies and as a last result surgery. During today's visit, the patient expressed that she was ready to proceed with a Left TKA. She will continue to obtain medical clearance for the surgery  The patient has opted to proceed with an [address] when she is medically cleared. The risks/benefits/alternatives and potential complications have been discussed with the patient. We also had a long discussion regarding the procedure itself and expectation of the recovery. All questions and concerns with addressed. Patient was instructed to call our office with any question or concerns prior to the procedure occurs. Patient should return to the clinic in prn or 1 weeks to follow up with  Chilo Lester PA-C. The patient will call the office immediately with any problems. No orders of the defined types were placed in this encounter. No orders of the defined types were placed in this encounter.       Anni Quintero am scribing for and in the presence of Hiro Fitzpatrick D.O. 7/11/2021  10:25 AM      Electronically signed by Wero Calvert DO, on 7/11/2021 at 10:25 AM     I, Hiro Fitzpatrick DO, have personally seen this patient and I have reviewed the CC, PMH, FHX and Social History as provided by other clinical staff. I reassessed the HPI and ROS as scribed by Luther Reyes in my presence and it is both accurate and complete. Thereafter, I personally performed the PE, reviewed the imaging and established the DX and POC. I agree with the documentation provided by the Medical Scribe. I have reviewed all documentation in its entirety prior to providing my signature indicating agreement. Any areas of disagreement are noted on the chart.     Electronically signed by Wero Calvert DO on 7/11/2021 at 10:25 AM

## 2021-07-09 ENCOUNTER — HOSPITAL ENCOUNTER (OUTPATIENT)
Dept: PHYSICAL THERAPY | Facility: CLINIC | Age: 56
Setting detail: THERAPIES SERIES
Discharge: HOME | End: 2021-07-09
Payer: COMMERCIAL

## 2021-07-09 NOTE — FLOWSHEET NOTE
[x] [facility] &  [facility]rapy  [facility]   [place]: [phone]  F: [phone]      Physical Therapy Cancel/No Show note    Date: 2021  Patient: Fatoumata Walker  : 1965  MRN: [id number]    Cancels/No Shows to date: 3/1    For today's appointment patient:    [x] Cancelled    [x] Rescheduled appointment -21 @11am    [] No-show     Reason given by patient:    []  Patient ill    []  Conflicting appointment    [] No transportation      [] Conflict with work    [] No reason given    [] Weather related    [] COVID-19    [x] Other:      Comments: Patient stated she just left the Dental office & had work done that has now caused a headache.       [x] Next appointment was confirmed    Electronically signed by: Brittany Mendez

## 2021-07-15 ENCOUNTER — HOSPITAL ENCOUNTER (OUTPATIENT)
Dept: GENERAL RADIOLOGY | Age: 56
Discharge: HOME | End: 2021-07-17
Payer: COMMERCIAL

## 2021-07-15 ENCOUNTER — HOSPITAL ENCOUNTER (OUTPATIENT)
Dept: PREADMISSION TESTING | Age: 56
Discharge: HOME | End: 2021-07-19
Payer: COMMERCIAL

## 2021-07-15 VITALS
HEIGHT: 63 IN | RESPIRATION RATE: 16 BRPM | DIASTOLIC BLOOD PRESSURE: 76 MMHG | OXYGEN SATURATION: 94 % | BODY MASS INDEX: 43.41 KG/M2 | HEART RATE: 80 BPM | TEMPERATURE: 96.5 F | WEIGHT: 245 LBS | SYSTOLIC BLOOD PRESSURE: 151 MMHG

## 2021-07-15 LAB
ABO/RH: NORMAL
ANION GAP SERPL CALCULATED.3IONS-SCNC: 11 MMOL/L (ref 9–17)
ANTIBODY SCREEN: NEGATIVE
ARM BAND NUMBER: NORMAL
BILIRUBIN URINE: NEGATIVE
BUN BLDV-MCNC: 13 MG/DL (ref 6–20)
BUN/CREAT BLD: 21 (ref 9–20)
CALCIUM SERPL-MCNC: 8.8 MG/DL (ref 8.6–10.4)
CHLORIDE BLD-SCNC: 101 MMOL/L (ref 98–107)
CO2: 28 MMOL/L (ref 20–31)
COLOR: YELLOW
COMMENT UA: ABNORMAL
CREAT SERPL-MCNC: 0.63 MG/DL (ref 0.5–0.9)
ESTIMATED AVERAGE GLUCOSE: 143 MG/DL
EXPIRATION DATE: NORMAL
GFR AFRICAN AMERICAN: >60 ML/MIN
GFR NON-AFRICAN AMERICAN: >60 ML/MIN
GFR SERPL CREATININE-BSD FRML MDRD: ABNORMAL ML/MIN/{1.73_M2}
GFR SERPL CREATININE-BSD FRML MDRD: ABNORMAL ML/MIN/{1.73_M2}
GLUCOSE BLD-MCNC: 157 MG/DL (ref 70–99)
GLUCOSE URINE: ABNORMAL
HBA1C MFR BLD: 6.6 % (ref 4–6)
HCT VFR BLD CALC: 35.1 % (ref 36.3–47.1)
HEMOGLOBIN: 10.4 G/DL (ref 11.9–15.1)
INR BLD: 1.9
KETONES, URINE: ABNORMAL
LEUKOCYTE ESTERASE, URINE: NEGATIVE
MCH RBC QN AUTO: 24.2 PG (ref 25.2–33.5)
MCHC RBC AUTO-ENTMCNC: 29.6 G/DL (ref 28.4–34.8)
MCV RBC AUTO: 81.6 FL (ref 82.6–102.9)
NITRITE, URINE: NEGATIVE
NRBC AUTOMATED: 0 PER 100 WBC
PARTIAL THROMBOPLASTIN TIME: 36.7 SEC (ref 23.9–33.8)
PDW BLD-RTO: 16.5 % (ref 11.8–14.4)
PH UA: 6 (ref 5–8)
PLATELET # BLD: 247 K/UL (ref 138–453)
PMV BLD AUTO: 9 FL (ref 8.1–13.5)
POTASSIUM SERPL-SCNC: 4 MMOL/L (ref 3.7–5.3)
PREALBUMIN: 22.5 MG/DL (ref 20–40)
PROTEIN UA: NEGATIVE
PROTHROMBIN TIME: 21.2 SEC (ref 11.5–14.2)
RBC # BLD: 4.3 M/UL (ref 3.95–5.11)
SODIUM BLD-SCNC: 140 MMOL/L (ref 135–144)
SPECIFIC GRAVITY UA: 1.02 (ref 1–1.03)
TURBIDITY: CLEAR
URINE HGB: NEGATIVE
UROBILINOGEN, URINE: NORMAL
WBC # BLD: 7.4 K/UL (ref 3.5–11.3)

## 2021-07-15 PROCEDURE — 93005 ELECTROCARDIOGRAM TRACING: CPT | Performed by: ORTHOPAEDIC SURGERY

## 2021-07-15 PROCEDURE — 84134 ASSAY OF PREALBUMIN: CPT

## 2021-07-15 PROCEDURE — 85610 PROTHROMBIN TIME: CPT

## 2021-07-15 PROCEDURE — 85730 THROMBOPLASTIN TIME PARTIAL: CPT

## 2021-07-15 PROCEDURE — 80048 BASIC METABOLIC PNL TOTAL CA: CPT

## 2021-07-15 PROCEDURE — 87086 URINE CULTURE/COLONY COUNT: CPT

## 2021-07-15 PROCEDURE — 86900 BLOOD TYPING SEROLOGIC ABO: CPT

## 2021-07-15 PROCEDURE — 86850 RBC ANTIBODY SCREEN: CPT

## 2021-07-15 PROCEDURE — 81003 URINALYSIS AUTO W/O SCOPE: CPT

## 2021-07-15 PROCEDURE — 71046 X-RAY EXAM CHEST 2 VIEWS: CPT

## 2021-07-15 PROCEDURE — 86901 BLOOD TYPING SEROLOGIC RH(D): CPT

## 2021-07-15 PROCEDURE — 36415 COLL VENOUS BLD VENIPUNCTURE: CPT

## 2021-07-15 PROCEDURE — 85027 COMPLETE CBC AUTOMATED: CPT

## 2021-07-15 PROCEDURE — 87641 MR-STAPH DNA AMP PROBE: CPT

## 2021-07-15 PROCEDURE — 77073 BONE LENGTH STUDIES: CPT

## 2021-07-15 PROCEDURE — 83036 HEMOGLOBIN GLYCOSYLATED A1C: CPT

## 2021-07-15 RX ORDER — FERROUS SULFATE 325(65) MG
325 TABLET ORAL 2 TIMES DAILY
COMMUNITY

## 2021-07-15 RX ORDER — ACETAMINOPHEN 500 MG
1000 TABLET ORAL ONCE
Status: CANCELLED | OUTPATIENT
Start: 2021-08-10

## 2021-07-15 RX ORDER — DICLOFENAC SODIUM 30 MG/G
GEL TOPICAL PRN
COMMUNITY

## 2021-07-15 RX ORDER — GABAPENTIN 300 MG/1
300 CAPSULE ORAL ONCE
Status: CANCELLED | OUTPATIENT
Start: 2021-08-10

## 2021-07-15 RX ORDER — CELECOXIB 200 MG/1
200 CAPSULE ORAL ONCE
Status: CANCELLED | OUTPATIENT
Start: 2021-08-10

## 2021-07-15 RX ORDER — POTASSIUM CHLORIDE 1.5 G/1.77G
20 POWDER, FOR SOLUTION ORAL 2 TIMES DAILY
COMMUNITY

## 2021-07-15 ASSESSMENT — PROMIS GLOBAL HEALTH SCALE
IN GENERAL, HOW WOULD YOU RATE YOUR SATISFACTION WITH YOUR SOCIAL ACTIVITIES AND RELATIONSHIPS [ON A SCALE OF 1 (POOR) TO 5 (EXCELLENT)]?: 3
TO WHAT EXTENT ARE YOU ABLE TO CARRY OUT YOUR EVERYDAY PHYSICAL ACTIVITIES SUCH AS WALKING, CLIMBING STAIRS, CARRYING GROCERIES, OR MOVING A CHAIR [ON A SCALE OF 1 (NOT AT ALL) TO 5 (COMPLETELY)]?: 2
SUM OF RESPONSES TO QUESTIONS 3, 6, 7, & 8: 14
SUM OF RESPONSES TO QUESTIONS 2, 4, 5, & 10: 12
IN THE PAST 7 DAYS, HOW OFTEN HAVE YOU BEEN BOTHERED BY EMOTIONAL PROBLEMS, SUCH AS FEELING ANXIOUS, DEPRESSED, OR IRRITABLE [ON A SCALE FROM 1 (NEVER) TO 5 (ALWAYS)]?: 3
IN GENERAL, WOULD YOU SAY YOUR HEALTH IS...[ON A SCALE OF 1 (POOR) TO 5 (EXCELLENT)]: 3
IN GENERAL, HOW WOULD YOU RATE YOUR MENTAL HEALTH, INCLUDING YOUR MOOD AND YOUR ABILITY TO THINK [ON A SCALE OF 1 (POOR) TO 5 (EXCELLENT)]?: 3
HOW IS THE PROMIS V1.1 BEING ADMINISTERED?: 0
WHO IS THE PERSON COMPLETING THE PROMIS V1.1 SURVEY?: 0
IN GENERAL, WOULD YOU SAY YOUR QUALITY OF LIFE IS...[ON A SCALE OF 1 (POOR) TO 5 (EXCELLENT)]: 3
IN THE PAST 7 DAYS, HOW WOULD YOU RATE YOUR PAIN ON AVERAGE [ON A SCALE FROM 0 (NO PAIN) TO 10 (WORST IMAGINABLE PAIN)]?: 6
IN GENERAL, HOW WOULD YOU RATE YOUR PHYSICAL HEALTH [ON A SCALE OF 1 (POOR) TO 5 (EXCELLENT)]?: 3
IN THE PAST 7 DAYS, HOW WOULD YOU RATE YOUR FATIGUE ON AVERAGE [ON A SCALE FROM 1 (NONE) TO 5 (VERY SEVERE)]?: 3
IN GENERAL, PLEASE RATE HOW WELL YOU CARRY OUT YOUR USUAL SOCIAL ACTIVITIES (INCLUDES ACTIVITIES AT HOME, AT WORK, AND IN YOUR COMMUNITY, AND RESPONSIBILITIES AS A PARENT, CHILD, SPOUSE, EMPLOYEE, FRIEND, ETC) [ON A SCALE OF 1 (POOR) TO 5 (EXCELLENT)]?: 2

## 2021-07-15 ASSESSMENT — PAIN SCALES - GENERAL: PAINLEVEL_OUTOF10: 5

## 2021-07-15 ASSESSMENT — PAIN DESCRIPTION - LOCATION: LOCATION: KNEE

## 2021-07-15 ASSESSMENT — KOOS JR
TWISING OR PIVOTING ON KNEE: 3
GOING UP OR DOWN STAIRS: 2
HOW SEVERE IS YOUR KNEE STIFFNESS AFTER FIRST WAKING IN MORNING: 3
STANDING UPRIGHT: 2
RISING FROM SITTING: 2
BENDING TO THE FLOOR TO PICK UP OBJECT: 2
STRAIGHTENING KNEE FULLY: 2

## 2021-07-15 ASSESSMENT — PAIN DESCRIPTION - FREQUENCY: FREQUENCY: CONTINUOUS

## 2021-07-15 ASSESSMENT — PAIN DESCRIPTION - DESCRIPTORS: DESCRIPTORS: STABBING;SORE

## 2021-07-15 ASSESSMENT — PAIN DESCRIPTION - ORIENTATION: ORIENTATION: RIGHT;LEFT

## 2021-07-15 ASSESSMENT — PAIN DESCRIPTION - PAIN TYPE: TYPE: CHRONIC PAIN

## 2021-07-15 NOTE — H&P
History and Physical Service   [place] [address]    HISTORY AND PHYSICAL EXAMINATION            Date of Evaluation: 7/15/2021  Patient name:  Marian Chapin  MRN:   [id number]  YOB: 1965  PCP:    Clem Cisneros    History Obtained From:     Patient, medical records    History of Present Illness: This is Marian Chapin a 54 y.o. female who presents today for a pre-testing [place]  by Dr. Duc Burroughs  for OSTEOARTHRITIS. .    DR. STEEN NOTE :  Beth L Jose Carlos presents today for Bilateral knee pain where the left knee is worse than the right. The pain has been present for a year in the left knee but it got worse int he last 8 weeks and the pain in the right knee has been present for 1 year but it has gotten worse over the past 3 months. The patient recalls a specific injury where the left knee gave out when she went to [address] [place] [address] in the past 8 weeks but she also has hit her leg on a weight back on 03/10/2021 when she was stepping over a box in her room when she was cleaning her spare bedroom. The right knee also was hit off the ground in the past 8 weeks. The patient has tried a cane, ice, ibuprofen and a walker with no improvement. The pain is now described as Maryla Schillings and Sharp. There is pain on weight bearing. The knees have swelled. There is painful popping and clicking. The left knee has mechanically caught or locked up but her right knee has not caught or locked up. The left knee has given out but not the right knee. They are stiff upon arising from sitting. It is painful to go up and down stairs and sit for a prolonged time. The patient has had a cortisone injection on 02/21/2021 with good pain relief until she fell in March. The patient has tried a Synvisc one lubrication injection on 03/25/2021 with good pain relief.  The patient has tried physical therapy but since her two falls she had in the past 4-6 weeks, she has not went back to physical therapy. The patient has had surgery on 11/06/2020 and it was a left knee arthroscopy with partial lateral meniscal tear and debridement. Therefore she is 8 months post op. The patient also has had a Right knee arthroscopy with partial medial meniscectomy and debridement, medial femoral condyle, and medial trochlea and patella. The date of surgery was on 08/14/2020. Therefore she is 11 months post op.  She had a her right knee was given a lubrication injection on March 26th with 2 weeks of moderate relief of pain. She admits severe pain in her left knee. She is currently in the process of obtaining medical clearance for surgery. She has received clearance from her dentist and PCP and is awaiting clearance from cardiology. Reports coming to therapy every Friday and doing exercises. She admits she has fallen multiple times due to her knee locking up.   Past Medical History:     Past Medical History:   Diagnosis Date    Atrial fibrillation ([address]     Blurred vision     left eye     Breast cancer ([address]     bilateral remission since 2012    C. difficile colitis     2015    Colon cancer ([address]     stage IV remission since 2016    COPD (chronic obstructive pulmonary disease) ([address]     COVID-19     Depression     Diabetes mellitus (HCC)     Diverticulosis     Dizziness     from sitting to standing    Fibromyalgia     GERD (gastroesophageal reflux disease)     History of blood transfusion     History of loop recorder     Hyperlipidemia     Hypertension     Kidney stones     Multiple sclerosis ([address]     On home oxygen therapy     at night    PONV (postoperative nausea and vomiting)     Rheumatoid arthritis ([address]     Seizure ([address] 7/    last seizure 7/18/2020  first seizure 1992 after head injury take gabapentin    Unspecified cerebral artery occlusion with cerebral infarction     2008     Wears glasses         Past Surgical History:     Past Surgical History:   Procedure Laterality Date [address]     BACK SURGERY      neck and back screws and cage    BREAST BIOPSY  [id number]    BREAST LUMPECTOMY      bilateral [id number]     CARDIAC SURGERY      cardiac cath negative in 2018   [facility]    COLONOSCOPY      ENDOSCOPY, COLON, DIAGNOSTIC      FRACTURE SURGERY      left wrist     HYSTERECTOMY  2010    INSERTABLE CARDIAC MONITOR      KNEE ARTHROSCOPY Right 08/14/2020    RIGHT KNEE ARTHROSCOPY WITH PARTIAL MEDIAL MENISCECTOMY WITH DEBRIDEMENT (Right     KNEE ARTHROSCOPY Right 8/14/2020    RIGHT KNEE ARTHROSCOPY WITH PARTIAL MEDIAL MENISCECTOMY WITH DEBRIDEMENT performed by Alverda Mohs, DO at [facility] ARTHROSCOPY Left 11/6/2020    LEFT KNEE ARTHROSCOPY WITH PARTIAL LATERAL MENISCAL TEAR REPAIR AND [address] performed by Alverda Mohs, DO at [address] [place] [address] SIGMOIDECTOMY  2016    for cancer    SKIN BIOPSY      moles    SLEEVE GASTRECTOMY  5/19/15    GASTRECTOMY SLEEVE LAPAROSCOPIC           TONSILLECTOMY  1985    UPPER GASTROINTESTINAL ENDOSCOPY  1/30/15        Medications Prior to Admission:     Prior to Admission medications    Medication Sig Start Date End Date Taking? Authorizing Provider   brexpiprazole (REXULTI) 0.5 MG TABS tablet Take 0.5 mg by mouth daily   Yes Historical Provider, MD   Multiple Vitamins-Minerals (MULTI COMPLETE PO) Take by mouth   Yes Historical Provider, MD   potassium chloride (KLOR-CON) 20 MEQ packet Take 20 mEq by mouth 2 times daily   Yes Historical Provider, MD   ferrous sulfate (IRON 325) 325 (65 Fe) MG tablet Take 325 mg by mouth 2 times daily   Yes Historical Provider, MD   Diclofenac Sodium 3 % GEL Apply topically as needed   Yes Historical Provider, MD   methylphenidate (RITALIN) 20 MG tablet Take 20 mg by mouth 2 times daily.    Yes Historical Provider, MD   albuterol sulfate HFA (VENTOLIN HFA) 108 (90 Base) MCG/ACT inhaler Inhale 2 puffs into the lungs every 6 hours as needed for Wheezing   Yes Historical Provider, MD   budesonide-formoterol (SYMBICORT) 160-4.5 MCG/ACT AERO INHALE 2 PUFFS BID UTD 9/17/20  Yes Historical Provider, MD   methocarbamol (ROBAXIN) 750 MG tablet Take 1,500 mg by mouth 3 times daily   Yes Historical Provider, MD   gabapentin (NEURONTIN) 600 MG tablet Take 1 tablet by mouth 3 times daily for 30 days. Patient taking differently: Take 1,200 mg by mouth 3 times daily.   6/8/20 7/15/21 Yes Quentin Sanchez MD   DULoxetine (CYMBALTA) 60 MG extended release capsule Take 1 capsule by mouth 2 times daily 6/8/20 7/15/21 Yes Quentin Sanchez MD   traZODone (DESYREL) 50 MG tablet Take 1 tablet by mouth nightly as needed for Sleep 6/8/20  Yes Quentin Sanchez MD   metFORMIN (GLUCOPHAGE) 500 MG tablet Take 1 tablet by mouth 2 times daily (with meals) 6/8/20  Yes Quentin Sanchez MD   rivaroxaban (XARELTO) 20 MG TABS tablet Take 1 tablet by mouth Daily with supper 6/8/20  Yes Quentin Sanchez MD   carvedilol (COREG) 12.5 MG tablet Take 1 tablet by mouth 2 times daily (with meals) 6/8/20  Yes Quentin Sanchez MD   furosemide (LASIX) 40 MG tablet Take 1 tablet by mouth daily 6/8/20  Yes Quentin Sanchez MD   magnesium oxide (MAG-OX) 400 (241.3 Mg) MG TABS tablet Take 1 tablet by mouth daily 6/9/20  Yes Quentin Sanchez MD   esomeprazole (NEXIUM) 40 MG delayed release capsule Take 1 capsule by mouth every morning (before breakfast) 6/8/20  Yes Quentin Sanchez MD   ondansetron (ZOFRAN-ODT) 4 MG disintegrating tablet Take 4 mg by mouth every 8 hours as needed for Nausea or Vomiting   Yes Historical Provider, MD   cetirizine (ZYRTEC) 10 MG tablet Take 10 mg by mouth daily   Yes Historical Provider, MD   SUPER B COMPLEX/C PO Take 1 tablet by mouth daily    Yes Historical Provider, MD   loperamide (IMODIUM) 2 MG capsule Take 2 mg by mouth 4 times daily as needed for Diarrhea Takes two capsules at onset   Yes Historical Provider, MD   lidocaine (LIDODERM) 5 % Place 1 patch onto the skin daily as needed 12 hours on, 12 hours off. Yes Historical Provider, MD   fluticasone (FLONASE) 50 MCG/ACT nasal spray 1 spray by Each Nostril route daily   Yes Historical Provider, MD   QUEtiapine (SEROQUEL) 100 MG tablet Take 1 tablet by mouth nightly 6/8/20   Marissa Olmedo MD   diclofenac (CATAFLAM) 50 MG tablet Take 50 mg by mouth daily as needed (headache) May repeat in 6 hours, max 3 tablets per day    Historical Provider, MD        Allergies:     Ace inhibitors, Betadine swab aid [povidone-iodine], Betadine [povidone iodine], Iodine, Lurasidone, Lurasidone hcl, Shellfish allergy, Shellfish-derived products, Iodides, Lamotrigine, Macrobid [nitrofurantoin macrocrystal], Morphine, Pyridium [phenazopyridine hcl], and Topiramate    Social History:     Tobacco:    reports that she quit smoking about a year ago. She has a 20.00 pack-year smoking history. She has never used smokeless tobacco.  Alcohol:      reports current alcohol use. Drug Use:  reports no history of drug use. Family History:     Family History   Adopted: Yes       Review of Systems:     Positive and Negative as described in HPI. CONSTITUTIONAL:  negative for fevers, chills, sweats, fatigue, weight loss  HEENT: WEARS GLASSES for vision ,HAS \"WHITE MATTER DISEASE\" ,HAS DOUBLE AND BLURRED VISION IN THE LEFT EYE. NO  hearing changes, runny nose, throat pain  RESPIRATORY: HAS OCCASIONAL  shortness of breath, cough, congestion, wheezing. WITH COPD, HAS OBSTRUCTIVE SLEEP APNEA USES O 2 BUT NO C-PAP . USES INHALERS   CARDIOVASCULAR: HAS  OCCASIONAL  chest pain,WAS RECENTLY HOSPITALIZED , FOUND TO HAVE ELECTROLYTE IMBALANCE palpitations.  HAS PAROXSYMAL ATRIAL FIBRILLATION ON XARELTO   GASTROINTESTINAL:  HAS  nausea, vomiting,CHRONIC  Diarrhea,FROM CHEMOTHERAPY NO  constipation, change in bowel habits, abdominal pain   GENITOURINARY:  negative for difficulty of urination, burning with urination, frequency HX STONES 4/2020  INTEGUMENT:  negative for rash, skin lesions, easy bruising   HEMATOLOGIC/LYMPHATIC:  negative for swelling/edema   ALLERGIC/IMMUNOLOGIC:  negative for urticaria , itching  ENDOCRINE: HAS DIABETES, LAST A1C WAS 6.3 NO hot or cold intolerance  MUSCULOSKELETAL:HAS BILATERAL KNEE PAIN joint pains, NO muscle aches, swelling of joints  NEUROLOGICAL: HAS  Headaches, SEIZURES , dizziness, lightheadedness, numbness, pain, tingling extremities PATIENT HAS HISTORY OF HEAD TRAUMA, AND MULTIPLE SCLEROSIS   BEHAVIOR/PSYCH:IS BEING TREATED FOR depression, anxiety    Physical Exam:   BP (!) 151/76   Pulse 80   Temp 96.5 °F (35.8 °C) (Temporal)   Resp 16   Ht 5' 3\" (1.6 m)   Wt 245 lb (111.1 kg)   SpO2 94%   BMI 43.40 kg/m²   No LMP recorded. Patient has had a hysterectomy. No obstetric history on file. No results for input(s): POCGLU in the last 72 hours. General Appearance:  alert, well appearing, and in no acute distress  Mental status: oriented to person, place, and time with normal affect  Head:  normocephalic, atraumatic. Eye: no icterus, redness, pupils equal and reactive, extraocular eye movements intact, conjunctiva clear  Ear: normal external ear, no discharge, hearing intact  Nose:  no drainage noted  Mouth: mucous membranes moist  Neck: supple, no carotid bruits, thyroid not palpable  Lungs: Bilateral equal air entry, clear to ausculation, no wheezing, rales or rhonchi, normal effort  Cardiovascular: HR  normal rate, regular rhythm, no murmur, gallop, rub. OCCASIONAL PVC 3/30 SECONDS,SEES    Abdomen: Soft, nontender, nondistended, normal bowel sounds  Neurologic: There are no new focal motor or sensory deficits, normal muscle tone and bulk, no abnormal sensation, normal speech, cranial nerves II through XII grossly intact  Skin: No gross lesions, rashes, bruising or bleeding on exposed skin area  Extremities:  peripheral pulses palpable, no pedal edema or calf pain with palpation  Psych: normal affect     Investigations:      Laboratory Testing:  Recent Results (from the past 24 hour(s))   APTT    Collection Time: 07/15/21  8:11 AM   Result Value Ref Range    PTT 36.7 (H) 23.9 - 33.8 sec   Basic Metabolic Panel    Collection Time: 07/15/21  8:11 AM   Result Value Ref Range    Glucose 157 (H) 70 - 99 mg/dL    BUN 13 6 - 20 mg/dL    CREATININE 0.63 0.50 - 0.90 mg/dL    Bun/Cre Ratio 21 (H) 9 - 20    Calcium 8.8 8.6 - 10.4 mg/dL    Sodium 140 135 - 144 mmol/L    Potassium 4.0 3.7 - 5.3 mmol/L    Chloride 101 98 - 107 mmol/L    CO2 28 20 - 31 mmol/L    Anion Gap 11 9 - 17 mmol/L    GFR Non-African American >60 >60 mL/min    GFR African American >60 >60 mL/min    GFR Comment          GFR Staging NOT REPORTED    CBC    Collection Time: 07/15/21  8:11 AM   Result Value Ref Range    WBC 7.4 3.5 - 11.3 k/uL    RBC 4.30 3.95 - 5.11 m/uL    Hemoglobin 10.4 (L) 11.9 - 15.1 g/dL    Hematocrit 35.1 (L) 36.3 - 47.1 %    MCV 81.6 (L) 82.6 - 102.9 fL    MCH 24.2 (L) 25.2 - 33.5 pg    MCHC 29.6 28.4 - 34.8 g/dL    RDW 16.5 (H) 11.8 - 14.4 %    Platelets 468 173 - 660 k/uL    MPV 9.0 8.1 - 13.5 fL    NRBC Automated 0.0 0.0 per 100 WBC   Protime-INR    Collection Time: 07/15/21  8:11 AM   Result Value Ref Range    Protime 21.2 (H) 11.5 - 14.2 sec    INR 1.9        Recent Labs     07/15/21  0811   HGB 10.4*   HCT 35.1*   WBC 7.4   MCV 81.6*      K 4.0      CO2 28   BUN 13   CREATININE 0.63   GLUCOSE 157*   INR 1.9   PROTIME 21.2*   APTT 36.7*       No results for input(s): COVID19 in the last 720 hours. Imaging/Diagnostics:    No results found. EKG OF 7/15/21 ON THE SHORT CHART    Diagnosis:      1. OSTEOARTHRITIS WITH DEGENERATIVE JOINT DISEASE LEFT KNEE     Plans:     1.  TOTAL LEFT  KNEE ARTHROPLASTY      KANG Ortega CNP  7/15/2021  8:48 AM

## 2021-07-15 NOTE — DISCHARGE INSTR - COC
Continuity of Care Form    Patient Name: Othelia Aase   :  1965  MRN:  [id number]    Admit date:  7/15/2021  Discharge date:  ***    Code Status Order: Prior   Advance Directives:   [facility] Documentation     Date/Time Healthcare Directive Type of Healthcare Directive Copy in [address] Agent's Name Healthcare Agent's Phone Number    07/15/21 4032  No, patient does not have an advance directive for healthcare treatment  --  --  --  --  --          Admitting Physician:  No admitting provider for patient encounter. PCP: Malvin Goodson    Discharging Nurse: [facility] Unit/Room#: No information available for this encounter. Discharging Unit Phone Number: ***    Emergency Contact:   Extended Emergency Contact Information  Primary Emergency Contact: Juan Albright  Address: [address] [place] [address] [address].  Lexus Perez, [address] [place] [address] [address] [place] [address] Phone: [phone]  Relation: Other    Past Surgical History:  Past Surgical History:   Procedure Laterality Date    APPENDECTOMY      BACK SURGERY      neck and back screws and cage    BREAST BIOPSY  9065-1329    BREAST LUMPECTOMY      bilateral ,4537,0326     CARDIAC SURGERY      cardiac cath negative in 2018   [facility]    COLONOSCOPY      ENDOSCOPY, COLON, DIAGNOSTIC      FRACTURE SURGERY      left wrist     HYSTERECTOMY      INSERTABLE CARDIAC MONITOR      KNEE ARTHROSCOPY Right 2020    RIGHT KNEE ARTHROSCOPY WITH PARTIAL MEDIAL MENISCECTOMY WITH DEBRIDEMENT (Right     KNEE ARTHROSCOPY Right 2020    RIGHT KNEE ARTHROSCOPY WITH PARTIAL MEDIAL MENISCECTOMY WITH DEBRIDEMENT performed by Oni Lopez DO at [facility] ARTHROSCOPY Left 2020    LEFT KNEE ARTHROSCOPY WITH PARTIAL LATERAL MENISCAL TEAR REPAIR AND [facility] performed by Oni Lopez DO at [address] [address] SIGMOIDECTOMY  2016 for cancer    SKIN BIOPSY      moles    SLEEVE GASTRECTOMY  5/19/15    GASTRECTOMY SLEEVE LAPAROSCOPIC           TONSILLECTOMY  1985    UPPER GASTROINTESTINAL ENDOSCOPY  1/30/15       Immunization History:   Immunization History   Administered Date(s) Administered    COVID-19, Gonzales Peter, PF, 30mcg/0.3mL 02/25/2021, 03/18/2021    Influenza Whole 10/01/2014       Active Problems:  Patient Active Problem List   Diagnosis Code    Hypertension I10    Hyperlipidemia E78.5    Urinary incontinence R32    Diabetes ([facility] E11.9    Arrhythmia I49.9    Arthritis M19.90    Fibromyalgia M79.7    Sleep apnea G47.30    Stroke ([facility] I63.9    Migraine G43.909    Seizure ([facility] R56.9    Back pain, chronic M54.9, G89.29    Gout M10.9    Morbid obesity with BMI of 50.0-59.9, adult ([facility] E66.01, Z68.43    Gastroparesis K31.84    Leg numbness R20.0    Bilateral leg numbness R20.0    MDD (major depressive disorder), severe (HCC) F32.2    PAF (paroxysmal atrial fibrillation) ([facility]) I48.0    Hypokalemia E87.6    Chronic pain syndrome G89.4    Severe episode of recurrent major depressive disorder, without psychotic features ([facility] F33.2    Postoperative hypoxia R09.02, Z98.890    Multiple sclerosis (HCC) G35    Rheumatoid arthritis (HCC) M06.9    Diabetes mellitus (HCC) E11.9    S/P arthroscopic partial medial meniscectomy Z98.890       Isolation/Infection:   Isolation          No Isolation        Patient Infection Status     Infection Onset Added Last Indicated Last Indicated By Review Planned Expiration Resolved Resolved By    None active    Resolved    COVID-19 Rule Out 11/02/20 11/02/20 11/02/20 COVID-19 (Ordered)   11/03/20 Rule-Out Test Resulted    COVID-19 Rule Out 08/09/20 08/09/20 08/10/20 Covid-19 Ambulatory (Ordered)   08/11/20 Rule-Out Test Resulted    COVID-19 Rule Out 06/03/20 06/03/20 06/03/20 COVID-19 (Ordered)   06/03/20 Rule-Out Test Resulted          Nurse Assessment:  Last Vital Signs: BP (!) 151/76   Pulse 80   Temp 96.5 °F (35.8 °C) (Temporal)   Resp 16   Ht 5' 3\" (1.6 m)   Wt 245 lb (111.1 kg)   SpO2 94%   BMI 43.40 kg/m²     Last documented pain score (0-10 scale): Pain Level: 5  Last Weight:   Wt Readings from Last 1 Encounters:   07/15/21 245 lb (111.1 kg)     Mental Status:  {IP PT MENTAL STATUS:52570}    IV Access:  { ONEL IV ACCESS:447141052}    Nursing Mobility/ADLs:  Walking   {CHP DME IYWQ:472983687}  Transfer  {CHP DME XITH:400079644}  Bathing  {CHP DME PYLW:628349544}  Dressing  {CHP DME FPSD:853054874}  Toileting  {CHP DME DFUM:586841907}  Feeding  {CHP DME FCPP:836895511}  Med Admin  {CHP DME UUYX:017779120}  Med Delivery   { ONEL MED Delivery:298834819}    Wound Care Documentation and Therapy:        Elimination:  Continence:   · Bowel: {YES / VM:95312}  · Bladder: {YES / VE:55928}  Urinary Catheter: {Urinary Catheter:844068930}   Colostomy/Ileostomy/Ileal Conduit: {YES / RQ:24969}       Date of Last BM: ***  No intake or output data in the 24 hours ending 07/15/21 0925  No intake/output data recorded.     Safety Concerns:     [address] Safety Concerns:059148640}    Impairments/Disabilities:      [address] Impairments/Disabilities:028981855}    Nutrition Therapy:  Current Nutrition Therapy:   [address] Diet List:036450435}    Routes of Feeding: {CHP DME Other Feedings:264747749}  Liquids: {Slp liquid thickness:40753}  Daily Fluid Restriction: {CHP DME Yes amt example:890236662}  Last Modified Barium Swallow with Video (Video Swallowing Test): {Done Not Done MGCK:829784710}    Treatments at the Time of Hospital Discharge:   Respiratory Treatments: ***  Oxygen Therapy:  {Therapy; copd oxygen:40345}  Ventilator:    { CC Vent JSKQ:647840518}    Rehab Therapies: {THERAPEUTIC INTERVENTION:8465627249}  Weight Bearing Status/Restrictions: [address] Weight Bearin}  Other Medical Equipment (for information only, NOT a DME order):  {EQUIPMENT:260332387}  Other Treatments: ***    Patient's personal belongings (please select all that are sent with patient):  {CHP DME Belongings:085967590}    RN SIGNATURE:  {Esignature:080937025}    CASE MANAGEMENT/SOCIAL WORK SECTION    Inpatient Status Date: ***    Readmission Risk Assessment Score:  Readmission Risk              Risk of Unplanned Readmission:  0           Discharging to Facility/ Agency   · Name:   · Address:  · Phone:  · Fax:    Dialysis Facility (if applicable)   · Name:  · Address:  · Dialysis Schedule:  · Phone:  · Fax:    / signature: {Esignature:370564449}    PHYSICIAN SECTION    Prognosis: {Prognosis:9269974806}    Condition at Discharge: [facility] Patient Condition:029718859}    Rehab Potential (if transferring to Rehab): {Prognosis:9613460574}    Recommended Labs or Other Treatments After Discharge: ***    Physician Certification: I certify the above information and transfer of Jesse Rodriges  is necessary for the continuing treatment of the diagnosis listed and that she requires {Admit to Appropriate Level of Care:57201} for {GREATER/LESS:344360315} 30 days.      Update Admission H&P: {CHP DME Changes in GIDHA:915624161}    PHYSICIAN SIGNATURE:  {Esignature:416719516}

## 2021-07-15 NOTE — PRE-PROCEDURE INSTRUCTIONS
ARRIVE AT [facility] [address] ON Tuesday, august 10, 2021 at 0530 AM    Once you enter the hospital lobby, take the elevators to the second floor. Check-In is at the surgery registration desk. Continue to take your home medications as you normally do up to and including the night before surgery with the exception of any blood thinning medications. Please stop any blood thinning medications as directed by your surgeon or prescribing physician. Failure to stop certain medications may interfere with your scheduled surgery. These may include:  Aspirin, Warfarin (Coumadin), Clopidogrel (Plavix), Ibuprofen (Motrin, Advil), Naproxen (Aleve), Meloxicam (Mobic), Celecoxib (Celebrex), Eliquis, Pradaxa, Xarelto, Effient, Fish Oil, Herbal supplements. Stop all blood thinning medications 5-10 days prior to surgery per physician directions. Xarelto, multi vitamin with COq 10    If you are diabetic, do not take any of your diabetic medications by mouth the morning of surgery. If you are taking insulin contact the doctor that manages your diabetes for instructions about any changes to your insulin dosages the day before surgery. Do not inject insulin or other injectable diabetic medications the morning of surgery unless otherwise instructed by the doctor who manages your diabetes. Please take the following medication(s) the day of surgery with a small sip of water:  Coreg, nexium    Please use your inhaler(s) if needed and bring your inhaler(s) from home the day of surgery. PREPARING FOR YOUR SURGERY:     Before surgery, you can play an important role in your own health. Because skin is not sterile, we need to be sure that your skin is as free of germs as possible before surgery by carefully washing before surgery. Preparing or prepping skin before surgery can reduce the risk of a surgical site infection.   Do not shave the area of your body where your surgery will be performed unless you received specific permission from your physician. You will need to shower at home the night before surgery and the morning of surgery with a special soap called chlorhexidine gluconate (CHG*). *Not to be used by people allergic to Chlorhexidine Gluconate (CHG). Following these instructions will help you be sure that your skin is clean before surgery. Instructions on cleaning your skin before surgery: The night before your surgery:      You will need to shower with warm water (not hot) and the CHG soap.  Use a clean wash cloth and a clean towel. Have clean clothes available to put on after the shower.   First wash your hair with regular shampoo. Rinse your hair and body thoroughly to remove the shampoo.  Wash your face and genital area (private parts) with your regular soap or water only. Thoroughly rinse your body with warm water from the neck down.  Turn water off to prevent rinsing the soap off too soon.  With a clean wet washcloth and half of the CHG soap in the bottle, lather your entire body from the neck down. Do not use CHG soap near your eyes or ears to avoid injury to those areas.  Wash thoroughly, paying special attention to the area where your surgery will be performed.  Wash your body gently for five (5) minutes. Avoid scrubbing your skin too hard.  Turn the water back on and rinse your body thoroughly.  Pat yourself dry with a clean, soft towel. Do not apply lotion, cream or powder.  Dress with clean freshly washed clothes. The morning of surgery:     Repeat shower following steps above - using remaining half of CHG soap in bottle. Patient Instructions:    Dolores Martinez If you are having any type of anesthesia you are to have nothing to eat or drink after midnight the night before your surgery.   This includes gum, hard candy, mints, water or smoking or chewing tobacco.  The only exception to this is a small sip of water to take with any morning dose of heart, blood pressure, or seizure medications. No alcoholic beverages for 24 hours prior to surgery.  Brush your teeth but do not swallow water.  Bring your eye glasses and case with you. No contacts are to be worn the day of surgery. You also may bring your hearing aids. Most surgical procedures involving anesthesia will require that you remove your dentures prior to surgery. · Do not wear any jewelry or body piercings day of surgery. Also, NO lotion, perfume or deodorant to be used the day of surgery. No nail polish on the operative extremity (arm/leg surgeries)    · If you are staying overnight with us, please bring a small bag of necessary personal items.  Please wear loose, comfortable clothing. If you are potentially going to have a cast or brace bring clothing that will fit over them.  In case of illness - If you have cold or flu like symptoms (high fever, runny nose, sore throat, cough, etc.) rash, nausea, vomiting, loose stools, and/or recent contact with someone who has a contagious disease (chicken pox, measles, etc.) Please call your doctor before coming to the hospital.         Day of Surgery/Procedure:    As a patient at [facility] you can expect quality medical and nursing care that is centered on your individual needs. Our goal is to make your surgical experience as comfortable as possible    . Transportation After Your Surgery/Procedure: You will need a friend or family member to drive you home after your procedure. Your  must be 25years of age or older and able to sign off on your discharge instructions. A taxi cab or any other form of public transportation is not acceptable. Your friend or family member must stay at the hospital throughout your procedure.     Someone must remain with you for the first 24 hours after your surgery if you receive anesthesia or medication. If you do not have someone to stay with you, your procedure may be cancelled.       If you have any other questions regarding your procedure or the day of surgery, please call [phone]      _________________________  ____________________________  Signature (Patient)              Signature (Provider)               Date

## 2021-07-16 ENCOUNTER — HOSPITAL ENCOUNTER (OUTPATIENT)
Dept: PHYSICAL THERAPY | Facility: CLINIC | Age: 56
Setting detail: THERAPIES SERIES
Discharge: HOME | End: 2021-07-16
Payer: COMMERCIAL

## 2021-07-16 LAB
CULTURE: NORMAL
EKG ATRIAL RATE: 77 BPM
EKG P AXIS: 49 DEGREES
EKG P-R INTERVAL: 144 MS
EKG Q-T INTERVAL: 420 MS
EKG QRS DURATION: 86 MS
EKG QTC CALCULATION (BAZETT): 475 MS
EKG R AXIS: 25 DEGREES
EKG T AXIS: -82 DEGREES
EKG VENTRICULAR RATE: 77 BPM
Lab: NORMAL
MRSA, DNA, NASAL: NORMAL
SPECIMEN DESCRIPTION: NORMAL
SPECIMEN DESCRIPTION: NORMAL

## 2021-07-16 PROCEDURE — 93010 ELECTROCARDIOGRAM REPORT: CPT | Performed by: INTERNAL MEDICINE

## 2021-07-16 PROCEDURE — 97110 THERAPEUTIC EXERCISES: CPT

## 2021-07-16 NOTE — FLOWSHEET NOTE
[x] [facility]  Outpatient Rehabilitation &  Therapy  [facility]   Inga Cleaves: [phone]  F: [phone]      Physical Therapy Daily Treatment Note    Date:  2021  Patient Name:  Vikki Bragg    :  1965  MRN: [id number]  Physician: Roula Menjivar                                   Insurance: [employer]  Medical Diagnosis: R knee OA                    Rehab Codes: M25.561, R26.89  Onset date: 2021 exacerbation             Next Dr's appt.: 21, DOS L knee 08/10/21  Visit# / total visits:      Cancels/No Shows: 1/0    Subjective:    Pain:  [x] Yes  [] No Location: bilateral knees L>R  Pain Rating: (0-10 scale) 6/10  Pain altered Tx:  [x] No  [] Yes  Action:  Comments: Patient reports that she went to the ER last week due to chest pain and was admitted to the observation unit due to abnormal electrolyte levels. She has a follow up scheduled with cardiology. Patient is feeling well today, reports 6/10 pain to her L knee. Has been walking and completing HEP at home. Objective:   Precautions: Standard   Exercises:  Exercise    Bilat knee OA  Reps/ Time Weight/ Level Comments         NuStep  10'            Calf S SB  3x30\"     HS step Stretch  3x30\"           LAQ 2x10 bilat    Supine HS S      SLR 2x10 bilat    Clamshells  2x10 [place]    Sidelying ABD 2x10 [place]  Band at [employer]  x10      4- way hip x10 orange    TGym Squats  2x10 L18    TGym HR 2x10 L18 Not today due to great toe pain on RLE               Other:      Treatment Charges: Mins Units   []  Modalities     [x]  Ther Exercise 45 3   []  Manual Therapy     []  Ther Activities     []  Aquatics     []  Vasocompression     []  Other     Total Treatment time 45 3       Assessment: [x] Progressing toward goals. Progressed program pt with pain throughout but able to complete all exercises. Patient demonstrates independence with HEP with only minimal cueing during 4-way hip exercises. Patient has been completing standing squats and walking daily. Plan to re-evaluate next week to determine need for continued skilled care before knee surgery on August 10th.     [] No change. [] Other:  [x] Patient would continue to benefit from skilled physical therapy services in order to: Increase ankle DF and hip extension ROM, as well as increase hip and knee strength to ease ADL's    STG/LTG  STG: (to be met in 9 treatments)  1. ? Pain: 5/10 or less pain B knees to allow for greater ease with ADLs   2. ? ROM: Ankle DF 10 deg knee straight and hip extension 20 deg to facilitate normal gait  3. ? Strength:5/5 hip abduction and hip extension for greater ease with ADLs  4. ? Function: LEFI 50% disability  5. Patient to be independent with home exercise program as demonstrated by performance with correct form without cues.     LTG: (to be met in 18 treatments)  1. Pt to demonstrate good eccentric quad control on 6 in step with <5/10 knee pain and little to no genu valgus to allow normal stair negotiation  2. LEFI <30% disability    Pt. Education:  [x] Yes  [] No  [x] Reviewed Prior HEP/Ed- cues required for 4-way hip     Method of Education: [x] Verbal  [x] Demo  [] Written  Comprehension of Education:  [x] Verbalizes understanding. [x] Demonstrates understanding. [x] Needs review. [] Demonstrates/verbalizes HEP/Ed previously given. Plan: [x] Continue current frequency toward long and short term goals.     [x] Specific Instructions for subsequent treatments: assess hip extension ROM, PT reassess       Time In:11:00am            Time Out: 11:55am    Electronically signed by:  Lila Flores, PT

## 2021-07-19 NOTE — PROGRESS NOTES
[address]  Joint Replacement Pre-surgical Assessment    Scheduled Surgery Date: 08/10/2021  Surgery Time: 0715    Surgeon: ENIO  Procedure: left Total Knee    Primary Insurance Coverage [employer]  Pre-op class attended YES    PCP: Cecilia Reed  Clearance received by PCP: Yes    Anticipated Discharge Plan: home  Agency (if applicable):  UNSURE    Significant PMH:   Surgical History    Procedure Laterality Date Comment Source   APPENDECTOMY  1985     BACK SURGERY   neck and back screws and cage    BREAST BIOPSY  [id number]     BREAST LUMPECTOMY   bilateral [id number]     CARDIAC SURGERY   cardiac cath negative in 2018    [facility]     COLONOSCOPY       ENDOSCOPY, COLON, DIAGNOSTIC       FRACTURE SURGERY   left wrist     HYSTERECTOMY  2010     INSERTABLE CARDIAC MONITOR       KNEE ARTHROSCOPY Right 08/14/2020 RIGHT KNEE ARTHROSCOPY WITH PARTIAL MEDIAL MENISCECTOMY WITH DEBRIDEMENT (Right     KNEE ARTHROSCOPY Right 8/14/2020 RIGHT KNEE ARTHROSCOPY WITH PARTIAL MEDIAL MENISCECTOMY WITH DEBRIDEMENT performed by Adalid Mathew DO at [facility] ARTHROSCOPY Left 11/6/2020 LEFT KNEE ARTHROSCOPY WITH PARTIAL LATERAL MENISCAL TEAR REPAIR AND [address] performed by Adalid Mathew DO at [facility] [address],[address], [address]  2016 for cancer    SKIN BIOPSY   moles    SLEEVE GASTRECTOMY  5/19/15 GASTRECTOMY SLEEVE LAPAROSCOPIC           [address] GASTROINTESTINAL ENDOSCOPY  1/30/15     ED Notes    ED Notes    Medical History    Diagnosis Date Comment Source   Atrial fibrillation ([address]      Blurred vision  left eye     Breast cancer ([address]  bilateral remission since 2012    C. difficile colitis  2015    Colon cancer ([address]  stage IV remission since 2016    COPD (chronic obstructive pulmonary disease) ([address]      COVID-19      Depression      Diabetes mellitus ([address]      Diverticulosis      Dizziness  from sitting to standing    Fibromyalgia GERD (gastroesophageal reflux disease)      History of blood transfusion      History of loop recorder      Hyperlipidemia      Hypertension      Kidney stones      Multiple sclerosis ([address]      On home oxygen therapy  at night    PONV (postoperative nausea and vomiting)      Rheumatoid arthritis ([address]      Seizure ([facility] [address] 7/ last seizure 7/18/2020  first seizure 1992 after head injury take gabapentin    Unspecified cerebral artery occlusion with cerebral infarction  2008     Wears glasses               Smoking history: the patient is a former smoker who quit smoking IN 2020    Alcohol history: Current alcohol use: SOCIAL    Concerns prior to surgery: PT HAS HX OF MS AND USES A ROLATOR WALKER ALSO HAS HX OF CVA/AFIB/RA/FIBROLYAGIA.     Electronically signed by: Hardy Wilson RN on 7/19/2021 at 11:19 AM

## 2021-07-23 ENCOUNTER — HOSPITAL ENCOUNTER (OUTPATIENT)
Dept: PHYSICAL THERAPY | Facility: CLINIC | Age: 56
Setting detail: THERAPIES SERIES
Discharge: HOME | End: 2021-07-23
Payer: COMMERCIAL

## 2021-07-23 NOTE — FLOWSHEET NOTE
[] [facility]) - [facility] &  Therapy  [address]    [phone]  F: [phone]   [] [address] [facility] [address]   [address]  P: [phone]  F: [phone]  [] [address] &  Therapy  [address]  P: [phone]  F: [phone] [] [address] [employer] [employer]  P: [phone]  F: [phone]  [x] [address]  [address] [facility] [address]   [address]   [place]: [phone]  F: [phone]   [] [facility]  [address]  [place]: [phone]   F: [phone]     Physical Therapy Cancel/No Show note    Date: 2021  Patient: Sepideh Chaves  : 1965  MRN: [id number]    Cancels/No Shows to date:      For today's appointment patient:    []  Cancelled    [] Rescheduled appointment    [x] No-show      Reason given by patient:    []  Patient ill    []  Conflicting appointment    [] No transportation      [] Conflict with work    [] No reason given    [] Weather related    [] COVID-19    [x] Other:      Comments: Patient was called regarding her missed appointment this date. Patient reports that she was contacted regarding her cancelled surgery and believed that the script for therapy was voided. Missed appointment not counted against the patient. Patient has appointment with Dr. Estuardo Dennis on . She will call the department at this time to discuss her appointment and plans for therapy. Patient would like to continue therapy at this location.        [] Next appointment was confirmed    Electronically signed by: Gilbert Keller PT

## 2021-07-28 ENCOUNTER — OFFICE VISIT (OUTPATIENT)
Dept: ORTHOPEDIC SURGERY | Age: 56
End: 2021-07-28
Payer: COMMERCIAL

## 2021-07-28 DIAGNOSIS — M17.0 PRIMARY OSTEOARTHRITIS OF BOTH KNEES: Primary | ICD-10-CM

## 2021-07-28 PROCEDURE — G8428 CUR MEDS NOT DOCUMENT: HCPCS | Performed by: ORTHOPAEDIC SURGERY

## 2021-07-28 PROCEDURE — G8417 CALC BMI ABV UP PARAM F/U: HCPCS | Performed by: ORTHOPAEDIC SURGERY

## 2021-07-28 PROCEDURE — 99213 OFFICE O/P EST LOW 20 MIN: CPT | Performed by: ORTHOPAEDIC SURGERY

## 2021-07-28 PROCEDURE — 1036F TOBACCO NON-USER: CPT | Performed by: ORTHOPAEDIC SURGERY

## 2021-07-28 PROCEDURE — 3017F COLORECTAL CA SCREEN DOC REV: CPT | Performed by: ORTHOPAEDIC SURGERY

## 2021-07-28 NOTE — PROGRESS NOTES
Bo Barney M.D.            [facility] [address], [place] [address].           Dept Phone: [phone]           Dept Fax:  [phone]           Ju[place]ino Rodriguez          Dept Phone: [phone]           Dept Fax:  [phone]      Chief Compliant:  Chief Complaint   Patient presents with    Pain     LT KNEE        History of Present Illness: This is a 54 y.o. female who presents to the clinic today for evaluation / follow up of left knee pain. Patient is a 42-year-old patient she is a former patient of Dr. Bradford Pepper. Please refer to all his prior dictations. And a short abbreviated discussion is noted the patient has had previous arthroscopy of this left knee but she has had multiple corticosteroid as well as viscosupplementation injections to his knee. She is undergone physical therapy. Patient is at the point of her activities of daily living have become significantly restricted she had been previously been scheduled to have a left knee replacement per  in early August at [facility]. She is here for follow-up pending his departure. Review of Systems   Constitutional: Negative for fever, chills, sweats. Eyes: Negative for changes in vision, or pain. HENT: Negative for ear ache, epistaxis, or sore throat. Respiratory/Cardio: Negative for Chest pain, palpitations, SOB, or cough. Gastrointestinal: Negative for abdominal pain, N/V/D. Genitourinary: Negative for dysuria, frequency, urgency, or hematuria. Neurological: Negative for headache, numbness, or weakness. Integumentary: Negative for rash, itching, laceration, or abrasion. Musculoskeletal: Positive for Pain (LT KNEE)       Physical Exam:  Constitutional: Patient is oriented to person, place, and time. Patient appears well-developed and well nourished. HENT: Negative otherwise noted  Head: Normocephalic and Atraumatic  Nose: Normal  Eyes: Conjunctivae and EOM are normal  Neck: Normal range of motion Neck supple. Respiratory/Cardio: Effort normal. No respiratory distress. Musculoskeletal: Examination of patient's left knee notes that she has good motion 0 to 125 degrees. She has no varus or valgus instability at 0 6090 degrees she does have crepitus and grinding medially as well as her patellofemoral joint. No obvious effusion. Ligamentously grossly intact no hip rotational pain no IT band findings    Neurological: Patient is alert and oriented to person, place, and time. Normal strenght. No sensory deficit. Skin: Skin is warm and dry  Psychiatric: Behavior is normal. Thought content normal.  Nursing note and vitals reviewed. Labs and Imaging:     XR taken deviously Dr. Mery Trevizo office noted moderate degenerative joint disease of both knees left greater than right     No orders of the defined types were placed in this encounter. Assessment and Plan:  1. Primary osteoarthritis of both knees            This is a 54 y.o. female who presents to the clinic today for evaluation / follow up of DJD left knee. Past History:    Current Outpatient Medications:     brexpiprazole (REXULTI) 0.5 MG TABS tablet, Take 0.5 mg by mouth daily, Disp: , Rfl:     Multiple Vitamins-Minerals (MULTI COMPLETE PO), Take by mouth, Disp: , Rfl:     potassium chloride (KLOR-CON) 20 MEQ packet, Take 20 mEq by mouth 2 times daily, Disp: , Rfl:     ferrous sulfate (IRON 325) 325 (65 Fe) MG tablet, Take 325 mg by mouth 2 times daily, Disp: , Rfl:     Diclofenac Sodium 3 % GEL, Apply topically as needed, Disp: , Rfl:     methylphenidate (RITALIN) 20 MG tablet, Take 20 mg by mouth 2 times daily. , Disp: , Rfl:     albuterol sulfate HFA (VENTOLIN HFA) 108 (90 Base) MCG/ACT inhaler, Inhale 2 puffs into the lungs every 6 hours as needed for Wheezing, Disp: , Rfl:    budesonide-formoterol (SYMBICORT) 160-4.5 MCG/ACT AERO, INHALE 2 PUFFS BID UTD, Disp: , Rfl:     methocarbamol (ROBAXIN) 750 MG tablet, Take 1,500 mg by mouth 3 times daily, Disp: , Rfl:     gabapentin (NEURONTIN) 600 MG tablet, Take 1 tablet by mouth 3 times daily for 30 days.  (Patient taking differently: Take 1,200 mg by mouth 3 times daily. ), Disp: 90 tablet, Rfl: 0    DULoxetine (CYMBALTA) 60 MG extended release capsule, Take 1 capsule by mouth 2 times daily, Disp: 60 capsule, Rfl: 0    traZODone (DESYREL) 50 MG tablet, Take 1 tablet by mouth nightly as needed for Sleep, Disp: 30 tablet, Rfl: 0    metFORMIN (GLUCOPHAGE) 500 MG tablet, Take 1 tablet by mouth 2 times daily (with meals), Disp: 60 tablet, Rfl: 0    rivaroxaban (XARELTO) 20 MG TABS tablet, Take 1 tablet by mouth Daily with supper, Disp: 30 tablet, Rfl: 0    QUEtiapine (SEROQUEL) 100 MG tablet, Take 1 tablet by mouth nightly, Disp: 30 tablet, Rfl: 0    carvedilol (COREG) 12.5 MG tablet, Take 1 tablet by mouth 2 times daily (with meals), Disp: 60 tablet, Rfl: 0    furosemide (LASIX) 40 MG tablet, Take 1 tablet by mouth daily, Disp: 30 tablet, Rfl: 0    magnesium oxide (MAG-OX) 400 (241.3 Mg) MG TABS tablet, Take 1 tablet by mouth daily, Disp: 30 tablet, Rfl: 0    esomeprazole (NEXIUM) 40 MG delayed release capsule, Take 1 capsule by mouth every morning (before breakfast), Disp: 30 capsule, Rfl: 0    ondansetron (ZOFRAN-ODT) 4 MG disintegrating tablet, Take 4 mg by mouth every 8 hours as needed for Nausea or Vomiting, Disp: , Rfl:     cetirizine (ZYRTEC) 10 MG tablet, Take 10 mg by mouth daily, Disp: , Rfl:     diclofenac (CATAFLAM) 50 MG tablet, Take 50 mg by mouth daily as needed (headache) May repeat in 6 hours, max 3 tablets per day, Disp: , Rfl:     SUPER B COMPLEX/C PO, Take 1 tablet by mouth daily , Disp: , Rfl:     loperamide (IMODIUM) 2 MG capsule, Take 2 mg by mouth 4 times daily as needed for Diarrhea Takes two capsules at onset, Disp: , Rfl:     lidocaine (LIDODERM) 5 %, Place 1 patch onto the skin daily as needed 12 hours on, 12 hours off. , Disp: , Rfl:     fluticasone (FLONASE) 50 MCG/ACT nasal spray, 1 spray by Each Nostril route daily, Disp: , Rfl:   Allergies   Allergen Reactions    Ace Inhibitors Anaphylaxis, Other (See Comments), Hives and Shortness Of Breath     ANGIOEDEMA  lisinopril  angiodema  ANGIOEDEMA  ANGIOEDEMA      Betadine Swab Aid [Povidone-Iodine] Hives and Itching    Betadine [Povidone Iodine] Hives    Iodine Anaphylaxis, Hives and Shortness Of Breath     INCLUDES BETADINE  Iv dye. Pt has not had any reaction w/ dye from CT scans for 10 years. Per dr. Yonatan Figueroa contrast (omni 300) is fine to use.  19 - lilibeth      Lurasidone Other (See Comments)    Lurasidone Hcl      Other reaction(s): Unknown    Shellfish Allergy Anaphylaxis    Shellfish-Derived Products Anaphylaxis    Iodides      Pre treat with benadryl    Lamotrigine Hives    Macrobid [Nitrofurantoin Macrocrystal] Hives    Morphine      -Narcotics LOWERS SIEZURE THRESHLD    Pyridium [Phenazopyridine Hcl] Hives    Topiramate Other (See Comments)     Confusion      Social History     Socioeconomic History    Marital status: Legally      Spouse name: Not on file    Number of children: Not on file    Years of education: Not on file    Highest education level: Not on file   Occupational History    Occupation: Disabled   Tobacco Use    Smoking status: Former Smoker     Packs/day: 1.00     Years: 20.00     Pack years: 20.00     Quit date: 2020     Years since quittin.0    Smokeless tobacco: Never Used   Vaping Use    Vaping Use: Never used   Substance and Sexual Activity    Alcohol use: Yes     Comment: occassion    Drug use: No    Sexual activity: Yes     Partners: Male   Other Topics Concern    Not on file   Social History Narrative    Not on file     Social Determinants of Health     Financial Resource Strain:    Puja Costello Difficulty of Paying Living Expenses:    Food Insecurity:     Worried About Running Out of Food in the Last Year:     [address] in the Last Year:    Transportation Needs:     Lack of Transportation (Medical):      Lack of Transportation (Non-Medical):    Physical Activity:     Days of Exercise per Week:     Minutes of Exercise per Session:    Stress:     Feeling of Stress :    Social Connections:     Frequency of Communication with Friends and Family:     Frequency of Social Gatherings with Friends and Family:     Attends Jew Services:     Active Member of Clubs or Organizations:     Attends Club or Organization Meetings:     Marital Status:    Intimate Partner Violence:     Fear of Current or Ex-Partner:     Emotionally Abused:     Physically Abused:     Sexually Abused:      Past Medical History:   Diagnosis Date    At[facility]     Blurred vision     left eye     Breast cancer ([facility]     bilateral remission since 2012    C. difficile colitis     2015    Colon cancer ([facility]     stage IV remission since 2016    COPD (chronic obstructive pulmonary disease) ([facility]     COVID-19     Depression     Diabetes mellitus ([facility]     Diverticulosis     Dizziness     from sitting to standing    Fibromyalgia     GERD (gastroesophageal reflux disease)     History of blood transfusion     History of loop recorder     Hyperlipidemia     Hypertension     Kidney stones     Multiple sclerosis ([address]     On home oxygen therapy     at night    PONV (postoperative nausea and vomiting)     Rheumatoid arthritis ([address]     Seizure ([address] 7/    last seizure 7/18/2020  first seizure 1992 after head injury take gabapentin    Unspecified cerebral artery occlusion with cerebral infarction     2008     Wears glasses      Past Surgical History:   Procedure Laterality Date    APPENDECTOMY  1985    BACK SURGERY      neck and back screws and cage    BREAST BIOPSY  [id number]    BREAST LUMPECTOMY      bilateral [id number]     CARDIAC SURGERY      cardiac cath negative in 2018   [facility]    COLONOSCOPY      ENDOSCOPY, COLON, DIAGNOSTIC      FRACTURE SURGERY      left wrist     HYSTERECTOMY  2010    INSERTABLE CARDIAC MONITOR      KNEE ARTHROSCOPY Right 08/14/2020    RIGHT KNEE ARTHROSCOPY WITH PARTIAL MEDIAL MENISCECTOMY WITH DEBRIDEMENT (Right     KNEE ARTHROSCOPY Right 8/14/2020    RIGHT KNEE ARTHROSCOPY WITH PARTIAL MEDIAL MENISCECTOMY WITH DEBRIDEMENT performed by Anjel Augustine DO at [facility] ARTHROSCOPY Left 11/6/2020    LEFT KNEE ARTHROSCOPY WITH PARTIAL LATERAL MENISCAL TEAR REPAIR AND [address] performed by Anjel Augustine DO at [address] [facility] SIGMOIDECTOMY  2016    for cancer    SKIN BIOPSY      moles    SLEEVE GASTRECTOMY  5/19/15    GASTRECTOMY SLEEVE LAPAROSCOPIC           TONSILLECTOMY  1985    UPPER GASTROINTESTINAL ENDOSCOPY  1/30/15     Family History   Adopted: Yes   Plan  Patient had already been previously scheduled for left total knee arthroplasty so she has been through the class already is aware of all risk and benefits. She already has medical clearance we will try to get her scheduled soon as possible for total knee arthroplasty having failed conservative treatment      Provider Attestation:  Eunice Vogel, personally performed the services described in this documentation. All medical record entries made by the scribe were at my direction and in my presence. I have reviewed the chart and discharge instructions and agree that the records reflect my personal performance and is accurate and complete. Emily Pierre MD. 07/28/21      Please note that this chart was generated using voice recognition Dragon dictation software. Although every effort was made to ensure the accuracy of this automated transcription, some errors in transcription may have occurred.

## 2021-08-18 ENCOUNTER — HOSPITAL ENCOUNTER (OUTPATIENT)
Dept: PREADMISSION TESTING | Age: 56
Discharge: HOME | End: 2021-08-22
Payer: COMMERCIAL

## 2021-08-18 VITALS
DIASTOLIC BLOOD PRESSURE: 64 MMHG | RESPIRATION RATE: 18 BRPM | WEIGHT: 250 LBS | OXYGEN SATURATION: 98 % | SYSTOLIC BLOOD PRESSURE: 139 MMHG | HEIGHT: 63 IN | TEMPERATURE: 97.5 F | BODY MASS INDEX: 44.3 KG/M2 | HEART RATE: 73 BPM

## 2021-08-18 LAB
-: ABNORMAL
ABSOLUTE EOS #: 0.15 K/UL (ref 0–0.4)
ABSOLUTE IMMATURE GRANULOCYTE: ABNORMAL K/UL (ref 0–0.3)
ABSOLUTE LYMPH #: 2.04 K/UL (ref 1–4.8)
ABSOLUTE MONO #: 0.51 K/UL (ref 0.1–1.3)
AMORPHOUS: ABNORMAL
ANION GAP SERPL CALCULATED.3IONS-SCNC: 9 MMOL/L (ref 9–17)
BACTERIA: ABNORMAL
BASOPHILS # BLD: 1 % (ref 0–2)
BASOPHILS ABSOLUTE: 0.07 K/UL (ref 0–0.2)
BILIRUBIN URINE: ABNORMAL
BUN BLDV-MCNC: 13 MG/DL (ref 6–20)
BUN/CREAT BLD: ABNORMAL (ref 9–20)
CALCIUM SERPL-MCNC: 9 MG/DL (ref 8.6–10.4)
CASTS UA: ABNORMAL /LPF
CHLORIDE BLD-SCNC: 98 MMOL/L (ref 98–107)
CO2: 30 MMOL/L (ref 20–31)
COLOR: ABNORMAL
COMMENT UA: ABNORMAL
CREAT SERPL-MCNC: 0.78 MG/DL (ref 0.5–0.9)
CRYSTALS, UA: ABNORMAL /HPF
DIFFERENTIAL TYPE: ABNORMAL
EOSINOPHILS RELATIVE PERCENT: 2 % (ref 0–4)
EPITHELIAL CELLS UA: ABNORMAL /HPF
GFR AFRICAN AMERICAN: >60 ML/MIN
GFR NON-AFRICAN AMERICAN: >60 ML/MIN
GFR SERPL CREATININE-BSD FRML MDRD: ABNORMAL ML/MIN/{1.73_M2}
GFR SERPL CREATININE-BSD FRML MDRD: ABNORMAL ML/MIN/{1.73_M2}
GLUCOSE BLD-MCNC: 115 MG/DL (ref 70–99)
GLUCOSE URINE: ABNORMAL
HCT VFR BLD CALC: 41.7 % (ref 36–46)
HEMOGLOBIN: 13.4 G/DL (ref 12–16)
IMMATURE GRANULOCYTES: ABNORMAL %
KETONES, URINE: ABNORMAL
LEUKOCYTE ESTERASE, URINE: ABNORMAL
LYMPHOCYTES # BLD: 28 % (ref 24–44)
MCH RBC QN AUTO: 27.6 PG (ref 26–34)
MCHC RBC AUTO-ENTMCNC: 32.1 G/DL (ref 31–37)
MCV RBC AUTO: 86.1 FL (ref 80–100)
MONOCYTES # BLD: 7 % (ref 1–7)
MORPHOLOGY: ABNORMAL
MUCUS: ABNORMAL
NITRITE, URINE: NEGATIVE
NRBC AUTOMATED: ABNORMAL PER 100 WBC
OTHER OBSERVATIONS UA: ABNORMAL
PDW BLD-RTO: 28.5 % (ref 11.5–14.9)
PH UA: 6 (ref 5–8)
PLATELET # BLD: 264 K/UL (ref 150–450)
PLATELET ESTIMATE: ABNORMAL
PMV BLD AUTO: 7.6 FL (ref 6–12)
POTASSIUM SERPL-SCNC: 4.1 MMOL/L (ref 3.7–5.3)
PROTEIN UA: ABNORMAL
RBC # BLD: 4.85 M/UL (ref 4–5.2)
RBC # BLD: ABNORMAL 10*6/UL
RBC UA: ABNORMAL /HPF
RENAL EPITHELIAL, UA: ABNORMAL /HPF
SEG NEUTROPHILS: 62 % (ref 36–66)
SEGMENTED NEUTROPHILS ABSOLUTE COUNT: 4.53 K/UL (ref 1.3–9.1)
SODIUM BLD-SCNC: 137 MMOL/L (ref 135–144)
SPECIFIC GRAVITY UA: 1.04 (ref 1–1.03)
TRICHOMONAS: ABNORMAL
TURBIDITY: ABNORMAL
URINE HGB: NEGATIVE
UROBILINOGEN, URINE: NORMAL
WBC # BLD: 7.3 K/UL (ref 3.5–11)
WBC # BLD: ABNORMAL 10*3/UL
WBC UA: ABNORMAL /HPF
YEAST: ABNORMAL

## 2021-08-18 PROCEDURE — 36415 COLL VENOUS BLD VENIPUNCTURE: CPT

## 2021-08-18 PROCEDURE — 81001 URINALYSIS AUTO W/SCOPE: CPT

## 2021-08-18 PROCEDURE — 87186 SC STD MICRODIL/AGAR DIL: CPT

## 2021-08-18 PROCEDURE — 85025 COMPLETE CBC W/AUTO DIFF WBC: CPT

## 2021-08-18 PROCEDURE — 87641 MR-STAPH DNA AMP PROBE: CPT

## 2021-08-18 PROCEDURE — 87086 URINE CULTURE/COLONY COUNT: CPT

## 2021-08-18 PROCEDURE — 87088 URINE BACTERIA CULTURE: CPT

## 2021-08-18 PROCEDURE — 80048 BASIC METABOLIC PNL TOTAL CA: CPT

## 2021-08-18 RX ORDER — CARBAMAZEPINE 200 MG/1
200 TABLET ORAL 2 TIMES DAILY
COMMUNITY
End: 2021-12-21

## 2021-08-18 NOTE — H&P (VIEW-ONLY)
HISTORY and [facility] [id number]       NAME:  Travis Etienne  MRN: [id number]   YOB: 1965   Date: 8/18/2021   Age: 54 y.o. Gender: female       COMPLAINT AND PRESENT HISTORY:    Travis Etienne is 54 y.o.,  female, undergoing preadmission testing for Primary osteoarthritis of left knee,  Contusion of left lower leg    Patient will be having:   KNEE TOTAL ARTHROPLASTY-Left  Patient had injury to the left knee, she had a contusion in March 2021. Patient is s/p Right and left Knee Arthroscopy with Meniscectomy done in August and November, 2020 respectively  and also states that she had fluid removed from left knee    Patient C/o  pain , swelling, stiffness, popping and cracking in the left Knee. \" my knees are always icy cold\"  Pt describes the pain as 7/10 in intensity at times. Onset of symptoms started  1 year ago. \" I really never got much relief\"  Patient has been using pain patches, voltaren gel, motrin and icy compresses to help in pain relief. Pt states that prolonged standing or walking aggravates sxs    Patient reports that her knee does buckle and  give way under her as well as  Knee locks. patient reports that 2 weeks ago , she fell at home face down. No trauma. Pt states that she uses a cane or walker. No redness, more  swelling when been on them. Significant medical history:  Atrial fib- Xarelto,- hx couple conversion, HTN-coreg, lasix,  HLD, COPD-mdi   MS-dormant,  EPILESPY tegetrol,gabepentin last sz July 2020, - LOOP RECORDER IN PLACEMENT-for 3 yrs now. DM-metformin, MARTHA-uses CPAP, home oxygen -prn, hypoxia. Anticoagulants or blood thinners: xarelto and vitamins    No chest pain, palpitations or diaphoresis. No recent URI, SOB, fever or chills. Patient has hx of PONV. Patient had Labs , pt had a recent stress test taken and received cardiac clearance from Provider.      MRI OF THE LEFT KNEE WITHOUT CONTRAST, 10/1/2020 6:47 am  TECHNIQUE:  Multiplanar multisequence MRI of the left knee was performed without the  administration of intravenous contrast.     COMPARISON:  Radiographs 07/20/2020     HISTORY:  ORDERING SYSTEM PROVIDED HISTORY: Acute medial meniscus tear of left knee,  subsequent encounter  TECHNOLOGIST PROVIDED HISTORY:  Is the patient pregnant?->No  Reason for Exam: Injury from fall Aug. 2019  Acuity: Chronic  Type of Exam: Subsequent/Follow-up  Additional signs and symptoms: increased pain and swelling anterior  Relevant Medical/Surgical History: feels unstable at times     FINDINGS:  MENISCI: The posterior root attachment of the lateral meniscus appears  attenuated and poorly defined.  Suspected horizontal tear of the lateral  meniscus body.  No medial meniscus tear identified.     CRUCIATE LIGAMENTS: ACL and PCL are intact.     EXTENSOR MECHANISM: Patellar and distal quadriceps tendons are intact.     LATERAL COLLATERAL LIGAMENT COMPLEX: Iliotibial band, fibular collateral  ligament, and biceps femoris tendon are intact.     MEDIAL COLLATERAL LIGAMENT COMPLEX: MCL is intact.     KNEE JOINT: Small to moderate size knee joint effusion.  T2 intermediate  signal anterior to the ACL tibial attachment in the joint space could  represent synovial hypertrophy.  Tiny Norris's cyst.  Tricompartmental  osteoarthrosis is seen.  Full-thickness cartilage loss is seen at the medial  patellar facet extending to the median ridge.  High-grade partial-thickness  cartilage loss is seen at the medial trochlea extending to the central  trochlea.  Partial-thickness cartilage loss is seen at the femoral condyles.   Moderate cartilage thinning at the lateral tibial plateau.     BONE MARROW: No acute fracture or significant bone marrow edema. Ilgia Land is an  intraosseous ganglion at the tibial eminence.   Impression  No medial meniscus tear identified.  Suspected horizontal tear of the lateral  meniscus body.  The posterior root attachment of the lateral meniscus appears  attenuated and may be partially injured.     Cruciate and collateral ligaments are intact.     Tricompartmental osteoarthrosis with areas of cartilage loss as described  above.     Lab Results   Component Value Date    LABA1C 6.6 (H) 07/15/2021     Lab Results   Component Value Date     07/15/2021       PAST MEDICAL HISTORY     Past Medical History:   Diagnosis Date    Atrial fibrillation ([address]     Blurred vision     left eye     Breast cancer ([address]     bilateral remission since     C. difficile colitis         Colon cancer ([address]     stage IV remission since     COPD (chronic obstructive pulmonary disease) ([address]     COVID-2019 & Dec 2020    Depression     Diabetes mellitus ([address]     Diverticulosis     Dizziness     from sitting to standing    Fibromyalgia     GERD (gastroesophageal reflux disease)     History of loop recorder     Hyperlipidemia     Hypertension     Kidney stones     Multiple sclerosis ([address]     On home oxygen therapy     CPAP at night, home oxygen PRN    PONV (postoperative nausea and vomiting)     Rheumatoid arthritis ([address]     Seizure ([address] 7    last seizure 2020  first seizure  after head injury take gabapentin/tegretol    Sleep apnea     CPAP nightly    Unspecified cerebral artery occlusion with cerebral infarction     2008     Wears glasses          SURGICAL HISTORY       Past Surgical History:   Procedure Laterality Date    APPENDECTOMY  1985    BACK SURGERY      neck and back screws and cage    BREAST BIOPSY  [id number]    BREAST LUMPECTOMY      bilateral [id number]     CARDIAC SURGERY      cardiac cath negative in 2018    CARDIOVERSION      several times     SECTION      CHOLECYSTECTOMY  1985    COLONOSCOPY      ENDOSCOPY, COLON, DIAGNOSTIC      FRACTURE SURGERY      left wrist     HYSTERECTOMY  2010    INSERTABLE CARDIAC MONITOR      loop recorder    KNEE ARTHROSCOPY Right 2020    RIGHT KNEE ARTHROSCOPY WITH PARTIAL MEDIAL MENISCECTOMY WITH DEBRIDEMENT performed by Alverda Mohs, DO at [address] ARTHROSCOPY Left 2020    LEFT KNEE ARTHROSCOPY WITH PARTIAL LATERAL MENISCAL TEAR REPAIR AND [address] performed by Alverda Mohs, DO at [address] SIGMOIDECTOMY  2016    for cancer    SKIN BIOPSY      moles    SLEEVE GASTRECTOMY  5/19/15    GASTRECTOMY SLEEVE LAPAROSCOPIC           TONSILLECTOMY  1985    UPPER GASTROINTESTINAL ENDOSCOPY  1/30/15       FAMILY HISTORY       Family History   Adopted: Yes       SOCIAL HISTORY       Social History     Socioeconomic History    Marital status: Legally      Spouse name: None    Number of children: None    Years of education: None    Highest education level: None   Occupational History    Occupation: Disabled   Tobacco Use    Smoking status: Former Smoker     Packs/day: 1.00     Years: 20.00     Pack years: 20.00     Quit date: 2020     Years since quittin.1    Smokeless tobacco: Never Used   Vaping Use    Vaping Use: Never used   Substance and Sexual Activity    Alcohol use: Yes     Comment: daily    Drug use: No    Sexual activity: Yes     Partners: Male   Other Topics Concern    None   Social History Narrative    None     Social Determinants of Health     Financial Resource Strain:     Difficulty of Paying Living Expenses:    Food Insecurity:     Worried About Running Out of Food in the Last Year:     Ran Out of Food in the Last Year:    Transportation Needs:     Lack of Transportation (Medical):      Lack of Transportation (Non-Medical):    Physical Activity:     Days of Exercise per Week:     Minutes of Exercise per Session:    Stress:     Feeling of Stress :    Social Connections:     Frequency of Communication with Friends and Family:     Frequency of Social Gatherings with Friends and Family:     Attends [employer] Services:     Active Member of Clubs or Organizations:     Attends Club or Organization Meetings:     Marital Status:    Intimate Partner Violence:     Fear of Current or Ex-Partner:     Emotionally Abused:     Physically Abused:     Sexually Abused:            REVIEW OF SYSTEMS      Allergies   Allergen Reactions    Ace Inhibitors Anaphylaxis, Other (See Comments), Hives and Shortness Of Breath     ANGIOEDEMA  lisinopril  angiodema  ANGIOEDEMA  ANGIOEDEMA      Betadine Swab Aid [Povidone-Iodine] Hives and Itching    Betadine [Povidone Iodine] Hives    Iodine Anaphylaxis, Hives and Shortness Of Breath     INCLUDES BETADINE  Iv dye. Pt has not had any reaction w/ dye from CT scans for 10 years. Per dr. Rusty Benitez contrast (omni 300) is fine to use. 8/14/19 - lilibeth      Lurasidone Other (See Comments)    Lurasidone Hcl      Other reaction(s): Unknown    Shellfish Allergy Anaphylaxis    Shellfish-Derived Products Anaphylaxis    Iodides      Pre treat with benadryl    Lamotrigine Hives    Macrobid [Nitrofurantoin Macrocrystal] Hives    Pyridium [Phenazopyridine Hcl] Hives    Topiramate Other (See Comments)     Confusion        Current Outpatient Medications on File Prior to Encounter   Medication Sig Dispense Refill    carBAMazepine (TEGRETOL) 200 MG tablet Take 200 mg by mouth 2 times daily      brexpiprazole (REXULTI) 0.5 MG TABS tablet Take 0.5 mg by mouth daily      Multiple Vitamins-Minerals (MULTI COMPLETE PO) Take by mouth 2 times daily       potassium chloride (KLOR-CON) 20 MEQ packet Take 20 mEq by mouth 2 times daily      ferrous sulfate (IRON 325) 325 (65 Fe) MG tablet Take 325 mg by mouth 2 times daily      Diclofenac Sodium 3 % GEL Apply topically as needed      methylphenidate (RITALIN) 20 MG tablet Take 20 mg by mouth 2 times daily.       albuterol sulfate HFA (VENTOLIN HFA) 108 (90 Base) MCG/ACT inhaler Inhale 2 puffs into the lungs every 6 hours as needed for Wheezing      budesonide-formoterol (SYMBICORT) 160-4.5 MCG/ACT AERO INHALE 2 PUFFS BID UTD      methocarbamol (ROBAXIN) 750 MG tablet Take 1,500 mg by mouth 3 times daily      gabapentin (NEURONTIN) 600 MG tablet Take 1 tablet by mouth 3 times daily for 30 days. (Patient taking differently: Take 1,200 mg by mouth 3 times daily. ) 90 tablet 0    DULoxetine (CYMBALTA) 60 MG extended release capsule Take 1 capsule by mouth 2 times daily 60 capsule 0    traZODone (DESYREL) 50 MG tablet Take 1 tablet by mouth nightly as needed for Sleep 30 tablet 0    metFORMIN (GLUCOPHAGE) 500 MG tablet Take 1 tablet by mouth 2 times daily (with meals) 60 tablet 0    rivaroxaban (XARELTO) 20 MG TABS tablet Take 1 tablet by mouth Daily with supper 30 tablet 0    carvedilol (COREG) 12.5 MG tablet Take 1 tablet by mouth 2 times daily (with meals) 60 tablet 0    furosemide (LASIX) 40 MG tablet Take 1 tablet by mouth daily 30 tablet 0    magnesium oxide (MAG-OX) 400 (241.3 Mg) MG TABS tablet Take 1 tablet by mouth daily 30 tablet 0    esomeprazole (NEXIUM) 40 MG delayed release capsule Take 1 capsule by mouth every morning (before breakfast) 30 capsule 0    ondansetron (ZOFRAN-ODT) 4 MG disintegrating tablet Take 4 mg by mouth every 8 hours as needed for Nausea or Vomiting      cetirizine (ZYRTEC) 10 MG tablet Take 10 mg by mouth daily      SUPER B COMPLEX/C PO Take 1 tablet by mouth 2 times daily       loperamide (IMODIUM) 2 MG capsule Take 2 mg by mouth 4 times daily as needed for Diarrhea Takes two capsules at onset      lidocaine (LIDODERM) 5 % Place 1 patch onto the skin daily as needed 12 hours on, 12 hours off.  fluticasone (FLONASE) 50 MCG/ACT nasal spray 1 spray by Each Nostril route daily       No current facility-administered medications on file prior to encounter. General health:  Fairly good. No fever or chills. Skin:  No itching, redness or rash. HEENT:  No headache, epistaxis or sore throat. Neck:  No pain, stiffness or masses. Cardiovascular/Respiratory system:  No chest pain, palpitation or shortness of breath. Gastrointestinal tract: No abdominal pain, Dysphagia, nausea, vomiting, diarrhea or constipation. Genitourinary:  No burning on micturition. No hesitancy, urgency, frequency or discoloration of urine. Locomotor:  See HPI. Neuropsychiatric:  No referable complaints. GENERAL PHYSICAL EXAM:     Vitals: /64   Pulse 73   Temp 97.5 °F (36.4 °C)   Resp 18   Ht 5' 3\" (1.6 m)   Wt 250 lb (113.4 kg)   SpO2 98%   BMI 44.29 kg/m²  Body mass index is 44.29 kg/m². GENERAL APPEARANCE:   Tamela Mathias is 54 y.o.,  female, severely obese, nourished, conscious, alert. Does not appear to be distress or pain at this time. SKIN:  Warm, dry, no cyanosis or jaundice. HEAD:  Normocephalic, atraumatic, no swelling or tenderness. EYES:  Pupils equal, reactive to light. EARS:  No discharge, no marked hearing loss. NOSE:  No rhinorrhea, epistaxis or septal deformity. THROAT:  Not congested. No ulceration bleeding or discharge. NECK:  No stiffness, trachea central.                  CHEST:  Symmetrical and equal on expansion. HEART:  RRR S1 > S2. No audible murmurs or gallops. LUNGS:  Equal on expansion, normal breath sounds. No adventitious sounds. ABDOMEN:  Severely obese. Soft on palpation. No localized tenderness. No guarding or rigidity. LYMPHATICS:  No palpable cervical lymphadenopathy. LOCOMOTOR, BACK AND SPINE:  No tenderness or deformities. EXTREMITIES:  Symmetrical, no pretibial edema. No calf tenderness. No discoloration or ulcerations.  Tenderness on palpation of the left Knee joint space NEUROLOGIC:  The patient is conscious, alert, oriented,Cranial nerve II-XII intact, taste and smell were not examined. No apparent focal sensory or motor deficits.                                                                                      PROVISIONAL DIAGNOSES / SURGERY:      KNEE TOTAL ARTHROPLASTY Left     Primary osteoarthritis of left knee     Contusion of left lower leg    Patient Active Problem List    Diagnosis Date Noted    S/P arthroscopic partial medial meniscectomy     Postoperative hypoxia 08/14/2020    Multiple sclerosis [facility]     Rheumatoid arthritis ([facility]     Diabetes mellitus ([facility]     Severe episode of recurrent major depressive disorder, without psychotic features ([facility] 06/03/2020    Chronic pain syndrome     PAF (paroxysmal atrial fibrillation) ([facility] 01/21/2020    Hypokalemia 01/21/2020    MDD (major depressive disorder), severe ([facility] 01/20/2020    Bilateral leg numbness 01/15/2019    Leg numbness 01/14/2019    Morbid obesity with BMI of 50.0-59.9, adult ([address] 02/09/2015    Gastroparesis 02/09/2015    Hypertension 06/04/2014    Hyperlipidemia 06/04/2014    Urinary incontinence 06/04/2014    Diabetes ([facility] 06/04/2014    Arrhythmia 06/04/2014    Arthritis 06/04/2014    Fibromyalgia 06/04/2014    Sleep apnea 06/04/2014    Stroke ([facility] 06/04/2014    Migraine 06/04/2014    Seizure ([address] 06/04/2014    Back pain, chronic 06/04/2014    Gout 06/04/2014           DAVIDA GAMBOA, KANG - CNP on 8/18/2021 at 1:50 PM

## 2021-08-18 NOTE — H&P
HISTORY and Tre[facility] EL[facility] [id number]       NAME:  Sirisha Mendez  MRN: [id number]   YOB: 1965   Date: 8/18/2021   Age: 54 y.o. Gender: female       COMPLAINT AND PRESENT HISTORY:    Sirisha Mendez is 54 y.o.,  female, undergoing preadmission testing for Primary osteoarthritis of left knee,  Contusion of left lower leg    Patient will be having:   KNEE TOTAL ARTHROPLASTY-Left  Patient had injury to the left knee, she had a contusion in March 2021. Patient is s/p Right and left Knee Arthroscopy with Meniscectomy done in August and November, 2020 respectively  and also states that she had fluid removed from left knee    Patient C/o  pain , swelling, stiffness, popping and cracking in the left Knee. \" my knees are always icy cold\"  Pt describes the pain as 7/10 in intensity at times. Onset of symptoms started  1 year ago. \" I really never got much relief\"  Patient has been using pain patches, voltaren gel, motrin and icy compresses to help in pain relief. Pt states that prolonged standing or walking aggravates sxs    Patient reports that her knee does buckle and  give way under her as well as  Knee locks. patient reports that 2 weeks ago , she fell at home face down. No trauma. Pt states that she uses a cane or walker. No redness, more  swelling when been on them. Significant medical history:  Atrial fib- Xarelto,- hx couple conversion, HTN-coreg, lasix,  HLD, COPD-mdi   MS-dormant,  EPILESPY tegetrol,gabepentin last sz July 2020, - LOOP RECORDER IN PLACEMENT-for 3 yrs now. DM-metformin, MARTHA-uses CPAP, home oxygen -prn, hypoxia. Anticoagulants or blood thinners: xarelto and vitamins    No chest pain, palpitations or diaphoresis. No recent URI, SOB, fever or chills. Patient has hx of PONV. Patient had Labs , pt had a recent stress test taken and received cardiac clearance from Provider.      MRI OF THE LEFT KNEE WITHOUT CONTRAST, 10/1/2020 6:47 lateral meniscus appears  attenuated and may be partially injured.     Cruciate and collateral ligaments are intact.     Tricompartmental osteoarthrosis with areas of cartilage loss as described  above.     Lab Results   Component Value Date    LABA1C 6.6 (H) 07/15/2021     Lab Results   Component Value Date     07/15/2021       PAST MEDICAL HISTORY     Past Medical History:   Diagnosis Date    Atrial fibrillation ([address]     Blurred vision     left eye     Breast cancer ([address]     bilateral remission since     C. difficile colitis         Colon cancer ([address]     stage IV remission since     COPD (chronic obstructive pulmonary disease) ([address]     COVID-2019 & Dec 2020    Depression     Diabetes mellitus ([address]     Diverticulosis     Dizziness     from sitting to standing    Fibromyalgia     GERD (gastroesophageal reflux disease)     History of loop recorder     Hyperlipidemia     Hypertension     Kidney stones     Multiple sclerosis ([address]     On home oxygen therapy     CPAP at night, home oxygen PRN    PONV (postoperative nausea and vomiting)     Rheumatoid arthritis ([address]     Seizure ([facility] 7    last seizure 2020  first seizure  after head injury take gabapentin/tegretol    Sleep apnea     CPAP nightly    Unspecified cerebral artery occlusion with cerebral infarction     2008     Wears glasses          SURGICAL HISTORY       Past Surgical History:   Procedure Laterality Date    APPENDECTOMY  1985    BACK SURGERY      neck and back screws and cage    BREAST BIOPSY  6040-6451    BREAST LUMPECTOMY      bilateral [id number]     CARDIAC SURGERY      cardiac cath negative in 2018    CARDIOVERSION      several times     SECTION      CHOLECYSTECTOMY  1985    COLONOSCOPY      ENDOSCOPY, COLON, DIAGNOSTIC      FRACTURE SURGERY      left wrist     HYSTERECTOMY  2010    INSERTABLE CARDIAC MONITOR      loop recorder    KNEE ARTHROSCOPY Right 2020    RIGHT KNEE ARTHROSCOPY WITH PARTIAL MEDIAL MENISCECTOMY WITH DEBRIDEMENT performed by Dell Platt DO at [address] ARTHROSCOPY Left 2020    LEFT KNEE ARTHROSCOPY WITH PARTIAL LATERAL MENISCAL TEAR REPAIR AND [address] performed by Dell Platt DO at [address] SIGMOIDECTOMY  2016    for cancer    SKIN BIOPSY      moles    SLEEVE GASTRECTOMY  5/19/15    GASTRECTOMY SLEEVE LAPAROSCOPIC           TONSILLECTOMY  1985    UPPER GASTROINTESTINAL ENDOSCOPY  1/30/15       FAMILY HISTORY       Family History   Adopted: Yes       SOCIAL HISTORY       Social History     Socioeconomic History    Marital status: Legally      Spouse name: None    Number of children: None    Years of education: None    Highest education level: None   Occupational History    Occupation: Disabled   Tobacco Use    Smoking status: Former Smoker     Packs/day: 1.00     Years: 20.00     Pack years: 20.00     Quit date: 2020     Years since quittin.1    Smokeless tobacco: Never Used   Vaping Use    Vaping Use: Never used   Substance and Sexual Activity    Alcohol use: Yes     Comment: daily    Drug use: No    Sexual activity: Yes     Partners: Male   Other Topics Concern    None   Social History Narrative    None     Social Determinants of Health     Financial Resource Strain:     Difficulty of Paying Living Expenses:    Food Insecurity:     Worried About Running Out of Food in the Last Year:     Ran Out of Food in the Last Year:    Transportation Needs:     Lack of Transportation (Medical):      Lack of Transportation (Non-Medical):    Physical Activity:     Days of Exercise per Week:     Minutes of Exercise per Session:    Stress:     Feeling of Stress :    Social Connections:     Frequency of Communication with Friends and Family:     Frequency of Social Gatherings with Friends and Family:     Attends Restorationist Services:     Active Member of Clubs or Organizations:     Attends Club or Organization Meetings:     Marital Status:    Intimate Partner Violence:     Fear of Current or Ex-Partner:     Emotionally Abused:     Physically Abused:     Sexually Abused:            REVIEW OF SYSTEMS      Allergies   Allergen Reactions    Ace Inhibitors Anaphylaxis, Other (See Comments), Hives and Shortness Of Breath     ANGIOEDEMA  lisinopril  angiodema  ANGIOEDEMA  ANGIOEDEMA      Betadine Swab Aid [Povidone-Iodine] Hives and Itching    Betadine [Povidone Iodine] Hives    Iodine Anaphylaxis, Hives and Shortness Of Breath     INCLUDES BETADINE  Iv dye. Pt has not had any reaction w/ dye from CT scans for 10 years. Per dr. Ernestina Kidd contrast (omni 300) is fine to use. 8/14/19 - lilibeth      Lurasidone Other (See Comments)    Lurasidone Hcl      Other reaction(s): Unknown    Shellfish Allergy Anaphylaxis    Shellfish-Derived Products Anaphylaxis    Iodides      Pre treat with benadryl    Lamotrigine Hives    Macrobid [Nitrofurantoin Macrocrystal] Hives    Pyridium [Phenazopyridine Hcl] Hives    Topiramate Other (See Comments)     Confusion        Current Outpatient Medications on File Prior to Encounter   Medication Sig Dispense Refill    carBAMazepine (TEGRETOL) 200 MG tablet Take 200 mg by mouth 2 times daily      brexpiprazole (REXULTI) 0.5 MG TABS tablet Take 0.5 mg by mouth daily      Multiple Vitamins-Minerals (MULTI COMPLETE PO) Take by mouth 2 times daily       potassium chloride (KLOR-CON) 20 MEQ packet Take 20 mEq by mouth 2 times daily      ferrous sulfate (IRON 325) 325 (65 Fe) MG tablet Take 325 mg by mouth 2 times daily      Diclofenac Sodium 3 % GEL Apply topically as needed      methylphenidate (RITALIN) 20 MG tablet Take 20 mg by mouth 2 times daily.       albuterol sulfate HFA (VENTOLIN HFA) 108 (90 Base) MCG/ACT inhaler Inhale 2 puffs into the lungs every 6 hours as needed for Wheezing      budesonide-formoterol (SYMBICORT) 160-4.5 MCG/ACT AERO INHALE 2 PUFFS BID UTD      methocarbamol (ROBAXIN) 750 MG tablet Take 1,500 mg by mouth 3 times daily      gabapentin (NEURONTIN) 600 MG tablet Take 1 tablet by mouth 3 times daily for 30 days. (Patient taking differently: Take 1,200 mg by mouth 3 times daily. ) 90 tablet 0    DULoxetine (CYMBALTA) 60 MG extended release capsule Take 1 capsule by mouth 2 times daily 60 capsule 0    traZODone (DESYREL) 50 MG tablet Take 1 tablet by mouth nightly as needed for Sleep 30 tablet 0    metFORMIN (GLUCOPHAGE) 500 MG tablet Take 1 tablet by mouth 2 times daily (with meals) 60 tablet 0    rivaroxaban (XARELTO) 20 MG TABS tablet Take 1 tablet by mouth Daily with supper 30 tablet 0    carvedilol (COREG) 12.5 MG tablet Take 1 tablet by mouth 2 times daily (with meals) 60 tablet 0    furosemide (LASIX) 40 MG tablet Take 1 tablet by mouth daily 30 tablet 0    magnesium oxide (MAG-OX) 400 (241.3 Mg) MG TABS tablet Take 1 tablet by mouth daily 30 tablet 0    esomeprazole (NEXIUM) 40 MG delayed release capsule Take 1 capsule by mouth every morning (before breakfast) 30 capsule 0    ondansetron (ZOFRAN-ODT) 4 MG disintegrating tablet Take 4 mg by mouth every 8 hours as needed for Nausea or Vomiting      cetirizine (ZYRTEC) 10 MG tablet Take 10 mg by mouth daily      SUPER B COMPLEX/C PO Take 1 tablet by mouth 2 times daily       loperamide (IMODIUM) 2 MG capsule Take 2 mg by mouth 4 times daily as needed for Diarrhea Takes two capsules at onset      lidocaine (LIDODERM) 5 % Place 1 patch onto the skin daily as needed 12 hours on, 12 hours off.  fluticasone (FLONASE) 50 MCG/ACT nasal spray 1 spray by Each Nostril route daily       No current facility-administered medications on file prior to encounter. General health:  Fairly good. No fever or chills. Skin:  No itching, redness or rash. HEENT:  No headache, epistaxis or sore throat. Neck:  No pain, stiffness or masses. Cardiovascular/Respiratory system:  No chest pain, palpitation or shortness of breath. Gastrointestinal tract: No abdominal pain, Dysphagia, nausea, vomiting, diarrhea or constipation. Genitourinary:  No burning on micturition. No hesitancy, urgency, frequency or discoloration of urine. Locomotor:  See HPI. Neuropsychiatric:  No referable complaints. GENERAL PHYSICAL EXAM:     Vitals: /64   Pulse 73   Temp 97.5 °F (36.4 °C)   Resp 18   Ht 5' 3\" (1.6 m)   Wt 250 lb (113.4 kg)   SpO2 98%   BMI 44.29 kg/m²  Body mass index is 44.29 kg/m². GENERAL APPEARANCE:   Belem Duvall is 54 y.o.,  female, severely obese, nourished, conscious, alert. Does not appear to be distress or pain at this time. SKIN:  Warm, dry, no cyanosis or jaundice. HEAD:  Normocephalic, atraumatic, no swelling or tenderness. EYES:  Pupils equal, reactive to light. EARS:  No discharge, no marked hearing loss. NOSE:  No rhinorrhea, epistaxis or septal deformity. THROAT:  Not congested. No ulceration bleeding or discharge. NECK:  No stiffness, trachea central.                  CHEST:  Symmetrical and equal on expansion. HEART:  RRR S1 > S2. No audible murmurs or gallops. LUNGS:  Equal on expansion, normal breath sounds. No adventitious sounds. ABDOMEN:  Severely obese. Soft on palpation. No localized tenderness. No guarding or rigidity. LYMPHATICS:  No palpable cervical lymphadenopathy. LOCOMOTOR, BACK AND SPINE:  No tenderness or deformities. EXTREMITIES:  Symmetrical, no pretibial edema. No calf tenderness. No discoloration or ulcerations.  Tenderness on palpation of the left Knee joint space NEUROLOGIC:  The patient is conscious, alert, oriented,Cranial nerve II-XII intact, taste and smell were not examined. No apparent focal sensory or motor deficits.                                                                                      PROVISIONAL DIAGNOSES / SURGERY:      KNEE TOTAL ARTHROPLASTY Left     Primary osteoarthritis of left knee     Contusion of left lower leg    Patient Active Problem List    Diagnosis Date Noted    S/P arthroscopic partial medial meniscectomy     Postoperative hypoxia 08/14/2020    Multiple sclerosis [facility]     Rheumatoid arthritis ([facility]     Diabetes mellitus ([facility]     Severe episode of recurrent major depressive disorder, without psychotic features ([facility] 06/03/2020    Chronic pain syndrome     PAF (paroxysmal atrial fibrillation) ([facility] 01/21/2020    Hypokalemia 01/21/2020    MDD (major depressive disorder), severe ([facility] 01/20/2020    Bilateral leg numbness 01/15/2019    Leg numbness 01/14/2019    Morbid obesity with BMI of 50.0-59.9, adult ([facility] 02/09/2015    Gastroparesis 02/09/2015    Hypertension 06/04/2014    Hyperlipidemia 06/04/2014    Urinary incontinence 06/04/2014    Diabetes ([facility] 06/04/2014    Arrhythmia 06/04/2014    Arthritis 06/04/2014    Fibromyalgia 06/04/2014    Sleep apnea 06/04/2014    Stroke ([facility] 06/04/2014    Migraine 06/04/2014    Seizure ([address] 06/04/2014    Back pain, chronic 06/04/2014    Gout 06/04/2014           DAVIDA GAMBOA, KANG - CNP on 8/18/2021 at 1:50 PM

## 2021-08-19 ENCOUNTER — TELEPHONE (OUTPATIENT)
Dept: ORTHOPEDIC SURGERY | Age: 56
End: 2021-08-19

## 2021-08-19 LAB
MRSA, DNA, NASAL: NORMAL
SPECIMEN DESCRIPTION: NORMAL

## 2021-08-19 NOTE — TELEPHONE ENCOUNTER
----- Message from Rory Simental MD sent at 8/19/2021 10:48 AM EDT -----  UTI, need sensitivity results to treat

## 2021-08-20 DIAGNOSIS — M17.0 PRIMARY OSTEOARTHRITIS OF BOTH KNEES: Primary | ICD-10-CM

## 2021-08-20 LAB
CULTURE: ABNORMAL
Lab: ABNORMAL
SPECIMEN DESCRIPTION: ABNORMAL

## 2021-08-20 RX ORDER — SULFAMETHOXAZOLE AND TRIMETHOPRIM 800; 160 MG/1; MG/1
1 TABLET ORAL 2 TIMES DAILY
Qty: 20 TABLET | Refills: 0 | Status: SHIPPED | OUTPATIENT
Start: 2021-08-20 | End: 2021-08-30

## 2021-08-30 ENCOUNTER — ANESTHESIA EVENT (OUTPATIENT)
Dept: OPERATING ROOM | Age: 56
End: 2021-08-30
Payer: COMMERCIAL

## 2021-08-31 ENCOUNTER — ANESTHESIA (OUTPATIENT)
Dept: OPERATING ROOM | Age: 56
End: 2021-08-31
Payer: COMMERCIAL

## 2021-08-31 ENCOUNTER — HOSPITAL ENCOUNTER (OUTPATIENT)
Age: 56
Discharge: HOME HEALTH | End: 2021-08-31
Attending: ORTHOPAEDIC SURGERY | Admitting: ORTHOPAEDIC SURGERY
Payer: COMMERCIAL

## 2021-08-31 ENCOUNTER — APPOINTMENT (OUTPATIENT)
Dept: GENERAL RADIOLOGY | Age: 56
End: 2021-08-31
Attending: ORTHOPAEDIC SURGERY
Payer: COMMERCIAL

## 2021-08-31 VITALS
TEMPERATURE: 98.1 F | WEIGHT: 250 LBS | HEART RATE: 101 BPM | DIASTOLIC BLOOD PRESSURE: 78 MMHG | OXYGEN SATURATION: 91 % | RESPIRATION RATE: 16 BRPM | SYSTOLIC BLOOD PRESSURE: 156 MMHG | HEIGHT: 63 IN | BODY MASS INDEX: 44.3 KG/M2

## 2021-08-31 VITALS — TEMPERATURE: 98.8 F | DIASTOLIC BLOOD PRESSURE: 85 MMHG | OXYGEN SATURATION: 95 % | SYSTOLIC BLOOD PRESSURE: 149 MMHG

## 2021-08-31 DIAGNOSIS — M17.12 ARTHRITIS OF LEFT KNEE: Primary | ICD-10-CM

## 2021-08-31 LAB
GLUCOSE BLD-MCNC: 189 MG/DL (ref 65–105)
INR BLD: 0.9
PROTHROMBIN TIME: 12.3 SEC (ref 11.8–14.6)

## 2021-08-31 PROCEDURE — 73560 X-RAY EXAM OF KNEE 1 OR 2: CPT

## 2021-08-31 PROCEDURE — 36415 COLL VENOUS BLD VENIPUNCTURE: CPT

## 2021-08-31 PROCEDURE — 6360000002 HC RX W HCPCS: Performed by: ORTHOPAEDIC SURGERY

## 2021-08-31 PROCEDURE — 2500000003 HC RX 250 WO HCPCS: Performed by: NURSE ANESTHETIST, CERTIFIED REGISTERED

## 2021-08-31 PROCEDURE — 3600000013 HC SURGERY LEVEL 3 ADDTL 15MIN: Performed by: ORTHOPAEDIC SURGERY

## 2021-08-31 PROCEDURE — 2500000003 HC RX 250 WO HCPCS: Performed by: ORTHOPAEDIC SURGERY

## 2021-08-31 PROCEDURE — 6360000002 HC RX W HCPCS: Performed by: ANESTHESIOLOGY

## 2021-08-31 PROCEDURE — 6370000000 HC RX 637 (ALT 250 FOR IP): Performed by: ORTHOPAEDIC SURGERY

## 2021-08-31 PROCEDURE — 2500000003 HC RX 250 WO HCPCS: Performed by: ANESTHESIOLOGY

## 2021-08-31 PROCEDURE — C1776 JOINT DEVICE (IMPLANTABLE): HCPCS | Performed by: ORTHOPAEDIC SURGERY

## 2021-08-31 PROCEDURE — 6360000002 HC RX W HCPCS: Performed by: NURSE ANESTHETIST, CERTIFIED REGISTERED

## 2021-08-31 PROCEDURE — C9290 INJ, BUPIVACAINE LIPOSOME: HCPCS | Performed by: ANESTHESIOLOGY

## 2021-08-31 PROCEDURE — 97116 GAIT TRAINING THERAPY: CPT

## 2021-08-31 PROCEDURE — 7100000000 HC PACU RECOVERY - FIRST 15 MIN: Performed by: ORTHOPAEDIC SURGERY

## 2021-08-31 PROCEDURE — 27447 TOTAL KNEE ARTHROPLASTY: CPT | Performed by: ORTHOPAEDIC SURGERY

## 2021-08-31 PROCEDURE — 2720000010 HC SURG SUPPLY STERILE: Performed by: ORTHOPAEDIC SURGERY

## 2021-08-31 PROCEDURE — 2580000003 HC RX 258: Performed by: ORTHOPAEDIC SURGERY

## 2021-08-31 PROCEDURE — 7100000001 HC PACU RECOVERY - ADDTL 15 MIN: Performed by: ORTHOPAEDIC SURGERY

## 2021-08-31 PROCEDURE — 2709999900 HC NON-CHARGEABLE SUPPLY: Performed by: ORTHOPAEDIC SURGERY

## 2021-08-31 PROCEDURE — C1713 ANCHOR/SCREW BN/BN,TIS/BN: HCPCS | Performed by: ORTHOPAEDIC SURGERY

## 2021-08-31 PROCEDURE — 97530 THERAPEUTIC ACTIVITIES: CPT

## 2021-08-31 PROCEDURE — 85610 PROTHROMBIN TIME: CPT

## 2021-08-31 PROCEDURE — 3600000003 HC SURGERY LEVEL 3 BASE: Performed by: ORTHOPAEDIC SURGERY

## 2021-08-31 PROCEDURE — 97161 PT EVAL LOW COMPLEX 20 MIN: CPT

## 2021-08-31 PROCEDURE — 97165 OT EVAL LOW COMPLEX 30 MIN: CPT

## 2021-08-31 PROCEDURE — 3700000000 HC ANESTHESIA ATTENDED CARE: Performed by: ORTHOPAEDIC SURGERY

## 2021-08-31 PROCEDURE — 97535 SELF CARE MNGMENT TRAINING: CPT

## 2021-08-31 PROCEDURE — 64447 NJX AA&/STRD FEMORAL NRV IMG: CPT | Performed by: ANESTHESIOLOGY

## 2021-08-31 PROCEDURE — 2580000003 HC RX 258: Performed by: ANESTHESIOLOGY

## 2021-08-31 PROCEDURE — 82947 ASSAY GLUCOSE BLOOD QUANT: CPT

## 2021-08-31 PROCEDURE — 3700000001 HC ADD 15 MINUTES (ANESTHESIA): Performed by: ORTHOPAEDIC SURGERY

## 2021-08-31 DEVICE — TRAY TIB L67MM KNEE CO CHROM FIN MOD INTLOK VANGUARD: Type: IMPLANTABLE DEVICE | Site: KNEE | Status: FUNCTIONAL

## 2021-08-31 DEVICE — IMPLANTABLE DEVICE: Type: IMPLANTABLE DEVICE | Site: KNEE | Status: FUNCTIONAL

## 2021-08-31 DEVICE — CEMENT BNE 40GM HI VISC RADPQ FOR REV SURG: Type: IMPLANTABLE DEVICE | Site: KNEE | Status: FUNCTIONAL

## 2021-08-31 DEVICE — COMPONENT PAT DIA31MM THK6.2MM THN KNEE TI ALLY S STL: Type: IMPLANTABLE DEVICE | Site: KNEE | Status: FUNCTIONAL

## 2021-08-31 RX ORDER — ACETAMINOPHEN 325 MG/1
650 TABLET ORAL EVERY 6 HOURS
Status: DISCONTINUED | OUTPATIENT
Start: 2021-08-31 | End: 2021-08-31 | Stop reason: HOSPADM

## 2021-08-31 RX ORDER — ROCURONIUM BROMIDE 10 MG/ML
INJECTION, SOLUTION INTRAVENOUS PRN
Status: DISCONTINUED | OUTPATIENT
Start: 2021-08-31 | End: 2021-08-31 | Stop reason: SDUPTHER

## 2021-08-31 RX ORDER — HYDROCODONE BITARTRATE AND ACETAMINOPHEN 5; 325 MG/1; MG/1
2 TABLET ORAL PRN
Status: DISCONTINUED | OUTPATIENT
Start: 2021-08-31 | End: 2021-08-31 | Stop reason: HOSPADM

## 2021-08-31 RX ORDER — SODIUM CHLORIDE 0.9 % (FLUSH) 0.9 %
10 SYRINGE (ML) INJECTION PRN
Status: DISCONTINUED | OUTPATIENT
Start: 2021-08-31 | End: 2021-08-31 | Stop reason: HOSPADM

## 2021-08-31 RX ORDER — ONDANSETRON 2 MG/ML
4 INJECTION INTRAMUSCULAR; INTRAVENOUS
Status: COMPLETED | OUTPATIENT
Start: 2021-08-31 | End: 2021-08-31

## 2021-08-31 RX ORDER — FENTANYL CITRATE 50 UG/ML
25 INJECTION, SOLUTION INTRAMUSCULAR; INTRAVENOUS EVERY 5 MIN PRN
Status: DISCONTINUED | OUTPATIENT
Start: 2021-08-31 | End: 2021-08-31 | Stop reason: HOSPADM

## 2021-08-31 RX ORDER — OXYCODONE HYDROCHLORIDE 5 MG/1
5 TABLET ORAL EVERY 4 HOURS PRN
Status: DISCONTINUED | OUTPATIENT
Start: 2021-08-31 | End: 2021-08-31 | Stop reason: HOSPADM

## 2021-08-31 RX ORDER — OXYCODONE HYDROCHLORIDE AND ACETAMINOPHEN 5; 325 MG/1; MG/1
1-2 TABLET ORAL EVERY 4 HOURS PRN
Qty: 42 TABLET | Refills: 0 | Status: SHIPPED | OUTPATIENT
Start: 2021-08-31 | End: 2021-09-07

## 2021-08-31 RX ORDER — SODIUM CHLORIDE, SODIUM LACTATE, POTASSIUM CHLORIDE, CALCIUM CHLORIDE 600; 310; 30; 20 MG/100ML; MG/100ML; MG/100ML; MG/100ML
INJECTION, SOLUTION INTRAVENOUS CONTINUOUS
Status: DISCONTINUED | OUTPATIENT
Start: 2021-08-31 | End: 2021-08-31 | Stop reason: HOSPADM

## 2021-08-31 RX ORDER — GABAPENTIN 600 MG/1
800 TABLET ORAL ONCE
Status: DISCONTINUED | OUTPATIENT
Start: 2021-08-31 | End: 2021-08-31

## 2021-08-31 RX ORDER — MORPHINE SULFATE 2 MG/ML
2 INJECTION, SOLUTION INTRAMUSCULAR; INTRAVENOUS EVERY 5 MIN PRN
Status: DISCONTINUED | OUTPATIENT
Start: 2021-08-31 | End: 2021-08-31 | Stop reason: HOSPADM

## 2021-08-31 RX ORDER — METOCLOPRAMIDE HYDROCHLORIDE 5 MG/ML
10 INJECTION INTRAMUSCULAR; INTRAVENOUS
Status: DISCONTINUED | OUTPATIENT
Start: 2021-08-31 | End: 2021-08-31 | Stop reason: HOSPADM

## 2021-08-31 RX ORDER — DEXAMETHASONE SODIUM PHOSPHATE 4 MG/ML
INJECTION, SOLUTION INTRA-ARTICULAR; INTRALESIONAL; INTRAMUSCULAR; INTRAVENOUS; SOFT TISSUE PRN
Status: DISCONTINUED | OUTPATIENT
Start: 2021-08-31 | End: 2021-08-31 | Stop reason: SDUPTHER

## 2021-08-31 RX ORDER — SODIUM CHLORIDE 9 MG/ML
25 INJECTION, SOLUTION INTRAVENOUS PRN
Status: DISCONTINUED | OUTPATIENT
Start: 2021-08-31 | End: 2021-08-31 | Stop reason: HOSPADM

## 2021-08-31 RX ORDER — ASPIRIN 81 MG/1
81 TABLET ORAL 2 TIMES DAILY
Status: CANCELLED | OUTPATIENT
Start: 2021-08-31

## 2021-08-31 RX ORDER — FENTANYL CITRATE 50 UG/ML
INJECTION, SOLUTION INTRAMUSCULAR; INTRAVENOUS PRN
Status: DISCONTINUED | OUTPATIENT
Start: 2021-08-31 | End: 2021-08-31 | Stop reason: SDUPTHER

## 2021-08-31 RX ORDER — SODIUM CHLORIDE 9 MG/ML
INJECTION, SOLUTION INTRAVENOUS CONTINUOUS
Status: DISCONTINUED | OUTPATIENT
Start: 2021-08-31 | End: 2021-08-31

## 2021-08-31 RX ORDER — MIDAZOLAM HYDROCHLORIDE 1 MG/ML
INJECTION INTRAMUSCULAR; INTRAVENOUS PRN
Status: DISCONTINUED | OUTPATIENT
Start: 2021-08-31 | End: 2021-08-31 | Stop reason: SDUPTHER

## 2021-08-31 RX ORDER — SULFAMETHOXAZOLE AND TRIMETHOPRIM 800; 160 MG/1; MG/1
1 TABLET ORAL 2 TIMES DAILY
COMMUNITY
End: 2021-12-21

## 2021-08-31 RX ORDER — BUPIVACAINE HYDROCHLORIDE 5 MG/ML
INJECTION, SOLUTION EPIDURAL; INTRACAUDAL
Status: DISCONTINUED | OUTPATIENT
Start: 2021-08-31 | End: 2021-08-31 | Stop reason: SDUPTHER

## 2021-08-31 RX ORDER — CALCIUM CHLORIDE 100 MG/ML
INJECTION INTRAVENOUS; INTRAVENTRICULAR PRN
Status: DISCONTINUED | OUTPATIENT
Start: 2021-08-31 | End: 2021-08-31 | Stop reason: ALTCHOICE

## 2021-08-31 RX ORDER — DEXAMETHASONE SODIUM PHOSPHATE 10 MG/ML
10 INJECTION, SOLUTION INTRAMUSCULAR; INTRAVENOUS ONCE
Status: COMPLETED | OUTPATIENT
Start: 2021-08-31 | End: 2021-08-31

## 2021-08-31 RX ORDER — DIPHENHYDRAMINE HYDROCHLORIDE 50 MG/ML
12.5 INJECTION INTRAMUSCULAR; INTRAVENOUS
Status: DISCONTINUED | OUTPATIENT
Start: 2021-08-31 | End: 2021-08-31 | Stop reason: HOSPADM

## 2021-08-31 RX ORDER — ONDANSETRON 2 MG/ML
4 INJECTION INTRAMUSCULAR; INTRAVENOUS EVERY 6 HOURS PRN
Status: DISCONTINUED | OUTPATIENT
Start: 2021-08-31 | End: 2021-08-31 | Stop reason: HOSPADM

## 2021-08-31 RX ORDER — LABETALOL HYDROCHLORIDE 5 MG/ML
5 INJECTION, SOLUTION INTRAVENOUS EVERY 10 MIN PRN
Status: DISCONTINUED | OUTPATIENT
Start: 2021-08-31 | End: 2021-08-31 | Stop reason: HOSPADM

## 2021-08-31 RX ORDER — SENNA AND DOCUSATE SODIUM 50; 8.6 MG/1; MG/1
1 TABLET, FILM COATED ORAL 2 TIMES DAILY
Status: DISCONTINUED | OUTPATIENT
Start: 2021-08-31 | End: 2021-08-31 | Stop reason: HOSPADM

## 2021-08-31 RX ORDER — FENTANYL CITRATE 50 UG/ML
50 INJECTION, SOLUTION INTRAMUSCULAR; INTRAVENOUS EVERY 5 MIN PRN
Status: DISCONTINUED | OUTPATIENT
Start: 2021-08-31 | End: 2021-08-31 | Stop reason: HOSPADM

## 2021-08-31 RX ORDER — ONDANSETRON 4 MG/1
4 TABLET, ORALLY DISINTEGRATING ORAL EVERY 8 HOURS PRN
Status: DISCONTINUED | OUTPATIENT
Start: 2021-08-31 | End: 2021-08-31 | Stop reason: HOSPADM

## 2021-08-31 RX ORDER — ONDANSETRON 2 MG/ML
INJECTION INTRAMUSCULAR; INTRAVENOUS PRN
Status: DISCONTINUED | OUTPATIENT
Start: 2021-08-31 | End: 2021-08-31 | Stop reason: SDUPTHER

## 2021-08-31 RX ORDER — LIDOCAINE HYDROCHLORIDE 10 MG/ML
INJECTION, SOLUTION EPIDURAL; INFILTRATION; INTRACAUDAL; PERINEURAL PRN
Status: DISCONTINUED | OUTPATIENT
Start: 2021-08-31 | End: 2021-08-31 | Stop reason: SDUPTHER

## 2021-08-31 RX ORDER — HYDROCODONE BITARTRATE AND ACETAMINOPHEN 5; 325 MG/1; MG/1
1 TABLET ORAL PRN
Status: DISCONTINUED | OUTPATIENT
Start: 2021-08-31 | End: 2021-08-31 | Stop reason: HOSPADM

## 2021-08-31 RX ORDER — TRANEXAMIC ACID 100 MG/ML
INJECTION, SOLUTION INTRAVENOUS PRN
Status: DISCONTINUED | OUTPATIENT
Start: 2021-08-31 | End: 2021-08-31 | Stop reason: SDUPTHER

## 2021-08-31 RX ORDER — SODIUM CHLORIDE 0.9 % (FLUSH) 0.9 %
10 SYRINGE (ML) INJECTION EVERY 12 HOURS SCHEDULED
Status: DISCONTINUED | OUTPATIENT
Start: 2021-08-31 | End: 2021-08-31 | Stop reason: HOSPADM

## 2021-08-31 RX ORDER — OXYCODONE HYDROCHLORIDE 10 MG/1
10 TABLET ORAL EVERY 4 HOURS PRN
Status: DISCONTINUED | OUTPATIENT
Start: 2021-08-31 | End: 2021-08-31 | Stop reason: HOSPADM

## 2021-08-31 RX ORDER — HYDRALAZINE HYDROCHLORIDE 20 MG/ML
5 INJECTION INTRAMUSCULAR; INTRAVENOUS EVERY 10 MIN PRN
Status: DISCONTINUED | OUTPATIENT
Start: 2021-08-31 | End: 2021-08-31 | Stop reason: HOSPADM

## 2021-08-31 RX ORDER — ACETAMINOPHEN 500 MG
1000 TABLET ORAL ONCE
Status: COMPLETED | OUTPATIENT
Start: 2021-08-31 | End: 2021-08-31

## 2021-08-31 RX ORDER — MEPERIDINE HYDROCHLORIDE 25 MG/ML
12.5 INJECTION INTRAMUSCULAR; INTRAVENOUS; SUBCUTANEOUS EVERY 5 MIN PRN
Status: DISCONTINUED | OUTPATIENT
Start: 2021-08-31 | End: 2021-08-31 | Stop reason: HOSPADM

## 2021-08-31 RX ORDER — SCOLOPAMINE TRANSDERMAL SYSTEM 1 MG/1
1 PATCH, EXTENDED RELEASE TRANSDERMAL ONCE
Status: DISCONTINUED | OUTPATIENT
Start: 2021-08-31 | End: 2021-08-31

## 2021-08-31 RX ORDER — PROPOFOL 10 MG/ML
INJECTION, EMULSION INTRAVENOUS PRN
Status: DISCONTINUED | OUTPATIENT
Start: 2021-08-31 | End: 2021-08-31 | Stop reason: SDUPTHER

## 2021-08-31 RX ADMIN — DEXTROSE MONOHYDRATE 2000 MG: 50 INJECTION, SOLUTION INTRAVENOUS at 17:50

## 2021-08-31 RX ADMIN — DEXAMETHASONE SODIUM PHOSPHATE 4 MG: 4 INJECTION, SOLUTION INTRAMUSCULAR; INTRAVENOUS at 11:27

## 2021-08-31 RX ADMIN — SODIUM CHLORIDE: 9 INJECTION, SOLUTION INTRAVENOUS at 09:24

## 2021-08-31 RX ADMIN — ONDANSETRON 4 MG: 2 INJECTION, SOLUTION INTRAMUSCULAR; INTRAVENOUS at 12:37

## 2021-08-31 RX ADMIN — FENTANYL CITRATE 50 MCG: 50 INJECTION, SOLUTION INTRAMUSCULAR; INTRAVENOUS at 11:54

## 2021-08-31 RX ADMIN — PROPOFOL 150 MG: 10 INJECTION, EMULSION INTRAVENOUS at 11:27

## 2021-08-31 RX ADMIN — OXYCODONE HYDROCHLORIDE 10 MG: 10 TABLET ORAL at 16:04

## 2021-08-31 RX ADMIN — PROPOFOL 50 MG: 10 INJECTION, EMULSION INTRAVENOUS at 11:28

## 2021-08-31 RX ADMIN — FENTANYL CITRATE 25 MCG: 50 INJECTION, SOLUTION INTRAMUSCULAR; INTRAVENOUS at 13:32

## 2021-08-31 RX ADMIN — ACETAMINOPHEN 1000 MG: 500 TABLET ORAL at 09:23

## 2021-08-31 RX ADMIN — FENTANYL CITRATE 100 MCG: 50 INJECTION, SOLUTION INTRAMUSCULAR; INTRAVENOUS at 11:27

## 2021-08-31 RX ADMIN — MIDAZOLAM 1 MG: 1 INJECTION INTRAMUSCULAR; INTRAVENOUS at 11:21

## 2021-08-31 RX ADMIN — BUPIVACAINE 10 ML: 13.3 INJECTION, SUSPENSION, LIPOSOMAL INFILTRATION at 13:22

## 2021-08-31 RX ADMIN — TRANEXAMIC ACID 1000 MG: 100 INJECTION, SOLUTION INTRAVENOUS at 12:36

## 2021-08-31 RX ADMIN — FENTANYL CITRATE 50 MCG: 50 INJECTION, SOLUTION INTRAMUSCULAR; INTRAVENOUS at 11:59

## 2021-08-31 RX ADMIN — SUGAMMADEX 454 MG: 100 INJECTION, SOLUTION INTRAVENOUS at 12:49

## 2021-08-31 RX ADMIN — ONDANSETRON 4 MG: 2 INJECTION INTRAMUSCULAR; INTRAVENOUS at 13:33

## 2021-08-31 RX ADMIN — LIDOCAINE HYDROCHLORIDE 50 MG: 10 INJECTION, SOLUTION EPIDURAL; INFILTRATION; INTRACAUDAL; PERINEURAL at 11:27

## 2021-08-31 RX ADMIN — TRANEXAMIC ACID 1000 MG: 100 INJECTION, SOLUTION INTRAVENOUS at 11:40

## 2021-08-31 RX ADMIN — ROCURONIUM BROMIDE 50 MG: 10 INJECTION, SOLUTION INTRAVENOUS at 11:27

## 2021-08-31 RX ADMIN — BUPIVACAINE HYDROCHLORIDE 10 ML: 5 INJECTION, SOLUTION EPIDURAL; INTRACAUDAL at 13:22

## 2021-08-31 RX ADMIN — ACETAMINOPHEN 650 MG: 325 TABLET, FILM COATED ORAL at 16:04

## 2021-08-31 RX ADMIN — MIDAZOLAM 1 MG: 1 INJECTION INTRAMUSCULAR; INTRAVENOUS at 11:19

## 2021-08-31 RX ADMIN — DEXAMETHASONE SODIUM PHOSPHATE 10 MG: 10 INJECTION, SOLUTION INTRAMUSCULAR; INTRAVENOUS at 09:23

## 2021-08-31 RX ADMIN — CEFAZOLIN 2000 MG: 10 INJECTION, POWDER, FOR SOLUTION INTRAVENOUS at 11:40

## 2021-08-31 ASSESSMENT — PULMONARY FUNCTION TESTS
PIF_VALUE: 1
PIF_VALUE: 26
PIF_VALUE: 25
PIF_VALUE: 24
PIF_VALUE: 25
PIF_VALUE: 21
PIF_VALUE: 26
PIF_VALUE: 21
PIF_VALUE: 24
PIF_VALUE: 1
PIF_VALUE: 25
PIF_VALUE: 15
PIF_VALUE: 19
PIF_VALUE: 25
PIF_VALUE: 15
PIF_VALUE: 15
PIF_VALUE: 21
PIF_VALUE: 22
PIF_VALUE: 21
PIF_VALUE: 26
PIF_VALUE: 15
PIF_VALUE: 21
PIF_VALUE: 16
PIF_VALUE: 21
PIF_VALUE: 25
PIF_VALUE: 15
PIF_VALUE: 26
PIF_VALUE: 15
PIF_VALUE: 1
PIF_VALUE: 16
PIF_VALUE: 27
PIF_VALUE: 1
PIF_VALUE: 26
PIF_VALUE: 16
PIF_VALUE: 20
PIF_VALUE: 21
PIF_VALUE: 16
PIF_VALUE: 26
PIF_VALUE: 0
PIF_VALUE: 22
PIF_VALUE: 25
PIF_VALUE: 27
PIF_VALUE: 21
PIF_VALUE: 16
PIF_VALUE: 25
PIF_VALUE: 2
PIF_VALUE: 26
PIF_VALUE: 26
PIF_VALUE: 25
PIF_VALUE: 26
PIF_VALUE: 24
PIF_VALUE: 26
PIF_VALUE: 16
PIF_VALUE: 26
PIF_VALUE: 26
PIF_VALUE: 25
PIF_VALUE: 1
PIF_VALUE: 25
PIF_VALUE: 21
PIF_VALUE: 16
PIF_VALUE: 21
PIF_VALUE: 25
PIF_VALUE: 1
PIF_VALUE: 25
PIF_VALUE: 24
PIF_VALUE: 16
PIF_VALUE: 26
PIF_VALUE: 25
PIF_VALUE: 26
PIF_VALUE: 3
PIF_VALUE: 21
PIF_VALUE: 20
PIF_VALUE: 21
PIF_VALUE: 25
PIF_VALUE: 26
PIF_VALUE: 0
PIF_VALUE: 23
PIF_VALUE: 25
PIF_VALUE: 27
PIF_VALUE: 21
PIF_VALUE: 26
PIF_VALUE: 27
PIF_VALUE: 26
PIF_VALUE: 21
PIF_VALUE: 1
PIF_VALUE: 25
PIF_VALUE: 21
PIF_VALUE: 2
PIF_VALUE: 16
PIF_VALUE: 16
PIF_VALUE: 26

## 2021-08-31 ASSESSMENT — PAIN SCALES - GENERAL
PAINLEVEL_OUTOF10: 8
PAINLEVEL_OUTOF10: 10
PAINLEVEL_OUTOF10: 5
PAINLEVEL_OUTOF10: 8
PAINLEVEL_OUTOF10: 9
PAINLEVEL_OUTOF10: 10

## 2021-08-31 ASSESSMENT — PAIN DESCRIPTION - LOCATION
LOCATION: KNEE

## 2021-08-31 ASSESSMENT — PAIN DESCRIPTION - ORIENTATION
ORIENTATION: LEFT

## 2021-08-31 ASSESSMENT — PAIN DESCRIPTION - DESCRIPTORS
DESCRIPTORS: ACHING
DESCRIPTORS: ACHING

## 2021-08-31 ASSESSMENT — ENCOUNTER SYMPTOMS: STRIDOR: 0

## 2021-08-31 ASSESSMENT — PAIN DESCRIPTION - PAIN TYPE
TYPE: SURGICAL PAIN

## 2021-08-31 ASSESSMENT — PAIN DESCRIPTION - ONSET: ONSET: ON-GOING

## 2021-08-31 ASSESSMENT — PAIN DESCRIPTION - PROGRESSION: CLINICAL_PROGRESSION: GRADUALLY WORSENING

## 2021-08-31 ASSESSMENT — PAIN DESCRIPTION - FREQUENCY: FREQUENCY: CONTINUOUS

## 2021-08-31 NOTE — ANESTHESIA POSTPROCEDURE EVALUATION
POST- ANESTHESIA EVALUATION       Pt Name: Deven Watson  MRN: [id number]  YOB: 1965  Date of evaluation: 8/31/2021  Time:  2:28 PM      /60   Pulse 94   Temp 97.5 °F (36.4 °C)   Resp 13   Ht 5' 3\" (1.6 m)   Wt 250 lb (113.4 kg)   SpO2 97%   BMI 44.29 kg/m²      Consciousness Level  Awake  Cardiopulmonary Status  Stable  Pain Adequately Treated YES  Nausea / Vomiting  NO  Adequate Hydration  YES  Anesthesia Related Complications NONE      Electronically signed by Crystal Olguin MD on 8/31/2021 at 2:28 PM       Department of Anesthesiology  Postprocedure Note    Patient: Deven Watson  MRN: [id number]  YOB: 1965  Date of evaluation: 8/31/2021  Time:  2:28 PM     Procedure Summary     Date: 08/31/21 Room / Location: [address] [place] [address] Noe Bruno 03 / [address] [facility]     Anesthesia Start: 1119 Anesthesia Stop: 1300    Procedure: KNEE TOTAL ARTHROPLASTY (Left Knee) Diagnosis:       (DEGENERATIVE JOINT DISEASE LEFT KNEE)      (PT FULLY VACCINATED)    Surgeons: Luzmaria Workman MD Responsible Provider: Aida Simon MD    Anesthesia Type: general, regional ASA Status: 3          Anesthesia Type: general, regional    Kiersten Phase I: Kiersten Score: 8    Kiersten Phase II:      Last vitals: Reviewed and per EMR flowsheets.        Anesthesia Post Evaluation

## 2021-08-31 NOTE — PROGRESS NOTES
CLINICAL PHARMACY NOTE: MEDS TO BEDS    Total # of Prescriptions Filled: 1   The following medications were delivered to the patient:  · Percocet 5/325     Additional Documentation:  Delivered Medication to Patients Room

## 2021-08-31 NOTE — PROGRESS NOTES
[address]    Occupational Therapy Evaluation  Date: 21  Patient Name: Karen Yoon       Room: 5620/4975-79  MRN: [id number]  Account: [de-identified]   : 1965  (54 y.o.) Gender: female     Discharge Recommendations:  Further Occupational Therapy is recommended upon facility discharge. Equipment Needed: Yes    Referring Practitioner: Dariusz Burns MD  Diagnosis: Primary OA L knee       Treatment Diagnosis: Impaired self care status  Past Medical History:  has a past medical history of Atrial fibrillation ([address] Blurred vision, Breast cancer ([address] C. difficile colitis, Colon cancer ([address] COPD (chronic obstructive pulmonary disease) ([address] COVID-19, Depression, Diabetes mellitus ([address] Diverticulosis, Dizziness, Fibromyalgia, GERD (gastroesophageal reflux disease), History of loop recorder, Hyperlipidemia, Hypertension, Kidney stones, Multiple sclerosis ([address] On home oxygen therapy, PONV (postoperative nausea and vomiting), Rheumatoid arthritis ([address] Seizure ([address] Sleep apnea, Unspecified cerebral artery occlusion with cerebral infarction, and Wears glasses. Past Surgical History:   has a past surgical history that includes Hysterectomy (); Cholecystectomy (); Appendectomy (); Tonsillectomy ();  section (); laparotomy (, ); Upper gastrointestinal endoscopy (1/30/15); Sleeve Gastrectomy (5/19/15); Colonoscopy; Endoscopy, colon, diagnostic; sigmoidectomy (); Insertable Cardiac Monitor; Knee arthroscopy (Right, 2020); Knee arthroscopy (Left, 2020); fracture surgery; Cardiac surgery; skin biopsy; back surgery; Breast biopsy ([id number]); Breast lumpectomy; Cardioversion; Kyphosis surgery; and Total knee arthroplasty (Left, 2021).     Restrictions  Restrictions/Precautions: Fall Risk, Weight Bearing, Surgical Protocols, Up as Tolerated, Seizure  Implants present? : Metal implants (Cage in neck; screw in R wrists; L TKA; loop recorder)  Other position/activity restrictions: PT OT eval and treat  Left Lower Extremity Weight Bearing: Weight Bearing As Tolerated (Full WBAT)  Required Braces or Orthoses?: Yes (Neck brace PRN, back brace PRN, Wrist brace PRN)     Vitals  Temp: 98.1 °F (36.7 °C)  Pulse: 101  Resp: 16  BP: (!) 156/78  Height: 5' 3\" (160 cm)  Weight: 250 lb (113.4 kg)  BMI (Calculated): 44.4  Oxygen Therapy  SpO2: 91 %  Pulse Oximeter Device Mode: Continuous  Pulse Oximeter Device Location: Right, Hand, Finger  O2 Device: None (Room air)  O2 Flow Rate (L/min): 1 L/min  Level of Consciousness: Alert (0)    Subjective  Subjective: Pt resting in bed upon arrival with significant other in the room. Pt requesting to use the bathroom. Pt was pleasant and agreeable to OT/PT eval. Pt reports multiple recent falls.    Comments: Ok per Wayne Rhoades for OT/PT eval  Overall Orientation Status: Within Functional Limits  Vision  Vision: Impaired  Vision Exceptions: Wears glasses at all times  Hearing  Hearing: Within functional limits  Social/Functional History  Lives With: Significant other  Type of Home: Apartment (1st level)  Home Layout: One level  Home Access: Stairs to enter without rails  Entrance Stairs - Number of Steps: 1 JANAE  Bathroom Shower/Tub: Tub/Shower unit, Curtain  Bathroom Toilet: Handicap height  Bathroom Equipment: Grab bars in shower, Hand-held shower (sink close to toilet)  Bath[facility] Accessibility: [facility])  Home Equipment: 4 wheeled walker, Cane, [address], Lift chair, Oxygen (2L PRN)  ADL Assistance: Needs assistance (A with donning socks)  Homemaking Assistance: Independent (Share responsibility)  Homemaking Responsibilities: Yes  Ambulation Assistance: Independent (Use of cane and 4 wheeled walker)  Transfer Assistance: Independent  Active : Yes  Mode of Transportation: SUV  Occupation: On disability  IADL Comments: Pt reports that she sleeps in flat bed or on a couch  Additional Comments: Pt reports that boyfriend is home and is able to assist as needed. Pain Assessment  Pain Assessment: 0-10  Pain Level: 8  Pain Type: Surgical pain  Pain Location: Knee  Pain Orientation: Left  Pain Descriptors:  (\"Ripped apart and put back together\")    Objective  Vision - Basic Assessment  Vision Comments: Pt reports double and blurred vision prior from medical conditions       Sensation  Overall Sensation Status: Impaired (Intermittent numbness up to waist and bilateral hands)   ADL  Feeding: Modified independent   Grooming: Setup  UE Bathing: Stand by assistance  LE Bathing: Minimal assistance  UE Dressing: Contact guard assistance (while standing)  LE Dressing: Minimal assistance  Toileting: Contact guard assistance  Additional Comments: ADL scores based on clinical reasoning and skilled observation unless otherwise noted. Pt completed toileting with increased time and CGA and verbal cues for safety. Pt educated on dressing surgical side first to increase ease and independence with lower body dressing. Pt demonstrated good understanding and return demo. Pt educated on use of reacher and sock aid to increase independence with lower body self care tasks with pt verbalizing understanding. Pt currently limited due to decreased strength, balance, activity tolerance, and pain limiting safety and independence with self care tasks. UE Function           LUE Strength  Gross LUE Strength: WFL  L Hand General: 4/5  LUE Strength Comment: Grossly 4/5     LUE Tone: Normotonic     LUE AROM (degrees)  LUE AROM : WFL     Left Hand AROM (degrees)  Left Hand AROM: WFL  RUE Strength  Gross RUE Strength: WFL  R Hand General: 4/5  RUE Strength Comment: Grossly 4/5      RUE Tone: Normotonic     RUE AROM (degrees)  RUE AROM : WFL     Right Hand AROM (degrees)  Right Hand AROM: WFL    Fine Motor Skills  Coordination  Movements Are Fluid And Coordinated:  Yes                           Mobility  Supine to Sit: Stand by assistance  Sit to Supine: Stand by assistance       Balance  Sitting Balance: Stand by assistance  Standing Balance: Contact guard assistance  Standing Balance  Time: 1-2 minutes x 2; 3-4 minutes x 1  Activity: functional mobility, toileting, lower body dressing  Comment: with RW and increased time   Functional Mobility  Functional - Mobility Device: Rolling Walker  Activity: To/from bathroom, Other (hallway)  Assist Level: Contact guard assistance  Functional Mobility Comments: Verbal cues for hand placement and safeyt with RW management. Pt demonstrated increased pain with mobility  Bed mobility  Supine to Sit: Stand by assistance  Sit to Supine: Stand by assistance  Scooting: Stand by assistance  Comment: Bed mobility completed with HOB elevated and increased time with patient able to self assist LLE to the EOB. Transfers  Sit to stand: Contact guard assistance  Stand to sit: Contact guard assistance  Transfer Comments: Verbal cues for hand placement and safety  Toilet Transfers  Toilet - Technique: Ambulating  Equipment Used: Raised toilet seat with rails  Toilet Transfer: Contact guard assistance  Toilet Transfers Comments: Verbal cues for hand placement and safety  Functional Activity Tolerance  Functional Activity Tolerance:  Tolerates 30 min exercise with multiple rests   Assessment  Performance deficits / Impairments: Decreased ADL status, Decreased functional mobility , Decreased ROM, Decreased strength, Decreased endurance, Decreased balance, Decreased high-level IADLs  Treatment Diagnosis: Impaired self care status  Prognosis: Good  Decision Making: Low Complexity  REQUIRES OT FOLLOW UP: Yes  Discharge Recommendations: Patient would benefit from continued therapy after discharge  Activity Tolerance: Patient limited by fatigue, Patient limited by pain         Functional Outcome Measures  AM-PAC Daily Activity Inpatient   How much help for putting on and taking off regular lower body clothing?: A Little  How much help

## 2021-08-31 NOTE — PROGRESS NOTES
Physical Therapy    Facility/Department: [facility]  Initial Assessment    NAME: Marian Chapin  : 1965  MRN: [id number]    Date of Service: 2021    Discharge Recommendations: The patient would benefit from additional Physical  Therapy after discharge from the facility upon return to their Home. PT Equipment Recommendations  Equipment Needed: Yes  Mobility Devices: [place] : Rolling  Other: Use RW at all times until therapy progresses pt    Assessment   Body structures, Functions, Activity limitations: Decreased ADL status; Decreased functional mobility ; Decreased ROM; Decreased strength; Increased pain;Decreased balance;Decreased endurance  Assessment: Pt doing very well post op. Pt is SBA/CGA for mobility and family training was completed. Pt will benefit from continued PT to progress AD and mobility of LLE. Treatment Diagnosis: Impaired functional mobility 2* L TKA  Specific instructions for Next Treatment: progress gait, stairs, HEP, ROM  Prognosis: Good  Decision Making: Low Complexity  History: S/P L TKA By Dr Varun Engel 21  Exam: ROM, MMT, bed mobility, transfers, amb, balance  Clinical Presentation: Pt alert, cooperative, pleasant, motivated  Barriers to Learning: pain  REQUIRES PT FOLLOW UP: Yes  Activity Tolerance  Activity Tolerance: Patient Tolerated treatment well;Patient limited by pain  Other Comments  Comments: cryocuff applied to LLE with family training with S.O. Patient Diagnosis(es): The encounter diagnosis was Arthritis of left knee.      has a past medical history of Atrial fibrillation ([facility] Blurred vision, Breast cancer ([facility] [address] C. difficile colitis, Colon cancer ([facility] [address] COPD (chronic obstructive pulmonary disease) ([facility] [address] COVID-19, Depression, Diabetes mellitus ([address] Diverticulosis, Dizziness, Fibromyalgia, GERD (gastroesophageal reflux disease), History of loop recorder, Hyperlipidemia, Hypertension, Kidney stones, Multiple sclerosis ([address] On home oxygen therapy, PONV (postoperative nausea and vomiting), Rheumatoid arthritis ([address] Seizure ([address] Sleep apnea, Unspecified cerebral artery occlusion with cerebral infarction, and Wears glasses. has a past surgical history that includes Hysterectomy (); Cholecystectomy (); Appendectomy (); Tonsillectomy ();  section (); laparotomy (, ); Upper gastrointestinal endoscopy (1/30/15); Sleeve Gastrectomy (5/19/15); Colonoscopy; Endoscopy, colon, diagnostic; sigmoidectomy (); Insertable Cardiac Monitor; Knee arthroscopy (Right, 2020); Knee arthroscopy (Left, 2020); fracture surgery; Cardiac surgery; skin biopsy; back surgery; Breast biopsy ([id number]); Breast lumpectomy; Cardioversion; Kyphosis surgery; and Total knee arthroplasty (Left, 2021). Restrictions  Restrictions/Precautions  Restrictions/Precautions: Fall Risk, Weight Bearing, Surgical Protocols, Up as Tolerated, Seizure  Required Braces or Orthoses?: Yes (Neck brace PRN, back brace PRN, Wrist brace PRN)  Implants present? : Metal implants (Cage in neck; screw in R wrists; L TKA; loop recorder)  Lower Extremity Weight Bearing Restrictions  Left Lower Extremity Weight Bearing: Weight Bearing As Tolerated (Full WBAT)  Position Activity Restriction  Other position/activity restrictions: PT OT eval and treat  Vision/Hearing  Vision: Impaired  Vision Exceptions: Wears glasses at all times  Hearing: Within functional limits     Subjective  General  Chart Reviewed: Yes  Patient assessed for rehabilitation services?: Yes  Additional Pertinent Hx: seizure, MS, COVID, Afib  Family / Caregiver Present: Yes (significant other)  Referring Practitioner: Jessica Melo MD  Referral Date : 21  Diagnosis: primary OA L knee  Follows Commands: Within Functional Limits  Subjective  Subjective: Pt up in recliner, alert and ready for PT OT.  at bedside. SALUD Vinson.  Pt is on room air at 91-92% pt reports this is normal for her and she has a pulse ox at home  Pain Screening  Patient Currently in Pain: Yes  Pain Assessment  Pain Assessment: 0-10  Pain Level: 8  Pain Type: Surgical pain  Pain Location: Knee  Pain Orientation: Left  Pain Descriptors:  (\"Ripped apart and put back together\")  Pain Frequency: Continuous  Pain Onset: On-going  Clinical Progression: Gradually worsening  Functional Pain Assessment: Prevents or interferes some active activities and ADLs  Non-Pharmaceutical Pain Intervention(s): Ambulation/Increased Activity;Cold applied;Distraction;Elevation;Rest;Repositioned  Response to Pain Intervention: Patient Satisfied  Vital Signs  Patient Currently in Pain: Yes  Oxygen Therapy  SpO2: 92 %  O2 Device: None (Room air)  Patient Observation  Observations: O2 sats 91-94% on room air, improve with mobility, LLE ACE wrapped       Orientation  Orientation  Overall Orientation Status: Within Normal Limits  Social/Functional History  Social/Functional History  Lives With: Significant other  Type of Home: Apartment (1st level)  Home Layout: One level  Home Access: Stairs to enter without rails  Entrance Stairs - Number of Steps: 1 JANAE  Bathroom Shower/Tub: Tub/Shower unit, Curtain  Bathroom Toilet: Handicap height  Bathroom Equipment: Grab bars in shower, Hand-held shower (sink close to toilet)  Bathroom Accessibility: Wing Sample accessible (sideways)  Home Equipment: 4 wheeled walker, Cane, Reacher, Lift chair, Oxygen (2L PRN)  ADL Assistance: Needs assistance (A with donning socks)  Homemaking Assistance: Independent (Share responsibility)  Homemaking Responsibilities: Yes  Ambulation Assistance: Independent (Use of cane and 4 wheeled walker)  Transfer Assistance: Independent  Active : Yes  Mode of Transportation: SUV  Occupation: On disability  IADL Comments: Pt reports that she sleeps in flat bed or on a couch  Additional Comments: Pt reports that boyfriend is home and is able to assist as needed.    Cognition Objective          AROM RLE (degrees)  RLE AROM: WNL  AROM LLE (degrees)  LLE AROM : WFL  LLE General AROM: Knee AROM: 0-88  AROM RUE (degrees)  RUE General AROM: See OT  AROM LUE (degrees)  LUE General AROM: See OT  Strength RLE  Strength RLE: WNL  Comment: Grossly 4/5  Strength LLE  Strength LLE: WFL  Comment: Grossly 4-/5  Strength RUE  Comment: See OT  Strength LUE  Comment: See OT     Sensation  Overall Sensation Status: Impaired (Intermittent numbness up to waist and bilateral hands)  Bed mobility  Comment: Pt up in recliner at start and end of session. Transfers  Sit to Stand: Stand by assistance;Contact guard assistance  Stand to sit: Stand by assistance;Contact guard assistance  Comment: SBA/CGA with transfers using RW - cues for technique. Ambulation  Ambulation?: Yes  WB Status: Full WBAT LLE  Ambulation 1  Surface: level tile  Device: Rolling Walker  Assistance: Contact guard assistance  Quality of Gait: antalgic gait, decreased stance time LLE, slow wendy, no LOB or buckling  Gait Deviations: Decreased step length;Decreased step height;Slow Wendy  Distance: [de-identified]'  Comments: Education on RW technique and step thru pattern  Stairs/Curb  Stairs?: Yes  Stairs  # Steps : 2  Stairs Height: 4\"  Device: Rolling walker  Assistance: Contact guard assistance  Comment: Education on pattern/technique with platform style using RW, good carryover. S.O. present for training. Balance  Posture: Good  Sitting - Static: Good  Sitting - Dynamic: Good  Standing - Static: Good;-  Standing - Dynamic: Good;-  Comments: low fall risk, standing balance with RW. Other exercises  Other exercises?: Yes  Other exercises 1: Education and handouts on - precautions, HEP, car transfers, demo of cryocuff with S.O.      Plan   Plan  Times per week: BID  Specific instructions for Next Treatment: progress gait, stairs, HEP, ROM  Current Treatment Recommendations: Strengthening, ROM, Balance Training, Functional Mobility Training, Transfer Training, Endurance Training, Gait Training, Stair training, Equipment Evaluation, Education, & procurement, Patient/Caregiver Education & Training, Safety Education & Training, Home Exercise Program, Pain Management, Positioning  Safety Devices  Type of devices: All fall risk precautions in place, Call light within reach, Gait belt, Patient at risk for falls, Left in chair, Nurse notified (RN Lawrence)  Restraints  Initially in place: No    G-Code       OutComes Score                                                  AM-PAC Score  AM-PAC Inpatient Mobility Raw Score : 14 (08/31/21 1536)  AM-PAC Inpatient T-Scale Score : 38.1 (08/31/21 1536)  Mobility Inpatient CMS 0-100% Score: 61.29 (08/31/21 1536)  Mobility Inpatient CMS G-Code Modifier : CL (08/31/21 1536)          Goals  Short term goals  Time Frame for Short term goals: 1-2 tx  Short term goal 1: Pt to demo bed mobility Mod I  Short term goal 2: Pt to demo supervision for transfers with device. Short term goal 3: Pt to amb 100'-150' SBA with device. Short term goal 4: Pt to ascend/descend 1 platform step SBA  Short term goal 5: Pt to demo good technique for HEP and reduce pain to 3/10 wtih mobility. Short term goal 6: Pt to improve L knee AROM 0-92 to improve mobility and prevent joint stiffness. Patient Goals   Patient goals :  To go home       Therapy Time   Individual Concurrent Group Co-treatment   Time In 1536         Time Out 1612         Minutes 36         Timed Code Treatment Minutes: [address], PT

## 2021-08-31 NOTE — OP NOTE
Operative Note      Patient: Karen Yoon  YOB: 1965  MRN: [id number]    Date of Procedure: 8/31/2021    Pre-Op Diagnosis: DEGENERATIVE JOINT DISEASE LEFT KNEE  PT FULLY VACCINATED    Post-Op Diagnosis: Same       Procedure(s):  KNEE TOTAL ARTHROPLASTY    Surgeon(s):  Dariusz Burns MD    Assistant:   Resident: DO Seng Sharif, MARÍA  Anesthesia: General    Estimated Blood Loss (mL): Minimal    Complications: None    Specimens:   * No specimens in log *    Implants:  Implant Name Type Inv. Item Serial No.  Lot No. LRB No. Used Action   CEMENT BNE 40GM HI VISC RADPQ FOR REV SURG  CEMENT BNE 40GM HI VISC RADPQ FOR REV SURG  [employer]- [id number] Left 2 Implanted   TRAY TIB L67MM KNEE CO CHROM FIN MOD INTLOK VANGUARD  TRAY TIB L67MM KNEE CO CHROM FIN MOD INTLOK VANGUARD  [employer]- [id number] Left 1 Implanted   COMPONENT FEM 62. 5MM L KNEE CO CHROM NP CRUCE RET INTLOK  COMPONENT FEM 62. 5MM L KNEE CO CHROM NP CRUCE RET INTLOK  [employer]- [id number] Left 1 Implanted   COMPONENT PAT [id number] THK6.2MM THN KNEE TI ALLY S STL  COMPONENT PAT [id number] THK6.2MM THN KNEE TI ALLY S STL  [employer]- [id number] Left 1 Implanted   BEARING TIB L67MM [id number] KNEE E1 ANT STBL VANGUARD  BEARING TIB L67MM [id number] KNEE E1 ANT STBL VANGUARD  [employer]- [id number] Left 1 Implanted         Drains: * No LDAs found *    Findings: DJD medial and lateral left knee    Detailed Description of Procedure:     Patient is a 54 y.o. female with a long standing history of left DJD of the knee. Patient has failed all types of conservative treatment included physical therapy, weight-loss counseling, NSAIDS, and injections. The patient has significant restriction of activities of daily living and/or work/job place abilities, and, therefore, has been counseled for TKA.  Patient is aware of all the risks and benefits as highlighted in the surgical consent form. The patient was given 2 grams of Ancef in the holding area as well as an adductor canal block. The patient was then taken to the operating suite where general anesthesia was administered. A tourniquet was applied to effected leg and then prepped and draped in the usual sterile fashion. Time out was called to verify laterality. The leg was examined   and the tourniquet inflated to 300 mm of Hg. Midline incision was utilized and taken down to the joint capsule where a mid-vastus approach to the knee was performed. After a complete synovectomy, the patella was elevated, calibrated for thickness, and the above sized, patellar triple pegged drills guide positioned medially and then drilled. Atrial patellar button was positioned in order to re-established the original thickness. This was then replaced with a protective patellar plate. The knee was then flexed and revealed significant  arthrosis. Osteophytes were removed via rongeur. A drill hole was made in the distal femur and the femoral canal was then irrigated and suctioned. A fluted IM jessica was inserted and a distal femoral cut was made at 4 degrees of valgus at the +2 setting. The proximal tibia was then exposed after resection of the ACL and lateral meniscus. An extra-medullary tibial cutting jug was then aligned in reference to the tibia tubercle and ankle mortise and positional with a slight posterior slope. The cut was made with minimal cutting of the lowest depth of the tibial wear and the fragment removed. The distal femur was then approached and femoral sizing guide with 3 degrees of external rotation was placed flush with the surface and drill holes made and femoral size determined. The appropriate size cutting jig was then placed and anterior, posterior, and chamfer cuts made with an oscillating saw. A laminar  was inserted with care to avoid damaging the MCL.  Posterior osteophytes were removed and the capsule stripped from the posterior femoral condyles. The capsule was then injected with the orthopedic cocktail at this time. The tibial cut was then assessed with a baseplate and alignment jessica to assure proper cut. Spacer blocks were then inserted and the knee was assessed to determine the amount of soft tissue release and osteophyte removal necessary  to establish symmetric flexion and extension gaps. The tibia was then elevated, and the above sized tibial plate was positioned for proper external rotation with an alignment jessica down the middle-third of the tibial tubercles. This was pinned in place, the proximal tibialis reamed, the fins were then repacked. The appropriate size femur was positioned and various size of the polyethylene trials were inserted. The knee was assessed for  ROM and patellar tracking. Satisfied with this, this distal femoral logs were drilled and then all the trial components were removed. The knee was irrigated and dried while the cement was prepared. Cement was applied to to both implant and cut surfaces and the implants impacted and the compression with one size larger poly inserted and excess cement removed. The patella was placed and compressed as well. The knee was placed in full extension and then injected with the remainder  of the 100ml of the orthopedic cocktail. The Irrisept lavage was carried out for the 1 minutes. This was then irrigated and a permanent polyethylene was insert was injected with platelet rich/poor plasma and the tourniquet was released at 39 minutes. Hemostasis was excellent. The knee was closed with a #1 Strata-Fix and vastus with a double-layer of O-Vicryl suture. The subcutaneous tissue closed with a 2-0 Monocryl Strata-Fix  and then a Zip-line used for the skin. This was covered with adaptic and Aquacel dressing and dressed with a large 6-inch Ace dressing from toes to mid-thigh. The patient was awakened and taken to PACU in good condition. Electronically signed by Emily Pierre MD on 8/31/2021 at 12:34 PM

## 2021-08-31 NOTE — CARE COORDINATION
Writer spoke to Sterling, at [facility], in regard's to [employer] [facility], Shower Chair. Per Sterling, This is not covered by Her Insurance. Pt. Informed & states understanding.

## 2021-08-31 NOTE — FLOWSHEET NOTE
Patient admitted to room 2041 per bed from PACU 1455. VS, assessment and orientation to the room and call system completed. SO at bedside.

## 2021-08-31 NOTE — PROGRESS NOTES
accu check 147, attempted to remove polish from toe nails on left foot, unable to remove, patient states she did not think they were gel, but attempted to remove again but do not come off

## 2021-08-31 NOTE — INTERVAL H&P NOTE
Update History & Physical    The patient's History and Physical of August 18, 2021 was reviewed with the patient and I examined the patient. There was no change. The surgical site was confirmed by the patient and me. Patient was started on antibiotic for recent  UTI. by  pt took 2 out of 10 days. Patient will be having: KNEE TOTAL ARTHROPLASTY - Left  Patient has been NPO since midnight. No blood thinners in the past 5-7 days. Patient denies any personal or family problems with anesthesia. Plan: The risks, benefits, expected outcome, and alternative to the recommended procedure have been discussed with the patient. Patient understands and wants to proceed with the procedure.      Electronically signed by KANG Cervantes CNP on 8/31/2021 at 7:59 AM
MD//YEFRI/OZIEL

## 2021-08-31 NOTE — CARE COORDINATION
Pt. Did request to speak to someone in regards to Billing/Out of Pocket issues. Writer Did call Ainsley Lopez, from the 's office & she will call pt. To discuss her issues.

## 2021-08-31 NOTE — CARE COORDINATION
Writer Faxed Face Sheet, DME order for [employer] to [facility]. Writer spoke to [employer] informed of DC today. She will have delivered to pt's room.

## 2021-08-31 NOTE — ANESTHESIA PRE PROCEDURE
Department of Anesthesiology  Preprocedure Note       Name:  Fatoumata Walker   Age:  54 y.o.  :  1965                                          MRN:  [id number]         Date:  2021      Surgeon: Farhat Reyes):  Pako Nation MD    Procedure: Procedure(s):  KNEE TOTAL ARTHROPLASTY    Medications prior to admission:   Prior to Admission medications    Medication Sig Start Date End Date Taking? Authorizing Provider   oxyCODONE-acetaminophen (PERCOCET) 5-325 MG per tablet Take 1-2 tablets by mouth every 4 hours as needed for Pain for up to 7 days. 21 Yes Pako Nation MD   sulfamethoxazole-trimethoprim (BACTRIM DS;SEPTRA DS) 800-160 MG per tablet Take 1 tablet by mouth 2 times daily   Yes Historical Provider, MD   carBAMazepine (TEGRETOL) 200 MG tablet Take 200 mg by mouth 2 times daily   Yes Historical Provider, MD   brexpiprazole (REXULTI) 0.5 MG TABS tablet Take 0.5 mg by mouth daily   Yes Historical Provider, MD   ferrous sulfate (IRON 325) 325 (65 Fe) MG tablet Take 325 mg by mouth 2 times daily   Yes Historical Provider, MD   Diclofenac Sodium 3 % GEL Apply topically as needed   Yes Historical Provider, MD   methylphenidate (RITALIN) 20 MG tablet Take 20 mg by mouth 2 times daily. Yes Historical Provider, MD   budesonide-formoterol (SYMBICORT) 160-4.5 MCG/ACT AERO INHALE 2 PUFFS BID UTD 20  Yes Historical Provider, MD   methocarbamol (ROBAXIN) 750 MG tablet Take 1,500 mg by mouth 3 times daily   Yes Historical Provider, MD   gabapentin (NEURONTIN) 600 MG tablet Take 1 tablet by mouth 3 times daily for 30 days. Patient taking differently: Take 1,200 mg by mouth 3 times daily.   20 Yes Lexy Jimenez MD   DULoxetine (CYMBALTA) 60 MG extended release capsule Take 1 capsule by mouth 2 times daily 20 Yes Lexy Jimenez MD   traZODone (DESYREL) 50 MG tablet Take 1 tablet by mouth nightly as needed for Sleep 20  Yes Lexy Jimenez MD   metFORMIN 2000 mg in dextrose 5 % 50 mL IVPB  2,000 mg IntraVENous On Call to 16 Joseph Biswas MD        acetaminophen (TYLENOL) tablet 1,000 mg  1,000 mg Oral Once Rodger Mcqueen MD        dexamethasone (PF) (DECADRON) injection 10 mg  10 mg IntraVENous Once Rodger Mcqueen MD        gabapentin (NEURONTIN) tablet 900 mg  900 mg Oral Once Rodger Mcqueen MD        scopolamine (TRANSDERM-SCOP) transdermal patch 1 patch  1 patch Transdermal Once Rodger Mcqueen MD        sodium chloride flush 0.9 % injection 10 mL  10 mL IntraVENous PRN Dahlia Redman MD        0.9 % sodium chloride infusion  25 mL IntraVENous PRN Jackhorn MD Feroz        0.9 % sodium chloride infusion   IntraVENous Continuous Chad Hatch MD           Allergies: Allergies   Allergen Reactions    Ace Inhibitors Anaphylaxis, Other (See Comments), Hives and Shortness Of Breath     ANGIOEDEMA  lisinopril  angiodema  ANGIOEDEMA  ANGIOEDEMA      Betadine Swab Aid [Povidone-Iodine] Hives and Itching    Betadine [Povidone Iodine] Hives    Iodine Anaphylaxis, Hives and Shortness Of Breath     INCLUDES BETADINE  Iv dye. Pt has not had any reaction w/ dye from CT scans for 10 years. Per dr. Tracey Estevez contrast (omni 300) is fine to use.  8/14/19 -       Lurasidone Other (See Comments)    Lurasidone Hcl      Other reaction(s): Unknown    Shellfish Allergy Anaphylaxis    Shellfish-Derived Products Anaphylaxis    Iodides      Pre treat with benadryl    Lamotrigine Hives    Macrobid [Nitrofurantoin Macrocrystal] Hives    Pyridium [Phenazopyridine Hcl] Hives    Topiramate Other (See Comments)     Confusion        Problem List:    Patient Active Problem List   Diagnosis Code    Hypertension I10    Hyperlipidemia E78.5    Urinary incontinence R32    Diabetes ([facility] E11.9    Arrhythmia I49.9    Arthritis M19.90    Fibromyalgia M79.7    Sleep apnea G47.30    Stroke (HCC) I63.9    Migraine G43.909    Seizure ([facility] R56.9    Back pain, chronic M54.9, G89.29    Gout M10.9    Morbid obesity with BMI of 50.0-59.9, adult ([facility]) E66.01, Z68.43    Gastroparesis K31.84    Leg numbness R20.0    Bilateral leg numbness R20.0    MDD (major depressive disorder), severe ([facility]) F32.2    PAF (paroxysmal atrial fibrillation) ([facility]) I48.0    Hypokalemia E87.6    Chronic pain syndrome G89.4    Severe episode of recurrent major depressive disorder, without psychotic features ([address] F33.2    Postoperative hypoxia R09.02, Z98.890    Multiple sclerosis ([facility]) G35    Rheumatoid arthritis ([address] M06.9    Diabetes mellitus ([facility]) E11.9    S/P arthroscopic partial medial meniscectomy Z98.890    Primary osteoarthritis of left knee M17.12       Past Medical History:        Diagnosis Date    Atrial fibrillation ([address]     Blurred vision     left eye     Breast cancer ([address]     bilateral remission since 2012    C. difficile colitis     2015    Colon cancer ([address]     stage IV remission since 2016    COPD (chronic obstructive pulmonary disease) ([address]     COVID-19 Nov 2019 & Dec 2020    Depression     Diabetes mellitus (HCC)     Diverticulosis     Dizziness     from sitting to standing    Fibromyalgia     GERD (gastroesophageal reflux disease)     History of loop recorder     Hyperlipidemia     Hypertension     Kidney stones     Multiple sclerosis ([address]     On home oxygen therapy     CPAP at night, home oxygen PRN    PONV (postoperative nausea and vomiting)     Rheumatoid arthritis ([address]     Seizure ([address] 7/    last seizure 7/18/2020  first seizure 1992 after head injury take gabapentin/tegretol    Sleep apnea     CPAP nightly    Unspecified cerebral artery occlusion with cerebral infarction     2008     Wears glasses        Past Surgical History:        Procedure Laterality Date    APPENDECTOMY  1985    BACK SURGERY      neck and back screws and cage    BREAST BIOPSY  3543-6443    BREAST LUMPECTOMY      bilateral [id number],9341    [employer] CARDIAC SURGERY      cardiac cath negative in 2018    CARDIOVERSION      several times   [facility]    COLONOSCOPY      ENDOSCOPY, COLON, DIAGNOSTIC      FRACTURE SURGERY      left wrist     HYSTERECTOMY      INSERTABLE CARDIAC MONITOR      loop recorder    KNEE ARTHROSCOPY Right 2020    RIGHT KNEE ARTHROSCOPY WITH PARTIAL MEDIAL MENISCECTOMY WITH DEBRIDEMENT performed by Mitchel Pitts DO at [facility] ARTHROSCOPY Left 2020    LEFT KNEE ARTHROSCOPY WITH PARTIAL LATERAL MENISCAL TEAR REPAIR AND [address] performed by Mitchel Pitts DO [facility] [address] [facility] SIGMOIDECTOMY  2016    for cancer    SKIN BIOPSY      moles    SLEEVE GASTRECTOMY  5/19/15    GASTRECTOMY SLEEVE LAPAROSCOPIC           TONSILLECTOMY  1985    UPPER GASTROINTESTINAL ENDOSCOPY  1/30/15       Social History:    Social History     Tobacco Use    Smoking status: Former Smoker     Packs/day: 1.00     Years: 20.00     Pack years: 20.00     Quit date: 2020     Years since quittin.1    Smokeless tobacco: Never Used   Substance Use Topics    Alcohol use: Yes     Comment: daily                                Counseling given: Not Answered      Vital Signs (Current):   Vitals:    21 0840   BP: (!) 156/76   Pulse: 74   Resp: 18   Temp: 98 °F (36.7 °C)   TempSrc: Infrared   SpO2: 94%   Weight: 250 lb (113.4 kg)   Height: 5' 3\" (1.6 m)                                              BP Readings from Last 3 Encounters:   21 (!) 156/76   21 139/64   07/15/21 (!) 151/76       NPO Status: Time of last liquid consumption: 2358 (sip with meds this am)                        Time of last solid consumption: 0                        Date of last liquid consumption: 21                        Date of last solid food consumption: 21    BMI:   Wt Readings from Last 3 Encounters:   21 250 lb (113.4 kg)   21 250 lb (113.4 kg)   07/15/21 245 lb (111.1 kg)     Body mass index is 44.29 kg/m². CBC:   Lab Results   Component Value Date    WBC 7.3 08/18/2021    RBC 4.85 08/18/2021    HGB 13.4 08/18/2021    HCT 41.7 08/18/2021    MCV 86.1 08/18/2021    RDW 28.5 08/18/2021     08/18/2021       CMP:   Lab Results   Component Value Date     08/18/2021    K 4.1 08/18/2021    K 3.7 01/14/2019    CL 98 08/18/2021    CO2 30 08/18/2021    BUN 13 08/18/2021    CREATININE 0.78 08/18/2021    GFRAA >60 08/18/2021    AGRATIO 1.0 01/20/2020    LABGLOM >60 08/18/2021    GLUCOSE 115 08/18/2021    PROT 7.0 06/03/2020    CALCIUM 9.0 08/18/2021    BILITOT 0.19 06/03/2020    ALKPHOS 68 06/03/2020    AST 18 06/03/2020    ALT 11 06/03/2020       POC Tests: No results for input(s): POCGLU, POCNA, POCK, POCCL, POCBUN, POCHEMO, POCHCT in the last 72 hours. Coags:   Lab Results   Component Value Date    PROTIME 21.2 07/15/2021    INR 1.9 07/15/2021    APTT 36.7 07/15/2021    APTT 31.6 01/14/2019       HCG (If Applicable): No results found for: PREGTESTUR, PREGSERUM, HCG, HCGQUANT     ABGs: No results found for: PHART, PO2ART, VAK2KDE, DSD0XJA, BEART, N6GBVRPV     Type & Screen (If Applicable):  No results found for: LABABO, LABRH    Drug/Infectious Status (If Applicable):  No results found for: HIV, HEPCAB    COVID-19 Screening (If Applicable):   Lab Results   Component Value Date    COVID19 Not Detected 11/02/2020    COVID19 Not Detected 08/10/2020           Anesthesia Evaluation  Patient summary reviewed and Nursing notes reviewed   history of anesthetic complications: PONV.   Airway: Mallampati: III  TM distance: >3 FB   Neck ROM: full  Mouth opening: > = 3 FB Dental:    (+) caps      Pulmonary: breath sounds clear to auscultation  (+) COPD:  sleep apnea: on CPAP,      (-) rhonchi, wheezes, rales, stridor and no decreased breath sounds                           Cardiovascular:    (+) hypertension:, dysrhythmias: atrial fibrillation,     (-) murmur, weak pulses,  friction rub, systolic click, carotid bruit and peripheral edema    ECG reviewed  Rhythm: regular  Rate: normal    Stress test reviewed  Cleared by cardiology           ROS comment: Perfusion Comments: Left ventricular perfusion is normal. Based on the  perfusion study data, risk of cardiovascular events is low risk. The study   is normal.       Neuro/Psych:   (+) seizures: well controlled, CVA: no interval change, headaches:, psychiatric history:            GI/Hepatic/Renal:   (+) GERD:, morbid obesity          Endo/Other:    (+) Diabetes, : arthritis:., .                 Abdominal:   (+) obese,           Vascular: Other Findings:           Anesthesia Plan      general and regional     ASA 3     (GA/ Left Adductor canal block for post op pain management )  Induction: intravenous. MIPS: Postoperative opioids intended and Prophylactic antiemetics administered. Anesthetic plan and risks discussed with patient. Plan discussed with CRNA.                   Brittany Rincon MD   8/31/2021

## 2021-08-31 NOTE — CARE COORDINATION
Joint Replacement Discharge Planning Note:    Admission Date:  8/31/2021 Sivan Sen is a 54 y.o.  female    Admitted for : Primary osteoarthritis of left knee [M17.12]    Met with:  Patient & Significant other, Joni Villafana    PCP:  [address]:  [facility]    Current Residence/ Living Arrangements:  independently at home w/ SO, Joni Villafana             Current Services PTA:  No    Is patient agreeable to [employer]: Yes    Is patient agreeable to outpatient physical therapy:  Yes, [facility]ants [facility] [place]    [place] of choice provided: Yes         [employer] Agency/Outpatient Therapy chosen:  [address] needed: No    Current home DME:  GB, Rollator, Oxygen, Wears 2LNC, as Needed, Has Concentrator, Nebulizer, CPAP, Oxygen equipment from [facility]:  [facility] [place]. Does Patient want to use MEDS to BEDS?([facility] only) Yes    Transportation Provider:  Patient                       Discharge Plan:   Patient intends to discharge to Home    Patient needs a wheeled walker. Anticipated discharge date 8/31/21 [facility]. Lives in an apt w/ 1 step in w/ SO, Juan. DME Rollator, GB, Oxygen, Wears 2LNC as needed, Has Concentrator, Nebulizer, CPAP, all from [facility]. Wants [facility], w/ Armen's, Luana, following, referal made. Will do Outpt therapy at [facility] [place]. Roseanne PTA.  Ryan is signed/needs completed, Will follow//KB      Readmission Risk              Risk of Unplanned Readmission:  0           Electronically signed by: Logan Nation RN on 8/31/2021 at 2:49 PM

## 2021-08-31 NOTE — DISCHARGE INSTR - COC
Continuity of Care Form    Patient Name: Melissa Lockett   :  1965  MRN:  [id number]    Admit date:  (Not on file)  Discharge date:  21    Code Status Order: Prior   Advance Directives:      Admitting Physician:  Kate Fleming MD  PCP: Luis Munguia    Discharging Nurse: [facility] Unit/Room#: No information available for this encounter. Discharging Unit Phone Number: [phone]    Emergency Contact:   Extended Emergency Contact Information  Primary Emergency Contact: Juan Albright  Address: [address]  Krishna Chopra, [address] [place] [address] Phone: [phone]  Relation: Other    Past Surgical History:  Past Surgical History:   Procedure Laterality Date    APPENDECTOMY      BACK SURGERY      neck and back screws and cage    BREAST BIOPSY  [id number]    BREAST LUMPECTOMY      bilateral [id number]     CARDIAC SURGERY      cardiac cath negative in 2018    CARDIOVERSION      several times     SECTION      CHOLECYSTECTOMY      COLONOSCOPY      ENDOSCOPY, COLON, DIAGNOSTIC      FRACTURE SURGERY      left wrist     HYSTERECTOMY      INSERTABLE CARDIAC MONITOR      loop recorder    KNEE ARTHROSCOPY Right 2020    RIGHT KNEE ARTHROSCOPY WITH PARTIAL MEDIAL MENISCECTOMY WITH DEBRIDEMENT performed by Beth Munoz DO at [facility] ARTHROSCOPY Left 2020    LEFT KNEE ARTHROSCOPY WITH PARTIAL LATERAL MENISCAL TEAR REPAIR AND [address] performed by Beth Munoz DO at [address] [facility],     SIGMOIDECTOMY  2016    for cancer    SKIN BIOPSY      moles    SLEEVE GASTRECTOMY  5/19/15    GASTRECTOMY SLEEVE LAPAROSCOPIC           TONSILLECTOMY      UPPER GASTROINTESTINAL ENDOSCOPY  1/30/15       Immunization History:   Immunization History   Administered Date(s) Administered    COVID-19, Gonzales Peter, PF, 30mcg/0.3mL 2021, 2021    Influenza Whole 10/01/2014 Active Problems:  Patient Active Problem List   Diagnosis Code    Hypertension I10    Hyperlipidemia E78.5    Urinary incontinence R32    Diabetes ([facility] E11.9    Arrhythmia I49.9    Arthritis M19.90    Fibromyalgia M79.7    Sleep apnea G47.30    Stroke ([facility]) I63.9    Migraine G43.909    Seizure ([facility]) R56.9    Back pain, chronic M54.9, G89.29    Gout M10.9    Morbid obesity with BMI of 50.0-59.9, adult ([facility]) E66.01, Z68.43    Gastroparesis K31.84    Leg numbness R20.0    Bilateral leg numbness R20.0    MDD (major depressive disorder), severe ([facility]) F32.2    PAF (paroxysmal atrial fibrillation) ([facility]) I48.0    Hypokalemia E87.6    Chronic pain syndrome G89.4    Severe episode of recurrent major depressive disorder, without psychotic features [facility] F33.2    Postoperative hypoxia R09.02, Z98.890    Multiple sclerosis ([facility]) G35    Rheumatoid arthritis ([facility]) M06.9    Diabetes mellitus ([facility]) E11.9    S/P arthroscopic partial medial meniscectomy Z98.890       Isolation/Infection:   Isolation            No Isolation          Patient Infection Status       Infection Onset Added Last Indicated Last Indicated By Review Planned Expiration Resolved Resolved By    None active    Resolved    COVID-19 Rule Out 11/02/20 11/02/20 11/02/20 COVID-19 (Ordered)   11/03/20 Rule-Out Test Resulted    COVID-19 Rule Out 08/09/20 08/09/20 08/10/20 Covid-19 Ambulatory (Ordered)   08/11/20 Rule-Out Test Resulted    COVID-19 Rule Out 06/03/20 06/03/20 06/03/20 COVID-19 (Ordered)   06/03/20 Rule-Out Test Resulted            Nurse Assessment:  Last Vital Signs: There were no vitals taken for this visit.     Last documented pain score (0-10 scale):    Last Weight:   Wt Readings from Last 1 Encounters:   08/18/21 250 lb (113.4 kg)     Mental Status:  oriented and alert    IV Access:  - None    Nursing Mobility/ADLs:  Walking   Assisted  Transfer  Assisted  Bathing  Assisted  Dressing  Assisted  Toileting Assisted  Feeding  Assisted  Med Admin  Independent  Med Delivery   whole    Wound Care Documentation and Therapy:        Elimination:  Continence:   · Bowel: Yes  · Bladder: Yes  Urinary Catheter: None   Colostomy/Ileostomy/Ileal Conduit: No       Date of Last BM:   No intake or output data in the 24 hours ending 08/31/21 0738  No intake/output data recorded. Safety Concerns: At Risk for Falls    Impairments/Disabilities:      Vision    Nutrition Therapy:  Current Nutrition Therapy:   - Oral Diet:  General    Routes of Feeding: Oral  Liquids: No Restrictions  Daily Fluid Restriction: no  Last Modified Barium Swallow with Video (Video Swallowing Test): not done    Treatments at the Time of Hospital Discharge:   Respiratory Treatments:   Oxygen Therapy:  is on oxygen at 2 L/min per nasal cannula. Wears as needed. Has Concentrator/Nebulizer/CPAP at home. Ventilator:    - No ventilator support    Rehab Therapies: Physical Therapy and Occupational Therapy  Weight Bearing Status/Restrictions: No weight bearing restirctions  Other Medical Equipment (for information only, NOT a DME order):  walker  Other Treatments: . TOTAL JOINT REPLACEMENT PATIENT HOME HEALTH CARE PROTOCOL  This patient is a post-operative total joint replacement patient. Expectations for this patients care are as follows:      Goals:   DailyTherapy for rehabilitative purposes for two weeks.  Increased level of activity and ambulation each day.  Well-controlled pain.  Free from infection.  Encourage patient to provide self-care when possible.  Ambulation with a rolling walker. Activity & Diet:   Therapy performed daily. (Instruct patient to take pain meds. 1 hour prior to therapy.)   Up in chair for all meals and majority of day.  Range of motion for TKA patients.  Hip precautions for GUIDO patients.    Incentive Spirometer: 3- 4 inhalations every twenty minutes, during the day, while awake, the first week home after discharge   Increase oral intake of fruits, fiber and water and take a daily stool softener to prevent constipation.  Consider stronger bowel protocol if no bowel movement is achieved after three days home.  Increase protein intake and reduce sugar intake to promote healing and prevent infection.  No pillow under the knee for TKA patients. [address] go on in the a.m. and come off in the p.m. (Hand wash them every two or three days, roll in towel to remove most of moisture, and hang to dry.)    Incision Care:   If patient has ACE bandage it may be removed POD #2   Keep incisional dressing intact until seen and removed by surgeon, unless saturated, in which case, call surgeon and request instructions.  If dressing falls off, call surgeon.  If using the Prevena dressing, with battery pack, place battery pack in waterproof bag during shower. If using Aquacel dressing then dressing is waterproof and patient may shower.  If patient has a Prevena remove on POD #5 and replace with Aquacel dressing (patient was provided with dressing in hospital)   Elevated the leg and ice the affected area four times a day, for twenty minutes each time and after every physical therapy session. Normal Conditions - Will improve with provided comfort measures and time:   Some swelling in the operative leg is normal - this should reduce over time.  Some post-operative pain is normal.  This will improve with prescribed medication, provided comfort measures and time.  Constipation related to pain medications & decreased mobility is a common occurrence. (Increase your fiber & water intake and take a stool softener.)    Slight warmth of operative site is normal and will diminish with time.  Fatigue and moderate pain after therapy is normal and will improve with time.  Numbness near the incision site is normal and will improve with time.     NOTE: Ensure/Remind patient to go to their follow-up appointment with their orthopedic surgeon, which is scheduled: 9/15/21 at 8:45 am.    Abnormal Conditions - When to call the Surgeon:   Increasing/excessive redness, warmth or swelling at the incisional site not relieved with ice and elevation.  Increasing/excessive pain not well-controlled by prescribed medications.  Drainage or odor from or around the incision site.  (A little blood showing through the bandage is ok, but active leaking, of any color, coming out of the bandage is NOT normal.)   Temperature above 101 degrees. (A mild temp of 99 - 100 is normal - if temp gets to 101 you may use Tylenol once - if it does not improve, you may use a 2nd dose of Tylenol after 5 hours - if temperature is still at or above 101 degrees then call the surgeon.)   Calf tenderness, swelling, or redness or numbness of the foot/lower leg.  Shortness of breath or chest pain.  Any other incision or surgical-related concerns.    CALL SURGEON with concerns PRIOR TO sending patient to hospital.      Patient's personal belongings (please select all that are sent with patient):  Kirsty    RN SIGNATURE:  Electronically signed by Fidel Cedeno RN on 8/31/21 at 3:19 PM EDT    CASE MANAGEMENT/SOCIAL WORK SECTION    Inpatient Status Date:     Readmission Risk Assessment Score:  Readmission Risk              Risk of Unplanned Readmission:  0           Discharging to Facility/ . [address]  [place] [address]  Phone [phone]  Fax  [phone]  ·     Dialysis Facility (if applicable)   · Name:  · Address:  · Dialysis Schedule:  · Phone:  · Fax:    / signature: Electronically signed by Fidel Cedeno RN on 8/31/21 at 3:01 PM EDT    PHYSICIAN SECTION    Prognosis: Good    Condition at Discharge: Stable    Rehab Potential (if transferring to Rehab): Good    Recommended Labs or Other Treatments After Discharge:     Physician Certification: I certify the above information and transfer of Karen Yoon  is necessary for the continuing treatment of the diagnosis listed and that she requires [address] for less 30 days.      Update Admission H&P: No change in H&P    PHYSICIAN SIGNATURE:  Electronically signed by Dariusz Burns MD on 8/31/21 at 7:38 AM EDT

## 2021-08-31 NOTE — CARE COORDINATION
Writer notified, Luana, from [facility], [facility]'s that pt. Will DC to home today. She will process all information & will follow. Instructed to call writer w/ any questions.

## 2021-09-01 NOTE — FLOWSHEET NOTE
Discharge instructions reviewed with the patient and her SO. All questions answered. Patient provided with anti em hose and instructed to put on in the am and remove at bedtime. Pt also had her script delivered by [facility] to beds . Patient assisted to the car and was able to get in with stand by assist.  Tolerated well.

## 2021-09-07 ENCOUNTER — TELEPHONE (OUTPATIENT)
Dept: ORTHOPEDIC SURGERY | Age: 56
End: 2021-09-07

## 2021-09-07 NOTE — TELEPHONE ENCOUNTER
Took a shower yesterday 9/6 and got her bandage wet. Also having some moderate swelling and bleeding. After showering, blood on aquacel ran out to the edges of the dressing. Severe increase in pain. Scheduled her to see DR. Manuel Kamara on 9/8 at 2:50 pm

## 2021-09-08 ENCOUNTER — OFFICE VISIT (OUTPATIENT)
Dept: ORTHOPEDIC SURGERY | Age: 56
End: 2021-09-08

## 2021-09-08 DIAGNOSIS — Z96.652 HISTORY OF TOTAL KNEE ARTHROPLASTY, LEFT: Primary | ICD-10-CM

## 2021-09-08 PROCEDURE — 99024 POSTOP FOLLOW-UP VISIT: CPT | Performed by: ORTHOPAEDIC SURGERY

## 2021-09-08 RX ORDER — OXYCODONE HYDROCHLORIDE AND ACETAMINOPHEN 5; 325 MG/1; MG/1
1 TABLET ORAL EVERY 6 HOURS PRN
Qty: 30 TABLET | Refills: 0 | Status: SHIPPED | OUTPATIENT
Start: 2021-09-08 | End: 2021-09-15 | Stop reason: SDUPTHER

## 2021-09-08 NOTE — PROGRESS NOTES
Marika Encarnacion M.D.            [address] [place] [address], [address] [facility],[address], [facility] [address].           Dept Phone: [phone]           Dept Fax:  [phone]           Juventino Rodriguez          Dept Phone: [phone]           Dept Fax:  [phone]      Chief Compliant:  Chief Complaint   Patient presents with    Post-Op Check     Lt  TKA 8/31/21        History of Present Illness:  Patient returns today status post left TKA times 2 weeks. Patient has no major complaints     Review of Systems   Constitutional: Negative for fever, chills, sweats, recent injury, recent illness  Neurological: Negative for Headaches, numbness, or weakness. Integumentary: Negative for rash, itching, ecchymosis, or wounds. Musculoskeletal: Positive for Post-Op Check (Lt  TKA 8/31/21)       Physical Exam:  Constitutional: Patient is oriented to person, place, and time. Patient appears well-developed and well nourished. Musculoskeletal: Normal gait. Motion 0-100 degrees with expected pain with ROM. No Calf tenderness, Negative Kaylyn's sign. Neurovascular intact. Neurological: Patient is alert and oriented to person, place, and time. Normal strenght. No sensory deficit. Skin: Skin is warm and dry. Incision is healing well without signs of redness or drainage  Nursing note and vitals reviewed. Labs and Imaging:     XR taken today:  XR KNEE LEFT (1-2 VIEWS)    Result Date: 9/8/2021  X-rays taken a reviewed by me show standing AP of both knees and a lateral left knee. Patient status post left total knee arthroplasty. Components are in good position on both AP and lateral views.          Orders Placed This Encounter   Procedures    XR KNEE LEFT (1-2 VIEWS)     Standing Status:   Future     Number of Occurrences:   1     Standing Expiration Date:   9/8/2022   [address] [facility] Physical Therapy - Sanju Sanderson     Referral Priority:   Routine     Referral Type:   Eval and Treat     Referral Reason:   Specialty Services Required     Requested Specialty:   Physical Therapy     Number of Visits Requested:   1       Assessment and Plan:  1. History of total knee arthroplasty, left        2 weeks status post left TKA        This is a 54 y.o. female who presents to the clinic today status post left TKA. Continue anticoagulation. Transition to outpatient Pt. Restrictions given. RTO 5-6 weeks. Call if any problems/issues prior to that       Provider Attestation:  Lizbethe Click, personally performed the services described in this documentation. All medical record entries made by the scribe were at my direction and in my presence. I have reviewed the chart and discharge instructions and agree that the records reflect my personal performance and is accurate and complete. Bran Moe MD. 09/08/21        Please note that this chart was generated using voice recognition Dragon dictation software. Although every effort was made to ensure the accuracy of this automated transcription, some errors in transcription may have occurred.

## 2021-09-15 DIAGNOSIS — Z96.652 HISTORY OF TOTAL KNEE ARTHROPLASTY, LEFT: ICD-10-CM

## 2021-09-15 DIAGNOSIS — Z96.652 HISTORY OF TOTAL KNEE ARTHROPLASTY, LEFT: Primary | ICD-10-CM

## 2021-09-15 RX ORDER — OXYCODONE HYDROCHLORIDE AND ACETAMINOPHEN 5; 325 MG/1; MG/1
1 TABLET ORAL EVERY 6 HOURS PRN
Qty: 30 TABLET | Refills: 0 | Status: SHIPPED | OUTPATIENT
Start: 2021-09-15 | End: 2021-09-23

## 2021-09-15 RX ORDER — OXYCODONE HYDROCHLORIDE AND ACETAMINOPHEN 5; 325 MG/1; MG/1
1 TABLET ORAL EVERY 6 HOURS PRN
Qty: 30 TABLET | Refills: 0 | Status: SHIPPED | OUTPATIENT
Start: 2021-09-15 | End: 2021-09-22

## 2021-09-15 NOTE — TELEPHONE ENCOUNTER
Pt is calling stating her knee is warm to the touch and has had an increase in pain for the last 2 days. Pt states the more she uses it the warmer it gets.  Please call pt at phone number [phone]

## 2021-09-27 ENCOUNTER — TELEPHONE (OUTPATIENT)
Dept: ORTHOPEDIC SURGERY | Age: 56
End: 2021-09-27

## 2021-09-27 NOTE — TELEPHONE ENCOUNTER
Patient called in requesting a call from clinical. Patient would like to know if she is allowed to finally take ibuprofen as the tylenol no longer helps. Patient just need some advisement, Please advise and address.

## 2021-09-28 NOTE — TELEPHONE ENCOUNTER
Called pt back to verify why she was not able to take the ibuprofen and she stated that she was told not to take due to it prohibiting 'bone growth'. Dr. Lila Martin, are you ok with pt taking either ibu or naprosyn for pain since tylenol is not helping?

## 2021-10-13 ENCOUNTER — OFFICE VISIT (OUTPATIENT)
Dept: ORTHOPEDIC SURGERY | Age: 56
End: 2021-10-13

## 2021-10-13 DIAGNOSIS — Z96.652 STATUS POST LEFT KNEE REPLACEMENT: Primary | ICD-10-CM

## 2021-10-13 PROCEDURE — 99024 POSTOP FOLLOW-UP VISIT: CPT | Performed by: ORTHOPAEDIC SURGERY

## 2021-10-13 NOTE — PROGRESS NOTES
Patient returns today she status post left total knee on 8/31/2021. She says her left knee feels fantastic. She is very happy out feels she states that she is walking several miles at a time now she was at a beach recently and that she had no problems with it she is doing so well she would like to proceed with having her right side done as it is extremely arthritic as well. Physical examination patient left knee notes her wound is pristine. Her motion is excellent 0 135 degrees good varus valgus stability and good patellar tracking. Right knee examination as per prior appointment    No new x-rays taken today    Impression  7-week status post left total knee  Severe DJD right knee    Plan  Patient would like to schedule for her right total knee arthroplasty in December of this year. She is doing her own exercises now to just continue with this that she states therapy is a waste of time for her.   She is aware of all risk and benefits having just completed this and will proceed with scheduling for right total knee arthroplasty

## 2021-12-20 ENCOUNTER — TELEPHONE (OUTPATIENT)
Dept: ORTHOPEDIC SURGERY | Age: 56
End: 2021-12-20

## 2021-12-20 NOTE — TELEPHONE ENCOUNTER
Azalea Trejo denied this patients TKA on 1/4/22 due to her BMI and no loss of weight shown. Azalea Trejo does not allow peer to peer discussions to change the outcome so if you want we would need to appeal it. Are you wanting us to try to appeal the decision at this time or no?

## 2021-12-21 ENCOUNTER — HOSPITAL ENCOUNTER (OUTPATIENT)
Dept: PREADMISSION TESTING | Age: 56
Discharge: HOME | End: 2021-12-25
Payer: COMMERCIAL

## 2021-12-21 VITALS
TEMPERATURE: 98 F | WEIGHT: 250 LBS | HEIGHT: 63 IN | DIASTOLIC BLOOD PRESSURE: 90 MMHG | SYSTOLIC BLOOD PRESSURE: 153 MMHG | HEART RATE: 77 BPM | OXYGEN SATURATION: 97 % | BODY MASS INDEX: 44.3 KG/M2 | RESPIRATION RATE: 18 BRPM

## 2021-12-21 PROBLEM — R90.89 ABNORMAL BRAIN MRI: Status: ACTIVE | Noted: 2019-04-10

## 2021-12-21 PROBLEM — G47.419 NARCOLEPTIC SYNDROME: Status: ACTIVE | Noted: 2021-02-19

## 2021-12-21 PROBLEM — S32.010A COMPRESSION FRACTURE OF FIRST LUMBAR VERTEBRA (HCC): Status: ACTIVE | Noted: 2019-09-24

## 2021-12-21 PROBLEM — G43.111 INTRACTABLE MIGRAINE WITH AURA WITH STATUS MIGRAINOSUS: Status: ACTIVE | Noted: 2021-12-21

## 2021-12-21 PROBLEM — I34.0 NON-RHEUMATIC MITRAL REGURGITATION: Status: ACTIVE | Noted: 2019-03-01

## 2021-12-21 PROBLEM — N20.0 RENAL STONES: Status: ACTIVE | Noted: 2021-03-12

## 2021-12-21 PROBLEM — M47.22 CERVICAL SPONDYLOSIS WITH MYELOPATHY AND RADICULOPATHY: Status: ACTIVE | Noted: 2020-12-17

## 2021-12-21 PROBLEM — M47.12 CERVICAL SPONDYLOSIS WITH MYELOPATHY AND RADICULOPATHY: Status: ACTIVE | Noted: 2020-12-17

## 2021-12-21 PROBLEM — J44.9 COPD, MODERATE (HCC): Status: ACTIVE | Noted: 2021-02-19

## 2021-12-21 PROBLEM — C44.90 MALIGNANT NEOPLASM OF SKIN: Status: ACTIVE | Noted: 2021-12-21

## 2021-12-21 PROBLEM — I50.9 CONGESTIVE HEART FAILURE (HCC): Status: ACTIVE | Noted: 2020-05-06

## 2021-12-21 PROBLEM — M72.0 DUPUYTREN'S CONTRACTURE OF HAND: Status: ACTIVE | Noted: 2021-04-16

## 2021-12-21 LAB
-: ABNORMAL
ABSOLUTE EOS #: 0.7 K/UL (ref 0–0.4)
ABSOLUTE IMMATURE GRANULOCYTE: ABNORMAL K/UL (ref 0–0.3)
ABSOLUTE LYMPH #: 2.5 K/UL (ref 1–4.8)
ABSOLUTE MONO #: 0.5 K/UL (ref 0.1–1.3)
AMORPHOUS: ABNORMAL
ANION GAP SERPL CALCULATED.3IONS-SCNC: 11 MMOL/L (ref 9–17)
BACTERIA: ABNORMAL
BASOPHILS # BLD: 1 % (ref 0–2)
BASOPHILS ABSOLUTE: 0.1 K/UL (ref 0–0.2)
BILIRUBIN URINE: NEGATIVE
BUN BLDV-MCNC: 9 MG/DL (ref 6–20)
BUN/CREAT BLD: ABNORMAL (ref 9–20)
CALCIUM SERPL-MCNC: 8.6 MG/DL (ref 8.6–10.4)
CASTS UA: ABNORMAL /LPF
CHLORIDE BLD-SCNC: 102 MMOL/L (ref 98–107)
CO2: 29 MMOL/L (ref 20–31)
COLOR: YELLOW
COMMENT UA: ABNORMAL
CREAT SERPL-MCNC: 0.53 MG/DL (ref 0.5–0.9)
CRYSTALS, UA: ABNORMAL /HPF
DIFFERENTIAL TYPE: ABNORMAL
EOSINOPHILS RELATIVE PERCENT: 8 % (ref 0–4)
EPITHELIAL CELLS UA: ABNORMAL /HPF
GFR AFRICAN AMERICAN: >60 ML/MIN
GFR NON-AFRICAN AMERICAN: >60 ML/MIN
GFR SERPL CREATININE-BSD FRML MDRD: ABNORMAL ML/MIN/{1.73_M2}
GFR SERPL CREATININE-BSD FRML MDRD: ABNORMAL ML/MIN/{1.73_M2}
GLUCOSE BLD-MCNC: 138 MG/DL (ref 70–99)
GLUCOSE URINE: NEGATIVE
HCT VFR BLD CALC: 39.3 % (ref 36–46)
HEMOGLOBIN: 13.2 G/DL (ref 12–16)
IMMATURE GRANULOCYTES: ABNORMAL %
KETONES, URINE: NEGATIVE
LEUKOCYTE ESTERASE, URINE: NEGATIVE
LYMPHOCYTES # BLD: 27 % (ref 24–44)
MCH RBC QN AUTO: 31.6 PG (ref 26–34)
MCHC RBC AUTO-ENTMCNC: 33.7 G/DL (ref 31–37)
MCV RBC AUTO: 93.7 FL (ref 80–100)
MONOCYTES # BLD: 5 % (ref 1–7)
MUCUS: ABNORMAL
NITRITE, URINE: NEGATIVE
NRBC AUTOMATED: ABNORMAL PER 100 WBC
OTHER OBSERVATIONS UA: ABNORMAL
PDW BLD-RTO: 14 % (ref 11.5–14.9)
PH UA: 6.5 (ref 5–8)
PLATELET # BLD: 241 K/UL (ref 150–450)
PLATELET ESTIMATE: ABNORMAL
PMV BLD AUTO: 8.1 FL (ref 6–12)
POTASSIUM SERPL-SCNC: 3.7 MMOL/L (ref 3.7–5.3)
PROTEIN UA: NEGATIVE
RBC # BLD: 4.19 M/UL (ref 4–5.2)
RBC # BLD: ABNORMAL 10*6/UL
RBC UA: ABNORMAL /HPF
RENAL EPITHELIAL, UA: ABNORMAL /HPF
SEG NEUTROPHILS: 59 % (ref 36–66)
SEGMENTED NEUTROPHILS ABSOLUTE COUNT: 5.7 K/UL (ref 1.3–9.1)
SODIUM BLD-SCNC: 142 MMOL/L (ref 135–144)
SPECIFIC GRAVITY UA: 1.01 (ref 1–1.03)
TRICHOMONAS: ABNORMAL
TURBIDITY: ABNORMAL
URINE HGB: NEGATIVE
UROBILINOGEN, URINE: NORMAL
WBC # BLD: 9.5 K/UL (ref 3.5–11)
WBC # BLD: ABNORMAL 10*3/UL
WBC UA: ABNORMAL /HPF
YEAST: ABNORMAL

## 2021-12-21 PROCEDURE — 36415 COLL VENOUS BLD VENIPUNCTURE: CPT

## 2021-12-21 PROCEDURE — 87641 MR-STAPH DNA AMP PROBE: CPT

## 2021-12-21 PROCEDURE — 85025 COMPLETE CBC W/AUTO DIFF WBC: CPT

## 2021-12-21 PROCEDURE — 80048 BASIC METABOLIC PNL TOTAL CA: CPT

## 2021-12-21 PROCEDURE — 81001 URINALYSIS AUTO W/SCOPE: CPT

## 2021-12-21 RX ORDER — UBROGEPANT 50 MG/1
TABLET ORAL PRN
COMMUNITY

## 2021-12-21 ASSESSMENT — ENCOUNTER SYMPTOMS
SHORTNESS OF BREATH: 1
BACK PAIN: 1
WHEEZING: 0
SORE THROAT: 0
SINUS PRESSURE: 0
DIARRHEA: 1
APNEA: 1
ABDOMINAL PAIN: 0
RHINORRHEA: 0
COUGH: 1
TROUBLE SWALLOWING: 0
CONSTIPATION: 0
NAUSEA: 0
SINUS PAIN: 0
VOMITING: 0
ABDOMINAL DISTENTION: 0
CHEST TIGHTNESS: 0

## 2021-12-21 NOTE — PROGRESS NOTES
Dr. Brenda Canada, anesthesia, was contacted and informed of patient's history and planned surgery. Cardiac clearance requested, patient saw [facility] 12/2021 for another surgery patient just had and got clearance. No need for medical clearance, ok with past medical clearance that was done 8/2021. Surgery scheduling will notify Dr. Noemi Littlejohn office who will be responsible for making sure the clearance is obtained and is in the chart for surgery. Called [facility] and received copy of most recent EKG 12/10/21 and loop recorder check.

## 2021-12-21 NOTE — H&P
HISTORY and [facility] [address]       NAME:  Gómez Taylor  MRN: [id number]   YOB: 1965   Date: 12/21/2021   Age: 64 y.o. Gender: female     COMPLAINT AND PRESENT HISTORY:   Gómez Taylor is 64 y.o.,  female, presents for pre-anesthesia/admission testing for KNEE TOTAL ARTHROPLASTY- RIGHT per Dr. Joann León. Primary dx: DEGENERATIVE JOINT DISEASE RIGHT KNEE.    HPI: Pt states 31 years ago she fell in a parking lot and cracked her knee cap, states she has had issues with the joint/leg since then. She state about 10 years ago her pain started getting worse, states at that time she weighed over 400lbs and was unfit for surgery. Pt underwent a gastric sleeve in 2015 and since lost about 200lbs. She had her left knee replaced in Aug of this year with good outcome and no complications. She denies any pain in her left knee, she rates pain 7/10 in her right knee, pain is constant. She does also report her joint will become swollen and red. She states knee does lock and give out on her, she does report a fall almost one week ago. Pt does have MS and states during flare ups she will use her walker but she does not feel she needs it currently. Symptoms are exacerbated with prolonged periods of standing or walking. Testing completed r/t condition:  X-RAY RIGHT KNEE, 2-22-21:  Narrative   KNEE X-RAY       4 views of the bilateral knee including AP, bilateral tunnel, and lateral    in the upright position, and skyline views reveal anatomic alignment with    no fracture or dislocation. Kellgren grade II changes of osteoarthritis    (joint space narrowing, osteophyte, subchondral sclerosis, bony    deformity/cyst) of the tri compartment(s). No osseous loose bodies. No    bony erosion or periosteal reaction. No soft tissue masses. Proximal pole    of patella is spurring.        Impression: Moderate osteoarthritis of both knees.        Review of additional significant medical hx:  A-FIB, DIZZINESS, HLD, HTN, LOOP RECORDER:  Current medications r/t condition: COREG, LASIX, XARELTO    EKG 7/15/21  Normal sinus rhythm  ST & T wave abnormality, consider inferior ischemia  ST & T wave abnormality, consider anterolateral ischemia  Prolonged QT  Abnormal ECG  No previous ECGs available    Nuc Stress Lexiscan 8/3/21    Baseline ECG indicates sinus rhythm and non-specific ST-T wave changes. There were no arrhythmias during stress. The stress ECG was negative     Perfusion Comments: Left ventricular perfusion is normal. Based on the   perfusion study data, risk of cardiovascular events is low risk. The study   is normal.      BREAST CA-in remission, COLON CA-s/p hemicolectomy    COPD, HOME O2 2L- PRN for SOB. Current medications r/t condition: ALBUTEROL, SYMBICORT    DM: Lab Results   LABA1C 6.6 (H) 07/15/2021    Narcolepsy-taking ritalin   SEIZURE:  Current medications r/t condition: TEGRETOL    RA: taking gabapentin and robaxin for pain     SLEEP APNEA: does not use machine. STROKE: 2008, no residual effects, on xarelto. Seizure disorder- has not had one in over 1.5 year, result of TBI    Pt does have upper and lower partial dentures. Pt denies any hx of MRSA infection  Pt xaralto   Pt does have a hx of PONV  Pt denies any acute symptoms of illness at this time including no SOB, CP, fever, URI or UTI symptoms.    PAST MEDICAL HISTORY     Past Medical History:   Diagnosis Date    Atrial fibrillation ([address]     Blurred vision     left eye     Breast cancer ([address]     bilateral remission since 2012    C. difficile colitis     2015    Colon cancer [facility]     stage IV remission since 2016    COPD (chronic obstructive pulmonary disease) [facility])     COVID-19 Nov 2019 & Dec 2020    Depression     Diabetes mellitus ([address]     Diverticulosis     Dizziness     from sitting to standing    Fibromyalgia     GERD (gastroesophageal reflux disease)     History of loop recorder     Hyperlipidemia     Hypertension     Kidney stones     Multiple sclerosis ([address]     On home oxygen therapy     CPAP at night, home oxygen PRN    PONV (postoperative nausea and vomiting)     Rheumatoid arthritis ([address]     Seizure ([facility] 7/    last seizure 7/18/2020  first seizure 1992 after head injury take gabapentin    Sleep apnea     CPAP nightly    Unspecified cerebral artery occlusion with cerebral infarction     2008     Wears glasses        SURGICAL HISTORY       Past Surgical History:   Procedure Laterality Date    APPENDECTOMY  1985    BACK SURGERY      neck and back screws and cage    BREAST BIOPSY  [id number]    BREAST LUMPECTOMY      bilateral 1998,2000,2002     CARDIAC SURGERY      cardiac cath negative in 2018    CARDIOVERSION      several times   [facility]    COLONOSCOPY      ENDOSCOPY, COLON, DIAGNOSTIC      FRACTURE SURGERY      right wrist ORIF    HYSTERECTOMY  2010    INSERTABLE CARDIAC MONITOR      loop recorder    KNEE ARTHROSCOPY Right 8/14/2020    RIGHT KNEE ARTHROSCOPY WITH PARTIAL MEDIAL MENISCECTOMY WITH DEBRIDEMENT performed by Lizabeth Arias DO at [facility] ARTHROSCOPY Left 11/6/2020    LEFT KNEE ARTHROSCOPY WITH PARTIAL LATERAL MENISCAL TEAR REPAIR AND [address] performed by Lizabeth Arias DO at [address], [address] [place] [address] RESECTION  12/2021    neck area    SIGMOIDECTOMY  2016    for cancer    SKIN BIOPSY      moles    SLEEVE GASTRECTOMY  5/19/15    GASTRECTOMY SLEEVE LAPAROSCOPIC           TONSILLECTOMY  1985    TOTAL KNEE ARTHROPLASTY Left 8/31/2021    KNEE TOTAL ARTHROPLASTY performed by Betzy Sanz MD at [facility]  1/30/15       SOCIAL HISTORY       Social History     Socioeconomic History    Marital status: Legally      Spouse name: None    Number of children: None    Years of education: None    Highest education level: None   Occupational History    Occupation: Disabled   Tobacco Use    Smoking status: Former Smoker     Packs/day: 1.00     Years: 20.00     Pack years: 20.00     Quit date: 2020     Years since quittin.4    Smokeless tobacco: Never Used   Vaping Use    Vaping Use: Never used   Substance and Sexual Activity    Alcohol use: Yes     Comment: daily    Drug use: No    Sexual activity: Yes     Partners: Male   Other Topics Concern    None   Social History Narrative    None     Social Determinants of Health     Financial Resource Strain:     Difficulty of Paying Living Expenses: Not on file   Food Insecurity:     Worried About Running Out of Food in the Last Year: Not on file    Jose of Food in the Last Year: Not on file   Transportation Needs:     Lack of Transportation (Medical): Not on file    Lack of Transportation (Non-Medical):  Not on file   Physical Activity:     Days of Exercise per Week: Not on file    Minutes of Exercise per Session: Not on file   Stress:     Feeling of Stress : Not on file   Social Connections:     Frequency of Communication with Friends and Family: Not on file    Frequency of Social Gatherings with Friends and Family: Not on file    Attends Roman [employer] Services: Not on file    Active Member of [address] [place] [address] or Organizations: Not on file    Attends Club or Organization Meetings: Not on file    Marital Status: Not on file   Intimate Partner Violence:     Fear of Current or Ex-Partner: Not on file    Emotionally Abused: Not on file    Physically Abused: Not on file    Sexually Abused: Not on file   Housing Stability:     Unable to Pay for Housing in the Last Year: Not on file    Number of [place] in the Last Year: Not on file    Unstable Housing in the Last Year: Not on file       REVIEW OF SYSTEMS      Allergies   Allergen Reactions    Ace Inhibitors Anaphylaxis, Other (See Comments), Hives and Shortness Of Breath     ANGIOEDEMA  lisinopril  angiodema  ANGIOEDEMA  ANGIOEDEMA  Betadine Swab Aid [Povidone-Iodine] Hives and Itching    Betadine [Povidone Iodine] Hives    Iodine Anaphylaxis, Hives and Shortness Of Breath     INCLUDES BETADINE  Iv dye. Pt has not had any reaction w/ dye from CT scans for 10 years. Per dr. Selina Saavedra contrast (omni 300) is fine to use. 8/14/19 - lilibeth      Lurasidone Other (See Comments)    Lurasidone Hcl      Other reaction(s): Unknown    Shellfish Allergy Anaphylaxis    Shellfish-Derived Products Anaphylaxis    Iodides      Pre treat with benadryl    Lamotrigine Hives    Macrobid [Nitrofurantoin Macrocrystal] Hives    Pyridium [Phenazopyridine Hcl] Hives    Topiramate Other (See Comments)     Confusion        Current Outpatient Medications on File Prior to Encounter   Medication Sig Dispense Refill    Ubrogepant (UBRELVY) 50 MG TABS Take by mouth as needed      brexpiprazole (REXULTI) 0.5 MG TABS tablet Take 0.5 mg by mouth daily      Multiple Vitamins-Minerals (MULTI COMPLETE PO) Take by mouth 2 times daily       potassium chloride (KLOR-CON) 20 MEQ packet Take 20 mEq by mouth 2 times daily      ferrous sulfate (IRON 325) 325 (65 Fe) MG tablet Take 325 mg by mouth 2 times daily      Diclofenac Sodium 3 % GEL Apply topically as needed      methylphenidate (RITALIN) 20 MG tablet Take 20 mg by mouth 2 times daily.  albuterol sulfate HFA (VENTOLIN HFA) 108 (90 Base) MCG/ACT inhaler Inhale 2 puffs into the lungs every 6 hours as needed for Wheezing      methocarbamol (ROBAXIN) 750 MG tablet Take 1,500 mg by mouth 3 times daily      gabapentin (NEURONTIN) 600 MG tablet Take 1 tablet by mouth 3 times daily for 30 days.  (Patient taking differently: Take 1,200 mg by mouth 3 times daily. ) 90 tablet 0    traZODone (DESYREL) 50 MG tablet Take 1 tablet by mouth nightly as needed for Sleep 30 tablet 0    metFORMIN (GLUCOPHAGE) 500 MG tablet Take 1 tablet by mouth 2 times daily (with meals) 60 tablet 0    rivaroxaban (XARELTO) 20 MG TABS tablet Take 1 tablet by mouth Daily with supper 30 tablet 0    carvedilol (COREG) 12.5 MG tablet Take 1 tablet by mouth 2 times daily (with meals) 60 tablet 0    furosemide (LASIX) 40 MG tablet Take 1 tablet by mouth daily (Patient taking differently: Take 40 mg by mouth 2 times daily ) 30 tablet 0    magnesium oxide (MAG-OX) 400 (241.3 Mg) MG TABS tablet Take 1 tablet by mouth daily 30 tablet 0    esomeprazole (NEXIUM) 40 MG delayed release capsule Take 1 capsule by mouth every morning (before breakfast) 30 capsule 0    ondansetron (ZOFRAN-ODT) 4 MG disintegrating tablet Take 4 mg by mouth every 8 hours as needed for Nausea or Vomiting      cetirizine (ZYRTEC) 10 MG tablet Take 10 mg by mouth daily      SUPER B COMPLEX/C PO Take 1 tablet by mouth 2 times daily       loperamide (IMODIUM) 2 MG capsule Take 2 mg by mouth 4 times daily as needed for Diarrhea Takes two capsules at onset      lidocaine (LIDODERM) 5 % Place 1 patch onto the skin daily as needed 12 hours on, 12 hours off.  fluticasone (FLONASE) 50 MCG/ACT nasal spray 1 spray by Each Nostril route daily      budesonide-formoterol (SYMBICORT) 160-4.5 MCG/ACT AERO INHALE 2 PUFFS BID UTD       No current facility-administered medications on file prior to encounter. Review of Systems   Constitutional: Negative for chills, diaphoresis, fatigue and fever. HENT: Positive for dental problem. Negative for congestion, ear pain, postnasal drip, rhinorrhea, sinus pressure, sinus pain, sore throat and trouble swallowing. Partial upper and lower dentures. Eyes: Positive for visual disturbance. Glasses   Respiratory: Positive for apnea, cough and shortness of breath. Negative for chest tightness and wheezing. MARTHA, COPD, ASTHMA  Occasional Yellow sputum. Cardiovascular: Positive for palpitations and leg swelling. Negative for chest pain. Occasional palpitations. Gastrointestinal: Positive for diarrhea.  Negative for abdominal distention, abdominal pain, constipation, nausea and vomiting. Chronic diarrhea, denies any blood in stool. Genitourinary: Negative for dysuria, flank pain, frequency and hematuria. Musculoskeletal: Positive for back pain, joint swelling, myalgias and neck pain. Hx RA and MS   Skin: Positive for wound. Negative for rash. Healing surgical scar to posterior neck. Neurological: Positive for headaches. Negative for dizziness, weakness and numbness. Migraines. Hematological: Does not bruise/bleed easily. Psychiatric/Behavioral: Negative for agitation and confusion. The patient is not nervous/anxious. GENERAL PHYSICAL EXAM     Vitals: BP (!) 153/90   Pulse 77   Temp 98 °F (36.7 °C)   Resp 18   Ht 5' 3\" (1.6 m)   Wt 250 lb (113.4 kg)   SpO2 97%   BMI 44.29 kg/m²               Physical Exam  Constitutional:       Appearance: Normal appearance. She is obese. HENT:      Head: Normocephalic and atraumatic. Nose: No congestion or rhinorrhea. Mouth/Throat:      Mouth: Mucous membranes are moist.      Pharynx: Oropharynx is clear. Eyes:      Extraocular Movements: Extraocular movements intact. Conjunctiva/sclera: Conjunctivae normal.      Pupils: Pupils are equal, round, and reactive to light. Comments: +glasses. Neck:      Comments: Posterior tenderness noted d/t lipoma removal    Cardiovascular:      Rate and Rhythm: Normal rate and regular rhythm. Pulses: Normal pulses. Heart sounds: Normal heart sounds. No murmur heard. No friction rub. No gallop. Comments: One abnormal beat heard. Pulmonary:      Effort: No respiratory distress. Breath sounds: No stridor. No wheezing, rhonchi or rales. Chest:      Chest wall: No tenderness. Abdominal:      General: Abdomen is flat. Bowel sounds are normal. There is no distension. Palpations: Abdomen is soft. There is no mass. Tenderness:  There is no abdominal tenderness. There is no right CVA tenderness, guarding or rebound. Hernia: No hernia is present. Musculoskeletal:         General: Tenderness present. Cervical back: Normal range of motion and neck supple. Right lower leg: Edema present. Left lower leg: Edema present. Comments: Right knee, ROM decreased. Skin:     Findings: Lesion present. Comments: Healing surgical incision to posterior neck, s/p lipoma removal    Neurological:      General: No focal deficit present. Mental Status: She is alert and oriented to person, place, and time. Mental status is at baseline. Motor: Weakness present. Coordination: Coordination normal.      Gait: Gait abnormal.      Comments: RLE r/t pain    Psychiatric:         Mood and Affect: Mood normal.         Behavior: Behavior normal.         Thought Content:  Thought content normal.         Judgment: Judgment normal.         LAB REVIEW       SURGERY / PROVISIONAL DIAGNOSES:      KNEE TOTAL ARTHROPLASTY- RIGHT    DEGENERATIVE JOINT DISEASE RIGHT KNEE    Patient Active Problem List    Diagnosis Date Noted    Primary osteoarthritis of left knee 08/31/2021    S/P arthroscopic partial medial meniscectomy     Postoperative hypoxia 08/14/2020    Multiple sclerosis ([address]     Rheumatoid arthritis ([address]     Diabetes mellitus ([address]     Severe episode of recurrent major depressive disorder, without psychotic features ([address] 06/03/2020    Chronic pain syndrome     PAF (paroxysmal atrial fibrillation) ([address] 01/21/2020    Hypokalemia 01/21/2020    MDD (major depressive disorder), severe ([address] 01/20/2020    Bilateral leg numbness 01/15/2019    Leg numbness 01/14/2019    Morbid obesity with BMI of 50.0-59.9, adult ([address] 02/09/2015    Gastroparesis 02/09/2015    Hypertension 06/04/2014    Hyperlipidemia 06/04/2014    Urinary incontinence 06/04/2014    Diabetes ([address] 06/04/2014    Arrhythmia 06/04/2014    Arthritis 06/04/2014    Fibromyalgia 06/04/2014    Sleep apnea 06/04/2014    Stroke ([facility] 06/04/2014    Migraine 06/04/2014    Seizure ([facility] 06/04/2014    Back pain, chronic 06/04/2014    Gout 06/04/2014               KANG Lorenzo - CNP on 12/21/2021 at 12:00 PM

## 2021-12-22 LAB
MRSA, DNA, NASAL: NORMAL
SPECIMEN DESCRIPTION: NORMAL

## 2021-12-27 ENCOUNTER — OFFICE VISIT (OUTPATIENT)
Dept: ORTHOPEDIC SURGERY | Age: 56
End: 2021-12-27
Payer: COMMERCIAL

## 2021-12-27 DIAGNOSIS — G89.29 CHRONIC PAIN OF RIGHT KNEE: Primary | ICD-10-CM

## 2021-12-27 DIAGNOSIS — M25.561 CHRONIC PAIN OF RIGHT KNEE: Primary | ICD-10-CM

## 2021-12-27 PROCEDURE — G8428 CUR MEDS NOT DOCUMENT: HCPCS | Performed by: ORTHOPAEDIC SURGERY

## 2021-12-27 PROCEDURE — G8417 CALC BMI ABV UP PARAM F/U: HCPCS | Performed by: ORTHOPAEDIC SURGERY

## 2021-12-27 PROCEDURE — 99211 OFF/OP EST MAY X REQ PHY/QHP: CPT | Performed by: ORTHOPAEDIC SURGERY

## 2021-12-27 NOTE — PROGRESS NOTES
Patient is here today just to discuss her pending right total knee replacement. Patient has had previous left total knee replacement done in August this year. She is doing beautifully with this. Unfortunately patient was scheduled for on January 4 to have her right knee replaced however her insurance denied this due to her BMI despite the fact that her BMI remains the same as it did back then. She does have a  around this. Patient was not examined today    Patient had a discussion with this. We we'll put in an appeals letter together however it is unlikely that we'll have any resolution before a week from tomorrow. The patient in the meantime states that she is get a go on a liquid diet to help lower her BMI to acceptable level. Again the patient's BMI was identical with her first total knee arthroplasty and she is doing fine and had no postoperative complications.     We will await the appeals process before rescheduling

## 2022-01-05 ENCOUNTER — TELEPHONE (OUTPATIENT)
Dept: ORTHOPEDIC SURGERY | Age: 57
End: 2022-01-05

## 2022-01-05 NOTE — TELEPHONE ENCOUNTER
Spoke with patient advised him that with her just hitting it, some bruising and swelling is normal, advised her that if the pain doesn't go away within a week or she is unable to walk on it to give us call to possibly be seen

## 2022-01-18 ENCOUNTER — TELEPHONE (OUTPATIENT)
Dept: ORTHOPEDIC SURGERY | Age: 57
End: 2022-01-18

## 2022-01-18 NOTE — TELEPHONE ENCOUNTER
Patient called in as she had a post op scheduled for tomorrow  01/19 but did not have surgery. Patient is requesting a call from clinical in regards to being approved by insurance and has some questions please advise thank you!

## 2022-01-30 ENCOUNTER — HOSPITAL ENCOUNTER (EMERGENCY)
Age: 57
Discharge: ANOTHER HOSPITAL | End: 2022-01-31
Attending: EMERGENCY MEDICINE
Payer: COMMERCIAL

## 2022-01-30 DIAGNOSIS — F10.920 ACUTE ALCOHOLIC INTOXICATION WITHOUT COMPLICATION (HCC): ICD-10-CM

## 2022-01-30 DIAGNOSIS — F14.10 COCAINE ABUSE (HCC): ICD-10-CM

## 2022-01-30 DIAGNOSIS — T50.902A INTENTIONAL DRUG OVERDOSE, INITIAL ENCOUNTER (HCC): Primary | ICD-10-CM

## 2022-01-30 LAB
-: ABNORMAL
ABSOLUTE EOS #: 0.2 K/UL (ref 0–0.44)
ABSOLUTE IMMATURE GRANULOCYTE: 0.05 K/UL (ref 0–0.3)
ABSOLUTE LYMPH #: 2.52 K/UL (ref 1.1–3.7)
ABSOLUTE MONO #: 0.5 K/UL (ref 0.1–1.2)
ACETAMINOPHEN LEVEL: <10 UG/ML (ref 10–30)
ALBUMIN SERPL-MCNC: 3.4 G/DL (ref 3.5–5.2)
ALBUMIN/GLOBULIN RATIO: ABNORMAL (ref 1–2.5)
ALP BLD-CCNC: 78 U/L (ref 35–104)
ALT SERPL-CCNC: 7 U/L (ref 5–33)
AMORPHOUS: ABNORMAL
AMPHETAMINE SCREEN URINE: NEGATIVE
ANION GAP SERPL CALCULATED.3IONS-SCNC: 13 MMOL/L (ref 9–17)
AST SERPL-CCNC: 16 U/L
BACTERIA: ABNORMAL
BARBITURATE SCREEN URINE: NEGATIVE
BASOPHILS # BLD: 1 % (ref 0–2)
BASOPHILS ABSOLUTE: 0.05 K/UL (ref 0–0.2)
BENZODIAZEPINE SCREEN, URINE: NEGATIVE
BILIRUB SERPL-MCNC: 0.18 MG/DL (ref 0.3–1.2)
BILIRUBIN URINE: NEGATIVE
BUN BLDV-MCNC: 13 MG/DL (ref 6–20)
BUN/CREAT BLD: 20 (ref 9–20)
BUPRENORPHINE URINE: ABNORMAL
CALCIUM SERPL-MCNC: 8.4 MG/DL (ref 8.6–10.4)
CANNABINOID SCREEN URINE: NEGATIVE
CASTS UA: ABNORMAL /LPF
CHLORIDE BLD-SCNC: 98 MMOL/L (ref 98–107)
CO2: 25 MMOL/L (ref 20–31)
COCAINE METABOLITE, URINE: POSITIVE
COLOR: YELLOW
COMMENT UA: ABNORMAL
CREAT SERPL-MCNC: 0.66 MG/DL (ref 0.5–0.9)
CRYSTALS, UA: ABNORMAL /HPF
DIFFERENTIAL TYPE: ABNORMAL
EOSINOPHILS RELATIVE PERCENT: 2 % (ref 1–4)
EPITHELIAL CELLS UA: ABNORMAL /HPF (ref 0–5)
ETHANOL PERCENT: 0.03 %
ETHANOL: 27 MG/DL
GFR AFRICAN AMERICAN: >60 ML/MIN
GFR NON-AFRICAN AMERICAN: >60 ML/MIN
GFR SERPL CREATININE-BSD FRML MDRD: ABNORMAL ML/MIN/{1.73_M2}
GFR SERPL CREATININE-BSD FRML MDRD: ABNORMAL ML/MIN/{1.73_M2}
GLUCOSE BLD-MCNC: 148 MG/DL (ref 70–99)
GLUCOSE URINE: NEGATIVE
HCT VFR BLD CALC: 40.1 % (ref 36.3–47.1)
HEMOGLOBIN: 13.1 G/DL (ref 11.9–15.1)
IMMATURE GRANULOCYTES: 1 %
IRON: 60 UG/DL (ref 37–145)
KETONES, URINE: NEGATIVE
LEUKOCYTE ESTERASE, URINE: ABNORMAL
LYMPHOCYTES # BLD: 26 % (ref 24–43)
MAGNESIUM: 2.1 MG/DL (ref 1.6–2.6)
MCH RBC QN AUTO: 31.4 PG (ref 25.2–33.5)
MCHC RBC AUTO-ENTMCNC: 32.7 G/DL (ref 28.4–34.8)
MCV RBC AUTO: 96.2 FL (ref 82.6–102.9)
MDMA URINE: ABNORMAL
METHADONE SCREEN, URINE: NEGATIVE
METHAMPHETAMINE, URINE: ABNORMAL
MONOCYTES # BLD: 5 % (ref 3–12)
MUCUS: ABNORMAL
NITRITE, URINE: NEGATIVE
NRBC AUTOMATED: 0 PER 100 WBC
OPIATES, URINE: NEGATIVE
OTHER OBSERVATIONS UA: ABNORMAL
OXYCODONE SCREEN URINE: NEGATIVE
PDW BLD-RTO: 13.6 % (ref 11.8–14.4)
PH UA: 6 (ref 5–8)
PHENCYCLIDINE, URINE: NEGATIVE
PLATELET # BLD: 233 K/UL (ref 138–453)
PLATELET ESTIMATE: ABNORMAL
PMV BLD AUTO: 9.9 FL (ref 8.1–13.5)
POTASSIUM SERPL-SCNC: 3.5 MMOL/L (ref 3.7–5.3)
PROPOXYPHENE, URINE: ABNORMAL
PROTEIN UA: NEGATIVE
RBC # BLD: 4.17 M/UL (ref 3.95–5.11)
RBC # BLD: ABNORMAL 10*6/UL
RBC UA: ABNORMAL /HPF (ref 0–2)
RENAL EPITHELIAL, UA: ABNORMAL /HPF
SALICYLATE LEVEL: 1 MG/DL (ref 3–10)
SARS-COV-2, RAPID: NOT DETECTED
SEG NEUTROPHILS: 65 % (ref 36–65)
SEGMENTED NEUTROPHILS ABSOLUTE COUNT: 6.24 K/UL (ref 1.5–8.1)
SODIUM BLD-SCNC: 136 MMOL/L (ref 135–144)
SPECIFIC GRAVITY UA: 1.01 (ref 1–1.03)
SPECIMEN DESCRIPTION: NORMAL
TEST INFORMATION: ABNORMAL
TOTAL PROTEIN: 6.4 G/DL (ref 6.4–8.3)
TOXIC TRICYCLIC SC,BLOOD: NEGATIVE
TRICHOMONAS: ABNORMAL
TRICYCLIC ANTIDEPRESSANTS, UR: ABNORMAL
TURBIDITY: CLEAR
URINE HGB: ABNORMAL
UROBILINOGEN, URINE: NORMAL
WBC # BLD: 9.6 K/UL (ref 3.5–11.3)
WBC # BLD: ABNORMAL 10*3/UL
WBC UA: ABNORMAL /HPF (ref 0–5)
YEAST: ABNORMAL

## 2022-01-30 PROCEDURE — 2580000003 HC RX 258: Performed by: EMERGENCY MEDICINE

## 2022-01-30 PROCEDURE — 6370000000 HC RX 637 (ALT 250 FOR IP): Performed by: EMERGENCY MEDICINE

## 2022-01-30 PROCEDURE — 80179 DRUG ASSAY SALICYLATE: CPT

## 2022-01-30 PROCEDURE — 83540 ASSAY OF IRON: CPT

## 2022-01-30 PROCEDURE — 87635 SARS-COV-2 COVID-19 AMP PRB: CPT

## 2022-01-30 PROCEDURE — G0480 DRUG TEST DEF 1-7 CLASSES: HCPCS

## 2022-01-30 PROCEDURE — 93005 ELECTROCARDIOGRAM TRACING: CPT | Performed by: EMERGENCY MEDICINE

## 2022-01-30 PROCEDURE — 85025 COMPLETE CBC W/AUTO DIFF WBC: CPT

## 2022-01-30 PROCEDURE — 80143 DRUG ASSAY ACETAMINOPHEN: CPT

## 2022-01-30 PROCEDURE — 99285 EMERGENCY DEPT VISIT HI MDM: CPT

## 2022-01-30 PROCEDURE — 80307 DRUG TEST PRSMV CHEM ANLYZR: CPT

## 2022-01-30 PROCEDURE — 80053 COMPREHEN METABOLIC PANEL: CPT

## 2022-01-30 PROCEDURE — 83735 ASSAY OF MAGNESIUM: CPT

## 2022-01-30 PROCEDURE — 81001 URINALYSIS AUTO W/SCOPE: CPT

## 2022-01-30 RX ORDER — 0.9 % SODIUM CHLORIDE 0.9 %
1000 INTRAVENOUS SOLUTION INTRAVENOUS ONCE
Status: COMPLETED | OUTPATIENT
Start: 2022-01-30 | End: 2022-01-30

## 2022-01-30 RX ORDER — HYDROXYZINE HYDROCHLORIDE 25 MG/1
25 TABLET, FILM COATED ORAL 3 TIMES DAILY PRN
COMMUNITY

## 2022-01-30 RX ORDER — POTASSIUM CHLORIDE 20 MEQ/1
40 TABLET, EXTENDED RELEASE ORAL ONCE
Status: COMPLETED | OUTPATIENT
Start: 2022-01-30 | End: 2022-01-30

## 2022-01-30 RX ADMIN — POTASSIUM CHLORIDE 40 MEQ: 20 TABLET, EXTENDED RELEASE ORAL at 18:15

## 2022-01-30 RX ADMIN — SODIUM CHLORIDE 1000 ML: 9 INJECTION, SOLUTION INTRAVENOUS at 17:12

## 2022-01-30 ASSESSMENT — PAIN DESCRIPTION - PROGRESSION: CLINICAL_PROGRESSION: NOT CHANGED

## 2022-01-30 ASSESSMENT — ENCOUNTER SYMPTOMS
SHORTNESS OF BREATH: 0
VOMITING: 0
BACK PAIN: 1
NAUSEA: 0

## 2022-01-30 ASSESSMENT — PAIN DESCRIPTION - FREQUENCY: FREQUENCY: CONTINUOUS

## 2022-01-30 ASSESSMENT — PAIN SCALES - GENERAL: PAINLEVEL_OUTOF10: 6

## 2022-01-30 ASSESSMENT — PAIN DESCRIPTION - LOCATION: LOCATION: BACK;NECK

## 2022-01-30 ASSESSMENT — PAIN DESCRIPTION - ORIENTATION: ORIENTATION: POSTERIOR

## 2022-01-30 ASSESSMENT — PAIN DESCRIPTION - DESCRIPTORS: DESCRIPTORS: SHARP

## 2022-01-30 ASSESSMENT — PAIN DESCRIPTION - PAIN TYPE: TYPE: CHRONIC PAIN

## 2022-01-30 ASSESSMENT — PAIN DESCRIPTION - ONSET: ONSET: ON-GOING

## 2022-01-30 NOTE — PLAN OF CARE
Patient appears medically stable. Per the ER physician poison control said the patient should be monitored for approximately 6 hours. ER physician agrees the patient has been stable. Her vitals are within normal limits and she is not hypoxic. No signs and symptoms of altered mental status per the ER physician. Mild hypokalemia per labs but otherwise nothing noted requiring admission.   After discussing the case with my attending I have asked the ER to reach back out to our service for admission if the patient is denied after [facility].

## 2022-01-30 NOTE — ED TRIAGE NOTES
Here per EMS. Intentionally took multiple home medications at once. \"I just had a fight with my boyfriend. \" Lives in same house with boyfriend. Today's argument stemmed from boyfriend's chronic health issues and \"I feel like I take care of him and my son, yet they won't care for themselves. I give 110% to both of them, yet get nothing in return. I feel so overwhelmed today. \" States \"I'm just so over it. \" Denies thoughts to hurt self at moment. No suicide attempt in the past. Denies suicidal ideation at present, but states \"at the time, I just wanted to go to sleep and not hear them complain constantly. \" \"I just wanted my boyfriend to give me comfort at that time, and he didn't. \" Maintaining eye contact. Calm, cooperative. Dr Clement Peaks in to see pt. Describes plan of care with possible hospital admission to psych.  Pt agrees to be admitted if needed, but prefers to go to [address] [place] [address].

## 2022-01-30 NOTE — ED PROVIDER NOTES
[facility]  Emergency Department Encounter     Pt Name: Tonya Molina  MRN: [id number]  [place] 1965  Date of evaluation: 1/30/22  PCP:  [address]       Chief Complaint   Patient presents with    Ingestion     home medication       HISTORY OF PRESENT ILLNESS  (Location/Symptom, Timing/Onset, Context/Setting, Quality, Duration, Modifying Factors, Severity.)    Tonya Molina is a 64 y.o. female who presents to the ED via EMS for intentional drug OD. Took a full bottle of her Hydroxyzine 25mg, which holds 90 pills after getting involved in a verbal altercation with her boyfriend. States he has a history of cirrhosis and lung CA but is still drinking and smoking which is frustrating for her. Denies any HI. No A/V hallucinations. Denies any prior psychiatric admissions or suicide attempts. Denies ingesting anything else.      PAST MEDICAL / SURGICAL / SOCIAL / FAMILY HISTORY    has a past medical history of Abdominal pain, epigastric, Abnormal cardiovascular stress test, Atrial fibrillation (HCC), Bilateral edema of lower extremity, Blurred vision, Breast cancer (HCC), C. difficile colitis, Cervical spondylosis with myelopathy and radiculopathy, Colitis, Colon cancer ([address] Colovesical fistula, Compression fracture of first lumbar vertebra (HCC), COPD (chronic obstructive pulmonary disease) ([address] COPD, moderate ([address] COVID-19, Depression, Diabetes mellitus ([address] Diverticulitis, Diverticulosis, Dizziness, Dupuytren's contracture of hand, Fibromyalgia, GERD (gastroesophageal reflux disease), History of loop recorder, Hyperlipidemia, Hypertension, Irritable bowel syndrome with diarrhea, Kidney stones, Malignant neoplasm of skin, Multiple sclerosis ([address] Myocardial infarct ([address] Narcoleptic syndrome, Non-rheumatic mitral regurgitation, On home oxygen therapy, Palpitations, PONV (postoperative nausea and vomiting), Rheumatoid arthritis ([address] S/P hysterectomy with oophorectomy, Seizure ([facility] Sleep apnea, Unspecified cerebral artery occlusion with cerebral infarction, and Wears glasses. has a past surgical history that includes Hysterectomy (); Cholecystectomy (); Appendectomy (); Tonsillectomy ();  section (); laparotomy (, ); Upper gastrointestinal endoscopy (1/30/15); Sleeve Gastrectomy (5/19/15); Colonoscopy; Endoscopy, colon, diagnostic; sigmoidectomy (); Insertable Cardiac Monitor; Knee arthroscopy (Right, 2020); Knee arthroscopy (Left, 2020); Cardiac surgery; skin biopsy; back surgery; Cardioversion; Kyphosis surgery; Total knee arthroplasty (Left, 2021); Breast biopsy (); Breast lumpectomy; fracture surgery; and lipoma resection (2021). Social History     Socioeconomic History    Marital status: Legally      Spouse name: Not on file    Number of children: Not on file    Years of education: Not on file    Highest education level: Not on file   Occupational History    Occupation: Disabled   Tobacco Use    Smoking status: Former Smoker     Packs/day: 1.00     Years: 20.00     Pack years: 20.00     Quit date: 2020     Years since quittin.5    Smokeless tobacco: Never Used   Vaping Use    Vaping Use: Never used   Substance and Sexual Activity    Alcohol use: Yes     Comment: daily    Drug use: No    Sexual activity: Yes     Partners: Male   Other Topics Concern    Not on file   Social History Narrative    Not on file     Social Determinants of Health     Financial Resource Strain:     Difficulty of Paying Living Expenses: Not on file   Food Insecurity:     Worried About [address] in the Last Year: Not on file    [address] [employer] in the Last Year: Not on file   Transportation Needs:     Lack of Transportation (Medical): Not on file    Lack of Transportation (Non-Medical):  Not on file   Physical Activity:     Days of Exercise per Week: Not mouth 2 times daily. Yes Historical Provider, MD   albuterol sulfate HFA (VENTOLIN HFA) 108 (90 Base) MCG/ACT inhaler Inhale 2 puffs into the lungs every 6 hours as needed for Wheezing   Yes Historical Provider, MD   budesonide-formoterol (SYMBICORT) 160-4.5 MCG/ACT AERO INHALE 2 PUFFS BID UTD 9/17/20  Yes Historical Provider, MD   methocarbamol (ROBAXIN) 750 MG tablet Take 1,500 mg by mouth 3 times daily   Yes Historical Provider, MD   traZODone (DESYREL) 50 MG tablet Take 1 tablet by mouth nightly as needed for Sleep 6/8/20  Yes Kamlesh Perry MD   metFORMIN (GLUCOPHAGE) 500 MG tablet Take 1 tablet by mouth 2 times daily (with meals) 6/8/20  Yes Kamlesh Perry MD   rivaroxaban (XARELTO) 20 MG TABS tablet Take 1 tablet by mouth Daily with supper 6/8/20  Yes Kamlesh Perry MD   carvedilol (COREG) 12.5 MG tablet Take 1 tablet by mouth 2 times daily (with meals) 6/8/20  Yes Kamlesh Perry MD   furosemide (LASIX) 40 MG tablet Take 1 tablet by mouth daily  Patient taking differently: Take 40 mg by mouth 2 times daily  6/8/20  Yes Kamlesh Perry MD   magnesium oxide (MAG-OX) 400 (241.3 Mg) MG TABS tablet Take 1 tablet by mouth daily 6/9/20  Yes Kamlesh Perry MD   esomeprazole (NEXIUM) 40 MG delayed release capsule Take 1 capsule by mouth every morning (before breakfast) 6/8/20  Yes Kamlesh Perry MD   ondansetron (ZOFRAN-ODT) 4 MG disintegrating tablet Take 4 mg by mouth every 8 hours as needed for Nausea or Vomiting   Yes Historical Provider, MD   cetirizine (ZYRTEC) 10 MG tablet Take 10 mg by mouth daily   Yes Historical Provider, MD   SUPER B COMPLEX/C PO Take 1 tablet by mouth 2 times daily    Yes Historical Provider, MD   lidocaine (LIDODERM) 5 % Place 1 patch onto the skin daily as needed 12 hours on, 12 hours off.     Yes Historical Provider, MD   fluticasone (FLONASE) 50 MCG/ACT nasal spray 1 spray by Each Nostril route daily   Yes Historical Provider, MD   ferrous sulfate (IRON 325) 325 (65 Fe) MG tablet Take 325 mg by mouth 2 times daily    Historical Provider, MD   gabapentin (NEURONTIN) 600 MG tablet Take 1 tablet by mouth 3 times daily for 30 days. Patient taking differently: Take 1,200 mg by mouth 3 times daily. 6/8/20 12/21/21  Ced Giraldo MD   loperamide (IMODIUM) 2 MG capsule Take 2 mg by mouth 4 times daily as needed for Diarrhea Takes two capsules at onset    Historical Provider, MD       REVIEW OF SYSTEMS    (2-9 systems for level 4, 10 or more for level 5)    Review of Systems   Constitutional: Negative for diaphoresis. HENT: Negative for tinnitus. Eyes: Negative for visual disturbance. Respiratory: Negative for shortness of breath. Cardiovascular: Negative for chest pain and palpitations. Gastrointestinal: Negative for nausea and vomiting. Musculoskeletal: Positive for back pain (chronic) and neck pain (chronic). Neurological: Negative for dizziness, light-headedness and headaches. Psychiatric/Behavioral: Positive for dysphoric mood and suicidal ideas. Negative for hallucinations. PHYSICAL EXAM   (up to 7 for level 4, 8 or more for level 5)    VITALS:   Vitals:    01/30/22 1815 01/30/22 1830 01/30/22 1900 01/30/22 1915   BP: (!) 145/82 (!) 150/77 (!) 141/75 (!) 142/62   Pulse: 75 88 78 72   Resp: 20 21 23 15   Temp:       TempSrc:       SpO2: 95% 95% 94% 94%   Weight:       Height:           Physical Exam  Vitals and nursing note reviewed. Constitutional:       General: She is not in acute distress. Appearance: She is well-developed. She is not diaphoretic. HENT:      Head: Normocephalic and atraumatic. Eyes:      Conjunctiva/sclera: Conjunctivae normal.   Cardiovascular:      Rate and Rhythm: Normal rate and regular rhythm. Heart sounds: Normal heart sounds. Pulmonary:      Effort: Pulmonary effort is normal. No respiratory distress. Breath sounds: Normal breath sounds. No wheezing or rales.    Abdominal:      General: There is no distension. Palpations: Abdomen is soft. Tenderness: There is no abdominal tenderness. Musculoskeletal:         General: Normal range of motion. Cervical back: Normal range of motion. Skin:     General: Skin is warm and dry. Neurological:      General: No focal deficit present. Mental Status: She is alert. Psychiatric:         Attention and Perception: Attention normal.         Mood and Affect: Mood is depressed. Affect is blunt and angry. Speech: Speech normal.         Behavior: Behavior normal. Behavior is cooperative. Thought Content: Thought content includes suicidal ideation. Thought content does not include homicidal ideation.          DIFFERENTIAL  DIAGNOSIS   PLAN (LABS / IMAGING / EKG):  Orders Placed This Encounter   Procedures    COVID-19, Rapid    CBC with DIFF    Comprehensive Metabolic Panel w/ Reflex to MG    TOX SCR, BLD, ED    Urinalysis with Microscopic    Urine Drug Screen    Magnesium    Iron    Telemetry monitoring - continuous duration    Inpatient consult to Hospitalist    EKG 12 Lead    Insert peripheral IV    Suicide precautions       MEDICATIONS ORDERED:  Orders Placed This Encounter   Medications    0.9 % sodium chloride bolus    potassium chloride (KLOR-CON M) extended release tablet 40 mEq     DIAGNOSTIC RESULTS / EMERGENCYDEPARTMENT COURSE / MDM   LABS:  Labs Reviewed   CBC WITH AUTO DIFFERENTIAL - Abnormal; Notable for the following components:       Result Value    Immature Granulocytes 1 (*)     All other components within normal limits   COMPREHENSIVE METABOLIC PANEL W/ REFLEX TO MG FOR LOW K - Abnormal; Notable for the following components:    Glucose 148 (*)     Calcium 8.4 (*)     Potassium 3.5 (*)     Total Bilirubin 0.18 (*)     Albumin 3.4 (*)     All other components within normal limits   TOX SCR, BLD, ED - Abnormal; Notable for the following components:    Acetaminophen Level <10 (*)     Ethanol 27 (*)     Ethanol percent 7.849 (*)     Salicylate Lvl 1 (*)     All other components within normal limits   URINALYSIS WITH MICROSCOPIC - Abnormal; Notable for the following components:    Urine Hgb TRACE (*)     Leukocyte Esterase, Urine SMALL (*)     All other components within normal limits   URINE DRUG SCREEN - Abnormal; Notable for the following components:    Cocaine Metabolite, Urine POSITIVE (*)     All other components within normal limits   COVID-19, RAPID   MAGNESIUM   IRON       RADIOLOGY:  No results found. EKG    EKG Interpretation    Interpreted by emergency department physician    Rhythm: normal sinus   Rate: normal  Axis: normal  Ectopy: none  Conduction: normal  ST Segments: depression in  I, II and aVl  T Waves: inversion in  v2, v3, v4, v5 and aVf  Q Waves: none    Clinical Impression: non-specific EKG.  No changed from 07/15/2021    All EKG's are interpreted by the Emergency Department Physician whoeither signs or Co-signs this chart in the absence of a cardiologist.    EMERGENCY DEPARTMENT COURSE:  ED Course as of 01/30/22 2112   Sun Jan 30, 2022   1707 CBC with DIFF(!):    WBC 9.6   RBC 4.17   Hemoglobin Quant 13.1   Hematocrit 40.1   MCV 96.2   MCH 31.4   MCHC 32.7   RDW 13.6   Platelet Count 537   MPV 9.9   NRBC Automated 0.0   Differential Type NOT REPORTED   Seg Neutrophils 65   Lymphocytes 26   Monocytes 5   Eosinophils % 2   Basophils 1   Immature Granulocytes 1(!)   Segs Absolute 6.24   Absolute Lymph # 2.52   Absolute Mono # 0.50   Absolute Eos # 0.20   Basophils Absolute 0.05   Absolute Immature Granulocyte 0.05   WBC Morphology NOT REPORTED   RBC Morphology NOT REPORTED   Platelet Estimate NOT REPORTED [AO]   1707 Urinalysis with Microscopic(!):    Color, UA Yellow   Turbidity UA Clear   Glucose, UA NEGATIVE   Bilirubin, Urine NEGATIVE   Ketones, Urine NEGATIVE   Specific Kite, UA 1.010   Urine Hgb TRACE(!)   pH, UA 6.0   Protein, UA NEGATIVE   Urobilinogen, Urine Normal   Nitrite, Urine NEGATIVE   Leukocyte Esterase, Urine SMALL(!)   Urinalysis Comments NOT REPORTED   -        WBC, UA 5 TO 10   RBC, UA 0 TO 2   Casts UA NOT REPORTED   Crystals, UA NOT REPORTED   Epithelial Cells, UA 5 TO 10   Renal Epithelial, UA NOT REPORTED   Bacteria, UA NOT REPORTED   Mucus, UA NOT REPORTED   Trichomonas, UA NOT REPORTED   Amorphous, UA NOT REPORTED   Other Observations UA NOT REPORTED   Yeast, Urine NOT REPORTED [AO]   1716 Comprehensive Metabolic Panel w/ Reflex to MG(!):    Glucose 148(!)   BUN 13   Creatinine 0.66   Bun/Cre Ratio 20   CALCIUM, SERUM, 087288 8.4(!)   Sodium 136   Potassium 3.5(!)   Chloride 98   CO2 25   Anion Gap 13   Alk Phos 78   ALT 7   AST 16   Bilirubin 0.18(!)   Total Protein 6.4   Albumin 3.4(!)   Albumin/Globulin Ratio NOT REPORTED   GFR Non- >60   GFR  >60   GFR Comment        GFR Staging NOT REPORTED [AO]   1717 Urine Drug Screen(!):    Amphetamine Screen, Ur NEGATIVE   Barbiturate Screen, Ur NEGATIVE   Benzodiazepine Screen, Urine NEGATIVE   Cocaine Metabolite, Urine POSITIVE(!)   Methadone Screen, Urine NEGATIVE   Opiates, Urine NEGATIVE   Phencyclidine, Urine NEGATIVE   Propoxyphene, Urine NOT REPORTED   Cannabinoid Scrn, Ur NEGATIVE   Oxycodone Screen, Ur NEGATIVE   Methamphetamine, Urine NOT REPORTED   Tricyclic Antidepressants, Urine NOT REPORTED   MDMA, Urine NOT REPORTED   Buprenorphine Urine NOT REPORTED   Test Information Assay provides medical screening only. The absence of expected drug(s) and/or metabolite(s) may indicate diluted or adulterated urine, limitations of testing or timing of collection. [AO]   1717 TOX SCR, BLD, ED(!):    Acetaminophen Level <10(!)   Ethanol 27(!)   Ethanol percent 8.910(!)   Salicylate Lvl 1(!)   Toxic Tricyclic Sc,Blood PENDING [AO]   26 Calling poison control [AO]   1800 [facility] politely declined admission.  Will obs patient 6 hours from ingestion time then contact I [AO]      ED Course User Index  [AO] Ivory Lunddo 1721, DO       MDM  Number of Diagnoses or Management Options  Acute alcoholic intoxication without complication ([facility]  Cocaine abuse ([facility]  Intentional drug overdose, initial encounter [facility])  Diagnosis management comments: 63 yo F presented via EMS for intentional OD on Hydroxazine after fight with boyfriend. On initial eval frustrated, NAD. VSS. Non focal exam. Screening diagnostics ordered. EtOH +, CHANDRA + cocaine. Mild hypokalemia, replaced orally. Spoke to poison control, monitor for ~6 hours before medically cleared, supportive care. [place] slip filled out. Attempted admission to medicine for medical monitoring, declined. Monitored in ED for 6 hour re-eval, plan to attempt transfer for further psychiatric care. Patient requesting [place]        Amount and/or Complexity of Data Reviewed  Clinical lab tests: ordered and reviewed  Review and summarize past medical records: yes  Independent visualization of images, tracings, or specimens: yes    Patient Progress  Patient progress: stable      PROCEDURES:  Procedures     CONSULTS:  IP CONSULT TO HOSPITALIST    CRITICAL CARE:  NONE    FINAL IMPRESSION     1. Intentional drug overdose, initial encounter ([facility]    2. Cocaine abuse ([facility]    3. Acute alcoholic intoxication without complication ([facility]        DISPOSITION / PLAN   DISPOSITION       Guillermina Flannery,   Emergency Medicine Physician    (Please note that portions of this note were completed with a voice recognition program.  Efforts were made to edit the dictations but occasionally words are mis-transcribed.)        Ivory Duke Kimble 1721, DO  01/30/22 2112

## 2022-01-31 ENCOUNTER — HOSPITAL ENCOUNTER (INPATIENT)
Age: 57
LOS: 4 days | Discharge: HOME | DRG: 885 | End: 2022-02-04
Attending: PSYCHIATRY & NEUROLOGY | Admitting: PSYCHIATRY & NEUROLOGY
Payer: COMMERCIAL

## 2022-01-31 VITALS
OXYGEN SATURATION: 94 % | DIASTOLIC BLOOD PRESSURE: 62 MMHG | BODY MASS INDEX: 45.08 KG/M2 | HEART RATE: 76 BPM | TEMPERATURE: 98.1 F | WEIGHT: 245 LBS | RESPIRATION RATE: 16 BRPM | HEIGHT: 62 IN | SYSTOLIC BLOOD PRESSURE: 122 MMHG

## 2022-01-31 PROBLEM — F60.3 BORDERLINE PERSONALITY DISORDER (HCC): Status: ACTIVE | Noted: 2022-01-31

## 2022-01-31 PROBLEM — F32.A DEPRESSION WITH SUICIDAL IDEATION: Status: ACTIVE | Noted: 2022-01-31

## 2022-01-31 PROBLEM — R45.851 DEPRESSION WITH SUICIDAL IDEATION: Status: ACTIVE | Noted: 2022-01-31

## 2022-01-31 LAB
EKG ATRIAL RATE: 83 BPM
EKG P AXIS: 42 DEGREES
EKG P-R INTERVAL: 144 MS
EKG Q-T INTERVAL: 382 MS
EKG QRS DURATION: 94 MS
EKG QTC CALCULATION (BAZETT): 448 MS
EKG R AXIS: -11 DEGREES
EKG T AXIS: -179 DEGREES
EKG VENTRICULAR RATE: 83 BPM
GLUCOSE BLD-MCNC: 105 MG/DL (ref 65–105)
GLUCOSE BLD-MCNC: 108 MG/DL (ref 65–105)

## 2022-01-31 PROCEDURE — 6370000000 HC RX 637 (ALT 250 FOR IP): Performed by: PSYCHIATRY & NEUROLOGY

## 2022-01-31 PROCEDURE — 99223 1ST HOSP IP/OBS HIGH 75: CPT | Performed by: INTERNAL MEDICINE

## 2022-01-31 PROCEDURE — APPSS60 APP SPLIT SHARED TIME 46-60 MINUTES

## 2022-01-31 PROCEDURE — 93010 ELECTROCARDIOGRAM REPORT: CPT | Performed by: INTERNAL MEDICINE

## 2022-01-31 PROCEDURE — 82947 ASSAY GLUCOSE BLOOD QUANT: CPT

## 2022-01-31 PROCEDURE — 1240000000 HC EMOTIONAL WELLNESS R&B

## 2022-01-31 PROCEDURE — 90792 PSYCH DIAG EVAL W/MED SRVCS: CPT | Performed by: PSYCHIATRY & NEUROLOGY

## 2022-01-31 RX ORDER — METHOCARBAMOL 750 MG/1
1500 TABLET, FILM COATED ORAL 3 TIMES DAILY
Status: DISCONTINUED | OUTPATIENT
Start: 2022-01-31 | End: 2022-02-04 | Stop reason: HOSPADM

## 2022-01-31 RX ORDER — ONDANSETRON 4 MG/1
4 TABLET, ORALLY DISINTEGRATING ORAL EVERY 8 HOURS PRN
Status: DISCONTINUED | OUTPATIENT
Start: 2022-01-31 | End: 2022-02-04 | Stop reason: HOSPADM

## 2022-01-31 RX ORDER — POTASSIUM CHLORIDE 1.5 G/1.77G
20 POWDER, FOR SOLUTION ORAL 2 TIMES DAILY
Status: DISCONTINUED | OUTPATIENT
Start: 2022-01-31 | End: 2022-01-31

## 2022-01-31 RX ORDER — TRAZODONE HYDROCHLORIDE 50 MG/1
50 TABLET ORAL NIGHTLY PRN
Status: DISCONTINUED | OUTPATIENT
Start: 2022-01-31 | End: 2022-02-04 | Stop reason: HOSPADM

## 2022-01-31 RX ORDER — ACETAMINOPHEN 325 MG/1
650 TABLET ORAL EVERY 4 HOURS PRN
Status: DISCONTINUED | OUTPATIENT
Start: 2022-01-31 | End: 2022-02-04 | Stop reason: HOSPADM

## 2022-01-31 RX ORDER — LOPERAMIDE HYDROCHLORIDE 2 MG/1
2 CAPSULE ORAL 4 TIMES DAILY PRN
Status: DISCONTINUED | OUTPATIENT
Start: 2022-01-31 | End: 2022-02-04 | Stop reason: HOSPADM

## 2022-01-31 RX ORDER — FERROUS SULFATE 325(65) MG
325 TABLET ORAL 2 TIMES DAILY
Status: DISCONTINUED | OUTPATIENT
Start: 2022-01-31 | End: 2022-02-04 | Stop reason: HOSPADM

## 2022-01-31 RX ORDER — BUDESONIDE AND FORMOTEROL FUMARATE DIHYDRATE 160; 4.5 UG/1; UG/1
2 AEROSOL RESPIRATORY (INHALATION) 2 TIMES DAILY
Status: DISCONTINUED | OUTPATIENT
Start: 2022-01-31 | End: 2022-02-04 | Stop reason: HOSPADM

## 2022-01-31 RX ORDER — POTASSIUM CHLORIDE 20 MEQ/1
20 TABLET, EXTENDED RELEASE ORAL 2 TIMES DAILY WITH MEALS
Status: DISCONTINUED | OUTPATIENT
Start: 2022-01-31 | End: 2022-02-04 | Stop reason: HOSPADM

## 2022-01-31 RX ORDER — LIDOCAINE 4 G/G
1 PATCH TOPICAL DAILY
Status: DISCONTINUED | OUTPATIENT
Start: 2022-01-31 | End: 2022-02-04 | Stop reason: HOSPADM

## 2022-01-31 RX ORDER — CETIRIZINE HYDROCHLORIDE 10 MG/1
10 TABLET ORAL DAILY
Status: DISCONTINUED | OUTPATIENT
Start: 2022-01-31 | End: 2022-02-04 | Stop reason: HOSPADM

## 2022-01-31 RX ORDER — POLYETHYLENE GLYCOL 3350 17 G/17G
17 POWDER, FOR SOLUTION ORAL DAILY PRN
Status: DISCONTINUED | OUTPATIENT
Start: 2022-01-31 | End: 2022-02-04 | Stop reason: HOSPADM

## 2022-01-31 RX ORDER — MAGNESIUM HYDROXIDE/ALUMINUM HYDROXICE/SIMETHICONE 120; 1200; 1200 MG/30ML; MG/30ML; MG/30ML
30 SUSPENSION ORAL EVERY 6 HOURS PRN
Status: DISCONTINUED | OUTPATIENT
Start: 2022-01-31 | End: 2022-02-04 | Stop reason: HOSPADM

## 2022-01-31 RX ORDER — M-VIT,TX,IRON,MINS/CALC/FOLIC 27MG-0.4MG
1 TABLET ORAL 2 TIMES DAILY
Status: DISCONTINUED | OUTPATIENT
Start: 2022-01-31 | End: 2022-02-04 | Stop reason: HOSPADM

## 2022-01-31 RX ORDER — FLUTICASONE PROPIONATE 50 MCG
1 SPRAY, SUSPENSION (ML) NASAL DAILY
Status: DISCONTINUED | OUTPATIENT
Start: 2022-01-31 | End: 2022-02-04 | Stop reason: HOSPADM

## 2022-01-31 RX ORDER — PANTOPRAZOLE SODIUM 40 MG/1
40 TABLET, DELAYED RELEASE ORAL
Refills: 0 | Status: DISCONTINUED | OUTPATIENT
Start: 2022-02-01 | End: 2022-02-04 | Stop reason: HOSPADM

## 2022-01-31 RX ORDER — DULOXETIN HYDROCHLORIDE 60 MG/1
60 CAPSULE, DELAYED RELEASE ORAL 2 TIMES DAILY
Status: DISCONTINUED | OUTPATIENT
Start: 2022-01-31 | End: 2022-02-04 | Stop reason: HOSPADM

## 2022-01-31 RX ORDER — FUROSEMIDE 40 MG/1
40 TABLET ORAL 2 TIMES DAILY
Status: DISCONTINUED | OUTPATIENT
Start: 2022-01-31 | End: 2022-02-04 | Stop reason: HOSPADM

## 2022-01-31 RX ORDER — IBUPROFEN 400 MG/1
400 TABLET ORAL EVERY 6 HOURS PRN
Status: DISCONTINUED | OUTPATIENT
Start: 2022-01-31 | End: 2022-02-04 | Stop reason: HOSPADM

## 2022-01-31 RX ORDER — METHYLPHENIDATE HYDROCHLORIDE 10 MG/1
20 TABLET ORAL 2 TIMES DAILY
Status: DISCONTINUED | OUTPATIENT
Start: 2022-01-31 | End: 2022-02-02

## 2022-01-31 RX ORDER — GABAPENTIN 600 MG/1
1200 TABLET ORAL 3 TIMES DAILY
Status: DISCONTINUED | OUTPATIENT
Start: 2022-01-31 | End: 2022-02-04 | Stop reason: HOSPADM

## 2022-01-31 RX ORDER — ALBUTEROL SULFATE 90 UG/1
2 AEROSOL, METERED RESPIRATORY (INHALATION) EVERY 6 HOURS PRN
Status: DISCONTINUED | OUTPATIENT
Start: 2022-01-31 | End: 2022-02-04 | Stop reason: HOSPADM

## 2022-01-31 RX ORDER — CARVEDILOL 6.25 MG/1
12.5 TABLET ORAL 2 TIMES DAILY WITH MEALS
Status: DISCONTINUED | OUTPATIENT
Start: 2022-01-31 | End: 2022-02-04 | Stop reason: HOSPADM

## 2022-01-31 RX ADMIN — METHYLPHENIDATE HYDROCHLORIDE 20 MG: 10 TABLET ORAL at 12:46

## 2022-01-31 RX ADMIN — IBUPROFEN 400 MG: 400 TABLET, FILM COATED ORAL at 09:13

## 2022-01-31 RX ADMIN — FERROUS SULFATE TAB 325 MG (65 MG ELEMENTAL FE) 325 MG: 325 (65 FE) TAB at 21:10

## 2022-01-31 RX ADMIN — IBUPROFEN 400 MG: 400 TABLET, FILM COATED ORAL at 15:54

## 2022-01-31 RX ADMIN — FUROSEMIDE 40 MG: 40 TABLET ORAL at 16:54

## 2022-01-31 RX ADMIN — POTASSIUM CHLORIDE 20 MEQ: 1500 TABLET, EXTENDED RELEASE ORAL at 16:54

## 2022-01-31 RX ADMIN — CARVEDILOL 12.5 MG: 6.25 TABLET, FILM COATED ORAL at 16:54

## 2022-01-31 RX ADMIN — IBUPROFEN 400 MG: 400 TABLET, FILM COATED ORAL at 21:08

## 2022-01-31 RX ADMIN — METFORMIN HYDROCHLORIDE 500 MG: 500 TABLET ORAL at 16:54

## 2022-01-31 RX ADMIN — BUDESONIDE AND FORMOTEROL FUMARATE DIHYDRATE 2 PUFF: 160; 4.5 AEROSOL RESPIRATORY (INHALATION) at 12:05

## 2022-01-31 RX ADMIN — DULOXETINE 60 MG: 60 CAPSULE, DELAYED RELEASE ORAL at 21:10

## 2022-01-31 RX ADMIN — METHOCARBAMOL 1500 MG: 750 TABLET ORAL at 12:46

## 2022-01-31 RX ADMIN — TRAZODONE HYDROCHLORIDE 50 MG: 50 TABLET ORAL at 21:09

## 2022-01-31 RX ADMIN — CETIRIZINE HYDROCHLORIDE TABLETS 10 MG: 10 TABLET, FILM COATED ORAL at 12:06

## 2022-01-31 RX ADMIN — MULTIPLE VITAMINS W/ MINERALS TAB 1 TABLET: TAB at 15:54

## 2022-01-31 RX ADMIN — METFORMIN HYDROCHLORIDE 500 MG: 500 TABLET ORAL at 12:05

## 2022-01-31 RX ADMIN — LOPERAMIDE HYDROCHLORIDE 2 MG: 2 CAPSULE ORAL at 18:58

## 2022-01-31 RX ADMIN — FERROUS SULFATE TAB 325 MG (65 MG ELEMENTAL FE) 325 MG: 325 (65 FE) TAB at 12:05

## 2022-01-31 RX ADMIN — CARVEDILOL 12.5 MG: 6.25 TABLET, FILM COATED ORAL at 12:06

## 2022-01-31 RX ADMIN — GABAPENTIN 1200 MG: 600 TABLET, FILM COATED ORAL at 21:09

## 2022-01-31 RX ADMIN — BUDESONIDE AND FORMOTEROL FUMARATE DIHYDRATE 2 PUFF: 160; 4.5 AEROSOL RESPIRATORY (INHALATION) at 21:08

## 2022-01-31 RX ADMIN — FLUTICASONE PROPIONATE 1 SPRAY: 50 SPRAY, METERED NASAL at 12:46

## 2022-01-31 RX ADMIN — GABAPENTIN 1200 MG: 600 TABLET, FILM COATED ORAL at 12:06

## 2022-01-31 RX ADMIN — METHYLPHENIDATE HYDROCHLORIDE 20 MG: 10 TABLET ORAL at 21:09

## 2022-01-31 RX ADMIN — MULTIPLE VITAMINS W/ MINERALS TAB 1 TABLET: TAB at 21:09

## 2022-01-31 RX ADMIN — METHOCARBAMOL 1500 MG: 750 TABLET ORAL at 21:10

## 2022-01-31 RX ADMIN — DULOXETINE 60 MG: 60 CAPSULE, DELAYED RELEASE ORAL at 12:06

## 2022-01-31 RX ADMIN — RIVAROXABAN 20 MG: 20 TABLET, FILM COATED ORAL at 16:54

## 2022-01-31 ASSESSMENT — PAIN SCALES - GENERAL
PAINLEVEL_OUTOF10: 7
PAINLEVEL_OUTOF10: 4
PAINLEVEL_OUTOF10: 7
PAINLEVEL_OUTOF10: 0

## 2022-01-31 ASSESSMENT — SLEEP AND FATIGUE QUESTIONNAIRES
SLEEP PATTERN: DIFFICULTY FALLING ASLEEP;DISTURBED/INTERRUPTED SLEEP
DIFFICULTY FALLING ASLEEP: YES
DIFFICULTY ARISING: NO
RESTFUL SLEEP: YES
AVERAGE NUMBER OF SLEEP HOURS: 4
DO YOU HAVE DIFFICULTY SLEEPING: YES
DO YOU USE A SLEEP AID: NO
DIFFICULTY STAYING ASLEEP: YES

## 2022-01-31 ASSESSMENT — PAIN DESCRIPTION - LOCATION: LOCATION: BACK;NECK

## 2022-01-31 ASSESSMENT — LIFESTYLE VARIABLES: HISTORY_ALCOHOL_USE: NO

## 2022-01-31 ASSESSMENT — PATIENT HEALTH QUESTIONNAIRE - PHQ9: SUM OF ALL RESPONSES TO PHQ QUESTIONS 1-9: 22

## 2022-01-31 NOTE — PROGRESS NOTES
Behavioral Services  Medicare Certification Upon Admission    I certify that this patient's inpatient psychiatric hospital admission is medically necessary for:    [x] (1) Treatment which could reasonably be expected to improve this patient's condition,       [x] (2) Or for diagnostic study;     AND     [x](2) The inpatient psychiatric services are provided while the individual is under the care of a physician and are included in the individualized plan of care.     Estimated length of stay/service 4 to 7 days    Plan for post-hospital care Home with outpatient community mental health follow-up    Electronically signed by Jalen Garay MD on 1/31/2022 at 12:55 PM

## 2022-01-31 NOTE — PROGRESS NOTES
Pharmacy Med Education Group Note    Date: 01/31/2022  Start Time: 7686  End Time: 6177    Number Participants in Group:  2    Goal:  Patient will demonstrate an understanding of the medications intended purpose and possible adverse effects  Topic: [place] for Pharmacy Med Ed Group    Discipline Responsible:     OT  AT  [facility].  RT     X Other       Participation Level:     None  Minimal      X Active Listener    X Interactive    Monopolizing         Participation Quality:    X Appropriate  Inappropriate     X       Attentive        Intrusive          Sharing        Resistant          Supportive        Lethargic       Affective:     X Congruent  Incongruent  Blunted  Flat    Constricted  Anxious  Elated  Angry    Labile  Depressed  Other         Cognitive:    X Alert  Oriented PPTP     Concentration   X G  F  P   Attention Span   X G  F  P   Short-Term Memory   X G  F  P   Long-Term Memory  G  F  P   ProblemSolving/  Decision Making  G  F  P   Ability to Process  Information   X G  F  P      Contributing Factors             Delusional             Hallucinating             Flight of Ideas             Other:       Modes of Intervention:    X Education   X Support  Exploration    Clarifying  Problem Solving  Confrontation    Socialization  Limit Setting  Reality Testing    Activity  Movement  Media    Other:            Response to Learning:    X Able to verbalize current knowledge/experience    Able to verbalize/acknowledge new learning    Able to retain information    Capable of insight    Able to change behavior    Progressing to goal    Other:        Comments:     Calvin Fleming PharmD, BCPS  1/31/2022 2:17 PM

## 2022-01-31 NOTE — ED NOTES
Pill bottle of Hydroxyzine 25mg given to RN per TFD. TFD states that pt ingested 20-30 pills, possibly 40 pills. Pills placed with other belongings out of pt's room.      Gautam Vick RN  01/31/22 1378

## 2022-01-31 NOTE — PLAN OF CARE
[facility]  Initial Interdisciplinary Treatment Plan NO      Original treatment plan Date & Time: 1/31/2022 0755    Admission Type:  Admission Type:  Involuntary (signed in)    Reason for admission:   Reason for Admission: Overdose on Atarax    Estimated Length of Stay:  5-7days  Estimated Discharge Date: to be determined by physician    PATIENT STRENGTHS:  Patient Strengths:Strengths: Medication Compliance,Communication  Patient Strengths and Limitations:Limitations: Difficulty problem solving/relies on others to help solve problems,General negative or hopeless attitude about future/recovery  Addictive Behavior: Addictive Behavior  In the past 3 months, have you felt or has someone told you that you have a problem with:  : None  Do you have a history of Chemical Use?: No  Do you have a history of Alcohol Use?: No (denies  positive for ethanol)  Do you have a history of Street Drug Abuse?: No (denies pos for cocaine)  Histroy of Prescripton Drug Abuse?: No  Medical Problems:  Past Medical History:   Diagnosis Date    Abdominal pain, epigastric 6/29/2011    Abnormal cardiovascular stress test 1/23/2012    Atrial fibrillation ([address]     Bilateral edema of lower extremity 3/3/2016    Blurred vision     left eye     Breast cancer ([address]     bilateral remission since 2012    C. difficile colitis     2015    Cervical spondylosis with myelopathy and radiculopathy 12/17/2020    Colitis 2/5/2016    Colon cancer ([address]     stage IV remission since 2016    Colovesical fistula 2/9/2016    Compression fracture of first lumbar vertebra ([address] 9/24/2019    COPD (chronic obstructive pulmonary disease) ([address]     COPD, moderate ([address] 2/19/2021    COVID-19 Nov 2019 & Dec 2020    Depression     Diabetes mellitus ([address]     Diverticulitis 11/17/2015    Diverticulosis     Dizziness     from sitting to standing    Dupuytren's contracture of hand 4/16/2021    Fibromyalgia     GERD (gastroesophageal reflux disease)     History of loop recorder     Hyperlipidemia     Hypertension     Irritable bowel syndrome with diarrhea 9/29/2010    Kidney stones     Malignant neoplasm of skin 12/21/2021    Multiple sclerosis ([address]     Myocardial infarct ([address] 12/2021    Narcoleptic syndrome 2/19/2021    Non-rheumatic mitral regurgitation 3/1/2019    On home oxygen therapy     CPAP at night, home oxygen PRN    Palpitations 6/18/2013    PONV (postoperative nausea and vomiting)     Rheumatoid arthritis ([address]     S/P hysterectomy with oophorectomy 8/4/2010    Seizure ([address] 7/    last seizure 7/18/2020  first seizure 1992 after head injury take gabapentin    Sleep apnea     CPAP nightly    Unspecified cerebral artery occlusion with cerebral infarction     2008     Wears glasses      Status EXAM:Status and Exam  Normal: No  Facial Expression: Flat  Affect: Appropriate  Level of Consciousness: Alert  Mood:Normal: No  Mood: Depressed,Anxious  Motor Activity:Normal: Yes  Interview Behavior: Cooperative  Preception: [place] to Person,[place] to Time,[place] to Place,[place] to Situation  Attention:Normal: No  Attention: Distractible  Thought Processes: Circumstantial  Thought Content:Normal: No  Thought Content: Preoccupations  Hallucinations: None  Delusions: No  Memory:Normal: Yes  Insight and Judgment: No  Insight and Judgment: Poor Judgment,Poor Insight  Present Suicidal Ideation: No  Present Homicidal Ideation: No    EDUCATION:   Learner Progress Toward Treatment Goals: reviewed group plans and strategies for care    Method:group therapy, medication compliance, individualized assessments and care planning    Outcome: needs reinforcement    PATIENT GOALS: to be discussed with patient within 72 hours    PLAN/TREATMENT RECOMMENDATIONS:     continue group therapy , medications compliance, goal setting, individualized assessments and care, continue to monitor pt on unit      SHORT-TERM GOALS:   Time frame for Short-Term Goals: 5-7 days    LONG-TERM GOALS:  Time frame for Long-Term Goals: 6 months  Members Present in Team Meeting: See Signature Sheet    DIANA Odette Koyanagi

## 2022-01-31 NOTE — ED NOTES
Report given to Guilherme ED RN for nursing care until pt gets transported to [facility] unit.      Yung Long RN  01/31/22 0143
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
## 2022-01-31 NOTE — BH NOTE
[facility]  Admission Note     Admission Type:   Admission Type:  Involuntary (signed in)    Reason for admission:  Reason for Admission: Overdose on Atarax    PATIENT STRENGTHS:  Strengths: Medication Compliance,Communication    Patient Strengths and Limitations:  Limitations: Difficulty problem solving/relies on others to help solve problems,General negative or hopeless attitude about future/recovery    Addictive Behavior:   Addictive Behavior  In the past 3 months, have you felt or has someone told you that you have a problem with:  : None  Do you have a history of Chemical Use?: No  Do you have a history of Alcohol Use?: No (denies  positive for ethanol)  Do you have a history of Street Drug Abuse?: No (denies pos for cocaine)  Histroy of Prescripton Drug Abuse?: No    Medical Problems:   Past Medical History:   Diagnosis Date    Abdominal pain, epigastric 6/29/2011    Abnormal cardiovascular stress test 1/23/2012    Atrial fibrillation ([address]     Bilateral edema of lower extremity 3/3/2016    Blurred vision     left eye     Breast cancer ([address]     bilateral remission since 2012    C. difficile colitis     2015    Cervical spondylosis with myelopathy and radiculopathy 12/17/2020    Colitis 2/5/2016    Colon cancer ([address]     stage IV remission since 2016    Colovesical fistula 2/9/2016    Compression fracture of first lumbar vertebra ([address] 9/24/2019    COPD (chronic obstructive pulmonary disease) ([address]     COPD, moderate ([address] 2/19/2021    COVID-19 Nov 2019 & Dec 2020    Depression     Diabetes mellitus ([address]     Diverticulitis 11/17/2015    Diverticulosis     Dizziness     from sitting to standing    Dupuytren's contracture of hand 4/16/2021    Fibromyalgia     GERD (gastroesophageal reflux disease)     History of loop recorder     Hyperlipidemia     Hypertension     Irritable bowel syndrome with diarrhea 9/29/2010    Kidney stones     Malignant neoplasm of skin 12/21/2021    [facility] sclerosis [facility]     Myocardial infarct [facility] 12/2021    Narcoleptic syndrome 2/19/2021    Non-rheumatic mitral regurgitation 3/1/2019    On home oxygen therapy     CPAP at night, home oxygen PRN    Palpitations 6/18/2013    PONV (postoperative nausea and vomiting)     Rheumatoid arthritis ([facility]     S/P hysterectomy with oophorectomy 8/4/2010    Seizure ([facility] 7/    last seizure 7/18/2020  first seizure 1992 after head injury take gabapentin    Sleep apnea     CPAP nightly    Unspecified cerebral artery occlusion with cerebral infarction     2008     Wears glasses        Status EXAM:  Status and Exam  Normal: No  Facial Expression: Flat  Affect: Appropriate  Level of Consciousness: Alert  Mood:Normal: No  Mood: Depressed,Anxious  Motor Activity:Normal: Yes  Interview Behavior: Cooperative  Preception: Edison to Person,Edison to Time,Edison to Place,Edison to Situation  Attention:Normal: No  Attention: Distractible  Thought Processes: Circumstantial  Thought Content:Normal: No  Thought Content: Preoccupations  Hallucinations: None  Delusions: No  Memory:Normal: Yes  Insight and Judgment: No  Insight and Judgment: Poor Judgment,Poor Insight  Present Suicidal Ideation: No  Present Homicidal Ideation: No    Tobacco Screening:  Practical Counseling, on admission, kristina X, if applicable and completed (first 3 are required if patient doesn't refuse):            ( )  Recognizing danger situations (included triggers and roadblocks)                    ( )  Coping skills (new ways to manage stress, exercise, relaxation techniques, changing routine, distraction)                                                           ( )  Basic information about quitting (benefits of quitting, techniques in how to quit, available resources  ( ) Referral for counseling faxed to [facility]                                           ( ) Patient refused counseling  (x ) Patient has not smoked in the last 30 days    Metabolic Screening:    Lab Results   Component Value Date    LABA1C 6.6 (H) 07/15/2021       Lab Results   Component Value Date    CHOL 171 12/03/2019    CHOL 268 (H) 01/15/2019     Lab Results   Component Value Date    TRIG 162 (H) 12/03/2019    TRIG 103 01/15/2019     Lab Results   Component Value Date    HDL 41 12/03/2019    HDL 55 01/15/2019     No components found for: LDLCAL  Lab Results   Component Value Date    LABVLDL 32 12/03/2019    LABVLDL 21 01/15/2019         Body mass index is 44.81 kg/m². BP Readings from Last 2 Encounters:   01/31/22 (!) 145/95   01/31/22 122/62           Pt admitted with followings belongings:  Dental Appliances: None  Vision - Corrective Lenses: Glasses  Hearing Aid: None  Jewelry: Ring,Necklace  Body Piercings Removed: N/A  Clothing: Marilu Mullet / coat,Pants,Shirt,Undergarments (Comment)  Were All Patient Medications Collected?: Yes  Other Valuables: Cell phone,Wallet,Money (Comment) ($48.68)     Patient's home medications need verified. Patient oriented to surroundings and program expectations and copy of patient rights given. Received admission packet:  yes. Consents reviewed, signed yes. Refused no. Patient verbalize understanding:  yes. Patient education on precautions: yes     Patient admitted from [address] [place] [address] ED after overdosing on Atarax. Patient denies that this was a suicide attempt on admission and reports she was just frustrated and wanted to sleep. Patient reports increased depression and anxiety due to stressors at home. Patient reports she lives with boyfriend and son and they all have medical issues. Patient states she gives them 175% and feels like she gets nothing in return. Patient follows up with [facility]. Patient reports medication compliance. Patient denies drugs and alcohol but positive for cocaine and ETOH. Patient changed into hospital clothing and wanded for safety.               Annie Solorzano RN
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
## 2022-01-31 NOTE — GROUP NOTE
Group Therapy Note    Date: 1/31/2022    Group Start Time: 1430  Group End Time: 6605  Group Topic: cognitive skills   MARY JANE Coronado, CTRS        Group Therapy Note    Attendees: 3         Patient's Goal:  To improve coping skills     Notes:   Pt was pleasant and participated well     Status After Intervention:  Improved    Participation Level:  Active Listener and Interactive    Participation Quality: Appropriate      Speech:  normal      Thought Process/Content: Logical      Affective Functioning: Congruent      Mood: euthymic      Level of consciousness:  Alert      Response to Learning: Able to verbalize current knowledge/experience and Progressing to goal      Endings: None Reported    Modes of Intervention: Education and Activity      Discipline Responsible: Psychoeducational Specialist      Signature:  Polina Beal

## 2022-01-31 NOTE — ED NOTES
Pt belongings reviewed with Evon=security and pt. Samsung cell phone in purple case, copper rimmed bifocal glasses, purple tennis shoes, camoflauge long sleeve tshirt, gray sweatpants, black fleece jacket, white underwire bra, green wallet with $45 grewal in bills, [employer] card, lottery tickets x2, lipstick, wearing ring that can't come off=dark blue stones x5/multiple small diamonds set in white gold, 2 rings removed=blue stone in silver band with 4 opaque white stones noted & lilac stone with 4 diamonds in gray colored band, blue oval stone in teardrop shaped setting on silver colored chain necklace. All belongings removed from room upon admission and room safe per suicidal room safety protocol, initiated at admission.      Cassandra Walters RN  01/30/22 [address], RN  01/30/22 2007       Cassandra Walters RN  01/30/22 2008

## 2022-01-31 NOTE — H&P
[facility]    CONSULTATION / HISTORY AND PHYSICAL EXAMINATION            Date:   1/31/2022  Patient name:  Hilda Roman  Date of admission:  1/31/2022  3:18 AM  MRN:   [id number]  Account:  [de-identified]  YOB: 1965  PCP:    Raymond Caceres  Room:   0109/0109-02  Code Status:    Full Code    Physician Requesting Consult: Naomi Danielle MD    Reason for Consult:  medical management    Chief Complaint:     No chief complaint on file. dm  htn    History Obtained From:     Patient medical record nursing staff    History of Present Illness:     HTN  Onset more than 2 years ago  ann mild to mod  Controlled with current po meds  Not associated with headaches or blurry vision  No chest pain    Diabetes   Duration more than 7 years  Modifying factors on Glucophage and other med  Severity uncontrolled sever  Associated signs and symtoms neuropathy/ckd/ CAD.    aggravated with sugar diet and better with low sugar diet           Past Medical History:     Past Medical History:   Diagnosis Date    Abdominal pain, epigastric 6/29/2011    Abnormal cardiovascular stress test 1/23/2012    Atrial fibrillation (HCC)     Bilateral edema of lower extremity 3/3/2016    Blurred vision     left eye     Breast cancer ([address]     bilateral remission since 2012    C. difficile colitis     2015    Cervical spondylosis with myelopathy and radiculopathy 12/17/2020    Colitis 2/5/2016    Colon cancer ([address]     stage IV remission since 2016    Colovesical fistula 2/9/2016    Compression fracture of first lumbar vertebra ([address] 9/24/2019    COPD (chronic obstructive pulmonary disease) (HCC)     COPD, moderate ([address] 2/19/2021    COVID-19 Nov 2019 & Dec 2020    Depression     Diabetes mellitus ([address]     Diverticulitis 11/17/2015    Diverticulosis     Dizziness     from sitting to standing    Dupuytren's contracture of hand 4/16/2021    Fibromyalgia     GERD (gastroesophageal reflux disease)     History of loop recorder     Hyperlipidemia     Hypertension     Irritable bowel syndrome with diarrhea 2010    Kidney stones     Malignant neoplasm of skin 2021    Multiple sclerosis ([address]     Myocardial infarct ([address] 2021    Narcoleptic syndrome 2021    Non-rheumatic mitral regurgitation 3/1/2019    On home oxygen therapy     CPAP at night, home oxygen PRN    Palpitations 2013    PONV (postoperative nausea and vomiting)     Rheumatoid arthritis ([address]     S/P hysterectomy with oophorectomy 2010    Seizure ([address] 7/    last seizure 2020  first seizure  after head injury take gabapentin    Sleep apnea     CPAP nightly    Unspecified cerebral artery occlusion with cerebral infarction          Wears glasses         Past Surgical History:     Past Surgical History:   Procedure Laterality Date    APPENDECTOMY  1985    BACK SURGERY      neck and back screws and cage    BREAST BIOPSY  [id number]    BREAST LUMPECTOMY      bilateral ,,     CARDIAC SURGERY      cardiac cath negative in 2018    CARDIOVERSION      several times     SECTION      CHOLECYSTECTOMY      COLONOSCOPY      ENDOSCOPY, COLON, DIAGNOSTIC      FRACTURE SURGERY      right wrist ORIF    HYSTERECTOMY  2010    INSERTABLE CARDIAC MONITOR      loop recorder    KNEE ARTHROSCOPY Right 2020    RIGHT KNEE ARTHROSCOPY WITH PARTIAL MEDIAL MENISCECTOMY WITH DEBRIDEMENT performed by Bladimir Marcos DO at [address] [facility] ARTHROSCOPY Left 2020    LEFT KNEE ARTHROSCOPY WITH PARTIAL LATERAL MENISCAL TEAR REPAIR AND [address] [facility] performed by Bladimir Marcos DO at [address], [address] RESECTION  2021    neck area    SIGMOIDECTOMY  2016    for cancer    SKIN BIOPSY      moles    SLEEVE GASTRECTOMY  5/19/15    GASTRECTOMY SLEEVE LAPAROSCOPIC           TONSILLECTOMY  1985    TOTAL KNEE ARTHROPLASTY Left 8/31/2021    KNEE TOTAL ARTHROPLASTY performed by Ivan Shone, MD at [address] [facility]  1/30/15        Medications Prior to Admission:     Prior to Admission medications    Medication Sig Start Date End Date Taking? Authorizing Provider   hydrOXYzine (ATARAX) 25 MG tablet Take 25 mg by mouth 3 times daily as needed for Itching    Historical Provider, MD   Ubrogepant (UBRELVY) 50 MG TABS Take by mouth as needed    Historical Provider, MD   brexpiprazole (REXULTI) 0.5 MG TABS tablet Take 0.5 mg by mouth daily    Historical Provider, MD   Multiple Vitamins-Minerals (MULTI COMPLETE PO) Take by mouth 2 times daily     Historical Provider, MD   potassium chloride (KLOR-CON) 20 MEQ packet Take 20 mEq by mouth 2 times daily    Historical Provider, MD   ferrous sulfate (IRON 325) 325 (65 Fe) MG tablet Take 325 mg by mouth 2 times daily    Historical Provider, MD   Diclofenac Sodium 3 % GEL Apply topically as needed    Historical Provider, MD   methylphenidate (RITALIN) 20 MG tablet Take 20 mg by mouth 2 times daily. Historical Provider, MD   albuterol sulfate HFA (VENTOLIN HFA) 108 (90 Base) MCG/ACT inhaler Inhale 2 puffs into the lungs every 6 hours as needed for Wheezing    Historical Provider, MD   budesonide-formoterol (SYMBICORT) 160-4.5 MCG/ACT AERO INHALE 2 PUFFS BID UTD 9/17/20   Historical Provider, MD   methocarbamol (ROBAXIN) 750 MG tablet Take 1,500 mg by mouth 3 times daily    Historical Provider, MD   gabapentin (NEURONTIN) 600 MG tablet Take 1 tablet by mouth 3 times daily for 30 days. Patient taking differently: Take 1,200 mg by mouth 3 times daily.   6/8/20 12/21/21  Haylie Hough MD   traZODone (DESYREL) 50 MG tablet Take 1 tablet by mouth nightly as needed for Sleep  Patient taking differently: Take 100 mg by mouth nightly as needed for Sleep  6/8/20   Haylie Hough MD   metFORMIN (GLUCOPHAGE) 500 MG tablet Take 1 tablet by mouth 2 times daily (with meals) 6/8/20   Mohini Correa MD   rivaroxaban (XARELTO) 20 MG TABS tablet Take 1 tablet by mouth Daily with supper 6/8/20   Mohini Correa MD   carvedilol (COREG) 12.5 MG tablet Take 1 tablet by mouth 2 times daily (with meals) 6/8/20   Mohini Correa MD   furosemide (LASIX) 40 MG tablet Take 1 tablet by mouth daily  Patient taking differently: Take 40 mg by mouth 2 times daily  6/8/20   Mohini Correa MD   magnesium oxide (MAG-OX) 400 (241.3 Mg) MG TABS tablet Take 1 tablet by mouth daily 6/9/20   Mohini Correa MD   esomeprazole (NEXIUM) 40 MG delayed release capsule Take 1 capsule by mouth every morning (before breakfast) 6/8/20   Mohini Correa MD   ondansetron (ZOFRAN-ODT) 4 MG disintegrating tablet Take 4 mg by mouth every 8 hours as needed for Nausea or Vomiting    Historical Provider, MD   cetirizine (ZYRTEC) 10 MG tablet Take 10 mg by mouth daily    Historical Provider, MD   SUPER B COMPLEX/C PO Take 1 tablet by mouth 2 times daily     Historical Provider, MD   loperamide (IMODIUM) 2 MG capsule Take 2 mg by mouth 4 times daily as needed for Diarrhea Takes two capsules at onset    Historical Provider, MD   lidocaine (LIDODERM) 5 % Place 1 patch onto the skin daily as needed 12 hours on, 12 hours off. Historical Provider, MD   fluticasone (FLONASE) 50 MCG/ACT nasal spray 1 spray by Each Nostril route daily    Historical Provider, MD        Allergies:     Ace inhibitors, Betadine swab aid [povidone-iodine], Betadine [povidone iodine], Iodine, Lurasidone, Lurasidone hcl, Shellfish allergy, Shellfish-derived products, Iodides, Lamotrigine, Macrobid [nitrofurantoin macrocrystal], Pyridium [phenazopyridine hcl], and Topiramate    Social History:     Tobacco:    reports that she quit smoking about 18 months ago. She has a 20.00 pack-year smoking history. She has never used smokeless tobacco.  Alcohol:      reports current alcohol use.   Drug Use:  reports no history of drug use.    Family History:     Family History   Adopted: Yes       Review of Systems:     Positive and Negative as described in HPI. CONSTITUTIONAL:  negative for fevers, chills, sweats, fatigue, weight loss  HEENT:  negative for vision, hearing changes, runny nose, throat pain  RESPIRATORY:  negative for shortness of breath, cough, congestion, wheezing. CARDIOVASCULAR:  negative for chest pain, palpitations. GASTROINTESTINAL:  negative for nausea, vomiting, diarrhea, constipation, change in bowel habits, abdominal pain   GENITOURINARY:  negative for difficulty of urination, burning with urination, frequency   INTEGUMENT:  negative for rash, skin lesions, easy bruising   HEMATOLOGIC/LYMPHATIC:  negative for swelling/edema   ALLERGIC/IMMUNOLOGIC:  negative for urticaria , itching  ENDOCRINE:  negative increase in drinking, increase in urination, hot or cold intolerance  MUSCULOSKELETAL:  negative joint pains, muscle aches, swelling of joints  NEUROLOGICAL:  negative for headaches, dizziness, lightheadedness, numbness, pain, tingling extremities      Physical Exam:     /75   Pulse 80   Temp 98.3 °F (36.8 °C) (Oral)   Resp 16   Ht 5' 2\" (1.575 m)   Wt 245 lb (111.1 kg)   SpO2 94%   BMI 44.81 kg/m²   Temp (24hrs), Av.2 °F (36.8 °C), Min:98.1 °F (36.7 °C), Max:98.3 °F (36.8 °C)    No results for input(s): POCGLU in the last 72 hours. No intake or output data in the 24 hours ending 22 1451  Morbid obesity  General Appearance:  alert, well appearing, and in no acute distress  Mental status: oriented to person, place, and time with normal affect  Head:  normocephalic, atraumatic.   Eye: no icterus, redness, pupils equal and reactive, extraocular eye movements intact, conjunctiva clear  Ear: normal external ear, no discharge, hearing intact  Nose:  no drainage noted  Mouth: mucous membranes moist  Neck: supple, no carotid bruits, thyroid not palpable  Lungs: Bilateral equal air entry, clear to ausculation, no wheezing, rales or rhonchi, normal effort  Cardiovascular: normal rate, regular rhythm, no murmur, gallop, rub. Abdomen: Soft, nontender, nondistended, normal bowel sounds, no hepatomegaly or splenomegaly  Neurologic: There are no new focal motor or sensory deficits, normal muscle tone and bulk, no abnormal sensation, normal speech, cranial nerves II through XII grossly intact  Skin: No gross lesions, rashes, bruising or bleeding on exposed skin area  Extremities:  peripheral pulses palpable, no pedal edema or calf pain with palpation  Mild implant contracture noted in the left hand    Investigations:      Laboratory Testing:  Recent Results (from the past 24 hour(s))   Urinalysis with Microscopic    Collection Time: 01/30/22  4:50 PM   Result Value Ref Range    Color, UA Yellow Yellow    Turbidity UA Clear Clear    Glucose, Ur NEGATIVE NEGATIVE    Bilirubin Urine NEGATIVE NEGATIVE    Ketones, Urine NEGATIVE NEGATIVE    Specific Gravity, UA 1.010 1.005 - 1.030    Urine Hgb TRACE (A) NEGATIVE    pH, UA 6.0 5.0 - 8.0    Protein, UA NEGATIVE NEGATIVE    Urobilinogen, Urine Normal Normal    Nitrite, Urine NEGATIVE NEGATIVE    Leukocyte Esterase, Urine SMALL (A) NEGATIVE    Urinalysis Comments NOT REPORTED     -          WBC, UA 5 TO 10 0 - 5 /HPF    RBC, UA 0 TO 2 0 - 2 /HPF    Casts UA NOT REPORTED /LPF    Crystals, UA NOT REPORTED None /HPF    Epithelial Cells UA 5 TO 10 0 - 5 /HPF    Renal Epithelial, UA NOT REPORTED 0 /HPF    Bacteria, UA NOT REPORTED None    Mucus, UA NOT REPORTED None    Trichomonas, UA NOT REPORTED None    Amorphous, UA NOT REPORTED None    Other Observations UA NOT REPORTED NOT REQ.     Yeast, UA NOT REPORTED None   Urine Drug Screen    Collection Time: 01/30/22  4:50 PM   Result Value Ref Range    Amphetamine Screen, Ur NEGATIVE NEGATIVE    Barbiturate Screen, Ur NEGATIVE NEGATIVE    Benzodiazepine Screen, Urine NEGATIVE NEGATIVE    Cocaine Metabolite, Urine POSITIVE (A) NEGATIVE    Methadone Screen, Urine NEGATIVE NEGATIVE    Opiates, Urine NEGATIVE NEGATIVE    Phencyclidine, Urine NEGATIVE NEGATIVE    Propoxyphene, Urine NOT REPORTED NEGATIVE    Cannabinoid Scrn, Ur NEGATIVE NEGATIVE    Oxycodone Screen, Ur NEGATIVE NEGATIVE    Methamphetamine, Urine NOT REPORTED NEGATIVE    Tricyclic Antidepressants, Urine NOT REPORTED NEGATIVE    MDMA, Urine NOT REPORTED NEGATIVE    Buprenorphine Urine NOT REPORTED NEGATIVE    Test Information       Assay provides medical screening only. The absence of expected drug(s) and/or metabolite(s) may indicate diluted or adulterated urine, limitations of testing or timing of collection.    CBC with DIFF    Collection Time: 01/30/22  4:55 PM   Result Value Ref Range    WBC 9.6 3.5 - 11.3 k/uL    RBC 4.17 3.95 - 5.11 m/uL    Hemoglobin 13.1 11.9 - 15.1 g/dL    Hematocrit 40.1 36.3 - 47.1 %    MCV 96.2 82.6 - 102.9 fL    MCH 31.4 25.2 - 33.5 pg    MCHC 32.7 28.4 - 34.8 g/dL    RDW 13.6 11.8 - 14.4 %    Platelets 003 956 - 557 k/uL    MPV 9.9 8.1 - 13.5 fL    NRBC Automated 0.0 0.0 per 100 WBC    Differential Type NOT REPORTED     Seg Neutrophils 65 36 - 65 %    Lymphocytes 26 24 - 43 %    Monocytes 5 3 - 12 %    Eosinophils % 2 1 - 4 %    Basophils 1 0 - 2 %    Immature Granulocytes 1 (H) 0 %    Segs Absolute 6.24 1.50 - 8.10 k/uL    Absolute Lymph # 2.52 1.10 - 3.70 k/uL    Absolute Mono # 0.50 0.10 - 1.20 k/uL    Absolute Eos # 0.20 0.00 - 0.44 k/uL    Basophils Absolute 0.05 0.00 - 0.20 k/uL    Absolute Immature Granulocyte 0.05 0.00 - 0.30 k/uL    WBC Morphology NOT REPORTED     RBC Morphology NOT REPORTED     Platelet Estimate NOT REPORTED    Comprehensive Metabolic Panel w/ Reflex to MG    Collection Time: 01/30/22  4:55 PM   Result Value Ref Range    Glucose 148 (H) 70 - 99 mg/dL    BUN 13 6 - 20 mg/dL    CREATININE 0.66 0.50 - 0.90 mg/dL    Bun/Cre Ratio 20 9 - 20    Calcium 8.4 (L) 8.6 - 10.4 mg/dL    Sodium 136 135 - 144 mmol/L    Potassium 3.5 episode of recurrent major depressive disorder, without psychotic features ([facility] [F33.2] 06/03/2020    Congestive heart failure ([facility] [I50.9] 05/06/2020    Morbid obesity with BMI of 50.0-59.9, adult ([facility] [E66.01, Z68.43] 02/09/2015    Arrhythmia [I49.9] 06/04/2014    Hypertension [I10] 06/04/2014    Hyperlipidemia [E78.5] 06/04/2014       Plan:     80-year-old lady morbid obesity BMI 44.81 weighs 245 pounds history of gastric sleeve surgery  Recent gastric emptying shows partial gastroparesis  Likely secondary to uncontrolled diabetes mellitus on Ozempic  Repeat A1c blood sugar log  History of colon cancer with the status post a partial colectomy could not find in the medical records will need outpatient hematology oncology follow-up  History of multiple sclerosis not on any treatment no focal weakness noted  History of rheumatoid arthritis without any hand deformity not on any immunosuppressive therapy  Multiple lung nodules follow-up outpatient likely secondary to rheumatoid arthritis needs to rule out CA lung from history of smoking  COPD without exacerbation symbicort  Congestive heart failure ejection fraction unknown clinically compensated outpatient echocardiogram  Continue Lasix 40 mg Metformin 500 mg if ejection fraction less than 35% we will stop Metformin  Diabetic neuropathy on gabapentin 1200 mg  History of gastritis and GERD on pantoprazole  Hypokalemia replace and recheck  History of recurrent diverticulitis status post colovesical fistula  Mild duptryn contracture outpatient follow-up with hand surgeon  theresa smith xeralto and MARYLOU Cadena MD  1/31/2022  2:51 PM    Copy sent to Dr. Jesus Mondragon    Please note that this chart was generated using voice recognition Dragon dictation software. Although every effort was made to ensure the accuracy of this automated transcription, some errors in transcription may have occurred.

## 2022-01-31 NOTE — BH NOTE
D/c plan when medically cleared    Met with patient during IDR's. He informed cm that he got a letter from [facility] states they had paid his bills but he has not received card yet. However, patient is listed as self pay. Telephone call to [facility] to find out financial status. Patient states he is living with his mother now and states he has oxygen and nebulizer at home. Dr. Bong Bello informed patient he may be ready for d/c tomorrow. Cm will continue to follow    [address] from patient advocate informed  patient has questions about medicaid  Met with patient again at bedside informed him that [facility] was notified and will be making contact with patient about medicaid and other questions. Medications has been verified with Anita.  Medications updated in home medication list.

## 2022-02-01 LAB
GLUCOSE BLD-MCNC: 103 MG/DL (ref 65–105)
GLUCOSE BLD-MCNC: 104 MG/DL (ref 65–105)
GLUCOSE BLD-MCNC: 110 MG/DL (ref 65–105)

## 2022-02-01 PROCEDURE — 6370000000 HC RX 637 (ALT 250 FOR IP): Performed by: PSYCHIATRY & NEUROLOGY

## 2022-02-01 PROCEDURE — 90833 PSYTX W PT W E/M 30 MIN: CPT | Performed by: PSYCHIATRY & NEUROLOGY

## 2022-02-01 PROCEDURE — 82947 ASSAY GLUCOSE BLOOD QUANT: CPT

## 2022-02-01 PROCEDURE — 1240000000 HC EMOTIONAL WELLNESS R&B

## 2022-02-01 PROCEDURE — 99232 SBSQ HOSP IP/OBS MODERATE 35: CPT | Performed by: INTERNAL MEDICINE

## 2022-02-01 PROCEDURE — 99232 SBSQ HOSP IP/OBS MODERATE 35: CPT | Performed by: PSYCHIATRY & NEUROLOGY

## 2022-02-01 RX ADMIN — CARVEDILOL 12.5 MG: 6.25 TABLET, FILM COATED ORAL at 17:25

## 2022-02-01 RX ADMIN — FLUTICASONE PROPIONATE 1 SPRAY: 50 SPRAY, METERED NASAL at 08:04

## 2022-02-01 RX ADMIN — METFORMIN HYDROCHLORIDE 500 MG: 500 TABLET ORAL at 17:25

## 2022-02-01 RX ADMIN — METHYLPHENIDATE HYDROCHLORIDE 20 MG: 10 TABLET ORAL at 07:59

## 2022-02-01 RX ADMIN — GABAPENTIN 1200 MG: 600 TABLET, FILM COATED ORAL at 13:35

## 2022-02-01 RX ADMIN — METHOCARBAMOL 1500 MG: 750 TABLET ORAL at 13:35

## 2022-02-01 RX ADMIN — CARVEDILOL 12.5 MG: 6.25 TABLET, FILM COATED ORAL at 08:01

## 2022-02-01 RX ADMIN — TRAZODONE HYDROCHLORIDE 50 MG: 50 TABLET ORAL at 21:00

## 2022-02-01 RX ADMIN — RIVAROXABAN 20 MG: 20 TABLET, FILM COATED ORAL at 17:25

## 2022-02-01 RX ADMIN — FERROUS SULFATE TAB 325 MG (65 MG ELEMENTAL FE) 325 MG: 325 (65 FE) TAB at 21:00

## 2022-02-01 RX ADMIN — POTASSIUM CHLORIDE 20 MEQ: 1500 TABLET, EXTENDED RELEASE ORAL at 17:25

## 2022-02-01 RX ADMIN — POTASSIUM CHLORIDE 20 MEQ: 1500 TABLET, EXTENDED RELEASE ORAL at 07:59

## 2022-02-01 RX ADMIN — ACETAMINOPHEN 650 MG: 325 TABLET, FILM COATED ORAL at 00:34

## 2022-02-01 RX ADMIN — BUDESONIDE AND FORMOTEROL FUMARATE DIHYDRATE 2 PUFF: 160; 4.5 AEROSOL RESPIRATORY (INHALATION) at 08:01

## 2022-02-01 RX ADMIN — FUROSEMIDE 40 MG: 40 TABLET ORAL at 08:02

## 2022-02-01 RX ADMIN — MULTIPLE VITAMINS W/ MINERALS TAB 1 TABLET: TAB at 07:59

## 2022-02-01 RX ADMIN — PANTOPRAZOLE SODIUM 40 MG: 40 TABLET, DELAYED RELEASE ORAL at 06:12

## 2022-02-01 RX ADMIN — MULTIPLE VITAMINS W/ MINERALS TAB 1 TABLET: TAB at 20:59

## 2022-02-01 RX ADMIN — IBUPROFEN 400 MG: 400 TABLET, FILM COATED ORAL at 05:36

## 2022-02-01 RX ADMIN — METFORMIN HYDROCHLORIDE 500 MG: 500 TABLET ORAL at 07:59

## 2022-02-01 RX ADMIN — IBUPROFEN 400 MG: 400 TABLET, FILM COATED ORAL at 18:42

## 2022-02-01 RX ADMIN — GABAPENTIN 1200 MG: 600 TABLET, FILM COATED ORAL at 21:00

## 2022-02-01 RX ADMIN — ACETAMINOPHEN 650 MG: 325 TABLET, FILM COATED ORAL at 08:00

## 2022-02-01 RX ADMIN — FUROSEMIDE 40 MG: 40 TABLET ORAL at 17:25

## 2022-02-01 RX ADMIN — DULOXETINE 60 MG: 60 CAPSULE, DELAYED RELEASE ORAL at 21:00

## 2022-02-01 RX ADMIN — DULOXETINE 60 MG: 60 CAPSULE, DELAYED RELEASE ORAL at 07:59

## 2022-02-01 RX ADMIN — METHOCARBAMOL 1500 MG: 750 TABLET ORAL at 21:00

## 2022-02-01 RX ADMIN — GABAPENTIN 1200 MG: 600 TABLET, FILM COATED ORAL at 08:00

## 2022-02-01 RX ADMIN — METHYLPHENIDATE HYDROCHLORIDE 20 MG: 10 TABLET ORAL at 21:00

## 2022-02-01 RX ADMIN — BUDESONIDE AND FORMOTEROL FUMARATE DIHYDRATE 2 PUFF: 160; 4.5 AEROSOL RESPIRATORY (INHALATION) at 20:59

## 2022-02-01 RX ADMIN — FERROUS SULFATE TAB 325 MG (65 MG ELEMENTAL FE) 325 MG: 325 (65 FE) TAB at 08:00

## 2022-02-01 RX ADMIN — CETIRIZINE HYDROCHLORIDE TABLETS 10 MG: 10 TABLET, FILM COATED ORAL at 08:01

## 2022-02-01 RX ADMIN — METHOCARBAMOL 1500 MG: 750 TABLET ORAL at 08:02

## 2022-02-01 ASSESSMENT — PAIN SCALES - GENERAL
PAINLEVEL_OUTOF10: 7
PAINLEVEL_OUTOF10: 6
PAINLEVEL_OUTOF10: 0
PAINLEVEL_OUTOF10: 7
PAINLEVEL_OUTOF10: 6

## 2022-02-01 ASSESSMENT — PAIN DESCRIPTION - LOCATION: LOCATION: BACK;NECK

## 2022-02-01 NOTE — PLAN OF CARE
[facility]  Day 3 Interdisciplinary Treatment Plan NOTE    Review Date & Time: 2/1/2022  0920    Admission Type:   Admission Type:  Involuntary (signed in)    Reason for admission:  Reason for Admission: Overdose on Atarax  Estimated Length of Stay: 5-7 days  Estimated Discharge Date Update: to be determined by physician    PATIENT STRENGTHS:  Patient Strengths Strengths: Medication Compliance,Communication  Patient Strengths and Limitations:Limitations: Difficulty problem solving/relies on others to help solve problems,Inappropriate/potentially harmful leisure interests,Apathetic / unmotivated  Addictive Behavior:Addictive Behavior  In the past 3 months, have you felt or has someone told you that you have a problem with:  : None  Do you have a history of Chemical Use?: No  Do you have a history of Alcohol Use?: No (denies  positive for ethanol)  Do you have a history of Street Drug Abuse?: No (denies pos for cocaine)  Histroy of Prescripton Drug Abuse?: No  Medical Problems:  Past Medical History:   Diagnosis Date    Abdominal pain, epigastric 6/29/2011    Abnormal cardiovascular stress test 1/23/2012    Atrial fibrillation ([address]     Bilateral edema of lower extremity 3/3/2016    Blurred vision     left eye     Breast cancer ([address]     bilateral remission since 2012    C. difficile colitis     2015    Cervical spondylosis with myelopathy and radiculopathy 12/17/2020    Colitis 2/5/2016    Colon cancer ([address]     stage IV remission since 2016    Colovesical fistula 2/9/2016    Compression fracture of first lumbar vertebra ([address] 9/24/2019    COPD (chronic obstructive pulmonary disease) ([address]     COPD, moderate ([address] 2/19/2021    COVID-19 Nov 2019 & Dec 2020    Depression     Diabetes mellitus ([address]     Diverticulitis 11/17/2015    Diverticulosis     Dizziness     from sitting to standing    Dupuytren's contracture of hand 4/16/2021    Fibromyalgia     GERD (gastroesophageal reflux disease)     History of loop recorder     Hyperlipidemia     Hypertension     Irritable bowel syndrome with diarrhea 9/29/2010    Kidney stones     Malignant neoplasm of skin 12/21/2021    Multiple sclerosis ([facility]     Myocardial infarct ([facility] 12/2021    Narcoleptic syndrome 2/19/2021    Non-rheumatic mitral regurgitation 3/1/2019    On home oxygen therapy     CPAP at night, home oxygen PRN    Palpitations 6/18/2013    PONV (postoperative nausea and vomiting)     Rheumatoid arthritis ([facility]     S/P hysterectomy with oophorectomy 8/4/2010    Seizure ([facility] 7/    last seizure 7/18/2020  first seizure 1992 after head injury take gabapentin    Sleep apnea     CPAP nightly    Unspecified cerebral artery occlusion with cerebral infarction     2008     Wears glasses        Risk:  Fall RiskTotal: 73  Pj Scale Pj Scale Score: 22  BVC    Change in scores no Changes to plan of Care no    Status EXAM:   Status and Exam  Normal: No  Facial Expression: Flat  Affect: Blunt  Level of Consciousness: Alert  Mood:Normal: No  Mood: Depressed,Anxious  Motor Activity:Normal: Yes  Motor Activity: Increased  Interview Behavior: Cooperative  Preception: [place] to Person,[place] to Time,[place] to Place,[place] to Situation  Attention:Normal: Yes  Attention: Distractible,Unable to Concentrate  Thought Processes: Circumstantial  Thought Content:Normal: Yes  Thought Content: Preoccupations  Hallucinations: None  Delusions: No  Memory:Normal: Yes  Insight and Judgment: No  Insight and Judgment: Poor Judgment,Poor Insight  Present Suicidal Ideation: No  Present Homicidal Ideation: No    Daily Assessment Last Entry:             Patient Currently in Pain: Yes  Daily Nutrition (WDL): Within Defined Limits    Patient Monitoring:  Frequency of Checks: 4 times per hour, close    Psychiatric Symptoms:   Depression Symptoms  Depression Symptoms: Feelings of helplessness,Feelings of hopelessess  Anxiety Symptoms  Anxiety Symptoms: Generalized  Monica Symptoms  Monica Symptoms: No problems reported or observed. Psychosis Symptoms  Delusion Type: No problems reported or observed. Suicide Risk CSSR-S:  1) Within the past month, have you wished you were dead or wished you could go to sleep and not wake up? : No  2) Have you actually had any thoughts of killing yourself? : No (denies attempt, took overdose)  6) Have you ever done anything, started to do anything, or prepared to do anything to end your life?: No (denies attempt took overdose)  Change in Result no Change in Plan of care no      EDUCATION:   EDUCATION:   Learner Progress Toward Treatment Goals: Reviewed results and recommendations of this team, Reviewed group plan and strategies, Reviewed signs, symptoms and risk of self harm and violent behavior, Reviewed goals and plan of care    Method:small group, individual verbal education    Outcome:verbalized by patient, but needs reinforcement to obtain goals    PATIENT GOALS:  Short term: getting stabilized on meds.    Long term: following up with mental health care     PLAN/TREATMENT RECOMMENDATIONS UPDATE: continue with group therapies, increased socialization, continue planning for after discharge goals, continue with medication compliance    SHORT-TERM GOALS UPDATE:   Time frame for Short-Term Goals: 5-7 days    LONG-TERM GOALS UPDATE:   Time frame for Long-Term Goals: 6 months  Members Present in Team Meeting: See Signature Sheet    ТАТЬЯНА Chaney

## 2022-02-01 NOTE — FLOWSHEET NOTE
*Patient participated in [facility] [employer] and we talked about her current issues and had prayer    *Writer gave patient a Bible and Daily Bread       02/01/22 1535   Encounter Summary   Services provided to: Patient   Referral/Consult From: [facility]   (2/1/22)   Complexity of Encounter Moderate   Length of Encounter 30 minutes   Spiritual Assessment Completed Yes   Spiritual/Rastafarian   Type Spiritual support   Assessment Calm; Approachable   Intervention Active listening;Prayer;Sustaining presence/ Ministry of presence; Discussed belief system/Mormon practices/linsey;Discussed relationship with God   Outcome Receptive; Expressed feelings/needs/concerns;Engaged in conversation;Expressed gratitude

## 2022-02-01 NOTE — PROGRESS NOTES
Daily Progress Note  2/1/2022    Patient Name: Brie Mayer:  Attempted suicide by overdose on Vistaril           Emergency Medications: Patient has not received any emergency PRN medications in the past 24 hours. Scheduled medications: Patient has been compliant with all scheduled medications. SUBJECTIVE:   Patient is seen today for a follow up assessment in her room. She reports that she is feeling better and that her biggest problem has been \"horrible\" alcohol cravings. She reports that she drinks approximately 15 shots of whiskey per day in mixed drinks and has been drinking at that rate for the past year and a half. She reports symptoms of withdrawal including cold sweats and shaking. However, no tremors or obvious sweating are observed. Patient endorses continued depression and anxiety, both of which she rates a 6 (on a scale of 0-10 with 10 being high). She verbalizes that she has recognized that a majority of her anxiety is over things that she cannot control, such as her daughter not speaking to her, and that she must accept these things. She denies active suicidal ideations, plans, or intent. She denies homicidal ideations. She denies symptoms of psychosis including auditory and visual hallucinations, and paranoia. No evidence of delusions are observed. She reports that she slept well overnight and her appetite has been normal. She denies medication side effects. She reports medical concern of neck and back pain, which are chronic, but feels that this will improve as she has switched to a medical bed. Patient is able to contract for safety on the unit. At this time, the patient is not appropriate for a lower level of care. There is risk of decompensation and the patient warrants further hospitalization for safety and stabilization.      Appetite:  [x] Normal/Adequate/Unchanged  [] Increased  [] Decreased      Sleep:       [x] Normal/Adequate/Unchanged  [] Fair  [] Poor      Group Attendance on Unit:   [x] Yes  [] Selectively    [] No    Medication Side Effects: Denies         Mental Status Exam  Level of consciousness: Alert and awake   Appearance: Appropriate attire for setting, seated on bed, with fair  grooming and hygiene   Behavior/Motor: Approachable, no psychomotor abnormalities  Attitude toward examiner: Cooperative, attentive, good eye contact  Speech: spontaneous, normal rate, volume, and tone  Mood:  \"Better\"  Affect: Mood congruent   Thought processes: linear and coherent   Thought content: Denies homicidal ideation  Suicidal Ideation: Improving suicidal ideation, without plan or intent. Contracts for safety on the unit. Delusions: Denies paranoia. No evidence of delusions observed. Perceptual Disturbance: patient does not appear to be responding to internal stimuli. Denies auditory and visual hallucinations. Cognition: Oriented to self, location, time, and situation  Memory: intact  Insight & Judgement: poor     Data   height is 5' 2\" (1.575 m) and weight is 245 lb (111.1 kg). Her oral temperature is 97.8 °F (36.6 °C). Her blood pressure is 153/69 (abnormal) and her pulse is 70. Her respiration is 14 and oxygen saturation is 94%.    Labs:   Admission on 01/31/2022   Component Date Value Ref Range Status    POC Glucose 01/31/2022 108* 65 - 105 mg/dL Final    POC Glucose 01/31/2022 105  65 - 105 mg/dL Final    POC Glucose 02/01/2022 104  65 - 105 mg/dL Final    POC Glucose 02/01/2022 110* 65 - 105 mg/dL Final   Admission on 01/30/2022, Discharged on 01/31/2022   Component Date Value Ref Range Status    WBC 01/30/2022 9.6  3.5 - 11.3 k/uL Final    RBC 01/30/2022 4.17  3.95 - 5.11 m/uL Final    Hemoglobin 01/30/2022 13.1  11.9 - 15.1 g/dL Final    Hematocrit 01/30/2022 40.1  36.3 - 47.1 % Final    MCV 01/30/2022 96.2  82.6 - 102.9 fL Final    MCH 01/30/2022 31.4  25.2 - 33.5 pg Final    MCHC 01/30/2022 32.7  28.4 - 34.8 g/dL Final    RDW 01/30/2022 13.6  11.8 - 14.4 % Final    Platelets 37/31/0749 233  138 - 453 k/uL Final    MPV 01/30/2022 9.9  8.1 - 13.5 fL Final    NRBC Automated 01/30/2022 0.0  0.0 per 100 WBC Final    Differential Type 01/30/2022 NOT REPORTED   Final    Seg Neutrophils 01/30/2022 65  36 - 65 % Final    Lymphocytes 01/30/2022 26  24 - 43 % Final    Monocytes 01/30/2022 5  3 - 12 % Final    Eosinophils % 01/30/2022 2  1 - 4 % Final    Basophils 01/30/2022 1  0 - 2 % Final    Immature Granulocytes 01/30/2022 1* 0 % Final    Segs Absolute 01/30/2022 6.24  1.50 - 8.10 k/uL Final    Absolute Lymph # 01/30/2022 2.52  1.10 - 3.70 k/uL Final    Absolute Mono # 01/30/2022 0.50  0.10 - 1.20 k/uL Final    Absolute Eos # 01/30/2022 0.20  0.00 - 0.44 k/uL Final    Basophils Absolute 01/30/2022 0.05  0.00 - 0.20 k/uL Final    Absolute Immature Granulocyte 01/30/2022 0.05  0.00 - 0.30 k/uL Final    WBC Morphology 01/30/2022 NOT REPORTED   Final    RBC Morphology 01/30/2022 NOT REPORTED   Final    Platelet Estimate 60/33/4077 NOT REPORTED   Final    Glucose 01/30/2022 148* 70 - 99 mg/dL Final    BUN 01/30/2022 13  6 - 20 mg/dL Final    CREATININE 01/30/2022 0.66  0.50 - 0.90 mg/dL Final    Bun/Cre Ratio 01/30/2022 20  9 - 20 Final    Calcium 01/30/2022 8.4* 8.6 - 10.4 mg/dL Final    Sodium 01/30/2022 136  135 - 144 mmol/L Final    Potassium 01/30/2022 3.5* 3.7 - 5.3 mmol/L Final    Chloride 01/30/2022 98  98 - 107 mmol/L Final    CO2 01/30/2022 25  20 - 31 mmol/L Final    Anion Gap 01/30/2022 13  9 - 17 mmol/L Final    Alkaline Phosphatase 01/30/2022 78  35 - 104 U/L Final    ALT 01/30/2022 7  5 - 33 U/L Final    AST 01/30/2022 16  <32 U/L Final    Total Bilirubin 01/30/2022 0.18* 0.3 - 1.2 mg/dL Final    Total Protein 01/30/2022 6.4  6.4 - 8.3 g/dL Final    Albumin 01/30/2022 3.4* 3.5 - 5.2 g/dL Final    Albumin/Globulin Ratio 01/30/2022 NOT REPORTED  1.0 - 2.5 Final    GFR Non- 01/30/2022 >60  >60 mL/min Final    GFR  01/30/2022 >60  >60 mL/min Final    GFR Comment 01/30/2022        Final    Comment: Average GFR for 52-63 years old:   80 mL/min/1.73sq m  Chronic Kidney Disease:   <60 mL/min/1.73sq m  Kidney failure:   <15 mL/min/1.73sq m              eGFR calculated using average adult body mass.  Additional eGFR calculator available at:        Daily [email or web address]            GFR Staging 01/30/2022 NOT REPORTED   Final    Acetaminophen Level 01/30/2022 <10* 10 - 30 ug/mL Final    Ethanol 01/30/2022 27* <10 mg/dL Final    Ethanol percent 01/30/2022 0.027* <2.405 % Final    Salicylate Lvl 14/77/0943 1* 3 - 10 mg/dL Final    Toxic Tricyclic Sc,Blood 86/04/7462 NEGATIVE  NEGATIVE Final    Color, UA 01/30/2022 Yellow  Yellow Final    Turbidity UA 01/30/2022 Clear  Clear Final    Glucose, Ur 01/30/2022 NEGATIVE  NEGATIVE Final    Bilirubin Urine 01/30/2022 NEGATIVE  NEGATIVE Final    Ketones, Urine 01/30/2022 NEGATIVE  NEGATIVE Final    Specific Gravity, UA 01/30/2022 1.010  1.005 - 1.030 Final    Urine Hgb 01/30/2022 TRACE* NEGATIVE Final    pH, UA 01/30/2022 6.0  5.0 - 8.0 Final    Protein, UA 01/30/2022 NEGATIVE  NEGATIVE Final    Urobilinogen, Urine 01/30/2022 Normal  Normal Final    Nitrite, Urine 01/30/2022 NEGATIVE  NEGATIVE Final    Leukocyte Esterase, Urine 01/30/2022 SMALL* NEGATIVE Final    Urinalysis Comments 01/30/2022 NOT REPORTED   Final    - 01/30/2022        Final    WBC, UA 01/30/2022 5 TO 10  0 - 5 /HPF Final    RBC, UA 01/30/2022 0 TO 2  0 - 2 /HPF Final    Casts UA 01/30/2022 NOT REPORTED  /LPF Final    Crystals, UA 01/30/2022 NOT REPORTED  None /HPF Final    Epithelial Cells UA 01/30/2022 5 TO 10  0 - 5 /HPF Final    Renal Epithelial, UA 01/30/2022 NOT REPORTED  0 /HPF Final    Bacteria, UA 01/30/2022 NOT REPORTED  None Final    Mucus, UA 01/30/2022 NOT REPORTED  None Final    Trichomonas, UA 01/30/2022 NOT REPORTED  None Final    Amorphous, UA 01/30/2022 NOT REPORTED  None Final    Other Observations UA 01/30/2022 NOT REPORTED  NOT REQ. Final    Yeast, UA 01/30/2022 NOT REPORTED  None Final    Amphetamine Screen, Ur 01/30/2022 NEGATIVE  NEGATIVE Final    Comment:       (Positive cutoff 1000 ng/mL)                  Barbiturate Screen, Ur 01/30/2022 NEGATIVE  NEGATIVE Final    Comment:       (Positive cutoff 200 ng/mL)                  Benzodiazepine Screen, Urine 01/30/2022 NEGATIVE  NEGATIVE Final    Comment:       (Positive cutoff 200 ng/mL)                  Cocaine Metabolite, Urine 01/30/2022 POSITIVE* NEGATIVE Final    Comment:       (Positive cutoff 300 ng/mL)                  Methadone Screen, Urine 01/30/2022 NEGATIVE  NEGATIVE Final    Comment:       (Positive cutoff 300 ng/mL)                  Opiates, Urine 01/30/2022 NEGATIVE  NEGATIVE Final    Comment:       (Positive cutoff 300 ng/mL)                  Phencyclidine, Urine 01/30/2022 NEGATIVE  NEGATIVE Final    Comment:       (Positive cutoff 25 ng/mL)                  Propoxyphene, Urine 01/30/2022 NOT REPORTED  NEGATIVE Final    Cannabinoid Scrn, Ur 01/30/2022 NEGATIVE  NEGATIVE Final    Comment:       (Positive cutoff 50 ng/mL)                  Oxycodone Screen, Ur 01/30/2022 NEGATIVE  NEGATIVE Final    Comment:       (Positive cutoff 100 ng/mL)                  Methamphetamine, Urine 01/30/2022 NOT REPORTED  NEGATIVE Final    Tricyclic Antidepressants, Urine 01/30/2022 NOT REPORTED  NEGATIVE Final    MDMA, Urine 01/30/2022 NOT REPORTED  NEGATIVE Final    Buprenorphine Urine 01/30/2022 NOT REPORTED  NEGATIVE Final    Test Information 01/30/2022 Assay provides medical screening only. The absence of expected drug(s) and/or metabolite(s) may indicate diluted or adulterated urine, limitations of testing or timing of collection. Final    Comment: Testing for legal purposes should be confirmed by another method.   To request confirmation   of test result, please call the lab within 7 days of sample submission.  Ventricular Rate 01/30/2022 83  BPM Final    Atrial Rate 01/30/2022 83  BPM Final    P-R Interval 01/30/2022 144  ms Final    QRS Duration 01/30/2022 94  ms Final    Q-T Interval 01/30/2022 382  ms Final    QTc Calculation (Bazett) 01/30/2022 448  ms Final    P Axis 01/30/2022 42  degrees Final    R Axis 01/30/2022 -11  degrees Final    T Axis 01/30/2022 -179  degrees Final    Specimen Description 01/30/2022 . NASOPHARYNGEAL SWAB   Final    SARS-CoV-2, Rapid 01/30/2022 Not Detected  Not Detected Final    Comment:       Rapid NAAT:  The specimen is NEGATIVE for SARS-CoV-2, the novel coronavirus associated with   COVID-19. The ID NOW COVID-19 assay is designed to detect the virus that causes COVID-19 in patients   with signs and symptoms of infection who are suspected of COVID-19. An individual without symptoms of COVID-19 and who is not shedding SARS-CoV-2 virus would   expect to have a negative (not detected) result in this assay. Negative results should be treated as presumptive and, if inconsistent with clinical signs   and symptoms or necessary for patient management,  should be tested with an alternative molecular assay. Negative results do not preclude   SARS-CoV-2 infection and   should not be used as the sole basis for patient management decisions. Fact sheet for Healthcare Providers: Nicholas.es  Fact sheet for Patients: Nicholas.es          Methodology: Isothermal Nucleic Acid Amplification      Magnesium 01/30/2022 2.1  1.6 - 2.6 mg/dL Final    Iron 01/30/2022 60  37 - 145 ug/dL Final         Reviewed patient's current plan of care and vital signs with nursing staff.     Labs reviewed: [x] Yes  Last EKG in EMR reviewed: [x] Yes    Medications  Current Facility-Administered Medications: acetaminophen (TYLENOL) tablet 650 mg, 650 mg, Oral, Q4H PRN  aluminum & magnesium hydroxide-simethicone (MAALOX) 200-200-20 MG/5ML suspension 30 mL, 30 mL, Oral, Q6H PRN  polyethylene glycol (GLYCOLAX) packet 17 g, 17 g, Oral, Daily PRN  traZODone (DESYREL) tablet 50 mg, 50 mg, Oral, Nightly PRN  ibuprofen (ADVIL;MOTRIN) tablet 400 mg, 400 mg, Oral, Q6H PRN  albuterol sulfate  (90 Base) MCG/ACT inhaler 2 puff, 2 puff, Inhalation, Q6H PRN  budesonide-formoterol (SYMBICORT) 160-4.5 MCG/ACT inhaler 2 puff, 2 puff, Inhalation, BID  carvedilol (COREG) tablet 12.5 mg, 12.5 mg, Oral, BID WC  cetirizine (ZYRTEC) tablet 10 mg, 10 mg, Oral, Daily  diclofenac sodium (VOLTAREN) 1 % gel 10 g, 10 g, Topical, PRN  pantoprazole (PROTONIX) tablet 40 mg, 40 mg, Oral, QAM AC  ferrous sulfate (IRON 325) tablet 325 mg, 325 mg, Oral, BID  fluticasone (FLONASE) 50 MCG/ACT nasal spray 1 spray, 1 spray, Each Nostril, Daily  furosemide (LASIX) tablet 40 mg, 40 mg, Oral, BID  gabapentin (NEURONTIN) tablet 1,200 mg, 1,200 mg, Oral, TID  lidocaine 4 % external patch 1 patch, 1 patch, TransDERmal, Daily  loperamide (IMODIUM) capsule 2 mg, 2 mg, Oral, 4x Daily PRN  metFORMIN (GLUCOPHAGE) tablet 500 mg, 500 mg, Oral, BID WC  methocarbamol (ROBAXIN) tablet 1,500 mg, 1,500 mg, Oral, TID  therapeutic multivitamin-minerals 1 tablet, 1 tablet, Oral, BID  methylphenidate (RITALIN) tablet 20 mg, 20 mg, Oral, BID  rivaroxaban (XARELTO) tablet 20 mg, 20 mg, Oral, Dinner  ondansetron (ZOFRAN-ODT) disintegrating tablet 4 mg, 4 mg, Oral, Q8H PRN  Ubrogepant TABS 50 mg, 50 mg, Oral, PRN  DULoxetine (CYMBALTA) extended release capsule 60 mg, 60 mg, Oral, BID  potassium chloride (KLOR-CON M) extended release tablet 20 mEq, 20 mEq, Oral, BID WC    ASSESSMENT  Severe episode of recurrent major depressive disorder, without psychotic features [facility]         PLAN  Patient symptoms are: Improving  Continue current medication regimen  Consider initiation of CIWA protocol   Monitor need and frequency of PRN medications  Encourage participation in groups and milieu  Attempt to develop insight  Psycho-education conducted  Supportive Therapy conducted  Probable discharge: to be determined by attending physician   Follow-up daily while inpatient    Patient continues to be monitored in the inpatient psychiatric facility at [facility] for safety and stabilization. Patient continues to need, on a daily basis, active treatment furnished directly by or requiring the supervision of inpatient psychiatric personnel.

## 2022-02-01 NOTE — PROGRESS NOTES
[facility] Internal Medicine    CONSULTATION / HISTORY AND PHYSICAL EXAMINATION            Date:   2/1/2022  Patient name:  Hilda Roman  Date of admission:  1/31/2022  3:18 AM  MRN:   [id number]  Account:  [de-identified]  YOB: 1965  PCP:    Raymond Caceres  Room:   0101/0101-02  Code Status:    Full Code    Physician Requesting Consult: Naomi Danielle MD    Reason for Consult:  medical management    Chief Complaint:     No chief complaint on file. dm  htn    History Obtained From:     Patient medical record nursing staff    History of Present Illness:     HTN  Onset more than 2 years ago  ann mild to mod  Controlled with current po meds  Not associated with headaches or blurry vision  No chest pain    Diabetes   Duration more than 7 years  Modifying factors on Glucophage and other med  Severity uncontrolled sever  Associated signs and symtoms neuropathy/ckd/ CAD.    aggravated with sugar diet and better with low sugar diet           Past Medical History:     Past Medical History:   Diagnosis Date    Abdominal pain, epigastric 6/29/2011    Abnormal cardiovascular stress test 1/23/2012    Atrial fibrillation (HCC)     Bilateral edema of lower extremity 3/3/2016    Blurred vision     left eye     Breast cancer ([address]     bilateral remission since 2012    C. difficile colitis     2015    Cervical spondylosis with myelopathy and radiculopathy 12/17/2020    Colitis 2/5/2016    Colon cancer ([address]     stage IV remission since 2016    Colovesical fistula 2/9/2016    Compression fracture of first lumbar vertebra ([address] 9/24/2019    COPD (chronic obstructive pulmonary disease) (HCC)     COPD, moderate ([address] 2/19/2021    COVID-19 Nov 2019 & Dec 2020    Depression     Diabetes mellitus ([address]     Diverticulitis 11/17/2015    Diverticulosis     Dizziness     from sitting to standing    Dupuytren's contracture of hand 4/16/2021    Fibromyalgia     GERD (gastroesophageal reflux disease)     History of loop recorder     Hyperlipidemia     Hypertension     Irritable bowel syndrome with diarrhea 2010    Kidney stones     Malignant neoplasm of skin 2021    Multiple sclerosis ([address]     Myocardial infarct ([address] 2021    Narcoleptic syndrome 2021    Non-rheumatic mitral regurgitation 3/1/2019    On home oxygen therapy     CPAP at night, home oxygen PRN    Palpitations 2013    PONV (postoperative nausea and vomiting)     Rheumatoid arthritis ([address]     S/P hysterectomy with oophorectomy 2010    Seizure ([address] 7/    last seizure 2020  first seizure  after head injury take gabapentin    Sleep apnea     CPAP nightly    Unspecified cerebral artery occlusion with cerebral infarction          Wears glasses         Past Surgical History:     Past Surgical History:   Procedure Laterality Date    APPENDECTOMY  1985    BACK SURGERY      neck and back screws and cage    BREAST BIOPSY  [id number]    BREAST LUMPECTOMY      bilateral ,,     CARDIAC SURGERY      cardiac cath negative in 2018    CARDIOVERSION      several times     SECTION      CHOLECYSTECTOMY      COLONOSCOPY      ENDOSCOPY, COLON, DIAGNOSTIC      FRACTURE SURGERY      right wrist ORIF    HYSTERECTOMY  2010    INSERTABLE CARDIAC MONITOR      loop recorder    KNEE ARTHROSCOPY Right 2020    RIGHT KNEE ARTHROSCOPY WITH PARTIAL MEDIAL MENISCECTOMY WITH DEBRIDEMENT performed by Ebony Wood DO at [address] [facility] ARTHROSCOPY Left 2020    LEFT KNEE ARTHROSCOPY WITH PARTIAL LATERAL MENISCAL TEAR REPAIR AND [address] [facility] performed by Ebony Wood DO at [address] [place] [address], [facility] [facility] [address] [facility] [address] RESECTION  2021    neck area    SIGMOIDECTOMY  2016    for cancer    SKIN BIOPSY      moles    SLEEVE GASTRECTOMY  5/19/15    GASTRECTOMY SLEEVE LAPAROSCOPIC           TONSILLECTOMY  1985    TOTAL KNEE ARTHROPLASTY Left 8/31/2021    KNEE TOTAL ARTHROPLASTY performed by Ivan Shone, MD at [address] [facility]  1/30/15        Medications Prior to Admission:     Prior to Admission medications    Medication Sig Start Date End Date Taking? Authorizing Provider   hydrOXYzine (ATARAX) 25 MG tablet Take 25 mg by mouth 3 times daily as needed for Itching    Historical Provider, MD   Ubrogepant (UBRELVY) 50 MG TABS Take by mouth as needed    Historical Provider, MD   brexpiprazole (REXULTI) 0.5 MG TABS tablet Take 0.5 mg by mouth daily    Historical Provider, MD   Multiple Vitamins-Minerals (MULTI COMPLETE PO) Take by mouth 2 times daily     Historical Provider, MD   potassium chloride (KLOR-CON) 20 MEQ packet Take 20 mEq by mouth 2 times daily    Historical Provider, MD   ferrous sulfate (IRON 325) 325 (65 Fe) MG tablet Take 325 mg by mouth 2 times daily    Historical Provider, MD   Diclofenac Sodium 3 % GEL Apply topically as needed    Historical Provider, MD   methylphenidate (RITALIN) 20 MG tablet Take 20 mg by mouth 2 times daily. Historical Provider, MD   albuterol sulfate HFA (VENTOLIN HFA) 108 (90 Base) MCG/ACT inhaler Inhale 2 puffs into the lungs every 6 hours as needed for Wheezing    Historical Provider, MD   budesonide-formoterol (SYMBICORT) 160-4.5 MCG/ACT AERO INHALE 2 PUFFS BID UTD 9/17/20   Historical Provider, MD   methocarbamol (ROBAXIN) 750 MG tablet Take 1,500 mg by mouth 3 times daily    Historical Provider, MD   gabapentin (NEURONTIN) 600 MG tablet Take 1 tablet by mouth 3 times daily for 30 days. Patient taking differently: Take 1,200 mg by mouth 3 times daily.   6/8/20 12/21/21  Haylie Hough MD   traZODone (DESYREL) 50 MG tablet Take 1 tablet by mouth nightly as needed for Sleep  Patient taking differently: Take 100 mg by mouth nightly as needed for Sleep  6/8/20   Haylie Hough MD   metFORMIN (GLUCOPHAGE) 500 MG tablet Take 1 tablet by mouth 2 times daily (with meals) 6/8/20   Arnav Edwards MD   rivaroxaban (XARELTO) 20 MG TABS tablet Take 1 tablet by mouth Daily with supper 6/8/20   Arnav Edwards MD   carvedilol (COREG) 12.5 MG tablet Take 1 tablet by mouth 2 times daily (with meals) 6/8/20   Arnav Edwards MD   furosemide (LASIX) 40 MG tablet Take 1 tablet by mouth daily  Patient taking differently: Take 40 mg by mouth 2 times daily  6/8/20   Arnav Edwards MD   magnesium oxide (MAG-OX) 400 (241.3 Mg) MG TABS tablet Take 1 tablet by mouth daily 6/9/20   Arnav Edwards MD   esomeprazole (NEXIUM) 40 MG delayed release capsule Take 1 capsule by mouth every morning (before breakfast) 6/8/20   Arnav Edwards MD   ondansetron (ZOFRAN-ODT) 4 MG disintegrating tablet Take 4 mg by mouth every 8 hours as needed for Nausea or Vomiting    Historical Provider, MD   cetirizine (ZYRTEC) 10 MG tablet Take 10 mg by mouth daily    Historical Provider, MD   SUPER B COMPLEX/C PO Take 1 tablet by mouth 2 times daily     Historical Provider, MD   loperamide (IMODIUM) 2 MG capsule Take 2 mg by mouth 4 times daily as needed for Diarrhea Takes two capsules at onset    Historical Provider, MD   lidocaine (LIDODERM) 5 % Place 1 patch onto the skin daily as needed 12 hours on, 12 hours off. Historical Provider, MD   fluticasone (FLONASE) 50 MCG/ACT nasal spray 1 spray by Each Nostril route daily    Historical Provider, MD        Allergies:     Ace inhibitors, Betadine swab aid [povidone-iodine], Betadine [povidone iodine], Iodine, Lurasidone, Lurasidone hcl, Shellfish allergy, Shellfish-derived products, Iodides, Lamotrigine, Macrobid [nitrofurantoin macrocrystal], Pyridium [phenazopyridine hcl], and Topiramate    Social History:     Tobacco:    reports that she quit smoking about 18 months ago. She has a 20.00 pack-year smoking history. She has never used smokeless tobacco.  Alcohol:      reports current alcohol use.   Drug Use:  reports no history of drug use.    Family History:     Family History   Adopted: Yes       Review of Systems:     Positive and Negative as described in HPI. CONSTITUTIONAL:  negative for fevers, chills, sweats, fatigue, weight loss  HEENT:  negative for vision, hearing changes, runny nose, throat pain  RESPIRATORY:  negative for shortness of breath, cough, congestion, wheezing. CARDIOVASCULAR:  negative for chest pain, palpitations. GASTROINTESTINAL:  negative for nausea, vomiting, diarrhea, constipation, change in bowel habits, abdominal pain   GENITOURINARY:  negative for difficulty of urination, burning with urination, frequency   INTEGUMENT:  negative for rash, skin lesions, easy bruising   HEMATOLOGIC/LYMPHATIC:  negative for swelling/edema   ALLERGIC/IMMUNOLOGIC:  negative for urticaria , itching  ENDOCRINE:  negative increase in drinking, increase in urination, hot or cold intolerance  MUSCULOSKELETAL:  negative joint pains, muscle aches, swelling of joints  NEUROLOGICAL:  negative for headaches, dizziness, lightheadedness, numbness, pain, tingling extremities      Physical Exam:     BP (!) 153/69   Pulse 70   Temp 97.8 °F (36.6 °C) (Oral)   Resp 14   Ht 5' 2\" (1.575 m)   Wt 245 lb (111.1 kg)   SpO2 94%   BMI 44.81 kg/m²   Temp (24hrs), Av °F (36.7 °C), Min:97.8 °F (36.6 °C), Max:98.1 °F (36.7 °C)    Recent Labs     22  1702 22  2036 22  0724 22  1137   POCGLU 108* 105 104 110*     No intake or output data in the 24 hours ending 22 1454  Morbid obesity  General Appearance:  alert, well appearing, and in no acute distress  Mental status: oriented to person, place, and time with normal affect  Head:  normocephalic, atraumatic.   Eye: no icterus, redness, pupils equal and reactive, extraocular eye movements intact, conjunctiva clear  Ear: normal external ear, no discharge, hearing intact  Nose:  no drainage noted  Mouth: mucous membranes moist  Neck: supple, no carotid bruits, thyroid not palpable  Lungs: Bilateral equal air entry, clear to ausculation, no wheezing, rales or rhonchi, normal effort  Cardiovascular: normal rate, regular rhythm, no murmur, gallop, rub.   Abdomen: Soft, nontender, nondistended, normal bowel sounds, no hepatomegaly or splenomegaly  Neurologic: There are no new focal motor or sensory deficits, normal muscle tone and bulk, no abnormal sensation, normal speech, cranial nerves II through XII grossly intact  Skin: No gross lesions, rashes, bruising or bleeding on exposed skin area  Extremities:  peripheral pulses palpable, no pedal edema or calf pain with palpation  Mild implant contracture noted in the left hand    Investigations:      Laboratory Testing:  Recent Results (from the past 24 hour(s))   POC Glucose Fingerstick    Collection Time: 01/31/22  5:02 PM   Result Value Ref Range    POC Glucose 108 (H) 65 - 105 mg/dL   POC Glucose Fingerstick    Collection Time: 01/31/22  8:36 PM   Result Value Ref Range    POC Glucose 105 65 - 105 mg/dL   POC Glucose Fingerstick    Collection Time: 02/01/22  7:24 AM   Result Value Ref Range    POC Glucose 104 65 - 105 mg/dL   POC Glucose Fingerstick    Collection Time: 02/01/22 11:37 AM   Result Value Ref Range    POC Glucose 110 (H) 65 - 105 mg/dL           Consultations:   IP CONSULT TO INTERNAL MEDICINE  Assessment :      Primary Problem  Severe episode of recurrent major depressive disorder, without psychotic features [facility])    Active Hospital Problems    Diagnosis Date Noted    Borderline personality disorder ([facility] [F60.3] 01/31/2022    Intractable migraine with aura with status migrainosus [G43.111] 12/21/2021    Dupuytren's contracture of hand [M72.0] 04/16/2021    COPD, moderate (HCC) [J44.9] 02/19/2021    Rheumatoid arthritis (HCC) [M06.9]     Multiple sclerosis (HCC) [G35]     Severe episode of recurrent major depressive disorder, without psychotic features ([facility] [F33.2] 06/03/2020    Congestive heart failure ([facility] [I50.9] 05/06/2020    Compression fracture of first lumbar vertebra ([facility] [S32.010A] 09/24/2019    Morbid obesity with BMI of 50.0-59.9, adult ([facility] [E66.01, Z68.43] 02/09/2015    Arrhythmia [I49.9] 06/04/2014    Hypertension [I10] 06/04/2014    Hyperlipidemia [E78.5] 06/04/2014       Plan:     70-year-old lady morbid obesity BMI 44.81 weighs 245 pounds history of gastric sleeve surgery  Recent gastric emptying shows partial gastroparesis  Likely secondary to uncontrolled diabetes mellitus on Ozempic  Repeat A1c blood sugar log  History of colon cancer with the status post a partial colectomy could not find in the medical records will need outpatient hematology oncology follow-up  History of multiple sclerosis not on any treatment no focal weakness noted  History of rheumatoid arthritis without any hand deformity not on any immunosuppressive therapy  Multiple lung nodules follow-up outpatient likely secondary to rheumatoid arthritis needs to rule out CA lung from history of smoking  COPD without exacerbation symbicort  Congestive heart failure ejection fraction unknown clinically compensated outpatient echocardiogram  Continue Lasix 40 mg Metformin 500 mg if ejection fraction less than 35% we will stop Metformin  Diabetic neuropathy on gabapentin 1200 mg  History of gastritis and GERD on pantoprazole  Hypokalemia replace and recheck  History of recurrent diverticulitis status post colovesical fistula  Mild duptryn contracture outpatient follow-up with hand surgeon  aftez on xeralto and MARYLOU Yna MD  2/1/2022  2:54 PM    Copy sent to Dr. Rafael Barajas    Please note that this chart was generated using voice recognition Dragon dictation software. Although every effort was made to ensure the accuracy of this automated transcription, some errors in transcription may have occurred.

## 2022-02-01 NOTE — GROUP NOTE
Group Therapy Note    Date: 2/1/2022    Group Start Time: 1000  Group End Time: 1183  Group Topic: Psychotherapy    MARY JANE LUONG    EZEQUIEL Frankel LSW        Group Therapy Note    Attendees: 4/9         Patient's Goal: Increase interpersonal relationship skills    Notes:  Patient was an active participant in group discussion    Status After Intervention:  Unchanged    Participation Level:  Active Listener and Interactive    Participation Quality: Appropriate, Attentive and Sharing      Speech:  normal      Thought Process/Content: Logical      Affective Functioning: Congruent      Mood: depressed      Level of consciousness:  Alert, Oriented x4 and Attentive      Response to Learning: Able to verbalize current knowledge/experience, Able to verbalize/acknowledge new learning and Able to retain information      Endings: None Reported    Modes of Intervention: Support, Socialization and Exploration      Discipline Responsible: /Counselor      Signature:  EZEQUIEL Frankel LSW

## 2022-02-01 NOTE — GROUP NOTE
Group Therapy Note    Date: 2/1/2022    Group Start Time: 1100  Group End Time: 1130  Group Topic: Healthy Living/Wellness    [facility]    Elio Day, CTRS           Patient's Goal:  To improve coping skills  /improve positive thinking     Notes:   Pt was pleasant and participated well     Status After Intervention:  Improved    Participation Level:  Active Listener and Interactive    Participation Quality: Appropriate, Attentive, Sharing and Supportive      Speech:  normal      Thought Process/Content: Logical      Affective Functioning: Congruent      Mood: euthymic      Level of consciousness:  Alert      Response to Learning: Able to verbalize current knowledge/experience and Progressing to goal      Endings: None Reported    Modes of Intervention: Education, Support and Problem-solving      Discipline Responsible: Psychoeducational Specialist      Signature:  Ho Parish

## 2022-02-01 NOTE — PLAN OF CARE
Problem: Altered Mood, Depressive Behavior:  Goal: Able to verbalize and/or display a decrease in depressive symptoms  Description: Able to verbalize and/or display a decrease in depressive symptoms  2/1/2022 1244 by Luis Saldaña RN  Outcome: Ongoing  Patient is alert, observed in day area. Patient is currently denying thoughts of wanting to harm self or others. Patient reports not having any tactile, gustatory, auditory, olfactory, or visual hallucinations. Patient admits to having anxiety and depression relating to medical concerns. Patient has been visible on unit, social with peers, attending unit programming. Sleep and appetite adequate. Patient is medication complaint, denies having any side effects. Hygiene appropriate. No further concerns. Will continue to monitor. Problem: Altered Mood, Depressive Behavior:  Goal: Absence of self-harm  Description: Absence of self-harm  2/1/2022 1244 by Luis Saldaña RN  Outcome: Ongoing  Patient is currently denying thoughts of wanting to harm self or others.

## 2022-02-01 NOTE — PLAN OF CARE
Problem: Altered Mood, Depressive Behavior:  Goal: Absence of self-harm  Description: Absence of self-harm  1/31/2022 2207 by Ambrose Simons RN  Outcome: Ongoing   Denies wanting to harm self.

## 2022-02-01 NOTE — GROUP NOTE
Group Therapy Note    Date: 2/1/2022    Group Start Time: 1300  Group End Time: 1330  Group Topic: Relaxation Group     NIRMAL Gutiérrez           Patient's Goal:  To improve coping skills uisng art    Notes:   Pt was pleasant and participated well     Status After Intervention:  Improved    Participation Level:  Active Listener and Interactive    Participation Quality: Appropriate, Attentive, Sharing and Supportive      Speech:  normal      Thought Process/Content: Logical      Affective Functioning: Congruent      Mood: euthymic      Level of consciousness:  Alert      Response to Learning: Able to verbalize current knowledge/experience and Progressing to goal      Endings: None Reported    Modes of Intervention: Education, Support and Problem-solving      Discipline Responsible: Psychoeducational Specialist      Signature:  Patti Blank

## 2022-02-01 NOTE — PROGRESS NOTES
Daily Progress Note  2/1/2022    Patient Name: Violette Magana:  Attempted suicide by overdose on Vistaril           Emergency Medications: Patient has not received any emergency PRN medications in the past 24 hours. Scheduled medications: Patient has been compliant with all scheduled medications. SUBJECTIVE:   Patient is seen today for a follow up assessment in her room. She reports that she is feeling better and that her biggest problem has been \"horrible\" alcohol cravings. She reports that she drinks approximately 15 shots of whiskey per day in mixed drinks and has been drinking at that rate for the past year and a half. She reports symptoms of withdrawal including cold sweats and shaking. However, no tremors or obvious sweating are observed. Patient endorses continued depression and anxiety, both of which she rates a 6 (on a scale of 0-10 with 10 being high). She verbalizes that she has recognized that a majority of her anxiety is over things that she cannot control, such as her daughter not speaking to her, and that she must accept these things. She denies active suicidal ideations, plans, or intent. She denies homicidal ideations. She denies symptoms of psychosis including auditory and visual hallucinations, and paranoia. No evidence of delusions are observed. She reports that she slept well overnight and her appetite has been normal. She denies medication side effects. She reports medical concern of neck and back pain, which are chronic, but feels that this will improve as she has switched to a medical bed. Patient is able to contract for safety on the unit. At this time, the patient is not appropriate for a lower level of care. There is risk of decompensation and the patient warrants further hospitalization for safety and stabilization.      Appetite:  [x] Normal/Adequate/Unchanged  [] Increased  [] Decreased      Sleep:       [x] Normal/Adequate/Unchanged  [] Fair  [] Poor      Group Attendance on Unit:   [x] Yes  [] Selectively    [] No    Medication Side Effects: Denies         Mental Status Exam  Level of consciousness: Alert and awake   Appearance: Appropriate attire for setting, seated on bed, with fair  grooming and hygiene   Behavior/Motor: Approachable, no psychomotor abnormalities  Attitude toward examiner: Cooperative, attentive, good eye contact  Speech: spontaneous, normal rate, volume, and tone  Mood:  \"Better\"  Affect: Mood congruent   Thought processes: linear and coherent   Thought content: Denies homicidal ideation  Suicidal Ideation: Improving suicidal ideation, without plan or intent. Contracts for safety on the unit. Delusions: Denies paranoia. No evidence of delusions observed. Perceptual Disturbance: patient does not appear to be responding to internal stimuli. Denies auditory and visual hallucinations. Cognition: Oriented to self, location, time, and situation  Memory: intact  Insight & Judgement: poor     Data   height is 5' 2\" (1.575 m) and weight is 245 lb (111.1 kg). Her oral temperature is 97.8 °F (36.6 °C). Her blood pressure is 120/70 and her pulse is 75. Her respiration is 14 and oxygen saturation is 94%.    Labs:   Admission on 01/31/2022   Component Date Value Ref Range Status    POC Glucose 01/31/2022 108* 65 - 105 mg/dL Final    POC Glucose 01/31/2022 105  65 - 105 mg/dL Final    POC Glucose 02/01/2022 104  65 - 105 mg/dL Final    POC Glucose 02/01/2022 110* 65 - 105 mg/dL Final    POC Glucose 02/01/2022 103  65 - 105 mg/dL Final   Admission on 01/30/2022, Discharged on 01/31/2022   Component Date Value Ref Range Status    WBC 01/30/2022 9.6  3.5 - 11.3 k/uL Final    RBC 01/30/2022 4.17  3.95 - 5.11 m/uL Final    Hemoglobin 01/30/2022 13.1  11.9 - 15.1 g/dL Final    Hematocrit 01/30/2022 40.1  36.3 - 47.1 % Final    MCV 01/30/2022 96.2  82.6 - 102.9 fL Final    MCH 01/30/2022 31.4  25.2 - 33.5 pg Final    MCHC 01/30/2022 32.7 28.4 - 34.8 g/dL Final    RDW 01/30/2022 13.6  11.8 - 14.4 % Final    Platelets 34/97/0308 233  138 - 453 k/uL Final    MPV 01/30/2022 9.9  8.1 - 13.5 fL Final    NRBC Automated 01/30/2022 0.0  0.0 per 100 WBC Final    Differential Type 01/30/2022 NOT REPORTED   Final    Seg Neutrophils 01/30/2022 65  36 - 65 % Final    Lymphocytes 01/30/2022 26  24 - 43 % Final    Monocytes 01/30/2022 5  3 - 12 % Final    Eosinophils % 01/30/2022 2  1 - 4 % Final    Basophils 01/30/2022 1  0 - 2 % Final    Immature Granulocytes 01/30/2022 1* 0 % Final    Segs Absolute 01/30/2022 6.24  1.50 - 8.10 k/uL Final    Absolute Lymph # 01/30/2022 2.52  1.10 - 3.70 k/uL Final    Absolute Mono # 01/30/2022 0.50  0.10 - 1.20 k/uL Final    Absolute Eos # 01/30/2022 0.20  0.00 - 0.44 k/uL Final    Basophils Absolute 01/30/2022 0.05  0.00 - 0.20 k/uL Final    Absolute Immature Granulocyte 01/30/2022 0.05  0.00 - 0.30 k/uL Final    WBC Morphology 01/30/2022 NOT REPORTED   Final    RBC Morphology 01/30/2022 NOT REPORTED   Final    Platelet Estimate 64/90/7507 NOT REPORTED   Final    Glucose 01/30/2022 148* 70 - 99 mg/dL Final    BUN 01/30/2022 13  6 - 20 mg/dL Final    CREATININE 01/30/2022 0.66  0.50 - 0.90 mg/dL Final    Bun/Cre Ratio 01/30/2022 20  9 - 20 Final    Calcium 01/30/2022 8.4* 8.6 - 10.4 mg/dL Final    Sodium 01/30/2022 136  135 - 144 mmol/L Final    Potassium 01/30/2022 3.5* 3.7 - 5.3 mmol/L Final    Chloride 01/30/2022 98  98 - 107 mmol/L Final    CO2 01/30/2022 25  20 - 31 mmol/L Final    Anion Gap 01/30/2022 13  9 - 17 mmol/L Final    Alkaline Phosphatase 01/30/2022 78  35 - 104 U/L Final    ALT 01/30/2022 7  5 - 33 U/L Final    AST 01/30/2022 16  <32 U/L Final    Total Bilirubin 01/30/2022 0.18* 0.3 - 1.2 mg/dL Final    Total Protein 01/30/2022 6.4  6.4 - 8.3 g/dL Final    Albumin 01/30/2022 3.4* 3.5 - 5.2 g/dL Final    Albumin/Globulin Ratio 01/30/2022 NOT REPORTED  1.0 - 2.5 Final    GFR Non- 01/30/2022 >60  >60 mL/min Final    GFR  01/30/2022 >60  >60 mL/min Final    GFR Comment 01/30/2022        Final    Comment: Average GFR for 52-63 years old:   80 mL/min/1.73sq m  Chronic Kidney Disease:   <60 mL/min/1.73sq m  Kidney failure:   <15 mL/min/1.73sq m              eGFR calculated using average adult body mass.  Additional eGFR calculator available at:        [email or web address]            GFR Staging 01/30/2022 NOT REPORTED   Final    Acetaminophen Level 01/30/2022 <10* 10 - 30 ug/mL Final    Ethanol 01/30/2022 27* <10 mg/dL Final    Ethanol percent 01/30/2022 0.027* <3.226 % Final    Salicylate Lvl 73/60/5774 1* 3 - 10 mg/dL Final    Toxic Tricyclic Sc,Blood 15/67/2760 NEGATIVE  NEGATIVE Final    Color, UA 01/30/2022 Yellow  Yellow Final    Turbidity UA 01/30/2022 Clear  Clear Final    Glucose, Ur 01/30/2022 NEGATIVE  NEGATIVE Final    Bilirubin Urine 01/30/2022 NEGATIVE  NEGATIVE Final    Ketones, Urine 01/30/2022 NEGATIVE  NEGATIVE Final    Specific Gravity, UA 01/30/2022 1.010  1.005 - 1.030 Final    Urine Hgb 01/30/2022 TRACE* NEGATIVE Final    pH, UA 01/30/2022 6.0  5.0 - 8.0 Final    Protein, UA 01/30/2022 NEGATIVE  NEGATIVE Final    Urobilinogen, Urine 01/30/2022 Normal  Normal Final    Nitrite, Urine 01/30/2022 NEGATIVE  NEGATIVE Final    Leukocyte Esterase, Urine 01/30/2022 SMALL* NEGATIVE Final    Urinalysis Comments 01/30/2022 NOT REPORTED   Final    - 01/30/2022        Final    WBC, UA 01/30/2022 5 TO 10  0 - 5 /HPF Final    RBC, UA 01/30/2022 0 TO 2  0 - 2 /HPF Final    Casts UA 01/30/2022 NOT REPORTED  /LPF Final    Crystals, UA 01/30/2022 NOT REPORTED  None /HPF Final    Epithelial Cells UA 01/30/2022 5 TO 10  0 - 5 /HPF Final    Renal Epithelial, UA 01/30/2022 NOT REPORTED  0 /HPF Final    Bacteria, UA 01/30/2022 NOT REPORTED  None Final    Mucus, UA 01/30/2022 NOT REPORTED  None Final    Trichomonas, UA 01/30/2022 NOT REPORTED  None Final    Amorphous, UA 01/30/2022 NOT REPORTED  None Final    Other Observations UA 01/30/2022 NOT REPORTED  NOT REQ. Final    Yeast, UA 01/30/2022 NOT REPORTED  None Final    Amphetamine Screen, Ur 01/30/2022 NEGATIVE  NEGATIVE Final    Comment:       (Positive cutoff 1000 ng/mL)                  Barbiturate Screen, Ur 01/30/2022 NEGATIVE  NEGATIVE Final    Comment:       (Positive cutoff 200 ng/mL)                  Benzodiazepine Screen, Urine 01/30/2022 NEGATIVE  NEGATIVE Final    Comment:       (Positive cutoff 200 ng/mL)                  Cocaine Metabolite, Urine 01/30/2022 POSITIVE* NEGATIVE Final    Comment:       (Positive cutoff 300 ng/mL)                  Methadone Screen, Urine 01/30/2022 NEGATIVE  NEGATIVE Final    Comment:       (Positive cutoff 300 ng/mL)                  Opiates, Urine 01/30/2022 NEGATIVE  NEGATIVE Final    Comment:       (Positive cutoff 300 ng/mL)                  Phencyclidine, Urine 01/30/2022 NEGATIVE  NEGATIVE Final    Comment:       (Positive cutoff 25 ng/mL)                  Propoxyphene, Urine 01/30/2022 NOT REPORTED  NEGATIVE Final    Cannabinoid Scrn, Ur 01/30/2022 NEGATIVE  NEGATIVE Final    Comment:       (Positive cutoff 50 ng/mL)                  Oxycodone Screen, Ur 01/30/2022 NEGATIVE  NEGATIVE Final    Comment:       (Positive cutoff 100 ng/mL)                  Methamphetamine, Urine 01/30/2022 NOT REPORTED  NEGATIVE Final    Tricyclic Antidepressants, Urine 01/30/2022 NOT REPORTED  NEGATIVE Final    MDMA, Urine 01/30/2022 NOT REPORTED  NEGATIVE Final    Buprenorphine Urine 01/30/2022 NOT REPORTED  NEGATIVE Final    Test Information 01/30/2022 Assay provides medical screening only. The absence of expected drug(s) and/or metabolite(s) may indicate diluted or adulterated urine, limitations of testing or timing of collection.    Final    Comment: Testing for legal purposes should be confirmed by another method. To request confirmation   of test result, please call the lab within 7 days of sample submission.  Ventricular Rate 01/30/2022 83  BPM Final    Atrial Rate 01/30/2022 83  BPM Final    P-R Interval 01/30/2022 144  ms Final    QRS Duration 01/30/2022 94  ms Final    Q-T Interval 01/30/2022 382  ms Final    QTc Calculation (Bazett) 01/30/2022 448  ms Final    P Axis 01/30/2022 42  degrees Final    R Axis 01/30/2022 -11  degrees Final    T Axis 01/30/2022 -179  degrees Final    Specimen Description 01/30/2022 . NASOPHARYNGEAL SWAB   Final    SARS-CoV-2, Rapid 01/30/2022 Not Detected  Not Detected Final    Comment:       Rapid NAAT:  The specimen is NEGATIVE for SARS-CoV-2, the novel coronavirus associated with   COVID-19. The ID NOW COVID-19 assay is designed to detect the virus that causes COVID-19 in patients   with signs and symptoms of infection who are suspected of COVID-19. An individual without symptoms of COVID-19 and who is not shedding SARS-CoV-2 virus would   expect to have a negative (not detected) result in this assay. Negative results should be treated as presumptive and, if inconsistent with clinical signs   and symptoms or necessary for patient management,  should be tested with an alternative molecular assay. Negative results do not preclude   SARS-CoV-2 infection and   should not be used as the sole basis for patient management decisions. Fact sheet for Healthcare Providers: Nicholas.es  Fact sheet for Patients: Nicholas.es          Methodology: Isothermal Nucleic Acid Amplification      Magnesium 01/30/2022 2.1  1.6 - 2.6 mg/dL Final    Iron 01/30/2022 60  37 - 145 ug/dL Final         Reviewed patient's current plan of care and vital signs with nursing staff.     Labs reviewed: [x] Yes  Last EKG in EMR reviewed: [x] Yes    Medications  Current Facility-Administered Medications: acetaminophen (TYLENOL) tablet 650 mg, 650 mg, Oral, Q4H PRN  aluminum & magnesium hydroxide-simethicone (MAALOX) 200-200-20 MG/5ML suspension 30 mL, 30 mL, Oral, Q6H PRN  polyethylene glycol (GLYCOLAX) packet 17 g, 17 g, Oral, Daily PRN  traZODone (DESYREL) tablet 50 mg, 50 mg, Oral, Nightly PRN  ibuprofen (ADVIL;MOTRIN) tablet 400 mg, 400 mg, Oral, Q6H PRN  albuterol sulfate  (90 Base) MCG/ACT inhaler 2 puff, 2 puff, Inhalation, Q6H PRN  budesonide-formoterol (SYMBICORT) 160-4.5 MCG/ACT inhaler 2 puff, 2 puff, Inhalation, BID  carvedilol (COREG) tablet 12.5 mg, 12.5 mg, Oral, BID WC  cetirizine (ZYRTEC) tablet 10 mg, 10 mg, Oral, Daily  diclofenac sodium (VOLTAREN) 1 % gel 10 g, 10 g, Topical, PRN  pantoprazole (PROTONIX) tablet 40 mg, 40 mg, Oral, QAM AC  ferrous sulfate (IRON 325) tablet 325 mg, 325 mg, Oral, BID  fluticasone (FLONASE) 50 MCG/ACT nasal spray 1 spray, 1 spray, Each Nostril, Daily  furosemide (LASIX) tablet 40 mg, 40 mg, Oral, BID  gabapentin (NEURONTIN) tablet 1,200 mg, 1,200 mg, Oral, TID  lidocaine 4 % external patch 1 patch, 1 patch, TransDERmal, Daily  loperamide (IMODIUM) capsule 2 mg, 2 mg, Oral, 4x Daily PRN  metFORMIN (GLUCOPHAGE) tablet 500 mg, 500 mg, Oral, BID WC  methocarbamol (ROBAXIN) tablet 1,500 mg, 1,500 mg, Oral, TID  therapeutic multivitamin-minerals 1 tablet, 1 tablet, Oral, BID  methylphenidate (RITALIN) tablet 20 mg, 20 mg, Oral, BID  rivaroxaban (XARELTO) tablet 20 mg, 20 mg, Oral, Dinner  ondansetron (ZOFRAN-ODT) disintegrating tablet 4 mg, 4 mg, Oral, Q8H PRN  DULoxetine (CYMBALTA) extended release capsule 60 mg, 60 mg, Oral, BID  potassium chloride (KLOR-CON M) extended release tablet 20 mEq, 20 mEq, Oral, BID WC    ASSESSMENT  Severe episode of recurrent major depressive disorder, without psychotic features ([facility] [address]         PLAN  Patient symptoms are: Improving  Continue current medication regimen  Vital signs reviewed, as of 5:25 PM blood pressure was 120/70 and pulse 75, no physical signs of withdrawal, will monitor. Monitor need and frequency of PRN medications  Encourage participation in groups and milieu  Attempt to develop insight  Psycho-education conducted  Supportive Therapy conducted  Probable discharge is possibly by Friday  Follow-up daily while inpatient    Patient continues to be monitored in the inpatient psychiatric facility at [facility] for safety and stabilization. Patient continues to need, on a daily basis, active treatment furnished directly by or requiring the supervision of inpatient psychiatric personnel.   Spent 30 minutes with the patient, of that greater than 16 minutes was spent in supportive psychotherapy

## 2022-02-02 PROCEDURE — 99232 SBSQ HOSP IP/OBS MODERATE 35: CPT | Performed by: PSYCHIATRY & NEUROLOGY

## 2022-02-02 PROCEDURE — 6370000000 HC RX 637 (ALT 250 FOR IP): Performed by: NURSE PRACTITIONER

## 2022-02-02 PROCEDURE — 6370000000 HC RX 637 (ALT 250 FOR IP): Performed by: PSYCHIATRY & NEUROLOGY

## 2022-02-02 PROCEDURE — 1240000000 HC EMOTIONAL WELLNESS R&B

## 2022-02-02 RX ORDER — METHYLPHENIDATE HYDROCHLORIDE 10 MG/1
20 TABLET ORAL 2 TIMES DAILY
Status: DISCONTINUED | OUTPATIENT
Start: 2022-02-02 | End: 2022-02-04 | Stop reason: HOSPADM

## 2022-02-02 RX ADMIN — FUROSEMIDE 40 MG: 40 TABLET ORAL at 17:34

## 2022-02-02 RX ADMIN — TRAZODONE HYDROCHLORIDE 50 MG: 50 TABLET ORAL at 21:21

## 2022-02-02 RX ADMIN — FERROUS SULFATE TAB 325 MG (65 MG ELEMENTAL FE) 325 MG: 325 (65 FE) TAB at 08:27

## 2022-02-02 RX ADMIN — METHOCARBAMOL 1500 MG: 750 TABLET ORAL at 08:26

## 2022-02-02 RX ADMIN — CETIRIZINE HYDROCHLORIDE TABLETS 10 MG: 10 TABLET, FILM COATED ORAL at 08:27

## 2022-02-02 RX ADMIN — CARVEDILOL 12.5 MG: 6.25 TABLET, FILM COATED ORAL at 08:27

## 2022-02-02 RX ADMIN — POTASSIUM CHLORIDE 20 MEQ: 1500 TABLET, EXTENDED RELEASE ORAL at 08:27

## 2022-02-02 RX ADMIN — METFORMIN HYDROCHLORIDE 500 MG: 500 TABLET ORAL at 17:34

## 2022-02-02 RX ADMIN — CARVEDILOL 12.5 MG: 6.25 TABLET, FILM COATED ORAL at 17:33

## 2022-02-02 RX ADMIN — IBUPROFEN 400 MG: 400 TABLET, FILM COATED ORAL at 01:05

## 2022-02-02 RX ADMIN — POTASSIUM CHLORIDE 20 MEQ: 1500 TABLET, EXTENDED RELEASE ORAL at 17:34

## 2022-02-02 RX ADMIN — METHYLPHENIDATE HYDROCHLORIDE 20 MG: 10 TABLET ORAL at 16:22

## 2022-02-02 RX ADMIN — DULOXETINE 60 MG: 60 CAPSULE, DELAYED RELEASE ORAL at 08:27

## 2022-02-02 RX ADMIN — IBUPROFEN 400 MG: 400 TABLET, FILM COATED ORAL at 10:57

## 2022-02-02 RX ADMIN — BUDESONIDE AND FORMOTEROL FUMARATE DIHYDRATE 2 PUFF: 160; 4.5 AEROSOL RESPIRATORY (INHALATION) at 08:26

## 2022-02-02 RX ADMIN — METHOCARBAMOL 1500 MG: 750 TABLET ORAL at 21:21

## 2022-02-02 RX ADMIN — IBUPROFEN 400 MG: 400 TABLET, FILM COATED ORAL at 21:24

## 2022-02-02 RX ADMIN — BUDESONIDE AND FORMOTEROL FUMARATE DIHYDRATE 2 PUFF: 160; 4.5 AEROSOL RESPIRATORY (INHALATION) at 21:21

## 2022-02-02 RX ADMIN — FUROSEMIDE 40 MG: 40 TABLET ORAL at 08:27

## 2022-02-02 RX ADMIN — GABAPENTIN 1200 MG: 600 TABLET, FILM COATED ORAL at 14:34

## 2022-02-02 RX ADMIN — METHOCARBAMOL 1500 MG: 750 TABLET ORAL at 14:34

## 2022-02-02 RX ADMIN — DULOXETINE 60 MG: 60 CAPSULE, DELAYED RELEASE ORAL at 21:21

## 2022-02-02 RX ADMIN — FLUTICASONE PROPIONATE 1 SPRAY: 50 SPRAY, METERED NASAL at 08:25

## 2022-02-02 RX ADMIN — MULTIPLE VITAMINS W/ MINERALS TAB 1 TABLET: TAB at 08:27

## 2022-02-02 RX ADMIN — PANTOPRAZOLE SODIUM 40 MG: 40 TABLET, DELAYED RELEASE ORAL at 08:27

## 2022-02-02 RX ADMIN — GABAPENTIN 1200 MG: 600 TABLET, FILM COATED ORAL at 08:27

## 2022-02-02 RX ADMIN — GABAPENTIN 1200 MG: 600 TABLET, FILM COATED ORAL at 21:21

## 2022-02-02 RX ADMIN — RIVAROXABAN 20 MG: 20 TABLET, FILM COATED ORAL at 17:34

## 2022-02-02 RX ADMIN — METHYLPHENIDATE HYDROCHLORIDE 20 MG: 10 TABLET ORAL at 08:27

## 2022-02-02 RX ADMIN — FERROUS SULFATE TAB 325 MG (65 MG ELEMENTAL FE) 325 MG: 325 (65 FE) TAB at 21:21

## 2022-02-02 RX ADMIN — MULTIPLE VITAMINS W/ MINERALS TAB 1 TABLET: TAB at 21:21

## 2022-02-02 RX ADMIN — LOPERAMIDE HYDROCHLORIDE 2 MG: 2 CAPSULE ORAL at 13:10

## 2022-02-02 RX ADMIN — METFORMIN HYDROCHLORIDE 500 MG: 500 TABLET ORAL at 08:27

## 2022-02-02 ASSESSMENT — PAIN SCALES - GENERAL
PAINLEVEL_OUTOF10: 5
PAINLEVEL_OUTOF10: 8
PAINLEVEL_OUTOF10: 2
PAINLEVEL_OUTOF10: 4
PAINLEVEL_OUTOF10: 7

## 2022-02-02 ASSESSMENT — PAIN DESCRIPTION - LOCATION
LOCATION: BACK

## 2022-02-02 ASSESSMENT — PAIN DESCRIPTION - PAIN TYPE
TYPE: CHRONIC PAIN

## 2022-02-02 NOTE — PLAN OF CARE
Problem: Altered Mood, Depressive Behavior:  Goal: Absence of self-harm  Description: Absence of self-harm  2/1/2022 2122 by Bhakti Hector RN  Outcome: Ongoing   Denies wanting to harm self. Problem: Pain:  Goal: Control of chronic pain  Description: Control of chronic pain  2/1/2022 2122 by Bhakti Hector RN  Outcome: Ongoing   Satisfied with current interventions.

## 2022-02-02 NOTE — GROUP NOTE
Group Therapy Note    Date: 2/2/2022    Group Start Time: 6106  Group End Time: 4423  Group Topic: Music Therapy    MARY JANE LUONG    Estela Lama        Group Therapy Note    Attendees: 2/6         Patient's Goal:  Patient shared preferred music and dedicated songs to important people in theie lives and engaged in conversations with peers and staff. Goals to normalize the environment; Reflect on relationships; Increase self-expression     Notes:  Patient attended and participated in group sharing multiples songs and reflecting on relationships. Patient was pleasant and engaging throughout. Status After Intervention:  Improved    Participation Level:  Active Listener and Interactive    Participation Quality: Appropriate, Attentive and Sharing      Speech:  normal      Thought Process/Content: Logical  Linear      Affective Functioning: Congruent      Mood: euthymic      Level of consciousness:  Alert and Attentive      Response to Learning: Able to verbalize current knowledge/experience and Progressing to goal      Endings: None Reported    Modes of Intervention: Socialization, Exploration, Activity, Media and Reality-testing      Discipline Responsible: Psychoeducational Specialist      Signature:  Estela Lama

## 2022-02-02 NOTE — PROGRESS NOTES
Daily Progress Note  2/2/2022    Patient Name: Fernie Guerrier:  Attempted suicide by overdose on Vistaril           Emergency Medications: Patient has not received any emergency PRN medications in the past 24 hours. Scheduled medications: Patient has been compliant with all scheduled medications. SUBJECTIVE:   Patient is seen today for a follow up assessment. Nursing requested that patient scheduled Ritalin time be adjusted so that she is not taking a dose of stimulants at bedtime. Reviewed this with the patient and she is agreeable that it would be helpful for her to take it later in the afternoon. She reports that she had difficulty sleeping last night and states that she fell asleep with the use of the trazodone however woke up in the early morning and was wide awake. She reports that she otherwise is tolerating her Cymbalta and the remainder of her medications. She reports that she continues to experience alcohol cravings though reports that the symptoms are improving daily. She has some anxiety about returning home and states \"I am not sure what to expect\". She reports that her boyfriend who lives in the home is actively drinking and she is hopeful that he will join her in outpatient programming so that they can both remain sober. She reports a lot of guilt related to her overdose and states \"I cannot believe I did that to him\". She continues to endorse depression and expresses frustration that she was unable to access her outpatient therapist since October and feels that that had a lot to do with a decompensation in her mood. She is grateful that she has been linked with a different program and remains able to contract for safety in the inpatient unit. She understands the importance of stability of symptoms prior to discharge and she remains asked a fairly significant risk for relapse returning to an environment where there is alcohol in the home.       Appetite:  [x] Normal/Adequate/Unchanged  [] Increased  [] Decreased      Sleep:       [] Normal/Adequate/Unchanged  [x] Fair  [] Poor      Group Attendance on Unit:   [x] Yes  [] Selectively    [] No    Medication Side Effects: Denies         Mental Status Exam  Level of consciousness: Alert and awake   Appearance: Appropriate attire for setting, seated in chair, with fair  grooming and hygiene   Behavior/Motor: Approachable, pleasant, working on an art project  Attitude toward examiner: Cooperative, attentive, good eye contact  Speech: spontaneous, normal rate, volume, and tone  Mood: \"Nervous\"  Affect: Mood congruent   Thought processes: linear and coherent   Thought content: Denies homicidal ideation  Suicidal Ideation: Improving suicidal ideation, without plan or intent. Contracts for safety on the unit. Delusions: Denies paranoia. No evidence of delusions observed. Perceptual Disturbance: patient does not appear to be responding to internal stimuli. Denies auditory and visual hallucinations. Cognition: Oriented to self, location, time, and situation  Memory: intact  Insight & Judgement: poor     Data   height is 5' 2\" (1.575 m) and weight is 245 lb (111.1 kg). Her oral temperature is 98 °F (36.7 °C). Her blood pressure is 127/68 and her pulse is 82. Her respiration is 14 and oxygen saturation is 94%. Labs:   Admission on 01/31/2022   Component Date Value Ref Range Status    POC Glucose 01/31/2022 108* 65 - 105 mg/dL Final    POC Glucose 01/31/2022 105  65 - 105 mg/dL Final    POC Glucose 02/01/2022 104  65 - 105 mg/dL Final    POC Glucose 02/01/2022 110* 65 - 105 mg/dL Final    POC Glucose 02/01/2022 103  65 - 105 mg/dL Final         Reviewed patient's current plan of care and vital signs with nursing staff.     Labs reviewed: [x] Yes  Last EKG in EMR reviewed: [x] Yes    Medications  Current Facility-Administered Medications: methylphenidate (RITALIN) tablet 20 mg, 20 mg, Oral, BID- 8&2  acetaminophen (TYLENOL) tablet 650 mg, 650 mg, Oral, Q4H PRN  aluminum & magnesium hydroxide-simethicone (MAALOX) 200-200-20 MG/5ML suspension 30 mL, 30 mL, Oral, Q6H PRN  polyethylene glycol (GLYCOLAX) packet 17 g, 17 g, Oral, Daily PRN  traZODone (DESYREL) tablet 50 mg, 50 mg, Oral, Nightly PRN  ibuprofen (ADVIL;MOTRIN) tablet 400 mg, 400 mg, Oral, Q6H PRN  albuterol sulfate  (90 Base) MCG/ACT inhaler 2 puff, 2 puff, Inhalation, Q6H PRN  budesonide-formoterol (SYMBICORT) 160-4.5 MCG/ACT inhaler 2 puff, 2 puff, Inhalation, BID  carvedilol (COREG) tablet 12.5 mg, 12.5 mg, Oral, BID WC  cetirizine (ZYRTEC) tablet 10 mg, 10 mg, Oral, Daily  diclofenac sodium (VOLTAREN) 1 % gel 10 g, 10 g, Topical, PRN  pantoprazole (PROTONIX) tablet 40 mg, 40 mg, Oral, QAM AC  ferrous sulfate (IRON 325) tablet 325 mg, 325 mg, Oral, BID  fluticasone (FLONASE) 50 MCG/ACT nasal spray 1 spray, 1 spray, Each Nostril, Daily  furosemide (LASIX) tablet 40 mg, 40 mg, Oral, BID  gabapentin (NEURONTIN) tablet 1,200 mg, 1,200 mg, Oral, TID  lidocaine 4 % external patch 1 patch, 1 patch, TransDERmal, Daily  loperamide (IMODIUM) capsule 2 mg, 2 mg, Oral, 4x Daily PRN  metFORMIN (GLUCOPHAGE) tablet 500 mg, 500 mg, Oral, BID WC  methocarbamol (ROBAXIN) tablet 1,500 mg, 1,500 mg, Oral, TID  therapeutic multivitamin-minerals 1 tablet, 1 tablet, Oral, BID  rivaroxaban (XARELTO) tablet 20 mg, 20 mg, Oral, Dinner  ondansetron (ZOFRAN-ODT) disintegrating tablet 4 mg, 4 mg, Oral, Q8H PRN  DULoxetine (CYMBALTA) extended release capsule 60 mg, 60 mg, Oral, BID  potassium chloride (KLOR-CON M) extended release tablet 20 mEq, 20 mEq, Oral, BID WC    ASSESSMENT  Severe episode of recurrent major depressive disorder, without psychotic features ([facility] [address]         PLAN  Patient symptoms are: Improving  Scheduled Ritalin times adjusted to 8a and 2p rather than 9a and 9p to try and improve sleep  Monitor need and frequency of PRN medications  Encourage participation in groups and milieu  Attempt to develop insight  Psycho-education conducted  Supportive Therapy conducted  Probable discharge is per attending  Follow-up daily while inpatient    Patient continues to be monitored in the inpatient psychiatric facility at [facility] for safety and stabilization. Patient continues to need, on a daily basis, active treatment furnished directly by or requiring the supervision of inpatient psychiatric personnel. I independently saw and evaluated the patient. I reviewed the nurse practitioners documentation above. Any additional comments or changes to the nurse practitioners documentation are stated below otherwise agree with assessment. Plan will be as follows:  Patient reporting improvement in mood. Actively participating in groups. Reports benefit to groups. Decreased suicidal ideation. Discussed medication timing as above. Patient also asking for her care. Proceeding however when asked why, patient was somewhat speech was. Discussed with her not necessarily wanting to add medications, reemphasized importance of therapy. Patient expressed understanding and agreement  PLAN  Patient s symptoms   are improving  Continue with current medications for now  Attempt to develop insight  Psycho-education conducted. Supportive Therapy conducted.   Probable discharge is likely Friday  Follow-up daily while on inpatient unit

## 2022-02-03 PROCEDURE — APPSS30 APP SPLIT SHARED TIME 16-30 MINUTES: Performed by: NURSE PRACTITIONER

## 2022-02-03 PROCEDURE — 6370000000 HC RX 637 (ALT 250 FOR IP): Performed by: PSYCHIATRY & NEUROLOGY

## 2022-02-03 PROCEDURE — 90833 PSYTX W PT W E/M 30 MIN: CPT | Performed by: PSYCHIATRY & NEUROLOGY

## 2022-02-03 PROCEDURE — 6370000000 HC RX 637 (ALT 250 FOR IP): Performed by: NURSE PRACTITIONER

## 2022-02-03 PROCEDURE — 99232 SBSQ HOSP IP/OBS MODERATE 35: CPT | Performed by: PSYCHIATRY & NEUROLOGY

## 2022-02-03 PROCEDURE — 1240000000 HC EMOTIONAL WELLNESS R&B

## 2022-02-03 RX ORDER — IBUPROFEN 400 MG/1
400 TABLET ORAL EVERY 6 HOURS PRN
Qty: 120 TABLET | Refills: 0 | Status: ON HOLD | OUTPATIENT
Start: 2022-02-03 | End: 2022-03-08 | Stop reason: HOSPADM

## 2022-02-03 RX ORDER — DULOXETIN HYDROCHLORIDE 60 MG/1
60 CAPSULE, DELAYED RELEASE ORAL 2 TIMES DAILY
Qty: 60 CAPSULE | Refills: 0
Start: 2022-02-03

## 2022-02-03 RX ADMIN — BUDESONIDE AND FORMOTEROL FUMARATE DIHYDRATE 2 PUFF: 160; 4.5 AEROSOL RESPIRATORY (INHALATION) at 09:01

## 2022-02-03 RX ADMIN — FUROSEMIDE 40 MG: 40 TABLET ORAL at 17:42

## 2022-02-03 RX ADMIN — CARVEDILOL 12.5 MG: 6.25 TABLET, FILM COATED ORAL at 17:41

## 2022-02-03 RX ADMIN — POTASSIUM CHLORIDE 20 MEQ: 1500 TABLET, EXTENDED RELEASE ORAL at 08:59

## 2022-02-03 RX ADMIN — LOPERAMIDE HYDROCHLORIDE 2 MG: 2 CAPSULE ORAL at 12:53

## 2022-02-03 RX ADMIN — GABAPENTIN 1200 MG: 600 TABLET, FILM COATED ORAL at 20:11

## 2022-02-03 RX ADMIN — METHYLPHENIDATE HYDROCHLORIDE 20 MG: 10 TABLET ORAL at 08:57

## 2022-02-03 RX ADMIN — RIVAROXABAN 20 MG: 20 TABLET, FILM COATED ORAL at 17:42

## 2022-02-03 RX ADMIN — LOPERAMIDE HYDROCHLORIDE 2 MG: 2 CAPSULE ORAL at 15:47

## 2022-02-03 RX ADMIN — METHOCARBAMOL 1500 MG: 750 TABLET ORAL at 14:25

## 2022-02-03 RX ADMIN — GABAPENTIN 1200 MG: 600 TABLET, FILM COATED ORAL at 09:00

## 2022-02-03 RX ADMIN — METFORMIN HYDROCHLORIDE 500 MG: 500 TABLET ORAL at 08:58

## 2022-02-03 RX ADMIN — FLUTICASONE PROPIONATE 1 SPRAY: 50 SPRAY, METERED NASAL at 09:01

## 2022-02-03 RX ADMIN — FUROSEMIDE 40 MG: 40 TABLET ORAL at 08:59

## 2022-02-03 RX ADMIN — PANTOPRAZOLE SODIUM 40 MG: 40 TABLET, DELAYED RELEASE ORAL at 10:44

## 2022-02-03 RX ADMIN — BUDESONIDE AND FORMOTEROL FUMARATE DIHYDRATE 2 PUFF: 160; 4.5 AEROSOL RESPIRATORY (INHALATION) at 20:10

## 2022-02-03 RX ADMIN — MULTIPLE VITAMINS W/ MINERALS TAB 1 TABLET: TAB at 08:58

## 2022-02-03 RX ADMIN — FERROUS SULFATE TAB 325 MG (65 MG ELEMENTAL FE) 325 MG: 325 (65 FE) TAB at 08:58

## 2022-02-03 RX ADMIN — GABAPENTIN 1200 MG: 600 TABLET, FILM COATED ORAL at 14:24

## 2022-02-03 RX ADMIN — CETIRIZINE HYDROCHLORIDE TABLETS 10 MG: 10 TABLET, FILM COATED ORAL at 08:58

## 2022-02-03 RX ADMIN — METHOCARBAMOL 1500 MG: 750 TABLET ORAL at 09:01

## 2022-02-03 RX ADMIN — CARVEDILOL 12.5 MG: 6.25 TABLET, FILM COATED ORAL at 08:57

## 2022-02-03 RX ADMIN — DULOXETINE 60 MG: 60 CAPSULE, DELAYED RELEASE ORAL at 20:16

## 2022-02-03 RX ADMIN — DULOXETINE 60 MG: 60 CAPSULE, DELAYED RELEASE ORAL at 09:00

## 2022-02-03 RX ADMIN — METHOCARBAMOL 1500 MG: 750 TABLET ORAL at 20:13

## 2022-02-03 RX ADMIN — METHYLPHENIDATE HYDROCHLORIDE 20 MG: 10 TABLET ORAL at 14:25

## 2022-02-03 RX ADMIN — POTASSIUM CHLORIDE 20 MEQ: 1500 TABLET, EXTENDED RELEASE ORAL at 17:42

## 2022-02-03 RX ADMIN — METFORMIN HYDROCHLORIDE 500 MG: 500 TABLET ORAL at 17:42

## 2022-02-03 RX ADMIN — FERROUS SULFATE TAB 325 MG (65 MG ELEMENTAL FE) 325 MG: 325 (65 FE) TAB at 20:11

## 2022-02-03 RX ADMIN — MULTIPLE VITAMINS W/ MINERALS TAB 1 TABLET: TAB at 20:11

## 2022-02-03 RX ADMIN — IBUPROFEN 400 MG: 400 TABLET, FILM COATED ORAL at 04:00

## 2022-02-03 RX ADMIN — TRAZODONE HYDROCHLORIDE 50 MG: 50 TABLET ORAL at 20:15

## 2022-02-03 RX ADMIN — LOPERAMIDE HYDROCHLORIDE 2 MG: 2 CAPSULE ORAL at 21:47

## 2022-02-03 ASSESSMENT — PAIN DESCRIPTION - PAIN TYPE: TYPE: CHRONIC PAIN

## 2022-02-03 ASSESSMENT — PAIN SCALES - GENERAL
PAINLEVEL_OUTOF10: 0
PAINLEVEL_OUTOF10: 5

## 2022-02-03 ASSESSMENT — PAIN DESCRIPTION - LOCATION: LOCATION: BACK

## 2022-02-03 NOTE — PROGRESS NOTES
CLINICAL PHARMACY NOTE: MEDS TO BEDS    Total # of Prescriptions Filled: 1   The following medications were delivered to the patient:  · Ibuprofen 400mg    Additional Documentation:  Delivered medication to nurses station

## 2022-02-03 NOTE — GROUP NOTE
Group Therapy Note    Date: 2/3/2022    Group Start Time: 1115  Group End Time: 1200  Group Topic: Music Therapy    [facility]    Kris Hein        Group Therapy Note    Attendees: 7/9         Patient's Goal:  Patients shared preferred music and offered advice based on their music selections. Goals to engage in peers support; Increase self-expression; Normalize the environment    Notes:  Patient attended and participated in group having positive interactions with peers and staff. Patient shared music and engaged in conversations about advice and topics within lyrics of their song selection. Status After Intervention:  Improved    Participation Level:  Active Listener and Interactive    Participation Quality: Appropriate, Attentive and Sharing      Speech:  normal      Thought Process/Content: Logical  Linear      Affective Functioning: Congruent      Mood: euthymic      Level of consciousness:  Alert and Attentive      Response to Learning: Able to verbalize current knowledge/experience, Capable of insight and Progressing to goal      Endings: None Reported    Modes of Intervention: Support, Socialization, Exploration, Activity, Media and Reality-testing      Discipline Responsible: Psychoeducational Specialist      Signature:  Kris Hein

## 2022-02-03 NOTE — PLAN OF CARE
Problem: Altered Mood, Depressive Behavior:  Goal: Able to verbalize and/or display a decrease in depressive symptoms  Description: Able to verbalize and/or display a decrease in depressive symptoms  Outcome: Ongoing  Note: Pt denies having suicidal or homicidal ideation. Pt is cooperative with staff and compliant with medical treatment. Pt denies having depression or anxiety. Pt denies having hallucinations. Pt reports having adequate diet and sleep. Problem: Altered Mood, Depressive Behavior:  Goal: Absence of self-harm  Description: Absence of self-harm  Outcome: Ongoing  Note: Pt remains free of self inflicted harm.

## 2022-02-03 NOTE — PLAN OF CARE
Problem: Altered Mood, Depressive Behavior:  Goal: Able to verbalize and/or display a decrease in depressive symptoms  Description: Able to verbalize and/or display a decrease in depressive symptoms  2/2/2022 2018 by Zain Campbell LPN  Outcome: Ongoing  Patient out and social with select peers. Patient is calm and maintains behavioral control on unit. Patient is medication compliant and verbalizes no concerns with her medications. Patient currently denies any suicidal or homicidal thoughts, as well as any auditory or visual hallucinations. Safety checks maintained. Will continue to monitor. Problem: Altered Mood, Depressive Behavior:  Goal: Absence of self-harm  Description: Absence of self-harm  2/2/2022 2018 by Zain Campbell LPN  Outcome: Ongoing   Patient remains free from self harm. Patient agrees to seek staff if thoughts arise. 15 minute checks maintained for patient safety. Will continue to monitor and provide support and reassurance as needed.

## 2022-02-03 NOTE — PROGRESS NOTES
Daily Progress Note  2/3/2022    Patient Name: Lars Warren:  Attempted suicide by overdose on Vistaril           Emergency Medications: Patient has not received any emergency PRN medications in the past 24 hours. Scheduled medications: Patient has been compliant with all scheduled medications. SUBJECTIVE:   Patient is seen today for a follow up assessment. Patient joined NP and writer in her room for conversation. Nursing staff report patient has been medication adherent, behaviorally in control, and selectively attending groups. She reports her mood as \"okay, I woke up feeling crappy, but that's just me. \" Patient denies suicidal ideation or urge to engage in self-injureous behavior. She reports a good appetite. She does endorse craving alcohol to the point of noticeable, self-reported bruxism. NP asked if patient would be open to inpatient rehabilitation once discharged tomorrow 2/4/22, patient is agreeable to intensive outpatient rehabilitation through the [facility]. 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 Patient states she had a positive 1:1 meeting with a , who assisted her with processing her guilt associated with her recent suicide attempt stating, \"God doesn't forgive attempting suicide. \" She states she misses her four grandchildren, two of which live with her daughter in [place], currently this daughter is choosing not to speak with patient. She has been in communication with her oldest grandson in [place] through an online chat platform. She states she misses her dog, who lives with her now  , she is able to see the dog three times per year. She enjoys watching videos of her dog as a distraction in addition to coloring in coloring books at home. Patient reports she has colored many pages while inpatient.  Patient reports her boyfriend has a cat and emphasizes the importance to her of having a pet in her life for therapeutic benefit. She reports she has enjoyed spending time on the unit with a new peer. Patient remains able to contract for safety in the inpatient unit. Appetite:  [x] Normal/Adequate/Unchanged  [] Increased  [] Decreased      Sleep:       [] Normal/Adequate/Unchanged  [x] Fair  [] Poor      Group Attendance on Unit:   [x] Yes  [] Selectively    [] No    Medication Side Effects: Denies         Mental Status Exam  Level of consciousness: Alert and awake   Appearance: Appropriate attire for setting, seated in chair, with good  grooming and hygiene   Behavior/Motor: Approachable, pleasant, walked from milieu into room and sat on bed  Attitude toward examiner: Cooperative, attentive, good eye contact  Speech: spontaneous, normal rate, volume, and tone  Mood:  \"okay\"  Affect: Mood congruent   Thought processes: linear and coherent   Thought content: Denies homicidal ideation  Suicidal Ideation: Improving suicidal ideation, without plan or intent. Contracts for safety on the unit. Delusions: Denies paranoia. No evidence of delusions observed. Perceptual Disturbance: patient does not appear to be responding to internal stimuli. Denies auditory and visual hallucinations. Cognition: Oriented to self, location, time, and situation  Memory: intact  Insight & Judgement: fair    Data   height is 5' 2\" (1.575 m) and weight is 245 lb (111.1 kg). Her oral temperature is 98.3 °F (36.8 °C). Her blood pressure is 150/90 (abnormal) and her pulse is 86. Her respiration is 14 and oxygen saturation is 94%.    Labs:   Admission on 01/31/2022   Component Date Value Ref Range Status    POC Glucose 01/31/2022 108* 65 - 105 mg/dL Final    POC Glucose 01/31/2022 105  65 - 105 mg/dL Final    POC Glucose 02/01/2022 104  65 - 105 mg/dL Final    POC Glucose 02/01/2022 110* 65 - 105 mg/dL Final    POC Glucose 02/01/2022 103  65 - 105 mg/dL Final         Reviewed patient's current plan of care and vital signs with nursing staff.     Labs reviewed: [x] Yes  Last EKG in EMR reviewed: [x] Yes    Medications  Current Facility-Administered Medications: methylphenidate (RITALIN) tablet 20 mg, 20 mg, Oral, BID- 8&2  acetaminophen (TYLENOL) tablet 650 mg, 650 mg, Oral, Q4H PRN  aluminum & magnesium hydroxide-simethicone (MAALOX) 200-200-20 MG/5ML suspension 30 mL, 30 mL, Oral, Q6H PRN  polyethylene glycol (GLYCOLAX) packet 17 g, 17 g, Oral, Daily PRN  traZODone (DESYREL) tablet 50 mg, 50 mg, Oral, Nightly PRN  ibuprofen (ADVIL;MOTRIN) tablet 400 mg, 400 mg, Oral, Q6H PRN  albuterol sulfate  (90 Base) MCG/ACT inhaler 2 puff, 2 puff, Inhalation, Q6H PRN  budesonide-formoterol (SYMBICORT) 160-4.5 MCG/ACT inhaler 2 puff, 2 puff, Inhalation, BID  carvedilol (COREG) tablet 12.5 mg, 12.5 mg, Oral, BID WC  cetirizine (ZYRTEC) tablet 10 mg, 10 mg, Oral, Daily  diclofenac sodium (VOLTAREN) 1 % gel 10 g, 10 g, Topical, PRN  pantoprazole (PROTONIX) tablet 40 mg, 40 mg, Oral, QAM AC  ferrous sulfate (IRON 325) tablet 325 mg, 325 mg, Oral, BID  fluticasone (FLONASE) 50 MCG/ACT nasal spray 1 spray, 1 spray, Each Nostril, Daily  furosemide (LASIX) tablet 40 mg, 40 mg, Oral, BID  gabapentin (NEURONTIN) tablet 1,200 mg, 1,200 mg, Oral, TID  lidocaine 4 % external patch 1 patch, 1 patch, TransDERmal, Daily  loperamide (IMODIUM) capsule 2 mg, 2 mg, Oral, 4x Daily PRN  metFORMIN (GLUCOPHAGE) tablet 500 mg, 500 mg, Oral, BID WC  methocarbamol (ROBAXIN) tablet 1,500 mg, 1,500 mg, Oral, TID  therapeutic multivitamin-minerals 1 tablet, 1 tablet, Oral, BID  rivaroxaban (XARELTO) tablet 20 mg, 20 mg, Oral, Dinner  ondansetron (ZOFRAN-ODT) disintegrating tablet 4 mg, 4 mg, Oral, Q8H PRN  DULoxetine (CYMBALTA) extended release capsule 60 mg, 60 mg, Oral, BID  potassium chloride (KLOR-CON M) extended release tablet 20 mEq, 20 mEq, Oral, BID WC    ASSESSMENT  Severe episode of recurrent major depressive disorder, without psychotic features ([facility] [address] PLAN  Patient symptoms are: Improving   Continue to monitor for side effects to medications  Monitor need and frequency of PRN medications  Encourage participation in groups and milieu  Attempt to develop insight  Psycho-education conducted  Supportive Therapy conducted  Probable discharge is Tomorrow  Follow-up daily while inpatient    Patient continues to be monitored in the inpatient psychiatric facility at [facility] for safety and stabilization. Patient continues to need, on a daily basis, active treatment furnished directly by or requiring the supervision of inpatient psychiatric personnel. --Mitch Rojas RN, student nurse practitioner on 2/3/2022     --KANG Garces - CNP on 2/3/2022 at 1:04 PM     An electronic signature was used to authenticate this note. I independently saw and evaluated the patient. I reviewed the nurse practitioners documentation above. Any additional comments or changes to the nurse practitioners documentation are stated below otherwise agree with assessment. Plan will be as follows:  Spent 30 minutes with the patient, of that greater than 16 minutes was spent in supportive psychotherapy. Patient reporting improvement in her symptoms. Able to contract for safety. Discussed if stable through tomorrow would consider discharge. PLAN  Patient s symptoms   are improving  Continue with current medication for now  Attempt to develop insight  Psycho-education conducted. Supportive Therapy conducted.   Probable discharge is tomorrow  Follow-up daily while on inpatient unit

## 2022-02-03 NOTE — GROUP NOTE
Group Therapy Note    Date: 2/3/2022    Group Start Time: 1000  Group End Time: 8328  Group Topic: Psychotherapy    [address] EZEQUIEL Campbell, ZORAIDA        Group Therapy Note    Attendees: 6/9         Patient was offered group therapy today but declined to participate despite encouragement from staff. 1:1 was offered.       Signature:  EZEQUIEL Mccullough, LSW

## 2022-02-04 VITALS
OXYGEN SATURATION: 94 % | RESPIRATION RATE: 14 BRPM | BODY MASS INDEX: 45.08 KG/M2 | HEART RATE: 83 BPM | WEIGHT: 245 LBS | DIASTOLIC BLOOD PRESSURE: 83 MMHG | SYSTOLIC BLOOD PRESSURE: 167 MMHG | HEIGHT: 62 IN | TEMPERATURE: 97.4 F

## 2022-02-04 PROCEDURE — 6370000000 HC RX 637 (ALT 250 FOR IP): Performed by: PSYCHIATRY & NEUROLOGY

## 2022-02-04 PROCEDURE — 6370000000 HC RX 637 (ALT 250 FOR IP): Performed by: NURSE PRACTITIONER

## 2022-02-04 PROCEDURE — 99239 HOSP IP/OBS DSCHRG MGMT >30: CPT | Performed by: PSYCHIATRY & NEUROLOGY

## 2022-02-04 RX ADMIN — FUROSEMIDE 40 MG: 40 TABLET ORAL at 08:42

## 2022-02-04 RX ADMIN — BUDESONIDE AND FORMOTEROL FUMARATE DIHYDRATE 2 PUFF: 160; 4.5 AEROSOL RESPIRATORY (INHALATION) at 08:44

## 2022-02-04 RX ADMIN — FLUTICASONE PROPIONATE 1 SPRAY: 50 SPRAY, METERED NASAL at 08:44

## 2022-02-04 RX ADMIN — METFORMIN HYDROCHLORIDE 500 MG: 500 TABLET ORAL at 08:40

## 2022-02-04 RX ADMIN — GABAPENTIN 1200 MG: 600 TABLET, FILM COATED ORAL at 08:40

## 2022-02-04 RX ADMIN — CARVEDILOL 12.5 MG: 6.25 TABLET, FILM COATED ORAL at 08:43

## 2022-02-04 RX ADMIN — POTASSIUM CHLORIDE 20 MEQ: 1500 TABLET, EXTENDED RELEASE ORAL at 08:42

## 2022-02-04 RX ADMIN — PANTOPRAZOLE SODIUM 40 MG: 40 TABLET, DELAYED RELEASE ORAL at 08:42

## 2022-02-04 RX ADMIN — CETIRIZINE HYDROCHLORIDE TABLETS 10 MG: 10 TABLET, FILM COATED ORAL at 08:41

## 2022-02-04 RX ADMIN — DULOXETINE 60 MG: 60 CAPSULE, DELAYED RELEASE ORAL at 08:42

## 2022-02-04 RX ADMIN — MULTIPLE VITAMINS W/ MINERALS TAB 1 TABLET: TAB at 08:38

## 2022-02-04 RX ADMIN — METHOCARBAMOL 1500 MG: 750 TABLET ORAL at 08:40

## 2022-02-04 RX ADMIN — FERROUS SULFATE TAB 325 MG (65 MG ELEMENTAL FE) 325 MG: 325 (65 FE) TAB at 08:41

## 2022-02-04 RX ADMIN — METHYLPHENIDATE HYDROCHLORIDE 20 MG: 10 TABLET ORAL at 08:39

## 2022-02-04 RX ADMIN — IBUPROFEN 400 MG: 400 TABLET, FILM COATED ORAL at 03:59

## 2022-02-04 ASSESSMENT — PAIN DESCRIPTION - LOCATION
LOCATION: BACK
LOCATION: BACK;NECK

## 2022-02-04 ASSESSMENT — PAIN DESCRIPTION - PAIN TYPE
TYPE: CHRONIC PAIN
TYPE: CHRONIC PAIN

## 2022-02-04 ASSESSMENT — PAIN SCALES - GENERAL
PAINLEVEL_OUTOF10: 7
PAINLEVEL_OUTOF10: 9

## 2022-02-04 NOTE — CARE COORDINATION
Name: Zoltan Hensley    : 1965    Discharge Date: 22    Primary Auth/Cert #: [id number]    Destination: home     Discharge Medications:      Medication List      START taking these medications    DULoxetine 60 MG extended release capsule  Commonly known as: CYMBALTA  Take 1 capsule by mouth 2 times daily  Notes to patient: To help improve mood     ibuprofen 400 MG tablet  Commonly known as: ADVIL;MOTRIN  Take 1 tablet by mouth every 6 hours as needed for Pain  Notes to patient: To help reduce pain        CHANGE how you take these medications    furosemide 40 MG tablet  Commonly known as: LASIX  Take 1 tablet by mouth daily  What changed: when to take this  Notes to patient: Heart health     gabapentin 600 MG tablet  Commonly known as: NEURONTIN  Take 1 tablet by mouth 3 times daily for 30 days. What changed: how much to take  Notes to patient: To help reduce neuropathic pain     traZODone 50 MG tablet  Commonly known as: DESYREL  Take 1 tablet by mouth nightly as needed for Sleep  What changed: how much to take  Notes to patient: To help improve sleep        CONTINUE taking these medications    carvedilol 12.5 MG tablet  Commonly known as: COREG  Take 1 tablet by mouth 2 times daily (with meals)  Notes to patient: To help reduce blood pressure     cetirizine 10 MG tablet  Commonly known as: ZYRTEC  Notes to patient: To help reduce allergies     Diclofenac Sodium 3 % Gel  Notes to patient: Pain     esomeprazole 40 MG delayed release capsule  Commonly known as: NexIUM  Take 1 capsule by mouth every morning (before breakfast)  Notes to patient: To help reduce stomach acid     ferrous sulfate 325 (65 Fe) MG tablet  Commonly known as: IRON 325  Notes to patient: supplement     fluticasone 50 MCG/ACT nasal spray  Commonly known as: FLONASE  Notes to patient: To help clear nostrils      hydrOXYzine 25 MG tablet  Commonly known as: ATARAX  Notes to patient:  To help reduce anxiety     lidocaine 5 %  Commonly known as: LIDODERM  Notes to patient: To help reduce pain     loperamide 2 MG capsule  Commonly known as: IMODIUM  Notes to patient: To help reduce diarrhea     magnesium oxide 400 (241.3 Mg) MG Tabs tablet  Commonly known as: MAG-OX  Take 1 tablet by mouth daily  Notes to patient: Supplement     metFORMIN 500 MG tablet  Commonly known as: GLUCOPHAGE  Take 1 tablet by mouth 2 times daily (with meals)  Notes to patient: To help control glucose levels     methocarbamol 750 MG tablet  Commonly known as: ROBAXIN  Notes to patient: To help relieve muscle spasms     MULTI COMPLETE PO  Notes to patient: supplement     ondansetron 4 MG disintegrating tablet  Commonly known as: ZOFRAN-ODT  Notes to patient: To help reduce nausea     potassium chloride 20 MEQ packet  Commonly known as: KLOR-CON  Notes to patient: supplement     Ritalin 20 MG tablet  Generic drug: methylphenidate  Notes to patient: To help with narcolepsy     rivaroxaban 20 MG Tabs tablet  Commonly known as: Xarelto  Take 1 tablet by mouth Daily with supper  Notes to patient: To help prevent clots     SUPER B COMPLEX/C PO  Notes to patient: Supplement     Symbicort 160-4.5 MCG/ACT Aero  Generic drug: budesonide-formoterol  Notes to patient: To help improve breathing     Ubrelvy 50 MG Tabs  Generic drug: Ubrogepant  Notes to patient: Migraines     Ventolin  (90 Base) MCG/ACT inhaler  Generic drug: albuterol sulfate HFA  Notes to patient:  To help improve breathing        STOP taking these medications    Rexulti 0.5 MG Tabs tablet  Generic drug: brexpiprazole           Where to Get Your Medications      These medications were sent to [address], [address] [facility] [address]    Phone: [phone]   · ibuprofen 400 MG tablet     Information about where to get these medications is not yet available    Ask your nurse or doctor about these medications  · DULoxetine 60 MG extended release capsule     Pharmacy Instructions:    Sent home with you from [address] [place] [address] Outpatient           Follow Up Appointment: [facility]  C/Reynaldo Stewart  [address] [place] [id number]    On 2/11/2022  Pt has appointment at 3:00 pm

## 2022-02-04 NOTE — DISCHARGE INSTR - DIET
Good nutrition is important when healing from an illness, injury, or surgery. Follow any nutrition recommendations given to you during your hospital stay. If you were given an oral nutrition supplement while in the hospital, continue to take this supplement at home. You can take it with meals, in-between meals, and/or before bedtime. These supplements can be purchased at most local grocery stores, pharmacies, and chain [employer]-stores. If you have any questions about your diet or nutrition, call the hospital and ask for the dietitian.   General diet  Increase fluids
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
## 2022-02-04 NOTE — GROUP NOTE
Group Therapy Note    Date: 2/4/2022    Group Start Time: 9616  Group End Time: 56  Group Topic: Psychotherapy    MARY JANE LUONG    EZEQUIEL Davila LSW        Group Therapy Note    Attendees: 7/10         Patient's Goal:  Increase interpersonal relationship skills    Notes:  Patient was an active participant in group discussion    Status After Intervention:  Unchanged    Participation Level:  Active Listener and Interactive    Participation Quality: Appropriate, Attentive and Sharing      Speech:  normal      Thought Process/Content: Logical      Affective Functioning: Congruent      Mood: euthymic      Level of consciousness:  Alert, Oriented x4 and Attentive      Response to Learning: Able to verbalize current knowledge/experience, Able to verbalize/acknowledge new learning and Able to retain information      Endings: None Reported    Modes of Intervention: Support, Socialization and Exploration      Discipline Responsible: /Counselor      Signature:  EZEQUIEL Davila LSW

## 2022-02-04 NOTE — BH NOTE
Patient given tobacco quitline number [phone] at this time. With nurse observation patient called number for information and follow up. Continue to reinforce the dangers of long term tobacco use and why tobacco cessation is important to patient.

## 2022-02-04 NOTE — GROUP NOTE
Group Therapy Note    Date: 2/4/2022    Group Start Time: 0900  Group End Time: 0940  Group Topic: Group Documentation    MARY JANE Pagan        Group Therapy Note    Attendees: 6/10         Patient's Goal: D/C today - talk to family and apologize  Notes:  Goal setting/support group    Status After Intervention:  Improved    Participation Level:  Active Listener and Interactive    Participation Quality: Appropriate, Attentive, Sharing and Supportive      Speech:  normal      Thought Process/Content: Logical      Affective Functioning: Congruent      Mood: anxious      Level of consciousness:  Alert, Oriented x4 and Attentive      Response to Learning: Able to verbalize current knowledge/experience, Able to verbalize/acknowledge new learning and Able to retain information      Endings: None Reported    Modes of Intervention: Education, Support, Socialization, Exploration and Problem-solving      Discipline Responsible: [address] [employer]      Signature:  Vicky Pagan

## 2022-02-04 NOTE — PLAN OF CARE
Problem: Altered Mood, Depressive Behavior:  Goal: Able to verbalize and/or display a decrease in depressive symptoms  Description: Able to verbalize and/or display a decrease in depressive symptoms  2/3/2022 1955 by Td Osorio LPN  Outcome: Ongoing     Problem: Altered Mood, Depressive Behavior:  Goal: Absence of self-harm  Description: Absence of self-harm  2/3/2022 1955 by Td Osorio LPN  Outcome: Ongoing   Denies suicidal thoughts and remains free from harm at this time. Isolated to room t/o the evening, c/o diarrhea.

## 2022-02-10 ENCOUNTER — TELEPHONE (OUTPATIENT)
Dept: ORTHOPEDIC SURGERY | Age: 57
End: 2022-02-10

## 2022-02-10 NOTE — TELEPHONE ENCOUNTER
Pt called and would like a call back from Jaja Mccoy to get her surgery for right knee replacement with Dr Shana Knight rescheduled- [phone], thank you

## 2022-02-22 ENCOUNTER — HOSPITAL ENCOUNTER (OUTPATIENT)
Dept: PREADMISSION TESTING | Age: 57
Discharge: HOME | End: 2022-02-26
Payer: COMMERCIAL

## 2022-02-22 VITALS
HEIGHT: 62 IN | SYSTOLIC BLOOD PRESSURE: 123 MMHG | WEIGHT: 245 LBS | BODY MASS INDEX: 45.08 KG/M2 | HEART RATE: 80 BPM | RESPIRATION RATE: 16 BRPM | DIASTOLIC BLOOD PRESSURE: 61 MMHG | TEMPERATURE: 97.2 F | OXYGEN SATURATION: 95 %

## 2022-02-22 DIAGNOSIS — Z01.818 PREOP EXAMINATION: ICD-10-CM

## 2022-02-22 PROBLEM — I25.119 CHEST PAIN DUE TO CAD (HCC): Status: ACTIVE | Noted: 2022-01-06

## 2022-02-22 PROBLEM — M17.0 PRIMARY OSTEOARTHRITIS OF BOTH KNEES: Status: ACTIVE | Noted: 2021-02-19

## 2022-02-22 PROBLEM — N39.3 SUI (STRESS URINARY INCONTINENCE, FEMALE): Status: ACTIVE | Noted: 2021-03-26

## 2022-02-22 PROBLEM — I51.89 DIASTOLIC DYSFUNCTION: Status: ACTIVE | Noted: 2019-03-01

## 2022-02-22 PROBLEM — D17.0 LIPOMA OF NECK: Status: ACTIVE | Noted: 2021-12-29

## 2022-02-22 PROBLEM — F33.1 RECURRENT MODERATE MAJOR DEPRESSIVE DISORDER WITH ANXIETY (HCC): Status: ACTIVE | Noted: 2020-02-24

## 2022-02-22 PROBLEM — I95.9 ARTERIAL HYPOTENSION: Status: ACTIVE | Noted: 2022-02-22

## 2022-02-22 PROBLEM — F41.9 RECURRENT MODERATE MAJOR DEPRESSIVE DISORDER WITH ANXIETY (HCC): Status: ACTIVE | Noted: 2020-02-24

## 2022-02-22 PROBLEM — E66.813 CLASS 3 SEVERE OBESITY IN ADULT: Status: ACTIVE | Noted: 2021-08-11

## 2022-02-22 PROBLEM — K21.9 GERD (GASTROESOPHAGEAL REFLUX DISEASE): Status: ACTIVE | Noted: 2020-05-06

## 2022-02-22 PROBLEM — F31.9 BIPOLAR DISORDER (HCC): Status: ACTIVE | Noted: 2022-02-22

## 2022-02-22 PROBLEM — N81.6 RECTOCELE: Status: ACTIVE | Noted: 2021-04-06

## 2022-02-22 PROBLEM — E66.01 CLASS 3 SEVERE OBESITY IN ADULT (HCC): Status: ACTIVE | Noted: 2021-08-11

## 2022-02-22 PROBLEM — R55 SYNCOPE: Status: ACTIVE | Noted: 2019-06-03

## 2022-02-22 PROBLEM — G89.29 CHRONIC PAIN: Status: ACTIVE | Noted: 2021-02-19

## 2022-02-22 PROBLEM — M50.20 HERNIATION OF CERVICAL INTERVERTEBRAL DISC WITHOUT MYELOPATHY: Status: ACTIVE | Noted: 2019-08-08

## 2022-02-22 PROBLEM — I10 HYPERTENSIVE DISORDER: Status: ACTIVE | Noted: 2020-05-06

## 2022-02-22 PROBLEM — R10.2 PELVIC PAIN: Status: ACTIVE | Noted: 2021-03-26

## 2022-02-22 PROBLEM — F51.04 PSYCHOPHYSIOLOGIC INSOMNIA: Status: ACTIVE | Noted: 2020-02-24

## 2022-02-22 LAB
ABSOLUTE EOS #: 0.1 K/UL (ref 0–0.4)
ABSOLUTE LYMPH #: 3.8 K/UL (ref 1–4.8)
ABSOLUTE MONO #: 0.6 K/UL (ref 0.1–1.3)
ANION GAP SERPL CALCULATED.3IONS-SCNC: 11 MMOL/L (ref 9–17)
BASOPHILS # BLD: 1 % (ref 0–2)
BASOPHILS ABSOLUTE: 0.1 K/UL (ref 0–0.2)
BILIRUBIN URINE: NEGATIVE
BUN BLDV-MCNC: 21 MG/DL (ref 6–20)
CALCIUM SERPL-MCNC: 8.9 MG/DL (ref 8.6–10.4)
CHLORIDE BLD-SCNC: 98 MMOL/L (ref 98–107)
CO2: 31 MMOL/L (ref 20–31)
COLOR: YELLOW
COMMENT UA: NORMAL
CREAT SERPL-MCNC: 1.3 MG/DL (ref 0.5–0.9)
EOSINOPHILS RELATIVE PERCENT: 1 % (ref 0–4)
GFR AFRICAN AMERICAN: 51 ML/MIN
GFR NON-AFRICAN AMERICAN: 42 ML/MIN
GFR SERPL CREATININE-BSD FRML MDRD: ABNORMAL ML/MIN/{1.73_M2}
GLUCOSE BLD-MCNC: 105 MG/DL (ref 70–99)
GLUCOSE URINE: NEGATIVE
HCT VFR BLD CALC: 42.4 % (ref 36–46)
HEMOGLOBIN: 14.4 G/DL (ref 12–16)
KETONES, URINE: NEGATIVE
LEUKOCYTE ESTERASE, URINE: NEGATIVE
LYMPHOCYTES # BLD: 30 % (ref 24–44)
MCH RBC QN AUTO: 31.1 PG (ref 26–34)
MCHC RBC AUTO-ENTMCNC: 33.9 G/DL (ref 31–37)
MCV RBC AUTO: 91.7 FL (ref 80–100)
MONOCYTES # BLD: 5 % (ref 1–7)
NITRITE, URINE: NEGATIVE
PDW BLD-RTO: 14.4 % (ref 11.5–14.9)
PH UA: 6.5 (ref 5–8)
PLATELET # BLD: 273 K/UL (ref 150–450)
PMV BLD AUTO: 8.6 FL (ref 6–12)
POTASSIUM SERPL-SCNC: 3.6 MMOL/L (ref 3.7–5.3)
PROTEIN UA: NEGATIVE
RBC # BLD: 4.63 M/UL (ref 4–5.2)
SEG NEUTROPHILS: 63 % (ref 36–66)
SEGMENTED NEUTROPHILS ABSOLUTE COUNT: 7.9 K/UL (ref 1.3–9.1)
SODIUM BLD-SCNC: 140 MMOL/L (ref 135–144)
SPECIFIC GRAVITY UA: 1.01 (ref 1–1.03)
TURBIDITY: CLEAR
URINE HGB: NEGATIVE
UROBILINOGEN, URINE: NORMAL
WBC # BLD: 12.5 K/UL (ref 3.5–11)

## 2022-02-22 PROCEDURE — 85025 COMPLETE CBC W/AUTO DIFF WBC: CPT

## 2022-02-22 PROCEDURE — 87641 MR-STAPH DNA AMP PROBE: CPT

## 2022-02-22 PROCEDURE — 99214 OFFICE O/P EST MOD 30 MIN: CPT | Performed by: NURSE PRACTITIONER

## 2022-02-22 PROCEDURE — 80048 BASIC METABOLIC PNL TOTAL CA: CPT

## 2022-02-22 PROCEDURE — 36415 COLL VENOUS BLD VENIPUNCTURE: CPT

## 2022-02-22 PROCEDURE — 81003 URINALYSIS AUTO W/O SCOPE: CPT

## 2022-02-22 RX ORDER — BACLOFEN 20 MG/1
20 TABLET ORAL 2 TIMES DAILY
COMMUNITY

## 2022-02-22 ASSESSMENT — PAIN SCALES - GENERAL: PAINLEVEL_OUTOF10: 5

## 2022-02-22 ASSESSMENT — ENCOUNTER SYMPTOMS
DIARRHEA: 0
SHORTNESS OF BREATH: 0
TROUBLE SWALLOWING: 0
WHEEZING: 0
ABDOMINAL PAIN: 0
RHINORRHEA: 0
ANAL BLEEDING: 0
SORE THROAT: 0
COUGH: 0
VOMITING: 0
CONSTIPATION: 0
NAUSEA: 0
BLOOD IN STOOL: 0

## 2022-02-22 ASSESSMENT — PAIN DESCRIPTION - LOCATION: LOCATION: BACK;NECK;KNEE

## 2022-02-22 ASSESSMENT — PAIN DESCRIPTION - DESCRIPTORS: DESCRIPTORS: ACHING

## 2022-02-22 ASSESSMENT — PAIN DESCRIPTION - PAIN TYPE: TYPE: CHRONIC PAIN

## 2022-02-22 ASSESSMENT — PAIN DESCRIPTION - ORIENTATION: ORIENTATION: RIGHT;LEFT

## 2022-02-22 NOTE — PROGRESS NOTES
Patient states she has an appt with cardiologist on 3/3/22 at 4:15, Dr Jesus Pagan with [facility] and can obtain clearance at this appt if needed.

## 2022-02-22 NOTE — H&P
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 11/17/2014    Type 2 diabetes mellitus without complication, without long-term current use of insulin ([facility] [address] 10/01/2014    Obesity 10/01/2014    Knee pain 09/30/2014    Edema 06/06/2014    Hypertension 06/04/2014    Hyperlipidemia 06/04/2014    Urinary incontinence 06/04/2014    Diabetes ([address] 06/04/2014    Arrhythmia 06/04/2014    Arthritis 06/04/2014    Fibromyalgia 06/04/2014    Sleep apnea 06/04/2014    Stroke ([address] 06/04/2014    Migraine 06/04/2014    Seizure ([address] 06/04/2014    Back pain, chronic 06/04/2014    Gout 06/04/2014    Type II or unspecified type diabetes mellitus with unspecified complication, not stated as uncontrolled 06/03/2014    Palpitations 06/18/2013    Cerebral infarction ([address] 06/18/2013    TIA (transient ischemic attack) 06/07/2013    Abnormal cardiovascular stress test 01/23/2012    Atypical chest pain 01/23/2012    Hot flashes, menopausal 07/26/2011    Abdominal pain, epigastric 06/29/2011    Irritable bowel syndrome with diarrhea 09/29/2010    S/P hysterectomy with oophorectomy 08/04/2010    Hypoxemia 04/30/2010  Shortness of breath 04/30/2010    Type II or unspecified type diabetes mellitus without mention of complication, uncontrolled 04/21/2010    Benign essential HTN 04/21/2010    Morbid obesity ([address] 04/21/2010    Postablative ovarian failure 02/01/2010           CLEARANCE: Based on my personal evaluation of patient including review of patient's chart, MEDICAL AND CARDIAC clearance required for scheduled surgery. Surgery scheduling will notify Dr. Demond Pena office who will be responsible for making sure the clearance is obtained and is in the chart for surgery.     Total time spent on encounter- PAT provider minutes: 31-40 minutes     KANG Gallagher - CNP on 2/22/2022 at 5:01 PM

## 2022-02-22 NOTE — H&P (VIEW-ONLY)
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 11/17/2014    Type 2 diabetes mellitus without complication, without long-term current use of insulin ([facility] 10/01/2014    Obesity 10/01/2014    Knee pain 09/30/2014    Edema 06/06/2014    Hypertension 06/04/2014    Hyperlipidemia 06/04/2014    Urinary incontinence 06/04/2014    Diabetes ([address] 06/04/2014    Arrhythmia 06/04/2014    Arthritis 06/04/2014    Fibromyalgia 06/04/2014    Sleep apnea 06/04/2014    Stroke ([address] 06/04/2014    Migraine 06/04/2014    Seizure ([address] 06/04/2014    Back pain, chronic 06/04/2014    Gout 06/04/2014    Type II or unspecified type diabetes mellitus with unspecified complication, not stated as uncontrolled 06/03/2014    Palpitations 06/18/2013    Cerebral infarction ([address] 06/18/2013    TIA (transient ischemic attack) 06/07/2013    Abnormal cardiovascular stress test 01/23/2012    Atypical chest pain 01/23/2012    Hot flashes, menopausal 07/26/2011    Abdominal pain, epigastric 06/29/2011    Irritable bowel syndrome with diarrhea 09/29/2010    S/P hysterectomy with oophorectomy 08/04/2010    Hypoxemia 04/30/2010  Shortness of breath 04/30/2010    Type II or unspecified type diabetes mellitus without mention of complication, uncontrolled 04/21/2010    Benign essential HTN 04/21/2010    Morbid obesity ([address] 04/21/2010    Postablative ovarian failure 02/01/2010           CLEARANCE: Based on my personal evaluation of patient including review of patient's chart, MEDICAL AND CARDIAC clearance required for scheduled surgery. Surgery scheduling will notify Dr. Travis Swenson office who will be responsible for making sure the clearance is obtained and is in the chart for surgery.     Total time spent on encounter- PAT provider minutes: 31-40 minutes     KANG Herrera CNP on 2/22/2022 at 5:01 PM

## 2022-02-23 LAB
MRSA, DNA, NASAL: NEGATIVE
SPECIMEN DESCRIPTION: NORMAL

## 2022-03-07 ENCOUNTER — ANESTHESIA EVENT (OUTPATIENT)
Dept: OPERATING ROOM | Age: 57
End: 2022-03-07
Payer: COMMERCIAL

## 2022-03-08 ENCOUNTER — HOSPITAL ENCOUNTER (OUTPATIENT)
Age: 57
Discharge: HOME HEALTH | End: 2022-03-08
Attending: ORTHOPAEDIC SURGERY | Admitting: ORTHOPAEDIC SURGERY
Payer: COMMERCIAL

## 2022-03-08 ENCOUNTER — ANESTHESIA (OUTPATIENT)
Dept: OPERATING ROOM | Age: 57
End: 2022-03-08
Payer: COMMERCIAL

## 2022-03-08 ENCOUNTER — APPOINTMENT (OUTPATIENT)
Dept: GENERAL RADIOLOGY | Age: 57
End: 2022-03-08
Attending: ORTHOPAEDIC SURGERY
Payer: COMMERCIAL

## 2022-03-08 VITALS
BODY MASS INDEX: 45.08 KG/M2 | HEIGHT: 62 IN | OXYGEN SATURATION: 92 % | WEIGHT: 245 LBS | SYSTOLIC BLOOD PRESSURE: 134 MMHG | DIASTOLIC BLOOD PRESSURE: 72 MMHG | TEMPERATURE: 98 F | RESPIRATION RATE: 16 BRPM | HEART RATE: 84 BPM

## 2022-03-08 VITALS — TEMPERATURE: 97.5 F | OXYGEN SATURATION: 96 % | DIASTOLIC BLOOD PRESSURE: 80 MMHG | SYSTOLIC BLOOD PRESSURE: 138 MMHG

## 2022-03-08 DIAGNOSIS — M17.11 PRIMARY OSTEOARTHRITIS OF RIGHT KNEE: Primary | ICD-10-CM

## 2022-03-08 LAB
AMPHETAMINE SCREEN URINE: NEGATIVE
BARBITURATE SCREEN URINE: NEGATIVE
BENZODIAZEPINE SCREEN, URINE: NEGATIVE
CANNABINOID SCREEN URINE: NEGATIVE
COCAINE METABOLITE, URINE: NEGATIVE
GLUCOSE BLD-MCNC: 134 MG/DL (ref 65–105)
GLUCOSE BLD-MCNC: 134 MG/DL (ref 65–105)
GLUCOSE BLD-MCNC: 69 MG/DL (ref 65–105)
METHADONE SCREEN, URINE: NEGATIVE
OPIATES, URINE: NEGATIVE
OXYCODONE SCREEN URINE: NEGATIVE
PHENCYCLIDINE, URINE: NEGATIVE
TEST INFORMATION: NORMAL

## 2022-03-08 PROCEDURE — 82947 ASSAY GLUCOSE BLOOD QUANT: CPT

## 2022-03-08 PROCEDURE — 64447 NJX AA&/STRD FEMORAL NRV IMG: CPT | Performed by: ANESTHESIOLOGY

## 2022-03-08 PROCEDURE — 6360000002 HC RX W HCPCS: Performed by: NURSE ANESTHETIST, CERTIFIED REGISTERED

## 2022-03-08 PROCEDURE — 6370000000 HC RX 637 (ALT 250 FOR IP): Performed by: ORTHOPAEDIC SURGERY

## 2022-03-08 PROCEDURE — 3700000000 HC ANESTHESIA ATTENDED CARE: Performed by: ORTHOPAEDIC SURGERY

## 2022-03-08 PROCEDURE — 6360000002 HC RX W HCPCS: Performed by: ORTHOPAEDIC SURGERY

## 2022-03-08 PROCEDURE — 2709999900 HC NON-CHARGEABLE SUPPLY: Performed by: ORTHOPAEDIC SURGERY

## 2022-03-08 PROCEDURE — 2720000010 HC SURG SUPPLY STERILE: Performed by: ORTHOPAEDIC SURGERY

## 2022-03-08 PROCEDURE — 3600000013 HC SURGERY LEVEL 3 ADDTL 15MIN: Performed by: ORTHOPAEDIC SURGERY

## 2022-03-08 PROCEDURE — 2580000003 HC RX 258: Performed by: ORTHOPAEDIC SURGERY

## 2022-03-08 PROCEDURE — 2580000003 HC RX 258: Performed by: NURSE ANESTHETIST, CERTIFIED REGISTERED

## 2022-03-08 PROCEDURE — 7100000000 HC PACU RECOVERY - FIRST 15 MIN: Performed by: ORTHOPAEDIC SURGERY

## 2022-03-08 PROCEDURE — 2500000003 HC RX 250 WO HCPCS: Performed by: ORTHOPAEDIC SURGERY

## 2022-03-08 PROCEDURE — C1713 ANCHOR/SCREW BN/BN,TIS/BN: HCPCS | Performed by: ORTHOPAEDIC SURGERY

## 2022-03-08 PROCEDURE — 97165 OT EVAL LOW COMPLEX 30 MIN: CPT

## 2022-03-08 PROCEDURE — 7100000001 HC PACU RECOVERY - ADDTL 15 MIN: Performed by: ORTHOPAEDIC SURGERY

## 2022-03-08 PROCEDURE — 73560 X-RAY EXAM OF KNEE 1 OR 2: CPT

## 2022-03-08 PROCEDURE — 80307 DRUG TEST PRSMV CHEM ANLYZR: CPT

## 2022-03-08 PROCEDURE — 3700000001 HC ADD 15 MINUTES (ANESTHESIA): Performed by: ORTHOPAEDIC SURGERY

## 2022-03-08 PROCEDURE — 97530 THERAPEUTIC ACTIVITIES: CPT

## 2022-03-08 PROCEDURE — 2500000003 HC RX 250 WO HCPCS: Performed by: NURSE ANESTHETIST, CERTIFIED REGISTERED

## 2022-03-08 PROCEDURE — 6360000002 HC RX W HCPCS: Performed by: ANESTHESIOLOGY

## 2022-03-08 PROCEDURE — 97535 SELF CARE MNGMENT TRAINING: CPT

## 2022-03-08 PROCEDURE — C9290 INJ, BUPIVACAINE LIPOSOME: HCPCS | Performed by: ANESTHESIOLOGY

## 2022-03-08 PROCEDURE — 3600000003 HC SURGERY LEVEL 3 BASE: Performed by: ORTHOPAEDIC SURGERY

## 2022-03-08 PROCEDURE — 27447 TOTAL KNEE ARTHROPLASTY: CPT | Performed by: ORTHOPAEDIC SURGERY

## 2022-03-08 PROCEDURE — C1776 JOINT DEVICE (IMPLANTABLE): HCPCS | Performed by: ORTHOPAEDIC SURGERY

## 2022-03-08 PROCEDURE — 2500000003 HC RX 250 WO HCPCS: Performed by: ANESTHESIOLOGY

## 2022-03-08 PROCEDURE — 2580000003 HC RX 258: Performed by: ANESTHESIOLOGY

## 2022-03-08 PROCEDURE — A4216 STERILE WATER/SALINE, 10 ML: HCPCS | Performed by: ORTHOPAEDIC SURGERY

## 2022-03-08 PROCEDURE — 97161 PT EVAL LOW COMPLEX 20 MIN: CPT

## 2022-03-08 DEVICE — COMPONENT PAT DIA34MM THK8.5MM STD KNEE TI ALLY S STL: Type: IMPLANTABLE DEVICE | Site: KNEE | Status: FUNCTIONAL

## 2022-03-08 DEVICE — VNGD ANT STAB BRG 13X71: Type: IMPLANTABLE DEVICE | Site: KNEE | Status: FUNCTIONAL

## 2022-03-08 DEVICE — CEMENT BNE 40GM HI VISC RADPQ FOR REV SURG: Type: IMPLANTABLE DEVICE | Site: KNEE | Status: FUNCTIONAL

## 2022-03-08 DEVICE — IMPLANTABLE DEVICE: Type: IMPLANTABLE DEVICE | Site: KNEE | Status: FUNCTIONAL

## 2022-03-08 DEVICE — TRAY TIB L71MM KNEE CO CHROM FIN MOD INTLOK VANGUARD: Type: IMPLANTABLE DEVICE | Site: KNEE | Status: FUNCTIONAL

## 2022-03-08 RX ORDER — DEXAMETHASONE SODIUM PHOSPHATE 10 MG/ML
10 INJECTION, SOLUTION INTRAMUSCULAR; INTRAVENOUS ONCE
Status: COMPLETED | OUTPATIENT
Start: 2022-03-08 | End: 2022-03-08

## 2022-03-08 RX ORDER — ONDANSETRON 2 MG/ML
4 INJECTION INTRAMUSCULAR; INTRAVENOUS
Status: DISCONTINUED | OUTPATIENT
Start: 2022-03-08 | End: 2022-03-08 | Stop reason: HOSPADM

## 2022-03-08 RX ORDER — SODIUM CHLORIDE 0.9 % (FLUSH) 0.9 %
10 SYRINGE (ML) INJECTION PRN
Status: DISCONTINUED | OUTPATIENT
Start: 2022-03-08 | End: 2022-03-08

## 2022-03-08 RX ORDER — ONDANSETRON 4 MG/1
4 TABLET, ORALLY DISINTEGRATING ORAL ONCE
Status: COMPLETED | OUTPATIENT
Start: 2022-03-08 | End: 2022-03-08

## 2022-03-08 RX ORDER — SCOLOPAMINE TRANSDERMAL SYSTEM 1 MG/1
1 PATCH, EXTENDED RELEASE TRANSDERMAL ONCE
Status: DISCONTINUED | OUTPATIENT
Start: 2022-03-08 | End: 2022-03-08

## 2022-03-08 RX ORDER — PROPOFOL 10 MG/ML
INJECTION, EMULSION INTRAVENOUS PRN
Status: DISCONTINUED | OUTPATIENT
Start: 2022-03-08 | End: 2022-03-08 | Stop reason: SDUPTHER

## 2022-03-08 RX ORDER — SODIUM CHLORIDE 0.9 % (FLUSH) 0.9 %
5-40 SYRINGE (ML) INJECTION EVERY 12 HOURS SCHEDULED
Status: DISCONTINUED | OUTPATIENT
Start: 2022-03-08 | End: 2022-03-08 | Stop reason: HOSPADM

## 2022-03-08 RX ORDER — SODIUM CHLORIDE 9 MG/ML
25 INJECTION, SOLUTION INTRAVENOUS PRN
Status: DISCONTINUED | OUTPATIENT
Start: 2022-03-08 | End: 2022-03-08 | Stop reason: HOSPADM

## 2022-03-08 RX ORDER — FENTANYL CITRATE 50 UG/ML
25 INJECTION, SOLUTION INTRAMUSCULAR; INTRAVENOUS ONCE
Status: DISCONTINUED | OUTPATIENT
Start: 2022-03-08 | End: 2022-03-08 | Stop reason: HOSPADM

## 2022-03-08 RX ORDER — SODIUM CHLORIDE, SODIUM LACTATE, POTASSIUM CHLORIDE, CALCIUM CHLORIDE 600; 310; 30; 20 MG/100ML; MG/100ML; MG/100ML; MG/100ML
INJECTION, SOLUTION INTRAVENOUS CONTINUOUS
Status: DISCONTINUED | OUTPATIENT
Start: 2022-03-08 | End: 2022-03-08

## 2022-03-08 RX ORDER — ASPIRIN 81 MG/1
81 TABLET ORAL 2 TIMES DAILY
Status: DISCONTINUED | OUTPATIENT
Start: 2022-03-08 | End: 2022-03-08 | Stop reason: HOSPADM

## 2022-03-08 RX ORDER — DEXTROSE MONOHYDRATE 25 G/50ML
12.5 INJECTION, SOLUTION INTRAVENOUS ONCE
Status: COMPLETED | OUTPATIENT
Start: 2022-03-08 | End: 2022-03-08

## 2022-03-08 RX ORDER — ACETAMINOPHEN 500 MG
1000 TABLET ORAL ONCE
Status: COMPLETED | OUTPATIENT
Start: 2022-03-08 | End: 2022-03-08

## 2022-03-08 RX ORDER — SODIUM CHLORIDE 9 MG/ML
INJECTION, SOLUTION INTRAVENOUS CONTINUOUS
Status: DISCONTINUED | OUTPATIENT
Start: 2022-03-08 | End: 2022-03-08

## 2022-03-08 RX ORDER — SODIUM CHLORIDE 0.9 % (FLUSH) 0.9 %
5-40 SYRINGE (ML) INJECTION PRN
Status: DISCONTINUED | OUTPATIENT
Start: 2022-03-08 | End: 2022-03-08 | Stop reason: HOSPADM

## 2022-03-08 RX ORDER — ONDANSETRON 2 MG/ML
INJECTION INTRAMUSCULAR; INTRAVENOUS PRN
Status: DISCONTINUED | OUTPATIENT
Start: 2022-03-08 | End: 2022-03-08 | Stop reason: SDUPTHER

## 2022-03-08 RX ORDER — FENTANYL CITRATE 50 UG/ML
INJECTION, SOLUTION INTRAMUSCULAR; INTRAVENOUS PRN
Status: DISCONTINUED | OUTPATIENT
Start: 2022-03-08 | End: 2022-03-08 | Stop reason: SDUPTHER

## 2022-03-08 RX ORDER — CALCIUM CHLORIDE 100 MG/ML
INJECTION INTRAVENOUS; INTRAVENTRICULAR PRN
Status: DISCONTINUED | OUTPATIENT
Start: 2022-03-08 | End: 2022-03-08 | Stop reason: ALTCHOICE

## 2022-03-08 RX ORDER — LIDOCAINE HYDROCHLORIDE 20 MG/ML
INJECTION, SOLUTION INTRAVENOUS PRN
Status: DISCONTINUED | OUTPATIENT
Start: 2022-03-08 | End: 2022-03-08 | Stop reason: SDUPTHER

## 2022-03-08 RX ORDER — BUPIVACAINE HYDROCHLORIDE 5 MG/ML
INJECTION, SOLUTION EPIDURAL; INTRACAUDAL
Status: DISCONTINUED | OUTPATIENT
Start: 2022-03-08 | End: 2022-03-08 | Stop reason: SDUPTHER

## 2022-03-08 RX ORDER — FENTANYL CITRATE 50 UG/ML
50 INJECTION, SOLUTION INTRAMUSCULAR; INTRAVENOUS EVERY 5 MIN PRN
Status: DISCONTINUED | OUTPATIENT
Start: 2022-03-08 | End: 2022-03-08 | Stop reason: HOSPADM

## 2022-03-08 RX ORDER — DEXAMETHASONE SODIUM PHOSPHATE 4 MG/ML
INJECTION, SOLUTION INTRA-ARTICULAR; INTRALESIONAL; INTRAMUSCULAR; INTRAVENOUS; SOFT TISSUE PRN
Status: DISCONTINUED | OUTPATIENT
Start: 2022-03-08 | End: 2022-03-08 | Stop reason: SDUPTHER

## 2022-03-08 RX ORDER — OXYCODONE HYDROCHLORIDE AND ACETAMINOPHEN 5; 325 MG/1; MG/1
1-2 TABLET ORAL EVERY 4 HOURS PRN
Qty: 42 TABLET | Refills: 0 | Status: SHIPPED | OUTPATIENT
Start: 2022-03-08 | End: 2022-03-15

## 2022-03-08 RX ORDER — OXYCODONE HYDROCHLORIDE 5 MG/1
5 TABLET ORAL EVERY 4 HOURS PRN
Status: DISCONTINUED | OUTPATIENT
Start: 2022-03-08 | End: 2022-03-08 | Stop reason: HOSPADM

## 2022-03-08 RX ORDER — GLYCOPYRROLATE 0.2 MG/ML
INJECTION INTRAMUSCULAR; INTRAVENOUS PRN
Status: DISCONTINUED | OUTPATIENT
Start: 2022-03-08 | End: 2022-03-08 | Stop reason: SDUPTHER

## 2022-03-08 RX ORDER — LIDOCAINE HYDROCHLORIDE 10 MG/ML
1 INJECTION, SOLUTION EPIDURAL; INFILTRATION; INTRACAUDAL; PERINEURAL
Status: DISCONTINUED | OUTPATIENT
Start: 2022-03-08 | End: 2022-03-08 | Stop reason: HOSPADM

## 2022-03-08 RX ORDER — OXYCODONE HYDROCHLORIDE 10 MG/1
10 TABLET ORAL EVERY 4 HOURS PRN
Status: DISCONTINUED | OUTPATIENT
Start: 2022-03-08 | End: 2022-03-08 | Stop reason: HOSPADM

## 2022-03-08 RX ORDER — MEPERIDINE HYDROCHLORIDE 25 MG/ML
12.5 INJECTION INTRAMUSCULAR; INTRAVENOUS; SUBCUTANEOUS EVERY 5 MIN PRN
Status: DISCONTINUED | OUTPATIENT
Start: 2022-03-08 | End: 2022-03-08 | Stop reason: HOSPADM

## 2022-03-08 RX ORDER — MIDAZOLAM HYDROCHLORIDE 1 MG/ML
INJECTION INTRAMUSCULAR; INTRAVENOUS PRN
Status: DISCONTINUED | OUTPATIENT
Start: 2022-03-08 | End: 2022-03-08 | Stop reason: SDUPTHER

## 2022-03-08 RX ORDER — SODIUM CHLORIDE 0.9 % (FLUSH) 0.9 %
10 SYRINGE (ML) INJECTION EVERY 12 HOURS SCHEDULED
Status: DISCONTINUED | OUTPATIENT
Start: 2022-03-08 | End: 2022-03-08

## 2022-03-08 RX ORDER — SODIUM CHLORIDE 9 MG/ML
25 INJECTION, SOLUTION INTRAVENOUS PRN
Status: DISCONTINUED | OUTPATIENT
Start: 2022-03-08 | End: 2022-03-08

## 2022-03-08 RX ORDER — NEOSTIGMINE METHYLSULFATE 5 MG/5 ML
SYRINGE (ML) INTRAVENOUS PRN
Status: DISCONTINUED | OUTPATIENT
Start: 2022-03-08 | End: 2022-03-08 | Stop reason: SDUPTHER

## 2022-03-08 RX ORDER — TRANEXAMIC ACID 100 MG/ML
INJECTION, SOLUTION INTRAVENOUS PRN
Status: DISCONTINUED | OUTPATIENT
Start: 2022-03-08 | End: 2022-03-08 | Stop reason: SDUPTHER

## 2022-03-08 RX ORDER — GABAPENTIN 400 MG/1
800 CAPSULE ORAL ONCE
Status: COMPLETED | OUTPATIENT
Start: 2022-03-08 | End: 2022-03-08

## 2022-03-08 RX ORDER — DIPHENHYDRAMINE HYDROCHLORIDE 50 MG/ML
12.5 INJECTION INTRAMUSCULAR; INTRAVENOUS
Status: DISCONTINUED | OUTPATIENT
Start: 2022-03-08 | End: 2022-03-08 | Stop reason: HOSPADM

## 2022-03-08 RX ORDER — ACETAMINOPHEN 325 MG/1
650 TABLET ORAL EVERY 6 HOURS
Status: DISCONTINUED | OUTPATIENT
Start: 2022-03-08 | End: 2022-03-08 | Stop reason: HOSPADM

## 2022-03-08 RX ORDER — ASPIRIN 81 MG/1
81 TABLET ORAL 2 TIMES DAILY
Qty: 90 TABLET | Refills: 1 | Status: SHIPPED | OUTPATIENT
Start: 2022-03-08 | End: 2022-03-08 | Stop reason: HOSPADM

## 2022-03-08 RX ORDER — SODIUM CHLORIDE 9 MG/ML
INJECTION, SOLUTION INTRAVENOUS CONTINUOUS PRN
Status: DISCONTINUED | OUTPATIENT
Start: 2022-03-08 | End: 2022-03-08 | Stop reason: SDUPTHER

## 2022-03-08 RX ORDER — ROCURONIUM BROMIDE 10 MG/ML
INJECTION, SOLUTION INTRAVENOUS PRN
Status: DISCONTINUED | OUTPATIENT
Start: 2022-03-08 | End: 2022-03-08 | Stop reason: SDUPTHER

## 2022-03-08 RX ADMIN — CEFAZOLIN 2000 MG: 10 INJECTION, POWDER, FOR SOLUTION INTRAVENOUS at 09:19

## 2022-03-08 RX ADMIN — DEXTROSE MONOHYDRATE 3000 MG: 50 INJECTION, SOLUTION INTRAVENOUS at 15:11

## 2022-03-08 RX ADMIN — FENTANYL CITRATE 50 MCG: 50 INJECTION, SOLUTION INTRAMUSCULAR; INTRAVENOUS at 09:35

## 2022-03-08 RX ADMIN — Medication 12.5 G: at 08:49

## 2022-03-08 RX ADMIN — ACETAMINOPHEN 650 MG: 325 TABLET ORAL at 14:36

## 2022-03-08 RX ADMIN — MIDAZOLAM 2 MG: 1 INJECTION INTRAMUSCULAR; INTRAVENOUS at 09:07

## 2022-03-08 RX ADMIN — PROPOFOL 200 MG: 10 INJECTION, EMULSION INTRAVENOUS at 09:09

## 2022-03-08 RX ADMIN — FENTANYL CITRATE 50 MCG: 50 INJECTION, SOLUTION INTRAMUSCULAR; INTRAVENOUS at 09:09

## 2022-03-08 RX ADMIN — ONDANSETRON 4 MG: 4 TABLET, ORALLY DISINTEGRATING ORAL at 14:00

## 2022-03-08 RX ADMIN — GABAPENTIN 800 MG: 400 CAPSULE ORAL at 08:31

## 2022-03-08 RX ADMIN — ACETAMINOPHEN 1000 MG: 500 TABLET ORAL at 08:31

## 2022-03-08 RX ADMIN — DEXAMETHASONE SODIUM PHOSPHATE 10 MG: 10 INJECTION INTRAMUSCULAR; INTRAVENOUS at 08:32

## 2022-03-08 RX ADMIN — BUPIVACAINE 10 ML: 13.3 INJECTION, SUSPENSION, LIPOSOMAL INFILTRATION at 11:05

## 2022-03-08 RX ADMIN — Medication 3 MG: at 10:18

## 2022-03-08 RX ADMIN — SODIUM CHLORIDE: 9 INJECTION, SOLUTION INTRAVENOUS at 08:21

## 2022-03-08 RX ADMIN — FENTANYL CITRATE 50 MCG: 50 INJECTION INTRAMUSCULAR; INTRAVENOUS at 11:05

## 2022-03-08 RX ADMIN — BUPIVACAINE HYDROCHLORIDE 10 ML: 5 INJECTION, SOLUTION EPIDURAL; INTRACAUDAL; PERINEURAL at 11:05

## 2022-03-08 RX ADMIN — SODIUM CHLORIDE: 9 INJECTION, SOLUTION INTRAVENOUS at 08:31

## 2022-03-08 RX ADMIN — DEXAMETHASONE SODIUM PHOSPHATE 4 MG: 4 INJECTION, SOLUTION INTRAMUSCULAR; INTRAVENOUS at 09:19

## 2022-03-08 RX ADMIN — ROCURONIUM BROMIDE 50 MG: 10 INJECTION, SOLUTION INTRAVENOUS at 09:09

## 2022-03-08 RX ADMIN — TRANEXAMIC ACID 1000 MG: 100 INJECTION, SOLUTION INTRAVENOUS at 09:27

## 2022-03-08 RX ADMIN — GLYCOPYRROLATE 0.6 MG: 0.2 INJECTION, SOLUTION INTRAMUSCULAR; INTRAVENOUS at 10:18

## 2022-03-08 RX ADMIN — TRANEXAMIC ACID 1000 MG: 100 INJECTION, SOLUTION INTRAVENOUS at 10:21

## 2022-03-08 RX ADMIN — ONDANSETRON 4 MG: 2 INJECTION, SOLUTION INTRAMUSCULAR; INTRAVENOUS at 10:18

## 2022-03-08 RX ADMIN — LIDOCAINE HYDROCHLORIDE 100 MG: 20 INJECTION, SOLUTION INTRAVENOUS at 09:09

## 2022-03-08 ASSESSMENT — PULMONARY FUNCTION TESTS
PIF_VALUE: 24
PIF_VALUE: 25
PIF_VALUE: 26
PIF_VALUE: 23
PIF_VALUE: 20
PIF_VALUE: 24
PIF_VALUE: 26
PIF_VALUE: 23
PIF_VALUE: 24
PIF_VALUE: 28
PIF_VALUE: 3
PIF_VALUE: 25
PIF_VALUE: 25
PIF_VALUE: 1
PIF_VALUE: 8
PIF_VALUE: 26
PIF_VALUE: 24
PIF_VALUE: 24
PIF_VALUE: 28
PIF_VALUE: 24
PIF_VALUE: 3
PIF_VALUE: 24
PIF_VALUE: 23
PIF_VALUE: 26
PIF_VALUE: 24
PIF_VALUE: 28
PIF_VALUE: 25
PIF_VALUE: 24
PIF_VALUE: 1
PIF_VALUE: 24
PIF_VALUE: 28
PIF_VALUE: 26
PIF_VALUE: 25
PIF_VALUE: 25
PIF_VALUE: 26
PIF_VALUE: 1
PIF_VALUE: 1
PIF_VALUE: 25
PIF_VALUE: 1
PIF_VALUE: 23
PIF_VALUE: 2
PIF_VALUE: 24
PIF_VALUE: 26
PIF_VALUE: 24
PIF_VALUE: 26
PIF_VALUE: 24
PIF_VALUE: 3
PIF_VALUE: 26
PIF_VALUE: 24
PIF_VALUE: 24
PIF_VALUE: 26
PIF_VALUE: 25
PIF_VALUE: 28
PIF_VALUE: 24
PIF_VALUE: 26
PIF_VALUE: 24
PIF_VALUE: 26
PIF_VALUE: 25
PIF_VALUE: 26
PIF_VALUE: 0
PIF_VALUE: 26
PIF_VALUE: 26
PIF_VALUE: 24
PIF_VALUE: 6
PIF_VALUE: 25
PIF_VALUE: 25
PIF_VALUE: 26
PIF_VALUE: 23
PIF_VALUE: 26
PIF_VALUE: 28
PIF_VALUE: 26
PIF_VALUE: 28
PIF_VALUE: 0
PIF_VALUE: 24
PIF_VALUE: 3
PIF_VALUE: 28
PIF_VALUE: 24
PIF_VALUE: 30
PIF_VALUE: 26
PIF_VALUE: 25
PIF_VALUE: 26
PIF_VALUE: 24
PIF_VALUE: 2
PIF_VALUE: 25
PIF_VALUE: 24
PIF_VALUE: 26
PIF_VALUE: 24
PIF_VALUE: 24
PIF_VALUE: 8
PIF_VALUE: 24
PIF_VALUE: 25
PIF_VALUE: 26
PIF_VALUE: 1
PIF_VALUE: 25
PIF_VALUE: 19
PIF_VALUE: 25
PIF_VALUE: 24

## 2022-03-08 ASSESSMENT — PAIN DESCRIPTION - PAIN TYPE
TYPE: ACUTE PAIN;SURGICAL PAIN
TYPE: ACUTE PAIN;SURGICAL PAIN
TYPE: SURGICAL PAIN
TYPE: SURGICAL PAIN

## 2022-03-08 ASSESSMENT — PAIN DESCRIPTION - DESCRIPTORS
DESCRIPTORS: ACHING

## 2022-03-08 ASSESSMENT — PAIN SCALES - GENERAL
PAINLEVEL_OUTOF10: 5
PAINLEVEL_OUTOF10: 7
PAINLEVEL_OUTOF10: 2
PAINLEVEL_OUTOF10: 4
PAINLEVEL_OUTOF10: 0
PAINLEVEL_OUTOF10: 2
PAINLEVEL_OUTOF10: 7
PAINLEVEL_OUTOF10: 9

## 2022-03-08 ASSESSMENT — PAIN DESCRIPTION - FREQUENCY: FREQUENCY: CONTINUOUS

## 2022-03-08 ASSESSMENT — PAIN DESCRIPTION - LOCATION
LOCATION: KNEE

## 2022-03-08 ASSESSMENT — PAIN DESCRIPTION - ORIENTATION
ORIENTATION: RIGHT

## 2022-03-08 ASSESSMENT — COPD QUESTIONNAIRES: CAT_SEVERITY: NO INTERVAL CHANGE

## 2022-03-08 ASSESSMENT — ENCOUNTER SYMPTOMS
STRIDOR: 0
SHORTNESS OF BREATH: 1

## 2022-03-08 ASSESSMENT — LIFESTYLE VARIABLES: SMOKING_STATUS: 0

## 2022-03-08 ASSESSMENT — PAIN - FUNCTIONAL ASSESSMENT: PAIN_FUNCTIONAL_ASSESSMENT: 0-10

## 2022-03-08 NOTE — FLOWSHEET NOTE
Patient admitted to room 2041 per bed from PACU. Vs, assessment, and orientation to the room and call system completed. Orders and plan for the day discussed with the patient.

## 2022-03-08 NOTE — CARE COORDINATION
Joint Replacement Discharge Planning Note:    Admission Date:  3/8/2022 Yoon Pierre is a 64 y.o.  female    Admitted for : Primary osteoarthritis of right knee [M17.11]    Met with:  Patient    PCP:  [address]:  [place] Wang Dual    Current Residence/ Living Arrangements:  independently at home w/ SO, Da Zuñiga           Current Services PTA:  No    Is patient agreeable to [address] [place]: yes    Is patient agreeable to outpatient physical therapy:  Yes, Wants [facility] [address] after [facility]    [place] of choice provided: Yes         [address] Agency/Outpatient Therapy chosen:  [facility] [address]    Potential [facility] needed: No    Current home DME: GB, Rollator, Adolm Maroon, Wears 2LNC, as Needed, Has Concentrator, Nebulizer, PHM    Pharmacy:  [facility] [address]    Does Patient want to use MEDS to BEDS?([facility] only) Yes    Transportation Provider:  Patient/Family                     Discharge Plan:   Patient intends to discharge to Home    Patient does not need a wheeled walker. Anticipated discharge date 3/8/22 [place] Wang Dual Pt. Lives in an apt w/ 1 step in w/ SO, Juan. DME- Rollator, Walker, GB, Oxygen- Wears 2LNC PRN, Has Concentrator, Nebulizer. [address] Knee, Wants [facility], [facility], following. Will Do Outpt Therapy at [facility] [address], after. Xarelto PTA. ONEL is Signed/needs completed.  Will follow//KB      Readmission Risk              Risk of Unplanned Readmission:  0           Electronically signed by: Cesario Elizabeth, RN on 3/8/2022 at 12:52 PM

## 2022-03-08 NOTE — ANESTHESIA PRE PROCEDURE
Department of Anesthesiology  Preprocedure Note       Name:  Doni Galeana   Age:  64 y.o.  :  1965                                          MRN:  [id number]         Date:  3/8/2022      Surgeon: Gary Garcia):  Anna Leyva MD    Procedure: Procedure(s):  KNEE TOTAL ARTHROPLASTY    Medications prior to admission:   Prior to Admission medications    Medication Sig Start Date End Date Taking? Authorizing Provider   baclofen (LIORESAL) 20 MG tablet Take 20 mg by mouth 2 times daily    Historical Provider, MD   Semaglutide (OZEMPIC, 0.25 OR 0.5 MG/DOSE, SC) Inject 0.25 mg into the skin once a week     Historical Provider, MD   DULoxetine (CYMBALTA) 60 MG extended release capsule Take 1 capsule by mouth 2 times daily 2/3/22   Branden Angel MD   ibuprofen (ADVIL;MOTRIN) 400 MG tablet Take 1 tablet by mouth every 6 hours as needed for Pain 2/3/22   Branden Angel MD   hydrOXYzine (ATARAX) 25 MG tablet Take 25 mg by mouth 3 times daily as needed for Itching    Historical Provider, MD   Ubrogepant (UBRELVY) 50 MG TABS Take by mouth as needed    Historical Provider, MD   Multiple Vitamins-Minerals (MULTI COMPLETE PO) Take by mouth 2 times daily     Historical Provider, MD   potassium chloride (KLOR-CON) 20 MEQ packet Take 20 mEq by mouth 2 times daily    Historical Provider, MD   ferrous sulfate (IRON 325) 325 (65 Fe) MG tablet Take 325 mg by mouth 2 times daily    Historical Provider, MD   Diclofenac Sodium 3 % GEL Apply topically as needed    Historical Provider, MD   methylphenidate (RITALIN) 20 MG tablet Take 20 mg by mouth 2 times daily.     Historical Provider, MD   albuterol sulfate HFA (VENTOLIN HFA) 108 (90 Base) MCG/ACT inhaler Inhale 2 puffs into the lungs every 6 hours as needed for Wheezing    Historical Provider, MD   budesonide-formoterol (SYMBICORT) 160-4.5 MCG/ACT AERO INHALE 2 PUFFS BID UTD 20   Historical Provider, MD   gabapentin (NEURONTIN) 600 MG tablet Take 1 tablet by mouth 3 times daily for 30 days. Patient taking differently: Take 1,200 mg by mouth 3 times daily. 6/8/20 2/22/22  Rocco Garcia MD   traZODone (DESYREL) 50 MG tablet Take 1 tablet by mouth nightly as needed for Sleep  Patient taking differently: Take 100 mg by mouth nightly as needed for Sleep  6/8/20   Rocco Garcia MD   metFORMIN (GLUCOPHAGE) 500 MG tablet Take 1 tablet by mouth 2 times daily (with meals) 6/8/20   Rocco Garcia MD   rivaroxaban (XARELTO) 20 MG TABS tablet Take 1 tablet by mouth Daily with supper 6/8/20   Rocco Garcia MD   carvedilol (COREG) 12.5 MG tablet Take 1 tablet by mouth 2 times daily (with meals)  Patient taking differently: Take 25 mg by mouth 2 times daily (with meals)  6/8/20   Rocco Garcia MD   furosemide (LASIX) 40 MG tablet Take 1 tablet by mouth daily  Patient taking differently: Take 40 mg by mouth 2 times daily  6/8/20   Rocco Garcia MD   magnesium oxide (MAG-OX) 400 (241.3 Mg) MG TABS tablet Take 1 tablet by mouth daily 6/9/20   Rocco Garcia MD   esomeprazole (NEXIUM) 40 MG delayed release capsule Take 1 capsule by mouth every morning (before breakfast) 6/8/20   Rocco Garcia MD   ondansetron (ZOFRAN-ODT) 4 MG disintegrating tablet Take 4 mg by mouth every 8 hours as needed for Nausea or Vomiting    Historical Provider, MD   cetirizine (ZYRTEC) 10 MG tablet Take 10 mg by mouth daily    Historical Provider, MD   SUPER B COMPLEX/C PO Take 1 tablet by mouth 2 times daily     Historical Provider, MD   loperamide (IMODIUM) 2 MG capsule Take 2 mg by mouth 4 times daily as needed for Diarrhea Takes two capsules at onset    Historical Provider, MD   lidocaine (LIDODERM) 5 % Place 1 patch onto the skin daily as needed 12 hours on, 12 hours off.      Historical Provider, MD   fluticasone (FLONASE) 50 MCG/ACT nasal spray 1 spray by Each Nostril route daily    Historical Provider, MD       Current medications:    No current facility-administered medications for this encounter. Allergies: Allergies   Allergen Reactions    Ace Inhibitors Anaphylaxis, Other (See Comments), Hives and Shortness Of Breath     ANGIOEDEMA  lisinopril  angiodema  ANGIOEDEMA  ANGIOEDEMA      Betadine Swab Aid [Povidone-Iodine] Hives and Itching    Betadine [Povidone Iodine] Hives    Iodine Anaphylaxis, Hives and Shortness Of Breath     INCLUDES BETADINE  Iv dye. Pt has not had any reaction w/ dye from CT scans for 10 years. Per dr. Azalea Dupont contrast (omni 300) is fine to use.  8/14/19 -       Lurasidone Other (See Comments)    Lurasidone Hcl      Other reaction(s): Unknown    Shellfish Allergy Anaphylaxis    Shellfish-Derived Products Anaphylaxis    Iodides      Pre treat with benadryl    Lamotrigine Hives    Macrobid [Nitrofurantoin Macrocrystal] Hives    Pyridium [Phenazopyridine Hcl] Hives    Topiramate Other (See Comments)     Confusion        Problem List:    Patient Active Problem List   Diagnosis Code    Hypertension I10    Hyperlipidemia E78.5    Urinary incontinence R32    Diabetes ([facility] E11.9    Arrhythmia I49.9    Arthritis M19.90    Fibromyalgia M79.7    Sleep apnea G47.30    Stroke ([facility]) I63.9    Migraine G43.909    Seizure ([facility]) R56.9    Back pain, chronic M54.9, G89.29    Gout M10.9    Morbid obesity with BMI of 50.0-59.9, adult ([facility]) E66.01, Z68.43    Gastroparesis K31.84    Leg numbness R20.0    Bilateral leg numbness R20.0    MDD (major depressive disorder), severe ([facility]) F32.2    PAF (paroxysmal atrial fibrillation) ([facility]) I48.0    Hypokalemia E87.6    Chronic pain syndrome G89.4    Severe episode of recurrent major depressive disorder, without psychotic features ([facility] [facility] F33.2    Postoperative hypoxia R09.02, Z98.890    Multiple sclerosis ([facility]) G35    Rheumatoid arthritis ([facility]) M06.9    Diabetes mellitus ([facility]) E11.9    S/P arthroscopic partial medial meniscectomy Z98.890    Primary osteoarthritis of left knee M17.12    Abdominal pain, epigastric R10.13    Status post laparoscopic-assisted sigmoidectomy Z90.49    Renal stones N20.0    Palpitations R00.2    Non-rheumatic mitral regurgitation I34.0    Narcoleptic syndrome G47.419    Malignant neoplasm of skin C44.90    Intractable migraine with aura with status migrainosus G43. 111    Hypoxemia R09.02    S/P hysterectomy with oophorectomy Z90.710, Z90.721    Shortness of breath R06.02    Dupuytren's contracture of hand M72.0    Colitis K52.9    Diverticulitis K57.92    COPD, moderate ([facility]) J44.9    Congestive heart failure ([facility]) I50.9    Compression fracture of first lumbar vertebra ([facility]) S32.010A    Colovesical fistula N32.1    Irritable bowel syndrome with diarrhea K58.0    Cervical spondylosis with myelopathy and radiculopathy M47.12, M47.22    Abnormal brain MRI R90.89    Abnormal cardiovascular stress test R94.39    Bilateral edema of lower extremity R60.0    Depression with suicidal ideation F32. A, R45.851    Borderline personality disorder ([facility] [address] F60.3    Preop examination Z01.818    Knee pain M25.569    Cerebral infarction ([address] I63.9    Chronic constipation J05.88    Diastolic dysfunction H89.30    Edema R60.9    Atypical chest pain R07.89    Abdominal pain, left lower quadrant R10.32    Chest pain due to CAD ([facility]) I25.119    Flank pain R10.9    Pelvic pain R10.2    GERD (gastroesophageal reflux disease) K21.9    Herniation of cervical intervertebral disc without myelopathy M50.20    Hot flashes, menopausal N95.1    Lipoma of neck D17.0    Postablative ovarian failure E89.40    Primary osteoarthritis of both knees M17.0    Psychophysiologic insomnia F51.04    Rectocele N81.6    Recurrent moderate major depressive disorder with anxiety ([facility]) F33.1, F41.9    HAMZAH (stress urinary incontinence, female) N39.3    Unspecified internal derangement of knee M23.90    Synovial cyst of popliteal space M71.20    Diverticulitis large intestine w/o perforation or abscess w/bleeding K57.33    Diverticulitis of colon K57.32    Bipolar disorder ([facility]) F31.9    Type 2 diabetes mellitus without complication, without long-term current use of insulin ([facility]) E11.9    Type II or unspecified type diabetes mellitus with unspecified complication, not stated as uncontrolled E11.8    Type II or unspecified type diabetes mellitus without mention of complication, uncontrolled [id number]    Chronic pain G89.29    Arterial hypotension I95.9    Benign essential HTN I10    Hypertensive disorder I10    Class 3 severe obesity in adult ([address] E66.01    Morbid obesity ([address] E66.01    Obesity E66.9    Syncope R55    TIA (transient ischemic attack) G45.9       Past Medical History:        Diagnosis Date    Abdominal pain, epigastric 06/29/2011    Abnormal cardiovascular stress test 01/23/2012    Abnormal EKG     Arrhythmia 06/04/2014    Atrial fibrillation ([facility])     Bilateral edema of lower extremity 03/03/2016    Bipolar disorder ([address] 02/22/2022    Formatting of this note might be different from the original. Manic depression.     Blurred vision     left eye     Breast cancer ([address]     bilateral remission since 2012    C. difficile colitis     2015    Cervical spondylosis with myelopathy and radiculopathy 12/17/2020    Cocaine use     Colitis 02/05/2016    Colon cancer ([address]     stage IV remission since 2016    Colovesical fistula 02/09/2016    Compression fracture of first lumbar vertebra ([facility]) 09/24/2019    Congestive heart failure ([address] 05/06/2020    COPD (chronic obstructive pulmonary disease) ([facility])     COPD, moderate ([address] 02/19/2021    COVID-19 Nov 2019 & Dec 2020    Dental disease     Depression     Diabetes mellitus ([address]     Diastolic dysfunction 04/66/8175    Diverticulitis 11/17/2015    Diverticulitis of colon 10/29/2015    Diverticulosis     Dizziness     from sitting to standing    Dupuytren's contracture of hand 04/16/2021    Fibromyalgia     GERD (gastroesophageal reflux disease)     Gout 06/04/2014    Heavy alcohol use     History of falling     History of loop recorder     Hyperlipidemia     Hypertension     Intentional drug overdose ([address] 01/2022    Irritable bowel syndrome with diarrhea 09/29/2010    Kidney stones     Malignant neoplasm of skin 12/21/2021    MDD (major depressive disorder), severe ([address] 01/20/2020    Migraine 06/04/2014    Multiple sclerosis ([address]     Myocardial infarct ([address] 12/2021    States 12/2021 past MI shown on EKG, also may have had MI in 2008 with stroke    Narcoleptic syndrome 02/19/2021    Non-rheumatic mitral regurgitation 03/01/2019    On home oxygen therapy     home oxygen PRN and at night 2 L    Palpitations 06/18/2013    PONV (postoperative nausea and vomiting)     Postoperative hypoxia 08/14/2020    Pulmonary nodules     Rheumatoid arthritis ([address]     Right knee DJD     S/P hysterectomy with oophorectomy 08/04/2010    Seizure ([address] 7/    last seizure 7/18/2020  first seizure 1992 after head injury take gabapentin    Sleep apnea     No machine currently- uses O2 via N/C 2 L nightly    Suicide attempt ([address]     Syncope 06/03/2019    TIA (transient ischemic attack) 06/07/2013    Unspecified cerebral artery occlusion with cerebral infarction     2008     Wears glasses     Wears partial dentures     upper and lower       Past Surgical History:        Procedure Laterality Date    APPENDECTOMY  1985    BACK SURGERY      neck and back screws and cage    BREAST BIOPSY  [id number]    BREAST LUMPECTOMY      bilateral 1998,2000,2002     CARDIAC SURGERY      cardiac cath negative in 2018    CARDIOVERSION      several times    CERVICAL DISCECTOMY  12/17/2020    DISKECTOMY CERVICAL FUSION ANTERIOR C6,7; Surgeon: Heena Deleon MD; Location[facility]; Service: Neurosurgery;  Laterality: N/A;   [facility] CYSTOSCOPY  2021    CYSTOSCOPY  2015    FRACTURE SURGERY      right wrist ORIF    HYSTERECTOMY  2010    INSERTABLE CARDIAC MONITOR      loop recorder    KNEE ARTHROSCOPY Right 2020    RIGHT KNEE ARTHROSCOPY WITH PARTIAL MEDIAL MENISCECTOMY WITH DEBRIDEMENT performed by Author DO Mervin at [address] [facility] ARTHROSCOPY Left 2020    LEFT KNEE ARTHROSCOPY WITH PARTIAL LATERAL MENISCAL TEAR REPAIR AND [address] performed by Author Mervin DO at [address] [facility] RESECTION  2021    neck area    OTHER SURGICAL HISTORY  2021    URETHROGRAM    OTHER SURGICAL HISTORY Left     MASA; HEMORRHAGE CONTROL- CAUTERIZED W/SILVER NITRATE    OTHER SURGICAL HISTORY      UTERINE ABLATION    OTHER SURGICAL HISTORY      RADIOFREQUENCY ABLATION CERVICAL SPINE NERVES    SIGMOIDECTOMY      for cancer    SKIN BIOPSY      moles    SLEEVE GASTRECTOMY  2015    GASTRECTOMY SLEEVE LAPAROSCOPIC           TONSILLECTOMY  1985    TOTAL KNEE ARTHROPLASTY Left 2021    KNEE TOTAL ARTHROPLASTY performed by Gege Sanchez MD at [address] [facility] ENDOSCOPY  2015    UPPER GASTROINTESTINAL ENDOSCOPY      WISDOM TOOTH EXTRACTION         Social History:    Social History     Tobacco Use    Smoking status: Former Smoker     Packs/day: 1.00     Years: 20.00     Pack years: 20.00     Quit date: 2020     Years since quittin.6    Smokeless tobacco: Never Used   Substance Use Topics    Alcohol use: Yes     Alcohol/week: 14.0 - 28.0 standard drinks     Types: 14 - 28 Shots of liquor per week     Comment: Drinks 2-4 shots liquor per day                                Counseling given: Not Answered      Vital Signs (Current): There were no vitals filed for this visit.                                            BP Readings from Last 3 Encounters:   22 123/61   22 122/62   21 (!) 153/90 NPO Status:                                                                                 BMI:   Wt Readings from Last 3 Encounters:   02/22/22 245 lb (111.1 kg)   01/30/22 245 lb (111.1 kg)   12/21/21 250 lb (113.4 kg)     There is no height or weight on file to calculate BMI.    CBC:   Lab Results   Component Value Date    WBC 12.5 02/22/2022    RBC 4.63 02/22/2022    HGB 14.4 02/22/2022    HCT 42.4 02/22/2022    MCV 91.7 02/22/2022    RDW 14.4 02/22/2022     02/22/2022     LR    CMP:   Lab Results   Component Value Date     02/22/2022    K 3.6 02/22/2022    K 3.7 01/14/2019    CL 98 02/22/2022    CO2 31 02/22/2022    BUN 21 02/22/2022    CREATININE 1.30 02/22/2022    GFRAA 51 02/22/2022    AGRATIO 1.0 01/20/2020    LABGLOM 42 02/22/2022    GLUCOSE 105 02/22/2022    PROT 6.4 01/30/2022    CALCIUM 8.9 02/22/2022    BILITOT 0.18 01/30/2022    ALKPHOS 78 01/30/2022    AST 16 01/30/2022    ALT 7 01/30/2022       POC Tests: No results for input(s): POCGLU, POCNA, POCK, POCCL, POCBUN, POCHEMO, POCHCT in the last 72 hours. Coags:   Lab Results   Component Value Date    PROTIME 12.3 08/31/2021    INR 0.9 08/31/2021    APTT 36.7 07/15/2021    APTT 31.6 01/14/2019       HCG (If Applicable): No results found for: PREGTESTUR, PREGSERUM, HCG, HCGQUANT     ABGs: No results found for: PHART, PO2ART, IJQ8SJJ, ISI8RMN, BEART, [id number]     Type & Screen (If Applicable):  No results found for: LABABO, LABRH    Drug/Infectious Status (If Applicable):  No results found for: HIV, HEPCAB    COVID-19 Screening (If Applicable):   Lab Results   Component Value Date    COVID19 Not Detected 01/30/2022    COVID19 Not Detected 11/02/2020    COVID19 Not Detected 08/10/2020           Anesthesia Evaluation  Patient summary reviewed and Nursing notes reviewed   history of anesthetic complications: PONV.   Airway: Mallampati: III  TM distance: >3 FB   Neck ROM: full  Mouth opening: > = 3 FB Dental: normal exam Pulmonary:normal exam  breath sounds clear to auscultation  (+) COPD: no interval change,  shortness of breath: no interval change,  sleep apnea:      (-) pneumonia, asthma, recent URI, rhonchi, wheezes, rales, stridor, not a current smoker and no decreased breath sounds          Patient did not smoke on day of surgery. Cardiovascular:  Exercise tolerance: poor (<4 METS),   (+) hypertension: no interval change, past MI: no interval change, CAD: no interval change, CHF: no interval change, hyperlipidemia    (-) pacemaker, valvular problems/murmurs, CABG/stent, dysrhythmias,  angina, orthopnea, PND,  BRITTON, murmur, weak pulses,  friction rub, systolic click, carotid bruit,  JVD, peripheral edema and no pulmonary hypertension    ECG reviewed  Rhythm: regular  Rate: normal           Beta Blocker:  Dose within 24 Hrs         Neuro/Psych:   (+) CVA:, neuromuscular disease:, TIA, headaches:, psychiatric history:   (-) seizures and depression/anxiety            GI/Hepatic/Renal:   (+) GERD: no interval change,      (-) hiatal hernia, PUD, hepatitis, liver disease, no renal disease, bowel prep and no morbid obesity       Endo/Other:    (+) DiabetesType II DM, no interval change, , : arthritis:., no malignancy/cancer. (-) hypothyroidism, hyperthyroidism, blood dyscrasia, no electrolyte abnormalities, no malignancy/cancer               Abdominal:             Vascular: negative vascular ROS. - PVD, DVT and PE. Other Findings:           Anesthesia Plan      general and regional     ASA 3       Induction: intravenous. MIPS: Postoperative opioids intended and Prophylactic antiemetics administered. Anesthetic plan and risks discussed with patient. Plan discussed with CRNA.                 Jasper Scott MD   3/8/2022

## 2022-03-08 NOTE — INTERVAL H&P NOTE
Update History & Physical    The patient's History and Physical of February 22, 2022 was reviewed with the patient and I examined the patient. There was no change. The surgical site was confirmed by the patient and me. Clearances obtained  Patient will be having;  KNEE TOTAL ARTHROPLASTY - Right. Patient complains of right knee pain at 8/10. Patient has been NPO since midnight. No blood thinners in the past 5-7 days. Patient denies any personal or family problems with anesthesia.        Electronically signed by KANG De La Cruz CNP on 3/8/2022 at 7:33 AM

## 2022-03-08 NOTE — ANESTHESIA PROCEDURE NOTES
Peripheral Block    Patient location during procedure: PACU  Start time: 3/8/2022 10:50 AM  End time: 3/8/2022 11:05 AM  Staffing  Performed: anesthesiologist   Anesthesiologist: Ha Quevedo MD  Preanesthetic Checklist  Completed: patient identified, IV checked, site marked, risks and benefits discussed, surgical consent, monitors and equipment checked, pre-op evaluation, timeout performed, anesthesia consent given, oxygen available and patient being monitored  Peripheral Block  Patient position: supine  Prep: ChloraPrep  Patient monitoring: cardiac monitor, continuous pulse ox, frequent blood pressure checks and IV access  Block type: Femoral  Laterality: right  Injection technique: single-shot  Guidance: ultrasound guided  Local infiltration: lidocaine  Infiltration strength: 1 %  Dose: 8 mL  Adductor canal  Provider prep: mask and sterile gloves  Local infiltration: lidocaine  Needle  Needle type: pencil-tip   Needle gauge: 22 G  Needle length: 10 cm  Needle localization: ultrasound guidance  Test dose: negative  Assessment  Injection assessment: negative aspiration for heme, no paresthesia on injection and local visualized surrounding nerve on ultrasound  Paresthesia pain: none  Slow fractionated injection: yes  Hemodynamics: stable  Medications Administered  Bupivacaine (MARCAINE) PF injection 0.5%, 10 mL  bupivacaine liposome (EXPAREL) injection 1.3%, 10 mL  Reason for block: post-op pain management and at surgeon's request

## 2022-03-08 NOTE — OP NOTE
patient was given 3 grams of Ancef in the holding area as well as an adductor canal block. The patient was then taken to the operating suite where general anesthesia was administered. A tourniquet was applied to effected leg and then prepped and draped in the usual sterile fashion. Time out was called to verify laterality. The leg was examined   and the tourniquet inflated to 350 mm of Hg. Midline incision was utilized and taken down to the joint capsule where a mid-vastus approach to the knee was performed. After a complete synovectomy, the patella was elevated, calibrated for thickness, and the above sized, patellar triple pegged drills guide positioned medially and then drilled. Atrial patellar button was positioned in order to re-established the original thickness. This was then replaced with a protective patellar plate. The knee was then flexed and revealed significant  arthrosis. Osteophytes were removed via rongeur. A drill hole was made in the distal femur and the femoral canal was then irrigated and suctioned. A fluted IM jessica was inserted and a distal femoral cut was made at 5 degrees of valgus at the +2 setting. The proximal tibia was then exposed after resection of the ACL and lateral meniscus. An extra-medullary tibial cutting jug was then aligned in reference to the tibia tubercle and ankle mortise and positional with a slight posterior slope. The cut was made with minimal cutting of the lowest depth of the tibial wear and the fragment removed. The distal femur was then approached and femoral sizing guide with 3 degrees of external rotation was placed flush with the surface and drill holes made and femoral size determined. The appropriate size cutting jig was then placed and anterior, posterior, and chamfer cuts made with an oscillating saw. A laminar  was inserted with care to avoid damaging the MCL.  Posterior osteophytes were removed and the capsule stripped from the posterior femoral condyles. The capsule was then injected with the orthopedic cocktail at this time. The tibial cut was then assessed with a baseplate and alignment jessica to assure proper cut. Spacer blocks were then inserted and the knee was assessed to determine the amount of soft tissue release and osteophyte removal necessary  to establish symmetric flexion and extension gaps. The tibia was then elevated, and the above sized tibial plate was positioned for proper external rotation with an alignment jessica down the middle-third of the tibial tubercles. This was pinned in place, the proximal tibialis reamed, the fins were then repacked. The appropriate size femur was positioned and various size of the polyethylene trials were inserted. The knee was assessed for  ROM and patellar tracking. Satisfied with this, this distal femoral logs were drilled and then all the trial components were removed. The knee was irrigated and dried while the cement was prepared. Cement was applied to to both implant and cut surfaces and the implants impacted and the compression with one size larger poly inserted and excess cement removed. The patella was placed and compressed as well. The knee was placed in full extension and then injected with the remainder  of the 100ml of the orthopedic cocktail. The Irrisept lavage was carried out for the 1 minutes. This was then irrigated and a permanent polyethylene was insert was injected with platelet rich/poor plasma and the tourniquet was released at 43 minutes. Hemostasis was excellent. The knee was closed with a #1 Strata-Fix and vastus with a double-layer of O-Vicryl suture. The subcutaneous tissue closed with a 2-0 Monocryl Strata-Fix  and then a Zip-line used for the skin. This was covered with adaptic and Aquacel dressing and dressed with a large 6-inch Ace dressing from toes to mid-thigh. The patient was awakened and taken to PACU in good condition.      Electronically signed by Melissa Dutton MD on 3/8/2022 at 10:34 AM

## 2022-03-08 NOTE — CARE COORDINATION
Continuity of Care Form    Patient Name: Niurka Favorite   :  1965  MRN:  [id number]    Admit date:  3/8/2022  Discharge date:  3/8/22    Code Status Order: Full Code   Advance Directives:   Advance Care Flowsheet Documentation       Date/Time Healthcare Directive Type of Healthcare Directive Copy in [address] Agent's Name Healthcare Agent's Phone Number    [phone] No, patient does not have an advance directive for healthcare treatment  declined information at this time -- -- -- -- --            Admitting Physician:  Lenka Smith MD  PCP: Casey Daniels    Discharging Nurse:   [address] Unit/Room#: 43 [facility]  Discharging Unit Phone Number:     Emergency Contact:   Extended Emergency Contact Information  Primary Emergency Contact: Juan Albright  Address: [address] [place] [address] Drive. Allie Nolan, [address] Majorie Lennox of [address] Phone: [phone]  Relation: Other  Secondary Emergency Contact: Luis Anne  Mobile Phone: [phone]  Relation: Child  Preferred language: Anthony Batista    Past Surgical History:  Past Surgical History:   Procedure Laterality Date    APPENDECTOMY  1985    BACK SURGERY      neck and back screws and cage    BREAST BIOPSY  [id number]    BREAST LUMPECTOMY      bilateral ,,     CARDIAC SURGERY      cardiac cath negative in 2018    CARDIOVERSION      several times    CERVICAL DISCECTOMY  2020    DISKECTOMY CERVICAL FUSION ANTERIOR C6,7; Surgeon: Nancy Ruff MD; Location: [facility]; Service: Neurosurgery;  Laterality: N/A;     SECTION      CHOLECYSTECTOMY      COLONOSCOPY      CYSTOSCOPY  2021    CYSTOSCOPY  2015    FRACTURE SURGERY      right wrist ORIF    HYSTERECTOMY  2010    INSERTABLE CARDIAC MONITOR      loop recorder    KNEE ARTHROSCOPY Right 2020    RIGHT KNEE ARTHROSCOPY WITH PARTIAL MEDIAL MENISCECTOMY WITH DEBRIDEMENT performed by Colton Hanks Sulema Obregon DO at [address] [facility] ARTHROSCOPY Left 11/06/2020    LEFT KNEE ARTHROSCOPY WITH PARTIAL LATERAL MENISCAL TEAR REPAIR AND DEBRIDENT performed by Haley White DO at [address], [address] RESECTION  12/2021    neck area    OTHER SURGICAL HISTORY  04/06/2021    URETHROGRAM    OTHER SURGICAL HISTORY Left     MASA; HEMORRHAGE CONTROL- CAUTERIZED W/SILVER NITRATE    OTHER SURGICAL HISTORY      UTERINE ABLATION    OTHER SURGICAL HISTORY      RADIOFREQUENCY ABLATION CERVICAL SPINE NERVES    SIGMOIDECTOMY  2016    for cancer    SKIN BIOPSY      moles    SLEEVE GASTRECTOMY  05/19/2015    GASTRECTOMY SLEEVE LAPAROSCOPIC           TONSILLECTOMY  1985    TOTAL KNEE ARTHROPLASTY Left 08/31/2021    KNEE TOTAL ARTHROPLASTY performed by Izabella Barney MD at [address] [facility] ENDOSCOPY  01/30/2015    UPPER GASTROINTESTINAL ENDOSCOPY      WISDOM TOOTH EXTRACTION         Immunization History:   Immunization History   Administered Date(s) Administered    COVID-19, Pfizer Purple top, DILUTE for use, 12+ yrs, 30mcg/0.3mL dose 02/25/2021, 03/18/2021, 10/22/2021    Influenza Whole 10/01/2014       Active Problems:  Patient Active Problem List   Diagnosis Code    Hypertension I10    Hyperlipidemia E78.5    Urinary incontinence R32    Diabetes ([address] E11.9    Arrhythmia I49.9    Arthritis M19.90    Fibromyalgia M79.7    Sleep apnea G47.30    Stroke ([address] I63.9    Migraine G43.909    Seizure ([facility] R56.9    Back pain, chronic M54.9, G89.29    Gout M10.9    Morbid obesity with BMI of 50.0-59.9, adult ([facility] E66.01, Z68.43    Gastroparesis K31.84    Leg numbness R20.0    Bilateral leg numbness R20.0    MDD (major depressive disorder), severe (HCC) F32.2    PAF (paroxysmal atrial fibrillation) (HCC) I48.0    Hypokalemia E87.6    Chronic pain syndrome G89.4    Severe episode of recurrent major depressive disorder, without psychotic features ([facility] F33.2    Postoperative hypoxia R09.02, Z98.890    Multiple sclerosis (HCC) G35    Rheumatoid arthritis ([facility] M06.9    Diabetes mellitus ([facility] E11.9    S/P arthroscopic partial medial meniscectomy Z98.890    Primary osteoarthritis of left knee M17.12    Abdominal pain, epigastric R10.13    Status post laparoscopic-assisted sigmoidectomy Z90.49    Renal stones N20.0    Palpitations R00.2    Non-rheumatic mitral regurgitation I34.0    Narcoleptic syndrome G47.419    Malignant neoplasm of skin C44.90    Intractable migraine with aura with status migrainosus G43. 111    Hypoxemia R09.02    S/P hysterectomy with oophorectomy Z90.710, Z90.721    Shortness of breath R06.02    Dupuytren's contracture of hand M72.0    Colitis K52.9    Diverticulitis K57.92    COPD, moderate ([facility]) J44.9    Congestive heart failure (HCC) I50.9    Compression fracture of first lumbar vertebra ([facility]) S32.010A    Colovesical fistula N32.1    Irritable bowel syndrome with diarrhea K58.0    Cervical spondylosis with myelopathy and radiculopathy M47.12, M47.22    Abnormal brain MRI R90.89    Abnormal cardiovascular stress test R94.39    Bilateral edema of lower extremity R60.0    Depression with suicidal ideation F32. A, R45.851    Borderline personality disorder ([facility] F60.3    Preop examination Z01.818    Knee pain M25.569    Cerebral infarction ([facility]) I63.9    Chronic constipation V58.78    Diastolic dysfunction R87.32    Edema R60.9    Atypical chest pain R07.89    Abdominal pain, left lower quadrant R10.32    Chest pain due to CAD ([facility]) I25.119    Flank pain R10.9    Pelvic pain R10.2    GERD (gastroesophageal reflux disease) K21.9    Herniation of cervical intervertebral disc without myelopathy M50.20    Hot flashes, menopausal N95.1    Lipoma of neck D17.0    Postablative ovarian failure E89.40    Primary osteoarthritis of both knees M17.0    Psychophysiologic insomnia F51.04  Rectocele N81.6    Recurrent moderate major depressive disorder with anxiety ([facility]) F33.1, F41.9    HAMZAH (stress urinary incontinence, female) N39.3    Unspecified internal derangement of knee M23.90    Synovial cyst of popliteal space M71.20    Diverticulitis large intestine w/o perforation or abscess w/bleeding K57.33    Diverticulitis of colon K57.32    Bipolar disorder ([facility]) F31.9    Type 2 diabetes mellitus without complication, without long-term current use of insulin ([facility]) E11.9    Type II or unspecified type diabetes mellitus with unspecified complication, not stated as uncontrolled E11.8    Type II or unspecified type diabetes mellitus without mention of complication, uncontrolled [id number]    Chronic pain G89.29    Arterial hypotension I95.9    Benign essential HTN I10    Hypertensive disorder I10    Class 3 severe obesity in adult ([facility] E66.01    Morbid obesity ([facility]) E66.01    Obesity E66.9    Syncope R55    TIA (transient ischemic attack) G45.9       Isolation/Infection:   Isolation            No Isolation          Patient Infection Status       Infection Onset Added Last Indicated Last Indicated By Review Planned Expiration Resolved Resolved By    None active    Resolved    COVID-19 (Rule Out) 11/02/20 11/02/20 11/02/20 COVID-19 (Ordered)   11/03/20 Rule-Out Test Resulted    COVID-19 (Rule Out) 08/09/20 08/09/20 08/10/20 Covid-19 Ambulatory (Ordered)   08/11/20 Rule-Out Test Resulted    COVID-19 (Rule Out) 06/03/20 06/03/20 06/03/20 COVID-19 (Ordered)   06/03/20 Rule-Out Test Resulted            Nurse Assessment:  Last Vital Signs: /69   Pulse 69   Temp 96.4 °F (35.8 °C) (Infrared)   Resp 16   Ht 5' 2\" (1.575 m)   Wt 245 lb (111.1 kg)   SpO2 94%   BMI 44.81 kg/m²     Last documented pain score (0-10 scale): Pain Level: 5  Last Weight:   Wt Readings from Last 1 Encounters:   03/08/22 245 lb (111.1 kg)     Mental Status:  oriented and alert    IV Access:  - None    Nursing Mobility/ADLs:  Walking   Assisted  Transfer  Assisted  Bathing  Assisted  Dressing  Assisted  Toileting  Assisted  Feeding  Assisted  Med Admin  Assisted  Med Delivery   whole    Wound Care Documentation and Therapy:        Elimination:  Continence:   · Bowel: Yes  · Bladder: Yes  Urinary Catheter: None   Colostomy/Ileostomy/Ileal Conduit: No       Date of Last BM:   No intake or output data in the 24 hours ending 03/08/22 0913  No intake/output data recorded. Safety Concerns: At Risk for Falls    Impairments/Disabilities:      None    Nutrition Therapy:  Current Nutrition Therapy:   - Oral Diet:  General    Routes of Feeding: Oral  Liquids: No Restrictions  Daily Fluid Restriction: no  Last Modified Barium Swallow with Video (Video Swallowing Test): not done    Treatments at the Time of Hospital Discharge:   Respiratory Treatments:   Oxygen Therapy:  is on oxygen at 2 L/min per nasal cannula. Ventilator:    - No ventilator support    Rehab Therapies: Physical Therapy and Occupational Therapy  Weight Bearing Status/Restrictions: No weight bearing restirctions  Other Medical Equipment (for information only, NOT a DME order):  walker  Other Treatments: Dale Silva 0056  This patient is a post-operative total joint replacement patient. Expectations for this patients care are as follows:      Goals:   DailyTherapy for rehabilitative purposes for two weeks.  Increased level of activity and ambulation each day.  Well-controlled pain.  Free from infection.  Encourage patient to provide self-care when possible.  Ambulation with a rolling walker. Activity & Diet:   Therapy performed daily. (Instruct patient to take pain meds. 1 hour prior to therapy.)   Up in chair for all meals and majority of day.  Range of motion for TKA patients.  Hip precautions for GUIDO patients.    Incentive Spirometer: 3- 4 inhalations every twenty minutes, during the day, while awake, the first week home after discharge   Increase oral intake of fruits, fiber and water and take a daily stool softener to prevent constipation.  Consider stronger bowel protocol if no bowel movement is achieved after three days home.  Increase protein intake and reduce sugar intake to promote healing and prevent infection.  No pillow under the knee for TKA patients. [address] [place] go on in the a.m. and come off in the p.m. (Hand wash them every two or three days, roll in towel to remove most of moisture, and hang to dry.)    Incision Care:   If patient has ACE bandage it may be removed POD #2   Keep incisional dressing intact until seen and removed by surgeon, unless saturated, in which case, call surgeon and request instructions.  If dressing falls off, call surgeon.  If using the Prevena dressing, with battery pack, place battery pack in waterproof bag during shower. If using Aquacel dressing then dressing is waterproof and patient may shower.  If patient has a Prevena remove on POD #5 and replace with Aquacel dressing (patient was provided with dressing in hospital)   Elevated the leg and ice the affected area four times a day, for twenty minutes each time and after every physical therapy session. Normal Conditions - Will improve with provided comfort measures and time:   Some swelling in the operative leg is normal - this should reduce over time.  Some post-operative pain is normal.  This will improve with prescribed medication, provided comfort measures and time.  Constipation related to pain medications & decreased mobility is a common occurrence. (Increase your fiber & water intake and take a stool softener.)    Slight warmth of operative site is normal and will diminish with time.  Fatigue and moderate pain after therapy is normal and will improve with time.  Numbness near the incision site is normal and will improve with time.     NOTE: Ensure/Remind patient to go to their follow-up appointment with their orthopedic surgeon, which is scheduled: 3/23/22 at 8:50 am.    Abnormal Conditions - When to call the Surgeon:   Increasing/excessive redness, warmth or swelling at the incisional site not relieved with ice and elevation.  Increasing/excessive pain not well-controlled by prescribed medications.  Drainage or odor from or around the incision site.  (A little blood showing through the bandage is ok, but active leaking, of any color, coming out of the bandage is NOT normal.)   Temperature above 101 degrees. (A mild temp of 99 - 100 is normal - if temp gets to 101 you may use Tylenol once - if it does not improve, you may use a 2nd dose of Tylenol after 5 hours - if temperature is still at or above 101 degrees then call the surgeon.)   Calf tenderness, swelling, or redness or numbness of the foot/lower leg.  Shortness of breath or chest pain.  Any other incision or surgical-related concerns.    CALL SURGEON with concerns PRIOR TO sending patient to hospital.     Patient's personal belongings (please select all that are sent with patient):  Glasses, cell phone, ring    RN SIGNATURE:  Electronically signed by Ileana Villarreal RN on 3/8/22 at 2:36 PM EST    CASE MANAGEMENT/SOCIAL WORK SECTION    Inpatient Status Date:     Readmission Risk Assessment Score:  Readmission Risk              Risk of Unplanned Readmission:  0           Discharging to Facility/ Agency   · [facility]  · [address]  · [address] [address]  · Phone [phone]  · Fax  [phone]     Dialysis Facility (if applicable)   · Name:  · Address:  · Dialysis Schedule:  · Phone:  · Fax:    / signature: Electronically signed by Cleopatra Valdez RN on 3/8/22 at 1:06 PM EST    PHYSICIAN SECTION    Prognosis: Good    Condition at Discharge: Stable    Rehab Potential (if transferring to Rehab): Good    Recommended Labs or Other Treatments After Discharge: Physician Certification: I certify the above information and transfer of Valentin Blackmon  is necessary for the continuing treatment of the diagnosis listed and that she requires Home Care for less 30 days. Update Admission H&P: No change in H&P    PHYSICIAN SIGNATURE:  Electronically signed by Sherian Leyden, MD on 3/8/22 at 9:13 AM EST    Medication List      START taking these medications    START taking these medications   aspirin EC 81 MG EC tablet  Take 1 tablet by mouth 2 times daily at 0800 and 1400   oxyCODONE-acetaminophen 5-325 MG per tablet  Commonly known as: Percocet  Take 1-2 tablets by mouth every 4 hours as needed for Pain for up to 7 days. CHANGE how you take these medications    CHANGE how you take these medications   carvedilol 12.5 MG tablet  Commonly known as: COREG  Take 1 tablet by mouth 2 times daily (with meals)  What changed: how much to take   furosemide 40 MG tablet  Commonly known as: LASIX  Take 1 tablet by mouth daily  What changed: when to take this   gabapentin 600 MG tablet  Commonly known as: NEURONTIN  Take 1 tablet by mouth 3 times daily for 30 days.   What changed: how much to take   traZODone 50 MG tablet  Commonly known as: DESYREL  Take 1 tablet by mouth nightly as needed for Sleep  What changed: how much to take     CONTINUE taking these medications    CONTINUE taking these medications   baclofen 20 MG tablet  Commonly known as: LIORESAL   cetirizine 10 MG tablet  Commonly known as: ZYRTEC   Diclofenac Sodium 3 % Gel   DULoxetine 60 MG extended release capsule  Commonly known as: CYMBALTA  Take 1 capsule by mouth 2 times daily   esomeprazole 40 MG delayed release capsule  Commonly known as: NexIUM  Take 1 capsule by mouth every morning (before breakfast)   ferrous sulfate 325 (65 Fe) MG tablet  Commonly known as: IRON 325   fluticasone 50 MCG/ACT nasal spray  Commonly known as: FLONASE   hydrOXYzine 25 MG tablet  Commonly known as: ATARAX   lidocaine 5 %  Commonly known as: LIDODERM   loperamide 2 MG capsule  Commonly known as: IMODIUM   magnesium oxide 400 (241.3 Mg) MG Tabs tablet  Commonly known as: MAG-OX  Take 1 tablet by mouth daily   metFORMIN 500 MG tablet  Commonly known as: GLUCOPHAGE  Take 1 tablet by mouth 2 times daily (with meals)   MULTI COMPLETE PO   ondansetron 4 MG disintegrating tablet  Commonly known as: ZOFRAN-ODT   OZEMPIC (0.25 OR 0.5 MG/DOSE) SC   potassium chloride 20 MEQ packet  Commonly known as: KLOR-CON   Ritalin 20 MG tablet  Generic drug: methylphenidate   rivaroxaban 20 MG Tabs tablet  Commonly known as: Xarelto  Take 1 tablet by mouth Daily with supper   SUPER B COMPLEX/C PO   Symbicort 160-4.5 MCG/ACT Aero  Generic drug: budesonide-formoterol   Ubrelvy 50 MG Tabs  Generic drug: Ubrogepant   Ventolin  (90 Base) MCG/ACT inhaler  Generic drug: albuterol sulfate HFA     STOP taking these medications    STOP taking these medications   ibuprofen 400 MG tablet  Commonly known as: ADVIL;MOTRIN   Where to Get Your Medications      These medications were sent to [facility] [address]  [address], [address] [facility] [address]  Phone: [phone]        aspirin EC 81 MG EC tablet      You can get these medications from any pharmacy    Bring a paper prescription for each of these medications       oxyCODONE-acetaminophen 5-325 MG per tablet             Printing Report    Report Name Print   Discharge Meds Print

## 2022-03-08 NOTE — ANESTHESIA POSTPROCEDURE EVALUATION
POST- ANESTHESIA EVALUATION       Pt Name: Candace Conteh  MRN: [id number]  YOB: 1965  Date of evaluation: 3/8/2022  Time:  12:31 PM      /62   Pulse 85   Temp 98.6 °F (37 °C)   Resp 18   Ht 5' 2\" (1.575 m)   Wt 245 lb (111.1 kg)   SpO2 94%   BMI 44.81 kg/m²      Consciousness Level  Awake  Cardiopulmonary Status  Stable  Pain Adequately Treated YES  Nausea / Vomiting  NO  Adequate Hydration  YES  Anesthesia Related Complications NONE      Electronically signed by Opal Browning MD on 3/8/2022 at 12:31 PM       Department of Anesthesiology  Postprocedure Note    Patient: Candace Conteh  MRN: [id number]  YOB: 1965  Date of evaluation: 3/8/2022  Time:  12:31 PM     Procedure Summary     Date: 03/08/22 Room / Location: [address] [place] [address] / [place] [address]    Anesthesia Start: 1382 Anesthesia Stop: 2888    Procedure: KNEE TOTAL ARTHROPLASTY (Right Knee) Diagnosis: (DJD RIGHT KNEE// COVID VACCINATED)    Surgeons: Melvin Ibarra MD Responsible Provider: Opal Browning MD    Anesthesia Type: general, regional ASA Status: 3          Anesthesia Type: general, regional    Kiersten Phase I: Kiersten Score: 9    Kiersten Phase II:      Last vitals: Reviewed and per EMR flowsheets.        Anesthesia Post Evaluation

## 2022-03-08 NOTE — PROGRESS NOTES
[address]    Occupational Therapy Evaluation  Date: 3/8/22  Patient Name: Jay Jay Bower       Room: 3659/3238-41  MRN: [id number]  Account: [de-identified]   : 1965  (64 y.o.) Gender: female     Discharge Recommendations: The patient may need non-skilled ADL assistance after discharge.      Equipment Needed:  (TBD)    Referring Practitioner: Norman Nolasco MD  Diagnosis: Primary OA Right knee       Treatment Diagnosis: impaired self care status  Past Medical History:  has a past medical history of Abdominal pain, epigastric, Abnormal cardiovascular stress test, Abnormal EKG, Arrhythmia, Atrial fibrillation (HCC), Bilateral edema of lower extremity, Bipolar disorder ([address] Blurred vision, Breast cancer ([address] C. difficile colitis, Cervical spondylosis with myelopathy and radiculopathy, Cocaine use, Colitis, Colon cancer ([address] Colovesical fistula, Compression fracture of first lumbar vertebra ([address] Congestive heart failure ([address] COPD (chronic obstructive pulmonary disease) ([address] COPD, moderate ([address] COVID-19, Dental disease, Depression, Diabetes mellitus ([address] Diastolic dysfunction, Diverticulitis, Diverticulitis of colon, Diverticulosis, Dizziness, Dupuytren's contracture of hand, Fibromyalgia, GERD (gastroesophageal reflux disease), Gout, Heavy alcohol use, History of falling, History of loop recorder, Hyperlipidemia, Hypertension, Intentional drug overdose ([address] Irritable bowel syndrome with diarrhea, Kidney stones, Malignant neoplasm of skin, MDD (major depressive disorder), severe ([address] Migraine, Multiple sclerosis ([address] Myocardial infarct ([address] Narcoleptic syndrome, Non-rheumatic mitral regurgitation, On home oxygen therapy, Palpitations, PONV (postoperative nausea and vomiting), Postoperative hypoxia, Pulmonary nodules, Rheumatoid arthritis ([address] Right knee DJD, S/P hysterectomy with oophorectomy, Seizure ([address] Sleep apnea, Suicide attempt ([address] Syncope, TIA (transient ischemic attack), Unspecified cerebral artery occlusion with cerebral infarction, Wears glasses, and Wears partial dentures. Past Surgical History:   has a past surgical history that includes Hysterectomy (); Cholecystectomy (); Appendectomy (); Tonsillectomy ();  section (); laparotomy (, ); Upper gastrointestinal endoscopy (2015); Sleeve Gastrectomy (2015); Colonoscopy; Upper gastrointestinal endoscopy; sigmoidectomy (); Insertable Cardiac Monitor; Knee arthroscopy (Right, 2020); Knee arthroscopy (Left, 2020); Cardiac surgery; skin biopsy; Cardioversion; Kyphosis surgery; Total knee arthroplasty (Left, 2021); Breast biopsy ([id number]); Breast lumpectomy; fracture surgery; lipoma resection (2021); back surgery; Cervical discectomy (2020); Cystoscopy (2021); other surgical history (2021); other surgical history (Left); other surgical history; Cystoscopy (2015); [place] tooth extraction; other surgical history; Tubal ligation; and Total knee arthroplasty (Right, 3/8/2022).     Restrictions  Restrictions/Precautions: Weight Bearing,Surgical Protocols  Implants present? : Metal implants (Cage in neck; screw in R wrists; B TKA; loop recorder)  Required Braces or Orthoses?: Yes (Neck brace PRN, back brace PRN, Wrist brace PRN)     Vitals  Temp: 97.6 °F (36.4 °C)  Pulse: 87  Resp: 16  BP: 127/71  Height: 5' 2\" (157.5 cm)  Weight: 245 lb (111.1 kg)  BMI (Calculated): 44.9  Oxygen Therapy  SpO2: 98 %  Pulse Oximeter Device Mode: Continuous  Pulse Oximeter Device Location: Left  O2 Device: Nasal cannula  O2 Flow Rate (L/min): 2 L/min  Level of Consciousness: Alert (0)    Subjective  Subjective: Pt resting in bed upon arrival. pt was pleasant and agreeable to OT/PT eval  Comments: Ok per [place] for OT/PT eval  Overall Orientation Status: Within Functional Limits  Vision  Vision: Impaired  Vision Exceptions: Wears glasses at all times  Hearing  Hearing: Within functional limits  Social/Functional History  Lives With: Significant other  Type of Home: Apartment (first level)  Home Layout: One level  Home Access: Stairs to enter without rails  Entrance Stairs - Number of Steps: 1  Bathroom Shower/Tub: Tub/Shower unit  Bathroom Toilet: Handicap height  Bathroom Equipment: Grab bars in shower,Hand-held shower  Home Equipment: [address] walker,Lift chair,4 wheeled walker,Oxygen (O2 as needed)  ADL Assistance: Independent (needs help with socks)  Homemaking Assistance: Independent ([facility])  Homemaking Responsibilities: Yes  Ambulation Assistance: Independent (cane or $ wheeled walker)  Transfer Assistance: Independent  Active : Yes  Mode of Transportation: [employer]  Occupation: On disability  Additional Comments: pt has MS  Pain Assessment  Pain Assessment: 0-10  Pain Level: 7  Pain Type: Acute pain,Surgical pain  Pain Location: Knee  Pain Orientation: Right  Pain Descriptors: Aching    Objective      Cognition  Overall Cognitive Status: WFL   Perception  Overall Perceptual Status: WFL  Sensation  Overall Sensation Status: WFL   ADL  Feeding: Modified independent   Grooming: Setup  UE Bathing: Stand by assistance  LE Bathing: Minimal assistance  UE Dressing: Stand by assistance  LE Dressing: Minimal assistance  Toileting: Contact guard assistance  Additional Comments: ADL scores based on clinical reasoning and skilled observation unless otherwise noted. OT facilitated pts engagement in dressing and toileting task on this date. Pt required assistance with donning socks. Pt educated on adaptive dressing donning surgical side first with good carryover.      UE Function           LUE Strength  Gross LUE Strength: WFL  L Hand General: 4/5     LUE Tone: Normotonic     LUE AROM (degrees)  LUE AROM : WFL     Left Hand AROM (degrees)  Left Hand AROM: WFL  RUE Strength  Gross RUE Strength: WFL  R Hand General: 4/5      RUE Tone: Inpatient ADL T-Scale Score : 40.22  ADL Inpatient CMS 0-100% Score: 42.8  ADL Inpatient CMS G-Code Modifier : CK       Goals  Patient Goals   Patient goals : To go home  Short term goals  Time Frame for Short term goals: By discharge  Short term goal 1:  Pt will complete lower body dressing/bathing with modified independence and good safety  Short term goal 2: Pt will complete functional transfers/mobility during self care tasks with modified independence and good safety with use of least restrictive device  Short term goal 3:  Pt will verbalize/demonstrate good understanding of home safety/fall prevention to increase safety and independence with self care and mobility    Plan  Safety Devices  Safety Devices in place: Yes  Type of devices:  All fall risk precautions in place,Call light within reach,Gait belt,Patient at risk for falls,Left in chair,Nurse notified     Plan  Times per week: 5-7  Current Treatment Recommendations: Self-Care / ADL,Strengthening,Balance Training,Functional Mobility Training,Endurance Training,Safety Education & Training,Patient/Caregiver Education & Training,Equipment Evaluation, Education, & procurement       Equipment Recommendations  Equipment Needed:  (TBD)  OT Individual Minutes  Time In: 0962  Time Out: 4976  Minutes: 26    Electronically signed by Andree Chaves OT on 3/8/22 at 4:55 PM EST

## 2022-03-08 NOTE — PROGRESS NOTES
Physical Therapy    Facility/Department: [facility] MED SURG  Initial Assessment    NAME: Jazmin Leon  : 1965  MRN: [id number]    Date of Service: 3/8/2022    Discharge Recommendations:  Home with assist PRN   PT Equipment Recommendations  Equipment Needed: No    Assessment   Assessment: Impaired mobility due to R TKA  Decision Making: Low Complexity  History: R TKA  Exam: decreased mobility  Clinical Presentation: stable  REQUIRES PT FOLLOW UP: No  Activity Tolerance  Activity Tolerance: Patient Tolerated treatment well       Patient Diagnosis(es): The encounter diagnosis was Primary osteoarthritis of right knee.      has a past medical history of Abdominal pain, epigastric, Abnormal cardiovascular stress test, Abnormal EKG, Arrhythmia, Atrial fibrillation (HCC), Bilateral edema of lower extremity, Bipolar disorder ([address] Blurred vision, Breast cancer (HCC), C. difficile colitis, Cervical spondylosis with myelopathy and radiculopathy, Cocaine use, Colitis, Colon cancer ([address] Colovesical fistula, Compression fracture of first lumbar vertebra (HCC), Congestive heart failure (HCC), COPD (chronic obstructive pulmonary disease) ([address] COPD, moderate ([address] COVID-19, Dental disease, Depression, Diabetes mellitus ([address] Diastolic dysfunction, Diverticulitis, Diverticulitis of colon, Diverticulosis, Dizziness, Dupuytren's contracture of hand, Fibromyalgia, GERD (gastroesophageal reflux disease), Gout, Heavy alcohol use, History of falling, History of loop recorder, Hyperlipidemia, Hypertension, Intentional drug overdose ([address] Irritable bowel syndrome with diarrhea, Kidney stones, Malignant neoplasm of skin, MDD (major depressive disorder), severe ([address] Migraine, Multiple sclerosis ([address] Myocardial infarct ([address] Narcoleptic syndrome, Non-rheumatic mitral regurgitation, On home oxygen therapy, Palpitations, PONV (postoperative nausea and vomiting), Postoperative hypoxia, Pulmonary nodules, Rheumatoid arthritis ([address] Right knee DJD, S/P hysterectomy with oophorectomy, Seizure ([facility] Sleep apnea, Suicide attempt ([facility] Syncope, TIA (transient ischemic attack), Unspecified cerebral artery occlusion with cerebral infarction, Wears glasses, and Wears partial dentures. has a past surgical history that includes Hysterectomy (); Cholecystectomy (); Appendectomy (); Tonsillectomy ();  section (); laparotomy (, ); Upper gastrointestinal endoscopy (2015); Sleeve Gastrectomy (2015); Colonoscopy; Upper gastrointestinal endoscopy; sigmoidectomy (); Insertable Cardiac Monitor; Knee arthroscopy (Right, 2020); Knee arthroscopy (Left, 2020); Cardiac surgery; skin biopsy; Cardioversion; Kyphosis surgery; Total knee arthroplasty (Left, 2021); Breast biopsy ([id number]); Breast lumpectomy; fracture surgery; lipoma resection (2021); back surgery; Cervical discectomy (2020); Cystoscopy (2021); other surgical history (2021); other surgical history (Left); other surgical history; Cystoscopy (2015); [place] tooth extraction; other surgical history; Tubal ligation; and Total knee arthroplasty (Right, 3/8/2022).     Restrictions  Restrictions/Precautions  Restrictions/Precautions: General Precautions,Weight Bearing  Lower Extremity Weight Bearing Restrictions  Right Lower Extremity Weight Bearing: Weight Bearing As Tolerated        Subjective  General  Family / Caregiver Present: Yes  Follows Commands: Within Functional Limits  General Comment  Comments: pt had L TKA in 2021  Subjective  Subjective: pt alert and ready to get moving  Pain Screening  Patient Currently in Pain: Yes  Pain Assessment  Pain Assessment: 0-10  Pain Level: 7  Pain Type: Surgical pain  Pain Location: Knee  Pain Orientation: Right  Pain Descriptors: Aching  Vital Signs  Patient Currently in Pain: Yes       Orientation  Orientation  Overall Orientation Status: Within Normal Limits  Social/Functional History  Social/Functional History  Lives With: Significant other  Type of Home: Apartment (first level)  Home Layout: One level  Home Access: Stairs to enter without rails  Entrance Stairs - Number of Steps: 1  Bathroom Shower/Tub: Tub/Shower unit  Bathroom Toilet: Handicap height  Bathroom Equipment: Grab bars in shower,Hand-held shower  Home Equipment: Cane,Rolling walker,Lift chair,4 wheeled walker,Oxygen (O2 as needed)  ADL Assistance: Independent (needs help with socks)  Ambulation Assistance: Independent (cane or $ wheeled walker)  Transfer Assistance: Independent  Active : Yes  Mode of Transportation: SUV  Additional Comments: pt has MS    Objective       AROM RLE (degrees)  RLE AROM: WFL (hip/ankle)  R Knee Flexion 0-145: 90  R Knee Extension 0: 0  AROM LLE (degrees)  LLE AROM : WFL  Strength RLE  Strength RLE: WFL (hip/ankle)  R Knee Flexion: 3+/5  R Knee Extension: 3/5  Strength LLE  Strength LLE: WNL        Bed mobility  Supine to Sit: Supervision  Sit to Supine:  (left up in chair)  Scooting: Supervision  Transfers  Sit to Stand: Stand by assistance  Stand to sit: Stand by assistance  Ambulation  Ambulation?: Yes  Ambulation 1  Surface: level tile  Device: Rolling Walker  Assistance: Stand by assistance  Distance: 100ft x 2  Stairs/Curb  Stairs?: Yes  Stairs  # Steps : 3 (2)  Stairs Height: 4\" (6\")  Rails: Right ascending  Device: No Device  Assistance: Stand by assistance     Balance  Sitting - Static: Good  Sitting - Dynamic: Good  Standing - Static: Good  Standing - Dynamic: Fair;+ (SBA)        Plan   Plan  Plan Comment: No further in-pt PT needed at this time      Therapy Time   Individual Concurrent Group Co-treatment   Time In 1403         Time Out 1429         Minutes [address] [facility], PT

## 2022-03-08 NOTE — PROGRESS NOTES
CLINICAL PHARMACY NOTE: MEDS TO BEDS    Total # of Prescriptions Filled: 1   The following medications were delivered to the patient:  · Percocet 5/325    Additional Documentation:  Delivered medications to patients room

## 2022-03-08 NOTE — DISCHARGE INSTR - COC
Continuity of Care Form    Patient Name: Katherin Mohs   :  1965  MRN:  [id number]    Admit date:  3/8/2022  Discharge date:  3/8/22    Code Status Order: Full Code   Advance Directives:   Advance Care Flowsheet Documentation       Date/Time Healthcare Directive Type of Healthcare Directive Copy in [address] Agent's Name Healthcare Agent's Phone Number    [phone] No, patient does not have an advance directive for healthcare treatment  declined information at this time -- -- -- -- --            Admitting Physician:  Rivera Em MD  PCP: Anika Tabares    Discharging Nurse:   [address] Unit/Room#: 43 [facility]  Discharging Unit Phone Number:     Emergency Contact:   Extended Emergency Contact Information  Primary Emergency Contact: Juan Albright  Address: [address] [place] [address] [address] Juarez Hernandez, [address] [address] [address]  Phone: [phone]  Relation: Other  Secondary Emergency Contact: Petrina Hodgkin  Mobile Phone: [phone]  Relation: Child  Preferred language: English    Past Surgical History:  Past Surgical History:   Procedure Laterality Date    APPENDECTOMY      BACK SURGERY      neck and back screws and cage    BREAST BIOPSY  [id number]    BREAST LUMPECTOMY      bilateral [id number]     CARDIAC SURGERY      cardiac cath negative in 2018    CARDIOVERSION      several times    CERVICAL DISCECTOMY  2020    DISKECTOMY CERVICAL FUSION ANTERIOR C6,7; Surgeon: Mina Schultz MD; Location: [facility]; Service: Neurosurgery;  Laterality: N/A;    [facility]    COLONOSCOPY      CYSTOSCOPY  2021    CYSTOSCOPY  2015    FRACTURE SURGERY      right wrist ORIF    HYSTERECTOMY  2010    INSERTABLE CARDIAC MONITOR      loop recorder    KNEE ARTHROSCOPY Right 2020    RIGHT KNEE ARTHROSCOPY WITH PARTIAL MEDIAL MENISCECTOMY WITH DEBRIDEMENT performed by Nuha Waddell DO at [facility] OR    KNEE ARTHROSCOPY Left 11/06/2020    LEFT KNEE ARTHROSCOPY WITH PARTIAL LATERAL MENISCAL TEAR REPAIR AND DEBRIDENT performed by Iliana Gonzalez DO at [facility], [address] [place] [address]  12/2021    neck area    OTHER SURGICAL HISTORY  04/06/2021    URETHROGRAM    OTHER SURGICAL HISTORY Left     MASA; HEMORRHAGE CONTROL- CAUTERIZED W/SILVER NITRATE    OTHER SURGICAL HISTORY      UTERINE ABLATION    OTHER SURGICAL HISTORY      RADIOFREQUENCY ABLATION CERVICAL SPINE NERVES    SIGMOIDECTOMY  2016    for cancer    SKIN BIOPSY      moles    SLEEVE GASTRECTOMY  05/19/2015    GASTRECTOMY SLEEVE [facility] [address]    TOTAL KNEE ARTHROPLASTY Left 08/31/2021    KNEE TOTAL ARTHROPLASTY performed by Dave Omalley MD at [facility] ENDOSCOPY  01/30/2015    UPPER GASTROINTESTINAL ENDOSCOPY      WISDOM TOOTH EXTRACTION         Immunization History:   Immunization History   Administered Date(s) Administered    COVID-19, Pfizer Purple top, DILUTE for use, 12+ yrs, 30mcg/0.3mL dose 02/25/2021, 03/18/2021, 10/22/2021    Influenza Whole 10/01/2014       Active Problems:  Patient Active Problem List   Diagnosis Code    Hypertension I10    Hyperlipidemia E78.5    Urinary incontinence R32    Diabetes ([facility] [address] E11.9    Arrhythmia I49.9    Arthritis M19.90    Fibromyalgia M79.7    Sleep apnea G47.30    Stroke ([facility] [address] I63.9    Migraine G43.909    Seizure ([facility] [address] R56.9    Back pain, chronic M54.9, G89.29    Gout M10.9    Morbid obesity with BMI of 50.0-59.9, adult ([facility]) E66.01, Z68.43    Gastroparesis K31.84    Leg numbness R20.0    Bilateral leg numbness R20.0    MDD (major depressive disorder), severe ([facility]) F32.2    PAF (paroxysmal atrial fibrillation) ([facility]) I48.0    Hypokalemia E87.6    Chronic pain syndrome G89.4    Severe episode of recurrent major depressive disorder, without psychotic features ([facility]) F33.2    Postoperative hypoxia R09.02, Z98.890    Multiple sclerosis (HCC) G35    Rheumatoid arthritis ([facility] [address] [facility] M06.9    Diabetes mellitus ([facility] [address] [facility] E11.9    S/P arthroscopic partial medial meniscectomy Z98.890    Primary osteoarthritis of left knee M17.12    Abdominal pain, epigastric R10.13    Status post laparoscopic-assisted sigmoidectomy Z90.49    Renal stones N20.0    Palpitations R00.2    Non-rheumatic mitral regurgitation I34.0    Narcoleptic syndrome G47.419    Malignant neoplasm of skin C44.90    Intractable migraine with aura with status migrainosus G43.111    Hypoxemia R09.02    S/P hysterectomy with oophorectomy Z90.710, Z90.721    Shortness of breath R06.02    Dupuytren's contracture of hand M72.0    Colitis K52.9    Diverticulitis K57.92    COPD, moderate ([facility]) J44.9    Congestive heart failure ([facility]) I50.9    Compression fracture of first lumbar vertebra ([facility]) S32.010A    Colovesical fistula N32.1    Irritable bowel syndrome with diarrhea K58.0    Cervical spondylosis with myelopathy and radiculopathy M47.12, M47.22    Abnormal brain MRI R90.89    Abnormal cardiovascular stress test R94.39    Bilateral edema of lower extremity R60.0    Depression with suicidal ideation F32. A, R45.851    Borderline personality disorder ([facility] F60.3    Preop examination Z01.818    Knee pain M25.569    Cerebral infarction ([facility] I63.9    Chronic constipation U45.00    Diastolic dysfunction Q30.95    Edema R60.9    Atypical chest pain R07.89    Abdominal pain, left lower quadrant R10.32    Chest pain due to CAD ([facility]) I25.119    Flank pain R10.9    Pelvic pain R10.2    GERD (gastroesophageal reflux disease) K21.9    Herniation of cervical intervertebral disc without myelopathy M50.20    Hot flashes, menopausal N95.1    Lipoma of neck D17.0    Postablative ovarian failure E89.40    Primary osteoarthritis of both knees M17.0    Psychophysiologic insomnia F51.04    Rectocele N81.6    Recurrent moderate major depressive disorder with anxiety ([facility]) F33.1, F41.9    HAMZAH (stress urinary incontinence, female) N39.3    Unspecified internal derangement of knee M23.90    Synovial cyst of popliteal space M71.20    Diverticulitis large intestine w/o perforation or abscess w/bleeding K57.33    Diverticulitis of colon K57.32    Bipolar disorder (HCC) F31.9    Type 2 diabetes mellitus without complication, without long-term current use of insulin (HCC) E11.9    Type II or unspecified type diabetes mellitus with unspecified complication, not stated as uncontrolled E11.8    Type II or unspecified type diabetes mellitus without mention of complication, uncontrolled [id number]    Chronic pain G89.29    Arterial hypotension I95.9    Benign essential HTN I10    Hypertensive disorder I10    Class 3 severe obesity in adult ([address] E66.01    Morbid obesity ([facility] [address] E66.01    Obesity E66.9    Syncope R55    TIA (transient ischemic attack) G45.9       Isolation/Infection:   Isolation            No Isolation          Patient Infection Status       Infection Onset Added Last Indicated Last Indicated By Review Planned Expiration Resolved Resolved By    None active    Resolved    COVID-19 (Rule Out) 11/02/20 11/02/20 11/02/20 COVID-19 (Ordered)   11/03/20 Rule-Out Test Resulted    COVID-19 (Rule Out) 08/09/20 08/09/20 08/10/20 Covid-19 Ambulatory (Ordered)   08/11/20 Rule-Out Test Resulted    COVID-19 (Rule Out) 06/03/20 06/03/20 06/03/20 COVID-19 (Ordered)   06/03/20 Rule-Out Test Resulted            Nurse Assessment:  Last Vital Signs: /69   Pulse 69   Temp 96.4 °F (35.8 °C) (Infrared)   Resp 16   Ht 5' 2\" (1.575 m)   Wt 245 lb (111.1 kg)   SpO2 94%   BMI 44.81 kg/m²     Last documented pain score (0-10 scale): Pain Level: 5  Last Weight:   Wt Readings from Last 1 Encounters:   03/08/22 245 lb (111.1 kg)     Mental Status:  oriented and alert    IV Access:  - None    Nursing Mobility/ADLs:  Walking   Assisted  Transfer  Assisted  Bathing  Assisted  Dressing  Assisted  Toileting Assisted  Feeding  Assisted  Med Admin  Assisted  Med Delivery   whole    Wound Care Documentation and Therapy:        Elimination:  Continence: Bowel: Yes  Bladder: Yes  Urinary Catheter: None   Colostomy/Ileostomy/Ileal Conduit: No       Date of Last BM:   No intake or output data in the 24 hours ending 03/08/22 0913  No intake/output data recorded. Safety Concerns: At Risk for Falls    Impairments/Disabilities:      None    Nutrition Therapy:  Current Nutrition Therapy:   - Oral Diet:  General    Routes of Feeding: Oral  Liquids: No Restrictions  Daily Fluid Restriction: no  Last Modified Barium Swallow with Video (Video Swallowing Test): not done    Treatments at the Time of Hospital Discharge:   Respiratory Treatments:   Oxygen Therapy:  is on oxygen at 2 L/min per nasal cannula. Ventilator:    - No ventilator support    Rehab Therapies: Physical Therapy and Occupational Therapy  Weight Bearing Status/Restrictions: No weight bearing restirctions  Other Medical Equipment (for information only, NOT a DME order):  walker  Other Treatments: Dale Silva 8241  This patient is a post-operative total joint replacement patient. Expectations for this patients care are as follows:      Goals:  DailyTherapy for rehabilitative purposes for two weeks. Increased level of activity and ambulation each day. Well-controlled pain. Free from infection. Encourage patient to provide self-care when possible. Ambulation with a rolling walker. Activity & Diet:  Therapy performed daily. (Instruct patient to take pain meds. 1 hour prior to therapy.)  Up in chair for all meals and majority of day. Range of motion for TKA patients. Hip precautions for GUIDO patients.   Incentive Spirometer: 3- 4 inhalations every twenty minutes, during the day, while awake, the first week home after discharge  Increase oral intake of fruits, fiber and water and take a daily stool softener to prevent constipation. Consider stronger bowel protocol if no bowel movement is achieved after three days home. Increase protein intake and reduce sugar intake to promote healing and prevent infection. No pillow under the knee for TKA patients. [facility] go on in the a.m. and come off in the p.m. (Hand wash them every two or three days, roll in towel to remove most of moisture, and hang to dry.)    Incision Care: If patient has ACE bandage it may be removed POD #2  Keep incisional dressing intact until seen and removed by surgeon, unless saturated, in which case, call surgeon and request instructions. If dressing falls off, call surgeon. If using the Prevena dressing, with battery pack, place battery pack in waterproof bag during shower. If using Aquacel dressing then dressing is waterproof and patient may shower. If patient has a Prevena remove on POD #5 and replace with Aquacel dressing (patient was provided with dressing in hospital)  Elevated the leg and ice the affected area four times a day, for twenty minutes each time and after every physical therapy session. Normal Conditions - Will improve with provided comfort measures and time:  Some swelling in the operative leg is normal - this should reduce over time. Some post-operative pain is normal.  This will improve with prescribed medication, provided comfort measures and time. Constipation related to pain medications & decreased mobility is a common occurrence. (Increase your fiber & water intake and take a stool softener.)   Slight warmth of operative site is normal and will diminish with time. Fatigue and moderate pain after therapy is normal and will improve with time. Numbness near the incision site is normal and will improve with time.    NOTE: Ensure/Remind patient to go to their follow-up appointment with their orthopedic surgeon, which is scheduled: 3/23/22 at 8:50 am.    Abnormal Conditions - When to call the Surgeon:  Increasing/excessive redness, warmth or swelling at the incisional site not relieved with ice and elevation. Increasing/excessive pain not well-controlled by prescribed medications. Drainage or odor from or around the incision site.  (A little blood showing through the bandage is ok, but active leaking, of any color, coming out of the bandage is NOT normal.)  Temperature above 101 degrees. (A mild temp of 99 - 100 is normal - if temp gets to 101 you may use Tylenol once - if it does not improve, you may use a 2nd dose of Tylenol after 5 hours - if temperature is still at or above 101 degrees then call the surgeon.)  Calf tenderness, swelling, or redness or numbness of the foot/lower leg. Shortness of breath or chest pain. Any other incision or surgical-related concerns.   CALL SURGEON with concerns PRIOR TO sending patient to hospital.     Patient's personal belongings (please select all that are sent with patient):  Glasses, cell phone, ring    RN SIGNATURE:  Electronically signed by Micheal Tee RN on 3/8/22 at 2:36 PM EST    CASE MANAGEMENT/SOCIAL WORK SECTION    Inpatient Status Date:     Readmission Risk Assessment Score:  Readmission Risk              Risk of Unplanned Readmission:  0           Discharging to Facility/ . [facility] [address]  [address] [address]  Phone [phone]  Fax  [phone]     Dialysis Facility (if applicable)   Name:  Address:  Dialysis Schedule:  Phone:  Fax:    / signature: Electronically signed by Jessica Castro RN on 3/8/22 at 1:06 PM EST    PHYSICIAN SECTION    Prognosis: Good    Condition at Discharge: Stable    Rehab Potential (if transferring to Rehab): Good    Recommended Labs or Other Treatments After Discharge:     Physician Certification: I certify the above information and transfer of Anat Ness  is necessary for the continuing treatment of the diagnosis listed and that she requires Home Care for less 30 days.      Update Admission H&P: No change in H&P    PHYSICIAN SIGNATURE:  Electronically signed by Beck Phelps MD on 3/8/22 at 9:13 AM EST

## 2022-03-08 NOTE — CARE COORDINATION
Writer faxed Ryan/DC med list to [facility] & informed of DC today. Informed Luana, of DC & to call writer w/ any questions.

## 2022-03-09 DIAGNOSIS — M17.12 PRIMARY OSTEOARTHRITIS OF LEFT KNEE: Primary | ICD-10-CM

## 2022-03-09 RX ORDER — HYDROCODONE BITARTRATE AND ACETAMINOPHEN 5; 325 MG/1; MG/1
1 TABLET ORAL EVERY 4 HOURS PRN
Qty: 42 TABLET | Refills: 0 | Status: SHIPPED | OUTPATIENT
Start: 2022-03-09 | End: 2022-03-16

## 2022-03-09 NOTE — TELEPHONE ENCOUNTER
Patient is asking if her pain medication can be switched from Oxycodone to [facility] as she is having head to toe, all over body itching with the Oxycodone. She states [address] is what she is able to tolerate the best.  Pharmacy on file has been verified and she is asking for a return call as soon as possible to discuss. She states she has tried a nerve pill and moisturizers to help the reaction and nothing has worked. Thank you.

## 2022-03-17 ENCOUNTER — TELEPHONE (OUTPATIENT)
Dept: ORTHOPEDIC SURGERY | Age: 57
End: 2022-03-17

## 2022-03-17 NOTE — TELEPHONE ENCOUNTER
Patient states her aquacell got wet during a shower on Monday. and is puckering and feels squishy. Where her blood drainage is it now has changed colors because the dressing got wet. Patients surgery was last Tuesday (9days out) and her PO is next Wednesday. Please advise what she should do.

## 2022-03-17 NOTE — TELEPHONE ENCOUNTER
Patient called in requesting advisement on her bandage. Patient stated her PT made her aware of there being water under the bandage and patient is concerned and needs to know if she is able to remove the bandage herself. Please advise thank you!

## 2022-03-18 NOTE — TELEPHONE ENCOUNTER
Spoke with patient she voiced understanding to keep surgical site covered, rescheduled patient for Monday.

## 2022-03-18 NOTE — TELEPHONE ENCOUNTER
[address]  [address]                    CARDIAC CATH REPORT           
_______________________________________________________________________________
 
Name:       YENIFER DUNNE              Room:           [address] Ritu BAILEY#:  [id number]      Account #:      [id number]  
Admission:  01/30/18     Attend Phys:    Cooper Payne MD
Discharge:  01/31/18     Date of Birth:  [birth year]  
         Report #: [id number]
                                                                     [id number]
_______________________________________________________________________________
THIS REPORT FOR:  //name//                      
 
 
--------------- APPROVED REPORT --------------
 
 
Patient Status: OP       Room #: 
Event Personnel: Cooper Payne Cardiologist, Alise Vasquez 
Monitor, Monique Gavin RTR Scrub, Silvia Calixto RN Circulator, 
Savannah Grimaldo RN Circulator
Exam: Generator Change for a Dual Chamber Permanent Pacemaker with RV 
lead revision
Indications: dual-chamber pulse generator at elective replacement. RV 
lead failure.
 
The patient is a 83 year-old male with a history of .
 
Conscious Sedation
Start time:  10:50           End Time:  11:23    
Fentanyl  50 mcg    Versed  1 mg  
 
Implanted Devices:  Biotronik Etrinsa 8 DR-T, model #884352, serial 
#[id number]
Biotronik Solia S 60, model #318226, serial #[id number]
 
Explanted Devices:  Medtronic Adapta, model number a DDDR 01, serial 
number [id number]
Medtronic model #407 65 8 serial number [id number] ventricular lead 
(capped)
 
Procedure
The patient underwent informed consent. We discussed the details of 
the procedure including the risks, which include, but not limited to 
bleeding, infection, vascular damage, cardiac perforation, and 
pneumothorax. 
After informed consent was obtained the patient was brought to the 
interventional radiology lab. The area of the left chest was prepped 
and draped in sterile fashion. Local anesthesia was achieved with 1% 
lidocaine. Next after an initial incision was made over the existing 
pulse generator the generator was explanted using electrocautery and 
blunt dissection. The patient was pacemaker dependent. The existing 
RV lead was attached to an external pulse generator for the remainder 
of the procedure. Next using a micropuncture kit and a peripheral 
injection of contrast the left subclavian vein was accessed. 
Ultimately a safety J guidewire was advanced to an area in the right 
atrium under fluoroscopic guidance. A 7 American tear-away introducer 
 
 
 
[place] [address]                    CARDIAC CATH REPORT           
_______________________________________________________________________________
 
Name:       YENIFER DUNNE              [address]           [address]  [id number]      Account #:      [id number]  
Admission:  01/30/18     Attend Phys:    Cooper Payne MD
Discharge:  01/31/18     Date of Birth:  [birth year]  
         Report #: [id number]
                                                                     [id number]
_______________________________________________________________________________
was advanced. Dilator and guidewire were removed and a right 
ventricular lead advanced to a secure position and the right 
ventricular apex. Thresholds were not adequate in this region. The 
lead was then affixed to the right ventricular septum. Thresholds 
were checked and deemed to be satisfactory. The lead was actively 
fixed. Next the existing RV lead was capped as the new RV lead was 
used for pacing. The right ventricular and atrial leads were attached 
to a new pulse generator. The pacemaker pocket was flushed using 
antibiotic solution. The Lead, generator and redundant RV and atrial 
leads were placed within the device pocket. The deep tissues were 
closed using 2-0 Vicryl suture in interrupted stitches. The skin 
incision was then closed with a single subcuticular stitch of 4-0 
Vicryl. Several Steri-Strips were placed across the incision. A 
sterile Telfa dressing was then covered with a Tegaderm. The patient 
tolerated she well without complication.
 
Electrode Parameters
P Wave:  0.90 mV   R Wave: no underlying R waves   
Atrial Threshold:  1.0 V at 0.40 ms Ventricular Threshold:  0.6 V at 
0.40 ms 
Atrial Resistance:  312 ohms.  Ventricular Resistance:  565 
ohms.  
 
Complications
The patient tolerated the procedure well and there were no 
complications associated with the procedure. 
 
Conclusion
1. Dual-chamber pulse generator at elective replacement.
2. Chronic right ventricular lead failure.
3. Successful replacement of a dual-chamber pulse generator.
4. Successful replacement of the right ventricular lead. 
 
Recommendations
1. Follow-up site check in one week.
2. Follow-up pacemaker interrogation per routine.
 
 
 
 
 
 
 
 
<ELECTRONICALLY SIGNED>
                                        By:  Cooper Payne MD, FACC   
02/14/18 0713
D: 02/14/18 0713_______________________________________
T: 02/14/18 0713Micgloria Payne MD, FACC      /INF Keep covered and come in Monday instead

## 2022-03-21 ENCOUNTER — OFFICE VISIT (OUTPATIENT)
Dept: ORTHOPEDIC SURGERY | Age: 57
End: 2022-03-21

## 2022-03-21 DIAGNOSIS — Z96.651 S/P TOTAL KNEE ARTHROPLASTY, RIGHT: Primary | ICD-10-CM

## 2022-03-21 PROCEDURE — 99024 POSTOP FOLLOW-UP VISIT: CPT | Performed by: ORTHOPAEDIC SURGERY

## 2022-03-21 RX ORDER — HYDROCODONE BITARTRATE AND ACETAMINOPHEN 5; 325 MG/1; MG/1
1 TABLET ORAL EVERY 4 HOURS PRN
Qty: 42 TABLET | Refills: 0 | Status: SHIPPED | OUTPATIENT
Start: 2022-03-21 | End: 2022-03-28

## 2022-03-21 NOTE — PROGRESS NOTES
Rocky Gandhi M.D.            [address] [facility] [address] [facility],[address], [address] [facility]           Dept Phone: [phone]           Dept Fax:  [address]           [place]          Dept Phone: [phone]           Dept Fax:  [phone]      Chief Compliant:  Chief Complaint   Patient presents with    Post-Op Check     Rt TKA 3/8/22        History of Present Illness:  Patient returns today status post right TKA times 2 weeks. Patient has no major complaints     Review of Systems   Constitutional: Negative for fever, chills, sweats, recent injury, recent illness  Neurological: Negative for Headaches, numbness, or weakness. Integumentary: Negative for rash, itching, ecchymosis, or wounds. Musculoskeletal: Positive for Post-Op Check (Rt TKA 3/8/22)       Physical Exam:  Constitutional: Patient is oriented to person, place, and time. Patient appears well-developed and well nourished. Musculoskeletal: Normal gait. Motion 0-100 degrees with expected pain with ROM. No Calf tenderness, Negative Kaylyn's sign. Neurovascular intact. Neurological: Patient is alert and oriented to person, place, and time. Normal strenght. No sensory deficit. Skin: Skin is warm and dry. Incision is healing well without signs of redness or drainage  Nursing note and vitals reviewed. Labs and Imaging:     XR taken today:  XR KNEE RIGHT (1-2 VIEWS)    Result Date: 3/21/2022  X-rays taken today and reviewed by me show standing AP both knees allow the right knee. Patient status post recent right total knee arthroplasty components appear to be in good position on both AP lateral views. Patellar height is appropriate.   Patient is also had a previous left total knee arthroplasty looks unchanged         Orders Placed This Encounter   Procedures    XR KNEE RIGHT (1-2 VIEWS) Standing Status:   Future     Number of Occurrences:   1     Standing Expiration Date:   3/21/2023    External Referral To Physical Therapy     Referral Priority:   Routine     Referral Type:   Eval and Treat     Referral Reason:   Specialty Services Required     Requested Specialty:   Physical Therapy     Number of Visits Requested:   1       Assessment and Plan:  1. S/P total knee arthroplasty, right        2 weeks status post right TKA        This is a 64 y.o. female who presents to the clinic today status post right TKA. Continue anticoagulation. Transition to outpatient Pt. Restrictions given. RTO 5-6 weeks. Call if any problems/issues prior to that       Provider Attestation:  Faina Vernon, personally performed the services described in this documentation. All medical record entries made by the scribe were at my direction and in my presence. I have reviewed the chart and discharge instructions and agree that the records reflect my personal performance and is accurate and complete. Jackelyn Hawkins MD. 03/21/22        Please note that this chart was generated using voice recognition Dragon dictation software. Although every effort was made to ensure the accuracy of this automated transcription, some errors in transcription may have occurred.

## 2022-03-24 PROBLEM — Z01.818 PREOP EXAMINATION: Status: RESOLVED | Noted: 2022-02-22 | Resolved: 2022-03-24

## 2022-03-28 ENCOUNTER — OFFICE VISIT (OUTPATIENT)
Dept: DERMATOLOGY | Age: 57
End: 2022-03-28
Payer: COMMERCIAL

## 2022-03-28 VITALS
HEART RATE: 77 BPM | WEIGHT: 244 LBS | TEMPERATURE: 98.6 F | BODY MASS INDEX: 43.23 KG/M2 | OXYGEN SATURATION: 89 % | HEIGHT: 63 IN

## 2022-03-28 DIAGNOSIS — J33.9 NASAL POLYP: Primary | ICD-10-CM

## 2022-03-28 PROCEDURE — 99203 OFFICE O/P NEW LOW 30 MIN: CPT | Performed by: PHYSICIAN ASSISTANT

## 2022-03-28 RX ORDER — TRIAMCINOLONE ACETONIDE 0.25 MG/G
OINTMENT TOPICAL
Qty: 30 G | Refills: 1 | Status: SHIPPED | OUTPATIENT
Start: 2022-03-28 | End: 2022-04-04

## 2022-03-28 RX ORDER — DIPHENOXYLATE HYDROCHLORIDE AND ATROPINE SULFATE 2.5; .025 MG/1; MG/1
1 TABLET ORAL 4 TIMES DAILY PRN
COMMUNITY
Start: 2021-09-24

## 2022-03-28 RX ORDER — BLOOD SUGAR DIAGNOSTIC
STRIP MISCELLANEOUS
COMMUNITY
Start: 2022-03-11

## 2022-03-28 RX ORDER — METOPROLOL TARTRATE 50 MG/1
TABLET, FILM COATED ORAL
COMMUNITY
Start: 2022-01-25

## 2022-03-28 RX ORDER — FAMOTIDINE 20 MG/1
TABLET, FILM COATED ORAL
COMMUNITY
Start: 2022-01-25

## 2022-03-28 RX ORDER — HYDROXYZINE PAMOATE 25 MG/1
CAPSULE ORAL
COMMUNITY
Start: 2020-10-23

## 2022-03-28 RX ORDER — ASPIRIN 81 MG/1
TABLET, CHEWABLE ORAL
COMMUNITY
Start: 2022-01-07

## 2022-03-28 RX ORDER — BISACODYL 5 MG
TABLET, DELAYED RELEASE (ENTERIC COATED) ORAL
COMMUNITY
Start: 2022-02-09

## 2022-03-28 RX ORDER — NYSTATIN 100000 [USP'U]/G
POWDER TOPICAL
COMMUNITY
Start: 2022-01-18

## 2022-03-28 RX ORDER — POLYETHYLENE GLYCOL 3350, SODIUM CHLORIDE, SODIUM BICARBONATE, POTASSIUM CHLORIDE 420; 11.2; 5.72; 1.48 G/4L; G/4L; G/4L; G/4L
POWDER, FOR SOLUTION ORAL
COMMUNITY
Start: 2022-02-09

## 2022-03-28 RX ORDER — ACETAMINOPHEN 500 MG
500 TABLET ORAL EVERY 6 HOURS PRN
COMMUNITY
Start: 2021-12-15

## 2022-03-28 RX ORDER — DIPHENHYDRAMINE HCL 50 MG/1
CAPSULE ORAL
COMMUNITY
Start: 2022-01-25

## 2022-03-28 RX ORDER — ISOSORBIDE MONONITRATE 30 MG/1
TABLET, EXTENDED RELEASE ORAL
COMMUNITY
Start: 2022-01-07

## 2022-03-28 NOTE — PROGRESS NOTES
Dermatology Patient Note  [address]  [place] [address]  Dept: [phone]  Dept Fax: [phone]      VISITDATE: 3/28/2022   REFERRING PROVIDER: No ref. provider found      Yoon Pierre is a 64 y.o. female  who presents today in the office for:    New Patient (dry patches on skin), Cyst (in nose), and Other (hx of precancerous lesions on face)      HISTORY OF PRESENT ILLNESS:  As above. Tender, firm papule in right nare, x months. Began with winter, crusting, and frequent use of nasal spray. MEDICAL PROBLEMS:  Patient Active Problem List    Diagnosis Date Noted    Primary osteoarthritis of right knee 03/08/2022    Bipolar disorder ([facility] 02/22/2022     Formatting of this note might be different from the original.  Manic depression.  Arterial hypotension 02/22/2022    Depression with suicidal ideation 01/31/2022    Borderline personality disorder ([facility] 01/31/2022    Chest pain due to CAD ([facility] 01/06/2022    Lipoma of neck 12/29/2021    Malignant neoplasm of skin 12/21/2021    Intractable migraine with aura with status migrainosus 12/21/2021    Primary osteoarthritis of left knee 08/31/2021    Class 3 severe obesity in [facility]) 08/11/2021    Dupuytren's contracture of hand 04/16/2021    Rectocele 04/06/2021    Pelvic pain 03/26/2021     Formatting of this note might be different from the original.  Unclear etiology. May be interstitial cystitis. Urine cultures have been negative. Cystoscopy to look for Hunner's ulcers      HAMZAH (stress urinary incontinence, female) 03/26/2021     Formatting of this note might be different from the original.  Does wear pads. Will evaluate during cystoscopy. Also may have cystocele, vaginal exam during cysto.       Renal stones 03/12/2021     Formatting of this note might be different from the original.  Has had multiple in the past, most recently several months ago. No stones on recent CT scan. Will check serum uric acid is she does have a history of gout and 24 hour urine collection.  Narcoleptic syndrome 02/19/2021    COPD, moderate ([address] 02/19/2021    Primary osteoarthritis of both knees 02/19/2021    Chronic pain 02/19/2021    Cervical spondylosis with myelopathy and radiculopathy 12/17/2020    S/P arthroscopic partial medial meniscectomy     Postoperative hypoxia 08/14/2020    Multiple sclerosis (HCC)     Rheumatoid arthritis ([address]     Diabetes mellitus (HCC)     Severe episode of recurrent major depressive disorder, without psychotic features ([address] 06/03/2020    Congestive heart failure ([address] 05/06/2020    GERD (gastroesophageal reflux disease) 05/06/2020    Hypertensive disorder 05/06/2020    Psychophysiologic insomnia 02/24/2020    Recurrent moderate major depressive disorder with anxiety ([address] 02/24/2020    Chronic pain syndrome     PAF (paroxysmal atrial fibrillation) ([address] 01/21/2020    Hypokalemia 01/21/2020    MDD (major depressive disorder), severe ([address] 01/20/2020    Compression fracture of first lumbar vertebra (HCC) 09/24/2019    Herniation of cervical intervertebral disc without myelopathy 08/08/2019    Syncope 06/03/2019    Abnormal brain MRI 04/10/2019    Non-rheumatic mitral regurgitation 28/56/5014    Diastolic dysfunction 25/80/2189    Bilateral leg numbness 01/15/2019    Leg numbness 01/14/2019    Bilateral edema of lower extremity 03/03/2016    Status post laparoscopic-assisted sigmoidectomy 02/22/2016    Colovesical fistula 02/09/2016    Chronic constipation 02/08/2016    Abdominal pain, left lower quadrant 02/08/2016    Flank pain 02/08/2016     Formatting of this note might be different from the original.  Has significant pain during voiding. Was previously told she may have reflux.   Will plan for VCUG during cystoscopy      Colitis 02/05/2016    Diverticulitis large intestine w/o perforation or abscess w/bleeding 01/13/2016    Diverticulitis 11/17/2015    Diverticulitis of colon 10/29/2015    Morbid obesity with BMI of 50.0-59.9, adult ([facility] 02/09/2015    Gastroparesis 02/09/2015    Unspecified internal derangement of knee 11/26/2014    Synovial cyst of popliteal space 11/17/2014    Type 2 diabetes mellitus without complication, without long-term current use of insulin ([facility] 10/01/2014    Obesity 10/01/2014    Knee pain 09/30/2014    Edema 06/06/2014    Hypertension 06/04/2014    Hyperlipidemia 06/04/2014    Urinary incontinence 06/04/2014    Diabetes ([facility] 06/04/2014    Arrhythmia 06/04/2014    Arthritis 06/04/2014    Fibromyalgia 06/04/2014    Sleep apnea 06/04/2014    Stroke ([facility] 06/04/2014    Migraine 06/04/2014    Seizure ([facility] 06/04/2014    Back pain, chronic 06/04/2014    Gout 06/04/2014    Type II or unspecified type diabetes mellitus with unspecified complication, not stated as uncontrolled 06/03/2014    Palpitations 06/18/2013    Cerebral infarction ([facility] 06/18/2013    TIA (transient ischemic attack) 06/07/2013    Abnormal cardiovascular stress test 01/23/2012    Atypical chest pain 01/23/2012    Hot flashes, menopausal 07/26/2011    Abdominal pain, epigastric 06/29/2011    Irritable bowel syndrome with diarrhea 09/29/2010    S/P hysterectomy with oophorectomy 08/04/2010    Hypoxemia 04/30/2010    Shortness of breath 04/30/2010    Type II or unspecified type diabetes mellitus without mention of complication, uncontrolled 04/21/2010    Benign essential HTN 04/21/2010     Formatting of this note might be different from the original.  ICD-10 Transition      Morbid obesity ([facility] 04/21/2010    Postablative ovarian failure 02/01/2010       CURRENT MEDICATIONS:   Current Outpatient Medications   Medication Sig Dispense Refill    acetaminophen (TYLENOL) 500 MG tablet Take 500 mg by mouth every 6 hours as needed       aspirin 81 MG chewable tablet  BISACODYL 5 MG EC tablet       diclofenac sodium (VOLTAREN) 1 % GEL       BANOPHEN 50 MG capsule       diphenoxylate-atropine (LOMOTIL) 2.5-0.025 MG per tablet Take 1 tablet by mouth 4 times daily as needed.  famotidine (PEPCID) 20 MG tablet       ONETOUCH ULTRA strip       hydrOXYzine (VISTARIL) 25 MG capsule TAKE ONE CAPSULE BY MOUTH THREE TIMES A DAY AS NEEDED FOR ANXIETY      isosorbide mononitrate (IMDUR) 30 MG extended release tablet       LORATADINE PO Take 10 mg by mouth       metoprolol tartrate (LOPRESSOR) 50 MG tablet       nystatin (NYAMYC) 415755 UNIT/GM powder APPLY TO AFFECTED AREA(S) FOUR TIMES A DAY      Onabotulinumtoxin A (BOTOX) 200 units injection every 3 (three) months.  polyethylene glycol-electrolytes (NULYTELY) 420 g solution       HYDROcodone-acetaminophen (NORCO) 5-325 MG per tablet Take 1 tablet by mouth every 4 hours as needed for Pain for up to 7 days. Intended supply: 7 days. Take lowest dose possible to manage pain 42 tablet 0    baclofen (LIORESAL) 20 MG tablet Take 20 mg by mouth 2 times daily       Semaglutide (OZEMPIC, 0.25 OR 0.5 MG/DOSE, SC) Inject 0.25 mg into the skin once a week fridays       DULoxetine (CYMBALTA) 60 MG extended release capsule Take 1 capsule by mouth 2 times daily 60 capsule 0    hydrOXYzine (ATARAX) 25 MG tablet Take 25 mg by mouth 3 times daily as needed for Itching       Ubrogepant (UBRELVY) 50 MG TABS Take by mouth as needed       Multiple Vitamins-Minerals (MULTI COMPLETE PO) Take by mouth 2 times daily       potassium chloride (KLOR-CON) 20 MEQ packet Take 20 mEq by mouth 2 times daily       ferrous sulfate (IRON 325) 325 (65 Fe) MG tablet Take 325 mg by mouth 2 times daily       Diclofenac Sodium 3 % GEL Apply topically as needed       methylphenidate (RITALIN) 20 MG tablet Take 20 mg by mouth 2 times daily.        albuterol sulfate HFA (VENTOLIN HFA) 108 (90 Base) MCG/ACT inhaler Inhale 2 puffs into the lungs every 6 hours as needed for Wheezing       budesonide-formoterol (SYMBICORT) 160-4.5 MCG/ACT AERO INHALE 2 PUFFS BID UTD      gabapentin (NEURONTIN) 600 MG tablet Take 1 tablet by mouth 3 times daily for 30 days. (Patient taking differently: Take 1,200 mg by mouth 3 times daily. ) 90 tablet 0    traZODone (DESYREL) 50 MG tablet Take 1 tablet by mouth nightly as needed for Sleep 30 tablet 0    metFORMIN (GLUCOPHAGE) 500 MG tablet Take 1 tablet by mouth 2 times daily (with meals) 60 tablet 0    rivaroxaban (XARELTO) 20 MG TABS tablet Take 1 tablet by mouth Daily with supper 30 tablet 0    carvedilol (COREG) 12.5 MG tablet Take 1 tablet by mouth 2 times daily (with meals) 60 tablet 0    furosemide (LASIX) 40 MG tablet Take 1 tablet by mouth daily 30 tablet 0    magnesium oxide (MAG-OX) 400 (241.3 Mg) MG TABS tablet Take 1 tablet by mouth daily 30 tablet 0    esomeprazole (NEXIUM) 40 MG delayed release capsule Take 1 capsule by mouth every morning (before breakfast) 30 capsule 0    ondansetron (ZOFRAN-ODT) 4 MG disintegrating tablet Take 4 mg by mouth every 8 hours as needed for Nausea or Vomiting       cetirizine (ZYRTEC) 10 MG tablet Take 10 mg by mouth daily       SUPER B COMPLEX/C PO Take 1 tablet by mouth 2 times daily       loperamide (IMODIUM) 2 MG capsule Take 2 mg by mouth 4 times daily as needed for Diarrhea Takes two capsules at onset       lidocaine (LIDODERM) 5 % Place 1 patch onto the skin daily as needed 12 hours on, 12 hours off.  fluticasone (FLONASE) 50 MCG/ACT nasal spray 1 spray by Each Nostril route daily        No current facility-administered medications for this visit.        ALLERGIES:   Allergies   Allergen Reactions    Ace Inhibitors Anaphylaxis, Other (See Comments), Hives and Shortness Of Breath     ANGIOEDEMA  lisinopril  angiodema  ANGIOEDEMA  ANGIOEDEMA      Betadine Swab Aid [Povidone-Iodine] Hives and Itching    Betadine [Povidone Iodine] Hives    Iodine Anaphylaxis, Hives and Shortness Of Breath     INCLUDES BETADINE  Iv dye. Pt has not had any reaction w/ dye from CT scans for 10 years. Per dr. Mendez Side contrast (omni 300) is fine to use. 19 - lilibeth      Lurasidone Other (See Comments)    Lurasidone Hcl      Other reaction(s): Unknown    Shellfish Allergy Anaphylaxis    Shellfish-Derived Products Anaphylaxis    Iodides      Pre treat with benadryl    Lamotrigine Hives    Macrobid [Nitrofurantoin Macrocrystal] Hives    Other     Oxycodone-Acetaminophen Itching    Phenazopyridine     Pyridium [Phenazopyridine Hcl] Hives    Topiramate Other (See Comments)     Confusion        SOCIAL HISTORY:  Social History     Tobacco Use    Smoking status: Former Smoker     Packs/day: 1.00     Years: 20.00     Pack years: 20.00     Quit date: 2020     Years since quittin.7    Smokeless tobacco: Never Used   Substance Use Topics    Alcohol use: Yes     Alcohol/week: 14.0 - 28.0 standard drinks     Types: 14 - 28 Shots of liquor per week     Comment: Drinks 2-4 shots liquor per day       Pertinent ROS:  Review of Systems  Skin: Denies any new changing, growing or bleeding lesions or rashes except as described in the HPI   Constitutional: Denies fevers, chills, and malaise. PHYSICAL EXAM:   Pulse 77   Temp 98.6 °F (37 °C) (Skin)   Ht 5' 3\" (1.6 m)   Wt 244 lb (110.7 kg)   SpO2 (!) 89%   BMI 43.22 kg/m²     The patient is generally well appearing, well nourished, alert and conversational. Affect is normal.    Cutaneous Exam:  Physical Exam  Face and neck only was examined. Facial covering was removed during examination. Diagnoses/exam findings/medical history pertinent to this visit are listed below:    Assessment:   Diagnosis Orders   1. Nasal polyp          Plan:  1.  Nasal polyp  - triamcinolone ointment 0.025% BID prn      RTC 3 months    Future Appointments   Date Time Provider Catrachito Whitten   2022  1:05 PM Jan Quintanilla MD [facility]   3/28/2023 10:00 AM [facility]         There are no Patient Instructions on file for this visit.       Electronically signed by Luz Maria Marquez PA-C on 3/28/22 at 11:59 AM EDT

## 2022-04-07 ENCOUNTER — TELEPHONE (OUTPATIENT)
Dept: ORTHOPEDIC SURGERY | Age: 57
End: 2022-04-07

## 2022-04-07 NOTE — TELEPHONE ENCOUNTER
Patient is SP Rt TKA 3/8/22 and late last week she started to notice an increase in pain in the anterior portion of her knee below the patella. Patient mentioned that it is warm to the touch. She also C/O numbness (pins and needles sensation) in her affected calf muscle and experiences significant pain if she presses her fingernail into the muscle. Patient states that she has recently resumed driving and maybe has been doing a little too much lately. She wants to know if the issues listed above are normal or not. Next OV is 5/2/22. Would you like to see her any sooner or do you have any other recommendations for her?

## 2022-05-18 ENCOUNTER — OFFICE VISIT (OUTPATIENT)
Dept: ORTHOPEDIC SURGERY | Age: 57
End: 2022-05-18

## 2022-05-18 DIAGNOSIS — Z96.651 S/P TOTAL KNEE ARTHROPLASTY, RIGHT: Primary | ICD-10-CM

## 2022-05-18 PROCEDURE — 99024 POSTOP FOLLOW-UP VISIT: CPT | Performed by: ORTHOPAEDIC SURGERY

## 2022-05-18 NOTE — PROGRESS NOTES
Patient returns today. She is status post a right total knee on 3/8/2022. She says this needs take a little longer for her to get pain relief. She had a left total knee done last August and she says it did extremely well for her. She does admit that she has not been able to do the therapy on this side as she is been doing it only on her own plus her boyfriend's been in the hospital for the last 43 days as well as she has moved. She therefore has not had the therapy that she had on her other side. Overall however she states she is doing better    Examination of the patient's right knee notes her wound is well-healed. She still has good motion 0 to 130 degrees she has good varus and valgus stability good patellar tracking she can still use a little bit more strength. Left knee was not examined today    Impression  Status post right total knee arthroplasty March 8, 2022  Previous left total knee arthroplasty August 2021    Plan  Patient was encouraged to continue with exercises much as possible and feel this is the reason why she has not recovered as quickly she did for her other knee.   We will see her back here in August of here for her anniversary call if she has problems prior to that time

## 2022-06-21 ENCOUNTER — OFFICE VISIT (OUTPATIENT)
Dept: ORTHOPEDIC SURGERY | Age: 57
End: 2022-06-21
Payer: COMMERCIAL

## 2022-06-21 VITALS — RESPIRATION RATE: 14 BRPM | BODY MASS INDEX: 43.23 KG/M2 | WEIGHT: 244 LBS | HEIGHT: 63 IN

## 2022-06-21 DIAGNOSIS — S69.81XA INJURY OF TRIANGULAR FIBROCARTILAGE COMPLEX (TFCC) OF RIGHT WRIST, INITIAL ENCOUNTER: ICD-10-CM

## 2022-06-21 DIAGNOSIS — M25.531 RIGHT WRIST PAIN: Primary | ICD-10-CM

## 2022-06-21 DIAGNOSIS — Z96.651 S/P TKR (TOTAL KNEE REPLACEMENT), RIGHT: ICD-10-CM

## 2022-06-21 DIAGNOSIS — M72.0 DUPUYTREN'S CONTRACTURE OF RIGHT HAND: ICD-10-CM

## 2022-06-21 PROCEDURE — 1036F TOBACCO NON-USER: CPT | Performed by: PHYSICIAN ASSISTANT

## 2022-06-21 PROCEDURE — 3017F COLORECTAL CA SCREEN DOC REV: CPT | Performed by: PHYSICIAN ASSISTANT

## 2022-06-21 PROCEDURE — G8417 CALC BMI ABV UP PARAM F/U: HCPCS | Performed by: PHYSICIAN ASSISTANT

## 2022-06-21 PROCEDURE — 99214 OFFICE O/P EST MOD 30 MIN: CPT | Performed by: PHYSICIAN ASSISTANT

## 2022-06-21 PROCEDURE — G8427 DOCREV CUR MEDS BY ELIG CLIN: HCPCS | Performed by: PHYSICIAN ASSISTANT

## 2022-06-21 RX ORDER — METHYLPREDNISOLONE 4 MG/1
4 TABLET ORAL SEE ADMIN INSTRUCTIONS
Qty: 1 KIT | Refills: 0 | Status: SHIPPED | OUTPATIENT
Start: 2022-06-21 | End: 2022-06-27

## 2022-06-21 NOTE — PROGRESS NOTES
[address] [facility], [address] [place], [address] [facility], [place], [address] [facility]           Dept Phone: [phone]           Dept Fax:  [phone] [address] [facility], [place]          Dept Phone: [phone]           Dept Fax:  [phone]      Chief Compliant:  Chief Complaint   Patient presents with    Pain     right knee and right wrist        History of Present Illness:  Luther Mosley returns today. This is a 64 y.o. female who presents to the clinic today for follow-up of right knee history of right total knee arthroplasty 3/8/2022 and evaluation of new wrist pain after a fall on 6/10/2022. Right knee:  Patient with history of right total knee arthroplasty done on 3/8/2022. She does admit that she was unable to do the outpatient physical therapy due to her boyfriend being admitted at the hospital for nearly 2 months she did states she was very diligent about doing home exercises that she was provided after having her left total knee done in 2021. She reports that her range of motion continues to improve but she has had pain all along which was severely aggravated by this fall on 6/10/2022. Patient localizes pain most severely to the anterior and lateral aspect of the right knee localizing most severe pain to the proximal tibia near the tibial tubercle. Patient denies any wounds or knee joint warmth. She does note intermittent swelling that seems to be activity related. No fever or chills recently. Right Wrist:  Patient has had increased right wrist pain after a fall on 6/10/2022 which also injured her knee. Patient with history of right distal radius ORIF for a fracture that was fixed in [place] in April 2018. She reports that the wrist was doing excellent until this recent fall.     Patient localizes pain most severely over the ulnar 90     Supination: 90     Flexion: 60     Extension: 50     Hand Joints:  Patient unable to achieve full extension with fourth digit at the fifth MCP joint lacking approximately 10 degrees likely due to Dupuytren's contracture developing. Normal range of motion of all remaining digits. Muscle Strength      : 4/5     Wrist Extension: 3/5     Wrist Flexion: 5/5       Sensation: normal   Phalen's Sign: Negative   Tinel's Sign (Medial Nerve): Negative   Finkelstein's Test: Negative   Fovea sign: Positive    Neurological: Patient is alert and oriented to person, place, and time. Normal strenght. No sensory deficit. Skin: Skin is warm and dry  Psychiatric: Behavior is normal. Thought content normal.  Nursing note and vitals reviewed. Labs and Imaging:     XR taken today:  No results found. X-rays taken in clinic and preliminarily reviewed by me 6/21/22:   AP bilateral knees and lateral view of the right knee demonstrates patient is status post bilateral total knee arthroplasty. Right knee components appear in excellent position without any evidence of hardware complication no evidence of periprosthetic fracture or hardware loosening. Left knee with good appearance no evidence of hardware complication on AP view. 3 views of the right wrist demonstrates patient is status post Volar ORIF plate for distal radius fracture years ago. Plate and screws appear in good position there is no evidence of new fracture of the distal radius. Questionable lucency through the ulnar styloid but no evidence of obvious fracture. Could represent remote injury. No other acute osseous abnormality noted. Assessment and Plan:  1. Right wrist pain    2. Injury of triangular fibrocartilage complex (TFCC) of right wrist, initial encounter    3. Dupuytren's contracture of right hand    4.  S/P TKR (total knee replacement), right        PLAN:  This is a 64 y.o. female who presents to the clinic today for evaluation of acute exacerbation of right knee pain and right wrist pain. Right knee pain s/p 3/8/22 Right TKA  Status post right total knee arthroplasty on 3/8/2022. Unfortunate patient did have a fall on 6/10/2022 which severely aggravated her pain. She was reporting she continued to have postoperative pain in the right knee even before the fall but it has been significant worse since. 1.  Radiographically there is no evidence of hardware loosening or periprosthetic fracture. There is no evidence of instability on examination. 2.  Patient is provided with reassurance this is likely a contusion or deep bone bruise after the fall there is no evidence of instability or new fracture. 3.  We will start her on a Medrol Dosepak recommend continuing home exercise program and ambulating with assistance of a walker until pain improves. 4.  Follow-up in 4 weeks however patient may call return sooner for any questions or concerns    Right wrist pain:  1. With new right wrist pain after a fall on 6/10/2022. Pain is centered over the ulnar styloid/TFCC area. Radiographically there is a questionable lucency through the ulnar styloid but no evidence of displaced fracture. 2.  Patient is educated that I do not see any new obvious fracture but she will be treated conservatively with a Velcro wrist brace to be worn with all activity over the next 3 weeks. 3.  Based on examination there is concern for TFCC injury as well will provide referral to Dr. Silva recio for further evaluation of this issue as well as patient's known Dupuytren's disease of the right hand. Referral was provided. Please note that this chart was generated using voice recognition Dragon dictation software. Although every effort was made to ensure the accuracy of this automated transcription, some errors in transcription may have occurred.

## 2022-06-21 NOTE — PATIENT INSTRUCTIONS
Patient Education        Wrist Injury (Triangular Fibrocartilage Complex): Rehab Exercises  Introduction  Here are some examples of exercises for you to try. The exercises may be suggested for a condition or for rehabilitation. Start each exercise slowly. Ease off the exercises if you start to have pain. You will be told when to start these exercises and which ones will work bestfor you. How to do the exercises  Wrist flexion and extension    1. Place your forearm on a table. Your affected hand and wrist should extend beyond the table, palm down. 2. Bend your wrist to move your hand upward and allow your hand to close into a fist. Now lower your hand and allow your fingers to relax. Hold each position for about 6 seconds. 3. Repeat 8 to 12 times. Hand flips    1. While seated, place your forearm and injured wrist on your thigh. Your palm should face down. 2. Flip your hand over so the back of your hand rests on your thigh and your palm is up. Alternate between palm up and palm down while keeping your forearm on your thigh. 3. Repeat 8 to 12 times. Wrist radial and ulnar deviation    1. Place your forearm on a table. Hold your hand and affected wrist over the edge of the table. 2. Gently bend your wrist to one side. Don't move your forearm. 3. Hold for about 6 seconds. 4. Gently bend the wrist to the other side. 5. Repeat 8 to 12 times. Follow-up care is a key part of your treatment and safety. Be sure to make and go to all appointments, and call your doctor if you are having problems. It's also a good idea to know your test results and keep alist of the medicines you take. Where can you learn more? Go to [email or web address]://[employer]. org and sign in to your [employer] account. Enter F241 in the [facility] box to learn more about \"Wrist Injury (Triangular Fibrocartilage Complex): Rehab Exercises. \"     If you do not have an account, please click on the \"Sign Up Now\" link.   Current as of: March 9, 2022               Content Version: 13.3  © 8111-2177 Healthwise, Incorporated. Care instructions adapted under license by [facility] ([facility]). If you have questions about a medical condition or this instruction, always ask your healthcare professional. [address] any warranty or liability for your use of this information.

## 2022-06-26 ENCOUNTER — HOSPITAL ENCOUNTER (EMERGENCY)
Age: 57
Discharge: HOME | End: 2022-06-27
Attending: EMERGENCY MEDICINE
Payer: COMMERCIAL

## 2022-06-26 ENCOUNTER — APPOINTMENT (OUTPATIENT)
Dept: CT IMAGING | Age: 57
End: 2022-06-26
Payer: COMMERCIAL

## 2022-06-26 DIAGNOSIS — R55 NEAR SYNCOPE: Primary | ICD-10-CM

## 2022-06-26 LAB
ABSOLUTE EOS #: 0.08 K/UL (ref 0–0.44)
ABSOLUTE IMMATURE GRANULOCYTE: 0.03 K/UL (ref 0–0.3)
ABSOLUTE LYMPH #: 1.58 K/UL (ref 1.1–3.7)
ABSOLUTE MONO #: 0.57 K/UL (ref 0.1–1.2)
ALBUMIN SERPL-MCNC: 4 G/DL (ref 3.5–5.2)
ALP BLD-CCNC: 86 U/L (ref 35–104)
ALT SERPL-CCNC: 28 U/L (ref 5–33)
ANION GAP SERPL CALCULATED.3IONS-SCNC: 13 MMOL/L (ref 9–17)
AST SERPL-CCNC: 20 U/L
BASOPHILS # BLD: 0 % (ref 0–2)
BASOPHILS ABSOLUTE: 0.04 K/UL (ref 0–0.2)
BILIRUB SERPL-MCNC: 0.24 MG/DL (ref 0.3–1.2)
BUN BLDV-MCNC: 10 MG/DL (ref 6–20)
BUN/CREAT BLD: 16 (ref 9–20)
CALCIUM SERPL-MCNC: 8.6 MG/DL (ref 8.6–10.4)
CHLORIDE BLD-SCNC: 100 MMOL/L (ref 98–107)
CO2: 29 MMOL/L (ref 20–31)
CREAT SERPL-MCNC: 0.63 MG/DL (ref 0.5–0.9)
EOSINOPHILS RELATIVE PERCENT: 1 % (ref 1–4)
GFR AFRICAN AMERICAN: >60 ML/MIN
GFR NON-AFRICAN AMERICAN: >60 ML/MIN
GFR SERPL CREATININE-BSD FRML MDRD: ABNORMAL ML/MIN/{1.73_M2}
GLUCOSE BLD-MCNC: 134 MG/DL (ref 70–99)
HCT VFR BLD CALC: 43.2 % (ref 36.3–47.1)
HEMOGLOBIN: 13.2 G/DL (ref 11.9–15.1)
IMMATURE GRANULOCYTES: 0 %
LYMPHOCYTES # BLD: 16 % (ref 24–43)
MAGNESIUM: 2 MG/DL (ref 1.6–2.6)
MCH RBC QN AUTO: 27.5 PG (ref 25.2–33.5)
MCHC RBC AUTO-ENTMCNC: 30.6 G/DL (ref 28.4–34.8)
MCV RBC AUTO: 90 FL (ref 82.6–102.9)
MONOCYTES # BLD: 6 % (ref 3–12)
NRBC AUTOMATED: 0 PER 100 WBC
PDW BLD-RTO: 14.7 % (ref 11.8–14.4)
PLATELET # BLD: 194 K/UL (ref 138–453)
PMV BLD AUTO: 10.9 FL (ref 8.1–13.5)
POTASSIUM SERPL-SCNC: 3.4 MMOL/L (ref 3.7–5.3)
RBC # BLD: 4.8 M/UL (ref 3.95–5.11)
RBC # BLD: ABNORMAL 10*6/UL
SEG NEUTROPHILS: 77 % (ref 36–65)
SEGMENTED NEUTROPHILS ABSOLUTE COUNT: 7.72 K/UL (ref 1.5–8.1)
SODIUM BLD-SCNC: 142 MMOL/L (ref 135–144)
TOTAL PROTEIN: 6.9 G/DL (ref 6.4–8.3)
TROPONIN, HIGH SENSITIVITY: <6 NG/L (ref 0–14)
WBC # BLD: 10 K/UL (ref 3.5–11.3)

## 2022-06-26 PROCEDURE — 84484 ASSAY OF TROPONIN QUANT: CPT

## 2022-06-26 PROCEDURE — 99284 EMERGENCY DEPT VISIT MOD MDM: CPT

## 2022-06-26 PROCEDURE — 80053 COMPREHEN METABOLIC PANEL: CPT

## 2022-06-26 PROCEDURE — 6360000002 HC RX W HCPCS: Performed by: EMERGENCY MEDICINE

## 2022-06-26 PROCEDURE — 96376 TX/PRO/DX INJ SAME DRUG ADON: CPT

## 2022-06-26 PROCEDURE — 85025 COMPLETE CBC W/AUTO DIFF WBC: CPT

## 2022-06-26 PROCEDURE — 83735 ASSAY OF MAGNESIUM: CPT

## 2022-06-26 PROCEDURE — 70450 CT HEAD/BRAIN W/O DYE: CPT

## 2022-06-26 PROCEDURE — 93005 ELECTROCARDIOGRAM TRACING: CPT | Performed by: EMERGENCY MEDICINE

## 2022-06-26 PROCEDURE — 96374 THER/PROPH/DIAG INJ IV PUSH: CPT

## 2022-06-26 PROCEDURE — 2580000003 HC RX 258: Performed by: EMERGENCY MEDICINE

## 2022-06-26 RX ORDER — ONDANSETRON 2 MG/ML
4 INJECTION INTRAMUSCULAR; INTRAVENOUS ONCE
Status: COMPLETED | OUTPATIENT
Start: 2022-06-26 | End: 2022-06-26

## 2022-06-26 RX ORDER — 0.9 % SODIUM CHLORIDE 0.9 %
1000 INTRAVENOUS SOLUTION INTRAVENOUS ONCE
Status: COMPLETED | OUTPATIENT
Start: 2022-06-26 | End: 2022-06-26

## 2022-06-26 RX ADMIN — ONDANSETRON 4 MG: 2 INJECTION INTRAMUSCULAR; INTRAVENOUS at 20:12

## 2022-06-26 RX ADMIN — SODIUM CHLORIDE 1000 ML: 9 INJECTION, SOLUTION INTRAVENOUS at 21:47

## 2022-06-26 RX ADMIN — SODIUM CHLORIDE 1000 ML: 9 INJECTION, SOLUTION INTRAVENOUS at 20:12

## 2022-06-26 RX ADMIN — ONDANSETRON 4 MG: 2 INJECTION INTRAMUSCULAR; INTRAVENOUS at 21:49

## 2022-06-26 ASSESSMENT — PAIN - FUNCTIONAL ASSESSMENT: PAIN_FUNCTIONAL_ASSESSMENT: 0-10

## 2022-06-26 ASSESSMENT — PAIN SCALES - GENERAL: PAINLEVEL_OUTOF10: 5

## 2022-06-27 VITALS
BODY MASS INDEX: 39.87 KG/M2 | HEART RATE: 86 BPM | DIASTOLIC BLOOD PRESSURE: 67 MMHG | OXYGEN SATURATION: 96 % | HEIGHT: 63 IN | SYSTOLIC BLOOD PRESSURE: 124 MMHG | WEIGHT: 225 LBS | RESPIRATION RATE: 17 BRPM | TEMPERATURE: 97.6 F

## 2022-06-27 LAB
EKG ATRIAL RATE: 68 BPM
EKG P AXIS: 55 DEGREES
EKG P-R INTERVAL: 154 MS
EKG Q-T INTERVAL: 446 MS
EKG QRS DURATION: 96 MS
EKG QTC CALCULATION (BAZETT): 474 MS
EKG R AXIS: -2 DEGREES
EKG T AXIS: -155 DEGREES
EKG VENTRICULAR RATE: 68 BPM

## 2022-06-27 NOTE — ED NOTES
Pt brought in by EMS. EMS reports pt was at a bar and felt dizzy when she stood up. Pt states she feels nauseous, had 2 episodes of diarrhea, and has chest pressure. Pt states she had 1 alcoholic beverage at bar. Pt does not wear O2 at home. Pt 90% on RA, placed on 2L O2 nasal cannula. EKG complete. Pt connected to monitor.       Rosmery Cannon RN  06/26/22 2004

## 2022-06-27 NOTE — ED NOTES
Pt able to tolerate getting up to wheelchair. Pt transferred well to restroom. Pt given ice water and crackers.       Remy Duvall RN  06/27/22 0604

## 2022-06-27 NOTE — ED PROVIDER NOTES
EMERGENCY DEPARTMENT ENCOUNTER    Pt Name: Thang Batres  MRN: [id number]  [place] 1965  Date of evaluation: 22  CHIEF COMPLAINT       Chief Complaint   Patient presents with    Dizziness    Emesis     HISTORY OF PRESENT ILLNESS   Patient is a 44-year-old female who presents to the ED for evaluation of lightheadedness and nausea and vomiting. Started today. She believes that alcohol may have been involved. She drank a \"jumbo jeff \"on an empty stomach\". She subsequently developed lightheadedness and went to the bathroom spending 2 hours there with vomiting, diarrhea and lightheadedness every time she tried to stand up. She is mourning the loss of her fiancé who  on 2022 so her eating patterns are off which is why she had an empty stomach this afternoon at the restaurant. No other issues at this time. ROS:  No fevers, cough, shortness of breath, chest pain, abdominal pain, nausea, vomiting, changes in urine or stool. REVIEW OF SYSTEMS     Review of Systems  PASTMEDICAL HISTORY     Past Medical History:   Diagnosis Date    Abdominal pain, epigastric 2011    Abnormal cardiovascular stress test 2012    Abnormal EKG     Arrhythmia 2014    Atrial fibrillation (HCC)     Bilateral edema of lower extremity 2016    Bipolar disorder ([address] 2022    Formatting of this note might be different from the original. Manic depression.     Blurred vision     left eye     Breast cancer ([address]     bilateral remission since     C. difficile colitis         Cervical spondylosis with myelopathy and radiculopathy 2020    Cocaine use     Colitis 2016    Colon cancer ([address]     stage IV remission since     Colovesical fistula 2016    Compression fracture of first lumbar vertebra (HCC) 2019    Congestive heart failure ([address] 2020    COPD (chronic obstructive pulmonary disease) (HCC)     COPD, moderate ([address] 2021    COVID-19 Nov 2019 & Dec 2020    Dental disease     Depression     Diabetes mellitus ([address]     Diastolic dysfunction 63/73/6336    Diverticulitis 11/17/2015    Diverticulitis of colon 10/29/2015    Diverticulosis     Dizziness     from sitting to standing    Dupuytren's contracture of hand 04/16/2021    Fibromyalgia     GERD (gastroesophageal reflux disease)     Gout 06/04/2014    Heavy alcohol use     History of falling     History of loop recorder     Hyperlipidemia     Hypertension     Intentional drug overdose ([address] 01/2022    Irritable bowel syndrome with diarrhea 09/29/2010    Kidney stones     Malignant neoplasm of skin 12/21/2021    MDD (major depressive disorder), severe ([address] 01/20/2020    Migraine 06/04/2014    Multiple sclerosis ([address]     Myocardial infarct ([address] 12/2021    States 12/2021 past MI shown on EKG, also may have had MI in 2008 with stroke    Narcoleptic syndrome 02/19/2021    Non-rheumatic mitral regurgitation 03/01/2019    On home oxygen therapy     home oxygen PRN and at night 2 L    Palpitations 06/18/2013    PONV (postoperative nausea and vomiting)     Postoperative hypoxia 08/14/2020    Pulmonary nodules     Rheumatoid arthritis ([address]     Right knee DJD     S/P hysterectomy with oophorectomy 08/04/2010    Seizure ([address] 7/    last seizure 7/18/2020  first seizure 1992 after head injury take gabapentin    Sleep apnea     No machine currently- uses O2 via N/C 2 L nightly    Suicide attempt ([address]     Syncope 06/03/2019    TIA (transient ischemic attack) 06/07/2013    Unspecified cerebral artery occlusion with cerebral infarction     2008     Wears glasses     Wears partial dentures     upper and lower     SURGICAL HISTORY       Past Surgical History:   Procedure Laterality Date    APPENDECTOMY  1985    BACK SURGERY      neck and back screws and cage    BREAST BIOPSY  9850-9790    BREAST LUMPECTOMY      bilateral 8520,6644,7815    [facility] CARDIAC SURGERY      cardiac cath negative in 2018    CARDIOVERSION      several times    CERVICAL DISCECTOMY  2020    DISKECTOMY CERVICAL FUSION ANTERIOR C6,7; Surgeon: Velasquez Sapp MD; Location: [facility]; Service: Neurosurgery;  Laterality: N/A;     SECTION      CHOLECYSTECTOMY      COLONOSCOPY      CYSTOSCOPY  2021    CYSTOSCOPY  2015    FRACTURE SURGERY      right wrist ORIF    HYSTERECTOMY (CERVIX STATUS UNKNOWN)      INSERTABLE CARDIAC MONITOR      loop recorder    KNEE ARTHROSCOPY Right 2020    RIGHT KNEE ARTHROSCOPY WITH PARTIAL MEDIAL MENISCECTOMY WITH DEBRIDEMENT performed by Babs Markham DO at [facility] ARTHROSCOPY Left 2020    LEFT KNEE ARTHROSCOPY WITH PARTIAL LATERAL MENISCAL TEAR REPAIR AND [address] performed by Babs Markham DO at [address] [facility], [address] RESECTION  2021    neck area    OTHER SURGICAL HISTORY  2021    URETHROGRAM    OTHER SURGICAL HISTORY Left     MASA; HEMORRHAGE CONTROL- CAUTERIZED W/SILVER NITRATE    OTHER SURGICAL HISTORY      UTERINE ABLATION    OTHER SURGICAL HISTORY      RADIOFREQUENCY ABLATION CERVICAL SPINE NERVES    SIGMOIDECTOMY  2016    for cancer    SKIN BIOPSY      moles    SLEEVE GASTRECTOMY  2015    GASTRECTOMY SLEEVE LAPAROSCOPIC           TONSILLECTOMY  1985    TOTAL KNEE ARTHROPLASTY Left 2021    KNEE TOTAL ARTHROPLASTY performed by José Miguel Stevens MD at [address] Right 3/8/2022    KNEE TOTAL ARTHROPLASTY performed by José Miguel Stevens MD at [facility] ENDOSCOPY  2015    UPPER GASTROINTESTINAL ENDOSCOPY      WISDOM TOOTH EXTRACTION       CURRENT MEDICATIONS       Previous Medications    ACETAMINOPHEN (TYLENOL) 500 MG TABLET    Take 500 mg by mouth every 6 hours as needed     ALBUTEROL SULFATE HFA (VENTOLIN HFA) 108 (90 BASE) MCG/ACT INHALER MG TABLET    Take 1 tablet by mouth 2 times daily (with meals)    METHYLPHENIDATE (RITALIN) 20 MG TABLET    Take 20 mg by mouth 2 times daily. METHYLPREDNISOLONE (MEDROL DOSEPACK) 4 MG TABLET    Take 1 tablet by mouth See Admin Instructions for 6 days Take by mouth. METOPROLOL TARTRATE (LOPRESSOR) 50 MG TABLET        MULTIPLE VITAMINS-MINERALS (MULTI COMPLETE PO)    Take by mouth 2 times daily     NYSTATIN (NYAMYC) 211585 UNIT/GM POWDER    APPLY TO AFFECTED AREA(S) FOUR TIMES A DAY    ONABOTULINUMTOXIN A (BOTOX) 200 UNITS INJECTION    every 3 (three) months. ONDANSETRON (ZOFRAN-ODT) 4 MG DISINTEGRATING TABLET    Take 4 mg by mouth every 8 hours as needed for Nausea or Vomiting     ONETOUCH ULTRA STRIP        POLYETHYLENE GLYCOL-ELECTROLYTES (NULYTELY) 420 G SOLUTION        POTASSIUM CHLORIDE (KLOR-CON) 20 MEQ PACKET    Take 20 mEq by mouth 2 times daily     RIVAROXABAN (XARELTO) 20 MG TABS TABLET    Take 1 tablet by mouth Daily with supper    SEMAGLUTIDE (OZEMPIC, 0.25 OR 0.5 MG/DOSE, SC)    Inject 0.25 mg into the skin once a week      SUPER B COMPLEX/C PO    Take 1 tablet by mouth 2 times daily     TRAZODONE (DESYREL) 50 MG TABLET    Take 1 tablet by mouth nightly as needed for Sleep    UBROGEPANT (UBRELVY) 50 MG TABS    Take by mouth as needed      ALLERGIES     is allergic to ace inhibitors, betadine swab aid [povidone-iodine], betadine [povidone iodine], iodine, lurasidone, lurasidone hcl, shellfish allergy, shellfish-derived products, iodides, lamotrigine, macrobid [nitrofurantoin macrocrystal], other, oxycodone-acetaminophen, phenazopyridine, pyridium [phenazopyridine hcl], and topiramate. FAMILY HISTORY     is adopted.       SOCIAL HISTORY       Social History     Tobacco Use    Smoking status: Former Smoker     Packs/day: 1.00     Years: 20.00     Pack years: 20.00     Quit date: 2020     Years since quittin.9    Smokeless tobacco: Never Used   Vaping Use    Vaping Use: Never used   Substance Use Topics    Alcohol use: Yes     Alcohol/week: 14.0 - 28.0 standard drinks     Types: 14 - 28 Shots of liquor per week     Comment: Drinks 2-4 shots liquor per day    Drug use: Not Currently     Types: Cocaine     Comment: Last use of cocaine was January 2022- patient states she has stopped     PHYSICAL EXAM     INITIAL VITALS: /71   Pulse 75   Temp 97.6 °F (36.4 °C) (Oral)   Resp 19   Ht 5' 3\" (1.6 m)   Wt 225 lb (102.1 kg)   SpO2 97%   BMI 39.86 kg/m²    Physical Exam  HENT:      Head: Normocephalic. Right Ear: External ear normal.      Left Ear: External ear normal.      Nose: Nose normal.   Eyes:      Conjunctiva/sclera: Conjunctivae normal.      Comments: Injected conjunctiva. Cardiovascular:      Rate and Rhythm: Normal rate. Pulmonary:      Effort: Pulmonary effort is normal.   Abdominal:      General: Abdomen is flat. Skin:     General: Skin is dry. Neurological:      General: No focal deficit present. Mental Status: She is alert and oriented to person, place, and time. Mental status is at baseline. Cranial Nerves: No cranial nerve deficit. Sensory: No sensory deficit. Motor: No weakness. Psychiatric:         Mood and Affect: Mood normal.         Behavior: Behavior normal.         MEDICAL DECISION MAKING:   The patient is hemodynamically stable, afebrile, nontoxic-appearing. Physical exam notable for injected conjunctiva. Based on history and exam likely effects of alcohol on empty stomach. Low suspicion for ACS, CVA. ED plan for basic labs, Zofran, fluids, reassess. DIAGNOSTIC RESULTS   EKG:All EKG's are interpreted by the Emergency Department Physician who either signs or Co-signs this chart in the absence of a cardiologist.        RADIOLOGY:All plain film, CT, MRI, and formal ultrasound images (except ED bedside ultrasound) are read by the radiologist, see reports below, unless otherwisenoted in MDM or here.   CT HEAD WO CONTRAST   Final Result   No acute intracranial abnormality. LABS: All lab results were reviewed by myself, and all abnormals are listed below. Labs Reviewed   CBC WITH AUTO DIFFERENTIAL - Abnormal; Notable for the following components:       Result Value    RDW 14.7 (*)     Seg Neutrophils 77 (*)     Lymphocytes 16 (*)     All other components within normal limits   COMPREHENSIVE METABOLIC PANEL W/ REFLEX TO MG FOR LOW K - Abnormal; Notable for the following components:    Glucose 134 (*)     Potassium 3.4 (*)     Total Bilirubin 0.24 (*)     All other components within normal limits   TROPONIN   MAGNESIUM       EMERGENCY DEPARTMENTCOURSE:   Patient remained stable in the ED. Symptoms did not improve after fluids and Zofran so I ordered head CT which was unremarkable. Symptoms improved after second liter fluids and Zofran    Labs:  Potassium 3.4  Creatinine 0.63  Leukos 134  WC 10 point  Hemoglobin 13.2    No further work-up indicated at this time. Nursing notes reviewed. At this time this is what I find, the patient appears well and does not appear sick or toxic. I gave my usual and customary discussion of the risks and benefits of discharge versus admission. I answered the patient's questions. I gave the patient strict return precautions. Patient expressed understanding of the discharge instructions. The care is provided during an unprecedented national emergency due to the novel coronavirus, COVID-19. Dictated but not reviewed.     Vitals:    Vitals:    06/26/22 2130 06/26/22 2300 06/26/22 2314 06/26/22 2315   BP: 131/67 133/78  130/71   Pulse: 71 75 70 75   Resp: 20 20 22 19   Temp:       TempSrc:       SpO2: 93% 97% 98% 97%   Weight:       Height:           The patient was given the following medications while in the emergency department:  Orders Placed This Encounter   Medications    0.9 % sodium chloride bolus    ondansetron (ZOFRAN) injection 4 mg    0.9 % sodium chloride bolus    ondansetron (ZOFRAN) injection 4 mg     CONSULTS:  None    FINAL IMPRESSION      1.  Near syncope          DISPOSITION/PLAN   DISPOSITION Decision To Discharge 06/27/2022 01:41:08 AM      PATIENT REFERRED TO:  [id number]  [facility] [id number]  [phone]    In 2 days      DISCHARGE MEDICATIONS:  New Prescriptions    No medications on file     Amdaa Lua MD  Attending Emergency Physician                    Sebastien Givens MD  06/27/22 [id number]

## 2022-06-27 NOTE — ED NOTES
Pt was assisted to sitting position. Pt immediately felt dizzy and nauseous. Pt returned to lying position.       Darrel Aceves RN  06/26/22 [id number]

## 2022-07-19 ENCOUNTER — OFFICE VISIT (OUTPATIENT)
Dept: ORTHOPEDIC SURGERY | Age: 57
End: 2022-07-19
Payer: COMMERCIAL

## 2022-07-19 DIAGNOSIS — M17.11 PRIMARY OSTEOARTHRITIS OF RIGHT KNEE: ICD-10-CM

## 2022-07-19 DIAGNOSIS — M17.0 PRIMARY OSTEOARTHRITIS OF BOTH KNEES: Primary | ICD-10-CM

## 2022-07-19 PROCEDURE — 99214 OFFICE O/P EST MOD 30 MIN: CPT | Performed by: PHYSICIAN ASSISTANT

## 2022-07-19 PROCEDURE — G8417 CALC BMI ABV UP PARAM F/U: HCPCS | Performed by: PHYSICIAN ASSISTANT

## 2022-07-19 PROCEDURE — G8427 DOCREV CUR MEDS BY ELIG CLIN: HCPCS | Performed by: PHYSICIAN ASSISTANT

## 2022-07-19 PROCEDURE — 3017F COLORECTAL CA SCREEN DOC REV: CPT | Performed by: PHYSICIAN ASSISTANT

## 2022-07-19 PROCEDURE — 1036F TOBACCO NON-USER: CPT | Performed by: PHYSICIAN ASSISTANT

## 2022-07-19 NOTE — PROGRESS NOTES
[facility], [facility], [address] [place], [place] [address]Frederick [address].           Dept Phone: [phone]           Dept Fax:  [phone]           Juventino Rodriguez          Dept Phone: [phone]           Dept Fax:  [phone]      Chief Compliant:  Chief Complaint   Patient presents with    Knee Pain     Right knee        History of Present Illness:  Reese Ricardo returns today. This is a 64 y.o. female who presents to the clinic today for follow up of right knee pain history of right total knee arthroplasty done on 3/8/2022. Of note patient was unable to do outpatient physical therapy due to significant other being in the hospital around the time of her surgery but she was very adamant about doing home exercises regularly on the right knee as she is familiar with him from her left total knee being done in 2021. Patient was seen on 6/21/2022 due to increased right knee pain after a fall on 6/10/2022 which aggravated her pain. At that time x-rays demonstrated no evidence of acute fracture or hardware loosening or periprosthetic complication. At that visit patient was started on a Medrol Dosepak instructed to continue ambulating with walker or cane for assistance and to follow-up today for reevaluation. Patient returns today that she does have some pain in bilateral knees but overall reports she is doing much better than her appointment 4 weeks ago. She states she continues to have increased pain right greater than left but states it is slowly improving. Patient continues with home exercise she does present today utilizing rolling walker but states she often walks around her house without any assistive devices this is usually just for longer distances.     Review of Systems   Constitutional: Negative for fever, chills, sweats, recent illness, or recent injury. Neurological: Negative for headaches, numbness, or weakness. Integumentary: Negative for rash, itching, ecchymosis, abrasions, or laceration. Musculoskeletal: Positive for Knee Pain (Right knee)       Physical Exam:  Constitutional: Patient is oriented to person, place, and time. Patient appears well-developed and well nourished. Musculoskeletal:    Right Knee    Gait:  Antalgic with rolling walker   Incision:  Well healed without any incisional erythema. Tenderness:  none   Flexion ROM:  125   Extension ROM:  0   Effusion:   trace   DVT Evaluation:  No evidence of DVT seen on physical exam.     Left Knee        Incision:  Well healed without any incisional erythema. Tenderness:  none   Flexion ROM:  125   Extension ROM:  0   Effusion:  no   DVT Evaluation:  No evidence of DVT seen on physical exam.     Patient with excellent range of motion bilaterally 0 to 125 degrees in both knees. She has minimal pain on examination but does note pain when gait is visualized. Knees are stable to varus and valgus stress bilaterally at 0, 30 and 90 degrees. Neurological: Patient is alert and oriented to person, place, and time. Normal strenght. No sensory deficit. Skin: Skin is warm and dry  Psychiatric: Behavior is normal. Thought content normal.  Nursing note and vitals reviewed. Labs and Imaging:     XR taken today:  No results found. No new x-rays taken today however those from 6/21/2022 again reviewed by me  AP bilateral knees standing lateral view of the right knee demonstrate status post bilateral total knee arthroplasties. Components appear in excellent position without evidence of hardware complication, loosening periprosthetic fracture. Assessment and Plan:  1. Primary osteoarthritis of both knees    2.  Primary osteoarthritis of right knee              PLAN:  This is a 64 y.o. female who presents to the clinic today for follow up increased right knee pain history of right total knee arthroplasty on 3/8/2022. Patient had a fall on 6/10/2022 seen by me on 6/26/2022 for increased knee pain. X-rays at that time were negative for any acute fracture examination was consistent with a contusion/sprain. 1.  Patient returns today stating that pain overall has improved. She does continue have some mild pain with ambulation but feels like it is improving slowly. 2.  Continue with home exercise program we did discuss the possibility of physical therapy referral however patient reports she is comfortable doing these at home and is overall satisfied with the improvement she has had this point. 3.  No evidence of acute abnormality on examination today we will have patient follow-up in 3 months for reevaluation with Dr. Senthil Pimentel of bilateral total knees however she may call return sooner for any questions or concerns. Please note that this chart was generated using voice recognition Dragon dictation software. Although every effort was made to ensure the accuracy of this automated transcription, some errors in transcription may have occurred. None known

## 2022-08-25 ENCOUNTER — OFFICE VISIT (OUTPATIENT)
Dept: ORTHOPEDIC SURGERY | Age: 57
End: 2022-08-25
Payer: MEDICARE

## 2022-08-25 DIAGNOSIS — Z96.651 S/P TKR (TOTAL KNEE REPLACEMENT), RIGHT: Primary | ICD-10-CM

## 2022-08-25 DIAGNOSIS — M17.0 PRIMARY OSTEOARTHRITIS OF BOTH KNEES: ICD-10-CM

## 2022-08-25 PROCEDURE — G8417 CALC BMI ABV UP PARAM F/U: HCPCS | Performed by: ORTHOPAEDIC SURGERY

## 2022-08-25 PROCEDURE — 3017F COLORECTAL CA SCREEN DOC REV: CPT | Performed by: ORTHOPAEDIC SURGERY

## 2022-08-25 PROCEDURE — 1036F TOBACCO NON-USER: CPT | Performed by: ORTHOPAEDIC SURGERY

## 2022-08-25 PROCEDURE — G8428 CUR MEDS NOT DOCUMENT: HCPCS | Performed by: ORTHOPAEDIC SURGERY

## 2022-08-25 PROCEDURE — 99213 OFFICE O/P EST LOW 20 MIN: CPT | Performed by: ORTHOPAEDIC SURGERY

## 2022-08-25 RX ORDER — METHYLPREDNISOLONE 4 MG/1
4 TABLET ORAL SEE ADMIN INSTRUCTIONS
Qty: 1 KIT | Refills: 0 | Status: SHIPPED | OUTPATIENT
Start: 2022-08-25 | End: 2022-08-31

## 2022-08-25 NOTE — PROGRESS NOTES
Alejandro Garnica returns status post right total knee on 322. She had a previous left total knee done before this. She has no problems with the left knee her right knee she is having a prolonged recovery she had a very sick fiancé that was in the hospital and delayed her therapy. She states that she has since is old with a grinding sensation on her kneecap and some pain along the anterior medial aspect she denies any fever chills    Examination of right knee notes her incisions well-healed she has a small abrasion from where she had a fall motion is 0 to 120 degrees she has good varus and valgus stability. That she has good patellar tracking there is some slight peripatellar crepitus although not severe and minimally painful. She has tender over her pes area and going up her hamstrings. No calf tenderness negative Homans    No new x-rays taken today    Impression  Status post right total knee 322  Status post left total knee 2021    Plan  Patient.   Continue exercises were given prescription for Medrol Dosepak also recommended Voltaren gel back here in March 2023 she has problems prior to that time

## 2022-10-14 NOTE — CONSULTS
H&P done.   DAVIDA GAMBOA, KANG - CNP on 6/4/2020 at 3:15 PM Pt in office to check HR on today.   HR reviewed by NP  Labs obtained as ordered    Karen CASTILLO

## 2022-12-06 SDOH — HEALTH STABILITY: PHYSICAL HEALTH: ON AVERAGE, HOW MANY DAYS PER WEEK DO YOU ENGAGE IN MODERATE TO STRENUOUS EXERCISE (LIKE A BRISK WALK)?: 0 DAYS

## 2022-12-06 SDOH — HEALTH STABILITY: PHYSICAL HEALTH: ON AVERAGE, HOW MANY MINUTES DO YOU ENGAGE IN EXERCISE AT THIS LEVEL?: 0 MIN

## 2022-12-07 ENCOUNTER — OFFICE VISIT (OUTPATIENT)
Dept: ORTHOPEDIC SURGERY | Age: 57
End: 2022-12-07
Payer: MEDICARE

## 2022-12-07 VITALS — WEIGHT: 190 LBS | HEIGHT: 65 IN | RESPIRATION RATE: 14 BRPM | BODY MASS INDEX: 31.65 KG/M2

## 2022-12-07 DIAGNOSIS — M25.511 RIGHT SHOULDER PAIN, UNSPECIFIED CHRONICITY: Primary | ICD-10-CM

## 2022-12-07 PROCEDURE — 99203 OFFICE O/P NEW LOW 30 MIN: CPT | Performed by: ORTHOPAEDIC SURGERY

## 2022-12-07 PROCEDURE — 3017F COLORECTAL CA SCREEN DOC REV: CPT | Performed by: ORTHOPAEDIC SURGERY

## 2022-12-07 PROCEDURE — G8427 DOCREV CUR MEDS BY ELIG CLIN: HCPCS | Performed by: ORTHOPAEDIC SURGERY

## 2022-12-07 PROCEDURE — G8484 FLU IMMUNIZE NO ADMIN: HCPCS | Performed by: ORTHOPAEDIC SURGERY

## 2022-12-07 PROCEDURE — 1036F TOBACCO NON-USER: CPT | Performed by: ORTHOPAEDIC SURGERY

## 2022-12-07 PROCEDURE — G8417 CALC BMI ABV UP PARAM F/U: HCPCS | Performed by: ORTHOPAEDIC SURGERY

## 2022-12-16 NOTE — PROGRESS NOTES
Orthopedic Shoulder Encounter Note     Chief complaint: right shoulder pain    HPI: Irena Smart is a 62 y.o.  right-hand dominant female who presents for evaluation of her right shoulder. She indicates that she has been having recurrent issues with this shoulder stating that about 25 years ago she believes she tore her rotator cuff white water rafting. More recently she has had several falls and then also had recent neck surgery in October and has been dealing with pain ever since. The pain is primarily localized to the anterior and posterior aspect of the shoulder. It is associated with painful clicking. She denies having any weakness but does report feeling stiff. Previous treatment:    NSAIDs: Yes    Physical Therapy:  Yes    Injections: None    Surgeries: None    Review of Systems:   Constitutional: Negative for fever, chills, sweats. Neurological: Negative for headache, numbness, or weakness.    Musculoskeletal: As noted in HPI     Past Medical History  Rolanda Hardwick  has a past medical history of Abdominal pain, epigastric, Abnormal cardiovascular stress test, Abnormal EKG, Arrhythmia, Atrial fibrillation (HCC), Bilateral edema of lower extremity, Bipolar disorder ([address] Blurred vision, Breast cancer ([address] C. difficile colitis, Cervical spondylosis with myelopathy and radiculopathy, Cocaine use, Colitis, Colon cancer ([address] Colovesical fistula, Compression fracture of first lumbar vertebra (HCC), Congestive heart failure ([address] COPD (chronic obstructive pulmonary disease) ([address] COPD, moderate ([address] COVID-19, Dental disease, Depression, Diabetes mellitus ([address] Diastolic dysfunction, Diverticulitis, Diverticulitis of colon, Diverticulosis, Dizziness, Dupuytren's contracture of hand, Fibromyalgia, GERD (gastroesophageal reflux disease), Gout, Heavy alcohol use, History of falling, History of loop recorder, Hyperlipidemia, Hypertension, Intentional drug overdose ([address] Irritable bowel syndrome with diarrhea, Kidney stones, Malignant neoplasm of skin, MDD (major depressive disorder), severe ([address] Migraine, Multiple sclerosis ([address] Myocardial infarct ([address] Narcoleptic syndrome, Non-rheumatic mitral regurgitation, On home oxygen therapy, Palpitations, PONV (postoperative nausea and vomiting), Postoperative hypoxia, Pulmonary nodules, Rheumatoid arthritis ([address] Right knee DJD, S/P hysterectomy with oophorectomy, Seizure ([address] Sleep apnea, Suicide attempt ([address] Syncope, TIA (transient ischemic attack), Unspecified cerebral artery occlusion with cerebral infarction, Wears glasses, and Wears partial dentures. Past Surgical History  Dominga River  has a past surgical history that includes Hysterectomy (); Cholecystectomy (); Appendectomy (); Tonsillectomy ();  section (); laparotomy (, ); Upper gastrointestinal endoscopy (2015); Sleeve Gastrectomy (2015); Colonoscopy; Upper gastrointestinal endoscopy; sigmoidectomy (); Insertable Cardiac Monitor; Knee arthroscopy (Right, 2020); Knee arthroscopy (Left, 2020); Cardiac surgery; skin biopsy; Cardioversion; Kyphosis surgery; Total knee arthroplasty (Left, 2021); Breast biopsy ([id number]); Breast lumpectomy; fracture surgery; lipoma resection (2021); back surgery; Cervical discectomy (2020); Cystoscopy (2021); other surgical history (2021); other surgical history (Left); other surgical history; Cystoscopy (2015); [place] tooth extraction; other surgical history; Tubal ligation; and Total knee arthroplasty (Right, 3/8/2022).     Current Medications  Current Outpatient Medications   Medication Sig Dispense Refill    acetaminophen (TYLENOL) 500 MG tablet Take 500 mg by mouth every 6 hours as needed       aspirin 81 MG chewable tablet       BISACODYL 5 MG EC tablet       diclofenac sodium (VOLTAREN) 1 % GEL       BANOPHEN 50 MG capsule       diphenoxylate-atropine (LOMOTIL) 2.5-0.025 MG per tablet Take 1 tablet by mouth 4 times daily as needed. famotidine (PEPCID) 20 MG tablet       ONETOUCH ULTRA strip       hydrOXYzine (VISTARIL) 25 MG capsule TAKE ONE CAPSULE BY MOUTH THREE TIMES A DAY AS NEEDED FOR ANXIETY      isosorbide mononitrate (IMDUR) 30 MG extended release tablet       LORATADINE PO Take 10 mg by mouth       metoprolol tartrate (LOPRESSOR) 50 MG tablet       nystatin (MYCOSTATIN) 000120 UNIT/GM powder APPLY TO AFFECTED AREA(S) FOUR TIMES A DAY      Onabotulinumtoxin A (BOTOX, COSMETIC,) 200 units injection every 3 (three) months. polyethylene glycol-electrolytes (NULYTELY) 420 g solution       baclofen (LIORESAL) 20 MG tablet Take 20 mg by mouth 2 times daily       Semaglutide (OZEMPIC, 0.25 OR 0.5 MG/DOSE, SC) Inject 0.25 mg into the skin once a week fridays       DULoxetine (CYMBALTA) 60 MG extended release capsule Take 1 capsule by mouth 2 times daily 60 capsule 0    hydrOXYzine (ATARAX) 25 MG tablet Take 25 mg by mouth 3 times daily as needed for Itching       Ubrogepant (UBRELVY) 50 MG TABS Take by mouth as needed       Multiple Vitamins-Minerals (MULTI COMPLETE PO) Take by mouth 2 times daily       potassium chloride (KLOR-CON) 20 MEQ packet Take 20 mEq by mouth 2 times daily       ferrous sulfate (IRON 325) 325 (65 Fe) MG tablet Take 325 mg by mouth 2 times daily       Diclofenac Sodium 3 % GEL Apply topically as needed       methylphenidate (RITALIN) 20 MG tablet Take 20 mg by mouth 2 times daily. albuterol sulfate HFA (PROVENTIL;VENTOLIN;PROAIR) 108 (90 Base) MCG/ACT inhaler Inhale 2 puffs into the lungs every 6 hours as needed for Wheezing       budesonide-formoterol (SYMBICORT) 160-4.5 MCG/ACT AERO INHALE 2 PUFFS BID UTD      gabapentin (NEURONTIN) 600 MG tablet Take 1 tablet by mouth 3 times daily for 30 days.  (Patient taking differently: Take 1,200 mg by mouth 3 times daily. ) 90 tablet 0    traZODone (DESYREL) 50 MG tablet Take 1 tablet by mouth nightly as needed for Sleep 30 tablet 0    metFORMIN (GLUCOPHAGE) 500 MG tablet Take 1 tablet by mouth 2 times daily (with meals) 60 tablet 0    rivaroxaban (XARELTO) 20 MG TABS tablet Take 1 tablet by mouth Daily with supper 30 tablet 0    carvedilol (COREG) 12.5 MG tablet Take 1 tablet by mouth 2 times daily (with meals) 60 tablet 0    furosemide (LASIX) 40 MG tablet Take 1 tablet by mouth daily 30 tablet 0    magnesium oxide (MAG-OX) 400 (241.3 Mg) MG TABS tablet Take 1 tablet by mouth daily 30 tablet 0    esomeprazole (NEXIUM) 40 MG delayed release capsule Take 1 capsule by mouth every morning (before breakfast) 30 capsule 0    ondansetron (ZOFRAN-ODT) 4 MG disintegrating tablet Take 4 mg by mouth every 8 hours as needed for Nausea or Vomiting       cetirizine (ZYRTEC) 10 MG tablet Take 10 mg by mouth daily       SUPER B COMPLEX/C PO Take 1 tablet by mouth 2 times daily       loperamide (IMODIUM) 2 MG capsule Take 2 mg by mouth 4 times daily as needed for Diarrhea Takes two capsules at onset       lidocaine (LIDODERM) 5 % Place 1 patch onto the skin daily as needed 12 hours on, 12 hours off. fluticasone (FLONASE) 50 MCG/ACT nasal spray 1 spray by Each Nostril route daily        No current facility-administered medications for this visit. Allergies  Allergies have been reviewed. Bhanu Larsen is allergic to ace inhibitors, betadine swab aid [povidone-iodine], betadine [povidone iodine], iodine, lurasidone, lurasidone hcl, shellfish allergy, shellfish-derived products, iodides, lamotrigine, macrobid [nitrofurantoin macrocrystal], other, oxycodone-acetaminophen, phenazopyridine, pyridium [phenazopyridine hcl], and topiramate. Social History  Beth  reports that she quit smoking about 2 years ago. Her smoking use included cigarettes. She has a 20.00 pack-year smoking history. She has never used smokeless tobacco. She reports current alcohol use of about 14.0 - 28.0 standard drinks per week.  She reports that she does not currently use drugs after having used the following drugs: Cocaine. Family History  Beth's family history includes Depression in her father; Heart Disease in her father; Other in her mother; Suicide in her father. She was adopted. Physical Exam:     Resp 14   Ht 5' 5\" (1.651 m)   Wt 190 lb (86.2 kg)   BMI 31.62 kg/m²    Constitutional: Patient is oriented to person, place, and time. Patient appears well-developed and well nourished. Mental Status/psychiatric: Behavior is normal. Thought content normal.  HENT: Negative otherwise noted  Head: Normocephalic and Atraumatic  Nose: Normal  Respiratory/Cardio: Effort normal. No respiratory distress. Shoulder:    Skin: Skin is warm and dry; no swelling or obvious muscular atrophy.    Vasculature: 2+ radial pulses bilaterally  Neuro: Sensation grossly intact to light touch diffusely  Tenderness: Tender to palpation over the anterior and anterolateral corner of her right shoulder    ROM: (Degrees)    Right   A P   Left   A P    Elevation  120 140   Elevation  140   Abduction  115 125   Abduction  145    ER   35 75   ER   70   IR   T12    IR   T12   90 abd/ER      90 abd/ER     90 abd/IR      90 abd/IR     Crepitation  No    Crepitation No  Dyskenesia  No    Dyskenesia No      Muscle strength:    Right       Left    Deltoid   5    Deltoid   5  Supraspinatus  5    Supraspinatus  5  ER   5    ER   5  IR   5    IR   5    Special tests    Right   Rotator Cuff    Left    y   Painful arc    n   n   Pain with ER    n    y   Neer's     n    y   Hawkin's    n    n   Drop Arm    n  n   Lift off/Belly Press   n  n   ER Lag    n          AC Joint  n   AC tenderness   n  n   Cross-chest adduction  n       Labrum/biceps    y (equivocal)  [place]'s    n   n   Biceps sheer    n      n   Speed's/Yergason's   n    y   Tenderness Biceps Groove  n    n   Tung's    n         Instability  n   Ant Apprehension   n    n   Post Apprehension   n    n   Ant Load shift    n    n   Post Load shift   n   n   Sulcus     n  n   Generalized Laxity   n  n   Relocation test   n  n   Crank test     n  n   [place]-superior escape  n       Imagin x-ray views of the right shoulder completed on 2022 reviewed independently demonstrating normal glenohumeral and acromioclavicular joint spaces with a type II acromion. No obvious fracture, dislocation or subluxation. Impression/Plan:     Davin Keller is a 62 y.o. old female with right shoulder pain. We had a discussion about possible etiologies as well as treatment options. She is undergone treatment thus far therapy as well as use of anti-inflammatories without significant improvement. As a result I did recommend further evaluation with an MRI study of her shoulder. We will facilitate her getting the study completed and follow-up with results and additional treatment recommendations as indicated. This note is created with the assistance of a speech recognition program.  While intending to generate adocument that actually reflects the content of the visit, the document can still have some errors including those of syntax and sound a like substitutions which may escape proof reading. It such instances, actual meaningcan be extrapolated by contextual diversion.     NA = Not assessed  RTC = Rotator cuff  RCT = Rotator cuff tear  ER = External rotation  IR = Internal rotation  AC = Acromioclavicular  GH = Glenohumeral  n = No  y = Yes

## 2022-12-20 ENCOUNTER — HOSPITAL ENCOUNTER (OUTPATIENT)
Dept: MRI IMAGING | Age: 57
Discharge: HOME | End: 2022-12-22
Payer: MEDICARE

## 2022-12-20 DIAGNOSIS — M25.511 RIGHT SHOULDER PAIN, UNSPECIFIED CHRONICITY: ICD-10-CM

## 2022-12-20 PROCEDURE — 73221 MRI JOINT UPR EXTREM W/O DYE: CPT

## 2022-12-22 ENCOUNTER — OFFICE VISIT (OUTPATIENT)
Dept: DERMATOLOGY | Age: 57
End: 2022-12-22

## 2022-12-22 VITALS
OXYGEN SATURATION: 93 % | DIASTOLIC BLOOD PRESSURE: 84 MMHG | SYSTOLIC BLOOD PRESSURE: 137 MMHG | HEART RATE: 74 BPM | BODY MASS INDEX: 32.08 KG/M2 | WEIGHT: 192.8 LBS | TEMPERATURE: 96.8 F

## 2022-12-22 DIAGNOSIS — L82.1 SEBORRHEIC KERATOSES: ICD-10-CM

## 2022-12-22 DIAGNOSIS — D48.9 NEOPLASM OF UNCERTAIN BEHAVIOR: ICD-10-CM

## 2022-12-22 DIAGNOSIS — B07.9 VERRUCA VULGARIS: ICD-10-CM

## 2022-12-22 DIAGNOSIS — D22.9 IRRITATED NEVUS: ICD-10-CM

## 2022-12-22 DIAGNOSIS — L98.1 NEUROTIC EXCORIATIONS: ICD-10-CM

## 2022-12-22 DIAGNOSIS — D22.9 MULTIPLE BENIGN NEVI: ICD-10-CM

## 2022-12-22 DIAGNOSIS — L30.4 INTERTRIGO: Primary | ICD-10-CM

## 2022-12-22 RX ORDER — BUDESONIDE 3 MG/1
CAPSULE, COATED PELLETS ORAL
COMMUNITY
Start: 2022-11-21

## 2022-12-22 RX ORDER — LIDOCAINE HYDROCHLORIDE 10 MG/ML
0.5 INJECTION, SOLUTION INFILTRATION; PERINEURAL ONCE
Status: SHIPPED | OUTPATIENT
Start: 2022-12-22

## 2022-12-22 RX ORDER — HYDROCODONE BITARTRATE AND ACETAMINOPHEN 5; 325 MG/1; MG/1
TABLET ORAL
COMMUNITY
Start: 2022-11-02

## 2022-12-22 RX ORDER — SULFAMETHOXAZOLE AND TRIMETHOPRIM 800; 160 MG/1; MG/1
1 TABLET ORAL 2 TIMES DAILY
COMMUNITY
Start: 2022-12-08 | End: 2022-12-22

## 2022-12-22 RX ORDER — FLUCONAZOLE 150 MG/1
TABLET ORAL
COMMUNITY
Start: 2022-12-08

## 2022-12-22 RX ORDER — NALOXONE HYDROCHLORIDE 4 MG/.1ML
SPRAY NASAL
COMMUNITY
Start: 2022-10-11

## 2022-12-22 RX ORDER — CARBAMAZEPINE 200 MG/1
TABLET ORAL
COMMUNITY
Start: 2022-12-02

## 2022-12-22 RX ORDER — CEPHALEXIN 500 MG/1
CAPSULE ORAL
COMMUNITY
Start: 2022-10-18

## 2022-12-22 RX ORDER — KETOCONAZOLE 20 MG/G
CREAM TOPICAL
Qty: 60 G | Refills: 3 | Status: SHIPPED | OUTPATIENT
Start: 2022-12-22

## 2022-12-22 RX ORDER — DEXTROAMPHETAMINE SACCHARATE, AMPHETAMINE ASPARTATE, DEXTROAMPHETAMINE SULFATE AND AMPHETAMINE SULFATE 2.5; 2.5; 2.5; 2.5 MG/1; MG/1; MG/1; MG/1
TABLET ORAL
COMMUNITY
Start: 2022-12-21

## 2022-12-22 RX ORDER — SULFAMETHOXAZOLE AND TRIMETHOPRIM 800; 160 MG/1; MG/1
TABLET ORAL
COMMUNITY
Start: 2022-12-08

## 2022-12-22 RX ORDER — ALBUTEROL SULFATE 2.5 MG/3ML
SOLUTION RESPIRATORY (INHALATION)
COMMUNITY
Start: 2022-11-20

## 2022-12-22 RX ORDER — TRIAMCINOLONE ACETONIDE 0.25 MG/G
CREAM TOPICAL
Qty: 80 G | Refills: 3 | Status: SHIPPED | OUTPATIENT
Start: 2022-12-22

## 2022-12-22 NOTE — PROGRESS NOTES
Dermatology Patient Note  [address]  [address] [place] [address] [address] [place] [id number]  Dept: [phone]  Dept Fax: [phone]      VISITDATE: 12/22/2022   REFERRING PROVIDER: No ref. provider found      Davin Keller is a 62 y.o. female  who presents today in the office for:    Follow-up (FBSE,)      HISTORY OF PRESENT ILLNESS:  C/O lesions on upper back. Admits to habitual excoriation. Also notes lesion on right chest x months. Denies excoriation. Pt states that this lesion has increased in size. Also c/o \"sore\" lesion on right occipital scalp x years. Irritated when brushing hair. Irritation in body folds, which is somewhat better since weight loss. Pt c/o wart on right palm as well. MEDICAL PROBLEMS:  Patient Active Problem List    Diagnosis Date Noted    Primary osteoarthritis of right knee 03/08/2022    Bipolar disorder ([facility] 02/22/2022     Formatting of this note might be different from the original.  Manic depression. Arterial hypotension 02/22/2022    Depression with suicidal ideation 01/31/2022    Borderline personality disorder ([facility] 01/31/2022    Chest pain due to CAD ([facility] 01/06/2022    Lipoma of neck 12/29/2021    Malignant neoplasm of skin 12/21/2021    Intractable migraine with aura with status migrainosus 12/21/2021    Primary osteoarthritis of left knee 08/31/2021    Class 3 severe obesity in adult [facility]) 08/11/2021    Dupuytren's contracture of hand 04/16/2021    Rectocele 04/06/2021    Pelvic pain 03/26/2021     Formatting of this note might be different from the original.  Unclear etiology. May be interstitial cystitis. Urine cultures have been negative. Cystoscopy to look for Hunner's ulcers      HAMZAH (stress urinary incontinence, female) 03/26/2021     Formatting of this note might be different from the original.  Does wear pads. Will evaluate during cystoscopy.   Also may have cystocele, vaginal exam during cysto. Renal stones 03/12/2021     Formatting of this note might be different from the original.  Has had multiple in the past, most recently several months ago. No stones on recent CT scan. Will check serum uric acid is she does have a history of gout and 24 hour urine collection. Narcoleptic syndrome 02/19/2021    COPD, moderate ([address] 02/19/2021    Primary osteoarthritis of both knees 02/19/2021    Chronic pain 02/19/2021    Cervical spondylosis with myelopathy and radiculopathy 12/17/2020    S/P arthroscopic partial medial meniscectomy     Postoperative hypoxia 08/14/2020    Multiple sclerosis ([address]     Rheumatoid arthritis ([address]     Diabetes mellitus ([address]     Severe episode of recurrent major depressive disorder, without psychotic features ([address] 06/03/2020    Congestive heart failure ([address] 05/06/2020    GERD (gastroesophageal reflux disease) 05/06/2020    Hypertensive disorder 05/06/2020    Psychophysiologic insomnia 02/24/2020    Recurrent moderate major depressive disorder with anxiety ([address] 02/24/2020    Chronic pain syndrome     PAF (paroxysmal atrial fibrillation) ([address] 01/21/2020    Hypokalemia 01/21/2020    MDD (major depressive disorder), severe ([address] 01/20/2020    Compression fracture of first lumbar vertebra ([address] 09/24/2019    Herniation of cervical intervertebral disc without myelopathy 08/08/2019    Syncope 06/03/2019    Abnormal brain MRI 04/10/2019    Non-rheumatic mitral regurgitation 32/15/0132    Diastolic dysfunction 69/33/1560    Bilateral leg numbness 01/15/2019    Leg numbness 01/14/2019    Bilateral edema of lower extremity 03/03/2016    Status post laparoscopic-assisted sigmoidectomy 02/22/2016    Colovesical fistula 02/09/2016    Chronic constipation 02/08/2016    Abdominal pain, left lower quadrant 02/08/2016    Flank pain 02/08/2016     Formatting of this note might be different from the original.  Has significant pain during voiding.   Was previously told she may have reflux.   Will plan for VCUG during cystoscopy      Colitis 02/05/2016    Diverticulitis large intestine w/o perforation or abscess w/bleeding 01/13/2016    Diverticulitis 11/17/2015    Diverticulitis of colon 10/29/2015    Morbid obesity with BMI of 50.0-59.9, adult ([facility] 02/09/2015    Gastroparesis 02/09/2015    Unspecified internal derangement of knee 11/26/2014    Synovial cyst of popliteal space 11/17/2014    Type 2 diabetes mellitus without complication, without long-term current use of insulin ([facility] 10/01/2014    Obesity 10/01/2014    Knee pain 09/30/2014    Edema 06/06/2014    Hypertension 06/04/2014    Hyperlipidemia 06/04/2014    Urinary incontinence 06/04/2014    Diabetes ([address] 06/04/2014    Arrhythmia 06/04/2014    Arthritis 06/04/2014    Fibromyalgia 06/04/2014    Sleep apnea 06/04/2014    Stroke ([address] 06/04/2014    Migraine 06/04/2014    Seizure ([address] 06/04/2014    Back pain, chronic 06/04/2014    Gout 06/04/2014    Type II or unspecified type diabetes mellitus with unspecified complication, not stated as uncontrolled 06/03/2014    Palpitations 06/18/2013    Cerebral infarction ([facility] 06/18/2013    TIA (transient ischemic attack) 06/07/2013    Abnormal cardiovascular stress test 01/23/2012    Atypical chest pain 01/23/2012    Hot flashes, menopausal 07/26/2011    Abdominal pain, epigastric 06/29/2011    Irritable bowel syndrome with diarrhea 09/29/2010    S/P hysterectomy with oophorectomy 08/04/2010    Hypoxemia 04/30/2010    Shortness of breath 04/30/2010    Type II or unspecified type diabetes mellitus without mention of complication, uncontrolled 04/21/2010    Benign essential HTN 04/21/2010     Formatting of this note might be different from the original.  ICD-10 Transition      Morbid obesity ([facility] 04/21/2010    Postablative ovarian failure 02/01/2010       CURRENT MEDICATIONS:   Current Outpatient Medications   Medication Sig Dispense Refill    amphetamine-dextroamphetamine (ADDERALL) 10 MG tablet       budesonide (ENTOCORT EC) 3 MG extended release capsule       carBAMazepine (TEGRETOL) 200 MG tablet       cephALEXin (KEFLEX) 500 MG capsule       fluconazole (DIFLUCAN) 150 MG tablet       HYDROcodone-acetaminophen (NORCO) 5-325 MG per tablet       naloxone 4 MG/0.1ML LIQD nasal spray       sulfamethoxazole-trimethoprim (BACTRIM DS;SEPTRA DS) 800-160 MG per tablet       sulfamethoxazole-trimethoprim (BACTRIM DS;SEPTRA DS) 800-160 MG per tablet Take 1 tablet by mouth 2 times daily      albuterol (PROVENTIL) (2.5 MG/3ML) 0.083% nebulizer solution       ketoconazole (NIZORAL) 2 % cream Apply to body folds twice daily, as needed. Combine with triamcinolone cream when using. 60 g 3    triamcinolone (KENALOG) 0.025 % cream Apply to body folds twice daily, as needed. Combine with ketoconazole cream when using. 80 g 3    acetaminophen (TYLENOL) 500 MG tablet Take 500 mg by mouth every 6 hours as needed       diclofenac sodium (VOLTAREN) 1 % GEL       diphenoxylate-atropine (LOMOTIL) 2.5-0.025 MG per tablet Take 1 tablet by mouth 4 times daily as needed. famotidine (PEPCID) 20 MG tablet       ONETOUCH ULTRA strip       hydrOXYzine (VISTARIL) 25 MG capsule TAKE ONE CAPSULE BY MOUTH THREE TIMES A DAY AS NEEDED FOR ANXIETY      LORATADINE PO Take 10 mg by mouth       nystatin (MYCOSTATIN) 517502 UNIT/GM powder APPLY TO AFFECTED AREA(S) FOUR TIMES A DAY      Onabotulinumtoxin A (BOTOX, COSMETIC,) 200 units injection every 3 (three) months.       baclofen (LIORESAL) 20 MG tablet Take 20 mg by mouth 2 times daily       Semaglutide (OZEMPIC, 0.25 OR 0.5 MG/DOSE, SC) Inject 0.25 mg into the skin once a week fridays       DULoxetine (CYMBALTA) 60 MG extended release capsule Take 1 capsule by mouth 2 times daily 60 capsule 0    Ubrogepant (UBRELVY) 50 MG TABS Take by mouth as needed       Multiple Vitamins-Minerals (MULTI COMPLETE PO) Take by mouth 2 times daily       potassium chloride (KLOR-CON) 20 MEQ packet Take 20 mEq by mouth 2 times daily       ferrous sulfate (IRON 325) 325 (65 Fe) MG tablet Take 325 mg by mouth 2 times daily       Diclofenac Sodium 3 % GEL Apply topically as needed       methylphenidate (RITALIN) 20 MG tablet Take 20 mg by mouth 2 times daily. albuterol sulfate HFA (PROVENTIL;VENTOLIN;PROAIR) 108 (90 Base) MCG/ACT inhaler Inhale 2 puffs into the lungs every 6 hours as needed for Wheezing       budesonide-formoterol (SYMBICORT) 160-4.5 MCG/ACT AERO INHALE 2 PUFFS BID UTD      traZODone (DESYREL) 50 MG tablet Take 1 tablet by mouth nightly as needed for Sleep 30 tablet 0    rivaroxaban (XARELTO) 20 MG TABS tablet Take 1 tablet by mouth Daily with supper 30 tablet 0    carvedilol (COREG) 12.5 MG tablet Take 1 tablet by mouth 2 times daily (with meals) 60 tablet 0    furosemide (LASIX) 40 MG tablet Take 1 tablet by mouth daily 30 tablet 0    magnesium oxide (MAG-OX) 400 (241.3 Mg) MG TABS tablet Take 1 tablet by mouth daily 30 tablet 0    esomeprazole (NEXIUM) 40 MG delayed release capsule Take 1 capsule by mouth every morning (before breakfast) 30 capsule 0    ondansetron (ZOFRAN-ODT) 4 MG disintegrating tablet Take 4 mg by mouth every 8 hours as needed for Nausea or Vomiting       cetirizine (ZYRTEC) 10 MG tablet Take 10 mg by mouth daily       SUPER B COMPLEX/C PO Take 1 tablet by mouth 2 times daily       loperamide (IMODIUM) 2 MG capsule Take 2 mg by mouth 4 times daily as needed for Diarrhea Takes two capsules at onset       lidocaine (LIDODERM) 5 % Place 1 patch onto the skin daily as needed 12 hours on, 12 hours off.        fluticasone (FLONASE) 50 MCG/ACT nasal spray 1 spray by Each Nostril route daily       aspirin 81 MG chewable tablet       BISACODYL 5 MG EC tablet       BANOPHEN 50 MG capsule       isosorbide mononitrate (IMDUR) 30 MG extended release tablet       metoprolol tartrate (LOPRESSOR) 50 MG tablet       polyethylene glycol-electrolytes (NULYTELY) 420 g solution       hydrOXYzine (ATARAX) 25 MG tablet Take 25 mg by mouth 3 times daily as needed for Itching       gabapentin (NEURONTIN) 600 MG tablet Take 1 tablet by mouth 3 times daily for 30 days. (Patient taking differently: Take 1,200 mg by mouth 3 times daily. ) 90 tablet 0    metFORMIN (GLUCOPHAGE) 500 MG tablet Take 1 tablet by mouth 2 times daily (with meals) 60 tablet 0     Current Facility-Administered Medications   Medication Dose Route Frequency Provider Last Rate Last Admin    lidocaine 1 % injection 0.5 mL  0.5 mL IntraDERmal Once Geary Mortimer, PA-C           ALLERGIES:   Allergies   Allergen Reactions    Ace Inhibitors Anaphylaxis, Other (See Comments), Hives and Shortness Of Breath     ANGIOEDEMA  lisinopril  angiodema  ANGIOEDEMA  ANGIOEDEMA      Betadine Swab Aid [Povidone-Iodine] Hives and Itching    Betadine [Povidone Iodine] Hives    Iodine Anaphylaxis, Hives and Shortness Of Breath     INCLUDES BETADINE  Iv dye. Pt has not had any reaction w/ dye from CT scans for 10 years. Per dr. Palacios Minors contrast (omni 300) is fine to use. 19 -       Lurasidone Other (See Comments)    Lurasidone Hcl      Other reaction(s): Unknown    Nitrofurantoin Hives and Shortness Of Breath    Shellfish Allergy Anaphylaxis    Shellfish-Derived Products Anaphylaxis    Statins Other (See Comments)     Multiple statins causing myalgias. Bee Venom     Calcium     Iodides      Pre treat with benadryl    Lamotrigine Hives    Macrobid [Nitrofurantoin Macrocrystal] Hives    Other     Oxycodone-Acetaminophen Itching    Phenazopyridine     Pyridium [Phenazopyridine Hcl] Hives    Topiramate Other (See Comments)     Confusion        SOCIAL HISTORY:  Social History     Tobacco Use    Smoking status: Former     Packs/day: 1.00     Years: 20.00     Pack years: 20.00     Types: Cigarettes     Quit date: 2020     Years since quittin.4    Smokeless tobacco: Never   Substance Use Topics    Alcohol use:  Yes     Alcohol/week: 14.0 - 28.0 standard drinks     Types: 14 - 28 Shots of liquor per week     Comment: Drinks 2-4 shots liquor per day       Pertinent ROS:  Review of Systems  Skin: Denies any new changing, growing or bleeding lesions or rashes except as described in the HPI   Constitutional: Denies fevers, chills, and malaise. PHYSICAL EXAM:   /84   Pulse 74   Temp 96.8 °F (36 °C) (Temporal)   Wt 192 lb 12.8 oz (87.5 kg)   SpO2 93%   BMI 32.08 kg/m²     The patient is generally well appearing, well nourished, alert and conversational. Affect is normal.    Cutaneous Exam:  Physical Exam  Total body skin exam excluding external genitalia: head/face, neck, both arms, chest, back, abdomen, both legs, buttocks, digits and/or nails, was examined. Genital exam was deferred as patient denied having any lesions in this area. Complete visualization of scalp may be limited by hair density, length, and/or style    Facial covering was removed during examination. Diagnoses/exam findings/medical history pertinent to this visit are listed below:    Assessment:   Diagnosis Orders   1. Intertrigo  ketoconazole (NIZORAL) 2 % cream    triamcinolone (KENALOG) 0.025 % cream      2. Irritated nevus  AL SHAV SKIN LES <5MM REMAINDR BODY      3. Neurotic excoriations        4. Seborrheic keratoses        5. Multiple benign nevi        6. Neoplasm of uncertain behavior  AL TANGENTIAL BIOPSY SKIN SINGLE LESION    lidocaine 1 % injection 0.5 mL    Surgical Pathology      7. Verruca vulgaris  AL DESTRUCTION BENIGN LESIONS UP TO 14           Plan:  1. Intertrigo  - chronic illness, responding to treatment but not yet at goal   - ketoconazole (NIZORAL) 2 % cream; Apply to body folds twice daily, as needed. Combine with triamcinolone cream when using. Dispense: 60 g; Refill: 3  - triamcinolone (KENALOG) 0.025 % cream; Apply to body folds twice daily, as needed. Combine with ketoconazole cream when using. Dispense: 80 g; Refill: 3    2. Irritated nevus  Shave Removal: The procedure and its risks were explained including but not limited to pain, bleeding, infection, permanent scar, permanent pigment alteration and need for an additional procedure. Consent to proceed with the procedure was obtained from the patient or the parent. After cleaning with alcohol the 5 mm lesion on the right occipital scalp was anesthetized with 1% lidocaine with epinephrine and was removed with a dermablade. Hemostasis was achieved with aluminum chloride and Vaseline and a bandage were applied.   - NE SHAV SKIN LES <5MM REMAINDR BODY    3. Neurotic excoriations  - OTC N-acetylcysteine 1,200 mg qd    4. Seborrheic keratoses  - reassurance and education     5. Multiple benign nevi  - reassurance and education     6. Neoplasm of uncertain behavior  Shave Biopsy (right chest - r/o BCC vs excoriation): The procedure and its risks were explained including but not limited to pain, bleeding, infection, permanent scar, permanent pigment alteration and need for an additional procedure. Consent to proceed with the procedure was obtained from the patient or the parent. After cleaning with alcohol the lesion was anesthetized with 1% lidocaine with epinephrine and was removed with a dermablade. Hemostasis was achieved with aluminum chloride and Vaseline and a bandage were applied.   - NE TANGENTIAL BIOPSY SKIN SINGLE LESION  - lidocaine 1 % injection 0.5 mL  - Surgical Pathology; Future    7. Verruca vulgaris  After discussion of the risks, benefits, and alternative therapies available, the patient elected to proceed. After obtaining written informed consent, the patient's identity, procedure, and site were verified during a time out prior to proceeding procedure.  The lesions on the right palm were treated using liquid nitrogen spray gun for 6 second per cycle, 2 cycles total. The patient tolerated the procedure well and there were no immediate complications.   - NE DESTRUCTION BENIGN LESIONS UP TO 14      RTC Per Bx    Future Appointments   Date Time Provider Catrachito Whitten   1/11/2023 10:00 AM Candance Sear, MD [facility]   3/3/2023 10:00 AM MD MAGDALENE Cano [facility]         Patient Instructions   N-ACETYLCYSTEINE 1,200MG QD       Electronically signed by Jane Holley PA-C on 12/22/22 at 6:11 PM EST

## 2022-12-28 DIAGNOSIS — D48.9 NEOPLASM OF UNCERTAIN BEHAVIOR: ICD-10-CM

## 2023-01-04 ENCOUNTER — OFFICE VISIT (OUTPATIENT)
Dept: ORTHOPEDIC SURGERY | Age: 58
End: 2023-01-04
Payer: MEDICARE

## 2023-01-04 VITALS — BODY MASS INDEX: 31.99 KG/M2 | RESPIRATION RATE: 16 BRPM | HEIGHT: 65 IN | WEIGHT: 192 LBS

## 2023-01-04 DIAGNOSIS — S46.011D TRAUMATIC INCOMPLETE TEAR OF RIGHT ROTATOR CUFF, SUBSEQUENT ENCOUNTER: ICD-10-CM

## 2023-01-04 DIAGNOSIS — Z01.818 PRE-OP TESTING: Primary | ICD-10-CM

## 2023-01-04 PROCEDURE — 99214 OFFICE O/P EST MOD 30 MIN: CPT | Performed by: ORTHOPAEDIC SURGERY

## 2023-01-04 PROCEDURE — G8484 FLU IMMUNIZE NO ADMIN: HCPCS | Performed by: ORTHOPAEDIC SURGERY

## 2023-01-04 PROCEDURE — 3017F COLORECTAL CA SCREEN DOC REV: CPT | Performed by: ORTHOPAEDIC SURGERY

## 2023-01-04 PROCEDURE — 1036F TOBACCO NON-USER: CPT | Performed by: ORTHOPAEDIC SURGERY

## 2023-01-04 PROCEDURE — G8417 CALC BMI ABV UP PARAM F/U: HCPCS | Performed by: ORTHOPAEDIC SURGERY

## 2023-01-04 PROCEDURE — G8427 DOCREV CUR MEDS BY ELIG CLIN: HCPCS | Performed by: ORTHOPAEDIC SURGERY

## 2023-01-04 NOTE — PROGRESS NOTES
HPI: Ms. Johny Jamil is here today to review the results of her right shoulder MRI study. This was completed on 12/20/2022. I did independently review the images with the patient today and it demonstrates what appears to be a high-grade partial-thickness tear of the supraspinatus involving its bursal surface. The biceps tendon and subscapularis appears to be intact. No obvious significant chondral damage. I had a discussion with the patient today educating her about the condition of her shoulder based on this MRI and discussed treatment options available to her. This included continued conservative management as well as surgical intervention. She has undergone treatment as alluded to above by way of physical therapy as well as use of anti-inflammatories. At this time she is inclined to proceed with surgery. I believe she would be a candidate for an arthroscopic assessment of the shoulder with rotator cuff repair. We discussed the details of the procedure, risks and benefits of surgery, expected outcome and postoperative recovery course. Risks as discussed included but were not limited to risk of infection, wound healing problems, stiffness, progressive arthritis, persistent pain, re-tear of the rotator cuff, failure of the rotator cuff repair to heal, suture and/or anchor related problems, vascular injury, excessive bleeding, temporary and/or permanent nerve injury, medical risks including DVT, PE, and stroke, and risk of anesthesia. She demonstrated a good understanding of our discussion and at this time would like to proceed with surgical intervention as outlined above. We will facilitate her getting cleared for surgery preoperatively and we'll schedule her for surgery at her convenience. Preoperative labs were ordered. All questions were appropriately answered however she was encouraged to return or call prior to surgery or any other upcoming appointments with additional questions and/or concerns.

## 2023-01-13 ENCOUNTER — HOSPITAL ENCOUNTER (OUTPATIENT)
Dept: PREADMISSION TESTING | Age: 58
Discharge: HOME | End: 2023-01-13

## 2023-01-13 ENCOUNTER — OFFICE VISIT (OUTPATIENT)
Dept: ORTHOPEDIC SURGERY | Age: 58
End: 2023-01-13

## 2023-01-13 DIAGNOSIS — Z96.651 HISTORY OF TOTAL RIGHT KNEE REPLACEMENT: Primary | ICD-10-CM

## 2023-01-13 NOTE — PROGRESS NOTES
Status post a right total knee March 2022. Patient states that overall she her knee is doing okay but she states that she gets the occasional grinding behind her kneecap that is bothersome especially with stairs otherwise she is getting around fairly well. She has noted to have nearly 150 pound weight loss    Lynda Koehler M.D.            [address] [facility],[address], [address] [facility]           Dept Phone: [phone]           Dept Fax:  [address]           [place]          Dept Phone: [phone]           Dept Fax:  [phone]      Chief Compliant:  Chief Complaint   Patient presents with    Pain     Rt TKA  3/8/22        History of Present Illness: This is a 62 y.o. female who presents to the clinic today for evaluation / follow up of status post right total knee please see above. Review of Systems   Constitutional: Negative for fever, chills, sweats. Eyes: Negative for changes in vision, or pain. HENT: Negative for ear ache, epistaxis, or sore throat. Respiratory/Cardio: Negative for Chest pain, palpitations, SOB, or cough. Gastrointestinal: Negative for abdominal pain, N/V/D. Genitourinary: Negative for dysuria, frequency, urgency, or hematuria. Neurological: Negative for headache, numbness, or weakness. Integumentary: Negative for rash, itching, laceration, or abrasion. Musculoskeletal: Positive for Pain (Rt TKA  3/8/22)       Physical Exam:  Constitutional: Patient is oriented to person, place, and time. Patient appears well-developed and well nourished. HENT: Negative otherwise noted  Head: Normocephalic and Atraumatic  Nose: Normal  Eyes: Conjunctivae and EOM are normal  Neck: Normal range of motion Neck supple. Respiratory/Cardio: Effort normal. No respiratory distress.   Musculoskeletal: Examination of the patient's right knee notes her motion is 0 to about 120 degrees she she does have some peripatellar crepitus but she is she does track nicely she has good varus and valgus stability minimal discomfort today otherwise fairly benign examination. Neurological: Patient is alert and oriented to person, place, and time. Normal strength. No sensory deficit. Skin: Skin is warm and dry  Psychiatric: Behavior is normal. Thought content normal.  Nursing note and vitals reviewed. Labs and Imaging:     XR taken today: No new x-rays taken today  No results found. No orders of the defined types were placed in this encounter. Assessment and Plan:  1. History of total right knee replacement      2       history of left total knee      This is a 62 y.o. female who presents to the clinic today for evaluation / follow up of history of bilateral total knee arthroplasties. Past History:    Current Outpatient Medications:     amphetamine-dextroamphetamine (ADDERALL) 10 MG tablet, , Disp: , Rfl:     budesonide (ENTOCORT EC) 3 MG extended release capsule, , Disp: , Rfl:     carBAMazepine (TEGRETOL) 200 MG tablet, , Disp: , Rfl:     cephALEXin (KEFLEX) 500 MG capsule, , Disp: , Rfl:     fluconazole (DIFLUCAN) 150 MG tablet, , Disp: , Rfl:     HYDROcodone-acetaminophen (NORCO) 5-325 MG per tablet, , Disp: , Rfl:     naloxone 4 MG/0.1ML LIQD nasal spray, , Disp: , Rfl:     sulfamethoxazole-trimethoprim (BACTRIM DS;SEPTRA DS) 800-160 MG per tablet, , Disp: , Rfl:     albuterol (PROVENTIL) (2.5 MG/3ML) 0.083% nebulizer solution, , Disp: , Rfl:     ketoconazole (NIZORAL) 2 % cream, Apply to body folds twice daily, as needed. Combine with triamcinolone cream when using., Disp: 60 g, Rfl: 3    triamcinolone (KENALOG) 0.025 % cream, Apply to body folds twice daily, as needed.   Combine with ketoconazole cream when using., Disp: 80 g, Rfl: 3    acetaminophen (TYLENOL) 500 MG tablet, Take 500 mg by mouth every 6 hours as needed , Disp: , Rfl:     aspirin 81 MG chewable tablet, , Disp: , Rfl:     BISACODYL 5 MG EC tablet, , Disp: , Rfl:     diclofenac sodium (VOLTAREN) 1 % GEL, , Disp: , Rfl:     BANOPHEN 50 MG capsule, , Disp: , Rfl:     diphenoxylate-atropine (LOMOTIL) 2.5-0.025 MG per tablet, Take 1 tablet by mouth 4 times daily as needed.  , Disp: , Rfl:     famotidine (PEPCID) 20 MG tablet, , Disp: , Rfl:     ONETOUCH ULTRA strip, , Disp: , Rfl:     hydrOXYzine (VISTARIL) 25 MG capsule, TAKE ONE CAPSULE BY MOUTH THREE TIMES A DAY AS NEEDED FOR ANXIETY, Disp: , Rfl:     isosorbide mononitrate (IMDUR) 30 MG extended release tablet, , Disp: , Rfl:     LORATADINE PO, Take 10 mg by mouth , Disp: , Rfl:     metoprolol tartrate (LOPRESSOR) 50 MG tablet, , Disp: , Rfl:     nystatin (MYCOSTATIN) 225344 UNIT/GM powder, APPLY TO AFFECTED AREA(S) FOUR TIMES A DAY, Disp: , Rfl:     Onabotulinumtoxin A (BOTOX, COSMETIC,) 200 units injection, every 3 (three) months., Disp: , Rfl:     polyethylene glycol-electrolytes (NULYTELY) 420 g solution, , Disp: , Rfl:     baclofen (LIORESAL) 20 MG tablet, Take 20 mg by mouth 2 times daily , Disp: , Rfl:     Semaglutide (OZEMPIC, 0.25 OR 0.5 MG/DOSE, SC), Inject 0.25 mg into the skin once a week fridays , Disp: , Rfl:     DULoxetine (CYMBALTA) 60 MG extended release capsule, Take 1 capsule by mouth 2 times daily, Disp: 60 capsule, Rfl: 0    hydrOXYzine (ATARAX) 25 MG tablet, Take 25 mg by mouth 3 times daily as needed for Itching , Disp: , Rfl:     Ubrogepant (UBRELVY) 50 MG TABS, Take by mouth as needed , Disp: , Rfl:     Multiple Vitamins-Minerals (MULTI COMPLETE PO), Take by mouth 2 times daily , Disp: , Rfl:     potassium chloride (KLOR-CON) 20 MEQ packet, Take 20 mEq by mouth 2 times daily , Disp: , Rfl:     ferrous sulfate (IRON 325) 325 (65 Fe) MG tablet, Take 325 mg by mouth 2 times daily , Disp: , Rfl:     Diclofenac Sodium 3 % GEL, Apply topically as needed , Disp: , Rfl:     methylphenidate (RITALIN) 20 MG tablet, Take 20 mg by mouth 2 times daily. , Disp: , Rfl:     albuterol sulfate HFA (PROVENTIL;VENTOLIN;PROAIR) 108 (90 Base) MCG/ACT inhaler, Inhale 2 puffs into the lungs every 6 hours as needed for Wheezing , Disp: , Rfl:     budesonide-formoterol (SYMBICORT) 160-4.5 MCG/ACT AERO, INHALE 2 PUFFS BID UTD, Disp: , Rfl:     gabapentin (NEURONTIN) 600 MG tablet, Take 1 tablet by mouth 3 times daily for 30 days.  (Patient taking differently: Take 1,200 mg by mouth 3 times daily. ), Disp: 90 tablet, Rfl: 0    traZODone (DESYREL) 50 MG tablet, Take 1 tablet by mouth nightly as needed for Sleep, Disp: 30 tablet, Rfl: 0    metFORMIN (GLUCOPHAGE) 500 MG tablet, Take 1 tablet by mouth 2 times daily (with meals), Disp: 60 tablet, Rfl: 0    rivaroxaban (XARELTO) 20 MG TABS tablet, Take 1 tablet by mouth Daily with supper, Disp: 30 tablet, Rfl: 0    carvedilol (COREG) 12.5 MG tablet, Take 1 tablet by mouth 2 times daily (with meals), Disp: 60 tablet, Rfl: 0    furosemide (LASIX) 40 MG tablet, Take 1 tablet by mouth daily, Disp: 30 tablet, Rfl: 0    magnesium oxide (MAG-OX) 400 (241.3 Mg) MG TABS tablet, Take 1 tablet by mouth daily, Disp: 30 tablet, Rfl: 0    esomeprazole (NEXIUM) 40 MG delayed release capsule, Take 1 capsule by mouth every morning (before breakfast), Disp: 30 capsule, Rfl: 0    ondansetron (ZOFRAN-ODT) 4 MG disintegrating tablet, Take 4 mg by mouth every 8 hours as needed for Nausea or Vomiting , Disp: , Rfl:     cetirizine (ZYRTEC) 10 MG tablet, Take 10 mg by mouth daily , Disp: , Rfl:     SUPER B COMPLEX/C PO, Take 1 tablet by mouth 2 times daily , Disp: , Rfl:     loperamide (IMODIUM) 2 MG capsule, Take 2 mg by mouth 4 times daily as needed for Diarrhea Takes two capsules at onset , Disp: , Rfl:     lidocaine (LIDODERM) 5 %, Place 1 patch onto the skin daily as needed 12 hours on, 12 hours off. , Disp: , Rfl:     fluticasone (FLONASE) 50 MCG/ACT nasal spray, 1 spray by Each Nostril route daily , Disp: , Rfl:   Allergies   Allergen Reactions    Ace Inhibitors Anaphylaxis, Other (See Comments), Hives and Shortness Of Breath     ANGIOEDEMA  lisinopril  angiodema  ANGIOEDEMA  ANGIOEDEMA      Betadine Swab Aid [Povidone-Iodine] Hives and Itching    Betadine [Povidone Iodine] Hives    Iodine Anaphylaxis, Hives and Shortness Of Breath     INCLUDES BETADINE  Iv dye. Pt has not had any reaction w/ dye from CT scans for 10 years. Per dr. Cony Lawrence contrast (omni 300) is fine to use. 19 - lilibeth      Lurasidone Other (See Comments)    Lurasidone Hcl      Other reaction(s): Unknown    Nitrofurantoin Hives and Shortness Of Breath    Shellfish Allergy Anaphylaxis    Shellfish-Derived Products Anaphylaxis    Statins Other (See Comments)     Multiple statins causing myalgias. Bee Venom     Calcium     Iodides      Pre treat with benadryl    Lamotrigine Hives    Macrobid [Nitrofurantoin Macrocrystal] Hives    Other     Oxycodone-Acetaminophen Itching    Phenazopyridine     Pyridium [Phenazopyridine Hcl] Hives    Topiramate Other (See Comments)     Confusion      Social History     Socioeconomic History    Marital status:      Spouse name: Not on file    Number of children: Not on file    Years of education: Not on file    Highest education level: Not on file   Occupational History    Occupation: Disabled   Tobacco Use    Smoking status: Former     Packs/day: 1.00     Years: 20.00     Pack years: 20.00     Types: Cigarettes     Quit date: 2020     Years since quittin.5    Smokeless tobacco: Never   Vaping Use    Vaping Use: Never used   Substance and Sexual Activity    Alcohol use:  Yes     Alcohol/week: 14.0 - 28.0 standard drinks     Types: 14 - 28 Shots of liquor per week     Comment: Drinks 2-4 shots liquor per day    Drug use: Not Currently     Types: Cocaine     Comment: Last use of cocaine was 2022- patient states she has stopped    Sexual activity: Yes     Partners: Male   Other Topics Concern    Not on file   Social History Narrative    Not on file     Social Determinants of Health     Financial Resource Strain: Not on file   Food Insecurity: Not on file   Transportation Needs: Not on file   Physical Activity: Inactive    Days of Exercise per Week: 0 days    Minutes of Exercise per Session: 0 min   Stress: Not on file   Social Connections: Not on file   Intimate Partner Violence: Not At Risk    Fear of Current or Ex-Partner: No    Emotionally Abused: No    Physically Abused: No    Sexually Abused: No   Housing Stability: Not on file     Past Medical History:   Diagnosis Date    Abdominal pain, epigastric 06/29/2011    Abnormal cardiovascular stress test 01/23/2012    Abnormal EKG     Arrhythmia 06/04/2014    Atrial fibrillation ([address]     Bilateral edema of lower extremity 03/03/2016    Bipolar disorder ([address] 02/22/2022    Formatting of this note might be different from the original. Manic depression.     Blurred vision     left eye     Breast cancer ([address]     bilateral remission since 2012    C. difficile colitis     2015    Cervical spondylosis with myelopathy and radiculopathy 12/17/2020    Cocaine use     Colitis 02/05/2016    Colon cancer ([address]     stage IV remission since 2016    Colovesical fistula 02/09/2016    Compression fracture of first lumbar vertebra ([address] 09/24/2019    Congestive heart failure ([address] 05/06/2020    COPD (chronic obstructive pulmonary disease) ([address]     COPD, moderate ([address] 02/19/2021    COVID-19 Nov 2019 & Dec 2020    Dental disease     Depression     Diabetes mellitus ([address]     Diastolic dysfunction 48/07/6957    Diverticulitis 11/17/2015    Diverticulitis of colon 10/29/2015    Diverticulosis     Dizziness     from sitting to standing    Dupuytren's contracture of hand 04/16/2021    Fibromyalgia     GERD (gastroesophageal reflux disease)     Gout 06/04/2014    Heavy alcohol use     History of falling     History of loop recorder     Hyperlipidemia     Hypertension     Intentional drug overdose ([facility] 01/2022    Irritable bowel syndrome with diarrhea 09/29/2010    Kidney stones     Malignant neoplasm of skin 12/21/2021    MDD (major depressive disorder), severe ([address] 01/20/2020    Migraine 06/04/2014    Multiple sclerosis ([address]     Myocardial infarct ([address] 12/2021    States 12/2021 past MI shown on EKG, also may have had MI in 2008 with stroke    Narcoleptic syndrome 02/19/2021    Non-rheumatic mitral regurgitation 03/01/2019    On home oxygen therapy     home oxygen PRN and at night 2 L    Palpitations 06/18/2013    PONV (postoperative nausea and vomiting)     Postoperative hypoxia 08/14/2020    Pulmonary nodules     Rheumatoid arthritis ([address]     Right knee DJD     S/P hysterectomy with oophorectomy 08/04/2010    Seizure ([address] 7/    last seizure 7/18/2020  first seizure 1992 after head injury take gabapentin    Sleep apnea     No machine currently- uses O2 via N/C 2 L nightly    Suicide attempt ([facility]     Syncope 06/03/2019    TIA (transient ischemic attack) 06/07/2013    Unspecified cerebral artery occlusion with cerebral infarction     2008     Wears glasses     Wears partial dentures     upper and lower     Past Surgical History:   Procedure Laterality Date    APPENDECTOMY  1985    BACK SURGERY      neck and back screws and cage    BREAST BIOPSY  [id number]    BREAST LUMPECTOMY      bilateral 1998,2000,2002     CARDIAC SURGERY      cardiac cath negative in 2018    CARDIOVERSION      several times    CERVICAL DISCECTOMY  12/17/2020    DISKECTOMY CERVICAL FUSION ANTERIOR C6,7; Surgeon: Gauri Rivera MD; Location: [facility]; Service: Neurosurgery;  Laterality: N/A;    [address] [facility] [address]    COLONOSCOPY      CYSTOSCOPY  04/06/2021    CYSTOSCOPY  11/19/2015    FRACTURE SURGERY      right wrist ORIF    HYSTERECTOMY (CERVIX STATUS UNKNOWN)  2010    INSERTABLE CARDIAC MONITOR      loop recorder    KNEE ARTHROSCOPY Right 08/14/2020    RIGHT KNEE ARTHROSCOPY WITH PARTIAL MEDIAL MENISCECTOMY WITH DEBRIDEMENT performed by Litzy Villagran DO at [address] [facility] ARTHROSCOPY Left 11/06/2020    LEFT KNEE ARTHROSCOPY WITH PARTIAL LATERAL MENISCAL TEAR REPAIR AND [address] performed by Litzy Villagran DO at [address] [facility] [address] RESECTION  12/2021    neck area    OTHER SURGICAL HISTORY  04/06/2021    URETHROGRAM    OTHER SURGICAL HISTORY Left     MASA; HEMORRHAGE CONTROL- CAUTERIZED W/SILVER NITRATE    OTHER SURGICAL HISTORY      UTERINE ABLATION    OTHER SURGICAL HISTORY      RADIOFREQUENCY ABLATION CERVICAL SPINE NERVES    SIGMOIDECTOMY  2016    for cancer    SKIN BIOPSY      moles    SLEEVE GASTRECTOMY  05/19/2015    GASTRECTOMY SLEEVE LAPAROSCOPIC           TONSILLECTOMY  1985    TOTAL KNEE ARTHROPLASTY Left 08/31/2021    KNEE TOTAL ARTHROPLASTY performed [facility]ann MD [facility] Right 3/8/2022    KNEE TOTAL ARTHROPLASTY performed by Caro Gann MD at [address] [place] [address] ENDOSCOPY  01/30/2015    UPPER GASTROINTESTINAL ENDOSCOPY      WISDOM TOOTH EXTRACTION       Family History   Adopted: Yes   Problem Relation Age of Onset    Other Mother         Guillain-Barré- COD    Heart Disease Father     Depression Father     Suicide Father    Plan  Patient was advised that she likely does have old scar tissue around her kneecap but otherwise she is functioning pretty well. I would encourage her to continue exercises and and that is in continue strengthening. We will see the patient back here in 1 year  Patient is noted to be having his rotator cuff surgery with Dr. Francy Herndon the next couple weeks and wish her good luck      Provider Attestation:  Bo Navarro, personally performed the services described in this documentation. All medical record entries made by the scribe were at my direction and in my presence.  I have reviewed the chart and discharge instructions and agree that the records reflect my personal performance and is accurate and complete. Jere Kapadia MD. 01/13/23      Please note that this chart was generated using voice recognition Dragon dictation software.  Although every effort was made to ensure the accuracy of this automated transcription, some errors in transcription may have occurred.

## 2023-03-23 ENCOUNTER — OFFICE VISIT (OUTPATIENT)
Dept: ORTHOPEDIC SURGERY | Age: 58
End: 2023-03-23

## 2023-03-23 DIAGNOSIS — Z96.651 HISTORY OF TOTAL RIGHT KNEE REPLACEMENT: Primary | ICD-10-CM

## 2023-03-23 NOTE — PROGRESS NOTES
pulmonary disease) ([address]     COPD, moderate ([address] 02/19/2021    COVID-19 Nov 2019 & Dec 2020    Dental disease     Depression     Diabetes mellitus ([address]     Diastolic dysfunction 24/93/7913    Diverticulitis 11/17/2015    Diverticulitis of colon 10/29/2015    Diverticulosis     Dizziness     from sitting to standing    Dupuytren's contracture of hand 04/16/2021    Fibromyalgia     GERD (gastroesophageal reflux disease)     Gout 06/04/2014    Heavy alcohol use     History of falling     History of loop recorder     Hyperlipidemia     Hypertension     Intentional drug overdose ([address] 01/2022    Irritable bowel syndrome with diarrhea 09/29/2010    Kidney stones     Malignant neoplasm of skin 12/21/2021    MDD (major depressive disorder), severe ([address] 01/20/2020    Migraine 06/04/2014    Multiple sclerosis ([address]     Myocardial infarct ([address] 12/2021    States 12/2021 past MI shown on EKG, also may have had MI in 2008 with stroke    Narcoleptic syndrome 02/19/2021    Non-rheumatic mitral regurgitation 03/01/2019    On home oxygen therapy     home oxygen PRN and at night 2 L    Palpitations 06/18/2013    PONV (postoperative nausea and vomiting)     Postoperative hypoxia 08/14/2020    Pulmonary nodules     Rheumatoid arthritis ([address]     Right knee DJD     S/P hysterectomy with oophorectomy 08/04/2010    Seizure ([address] 7/    last seizure 7/18/2020  first seizure 1992 after head injury take gabapentin    Sleep apnea     No machine currently- uses O2 via N/C 2 L nightly    Suicide attempt ([address]     Syncope 06/03/2019    TIA (transient ischemic attack) 06/07/2013    Unspecified cerebral artery occlusion with cerebral infarction     2008     Wears glasses     Wears partial dentures     upper and lower     Past Surgical History:   Procedure Laterality Date    APPENDECTOMY  1985    BACK SURGERY      neck and back screws and cage    BREAST BIOPSY  [id number]    BREAST LUMPECTOMY      bilateral 1998,2000,2002     CARDIAC SURGERY

## 2023-03-29 ENCOUNTER — HOSPITAL ENCOUNTER (OUTPATIENT)
Age: 58
Discharge: HOME | End: 2023-03-29
Payer: MEDICARE

## 2023-03-29 ENCOUNTER — OFFICE VISIT (OUTPATIENT)
Dept: ORTHOPEDIC SURGERY | Age: 58
End: 2023-03-29
Payer: MEDICARE

## 2023-03-29 VITALS — RESPIRATION RATE: 14 BRPM | WEIGHT: 193 LBS | HEIGHT: 62 IN | BODY MASS INDEX: 35.51 KG/M2

## 2023-03-29 DIAGNOSIS — M75.101 TEAR OF RIGHT ROTATOR CUFF, UNSPECIFIED TEAR EXTENT, UNSPECIFIED WHETHER TRAUMATIC: ICD-10-CM

## 2023-03-29 DIAGNOSIS — Z01.818 PRE-OP TESTING: ICD-10-CM

## 2023-03-29 DIAGNOSIS — M25.512 LEFT SHOULDER PAIN, UNSPECIFIED CHRONICITY: Primary | ICD-10-CM

## 2023-03-29 LAB
ANION GAP SERPL CALCULATED.3IONS-SCNC: 11 MMOL/L (ref 9–17)
BUN SERPL-MCNC: 10 MG/DL (ref 6–20)
CALCIUM SERPL-MCNC: 9 MG/DL (ref 8.6–10.4)
CHLORIDE SERPL-SCNC: 107 MMOL/L (ref 98–107)
CO2 SERPL-SCNC: 28 MMOL/L (ref 20–31)
CREAT SERPL-MCNC: 0.63 MG/DL (ref 0.5–0.9)
GFR SERPL CREATININE-BSD FRML MDRD: >60 ML/MIN/1.73M2
GLUCOSE SERPL-MCNC: 90 MG/DL (ref 70–99)
HCT VFR BLD AUTO: 40.5 % (ref 36.3–47.1)
HGB BLD-MCNC: 12.9 G/DL (ref 11.9–15.1)
MCH RBC QN AUTO: 30.3 PG (ref 25.2–33.5)
MCHC RBC AUTO-ENTMCNC: 31.9 G/DL (ref 28.4–34.8)
MCV RBC AUTO: 95.1 FL (ref 82.6–102.9)
NRBC AUTOMATED: 0 PER 100 WBC
PDW BLD-RTO: 13.3 % (ref 11.8–14.4)
PLATELET # BLD AUTO: 191 K/UL (ref 138–453)
PMV BLD AUTO: 10.5 FL (ref 8.1–13.5)
POTASSIUM SERPL-SCNC: 4.1 MMOL/L (ref 3.7–5.3)
RBC # BLD: 4.26 M/UL (ref 3.95–5.11)
SODIUM SERPL-SCNC: 146 MMOL/L (ref 135–144)
WBC # BLD AUTO: 5 K/UL (ref 3.5–11.3)

## 2023-03-29 PROCEDURE — 99214 OFFICE O/P EST MOD 30 MIN: CPT | Performed by: ORTHOPAEDIC SURGERY

## 2023-03-29 PROCEDURE — G8484 FLU IMMUNIZE NO ADMIN: HCPCS | Performed by: ORTHOPAEDIC SURGERY

## 2023-03-29 PROCEDURE — 36415 COLL VENOUS BLD VENIPUNCTURE: CPT

## 2023-03-29 PROCEDURE — G8417 CALC BMI ABV UP PARAM F/U: HCPCS | Performed by: ORTHOPAEDIC SURGERY

## 2023-03-29 PROCEDURE — G8427 DOCREV CUR MEDS BY ELIG CLIN: HCPCS | Performed by: ORTHOPAEDIC SURGERY

## 2023-03-29 PROCEDURE — 1036F TOBACCO NON-USER: CPT | Performed by: ORTHOPAEDIC SURGERY

## 2023-03-29 PROCEDURE — 80048 BASIC METABOLIC PNL TOTAL CA: CPT

## 2023-03-29 PROCEDURE — 3017F COLORECTAL CA SCREEN DOC REV: CPT | Performed by: ORTHOPAEDIC SURGERY

## 2023-03-29 PROCEDURE — 85027 COMPLETE CBC AUTOMATED: CPT

## 2023-03-29 NOTE — PROGRESS NOTES
ORTHOPEDIC PATIENT EVALUATION      HPI / Chief Complaint  Brendan Patel is a 62 y.o. female who presents for evaluation of her left shoulder. She would also like to revisit getting scheduled for surgery regarding her right shoulder. She has been seen in the past for the right shoulder and diagnosed with what appeared to be a high-grade bursal sided partial-thickness rotator cuff tear. She has undergone and failed treatment conservatively with use of therapy as well as a cortisone injection. She had elected initially to proceed with surgery but states that she had things going on in her life that caused her to back out from that decision. At this time her right shoulder is bothering her considerably and she is also dealing with quite a bit of pain in her left shoulder as well. She describes having pain over the top of the shoulder extending into the base of the neck and at times into the collarbone region. She describes it as a deep ache. No associated paresthesias.     Past Medical History  Dani Higuera  has a past medical history of Abdominal pain, epigastric, Abnormal cardiovascular stress test, Abnormal EKG, Arrhythmia, Atrial fibrillation (HCC), Bilateral edema of lower extremity, Bipolar disorder ([address] Blurred vision, Breast cancer ([address] C. difficile colitis, Cervical spondylosis with myelopathy and radiculopathy, Cocaine use, Colitis, Colon cancer ([address] Colovesical fistula, Compression fracture of first lumbar vertebra (HCC), Congestive heart failure ([address] COPD (chronic obstructive pulmonary disease) ([address] COPD, moderate ([address] COVID-19, Dental disease, Depression, Diabetes mellitus ([address] Diastolic dysfunction, Diverticulitis, Diverticulitis of colon, Diverticulosis, Dizziness, Dupuytren's contracture of hand, Fibromyalgia, GERD (gastroesophageal reflux disease), Gout, Heavy alcohol use, History of falling, History of loop recorder, Hyperlipidemia, Hypertension, Intentional drug overdose ([address]

## 2023-04-07 ENCOUNTER — HOSPITAL ENCOUNTER (INPATIENT)
Age: 58
LOS: 1 days | Discharge: HOME | DRG: 313 | End: 2023-04-11
Attending: EMERGENCY MEDICINE | Admitting: EMERGENCY MEDICINE
Payer: MEDICARE

## 2023-04-07 ENCOUNTER — APPOINTMENT (OUTPATIENT)
Dept: GENERAL RADIOLOGY | Age: 58
DRG: 313 | End: 2023-04-07
Payer: MEDICARE

## 2023-04-07 DIAGNOSIS — R07.9 CHEST PAIN, UNSPECIFIED TYPE: Primary | ICD-10-CM

## 2023-04-07 LAB
ABSOLUTE EOS #: 0.16 K/UL (ref 0–0.44)
ABSOLUTE IMMATURE GRANULOCYTE: <0.03 K/UL (ref 0–0.3)
ABSOLUTE LYMPH #: 2.65 K/UL (ref 1.1–3.7)
ABSOLUTE MONO #: 0.39 K/UL (ref 0.1–1.2)
ANION GAP SERPL CALCULATED.3IONS-SCNC: 12 MMOL/L (ref 9–17)
BASOPHILS # BLD: 1 % (ref 0–2)
BASOPHILS ABSOLUTE: 0.04 K/UL (ref 0–0.2)
BUN SERPL-MCNC: 10 MG/DL (ref 6–20)
CALCIUM SERPL-MCNC: 8.8 MG/DL (ref 8.6–10.4)
CHLORIDE SERPL-SCNC: 98 MMOL/L (ref 98–107)
CO2 SERPL-SCNC: 28 MMOL/L (ref 20–31)
CREAT SERPL-MCNC: 0.57 MG/DL (ref 0.5–0.9)
EOSINOPHILS RELATIVE PERCENT: 2 % (ref 1–4)
GFR SERPL CREATININE-BSD FRML MDRD: >60 ML/MIN/1.73M2
GLUCOSE SERPL-MCNC: 76 MG/DL (ref 70–99)
HCT VFR BLD AUTO: 40.7 % (ref 36.3–47.1)
HGB BLD-MCNC: 13.3 G/DL (ref 11.9–15.1)
IMMATURE GRANULOCYTES: 0 %
LYMPHOCYTES # BLD: 36 % (ref 24–43)
MAGNESIUM SERPL-MCNC: 2.1 MG/DL (ref 1.6–2.6)
MCH RBC QN AUTO: 30.9 PG (ref 25.2–33.5)
MCHC RBC AUTO-ENTMCNC: 32.7 G/DL (ref 28.4–34.8)
MCV RBC AUTO: 94.7 FL (ref 82.6–102.9)
MONOCYTES # BLD: 5 % (ref 3–12)
NRBC AUTOMATED: 0 PER 100 WBC
PDW BLD-RTO: 13.4 % (ref 11.8–14.4)
PLATELET # BLD AUTO: 216 K/UL (ref 138–453)
PMV BLD AUTO: 10.8 FL (ref 8.1–13.5)
POTASSIUM SERPL-SCNC: 3.4 MMOL/L (ref 3.7–5.3)
RBC # BLD: 4.3 M/UL (ref 3.95–5.11)
SARS-COV-2 RDRP RESP QL NAA+PROBE: NOT DETECTED
SEG NEUTROPHILS: 56 % (ref 36–65)
SEGMENTED NEUTROPHILS ABSOLUTE COUNT: 4.03 K/UL (ref 1.5–8.1)
SODIUM SERPL-SCNC: 138 MMOL/L (ref 135–144)
SPECIMEN DESCRIPTION: NORMAL
TROPONIN I SERPL DL<=0.01 NG/ML-MCNC: 10 NG/L (ref 0–14)
TROPONIN I SERPL DL<=0.01 NG/ML-MCNC: 9 NG/L (ref 0–14)
TSH SERPL-ACNC: 1.01 UIU/ML (ref 0.3–5)
WBC # BLD AUTO: 7.3 K/UL (ref 3.5–11.3)

## 2023-04-07 PROCEDURE — G0378 HOSPITAL OBSERVATION PER HR: HCPCS

## 2023-04-07 PROCEDURE — 99285 EMERGENCY DEPT VISIT HI MDM: CPT

## 2023-04-07 PROCEDURE — 80048 BASIC METABOLIC PNL TOTAL CA: CPT

## 2023-04-07 PROCEDURE — 6370000000 HC RX 637 (ALT 250 FOR IP): Performed by: STUDENT IN AN ORGANIZED HEALTH CARE EDUCATION/TRAINING PROGRAM

## 2023-04-07 PROCEDURE — 84443 ASSAY THYROID STIM HORMONE: CPT

## 2023-04-07 PROCEDURE — 71045 X-RAY EXAM CHEST 1 VIEW: CPT

## 2023-04-07 PROCEDURE — 83735 ASSAY OF MAGNESIUM: CPT

## 2023-04-07 PROCEDURE — 84484 ASSAY OF TROPONIN QUANT: CPT

## 2023-04-07 PROCEDURE — 2580000003 HC RX 258: Performed by: STUDENT IN AN ORGANIZED HEALTH CARE EDUCATION/TRAINING PROGRAM

## 2023-04-07 PROCEDURE — 93005 ELECTROCARDIOGRAM TRACING: CPT | Performed by: STUDENT IN AN ORGANIZED HEALTH CARE EDUCATION/TRAINING PROGRAM

## 2023-04-07 PROCEDURE — 85025 COMPLETE CBC W/AUTO DIFF WBC: CPT

## 2023-04-07 PROCEDURE — 87635 SARS-COV-2 COVID-19 AMP PRB: CPT

## 2023-04-07 RX ORDER — CARVEDILOL 12.5 MG/1
12.5 TABLET ORAL 2 TIMES DAILY WITH MEALS
Status: DISCONTINUED | OUTPATIENT
Start: 2023-04-08 | End: 2023-04-09

## 2023-04-07 RX ORDER — TRAZODONE HYDROCHLORIDE 50 MG/1
50 TABLET ORAL NIGHTLY PRN
Status: DISCONTINUED | OUTPATIENT
Start: 2023-04-07 | End: 2023-04-11 | Stop reason: HOSPADM

## 2023-04-07 RX ORDER — ASPIRIN 81 MG/1
81 TABLET, CHEWABLE ORAL DAILY
Status: DISCONTINUED | OUTPATIENT
Start: 2023-04-08 | End: 2023-04-11 | Stop reason: HOSPADM

## 2023-04-07 RX ORDER — BUDESONIDE 3 MG/1
3 CAPSULE, COATED PELLETS ORAL DAILY
Status: DISCONTINUED | OUTPATIENT
Start: 2023-04-08 | End: 2023-04-11 | Stop reason: HOSPADM

## 2023-04-07 RX ORDER — SODIUM CHLORIDE 0.9 % (FLUSH) 0.9 %
5-40 SYRINGE (ML) INJECTION EVERY 12 HOURS SCHEDULED
Status: DISCONTINUED | OUTPATIENT
Start: 2023-04-07 | End: 2023-04-11 | Stop reason: HOSPADM

## 2023-04-07 RX ORDER — ONDANSETRON 4 MG/1
4 TABLET, ORALLY DISINTEGRATING ORAL EVERY 8 HOURS PRN
Status: DISCONTINUED | OUTPATIENT
Start: 2023-04-07 | End: 2023-04-11 | Stop reason: HOSPADM

## 2023-04-07 RX ORDER — ONDANSETRON 2 MG/ML
4 INJECTION INTRAMUSCULAR; INTRAVENOUS EVERY 6 HOURS PRN
Status: DISCONTINUED | OUTPATIENT
Start: 2023-04-07 | End: 2023-04-11 | Stop reason: HOSPADM

## 2023-04-07 RX ORDER — SODIUM CHLORIDE 0.9 % (FLUSH) 0.9 %
5-40 SYRINGE (ML) INJECTION PRN
Status: DISCONTINUED | OUTPATIENT
Start: 2023-04-07 | End: 2023-04-11 | Stop reason: HOSPADM

## 2023-04-07 RX ORDER — ACETAMINOPHEN 325 MG/1
650 TABLET ORAL EVERY 4 HOURS PRN
Status: DISCONTINUED | OUTPATIENT
Start: 2023-04-07 | End: 2023-04-11 | Stop reason: HOSPADM

## 2023-04-07 RX ORDER — CETIRIZINE HYDROCHLORIDE 10 MG/1
10 TABLET ORAL DAILY
Status: DISCONTINUED | OUTPATIENT
Start: 2023-04-08 | End: 2023-04-11 | Stop reason: HOSPADM

## 2023-04-07 RX ORDER — BACLOFEN 10 MG/1
20 TABLET ORAL 2 TIMES DAILY
Status: DISCONTINUED | OUTPATIENT
Start: 2023-04-07 | End: 2023-04-08

## 2023-04-07 RX ORDER — LANOLIN ALCOHOL/MO/W.PET/CERES
325 CREAM (GRAM) TOPICAL 2 TIMES DAILY
Status: DISCONTINUED | OUTPATIENT
Start: 2023-04-07 | End: 2023-04-11 | Stop reason: HOSPADM

## 2023-04-07 RX ORDER — DULOXETIN HYDROCHLORIDE 30 MG/1
60 CAPSULE, DELAYED RELEASE ORAL 2 TIMES DAILY
Status: DISCONTINUED | OUTPATIENT
Start: 2023-04-07 | End: 2023-04-11 | Stop reason: HOSPADM

## 2023-04-07 RX ORDER — PANTOPRAZOLE SODIUM 40 MG/1
40 TABLET, DELAYED RELEASE ORAL
Status: DISCONTINUED | OUTPATIENT
Start: 2023-04-08 | End: 2023-04-11 | Stop reason: HOSPADM

## 2023-04-07 RX ORDER — CARBAMAZEPINE 200 MG/1
200 TABLET ORAL 2 TIMES DAILY
Status: DISCONTINUED | OUTPATIENT
Start: 2023-04-07 | End: 2023-04-11 | Stop reason: HOSPADM

## 2023-04-07 RX ORDER — SODIUM CHLORIDE 9 MG/ML
INJECTION, SOLUTION INTRAVENOUS PRN
Status: DISCONTINUED | OUTPATIENT
Start: 2023-04-07 | End: 2023-04-11 | Stop reason: HOSPADM

## 2023-04-07 RX ORDER — FUROSEMIDE 40 MG/1
40 TABLET ORAL DAILY
Status: DISCONTINUED | OUTPATIENT
Start: 2023-04-08 | End: 2023-04-11 | Stop reason: HOSPADM

## 2023-04-07 RX ORDER — GABAPENTIN 600 MG/1
1200 TABLET ORAL 3 TIMES DAILY
Status: DISCONTINUED | OUTPATIENT
Start: 2023-04-07 | End: 2023-04-11 | Stop reason: HOSPADM

## 2023-04-07 RX ADMIN — BACLOFEN 20 MG: 10 TABLET ORAL at 22:51

## 2023-04-07 RX ADMIN — RIVAROXABAN 20 MG: 20 TABLET, FILM COATED ORAL at 22:51

## 2023-04-07 RX ADMIN — GABAPENTIN 1200 MG: 600 TABLET ORAL at 22:51

## 2023-04-07 RX ADMIN — POTASSIUM BICARBONATE 40 MEQ: 782 TABLET, EFFERVESCENT ORAL at 19:13

## 2023-04-07 RX ADMIN — TRAZODONE HYDROCHLORIDE 50 MG: 50 TABLET ORAL at 22:51

## 2023-04-07 RX ADMIN — SODIUM CHLORIDE, PRESERVATIVE FREE 10 ML: 5 INJECTION INTRAVENOUS at 22:22

## 2023-04-07 RX ADMIN — DULOXETINE HYDROCHLORIDE 60 MG: 30 CAPSULE, DELAYED RELEASE ORAL at 22:51

## 2023-04-07 RX ADMIN — CARBAMAZEPINE 200 MG: 200 TABLET ORAL at 22:51

## 2023-04-07 RX ADMIN — FERROUS SULFATE TAB EC 325 MG (65 MG FE EQUIVALENT) 325 MG: 325 (65 FE) TABLET DELAYED RESPONSE at 22:51

## 2023-04-07 ASSESSMENT — ENCOUNTER SYMPTOMS
NAUSEA: 0
COUGH: 1
ABDOMINAL PAIN: 0
PHOTOPHOBIA: 0
SINUS PRESSURE: 0
DIARRHEA: 0
COLOR CHANGE: 0
BACK PAIN: 0
WHEEZING: 0
SORE THROAT: 0
CONSTIPATION: 0
EYE REDNESS: 0
TROUBLE SWALLOWING: 0
CHEST TIGHTNESS: 0
SINUS PAIN: 0
SHORTNESS OF BREATH: 1
VOMITING: 0

## 2023-04-07 ASSESSMENT — PAIN SCALES - GENERAL: PAINLEVEL_OUTOF10: 0

## 2023-04-07 ASSESSMENT — HEART SCORE: ECG: 1

## 2023-04-08 LAB
EKG ATRIAL RATE: 77 BPM
EKG P AXIS: 51 DEGREES
EKG P-R INTERVAL: 154 MS
EKG Q-T INTERVAL: 392 MS
EKG QRS DURATION: 88 MS
EKG QTC CALCULATION (BAZETT): 443 MS
EKG R AXIS: 3 DEGREES
EKG T AXIS: -140 DEGREES
EKG VENTRICULAR RATE: 77 BPM
TROPONIN I SERPL DL<=0.01 NG/ML-MCNC: 9 NG/L (ref 0–14)

## 2023-04-08 PROCEDURE — 6370000000 HC RX 637 (ALT 250 FOR IP): Performed by: EMERGENCY MEDICINE

## 2023-04-08 PROCEDURE — 6370000000 HC RX 637 (ALT 250 FOR IP)

## 2023-04-08 PROCEDURE — 36415 COLL VENOUS BLD VENIPUNCTURE: CPT

## 2023-04-08 PROCEDURE — 6370000000 HC RX 637 (ALT 250 FOR IP): Performed by: STUDENT IN AN ORGANIZED HEALTH CARE EDUCATION/TRAINING PROGRAM

## 2023-04-08 PROCEDURE — 84484 ASSAY OF TROPONIN QUANT: CPT

## 2023-04-08 PROCEDURE — G0378 HOSPITAL OBSERVATION PER HR: HCPCS

## 2023-04-08 PROCEDURE — 2580000003 HC RX 258: Performed by: STUDENT IN AN ORGANIZED HEALTH CARE EDUCATION/TRAINING PROGRAM

## 2023-04-08 PROCEDURE — 93010 ELECTROCARDIOGRAM REPORT: CPT | Performed by: INTERNAL MEDICINE

## 2023-04-08 RX ORDER — METOPROLOL TARTRATE 5 MG/5ML
5 INJECTION INTRAVENOUS EVERY 5 MIN PRN
Status: DISCONTINUED | OUTPATIENT
Start: 2023-04-08 | End: 2023-04-11 | Stop reason: HOSPADM

## 2023-04-08 RX ORDER — BACLOFEN 10 MG/1
20 TABLET ORAL 3 TIMES DAILY
Status: DISCONTINUED | OUTPATIENT
Start: 2023-04-08 | End: 2023-04-08

## 2023-04-08 RX ORDER — BACLOFEN 10 MG/1
20 TABLET ORAL 3 TIMES DAILY
Status: DISCONTINUED | OUTPATIENT
Start: 2023-04-08 | End: 2023-04-11 | Stop reason: HOSPADM

## 2023-04-08 RX ORDER — LOPERAMIDE HYDROCHLORIDE 2 MG/1
2 CAPSULE ORAL ONCE
Status: COMPLETED | OUTPATIENT
Start: 2023-04-08 | End: 2023-04-08

## 2023-04-08 RX ADMIN — SODIUM CHLORIDE, PRESERVATIVE FREE 10 ML: 5 INJECTION INTRAVENOUS at 09:06

## 2023-04-08 RX ADMIN — PANTOPRAZOLE SODIUM 40 MG: 40 TABLET, DELAYED RELEASE ORAL at 09:02

## 2023-04-08 RX ADMIN — CARBAMAZEPINE 200 MG: 200 TABLET ORAL at 20:22

## 2023-04-08 RX ADMIN — CARVEDILOL 12.5 MG: 12.5 TABLET, FILM COATED ORAL at 10:54

## 2023-04-08 RX ADMIN — GABAPENTIN 1200 MG: 600 TABLET ORAL at 09:04

## 2023-04-08 RX ADMIN — BACLOFEN 20 MG: 10 TABLET ORAL at 09:05

## 2023-04-08 RX ADMIN — CARBAMAZEPINE 200 MG: 200 TABLET ORAL at 09:05

## 2023-04-08 RX ADMIN — CARVEDILOL 12.5 MG: 12.5 TABLET, FILM COATED ORAL at 17:33

## 2023-04-08 RX ADMIN — CETIRIZINE HYDROCHLORIDE 10 MG: 10 TABLET ORAL at 09:05

## 2023-04-08 RX ADMIN — FERROUS SULFATE TAB EC 325 MG (65 MG FE EQUIVALENT) 325 MG: 325 (65 FE) TABLET DELAYED RESPONSE at 20:22

## 2023-04-08 RX ADMIN — ASPIRIN 81 MG: 81 TABLET, CHEWABLE ORAL at 10:54

## 2023-04-08 RX ADMIN — SODIUM CHLORIDE, PRESERVATIVE FREE 10 ML: 5 INJECTION INTRAVENOUS at 20:23

## 2023-04-08 RX ADMIN — METOPROLOL TARTRATE 25 MG: 25 TABLET ORAL at 10:54

## 2023-04-08 RX ADMIN — GABAPENTIN 1200 MG: 600 TABLET ORAL at 20:22

## 2023-04-08 RX ADMIN — RIVAROXABAN 20 MG: 20 TABLET, FILM COATED ORAL at 17:33

## 2023-04-08 RX ADMIN — LOPERAMIDE HYDROCHLORIDE 2 MG: 2 CAPSULE ORAL at 09:12

## 2023-04-08 RX ADMIN — ACETAMINOPHEN 650 MG: 325 TABLET ORAL at 09:03

## 2023-04-08 RX ADMIN — GABAPENTIN 1200 MG: 600 TABLET ORAL at 14:33

## 2023-04-08 RX ADMIN — BUDESONIDE 3 MG: 3 CAPSULE, COATED PELLETS ORAL at 09:05

## 2023-04-08 RX ADMIN — FUROSEMIDE 40 MG: 40 TABLET ORAL at 09:03

## 2023-04-08 RX ADMIN — FERROUS SULFATE TAB EC 325 MG (65 MG FE EQUIVALENT) 325 MG: 325 (65 FE) TABLET DELAYED RESPONSE at 09:05

## 2023-04-08 RX ADMIN — BACLOFEN 20 MG: 10 TABLET ORAL at 20:22

## 2023-04-08 RX ADMIN — BACLOFEN 20 MG: 10 TABLET ORAL at 16:15

## 2023-04-08 ASSESSMENT — PAIN DESCRIPTION - LOCATION: LOCATION: CHEST

## 2023-04-08 ASSESSMENT — PAIN - FUNCTIONAL ASSESSMENT: PAIN_FUNCTIONAL_ASSESSMENT: PREVENTS OR INTERFERES SOME ACTIVE ACTIVITIES AND ADLS

## 2023-04-08 ASSESSMENT — PAIN SCALES - GENERAL
PAINLEVEL_OUTOF10: 3
PAINLEVEL_OUTOF10: 3
PAINLEVEL_OUTOF10: 0

## 2023-04-08 ASSESSMENT — PAIN DESCRIPTION - ORIENTATION: ORIENTATION: LEFT

## 2023-04-08 ASSESSMENT — PAIN DESCRIPTION - DESCRIPTORS: DESCRIPTORS: STABBING

## 2023-04-09 PROCEDURE — 2580000003 HC RX 258: Performed by: STUDENT IN AN ORGANIZED HEALTH CARE EDUCATION/TRAINING PROGRAM

## 2023-04-09 PROCEDURE — 6370000000 HC RX 637 (ALT 250 FOR IP): Performed by: EMERGENCY MEDICINE

## 2023-04-09 PROCEDURE — 6360000002 HC RX W HCPCS: Performed by: STUDENT IN AN ORGANIZED HEALTH CARE EDUCATION/TRAINING PROGRAM

## 2023-04-09 PROCEDURE — 2500000003 HC RX 250 WO HCPCS

## 2023-04-09 PROCEDURE — G0378 HOSPITAL OBSERVATION PER HR: HCPCS

## 2023-04-09 PROCEDURE — 96374 THER/PROPH/DIAG INJ IV PUSH: CPT

## 2023-04-09 PROCEDURE — 6370000000 HC RX 637 (ALT 250 FOR IP): Performed by: STUDENT IN AN ORGANIZED HEALTH CARE EDUCATION/TRAINING PROGRAM

## 2023-04-09 RX ORDER — CARVEDILOL 25 MG/1
25 TABLET ORAL 2 TIMES DAILY WITH MEALS
Status: DISCONTINUED | OUTPATIENT
Start: 2023-04-09 | End: 2023-04-11 | Stop reason: HOSPADM

## 2023-04-09 RX ADMIN — ACETAMINOPHEN 650 MG: 325 TABLET ORAL at 12:38

## 2023-04-09 RX ADMIN — CARVEDILOL 12.5 MG: 12.5 TABLET, FILM COATED ORAL at 17:04

## 2023-04-09 RX ADMIN — CARBAMAZEPINE 200 MG: 200 TABLET ORAL at 21:19

## 2023-04-09 RX ADMIN — FERROUS SULFATE TAB EC 325 MG (65 MG FE EQUIVALENT) 325 MG: 325 (65 FE) TABLET DELAYED RESPONSE at 21:19

## 2023-04-09 RX ADMIN — GABAPENTIN 1200 MG: 600 TABLET ORAL at 14:11

## 2023-04-09 RX ADMIN — FERROUS SULFATE TAB EC 325 MG (65 MG FE EQUIVALENT) 325 MG: 325 (65 FE) TABLET DELAYED RESPONSE at 09:12

## 2023-04-09 RX ADMIN — SODIUM CHLORIDE, PRESERVATIVE FREE 10 ML: 5 INJECTION INTRAVENOUS at 21:20

## 2023-04-09 RX ADMIN — CETIRIZINE HYDROCHLORIDE 10 MG: 10 TABLET ORAL at 09:12

## 2023-04-09 RX ADMIN — ONDANSETRON 4 MG: 2 INJECTION INTRAMUSCULAR; INTRAVENOUS at 18:09

## 2023-04-09 RX ADMIN — BACLOFEN 20 MG: 10 TABLET ORAL at 21:19

## 2023-04-09 RX ADMIN — ASPIRIN 81 MG: 81 TABLET, CHEWABLE ORAL at 09:11

## 2023-04-09 RX ADMIN — BUDESONIDE 3 MG: 3 CAPSULE, COATED PELLETS ORAL at 09:11

## 2023-04-09 RX ADMIN — CARBAMAZEPINE 200 MG: 200 TABLET ORAL at 09:12

## 2023-04-09 RX ADMIN — ANTI-FUNGAL POWDER MICONAZOLE NITRATE TALC FREE: 1.42 POWDER TOPICAL at 21:19

## 2023-04-09 RX ADMIN — GABAPENTIN 1200 MG: 600 TABLET ORAL at 21:19

## 2023-04-09 RX ADMIN — BACLOFEN 20 MG: 10 TABLET ORAL at 09:11

## 2023-04-09 RX ADMIN — BACLOFEN 20 MG: 10 TABLET ORAL at 14:11

## 2023-04-09 RX ADMIN — GABAPENTIN 1200 MG: 600 TABLET ORAL at 09:11

## 2023-04-09 RX ADMIN — CARVEDILOL 12.5 MG: 12.5 TABLET, FILM COATED ORAL at 09:11

## 2023-04-09 RX ADMIN — RIVAROXABAN 20 MG: 20 TABLET, FILM COATED ORAL at 17:13

## 2023-04-09 RX ADMIN — PANTOPRAZOLE SODIUM 40 MG: 40 TABLET, DELAYED RELEASE ORAL at 09:13

## 2023-04-09 RX ADMIN — FUROSEMIDE 40 MG: 40 TABLET ORAL at 09:12

## 2023-04-09 RX ADMIN — SODIUM CHLORIDE, PRESERVATIVE FREE 10 ML: 5 INJECTION INTRAVENOUS at 09:12

## 2023-04-09 ASSESSMENT — PAIN SCALES - GENERAL
PAINLEVEL_OUTOF10: 0
PAINLEVEL_OUTOF10: 3

## 2023-04-10 ENCOUNTER — APPOINTMENT (OUTPATIENT)
Dept: CT IMAGING | Age: 58
DRG: 313 | End: 2023-04-10
Payer: MEDICARE

## 2023-04-10 PROCEDURE — 6360000002 HC RX W HCPCS

## 2023-04-10 PROCEDURE — 1200000000 HC SEMI PRIVATE

## 2023-04-10 PROCEDURE — 6360000004 HC RX CONTRAST MEDICATION: Performed by: EMERGENCY MEDICINE

## 2023-04-10 PROCEDURE — 6370000000 HC RX 637 (ALT 250 FOR IP): Performed by: EMERGENCY MEDICINE

## 2023-04-10 PROCEDURE — 2500000003 HC RX 250 WO HCPCS: Performed by: STUDENT IN AN ORGANIZED HEALTH CARE EDUCATION/TRAINING PROGRAM

## 2023-04-10 PROCEDURE — 96374 THER/PROPH/DIAG INJ IV PUSH: CPT

## 2023-04-10 PROCEDURE — 6370000000 HC RX 637 (ALT 250 FOR IP)

## 2023-04-10 PROCEDURE — 75574 CT ANGIO HRT W/3D IMAGE: CPT

## 2023-04-10 PROCEDURE — 6370000000 HC RX 637 (ALT 250 FOR IP): Performed by: STUDENT IN AN ORGANIZED HEALTH CARE EDUCATION/TRAINING PROGRAM

## 2023-04-10 PROCEDURE — 2580000003 HC RX 258: Performed by: STUDENT IN AN ORGANIZED HEALTH CARE EDUCATION/TRAINING PROGRAM

## 2023-04-10 RX ORDER — ACETAMINOPHEN 500 MG
1000 TABLET ORAL 3 TIMES DAILY
Qty: 180 TABLET | Refills: 0 | Status: ON HOLD | OUTPATIENT
Start: 2023-04-10 | End: 2023-04-23 | Stop reason: HOSPADM

## 2023-04-10 RX ORDER — DIPHENHYDRAMINE HYDROCHLORIDE 50 MG/ML
50 INJECTION INTRAMUSCULAR; INTRAVENOUS ONCE
Status: COMPLETED | OUTPATIENT
Start: 2023-04-10 | End: 2023-04-10

## 2023-04-10 RX ORDER — METHYLPREDNISOLONE SODIUM SUCCINATE 40 MG/ML
40 INJECTION, POWDER, LYOPHILIZED, FOR SOLUTION INTRAMUSCULAR; INTRAVENOUS EVERY 4 HOURS
Status: COMPLETED | OUTPATIENT
Start: 2023-04-10 | End: 2023-04-10

## 2023-04-10 RX ADMIN — ACETAMINOPHEN 650 MG: 325 TABLET ORAL at 05:21

## 2023-04-10 RX ADMIN — CARBAMAZEPINE 200 MG: 200 TABLET ORAL at 21:46

## 2023-04-10 RX ADMIN — CARVEDILOL 25 MG: 25 TABLET, FILM COATED ORAL at 08:50

## 2023-04-10 RX ADMIN — GABAPENTIN 1200 MG: 600 TABLET ORAL at 21:46

## 2023-04-10 RX ADMIN — BACLOFEN 20 MG: 10 TABLET ORAL at 21:46

## 2023-04-10 RX ADMIN — CETIRIZINE HYDROCHLORIDE 10 MG: 10 TABLET ORAL at 08:49

## 2023-04-10 RX ADMIN — GABAPENTIN 1200 MG: 600 TABLET ORAL at 08:49

## 2023-04-10 RX ADMIN — ASPIRIN 81 MG: 81 TABLET, CHEWABLE ORAL at 08:49

## 2023-04-10 RX ADMIN — GABAPENTIN 1200 MG: 600 TABLET ORAL at 13:04

## 2023-04-10 RX ADMIN — CARBAMAZEPINE 200 MG: 200 TABLET ORAL at 08:49

## 2023-04-10 RX ADMIN — SODIUM CHLORIDE, PRESERVATIVE FREE 10 ML: 5 INJECTION INTRAVENOUS at 08:50

## 2023-04-10 RX ADMIN — IOPAMIDOL 109 ML: 755 INJECTION, SOLUTION INTRAVENOUS at 17:42

## 2023-04-10 RX ADMIN — METOPROLOL TARTRATE 15 MG: 5 INJECTION, SOLUTION INTRAVENOUS at 17:47

## 2023-04-10 RX ADMIN — ACETAMINOPHEN 650 MG: 325 TABLET ORAL at 23:06

## 2023-04-10 RX ADMIN — BACLOFEN 20 MG: 10 TABLET ORAL at 13:04

## 2023-04-10 RX ADMIN — METHYLPREDNISOLONE SODIUM SUCCINATE 40 MG: 40 INJECTION, POWDER, FOR SOLUTION INTRAMUSCULAR; INTRAVENOUS at 16:52

## 2023-04-10 RX ADMIN — METHYLPREDNISOLONE SODIUM SUCCINATE 40 MG: 40 INJECTION, POWDER, FOR SOLUTION INTRAMUSCULAR; INTRAVENOUS at 13:04

## 2023-04-10 RX ADMIN — PANTOPRAZOLE SODIUM 40 MG: 40 TABLET, DELAYED RELEASE ORAL at 08:49

## 2023-04-10 RX ADMIN — RIVAROXABAN 20 MG: 20 TABLET, FILM COATED ORAL at 18:37

## 2023-04-10 RX ADMIN — BACLOFEN 20 MG: 10 TABLET ORAL at 08:49

## 2023-04-10 RX ADMIN — DULOXETINE HYDROCHLORIDE 60 MG: 30 CAPSULE, DELAYED RELEASE ORAL at 08:49

## 2023-04-10 RX ADMIN — ANTI-FUNGAL POWDER MICONAZOLE NITRATE TALC FREE: 1.42 POWDER TOPICAL at 21:48

## 2023-04-10 RX ADMIN — DIPHENHYDRAMINE HYDROCHLORIDE 50 MG: 50 INJECTION, SOLUTION INTRAMUSCULAR; INTRAVENOUS at 15:46

## 2023-04-10 RX ADMIN — SODIUM CHLORIDE, PRESERVATIVE FREE 10 ML: 5 INJECTION INTRAVENOUS at 21:47

## 2023-04-10 RX ADMIN — FUROSEMIDE 40 MG: 40 TABLET ORAL at 08:49

## 2023-04-10 RX ADMIN — BUDESONIDE 3 MG: 3 CAPSULE, COATED PELLETS ORAL at 08:49

## 2023-04-10 RX ADMIN — FERROUS SULFATE TAB EC 325 MG (65 MG FE EQUIVALENT) 325 MG: 325 (65 FE) TABLET DELAYED RESPONSE at 08:49

## 2023-04-10 RX ADMIN — FERROUS SULFATE TAB EC 325 MG (65 MG FE EQUIVALENT) 325 MG: 325 (65 FE) TABLET DELAYED RESPONSE at 21:46

## 2023-04-10 ASSESSMENT — PAIN DESCRIPTION - LOCATION
LOCATION: HEAD
LOCATION: NECK;BACK

## 2023-04-10 ASSESSMENT — PAIN SCALES - GENERAL
PAINLEVEL_OUTOF10: 5
PAINLEVEL_OUTOF10: 0
PAINLEVEL_OUTOF10: 6
PAINLEVEL_OUTOF10: 8
PAINLEVEL_OUTOF10: 0

## 2023-04-10 ASSESSMENT — PAIN DESCRIPTION - FREQUENCY: FREQUENCY: CONTINUOUS

## 2023-04-10 ASSESSMENT — PAIN DESCRIPTION - DESCRIPTORS: DESCRIPTORS: STABBING;SHARP

## 2023-04-10 ASSESSMENT — PAIN DESCRIPTION - ONSET: ONSET: ON-GOING

## 2023-04-10 ASSESSMENT — PAIN DESCRIPTION - PAIN TYPE: TYPE: ACUTE PAIN

## 2023-04-10 ASSESSMENT — PAIN - FUNCTIONAL ASSESSMENT: PAIN_FUNCTIONAL_ASSESSMENT: ACTIVITIES ARE NOT PREVENTED

## 2023-04-10 ASSESSMENT — PAIN DESCRIPTION - ORIENTATION: ORIENTATION: ANTERIOR

## 2023-04-10 NOTE — PROGRESS NOTES
Patient has charted iodine allergy with a reaction of anaphylaxis. Ordering physicians were notified of hospital policy of not administering contrast to patients with dye allergys with a anaphylaxis reaction. Dr. Kamilla Kang, Dr. Adolfo Ram, and Dr. Allison Salvador have agreed to agreed to a four hour allergy prep prior to contrast administration.

## 2023-04-10 NOTE — PROGRESS NOTES
[address] [facility]  CDU / OBSERVATION ENCOUNTER  ATTENDING NOTE       I performed a history and physical examination of the patient and discussed management with the resident or midlevel provider. I reviewed the resident or midlevel provider's note and agree with the documented findings and plan of care. Any areas of disagreement are noted on the chart. I was personally present for the key portions of any procedures. I have documented in the chart those procedures where I was not present during the key portions. I have reviewed the nurses notes. I agree with the chief complaint, past medical history, past surgical history, allergies, medications, social and family history as documented unless otherwise noted below. The Family history, social history, and ROS are effectively unchanged since admission unless noted elsewhere in the chart. This patient was placed in the observation unit for reevaluation for possible admission to the hospital    Patient admitted to the ETU for chest pain and cardiology evaluation. On my exam today, patient says she does occasionally still have some episodes of chest pain but is not complaining of any chest pain at that time. She denies any new or worsening symptoms. Cardiology has been consulted and a coronary CTA is ordered for today. We will await results of the CTA and appreciate any further cardiology recommendations.     Enoch Mejia MD  Attending Emergency  Physician

## 2023-04-10 NOTE — DISCHARGE SUMMARY
CDU Discharge Summary        Patient:  Neri Lagos  YOB: 1965    MRN: [id number]   Acct: [de-identified]    Primary Care Physician: Lani Lucia    Admit date:  4/7/2023  5:17 PM  Discharge date: 4/11/2023 12:36 PM     Discharge Diagnoses:     Acute chest pain due to MSK  Improved with analgesia    Follow-up:  Call today/tomorrow for a follow up appointment with Lani Lucia , or return to the Emergency Room with worsening symptoms    Stressed to patient the importance of following up with primary care doctor for further workup/management of symptoms. Pt verbalizes understanding and agrees with plan. Discharge Medications:  Changes to medications            Medication List        CHANGE how you take these medications      acetaminophen 500 MG tablet  Commonly known as: TYLENOL  Take 2 tablets by mouth 3 times daily  What changed:   how much to take  when to take this  reasons to take this     gabapentin 600 MG tablet  Commonly known as: NEURONTIN  Take 1 tablet by mouth 3 times daily for 30 days.   What changed: how much to take            CONTINUE taking these medications      * albuterol sulfate  (90 Base) MCG/ACT inhaler  Commonly known as: PROVENTIL;VENTOLIN;PROAIR     * albuterol (2.5 MG/3ML) 0.083% nebulizer solution  Commonly known as: PROVENTIL     amphetamine-dextroamphetamine 10 MG tablet  Commonly known as: ADDERALL     aspirin 81 MG chewable tablet     baclofen 20 MG tablet  Commonly known as: LIORESAL     Banophen 50 MG capsule  Generic drug: diphenhydrAMINE     bisacodyl 5 MG EC tablet  Generic drug: bisacodyl     budesonide 3 MG extended release capsule  Commonly known as: ENTOCORT EC     budesonide-formoterol 160-4.5 MCG/ACT Aero  Commonly known as: SYMBICORT     carBAMazepine 200 MG tablet  Commonly known as: TEGRETOL     cephALEXin 500 MG capsule  Commonly known as: KEFLEX     cetirizine 10 MG tablet  Commonly known as: ZYRTEC     diclofenac sodium 1 % Gel  Commonly known

## 2023-04-10 NOTE — PLAN OF CARE
Problem: Chronic Conditions and Co-morbidities  Goal: Patient's chronic conditions and co-morbidity symptoms are monitored and maintained or improved  4/10/2023 1006 by Andrea Posada RN  Outcome: Progressing  4/10/2023 0427 by Aneta Woodall RN  Outcome: Progressing     Problem: Pain  Goal: Verbalizes/displays adequate comfort level or baseline comfort level  4/10/2023 1006 by Andrea Posada RN  Outcome: Progressing  4/10/2023 0427 by Aneta Woodall RN  Outcome: Progressing     Problem: Safety - Adult  Goal: Free from fall injury  4/10/2023 1006 by Andrea Posada RN  Outcome: Progressing  4/10/2023 0427 by Aneta Woodall RN  Outcome: Progressing     Problem: Discharge Planning  Goal: Discharge to home or other facility with appropriate resources  4/10/2023 1006 by Andrea Posada RN  Outcome: Progressing  4/10/2023 0427 by Aneta Woodall RN  Outcome: Progressing

## 2023-04-10 NOTE — PROGRESS NOTES
OBS/CDU   RESIDENT NOTE      Patients PCP is:  JARRET FOLEY      No acute events overnight. Has been able to tolerate a full diet without nausea or vomiting. The patient is urinating on his own and is passing flatus. Denies fever, chills, nausea, vomiting, chest pain, shortness of breath, abdominal pain, focal weakness, numbness, tingling, urinary/bowel symptoms, vision changes, visual hallucinations, or headache. PHYSICAL EXAM      General: NAD, AO X 3  Heent: EMOI, PERRL  Neck: SUPPLE, NO JVD  Cardiovascular: RRR, S1S2  Pulmonary: CTAB, NO SOB  Abdomen: SOFT, NTTP, ND, +BS  Extremities: +2/4 PULSES DISTAL, NO SWELLING  Neuro / Psych: NO NUMBNESS OR TINGLING, MENTATION AT BASELINE    PERTINENT TEST /EXAMS      I have reviewed all available laboratory results. MEDICATIONS CURRENT   miconazole (MICOTIN) 2 % powder, BID  carvedilol (COREG) tablet 25 mg, BID WC  metoprolol (LOPRESSOR) injection 5 mg, Q5 Min PRN  baclofen (LIORESAL) tablet 20 mg, TID  aspirin chewable tablet 81 mg, Daily  budesonide (ENTOCORT EC) extended release capsule 3 mg, Daily  carBAMazepine (TEGRETOL) tablet 200 mg, BID  cetirizine (ZYRTEC) tablet 10 mg, Daily  DULoxetine (CYMBALTA) extended release capsule 60 mg, BID  pantoprazole (PROTONIX) tablet 40 mg, QAM AC  ferrous sulfate (FE TABS 325) EC tablet 325 mg, BID  furosemide (LASIX) tablet 40 mg, Daily  gabapentin (NEURONTIN) tablet 1,200 mg, TID  rivaroxaban (XARELTO) tablet 20 mg, Dinner  traZODone (DESYREL) tablet 50 mg, Nightly PRN  sodium chloride flush 0.9 % injection 5-40 mL, 2 times per day  sodium chloride flush 0.9 % injection 5-40 mL, PRN  0.9 % sodium chloride infusion, PRN  acetaminophen (TYLENOL) tablet 650 mg, Q4H PRN  ondansetron (ZOFRAN-ODT) disintegrating tablet 4 mg, Q8H PRN   Or  ondansetron (ZOFRAN) injection 4 mg, Q6H PRN        All medication charted and reviewed.     CONSULTS      IP CONSULT TO CARDIOLOGY    ASSESSMENT/PLAN       Beth HARMON

## 2023-04-10 NOTE — PROGRESS NOTES
[facility]  Progress Note                   Date:   4/10/2023  Patient name: Noemi Coughlin  Date of admission:  4/7/2023  5:17 PM  MRN:   [id number]  YOB: 1965  PCP: Ruddy Alvarado    Reason for Admission: Chest pain [R07.9]  Chest pain, unspecified type [R07.9]    Subjective:       Clinical Changes /Abnormalities: Patient seen and examined in room. She currently denies chest pain but states she had episode CP this am. Labs/vitals/tele. Discussed with RN, no acute CV issues or concerns. Plan for coronary CTA today. Review of Systems    Medications:   Scheduled Meds:   methylPREDNISolone  40 mg IntraVENous Q4H    diphenhydrAMINE  50 mg IntraVENous Once    miconazole   Topical BID    carvedilol  25 mg Oral BID WC    baclofen  20 mg Oral TID    aspirin  81 mg Oral Daily    budesonide  3 mg Oral Daily    carBAMazepine  200 mg Oral BID    cetirizine  10 mg Oral Daily    DULoxetine  60 mg Oral BID    pantoprazole  40 mg Oral QAM AC    ferrous sulfate  325 mg Oral BID    furosemide  40 mg Oral Daily    gabapentin  1,200 mg Oral TID    rivaroxaban  20 mg Oral Dinner    sodium chloride flush  5-40 mL IntraVENous 2 times per day     Continuous Infusions:   sodium chloride       CBC:   Recent Labs     04/07/23  1802   WBC 7.3   HGB 13.3        BMP:    Recent Labs     04/07/23  1802      K 3.4*   CL 98   CO2 28   BUN 10   CREATININE 0.57   GLUCOSE 76     Hepatic:No results for input(s): AST, ALT, ALB, BILITOT, ALKPHOS in the last 72 hours. Troponin:   Recent Labs     04/07/23  1802 04/07/23  1953 04/08/23  0903   TROPHS 10 9 9     BNP: No results for input(s): BNP in the last 72 hours. Lipids: No results for input(s): CHOL, HDL in the last 72 hours. Invalid input(s): LDLCALCU  INR: No results for input(s): INR in the last 72 hours.     Objective:   Vitals: /77   Pulse 58   Temp 98.4 °F (36.9 °C) (Oral)   Resp 15   Ht 5' 2\" (1.575 m)   Wt 187 lb (84.8 kg)   SpO2

## 2023-04-10 NOTE — PROGRESS NOTES
Notified by radiology tech that unable to do a contrasted study since the patient has not had any contrasted studies here recently without an anaphylactic reaction. Called radiologist , who states that the patient can have the contrasted study if we do the 4-hour prep. Plan for doing the 4-hour prep and have the contrasted study this afternoon.     [place], DO  PGY1

## 2023-04-11 VITALS
DIASTOLIC BLOOD PRESSURE: 63 MMHG | TEMPERATURE: 97.7 F | HEART RATE: 67 BPM | RESPIRATION RATE: 16 BRPM | SYSTOLIC BLOOD PRESSURE: 119 MMHG | WEIGHT: 187 LBS | OXYGEN SATURATION: 96 % | HEIGHT: 62 IN | BODY MASS INDEX: 34.41 KG/M2

## 2023-04-11 PROCEDURE — 6370000000 HC RX 637 (ALT 250 FOR IP)

## 2023-04-11 PROCEDURE — 6370000000 HC RX 637 (ALT 250 FOR IP): Performed by: EMERGENCY MEDICINE

## 2023-04-11 PROCEDURE — 6370000000 HC RX 637 (ALT 250 FOR IP): Performed by: STUDENT IN AN ORGANIZED HEALTH CARE EDUCATION/TRAINING PROGRAM

## 2023-04-11 RX ADMIN — DULOXETINE HYDROCHLORIDE 60 MG: 30 CAPSULE, DELAYED RELEASE ORAL at 10:02

## 2023-04-11 RX ADMIN — GABAPENTIN 1200 MG: 600 TABLET ORAL at 10:02

## 2023-04-11 RX ADMIN — FERROUS SULFATE TAB EC 325 MG (65 MG FE EQUIVALENT) 325 MG: 325 (65 FE) TABLET DELAYED RESPONSE at 10:03

## 2023-04-11 RX ADMIN — CARVEDILOL 25 MG: 25 TABLET, FILM COATED ORAL at 10:03

## 2023-04-11 RX ADMIN — PANTOPRAZOLE SODIUM 40 MG: 40 TABLET, DELAYED RELEASE ORAL at 10:03

## 2023-04-11 RX ADMIN — ASPIRIN 81 MG: 81 TABLET, CHEWABLE ORAL at 10:04

## 2023-04-11 RX ADMIN — FUROSEMIDE 40 MG: 40 TABLET ORAL at 10:03

## 2023-04-11 RX ADMIN — BACLOFEN 20 MG: 10 TABLET ORAL at 10:03

## 2023-04-11 RX ADMIN — BUDESONIDE 3 MG: 3 CAPSULE, COATED PELLETS ORAL at 10:01

## 2023-04-11 RX ADMIN — TRAZODONE HYDROCHLORIDE 50 MG: 50 TABLET ORAL at 00:10

## 2023-04-11 RX ADMIN — CARBAMAZEPINE 200 MG: 200 TABLET ORAL at 10:04

## 2023-04-11 RX ADMIN — CETIRIZINE HYDROCHLORIDE 10 MG: 10 TABLET ORAL at 10:03

## 2023-04-11 RX ADMIN — ANTI-FUNGAL POWDER MICONAZOLE NITRATE TALC FREE: 1.42 POWDER TOPICAL at 10:07

## 2023-04-11 ASSESSMENT — PAIN SCALES - GENERAL
PAINLEVEL_OUTOF10: 0
PAINLEVEL_OUTOF10: 0

## 2023-04-11 NOTE — PROGRESS NOTES
OBS/CDU   RESIDENT NOTE      Patients PCP is:  [address]        SUBJECTIVE      Patient states that she is feeling a lot better. No acute events overnight. Has been able to tolerate a full diet without nausea or vomiting. The patient is urinating on his own and is passing flatus. Denies fever, chills, nausea, vomiting, chest pain, shortness of breath, abdominal pain, focal weakness, numbness, tingling, urinary/bowel symptoms, vision changes, visual hallucinations, or headache. PHYSICAL EXAM      General: NAD, AO X 3  Heent: EMOI, PERRL  Neck: SUPPLE, NO JVD  Cardiovascular: RRR, S1S2  Pulmonary: CTAB, NO SOB  Abdomen: SOFT, NTTP, ND, +BS  Extremities: +2/4 PULSES DISTAL, NO SWELLING  Neuro / Psych: NO NUMBNESS OR TINGLING, MENTATION AT BASELINE    PERTINENT TEST /EXAMS      I have reviewed all available laboratory results. MEDICATIONS CURRENT   miconazole (MICOTIN) 2 % powder, BID  carvedilol (COREG) tablet 25 mg, BID WC  metoprolol (LOPRESSOR) injection 5 mg, Q5 Min PRN  baclofen (LIORESAL) tablet 20 mg, TID  aspirin chewable tablet 81 mg, Daily  budesonide (ENTOCORT EC) extended release capsule 3 mg, Daily  carBAMazepine (TEGRETOL) tablet 200 mg, BID  cetirizine (ZYRTEC) tablet 10 mg, Daily  DULoxetine (CYMBALTA) extended release capsule 60 mg, BID  pantoprazole (PROTONIX) tablet 40 mg, QAM AC  ferrous sulfate (FE TABS 325) EC tablet 325 mg, BID  furosemide (LASIX) tablet 40 mg, Daily  gabapentin (NEURONTIN) tablet 1,200 mg, TID  rivaroxaban (XARELTO) tablet 20 mg, Dinner  traZODone (DESYREL) tablet 50 mg, Nightly PRN  sodium chloride flush 0.9 % injection 5-40 mL, 2 times per day  sodium chloride flush 0.9 % injection 5-40 mL, PRN  0.9 % sodium chloride infusion, PRN  acetaminophen (TYLENOL) tablet 650 mg, Q4H PRN  ondansetron (ZOFRAN-ODT) disintegrating tablet 4 mg, Q8H PRN   Or  ondansetron (ZOFRAN) injection 4 mg, Q6H PRN        All medication charted and reviewed.     CONSULTS      IP CONSULT

## 2023-04-11 NOTE — PROGRESS NOTES
[address]  CDU / OBSERVATION ENCOUNTER  ATTENDING NOTE       I performed a history and physical examination of the patient and discussed management with the resident or midlevel provider. I reviewed the resident or midlevel provider's note and agree with the documented findings and plan of care. Any areas of disagreement are noted on the chart. I was personally present for the key portions of any procedures. I have documented in the chart those procedures where I was not present during the key portions. I have reviewed the nurses notes. I agree with the chief complaint, past medical history, past surgical history, allergies, medications, social and family history as documented unless otherwise noted below. The Family history, social history, and ROS are effectively unchanged since admission unless noted elsewhere in the chart. This patient was placed in the observation unit for reevaluation for possible admission to the hospital    Patient doing reasonably well while in the hospital but patient has had coronary CT angiography. Awaiting testing results in order to disposition patient. Patient pain-free at the time of discharge. Patient with negative testing. Patient states ready for home.     Malvin Keane MD  Attending Emergency  Physician

## 2023-04-19 ENCOUNTER — HOSPITAL ENCOUNTER (INPATIENT)
Age: 58
LOS: 5 days | Discharge: PSYCHIATRIC OR SUBSTANCE USE FACILITY | DRG: 885 | End: 2023-04-24
Attending: EMERGENCY MEDICINE | Admitting: PSYCHIATRY & NEUROLOGY
Payer: MEDICARE

## 2023-04-19 DIAGNOSIS — F32.A DEPRESSION WITH SUICIDAL IDEATION: Primary | ICD-10-CM

## 2023-04-19 DIAGNOSIS — R45.851 DEPRESSION WITH SUICIDAL IDEATION: Primary | ICD-10-CM

## 2023-04-19 LAB
ABSOLUTE EOS #: 0.1 K/UL (ref 0–0.4)
ABSOLUTE LYMPH #: 2.4 K/UL (ref 1–4.8)
ABSOLUTE MONO #: 0.3 K/UL (ref 0.1–1.3)
ACETAMINOPHEN LEVEL: <5 UG/ML (ref 10–30)
ALBUMIN SERPL-MCNC: 3.8 G/DL (ref 3.5–5.2)
ALP SERPL-CCNC: 86 U/L (ref 35–104)
ALT SERPL-CCNC: 13 U/L (ref 5–33)
AMPHETAMINE SCREEN URINE: POSITIVE
ANION GAP SERPL CALCULATED.3IONS-SCNC: 11 MMOL/L (ref 9–17)
AST SERPL-CCNC: 16 U/L
BACTERIA: ABNORMAL
BARBITURATE SCREEN URINE: NEGATIVE
BASOPHILS # BLD: 1 % (ref 0–2)
BASOPHILS ABSOLUTE: 0.1 K/UL (ref 0–0.2)
BENZODIAZEPINE SCREEN, URINE: NEGATIVE
BILIRUB SERPL-MCNC: 0.2 MG/DL (ref 0.3–1.2)
BILIRUBIN URINE: NEGATIVE
BUN SERPL-MCNC: 11 MG/DL (ref 6–20)
CALCIUM SERPL-MCNC: 9.1 MG/DL (ref 8.6–10.4)
CANNABINOID SCREEN URINE: NEGATIVE
CASTS UA: ABNORMAL /LPF
CHLORIDE SERPL-SCNC: 102 MMOL/L (ref 98–107)
CO2 SERPL-SCNC: 27 MMOL/L (ref 20–31)
COCAINE METABOLITE, URINE: NEGATIVE
COLOR: YELLOW
CREAT SERPL-MCNC: 0.54 MG/DL (ref 0.5–0.9)
EOSINOPHILS RELATIVE PERCENT: 2 % (ref 0–4)
EPITHELIAL CELLS UA: ABNORMAL /HPF
ETHANOL PERCENT: <0.01 %
ETHANOL: <10 MG/DL
FENTANYL URINE: NEGATIVE
GFR SERPL CREATININE-BSD FRML MDRD: >60 ML/MIN/1.73M2
GLUCOSE SERPL-MCNC: 98 MG/DL (ref 70–99)
GLUCOSE UR STRIP.AUTO-MCNC: NEGATIVE MG/DL
HCT VFR BLD AUTO: 40.3 % (ref 36–46)
HGB BLD-MCNC: 13.2 G/DL (ref 12–16)
KETONES UR STRIP.AUTO-MCNC: NEGATIVE MG/DL
LEUKOCYTE ESTERASE UR QL STRIP.AUTO: NEGATIVE
LYMPHOCYTES # BLD: 38 % (ref 24–44)
MCH RBC QN AUTO: 30.4 PG (ref 26–34)
MCHC RBC AUTO-ENTMCNC: 32.6 G/DL (ref 31–37)
MCV RBC AUTO: 93.2 FL (ref 80–100)
METHADONE SCREEN, URINE: NEGATIVE
MONOCYTES # BLD: 5 % (ref 1–7)
NITRITE UR QL STRIP.AUTO: NEGATIVE
OPIATES, URINE: NEGATIVE
OXYCODONE SCREEN URINE: NEGATIVE
PDW BLD-RTO: 14.4 % (ref 11.5–14.9)
PHENCYCLIDINE, URINE: NEGATIVE
PLATELET # BLD AUTO: 241 K/UL (ref 150–450)
PMV BLD AUTO: 8.5 FL (ref 6–12)
POTASSIUM SERPL-SCNC: 3.9 MMOL/L (ref 3.7–5.3)
PROT SERPL-MCNC: 7.1 G/DL (ref 6.4–8.3)
PROT UR STRIP.AUTO-MCNC: 5 MG/DL (ref 5–8)
PROT UR STRIP.AUTO-MCNC: NEGATIVE MG/DL
RBC # BLD: 4.33 M/UL (ref 4–5.2)
RBC CLUMPS #/AREA URNS AUTO: ABNORMAL /HPF
SALICYLATE LEVEL: <1 MG/DL (ref 3–10)
SEG NEUTROPHILS: 54 % (ref 36–66)
SEGMENTED NEUTROPHILS ABSOLUTE COUNT: 3.3 K/UL (ref 1.3–9.1)
SODIUM SERPL-SCNC: 140 MMOL/L (ref 135–144)
SPECIFIC GRAVITY UA: 1.01 (ref 1–1.03)
TEST INFORMATION: ABNORMAL
TURBIDITY: CLEAR
URINE HGB: ABNORMAL
UROBILINOGEN, URINE: NORMAL
WBC # BLD AUTO: 6.2 K/UL (ref 3.5–11)
WBC UA: ABNORMAL /HPF

## 2023-04-19 PROCEDURE — 81001 URINALYSIS AUTO W/SCOPE: CPT

## 2023-04-19 PROCEDURE — 80053 COMPREHEN METABOLIC PANEL: CPT

## 2023-04-19 PROCEDURE — 99285 EMERGENCY DEPT VISIT HI MDM: CPT

## 2023-04-19 PROCEDURE — 6370000000 HC RX 637 (ALT 250 FOR IP): Performed by: PSYCHIATRY & NEUROLOGY

## 2023-04-19 PROCEDURE — G0480 DRUG TEST DEF 1-7 CLASSES: HCPCS

## 2023-04-19 PROCEDURE — 36415 COLL VENOUS BLD VENIPUNCTURE: CPT

## 2023-04-19 PROCEDURE — 85025 COMPLETE CBC W/AUTO DIFF WBC: CPT

## 2023-04-19 PROCEDURE — 80179 DRUG ASSAY SALICYLATE: CPT

## 2023-04-19 PROCEDURE — 80143 DRUG ASSAY ACETAMINOPHEN: CPT

## 2023-04-19 PROCEDURE — 80307 DRUG TEST PRSMV CHEM ANLYZR: CPT

## 2023-04-19 PROCEDURE — 1240000000 HC EMOTIONAL WELLNESS R&B

## 2023-04-19 RX ORDER — ASPIRIN 81 MG/1
81 TABLET, CHEWABLE ORAL DAILY
Status: DISCONTINUED | OUTPATIENT
Start: 2023-04-20 | End: 2023-04-24 | Stop reason: HOSPADM

## 2023-04-19 RX ORDER — LIDOCAINE 4 G/G
1 PATCH TOPICAL DAILY
Status: DISCONTINUED | OUTPATIENT
Start: 2023-04-20 | End: 2023-04-24 | Stop reason: HOSPADM

## 2023-04-19 RX ORDER — FAMOTIDINE 20 MG/1
20 TABLET, FILM COATED ORAL 2 TIMES DAILY
Status: DISCONTINUED | OUTPATIENT
Start: 2023-04-19 | End: 2023-04-24 | Stop reason: HOSPADM

## 2023-04-19 RX ORDER — POLYETHYLENE GLYCOL 3350 17 G/17G
17 POWDER, FOR SOLUTION ORAL DAILY PRN
Status: DISCONTINUED | OUTPATIENT
Start: 2023-04-19 | End: 2023-04-24 | Stop reason: HOSPADM

## 2023-04-19 RX ORDER — DIPHENOXYLATE HYDROCHLORIDE AND ATROPINE SULFATE 2.5; .025 MG/1; MG/1
2 TABLET ORAL 4 TIMES DAILY PRN
Status: DISCONTINUED | OUTPATIENT
Start: 2023-04-19 | End: 2023-04-23

## 2023-04-19 RX ORDER — HYDROXYZINE 50 MG/1
50 TABLET, FILM COATED ORAL 3 TIMES DAILY PRN
Status: DISCONTINUED | OUTPATIENT
Start: 2023-04-19 | End: 2023-04-24 | Stop reason: HOSPADM

## 2023-04-19 RX ORDER — PANTOPRAZOLE SODIUM 40 MG/1
40 TABLET, DELAYED RELEASE ORAL
Status: DISCONTINUED | OUTPATIENT
Start: 2023-04-20 | End: 2023-04-24 | Stop reason: HOSPADM

## 2023-04-19 RX ORDER — CARBAMAZEPINE 200 MG/1
200 TABLET ORAL 2 TIMES DAILY
Status: DISCONTINUED | OUTPATIENT
Start: 2023-04-19 | End: 2023-04-24 | Stop reason: HOSPADM

## 2023-04-19 RX ORDER — TRAZODONE HYDROCHLORIDE 50 MG/1
50 TABLET ORAL NIGHTLY PRN
Status: DISCONTINUED | OUTPATIENT
Start: 2023-04-19 | End: 2023-04-24 | Stop reason: HOSPADM

## 2023-04-19 RX ORDER — MAGNESIUM HYDROXIDE/ALUMINUM HYDROXICE/SIMETHICONE 120; 1200; 1200 MG/30ML; MG/30ML; MG/30ML
30 SUSPENSION ORAL EVERY 6 HOURS PRN
Status: DISCONTINUED | OUTPATIENT
Start: 2023-04-19 | End: 2023-04-24 | Stop reason: HOSPADM

## 2023-04-19 RX ORDER — ALBUTEROL SULFATE 2.5 MG/3ML
2.5 SOLUTION RESPIRATORY (INHALATION) EVERY 6 HOURS PRN
Status: DISCONTINUED | OUTPATIENT
Start: 2023-04-19 | End: 2023-04-24 | Stop reason: HOSPADM

## 2023-04-19 RX ORDER — FUROSEMIDE 40 MG/1
40 TABLET ORAL 2 TIMES DAILY
Status: DISCONTINUED | OUTPATIENT
Start: 2023-04-19 | End: 2023-04-24 | Stop reason: HOSPADM

## 2023-04-19 RX ORDER — CETIRIZINE HYDROCHLORIDE 10 MG/1
10 TABLET ORAL DAILY
Status: DISCONTINUED | OUTPATIENT
Start: 2023-04-20 | End: 2023-04-24 | Stop reason: HOSPADM

## 2023-04-19 RX ORDER — CARVEDILOL 25 MG/1
25 TABLET ORAL 2 TIMES DAILY WITH MEALS
Status: DISCONTINUED | OUTPATIENT
Start: 2023-04-20 | End: 2023-04-24 | Stop reason: HOSPADM

## 2023-04-19 RX ORDER — FLUTICASONE PROPIONATE 50 MCG
1 SPRAY, SUSPENSION (ML) NASAL 2 TIMES DAILY
Status: DISCONTINUED | OUTPATIENT
Start: 2023-04-19 | End: 2023-04-24 | Stop reason: HOSPADM

## 2023-04-19 RX ORDER — IBUPROFEN 400 MG/1
400 TABLET ORAL EVERY 6 HOURS PRN
Status: DISCONTINUED | OUTPATIENT
Start: 2023-04-19 | End: 2023-04-24 | Stop reason: HOSPADM

## 2023-04-19 RX ORDER — ACETAMINOPHEN 325 MG/1
650 TABLET ORAL EVERY 6 HOURS PRN
Status: DISCONTINUED | OUTPATIENT
Start: 2023-04-19 | End: 2023-04-19

## 2023-04-19 RX ADMIN — CARBAMAZEPINE 200 MG: 200 TABLET ORAL at 23:10

## 2023-04-19 RX ADMIN — FAMOTIDINE 20 MG: 20 TABLET, FILM COATED ORAL at 23:10

## 2023-04-19 RX ADMIN — HYDROXYZINE HYDROCHLORIDE 50 MG: 50 TABLET, FILM COATED ORAL at 23:16

## 2023-04-19 RX ADMIN — TRAZODONE HYDROCHLORIDE 50 MG: 50 TABLET ORAL at 23:10

## 2023-04-19 RX ADMIN — IBUPROFEN 400 MG: 400 TABLET, FILM COATED ORAL at 19:25

## 2023-04-19 ASSESSMENT — SLEEP AND FATIGUE QUESTIONNAIRES
SLEEP PATTERN: DIFFICULTY FALLING ASLEEP
DO YOU USE A SLEEP AID: NO
AVERAGE NUMBER OF SLEEP HOURS: 4
DO YOU HAVE DIFFICULTY SLEEPING: YES

## 2023-04-19 ASSESSMENT — ENCOUNTER SYMPTOMS
BACK PAIN: 0
SHORTNESS OF BREATH: 0
VOMITING: 0
DIARRHEA: 0
EYE REDNESS: 0
COUGH: 0
ABDOMINAL PAIN: 0
EYE PAIN: 0
SORE THROAT: 0

## 2023-04-19 ASSESSMENT — LIFESTYLE VARIABLES
HOW MANY STANDARD DRINKS CONTAINING ALCOHOL DO YOU HAVE ON A TYPICAL DAY: 5 OR 6
HOW OFTEN DO YOU HAVE A DRINK CONTAINING ALCOHOL: 2-3 TIMES A WEEK

## 2023-04-19 ASSESSMENT — PAIN DESCRIPTION - LOCATION: LOCATION: HEAD;BACK

## 2023-04-19 ASSESSMENT — PAIN - FUNCTIONAL ASSESSMENT: PAIN_FUNCTIONAL_ASSESSMENT: ACTIVITIES ARE NOT PREVENTED

## 2023-04-19 ASSESSMENT — PAIN SCALES - GENERAL: PAINLEVEL_OUTOF10: 6

## 2023-04-19 ASSESSMENT — PAIN DESCRIPTION - DESCRIPTORS: DESCRIPTORS: ACHING;DISCOMFORT

## 2023-04-20 PROBLEM — F10.90 ALCOHOL USE DISORDER: Status: ACTIVE | Noted: 2023-04-20

## 2023-04-20 LAB
GLUCOSE BLD-MCNC: 102 MG/DL (ref 65–105)
GLUCOSE BLD-MCNC: 129 MG/DL (ref 65–105)
GLUCOSE BLD-MCNC: 67 MG/DL (ref 65–105)
GLUCOSE BLD-MCNC: 83 MG/DL (ref 65–105)

## 2023-04-20 PROCEDURE — 82947 ASSAY GLUCOSE BLOOD QUANT: CPT

## 2023-04-20 PROCEDURE — 99223 1ST HOSP IP/OBS HIGH 75: CPT | Performed by: INTERNAL MEDICINE

## 2023-04-20 PROCEDURE — 1240000000 HC EMOTIONAL WELLNESS R&B

## 2023-04-20 PROCEDURE — 6370000000 HC RX 637 (ALT 250 FOR IP): Performed by: INTERNAL MEDICINE

## 2023-04-20 PROCEDURE — 6370000000 HC RX 637 (ALT 250 FOR IP): Performed by: PSYCHIATRY & NEUROLOGY

## 2023-04-20 RX ORDER — INSULIN LISPRO 100 [IU]/ML
0-4 INJECTION, SOLUTION INTRAVENOUS; SUBCUTANEOUS NIGHTLY
Status: DISCONTINUED | OUTPATIENT
Start: 2023-04-20 | End: 2023-04-22

## 2023-04-20 RX ORDER — DEXTROSE MONOHYDRATE 100 MG/ML
INJECTION, SOLUTION INTRAVENOUS CONTINUOUS PRN
Status: DISCONTINUED | OUTPATIENT
Start: 2023-04-20 | End: 2023-04-24 | Stop reason: HOSPADM

## 2023-04-20 RX ORDER — DULOXETIN HYDROCHLORIDE 30 MG/1
30 CAPSULE, DELAYED RELEASE ORAL DAILY
Status: DISCONTINUED | OUTPATIENT
Start: 2023-04-20 | End: 2023-04-21

## 2023-04-20 RX ORDER — BACLOFEN 10 MG/1
20 TABLET ORAL 3 TIMES DAILY
Status: DISCONTINUED | OUTPATIENT
Start: 2023-04-20 | End: 2023-04-24 | Stop reason: HOSPADM

## 2023-04-20 RX ORDER — INSULIN LISPRO 100 [IU]/ML
0-8 INJECTION, SOLUTION INTRAVENOUS; SUBCUTANEOUS
Status: DISCONTINUED | OUTPATIENT
Start: 2023-04-20 | End: 2023-04-22

## 2023-04-20 RX ORDER — GABAPENTIN 600 MG/1
600 TABLET ORAL 3 TIMES DAILY
Status: DISCONTINUED | OUTPATIENT
Start: 2023-04-20 | End: 2023-04-21

## 2023-04-20 RX ADMIN — GABAPENTIN 600 MG: 600 TABLET, FILM COATED ORAL at 10:42

## 2023-04-20 RX ADMIN — RIVAROXABAN 20 MG: 20 TABLET, FILM COATED ORAL at 17:30

## 2023-04-20 RX ADMIN — CARVEDILOL 25 MG: 25 TABLET, FILM COATED ORAL at 16:56

## 2023-04-20 RX ADMIN — PANTOPRAZOLE SODIUM 40 MG: 40 TABLET, DELAYED RELEASE ORAL at 06:06

## 2023-04-20 RX ADMIN — HYDROXYZINE HYDROCHLORIDE 50 MG: 50 TABLET, FILM COATED ORAL at 12:46

## 2023-04-20 RX ADMIN — IBUPROFEN 400 MG: 400 TABLET, FILM COATED ORAL at 20:08

## 2023-04-20 RX ADMIN — TRAZODONE HYDROCHLORIDE 50 MG: 50 TABLET ORAL at 22:15

## 2023-04-20 RX ADMIN — CARBAMAZEPINE 200 MG: 200 TABLET ORAL at 08:55

## 2023-04-20 RX ADMIN — FUROSEMIDE 40 MG: 40 TABLET ORAL at 08:55

## 2023-04-20 RX ADMIN — FAMOTIDINE 20 MG: 20 TABLET, FILM COATED ORAL at 08:55

## 2023-04-20 RX ADMIN — IBUPROFEN 400 MG: 400 TABLET, FILM COATED ORAL at 11:39

## 2023-04-20 RX ADMIN — HYDROXYZINE HYDROCHLORIDE 50 MG: 50 TABLET, FILM COATED ORAL at 22:15

## 2023-04-20 RX ADMIN — FAMOTIDINE 20 MG: 20 TABLET, FILM COATED ORAL at 22:15

## 2023-04-20 RX ADMIN — BACLOFEN 20 MG: 10 TABLET ORAL at 10:42

## 2023-04-20 RX ADMIN — FUROSEMIDE 40 MG: 40 TABLET ORAL at 16:56

## 2023-04-20 RX ADMIN — CETIRIZINE HYDROCHLORIDE 10 MG: 10 TABLET, FILM COATED ORAL at 08:55

## 2023-04-20 RX ADMIN — ASPIRIN 81 MG 81 MG: 81 TABLET ORAL at 08:55

## 2023-04-20 RX ADMIN — CARBAMAZEPINE 200 MG: 200 TABLET ORAL at 22:15

## 2023-04-20 RX ADMIN — GABAPENTIN 600 MG: 600 TABLET, FILM COATED ORAL at 14:14

## 2023-04-20 RX ADMIN — Medication 16 G: at 16:02

## 2023-04-20 RX ADMIN — GABAPENTIN 600 MG: 600 TABLET, FILM COATED ORAL at 22:15

## 2023-04-20 RX ADMIN — DICLOFENAC SODIUM 2 G: 10 GEL TOPICAL at 16:50

## 2023-04-20 RX ADMIN — CARVEDILOL 25 MG: 25 TABLET, FILM COATED ORAL at 08:55

## 2023-04-20 RX ADMIN — DICLOFENAC SODIUM 2 G: 10 GEL TOPICAL at 08:56

## 2023-04-20 RX ADMIN — FLUTICASONE PROPIONATE 1 SPRAY: 50 SPRAY, METERED NASAL at 08:56

## 2023-04-20 RX ADMIN — BACLOFEN 20 MG: 10 TABLET ORAL at 14:14

## 2023-04-20 ASSESSMENT — PAIN DESCRIPTION - LOCATION
LOCATION: BACK
LOCATION: NECK;SHOULDER

## 2023-04-20 ASSESSMENT — PAIN SCALES - GENERAL
PAINLEVEL_OUTOF10: 9
PAINLEVEL_OUTOF10: 9
PAINLEVEL_OUTOF10: 0

## 2023-04-20 ASSESSMENT — PAIN DESCRIPTION - DESCRIPTORS: DESCRIPTORS: ACHING

## 2023-04-21 LAB
CHOLEST SERPL-MCNC: 190 MG/DL
CHOLESTEROL/HDL RATIO: 3.8
GLUCOSE BLD-MCNC: 114 MG/DL (ref 65–105)
GLUCOSE BLD-MCNC: 131 MG/DL (ref 65–105)
GLUCOSE BLD-MCNC: 76 MG/DL (ref 65–105)
GLUCOSE BLD-MCNC: 89 MG/DL (ref 65–105)
HDLC SERPL-MCNC: 50 MG/DL
LDLC SERPL CALC-MCNC: 113 MG/DL (ref 0–130)
TRIGL SERPL-MCNC: 133 MG/DL

## 2023-04-21 PROCEDURE — 2500000003 HC RX 250 WO HCPCS: Performed by: PSYCHIATRY & NEUROLOGY

## 2023-04-21 PROCEDURE — 1240000000 HC EMOTIONAL WELLNESS R&B

## 2023-04-21 PROCEDURE — 82947 ASSAY GLUCOSE BLOOD QUANT: CPT

## 2023-04-21 PROCEDURE — 83036 HEMOGLOBIN GLYCOSYLATED A1C: CPT

## 2023-04-21 PROCEDURE — 36415 COLL VENOUS BLD VENIPUNCTURE: CPT

## 2023-04-21 PROCEDURE — 80061 LIPID PANEL: CPT

## 2023-04-21 PROCEDURE — 6370000000 HC RX 637 (ALT 250 FOR IP): Performed by: PSYCHIATRY & NEUROLOGY

## 2023-04-21 PROCEDURE — 6370000000 HC RX 637 (ALT 250 FOR IP): Performed by: INTERNAL MEDICINE

## 2023-04-21 PROCEDURE — 6370000000 HC RX 637 (ALT 250 FOR IP)

## 2023-04-21 RX ORDER — GABAPENTIN 300 MG/1
600 CAPSULE ORAL ONCE
Status: COMPLETED | OUTPATIENT
Start: 2023-04-21 | End: 2023-04-21

## 2023-04-21 RX ORDER — VENLAFAXINE HYDROCHLORIDE 37.5 MG/1
37.5 CAPSULE, EXTENDED RELEASE ORAL
Status: DISCONTINUED | OUTPATIENT
Start: 2023-04-21 | End: 2023-04-22

## 2023-04-21 RX ORDER — GABAPENTIN 600 MG/1
1200 TABLET ORAL 3 TIMES DAILY
Status: DISCONTINUED | OUTPATIENT
Start: 2023-04-21 | End: 2023-04-24 | Stop reason: HOSPADM

## 2023-04-21 RX ORDER — VENLAFAXINE HYDROCHLORIDE 37.5 MG/1
37.5 CAPSULE, EXTENDED RELEASE ORAL
Status: DISCONTINUED | OUTPATIENT
Start: 2023-04-22 | End: 2023-04-21

## 2023-04-21 RX ADMIN — BACLOFEN 20 MG: 10 TABLET ORAL at 14:23

## 2023-04-21 RX ADMIN — IBUPROFEN 400 MG: 400 TABLET, FILM COATED ORAL at 17:45

## 2023-04-21 RX ADMIN — PANTOPRAZOLE SODIUM 40 MG: 40 TABLET, DELAYED RELEASE ORAL at 06:13

## 2023-04-21 RX ADMIN — FAMOTIDINE 20 MG: 20 TABLET, FILM COATED ORAL at 08:46

## 2023-04-21 RX ADMIN — FLUTICASONE PROPIONATE 1 SPRAY: 50 SPRAY, METERED NASAL at 08:45

## 2023-04-21 RX ADMIN — FAMOTIDINE 20 MG: 20 TABLET, FILM COATED ORAL at 21:42

## 2023-04-21 RX ADMIN — DICLOFENAC SODIUM 2 G: 10 GEL TOPICAL at 12:42

## 2023-04-21 RX ADMIN — GABAPENTIN 1200 MG: 600 TABLET, FILM COATED ORAL at 21:42

## 2023-04-21 RX ADMIN — FLUTICASONE PROPIONATE 1 SPRAY: 50 SPRAY, METERED NASAL at 21:45

## 2023-04-21 RX ADMIN — ANTI-FUNGAL POWDER MICONAZOLE NITRATE TALC FREE: 1.42 POWDER TOPICAL at 08:46

## 2023-04-21 RX ADMIN — HYDROXYZINE HYDROCHLORIDE 50 MG: 50 TABLET, FILM COATED ORAL at 21:42

## 2023-04-21 RX ADMIN — GABAPENTIN 1200 MG: 600 TABLET, FILM COATED ORAL at 14:23

## 2023-04-21 RX ADMIN — ALUMINUM HYDROXIDE, MAGNESIUM HYDROXIDE, AND SIMETHICONE 30 ML: 200; 200; 20 SUSPENSION ORAL at 16:19

## 2023-04-21 RX ADMIN — CETIRIZINE HYDROCHLORIDE 10 MG: 10 TABLET, FILM COATED ORAL at 08:46

## 2023-04-21 RX ADMIN — GABAPENTIN 600 MG: 600 TABLET, FILM COATED ORAL at 08:47

## 2023-04-21 RX ADMIN — TRAZODONE HYDROCHLORIDE 50 MG: 50 TABLET ORAL at 21:42

## 2023-04-21 RX ADMIN — FUROSEMIDE 40 MG: 40 TABLET ORAL at 17:41

## 2023-04-21 RX ADMIN — GABAPENTIN 600 MG: 300 CAPSULE ORAL at 10:51

## 2023-04-21 RX ADMIN — CARBAMAZEPINE 200 MG: 200 TABLET ORAL at 21:42

## 2023-04-21 RX ADMIN — BACLOFEN 20 MG: 10 TABLET ORAL at 09:50

## 2023-04-21 RX ADMIN — FUROSEMIDE 40 MG: 40 TABLET ORAL at 08:48

## 2023-04-21 RX ADMIN — HYDROXYZINE HYDROCHLORIDE 50 MG: 50 TABLET, FILM COATED ORAL at 13:12

## 2023-04-21 RX ADMIN — IBUPROFEN 400 MG: 400 TABLET, FILM COATED ORAL at 07:41

## 2023-04-21 RX ADMIN — VENLAFAXINE HYDROCHLORIDE 37.5 MG: 37.5 CAPSULE, EXTENDED RELEASE ORAL at 11:50

## 2023-04-21 RX ADMIN — ASPIRIN 81 MG 81 MG: 81 TABLET ORAL at 08:47

## 2023-04-21 RX ADMIN — CARVEDILOL 25 MG: 25 TABLET, FILM COATED ORAL at 08:47

## 2023-04-21 RX ADMIN — BACLOFEN 20 MG: 10 TABLET ORAL at 21:42

## 2023-04-21 RX ADMIN — CARBAMAZEPINE 200 MG: 200 TABLET ORAL at 08:46

## 2023-04-21 RX ADMIN — RIVAROXABAN 20 MG: 20 TABLET, FILM COATED ORAL at 17:41

## 2023-04-21 ASSESSMENT — PAIN SCALES - GENERAL
PAINLEVEL_OUTOF10: 0
PAINLEVEL_OUTOF10: 6
PAINLEVEL_OUTOF10: 8
PAINLEVEL_OUTOF10: 0

## 2023-04-21 ASSESSMENT — PAIN DESCRIPTION - LOCATION
LOCATION: HEAD
LOCATION: NECK;SHOULDER

## 2023-04-21 ASSESSMENT — PAIN - FUNCTIONAL ASSESSMENT
PAIN_FUNCTIONAL_ASSESSMENT: ACTIVITIES ARE NOT PREVENTED

## 2023-04-22 LAB
GLUCOSE BLD-MCNC: 95 MG/DL (ref 65–105)
GLUCOSE BLD-MCNC: 98 MG/DL (ref 65–105)

## 2023-04-22 PROCEDURE — 6370000000 HC RX 637 (ALT 250 FOR IP): Performed by: PSYCHIATRY & NEUROLOGY

## 2023-04-22 PROCEDURE — 6370000000 HC RX 637 (ALT 250 FOR IP): Performed by: INTERNAL MEDICINE

## 2023-04-22 PROCEDURE — 6370000000 HC RX 637 (ALT 250 FOR IP)

## 2023-04-22 PROCEDURE — 82947 ASSAY GLUCOSE BLOOD QUANT: CPT

## 2023-04-22 PROCEDURE — 1240000000 HC EMOTIONAL WELLNESS R&B

## 2023-04-22 RX ORDER — VENLAFAXINE HYDROCHLORIDE 75 MG/1
75 CAPSULE, EXTENDED RELEASE ORAL
Status: DISCONTINUED | OUTPATIENT
Start: 2023-04-23 | End: 2023-04-24 | Stop reason: HOSPADM

## 2023-04-22 RX ADMIN — IBUPROFEN 400 MG: 400 TABLET, FILM COATED ORAL at 07:31

## 2023-04-22 RX ADMIN — BACLOFEN 20 MG: 10 TABLET ORAL at 21:01

## 2023-04-22 RX ADMIN — IBUPROFEN 400 MG: 400 TABLET, FILM COATED ORAL at 14:52

## 2023-04-22 RX ADMIN — CARBAMAZEPINE 200 MG: 200 TABLET ORAL at 21:02

## 2023-04-22 RX ADMIN — GABAPENTIN 1200 MG: 600 TABLET, FILM COATED ORAL at 21:02

## 2023-04-22 RX ADMIN — FUROSEMIDE 40 MG: 40 TABLET ORAL at 17:42

## 2023-04-22 RX ADMIN — GABAPENTIN 1200 MG: 600 TABLET, FILM COATED ORAL at 14:52

## 2023-04-22 RX ADMIN — DIPHENOXYLATE HYDROCHLORIDE AND ATROPINE SULFATE 2 TABLET: 2.5; .025 TABLET ORAL at 19:48

## 2023-04-22 RX ADMIN — FLUTICASONE PROPIONATE 1 SPRAY: 50 SPRAY, METERED NASAL at 09:17

## 2023-04-22 RX ADMIN — HYDROXYZINE HYDROCHLORIDE 50 MG: 50 TABLET, FILM COATED ORAL at 21:02

## 2023-04-22 RX ADMIN — HYDROXYZINE HYDROCHLORIDE 50 MG: 50 TABLET, FILM COATED ORAL at 10:35

## 2023-04-22 RX ADMIN — CARVEDILOL 25 MG: 25 TABLET, FILM COATED ORAL at 17:42

## 2023-04-22 RX ADMIN — GABAPENTIN 1200 MG: 600 TABLET, FILM COATED ORAL at 09:14

## 2023-04-22 RX ADMIN — ANTI-FUNGAL POWDER MICONAZOLE NITRATE TALC FREE: 1.42 POWDER TOPICAL at 09:11

## 2023-04-22 RX ADMIN — VENLAFAXINE HYDROCHLORIDE 37.5 MG: 37.5 CAPSULE, EXTENDED RELEASE ORAL at 09:16

## 2023-04-22 RX ADMIN — DICLOFENAC SODIUM 2 G: 10 GEL TOPICAL at 12:00

## 2023-04-22 RX ADMIN — CETIRIZINE HYDROCHLORIDE 10 MG: 10 TABLET, FILM COATED ORAL at 09:14

## 2023-04-22 RX ADMIN — FAMOTIDINE 20 MG: 20 TABLET, FILM COATED ORAL at 09:13

## 2023-04-22 RX ADMIN — ASPIRIN 81 MG 81 MG: 81 TABLET ORAL at 09:14

## 2023-04-22 RX ADMIN — FUROSEMIDE 40 MG: 40 TABLET ORAL at 09:15

## 2023-04-22 RX ADMIN — ALUMINUM HYDROXIDE, MAGNESIUM HYDROXIDE, AND SIMETHICONE 30 ML: 200; 200; 20 SUSPENSION ORAL at 18:24

## 2023-04-22 RX ADMIN — FLUTICASONE PROPIONATE 1 SPRAY: 50 SPRAY, METERED NASAL at 21:01

## 2023-04-22 RX ADMIN — BACLOFEN 20 MG: 10 TABLET ORAL at 14:51

## 2023-04-22 RX ADMIN — TRAZODONE HYDROCHLORIDE 50 MG: 50 TABLET ORAL at 21:02

## 2023-04-22 RX ADMIN — PANTOPRAZOLE SODIUM 40 MG: 40 TABLET, DELAYED RELEASE ORAL at 06:13

## 2023-04-22 RX ADMIN — FAMOTIDINE 20 MG: 20 TABLET, FILM COATED ORAL at 21:01

## 2023-04-22 RX ADMIN — RIVAROXABAN 20 MG: 20 TABLET, FILM COATED ORAL at 17:42

## 2023-04-22 RX ADMIN — CARBAMAZEPINE 200 MG: 200 TABLET ORAL at 09:16

## 2023-04-22 RX ADMIN — BACLOFEN 20 MG: 10 TABLET ORAL at 10:33

## 2023-04-22 RX ADMIN — CARVEDILOL 25 MG: 25 TABLET, FILM COATED ORAL at 09:15

## 2023-04-22 ASSESSMENT — PAIN SCALES - GENERAL
PAINLEVEL_OUTOF10: 7
PAINLEVEL_OUTOF10: 7
PAINLEVEL_OUTOF10: 3

## 2023-04-22 ASSESSMENT — PAIN DESCRIPTION - LOCATION: LOCATION: BACK;NECK

## 2023-04-23 PROCEDURE — 6370000000 HC RX 637 (ALT 250 FOR IP): Performed by: INTERNAL MEDICINE

## 2023-04-23 PROCEDURE — 6370000000 HC RX 637 (ALT 250 FOR IP): Performed by: PSYCHIATRY & NEUROLOGY

## 2023-04-23 PROCEDURE — 6370000000 HC RX 637 (ALT 250 FOR IP)

## 2023-04-23 PROCEDURE — 1240000000 HC EMOTIONAL WELLNESS R&B

## 2023-04-23 RX ORDER — FUROSEMIDE 40 MG/1
40 TABLET ORAL 2 TIMES DAILY
Qty: 60 TABLET | Refills: 0 | Status: SHIPPED | OUTPATIENT
Start: 2023-04-23 | End: 2023-04-24 | Stop reason: SDUPTHER

## 2023-04-23 RX ORDER — VENLAFAXINE HYDROCHLORIDE 75 MG/1
75 CAPSULE, EXTENDED RELEASE ORAL
Qty: 30 CAPSULE | Refills: 0 | Status: SHIPPED | OUTPATIENT
Start: 2023-04-24 | End: 2023-04-24 | Stop reason: SDUPTHER

## 2023-04-23 RX ORDER — LOPERAMIDE HYDROCHLORIDE 2 MG/1
2 CAPSULE ORAL 4 TIMES DAILY PRN
Status: DISCONTINUED | OUTPATIENT
Start: 2023-04-23 | End: 2023-04-24 | Stop reason: HOSPADM

## 2023-04-23 RX ORDER — HYDROXYZINE 50 MG/1
50 TABLET, FILM COATED ORAL 3 TIMES DAILY PRN
Qty: 20 TABLET | Refills: 0 | Status: SHIPPED | OUTPATIENT
Start: 2023-04-23 | End: 2023-05-03

## 2023-04-23 RX ADMIN — FUROSEMIDE 40 MG: 40 TABLET ORAL at 08:59

## 2023-04-23 RX ADMIN — FAMOTIDINE 20 MG: 20 TABLET, FILM COATED ORAL at 08:59

## 2023-04-23 RX ADMIN — LOPERAMIDE HYDROCHLORIDE 2 MG: 2 CAPSULE ORAL at 16:18

## 2023-04-23 RX ADMIN — IBUPROFEN 400 MG: 400 TABLET, FILM COATED ORAL at 08:59

## 2023-04-23 RX ADMIN — GABAPENTIN 1200 MG: 600 TABLET, FILM COATED ORAL at 15:12

## 2023-04-23 RX ADMIN — DICLOFENAC SODIUM 2 G: 10 GEL TOPICAL at 06:21

## 2023-04-23 RX ADMIN — CARBAMAZEPINE 200 MG: 200 TABLET ORAL at 08:59

## 2023-04-23 RX ADMIN — FUROSEMIDE 40 MG: 40 TABLET ORAL at 17:17

## 2023-04-23 RX ADMIN — IBUPROFEN 400 MG: 400 TABLET, FILM COATED ORAL at 02:42

## 2023-04-23 RX ADMIN — FAMOTIDINE 20 MG: 20 TABLET, FILM COATED ORAL at 21:35

## 2023-04-23 RX ADMIN — DIPHENOXYLATE HYDROCHLORIDE AND ATROPINE SULFATE 2 TABLET: 2.5; .025 TABLET ORAL at 09:10

## 2023-04-23 RX ADMIN — PANTOPRAZOLE SODIUM 40 MG: 40 TABLET, DELAYED RELEASE ORAL at 06:21

## 2023-04-23 RX ADMIN — FLUTICASONE PROPIONATE 1 SPRAY: 50 SPRAY, METERED NASAL at 21:37

## 2023-04-23 RX ADMIN — BACLOFEN 20 MG: 10 TABLET ORAL at 21:35

## 2023-04-23 RX ADMIN — TRAZODONE HYDROCHLORIDE 50 MG: 50 TABLET ORAL at 21:35

## 2023-04-23 RX ADMIN — CARVEDILOL 25 MG: 25 TABLET, FILM COATED ORAL at 09:00

## 2023-04-23 RX ADMIN — HYDROXYZINE HYDROCHLORIDE 50 MG: 50 TABLET, FILM COATED ORAL at 11:07

## 2023-04-23 RX ADMIN — CARBAMAZEPINE 200 MG: 200 TABLET ORAL at 21:34

## 2023-04-23 RX ADMIN — VENLAFAXINE HYDROCHLORIDE 75 MG: 75 CAPSULE, EXTENDED RELEASE ORAL at 08:59

## 2023-04-23 RX ADMIN — ANTI-FUNGAL POWDER MICONAZOLE NITRATE TALC FREE: 1.42 POWDER TOPICAL at 09:08

## 2023-04-23 RX ADMIN — GABAPENTIN 1200 MG: 600 TABLET, FILM COATED ORAL at 21:35

## 2023-04-23 RX ADMIN — BACLOFEN 20 MG: 10 TABLET ORAL at 15:12

## 2023-04-23 RX ADMIN — LOPERAMIDE HYDROCHLORIDE 2 MG: 2 CAPSULE ORAL at 12:33

## 2023-04-23 RX ADMIN — ALUMINUM HYDROXIDE, MAGNESIUM HYDROXIDE, AND SIMETHICONE 30 ML: 200; 200; 20 SUSPENSION ORAL at 13:54

## 2023-04-23 RX ADMIN — HYDROXYZINE HYDROCHLORIDE 50 MG: 50 TABLET, FILM COATED ORAL at 18:02

## 2023-04-23 RX ADMIN — GABAPENTIN 1200 MG: 600 TABLET, FILM COATED ORAL at 08:59

## 2023-04-23 RX ADMIN — ASPIRIN 81 MG 81 MG: 81 TABLET ORAL at 08:59

## 2023-04-23 RX ADMIN — CETIRIZINE HYDROCHLORIDE 10 MG: 10 TABLET, FILM COATED ORAL at 08:59

## 2023-04-23 RX ADMIN — CARVEDILOL 25 MG: 25 TABLET, FILM COATED ORAL at 17:17

## 2023-04-23 RX ADMIN — FLUTICASONE PROPIONATE 1 SPRAY: 50 SPRAY, METERED NASAL at 09:00

## 2023-04-23 RX ADMIN — BACLOFEN 20 MG: 10 TABLET ORAL at 08:59

## 2023-04-23 RX ADMIN — RIVAROXABAN 20 MG: 20 TABLET, FILM COATED ORAL at 17:17

## 2023-04-23 ASSESSMENT — PAIN SCALES - GENERAL
PAINLEVEL_OUTOF10: 7
PAINLEVEL_OUTOF10: 4
PAINLEVEL_OUTOF10: 7

## 2023-04-23 ASSESSMENT — PAIN DESCRIPTION - LOCATION
LOCATION: NECK
LOCATION: NECK

## 2023-04-23 ASSESSMENT — PAIN DESCRIPTION - DESCRIPTORS: DESCRIPTORS: ACHING

## 2023-04-24 VITALS
HEART RATE: 77 BPM | OXYGEN SATURATION: 100 % | BODY MASS INDEX: 34.96 KG/M2 | TEMPERATURE: 96.8 F | DIASTOLIC BLOOD PRESSURE: 67 MMHG | SYSTOLIC BLOOD PRESSURE: 121 MMHG | HEIGHT: 62 IN | RESPIRATION RATE: 14 BRPM | WEIGHT: 190 LBS

## 2023-04-24 PROCEDURE — 6370000000 HC RX 637 (ALT 250 FOR IP): Performed by: PSYCHIATRY & NEUROLOGY

## 2023-04-24 PROCEDURE — 6370000000 HC RX 637 (ALT 250 FOR IP): Performed by: INTERNAL MEDICINE

## 2023-04-24 PROCEDURE — 6370000000 HC RX 637 (ALT 250 FOR IP)

## 2023-04-24 RX ORDER — CARBAMAZEPINE 200 MG/1
200 TABLET ORAL 2 TIMES DAILY
Qty: 60 TABLET | Refills: 0 | Status: SHIPPED | OUTPATIENT
Start: 2023-04-24

## 2023-04-24 RX ORDER — LIDOCAINE 50 MG/G
1 PATCH TOPICAL DAILY PRN
Qty: 30 PATCH | Refills: 0 | Status: SHIPPED | OUTPATIENT
Start: 2023-04-24

## 2023-04-24 RX ORDER — FLUTICASONE PROPIONATE 50 MCG
1 SPRAY, SUSPENSION (ML) NASAL 2 TIMES DAILY
Qty: 16 G | Refills: 0 | Status: SHIPPED | OUTPATIENT
Start: 2023-04-24

## 2023-04-24 RX ORDER — ESOMEPRAZOLE MAGNESIUM 40 MG/1
40 CAPSULE, DELAYED RELEASE ORAL
Qty: 30 CAPSULE | Refills: 0 | Status: SHIPPED | OUTPATIENT
Start: 2023-04-24

## 2023-04-24 RX ORDER — CARVEDILOL 12.5 MG/1
12.5 TABLET ORAL 2 TIMES DAILY WITH MEALS
Qty: 60 TABLET | Refills: 0 | Status: SHIPPED | OUTPATIENT
Start: 2023-04-24

## 2023-04-24 RX ORDER — CETIRIZINE HYDROCHLORIDE 10 MG/1
10 TABLET ORAL DAILY
Qty: 30 TABLET | Refills: 0 | Status: SHIPPED | OUTPATIENT
Start: 2023-04-24

## 2023-04-24 RX ORDER — FUROSEMIDE 40 MG/1
40 TABLET ORAL 2 TIMES DAILY
Qty: 60 TABLET | Refills: 0 | Status: SHIPPED | OUTPATIENT
Start: 2023-04-24

## 2023-04-24 RX ORDER — NYSTATIN 100000 [USP'U]/G
POWDER TOPICAL
Qty: 30 G | Refills: 0 | Status: SHIPPED | OUTPATIENT
Start: 2023-04-24

## 2023-04-24 RX ORDER — ALBUTEROL SULFATE 90 UG/1
2 AEROSOL, METERED RESPIRATORY (INHALATION) EVERY 6 HOURS PRN
Qty: 18 G | Refills: 0 | Status: SHIPPED | OUTPATIENT
Start: 2023-04-24

## 2023-04-24 RX ORDER — TRAZODONE HYDROCHLORIDE 50 MG/1
50 TABLET ORAL NIGHTLY PRN
Qty: 30 TABLET | Refills: 0 | Status: SHIPPED | OUTPATIENT
Start: 2023-04-24

## 2023-04-24 RX ORDER — VENLAFAXINE HYDROCHLORIDE 75 MG/1
75 CAPSULE, EXTENDED RELEASE ORAL
Qty: 30 CAPSULE | Refills: 0 | Status: SHIPPED | OUTPATIENT
Start: 2023-04-24

## 2023-04-24 RX ORDER — ASPIRIN 81 MG/1
81 TABLET, CHEWABLE ORAL DAILY
Qty: 30 TABLET | Refills: 0 | Status: SHIPPED | OUTPATIENT
Start: 2023-04-24

## 2023-04-24 RX ORDER — BACLOFEN 20 MG/1
20 TABLET ORAL 3 TIMES DAILY
Qty: 90 TABLET | Refills: 0 | Status: SHIPPED | OUTPATIENT
Start: 2023-04-24

## 2023-04-24 RX ORDER — GABAPENTIN 600 MG/1
1200 TABLET ORAL 3 TIMES DAILY
Qty: 180 TABLET | Refills: 0 | Status: SHIPPED | OUTPATIENT
Start: 2023-04-24 | End: 2023-05-24

## 2023-04-24 RX ADMIN — FUROSEMIDE 40 MG: 40 TABLET ORAL at 08:30

## 2023-04-24 RX ADMIN — BACLOFEN 20 MG: 10 TABLET ORAL at 08:29

## 2023-04-24 RX ADMIN — PANTOPRAZOLE SODIUM 40 MG: 40 TABLET, DELAYED RELEASE ORAL at 06:19

## 2023-04-24 RX ADMIN — FAMOTIDINE 20 MG: 20 TABLET, FILM COATED ORAL at 08:28

## 2023-04-24 RX ADMIN — IBUPROFEN 400 MG: 400 TABLET, FILM COATED ORAL at 03:04

## 2023-04-24 RX ADMIN — GABAPENTIN 1200 MG: 600 TABLET, FILM COATED ORAL at 08:27

## 2023-04-24 RX ADMIN — CETIRIZINE HYDROCHLORIDE 10 MG: 10 TABLET, FILM COATED ORAL at 08:28

## 2023-04-24 RX ADMIN — ASPIRIN 81 MG 81 MG: 81 TABLET ORAL at 08:28

## 2023-04-24 RX ADMIN — ANTI-FUNGAL POWDER MICONAZOLE NITRATE TALC FREE: 1.42 POWDER TOPICAL at 08:30

## 2023-04-24 RX ADMIN — VENLAFAXINE HYDROCHLORIDE 75 MG: 75 CAPSULE, EXTENDED RELEASE ORAL at 08:28

## 2023-04-24 RX ADMIN — CARVEDILOL 25 MG: 25 TABLET, FILM COATED ORAL at 08:28

## 2023-04-24 RX ADMIN — CARBAMAZEPINE 200 MG: 200 TABLET ORAL at 08:28

## 2023-04-24 RX ADMIN — FLUTICASONE PROPIONATE 1 SPRAY: 50 SPRAY, METERED NASAL at 08:27

## 2023-04-24 RX ADMIN — HYDROXYZINE HYDROCHLORIDE 50 MG: 50 TABLET, FILM COATED ORAL at 08:27

## 2023-04-24 ASSESSMENT — PAIN SCALES - GENERAL
PAINLEVEL_OUTOF10: 0
PAINLEVEL_OUTOF10: 0

## 2023-04-24 NOTE — DISCHARGE INSTRUCTIONS
Information:  Medications:   Medication summary provided   I understand that I should take only the medications on my list.     -why and when I need to take each medicine.     -which side effects to watch for.     -that I should carry my medication information at all times in case of     Emergency situations. I will take all of my medicines to follow up appointments.     -check with my physician or pharmacist before taking any new    Medication, over the counter product or drink alcohol.    -Ask about food, drug or dietary supplement interactions.    -discard old lists and update records with medication providers. Notify Physician:  Notify physician if you notice:   Always call 911 if you feel your life is in danger  In case of an emergency call 911 immediately! If 911 is not available call your local emergency medical system for help    Behavioral Health Follow Up:  Original Referral Source:ALISSA  Discharge Diagnosis: Depression with suicidal ideation [F32. A, R45.851]  Patient status at discharge: Stable  My hospital  was: [facility]  Aftercare plan faxed: staff   -faxed by: staff   -date: 04/24/2023   -time: 1015am  Prescriptions: Pastor Munoz   Patient denies suicidal ideations. Patient discharged to [facility] via personal car. Patient given all personal belongings. Smoking: Quit Smoking. Call the [facility]'s smoking quitline at 2-246-41P-QUIT  Know the signs of a heart attack   If you have any of the following symptoms call 911 immediately, do not wait more    Than five minutes. 1. Pressure, fullness and/ or squeezing in the center of the chest spreading to    The jaw, neck or shoulder. 2. Chest discomfort with light headedness, fainting, sweating, nausea or    Shortness of breath. 3. Upper abdominal pressure or discomfort. 4. Lower chest pain, back pain, unusual fatigue, shortness of breath, nausea   Or dizziness.      General Information:   Questions regarding your bill: Call HELP program ([phone])

## 2023-04-24 NOTE — BH NOTE
Patient denies being a current smoker. Smoking: Quit Smoking. Call the [facility]'s smoking quitline at 8-399-07P-QUIT  Know the signs of a heart attack   If you have any of the following symptoms call 911 immediately, do not wait more    Than five minutes. 1. Pressure, fullness and/ or squeezing in the center of the chest spreading to    The jaw, neck or shoulder. 2. Chest discomfort with light headedness, fainting, sweating, nausea or    Shortness of breath. 3. Upper abdominal pressure or discomfort. 4. Lower chest pain, back pain, unusual fatigue, shortness of breath, nausea   Or dizziness.      General Information:   Questions regarding your bill: Call HELP program [phone]     Suicide Hotline ([place] [address])  (835) 549-6554      Recovery Help line- 700.645.6095      To obtain results of pending studies call Medical Records at: [phone]     For emergencies and 24 hour/7 days a week contact information:  [phone]

## 2023-04-24 NOTE — DISCHARGE INSTR - DIET

## 2023-04-24 NOTE — GROUP NOTE
Group Therapy Note    Date: 4/24/2023    Group Start Time: 0900  Group End Time: 0930  Group Topic: Group Documentation    [address], LPN        Group Therapy Note    Attendees: 9/20       Patient's Goal:  educate    Notes:  inspire    Status After Intervention:  Improved    Participation Level:  Active Listener    Participation Quality: Appropriate      Speech:  normal      Thought Process/Content: Logical      Affective Functioning: Flat      Mood: euthymic      Level of consciousness:  Alert      Response to Learning: Progressing to goal      Endings: None Reported    Modes of Intervention: Education      Discipline Responsible: Licensed Practical Nurse      Signature:  Rosmery Tovar LPN

## 2023-04-24 NOTE — BH NOTE
[facility]  Discharge Note    Pt discharged with followings belongings:   Dental Appliances: Uppers, Lowers  Vision - Corrective Lenses: Eyeglasses  Hearing Aid: Bilateral hearing aids  Jewelry: Earrings, Ring  Body Piercings Removed: N/A  Clothing: Shirt, Sweater, Pants, Socks, Undergarments, Footwear  Other Valuables: Personal Toiletries, Other (Comment), Wallet, Keys (VISA [id number], [place] debit [id number], SS card,  license, 4 insurance card)   Valuables sent home with patient. Patient educated on aftercare instructions: Yes  Information faxed to [place], [place] by staff  at 10:16 AM .Patient verbalize understanding of AVS:  Yes. Status EXAM upon discharge: Stable Patient discharge to [facility] via personal car. Patient denies any thoughts of harm to self or others at time of discharge. Mental Status and Behavioral Exam  Normal: No  Level of Assistance: Independent/Self  Facial Expression: Flat, Sad, Elevated, Worried  Affect: Appropriate  Level of Consciousness: Alert  Frequency of Checks: 4 times per hour, close  Mood:Normal: No  Mood: Depressed, Anxious  Motor Activity:Normal: Yes  Eye Contact: Good  Observed Behavior: Cooperative, Friendly, Hypermobile, Preoccupied  Sexual Misconduct History: Current - no  Preception: [place] to time, [place] to situation, [place] to place, [place] to person  Attention:Normal: No  Attention: Distractible  Thought Processes: Circumstantial, Flight of ideas  Thought Content:Normal: No  Thought Content: Preoccupations  Depression Symptoms: Impaired concentration  Anxiety Symptoms: Generalized  Monica Symptoms: No problems reported or observed. Hallucinations: None  Delusions: No  Delusions: Somatic, Paranoid, Obsessions  Memory:Normal: Yes  Insight and Judgment: No  Insight and Judgment: Poor insight, Poor judgment    Tobacco Screening:  Practical Counseling, on admission, kristina X, if applicable and completed (first 3 are required if patient doesn't refuse):             (

## 2023-04-24 NOTE — PLAN OF CARE
Patient denies thoughts of self harm at this time. Patient agrees to seek out staff if they begin having thoughts of harming self or they need to talk. Q15min safety checks continue   Problem: Coping  Goal: Pt/Family able to verbalize concerns and demonstrate effective coping strategies  Description: INTERVENTIONS:  1. Assist patient/family to identify coping skills, available support systems and cultural and spiritual values  2. Provide emotional support, including active listening and acknowledgement of concerns of patient and caregivers  3. Reduce environmental stimuli, as able  4. Instruct patient/family in relaxation techniques, as appropriate  5. Assess for spiritual pain/suffering and initiate Spiritual Care, Psychosocial Clinical Specialist consults as needed  Outcome: Progressing  Note: Patient has been able to demonstrate her concerns to writer tonight. Continually all week as killian seen good progression with her coping skills. Patient has verbalized fears, concerns and excitements about new beginnings/changes coming up with her living situation and discharging eventually. Patient has shown good coping skills and shown progression through out the week. With ups and downs and progression through out it all. Patient seems to calm down more at night. Patient has agreed to seek out nursing staff if need be. Will continue to monitor. Q15min safety checks. Problem: Depression/Self Harm  Goal: Effect of psychiatric condition will be minimized and patient will be protected from self harm  Description: INTERVENTIONS:  1. Assess impact of patient's symptoms on level of functioning, self care needs and offer support as indicated  2. Assess patient/family knowledge of depression, impact on illness and need for teaching  3. Provide emotional support, presence and reassurance  4. Assess for possible suicidal thoughts or ideation.  If patient expresses suicidal thoughts or statements do not leave alone, initiate
Problem: Coping  Goal: Pt/Family able to verbalize concerns and demonstrate effective coping strategies  Description: INTERVENTIONS:  1. Assist patient/family to identify coping skills, available support systems and cultural and spiritual values  2. Provide emotional support, including active listening and acknowledgement of concerns of patient and caregivers  3. Reduce environmental stimuli, as able  4. Instruct patient/family in relaxation techniques, as appropriate  5. Assess for spiritual pain/suffering and initiate Spiritual Care, Psychosocial Clinical Specialist consults as needed  Outcome: Progressing  Note: Patient has been able to demonstrate her concerns to writer tonight. Continually all week as killian seen good progression with her coping skills. Patient has verbalized fears, concerns and excitements about new beginnings/changes coming up with her living situation and discharging eventually. Patient has shown good coping skills and shown progression through out the week. With ups and downs and progression through out it all. Patient seems to calm down more at night. Patient has agreed to seek out nursing staff if need be. Will continue to monitor. Q15min safety checks.
levels  Description: INTERVENTIONS:  1. Administer medication as ordered  2. Teach and rehearse alternative coping skills  3. Provide emotional support with 1:1 interaction with staff  Outcome: Adequate for Discharge     Problem: Pain  Goal: Verbalizes/displays adequate comfort level or baseline comfort level  Outcome: Adequate for Discharge     Problem: Safety - Adult  Goal: Free from fall injury  Outcome: Adequate for Discharge     Problem: Depression/Self Harm  Goal: Effect of psychiatric condition will be minimized and patient will be protected from self harm  Description: INTERVENTIONS:  1. Assess impact of patient's symptoms on level of functioning, self care needs and offer support as indicated  2. Assess patient/family knowledge of depression, impact on illness and need for teaching  3. Provide emotional support, presence and reassurance  4. Assess for possible suicidal thoughts or ideation. If patient expresses suicidal thoughts or statements do not leave alone, initiate Suicide Precautions, move to a room close to the nursing station and obtain sitter  5. Initiate consults as appropriate with Mental Health Professional, Spiritual Care, Psychosocial CNS, and consider a recommendation to the [facility] for a Psychiatric Consultation  4/24/2023 0818 by Miesha Covington LPN  Outcome: Adequate for Discharge  4/24/2023 0056 by Lisa Larsen LPN  Outcome: Progressing  Note: Patient denies thoughts of self harm at this time. Patient agrees to seek out staff if they begin having thoughts of harming self or they need to talk. Q15min safety checks continue   4/24/2023 0049 by Lisa Larsen LPN  Outcome: Not Progressing  Note: Patient denies thoughts of self harm at this time. Patient agrees to seek out staff if they begin having thoughts of harming self or they need to talk.  Q15min safety checks continue

## 2023-04-25 NOTE — CARE COORDINATION
Name: Chancy Goltz    : 1965    Discharge Date: 23    Primary Auth/Cert #:     Destination: [facility]     Discharge Medications:      Medication List        START taking these medications      venlafaxine 75 MG extended release capsule  Commonly known as: EFFEXOR XR  Take 1 capsule by mouth daily (with breakfast)  Notes to patient: Mood             CHANGE how you take these medications      albuterol sulfate  (90 Base) MCG/ACT inhaler  Commonly known as: PROVENTIL;VENTOLIN;PROAIR  Inhale 2 puffs into the lungs every 6 hours as needed for Wheezing  What changed: Another medication with the same name was removed. Continue taking this medication, and follow the directions you see here. Notes to patient: wheezing     aspirin 81 MG chewable tablet  Take 1 tablet by mouth daily  What changed:   how much to take  how to take this  when to take this  Notes to patient: anticoagulant     carvedilol 12.5 MG tablet  Commonly known as: COREG  Take 1 tablet by mouth 2 times daily (with meals)  What changed: how much to take  Notes to patient: Blood pressure     diclofenac sodium 1 % Gel  Commonly known as: VOLTAREN  Apply 2 g topically 4 times daily as needed for Pain  What changed: reasons to take this  Notes to patient: pain     hydrOXYzine HCl 50 MG tablet  Commonly known as: ATARAX  Take 1 tablet by mouth 3 times daily as needed for Anxiety  What changed:   medication strength  how much to take  reasons to take this  Notes to patient: Anxiety      lidocaine 5 %  Commonly known as: LIDODERM  Place 1 patch onto the skin daily as needed for Pain 12 hours on, 12 hours off.   What changed: reasons to take this  Notes to patient: Pain             CONTINUE taking these medications      baclofen 20 MG tablet  Commonly known as: LIORESAL  Take 1 tablet by mouth 3 times daily  Notes to patient: Muscle spasms     carBAMazepine 200 MG tablet  Commonly known as: TEGRETOL  Take 1 tablet by mouth 2 times

## 2023-04-26 LAB
EST. AVERAGE GLUCOSE BLD GHB EST-MCNC: 103 MG/DL
HBA1C MFR BLD: 5.2 % (ref 4–6)

## 2023-04-29 ENCOUNTER — HOSPITAL ENCOUNTER (EMERGENCY)
Age: 58
Discharge: HOME | End: 2023-04-29
Attending: EMERGENCY MEDICINE
Payer: MEDICARE

## 2023-04-29 VITALS
WEIGHT: 189 LBS | TEMPERATURE: 98.5 F | BODY MASS INDEX: 33.49 KG/M2 | SYSTOLIC BLOOD PRESSURE: 102 MMHG | RESPIRATION RATE: 16 BRPM | HEIGHT: 63 IN | OXYGEN SATURATION: 92 % | HEART RATE: 74 BPM | DIASTOLIC BLOOD PRESSURE: 68 MMHG

## 2023-04-29 DIAGNOSIS — N10 ACUTE PYELONEPHRITIS: Primary | ICD-10-CM

## 2023-04-29 LAB
ABSOLUTE EOS #: 0.03 K/UL (ref 0–0.44)
ABSOLUTE IMMATURE GRANULOCYTE: <0.03 K/UL (ref 0–0.3)
ABSOLUTE LYMPH #: 1.95 K/UL (ref 1.1–3.7)
ABSOLUTE MONO #: 0.4 K/UL (ref 0.1–1.2)
ANION GAP SERPL CALCULATED.3IONS-SCNC: 7 MMOL/L (ref 9–17)
BASOPHILS # BLD: 0 % (ref 0–2)
BASOPHILS ABSOLUTE: 0.03 K/UL (ref 0–0.2)
BILIRUBIN URINE: NEGATIVE
BUN SERPL-MCNC: 12 MG/DL (ref 6–20)
CALCIUM SERPL-MCNC: 9 MG/DL (ref 8.6–10.4)
CASTS UA: NORMAL /LPF (ref 0–8)
CHLORIDE SERPL-SCNC: 100 MMOL/L (ref 98–107)
CO2 SERPL-SCNC: 31 MMOL/L (ref 20–31)
COLOR: YELLOW
CREAT SERPL-MCNC: 0.7 MG/DL (ref 0.5–0.9)
EOSINOPHILS RELATIVE PERCENT: 0 % (ref 1–4)
EPITHELIAL CELLS UA: NORMAL /HPF (ref 0–5)
GFR SERPL CREATININE-BSD FRML MDRD: >60 ML/MIN/1.73M2
GLUCOSE SERPL-MCNC: 85 MG/DL (ref 70–99)
GLUCOSE UR STRIP.AUTO-MCNC: NEGATIVE MG/DL
HCT VFR BLD AUTO: 37.8 % (ref 36.3–47.1)
HGB BLD-MCNC: 12.6 G/DL (ref 11.9–15.1)
IMMATURE GRANULOCYTES: 0 %
KETONES UR STRIP.AUTO-MCNC: NEGATIVE MG/DL
LEUKOCYTE ESTERASE UR QL STRIP.AUTO: ABNORMAL
LYMPHOCYTES # BLD: 29 % (ref 24–43)
MCH RBC QN AUTO: 31.5 PG (ref 25.2–33.5)
MCHC RBC AUTO-ENTMCNC: 33.3 G/DL (ref 28.4–34.8)
MCV RBC AUTO: 94.5 FL (ref 82.6–102.9)
MONOCYTES # BLD: 6 % (ref 3–12)
NITRITE UR QL STRIP.AUTO: NEGATIVE
NRBC AUTOMATED: 0 PER 100 WBC
PDW BLD-RTO: 13.4 % (ref 11.8–14.4)
PLATELET # BLD AUTO: 194 K/UL (ref 138–453)
PMV BLD AUTO: 10.6 FL (ref 8.1–13.5)
POTASSIUM SERPL-SCNC: 4.4 MMOL/L (ref 3.7–5.3)
PROT UR STRIP.AUTO-MCNC: 7 MG/DL (ref 5–8)
PROT UR STRIP.AUTO-MCNC: NEGATIVE MG/DL
RBC # BLD: 4 M/UL (ref 3.95–5.11)
RBC CLUMPS #/AREA URNS AUTO: NORMAL /HPF (ref 0–4)
SEG NEUTROPHILS: 65 % (ref 36–65)
SEGMENTED NEUTROPHILS ABSOLUTE COUNT: 4.29 K/UL (ref 1.5–8.1)
SODIUM SERPL-SCNC: 138 MMOL/L (ref 135–144)
SPECIFIC GRAVITY UA: 1.01 (ref 1–1.03)
TURBIDITY: CLEAR
URINE HGB: NEGATIVE
UROBILINOGEN, URINE: NORMAL
WBC # BLD AUTO: 6.7 K/UL (ref 3.5–11.3)
WBC UA: NORMAL /HPF (ref 0–5)

## 2023-04-29 PROCEDURE — 99284 EMERGENCY DEPT VISIT MOD MDM: CPT

## 2023-04-29 PROCEDURE — 80048 BASIC METABOLIC PNL TOTAL CA: CPT

## 2023-04-29 PROCEDURE — 87077 CULTURE AEROBIC IDENTIFY: CPT

## 2023-04-29 PROCEDURE — 81001 URINALYSIS AUTO W/SCOPE: CPT

## 2023-04-29 PROCEDURE — 6370000000 HC RX 637 (ALT 250 FOR IP): Performed by: STUDENT IN AN ORGANIZED HEALTH CARE EDUCATION/TRAINING PROGRAM

## 2023-04-29 PROCEDURE — 2580000003 HC RX 258: Performed by: STUDENT IN AN ORGANIZED HEALTH CARE EDUCATION/TRAINING PROGRAM

## 2023-04-29 PROCEDURE — 85025 COMPLETE CBC W/AUTO DIFF WBC: CPT

## 2023-04-29 PROCEDURE — 87086 URINE CULTURE/COLONY COUNT: CPT

## 2023-04-29 PROCEDURE — 87186 SC STD MICRODIL/AGAR DIL: CPT

## 2023-04-29 PROCEDURE — 6360000002 HC RX W HCPCS: Performed by: STUDENT IN AN ORGANIZED HEALTH CARE EDUCATION/TRAINING PROGRAM

## 2023-04-29 PROCEDURE — 96374 THER/PROPH/DIAG INJ IV PUSH: CPT

## 2023-04-29 RX ORDER — CEPHALEXIN 500 MG/1
500 CAPSULE ORAL 4 TIMES DAILY
Qty: 28 CAPSULE | Refills: 0 | Status: SHIPPED | OUTPATIENT
Start: 2023-04-29 | End: 2023-05-06

## 2023-04-29 RX ORDER — 0.9 % SODIUM CHLORIDE 0.9 %
1000 INTRAVENOUS SOLUTION INTRAVENOUS ONCE
Status: COMPLETED | OUTPATIENT
Start: 2023-04-29 | End: 2023-04-29

## 2023-04-29 RX ORDER — CEPHALEXIN 500 MG/1
500 CAPSULE ORAL 4 TIMES DAILY
Qty: 28 CAPSULE | Refills: 0 | Status: SHIPPED | OUTPATIENT
Start: 2023-04-29 | End: 2023-04-29 | Stop reason: SDUPTHER

## 2023-04-29 RX ORDER — CEPHALEXIN 250 MG/1
500 CAPSULE ORAL ONCE
Status: COMPLETED | OUTPATIENT
Start: 2023-04-29 | End: 2023-04-29

## 2023-04-29 RX ORDER — KETOROLAC TROMETHAMINE 30 MG/ML
30 INJECTION, SOLUTION INTRAMUSCULAR; INTRAVENOUS ONCE
Status: COMPLETED | OUTPATIENT
Start: 2023-04-29 | End: 2023-04-29

## 2023-04-29 RX ADMIN — SODIUM CHLORIDE 1000 ML: 9 INJECTION, SOLUTION INTRAVENOUS at 11:00

## 2023-04-29 RX ADMIN — KETOROLAC TROMETHAMINE 30 MG: 30 INJECTION, SOLUTION INTRAMUSCULAR; INTRAVENOUS at 11:14

## 2023-04-29 RX ADMIN — CEPHALEXIN 500 MG: 250 CAPSULE ORAL at 14:32

## 2023-04-29 ASSESSMENT — ENCOUNTER SYMPTOMS
DIARRHEA: 0
TROUBLE SWALLOWING: 0
ABDOMINAL DISTENTION: 0
CHEST TIGHTNESS: 0
SORE THROAT: 0
BACK PAIN: 0
SHORTNESS OF BREATH: 0
ABDOMINAL PAIN: 0
VOMITING: 0
CONSTIPATION: 0
COUGH: 0

## 2023-04-29 ASSESSMENT — PAIN SCALES - GENERAL
PAINLEVEL_OUTOF10: 6
PAINLEVEL_OUTOF10: 6

## 2023-04-29 ASSESSMENT — PAIN - FUNCTIONAL ASSESSMENT: PAIN_FUNCTIONAL_ASSESSMENT: 0-10

## 2023-04-29 ASSESSMENT — PAIN DESCRIPTION - ONSET: ONSET: ON-GOING

## 2023-04-29 ASSESSMENT — PAIN DESCRIPTION - PAIN TYPE: TYPE: ACUTE PAIN

## 2023-04-29 ASSESSMENT — PAIN DESCRIPTION - LOCATION
LOCATION: ABDOMEN;FLANK
LOCATION: FLANK;ABDOMEN

## 2023-04-29 ASSESSMENT — PAIN DESCRIPTION - DESCRIPTORS: DESCRIPTORS: STABBING;SHARP;SHOOTING

## 2023-04-29 ASSESSMENT — PAIN DESCRIPTION - FREQUENCY: FREQUENCY: CONTINUOUS

## 2023-04-29 ASSESSMENT — PAIN DESCRIPTION - ORIENTATION: ORIENTATION: LEFT

## 2023-04-29 NOTE — ED PROVIDER NOTES
[facility]  ED  Emergency Department Encounter  Emergency Medicine Resident     Pt Name:Beth Rojas  MRN: [id number]  [place] 1965  Date of evaluation: 4/29/23  PCP:  KANG Mullins NP  Note Started: 10:27 AM EDT      CHIEF COMPLAINT       Chief Complaint   Patient presents with    Abdominal Pain    Flank Pain     Pain for 2 days, may have UTI or kidney stone/history of kidney stones       HISTORY OF PRESENT ILLNESS  (Location/Symptom, Timing/Onset, Context/Setting, Quality, Duration, Modifying Factors, Severity.)      Davin Keller is a 62 y.o. female who presents with 2 days of dysuria and cloudy urine. Patient has a history of multiple kidney cysts as well as kidney stones, states that the pain is in her right side and radiates down to her suprapubic area. She also feels pressure in her bladder. Patient also states that she initially ignored her symptoms due to detoxing from alcohol. Has not noticed any hematuria, fevers or chills. Does report that she has had multiple CAT scans at [facility] due to her cysts. Recent ultrasound from 3/14 shows no dilation or stones. There is some benign cysts.     PAST MEDICAL / SURGICAL / SOCIAL / FAMILY HISTORY      has a past medical history of Abdominal pain, epigastric, Abnormal cardiovascular stress test, Abnormal EKG, Arrhythmia, Atrial fibrillation (HCC), Bilateral edema of lower extremity, Bipolar disorder ([address] Blurred vision, Breast cancer ([address] C. difficile colitis, Cervical spondylosis with myelopathy and radiculopathy, Cocaine use, Colitis, Colon cancer ([address] Colovesical fistula, Compression fracture of first lumbar vertebra (HCC), Congestive heart failure (HCC), COPD (chronic obstructive pulmonary disease) ([address] COPD, moderate ([address] COVID-19, Dental disease, Depression, Diabetes mellitus ([address] Diastolic dysfunction, Diverticulitis, Diverticulitis of colon, Diverticulosis, Dizziness, Dupuytren's contracture of hand,
__  Constitutional:  walking without difficulty  appears well-developed and well-nourished. HENT: no facial swelling or edema  Head: Normocephalic and atraumatic. Eyes: Right eye exhibits no discharge. Left eye exhibits no discharge. No scleral icterus. Neck: No JVD present. No tracheal deviation present. Cardiovascular: pulses present in extremities  Pulmonary/Chest: Effort normal. No respiratory distress. Musculoskeletal: Normal range of motion. exhibits no edema. Neurological: walking no distress  Skin: Skin is warm and dry. Psychiatric: has a normal mood and affect. behavior is normal.      Comments  Medical Decision Making  Problems Addressed:  Acute pyelonephritis: complicated acute illness or injury     Details: Requirign prolonged course of medications    Amount and/or Complexity of Data Reviewed  Labs: ordered. Decision-making details documented in ED Course. Risk  Prescription drug management. Obtain labs assessment for UTI patient already has help for her alcohol history      ED Course as of 04/29/23 1528   Sat Apr 29, 2023   1313 CBC and BMP unremarkable. Awaiting urine [SS]   1348 UTI Present on the UA likely [WK]      ED Course User Index  [SS] Maddy Corado MD  [WK] DO Ailyn Buckner DO,, DO, RDMS.   Attending Emergency Physician         Ailyn Martinez DO  04/29/23 1529

## 2023-04-29 NOTE — ED TRIAGE NOTES
PT with history of kidney stones, kidney pain and frequent UTI's. Having abdominal pain and left flank pain. Has kidney cysts on left and has had stones bilateral previously. Pt with 11 days sober staying in transitional housing for sobriety currently. Pt has multiple allergies, does have CT dye allergy 10-15 years ago. Has had CT scans for past 10+ years without any premedication or difficulties. hard copy, drawn during this pregnancy

## 2023-04-30 LAB
MICROORGANISM SPEC CULT: ABNORMAL
SPECIMEN DESCRIPTION: ABNORMAL

## 2023-05-02 NOTE — GROUP NOTE
Detail Level: Simple Group Therapy Note    Date: 2/2/2022    Group Start Time: 1000  Group End Time: 1030  Group Topic: Psychotherapy    MARY JANE LUONG    EZEQUIEL Leos, LSW        Group Therapy Note    Attendees: 6/8         Patient's Goal:  Increase interpersonal relationship skills    Notes:  Patient was an active participant in group discussion    Status After Intervention:  Unchanged    Participation Level:  Active Listener and Interactive    Participation Quality: Appropriate, Attentive and Sharing      Speech:  normal      Thought Process/Content: Logical      Affective Functioning: Congruent      Mood: depressed      Level of consciousness:  Alert, Oriented x4 and Attentive      Response to Learning: Able to verbalize current knowledge/experience, Able to verbalize/acknowledge new learning and Able to retain information      Endings: None Reported    Modes of Intervention: Support, Socialization and Exploration      Discipline Responsible: /Counselor      Signature:  EZEQUIEL Leos, ZORAIDA

## 2023-05-05 ENCOUNTER — APPOINTMENT (OUTPATIENT)
Dept: GENERAL RADIOLOGY | Age: 58
End: 2023-05-05
Payer: MEDICARE

## 2023-05-05 ENCOUNTER — HOSPITAL ENCOUNTER (EMERGENCY)
Age: 58
Discharge: HOME | End: 2023-05-05
Attending: EMERGENCY MEDICINE
Payer: MEDICARE

## 2023-05-05 VITALS
OXYGEN SATURATION: 96 % | SYSTOLIC BLOOD PRESSURE: 141 MMHG | TEMPERATURE: 97.9 F | DIASTOLIC BLOOD PRESSURE: 89 MMHG | BODY MASS INDEX: 33.31 KG/M2 | HEIGHT: 63 IN | RESPIRATION RATE: 12 BRPM | HEART RATE: 65 BPM | WEIGHT: 188 LBS

## 2023-05-05 DIAGNOSIS — R42 DIZZINESS: Primary | ICD-10-CM

## 2023-05-05 LAB
ABSOLUTE EOS #: 0.09 K/UL (ref 0–0.44)
ABSOLUTE IMMATURE GRANULOCYTE: <0.03 K/UL (ref 0–0.3)
ABSOLUTE LYMPH #: 2.55 K/UL (ref 1.1–3.7)
ABSOLUTE MONO #: 0.34 K/UL (ref 0.1–1.2)
ALBUMIN SERPL-MCNC: 3.7 G/DL (ref 3.5–5.2)
ALBUMIN/GLOBULIN RATIO: 1.2 (ref 1–2.5)
ALP SERPL-CCNC: 74 U/L (ref 35–104)
ALT SERPL-CCNC: 12 U/L (ref 5–33)
ANION GAP SERPL CALCULATED.3IONS-SCNC: 10 MMOL/L (ref 9–17)
AST SERPL-CCNC: 18 U/L
BASOPHILS # BLD: 1 % (ref 0–2)
BASOPHILS ABSOLUTE: 0.04 K/UL (ref 0–0.2)
BILIRUB SERPL-MCNC: <0.1 MG/DL (ref 0.3–1.2)
BNP SERPL-MCNC: 175 PG/ML
BUN SERPL-MCNC: 14 MG/DL (ref 6–20)
CALCIUM SERPL-MCNC: 8.9 MG/DL (ref 8.6–10.4)
CHLORIDE SERPL-SCNC: 101 MMOL/L (ref 98–107)
CO2 SERPL-SCNC: 28 MMOL/L (ref 20–31)
CREAT SERPL-MCNC: 0.94 MG/DL (ref 0.5–0.9)
EOSINOPHILS RELATIVE PERCENT: 1 % (ref 1–4)
GFR SERPL CREATININE-BSD FRML MDRD: >60 ML/MIN/1.73M2
GLUCOSE SERPL-MCNC: 96 MG/DL (ref 70–99)
HCT VFR BLD AUTO: 38.7 % (ref 36.3–47.1)
HGB BLD-MCNC: 12.7 G/DL (ref 11.9–15.1)
IMMATURE GRANULOCYTES: 0 %
LIPASE SERPL-CCNC: 42 U/L (ref 13–60)
LYMPHOCYTES # BLD: 37 % (ref 24–43)
MAGNESIUM SERPL-MCNC: 2.3 MG/DL (ref 1.6–2.6)
MCH RBC QN AUTO: 30.9 PG (ref 25.2–33.5)
MCHC RBC AUTO-ENTMCNC: 32.8 G/DL (ref 28.4–34.8)
MCV RBC AUTO: 94.2 FL (ref 82.6–102.9)
MONOCYTES # BLD: 5 % (ref 3–12)
NRBC AUTOMATED: 0 PER 100 WBC
PDW BLD-RTO: 13.6 % (ref 11.8–14.4)
PLATELET # BLD AUTO: 185 K/UL (ref 138–453)
PMV BLD AUTO: 10.4 FL (ref 8.1–13.5)
POTASSIUM SERPL-SCNC: 3.6 MMOL/L (ref 3.7–5.3)
PROT SERPL-MCNC: 6.7 G/DL (ref 6.4–8.3)
RBC # BLD: 4.11 M/UL (ref 3.95–5.11)
SEG NEUTROPHILS: 56 % (ref 36–65)
SEGMENTED NEUTROPHILS ABSOLUTE COUNT: 3.8 K/UL (ref 1.5–8.1)
SODIUM SERPL-SCNC: 139 MMOL/L (ref 135–144)
TROPONIN I SERPL DL<=0.01 NG/ML-MCNC: 11 NG/L (ref 0–14)
TROPONIN I SERPL DL<=0.01 NG/ML-MCNC: 9 NG/L (ref 0–14)
WBC # BLD AUTO: 6.8 K/UL (ref 3.5–11.3)

## 2023-05-05 PROCEDURE — 6370000000 HC RX 637 (ALT 250 FOR IP): Performed by: STUDENT IN AN ORGANIZED HEALTH CARE EDUCATION/TRAINING PROGRAM

## 2023-05-05 PROCEDURE — 93005 ELECTROCARDIOGRAM TRACING: CPT | Performed by: STUDENT IN AN ORGANIZED HEALTH CARE EDUCATION/TRAINING PROGRAM

## 2023-05-05 PROCEDURE — 99285 EMERGENCY DEPT VISIT HI MDM: CPT

## 2023-05-05 PROCEDURE — 83880 ASSAY OF NATRIURETIC PEPTIDE: CPT

## 2023-05-05 PROCEDURE — 83735 ASSAY OF MAGNESIUM: CPT

## 2023-05-05 PROCEDURE — 2580000003 HC RX 258: Performed by: STUDENT IN AN ORGANIZED HEALTH CARE EDUCATION/TRAINING PROGRAM

## 2023-05-05 PROCEDURE — 71045 X-RAY EXAM CHEST 1 VIEW: CPT

## 2023-05-05 PROCEDURE — 80053 COMPREHEN METABOLIC PANEL: CPT

## 2023-05-05 PROCEDURE — 84484 ASSAY OF TROPONIN QUANT: CPT

## 2023-05-05 PROCEDURE — 85025 COMPLETE CBC W/AUTO DIFF WBC: CPT

## 2023-05-05 PROCEDURE — 83690 ASSAY OF LIPASE: CPT

## 2023-05-05 RX ORDER — CLONIDINE 0.1 MG/24H
1 PATCH, EXTENDED RELEASE TRANSDERMAL ONCE
Status: DISCONTINUED | OUTPATIENT
Start: 2023-05-05 | End: 2023-05-05 | Stop reason: HOSPADM

## 2023-05-05 RX ORDER — 0.9 % SODIUM CHLORIDE 0.9 %
500 INTRAVENOUS SOLUTION INTRAVENOUS ONCE
Status: COMPLETED | OUTPATIENT
Start: 2023-05-05 | End: 2023-05-05

## 2023-05-05 RX ADMIN — SODIUM CHLORIDE 500 ML: 0.9 INJECTION, SOLUTION INTRAVENOUS at 18:01

## 2023-05-05 ASSESSMENT — PAIN - FUNCTIONAL ASSESSMENT: PAIN_FUNCTIONAL_ASSESSMENT: NONE - DENIES PAIN

## 2023-05-05 ASSESSMENT — ENCOUNTER SYMPTOMS
VOMITING: 0
ABDOMINAL PAIN: 0
SHORTNESS OF BREATH: 0

## 2023-05-05 NOTE — ED NOTES
The following labs were labeled with appropriate pt sticker and tubed to lab:     [] Blue     [] Lavender   [] on ice  [x] Green/yellow  [] Green/black [] on ice  [] Marcelino Felty  [] on ice  [] Yellow  [] Red  [] Type/ Screen  [] ABG  [] VBG    [] COVID-19 swab    [] Rapid  [] PCR  [] Flu swab  [] Peds Viral Panel     [] Urine Sample  [] Fecal Sample  [] Pelvic Cultures  [] Blood Cultures  [] X 2  [] STREP Cultures     Sanya Knapp RN  05/05/23 3423

## 2023-05-05 NOTE — ED PROVIDER NOTES
[facility] ED  Emergency Department Encounter  Emergency Medicine Resident     Pt Name: Brittny Manuel  MRN: [id number]  [place] 1965  Date of evaluation: 5/5/23  PCP:  KANG Sow NP    [facility]       Chief Complaint   Patient presents with    Dizziness       HISTORY [place]  (Location/Symptom, Timing/Onset, Context/Setting, Quality, Duration, Modifying Factors,Severity.)      Brittny Manuel is a 62 y. o.yo female who is on Xarelto for A-fib. Patient recently started taking naltrexone for alcohol use. Patient currently resides in a facility. Patient states for the past 2 days which is when she started the naltrexone she has been feeling dizzy, lightheaded. She states that today she did have chest discomfort but no shortness of breath. She was evaluated by her nurse at her facility and was recommended to come to the ED. She denies any cough, fever. She states that the pain is over her left-sided chest, it is mild, no radiation. .  She denies falling or having any syncopal episode with her dizziness symptoms. She does report that she has an appointment with Dr. Garwin Bumpers, electrophysiologist for possible ablation for the A-fib.       PAST MEDICAL / SURGICAL / SOCIAL / FAMILY HISTORY      has a past medical history of Abdominal pain, epigastric, Abnormal cardiovascular stress test, Abnormal EKG, Arrhythmia, Atrial fibrillation (HCC), Bilateral edema of lower extremity, Bipolar disorder ([address] Blurred vision, Breast cancer ([address] C. difficile colitis, Cervical spondylosis with myelopathy and radiculopathy, Cocaine use, Colitis, Colon cancer ([address] Colovesical fistula, Compression fracture of first lumbar vertebra (HCC), Congestive heart failure (HCC), COPD (chronic obstructive pulmonary disease) ([address] COPD, moderate ([address] COVID-19, Dental disease, Depression, Diabetes mellitus ([address] Diastolic dysfunction, Diverticulitis, Diverticulitis of colon, Diverticulosis,

## 2023-05-05 NOTE — ED NOTES
Patient to ED 36 c/o medication reaction after starting vivitrol 3 days ago. States she has been experiencing nausea, headaches, and feeling clammy and weak for 2 of the 3 days she has been on the new medication. States no other medications have changed and denies any recent exposures to anyone that has been sick. On arrival, patient ambulatory, A+Ox4, respirations even and unlabored, speaking in complete sentences.       Fuad Kaye RN  05/05/23 5562

## 2023-05-05 NOTE — DISCHARGE INSTRUCTIONS
You were seen in the emergency room due to concern of naltrexone side effects, work-up is negative, chest x-ray did not show infection or pneumothorax. Your cardiac work-up was within normal limits. You will be discharged with clonidine patch augment the effects of the naltrexone. If at anytime you do have worsening symptoms, fever, chills, nausea vomiting, please return to emergency room as soon as possible. XR CHEST PORTABLE   Final Result   No acute process.

## 2023-05-05 NOTE — ED NOTES
The following labs were labeled with appropriate pt sticker and tubed to lab:     [x] Blue     [x] Lavender   [] on ice  [x] Green/yellow  [] Green/black [] on ice  [] Frantz Toni  [] on ice  [x] Yellow  [] Red  [] Type/ Screen  [] ABG  [] VBG    [] COVID-19 swab    [] Rapid  [] PCR  [] Flu swab  [] Peds Viral Panel     [] Urine Sample  [] Fecal Sample  [] Pelvic Cultures  [] Blood Cultures  [] X 2  [] STREP Cultures     Airam Knutson RN  05/05/23 9055

## 2023-05-05 NOTE — ED PROVIDER NOTES
FACULTY SIGN-OUT  ADDENDUM       Patient: Kiran Munguia   MRN: [id number]  PCP:  KANG Herrera NP  Note Started: 5/5/23, 6:36 PM EDT  Attestation  I was available and discussed any additional care issues that arose and coordinated the management plans with the resident(s) caring for the patient during my duty period. Any areas of disagreement with resident's documentation of care or procedures are noted on the chart. I was personally present for the key portions of any/all procedures during my duty period. I have documented in the chart those procedures where I was not present during the key portions. The patient's initial evaluation and plan have been discussed with the prior provider who initially evaluated the patient. Pertinent Comments:   The patient is a 62 y.o. female taken in signout with recently being treated with naltrexone and likely having symptoms secondary to this  We are awaiting laboratory work-up as well as attempted symptomatic control and reevaluation after    ED COURSE      The patient was given the following medications:  Orders Placed This Encounter   Medications    0.9 % sodium chloride bolus       RECENT VITALS:   BP: (!) 141/89  Pulse: 65  Respirations: 12  Temp: 97.9 °F (36.6 °C) SpO2: 96 %    (Please note that portions of this note were completed with a voice recognition program.  Efforts were made to edit the dictations but occasionally words are mis-transcribed.)    MD Floresita Pritchard  Attending Emergency Medicine Physician       Sid Strauss MD  05/05/23 4128

## 2023-05-06 LAB
EKG ATRIAL RATE: 66 BPM
EKG P AXIS: 56 DEGREES
EKG P-R INTERVAL: 160 MS
EKG Q-T INTERVAL: 444 MS
EKG QRS DURATION: 90 MS
EKG QTC CALCULATION (BAZETT): 465 MS
EKG R AXIS: 11 DEGREES
EKG T AXIS: 14 DEGREES
EKG VENTRICULAR RATE: 66 BPM

## 2023-05-06 PROCEDURE — 93010 ELECTROCARDIOGRAM REPORT: CPT | Performed by: INTERNAL MEDICINE

## 2023-05-06 NOTE — ED PROVIDER NOTES
[facility]     Emergency Department     Faculty Attestation    I performed a history and physical examination of the patient and discussed management with the resident. I reviewed the residents note and agree with the documented findings including all diagnostic interpretations and plan of care. Any areas of disagreement are noted on the chart. I was personally present for the key portions of any procedures. I have documented in the chart those procedures where I was not present during the key portions. I have reviewed the emergency nurses triage note. I agree with the chief complaint, past medical history, past surgical history, allergies, medications, social and family history as documented unless otherwise noted below. Documentation of the HPI, Physical Exam and Medical Decision Making performed by scribelana is based on my personal performance of the HPI, PE and MDM. For Physician Assistant/ Nurse Practitioner cases/documentation I have personally evaluated this patient and have completed at least one if not all key elements of the E/M (history, physical exam, and MDM). Additional findings are as noted. Primary Care Physician: KANG Lee NP    Note Started: 11:39 PM EDT     VITAL SIGNS:   height is 5' 3\" (1.6 m) and weight is 188 lb (85.3 kg). Her oral temperature is 97.9 °F (36.6 °C). Her blood pressure is 141/89 (abnormal) and her pulse is 65. Her respiration is 12 and oxygen saturation is 96%. Medical Decision Making  Dizziness, body aches, nausea. Recently started on oral naltrexone. No fever, no cough, SOB. No acute distress, alert, answering questions appropriately, cardiac exam regular rate and rhythm no murmurs rubs gallops, pulmonary clear to auscultation bilaterally, abdomen is soft nontender nondistended. Impression is med reaction, but could be atypical acs, electrolyte/renal dysfunction.  Labs, EKG, sx treatment,

## 2023-05-11 ENCOUNTER — HOSPITAL ENCOUNTER (OUTPATIENT)
Dept: PREADMISSION TESTING | Age: 58
Discharge: HOME | End: 2023-05-11
Attending: ORTHOPAEDIC SURGERY | Admitting: ORTHOPAEDIC SURGERY

## 2023-05-15 ENCOUNTER — HOSPITAL ENCOUNTER (OUTPATIENT)
Dept: PREADMISSION TESTING | Age: 58
Discharge: HOME | End: 2023-05-19
Attending: ORTHOPAEDIC SURGERY | Admitting: ORTHOPAEDIC SURGERY

## 2023-05-15 VITALS
HEART RATE: 82 BPM | TEMPERATURE: 98.7 F | OXYGEN SATURATION: 96 % | WEIGHT: 185 LBS | RESPIRATION RATE: 16 BRPM | HEIGHT: 63 IN | SYSTOLIC BLOOD PRESSURE: 122 MMHG | BODY MASS INDEX: 32.78 KG/M2 | DIASTOLIC BLOOD PRESSURE: 69 MMHG

## 2023-05-15 PROCEDURE — APPSS45 APP SPLIT SHARED TIME 31-45 MINUTES: Performed by: NURSE PRACTITIONER

## 2023-05-15 RX ORDER — SEMAGLUTIDE 1.34 MG/ML
INJECTION, SOLUTION SUBCUTANEOUS WEEKLY
COMMUNITY

## 2023-05-15 RX ORDER — LOPERAMIDE HYDROCHLORIDE 2 MG/1
2 CAPSULE ORAL 4 TIMES DAILY PRN
COMMUNITY

## 2023-05-15 RX ORDER — DIPHENOXYLATE HYDROCHLORIDE AND ATROPINE SULFATE 2.5; .025 MG/1; MG/1
1 TABLET ORAL 4 TIMES DAILY PRN
COMMUNITY

## 2023-05-15 RX ORDER — UBROGEPANT 50 MG/1
TABLET ORAL PRN
COMMUNITY

## 2023-05-15 RX ORDER — BUDESONIDE AND FORMOTEROL FUMARATE DIHYDRATE 160; 4.5 UG/1; UG/1
2 AEROSOL RESPIRATORY (INHALATION) 2 TIMES DAILY
COMMUNITY

## 2023-05-15 RX ORDER — DEXTROAMPHETAMINE SACCHARATE, AMPHETAMINE ASPARTATE, DEXTROAMPHETAMINE SULFATE AND AMPHETAMINE SULFATE 2.5; 2.5; 2.5; 2.5 MG/1; MG/1; MG/1; MG/1
10 TABLET ORAL 2 TIMES DAILY
COMMUNITY

## 2023-05-15 RX ORDER — ONDANSETRON 4 MG/1
4 TABLET, FILM COATED ORAL EVERY 8 HOURS PRN
COMMUNITY

## 2023-05-15 NOTE — H&P
labral degeneration. Moderate glenohumeral  chondromalacia. Mild glenohumeral osteoarthrosis. 5. Mild degenerative change of the right AC joint.     PAST MEDICAL HISTORY     Past Medical History:   Diagnosis Date    Abdominal pain, epigastric 06/29/2011    Abnormal cardiovascular stress test 01/23/2012    Abnormal EKG     Arrhythmia 06/04/2014    Atrial fibrillation ([address]     Bilateral edema of lower extremity 03/03/2016    Blurred vision     left eye     Breast cancer ([address]     bilateral remission since 2012    C. difficile colitis     2015    Cervical spondylosis with myelopathy and radiculopathy 12/17/2020    Cocaine use     Colitis 02/05/2016    Colon cancer ([address]     stage IV remission since 2016    Colovesical fistula 02/09/2016    Compression fracture of first lumbar vertebra ([address] 09/24/2019    Congestive heart failure ([address] 05/06/2020    COPD (chronic obstructive pulmonary disease) ([address]     COPD, moderate ([address] 02/19/2021    COVID-19 Nov 2019 & Dec 2020    Dental disease     Depression     Diabetes mellitus ([address]     Diastolic dysfunction 32/43/4139    Diverticulitis 11/17/2015    Diverticulitis of colon 10/29/2015    Diverticulosis     Dizziness     from sitting to standing    Dupuytren's contracture of hand 04/16/2021    Fibromyalgia     GERD (gastroesophageal reflux disease)     Gout 06/04/2014    Heavy alcohol use     History of falling     History of loop recorder     Hyperlipidemia     Hypertension     Intentional drug overdose ([address] 01/2022    Irritable bowel syndrome with diarrhea 09/29/2010    Kidney stones     Malignant neoplasm of skin 12/21/2021    MDD (major depressive disorder), severe ([address] 01/20/2020    Migraine 06/04/2014    Multiple sclerosis ([address]     Myocardial infarct ([address] 12/2021    States 12/2021 past MI shown on EKG, also may have had MI in 2008 with stroke    Narcoleptic syndrome 02/19/2021    Non-rheumatic mitral regurgitation 03/01/2019    On home oxygen therapy     home

## 2023-05-15 NOTE — DISCHARGE INSTRUCTIONS
Pre-op Instructions For Out-Patient Surgery    Medication Instructions:  Please stop herbs and any supplements now (includes vitamins and minerals). Please contact your surgeon and prescribing physician for pre-op instructions for any blood thinners. Stop Xarelto as directed    If you have inhalers/aerosol treatments at home, please use them the morning of your surgery and bring the inhalers with you to the hospital.    Please take the following medications the morning of your surgery with a sip of water:    Carvedilol, Tegretol, Neurontin, Nexium, Inhaler    Surgery Instructions:  After midnight before surgery:  Do not eat or drink anything, including water, mints, gum, and hard candy. You may brush your teeth without swallowing. No smoking, chewing tobacco, or street drugs. Please shower or bathe before surgery. If you were given Surgical Scrub Chlorhexidine Gluconate Liquid (CHG), please shower the night before and the morning of your surgery following the detailed instructions you received during your pre-admission visit. Please do not wear any cologne, lotion, powder, deodorant, jewelry, piercings, perfume, makeup, nail polish, hair accessories, or hair spray on the day of surgery. Wear loose comfortable clothing. Leave your valuables at home. Bring a storage case for any glasses/contacts. An adult who is responsible for you MUST drive you home and should be with you for the first 24 hours after surgery. If having out-patient knee and foot surgeries, please arrange for planned crutches, walker, or wheelchair before arriving to the hospital.    The Day of Surgery:  Arrive at [facility] AT [facility] Surgery Entrance at the time directed by your surgeon and check in at the desk. If you have a living will or healthcare power of , please bring a copy. You will be taken to the pre-op holding area where you will be prepared for surgery.   A physical

## 2023-05-17 RX ORDER — HYDROCODONE BITARTRATE AND ACETAMINOPHEN 5; 325 MG/1; MG/1
1 TABLET ORAL EVERY 4 HOURS PRN
Qty: 42 TABLET | Refills: 0 | Status: CANCELLED | OUTPATIENT
Start: 2023-05-17 | End: 2023-05-24

## 2023-05-17 RX ORDER — GABAPENTIN 300 MG/1
300 CAPSULE ORAL DAILY
Qty: 7 CAPSULE | Refills: 0 | Status: CANCELLED | OUTPATIENT
Start: 2023-05-17 | End: 2023-05-24

## 2023-05-17 RX ORDER — ONDANSETRON 4 MG/1
4 TABLET, FILM COATED ORAL DAILY PRN
Qty: 20 TABLET | Refills: 0 | Status: CANCELLED | OUTPATIENT
Start: 2023-05-17

## 2023-05-18 ENCOUNTER — TELEPHONE (OUTPATIENT)
Dept: ORTHOPEDIC SURGERY | Age: 58
End: 2023-05-18

## 2023-05-18 NOTE — TELEPHONE ENCOUNTER
Cardiac clearance letter scanned into chart on 5/18
LVM with patient to call office back. Still in need of cardiac and PCP clearance for her upcoming sx with Dr. Maury Helm.
Detail Level: Detailed
General Sunscreen Counseling: I recommended a broad spectrum sunscreen with a SPF of 30 or higher.  I explained that SPF 30 sunscreens block approximately 97 percent of the sun's harmful rays.  Sunscreens should be applied at least 15 minutes prior to expected sun exposure and then every 2 hours after that as long as sun exposure continues. If swimming or exercising sunscreen should be reapplied every 45 minutes to an hour after getting wet or sweating.  One ounce, or the equivalent of a shot glass full of sunscreen, is adequate to protect the skin not covered by a bathing suit. I also recommended a lip balm with a sunscreen as well. Sun protective clothing can be used in lieu of sunscreen but must be worn the entire time you are exposed to the sun's rays.

## 2023-05-19 ENCOUNTER — OFFICE VISIT (OUTPATIENT)
Dept: ORTHOPEDIC SURGERY | Age: 58
End: 2023-05-19

## 2023-05-19 VITALS — WEIGHT: 185 LBS | BODY MASS INDEX: 32.78 KG/M2 | HEIGHT: 63 IN | RESPIRATION RATE: 14 BRPM

## 2023-05-19 DIAGNOSIS — M25.531 WRIST PAIN, RIGHT: ICD-10-CM

## 2023-05-19 DIAGNOSIS — M75.101 TEAR OF RIGHT ROTATOR CUFF, UNSPECIFIED TEAR EXTENT, UNSPECIFIED WHETHER TRAUMATIC: Primary | ICD-10-CM

## 2023-05-19 RX ORDER — ONDANSETRON 4 MG/1
4 TABLET, FILM COATED ORAL DAILY PRN
Qty: 20 TABLET | Refills: 0 | Status: CANCELLED | OUTPATIENT
Start: 2023-05-19

## 2023-05-19 RX ORDER — SODIUM CHLORIDE 0.9 % (FLUSH) 0.9 %
5-40 SYRINGE (ML) INJECTION EVERY 12 HOURS SCHEDULED
OUTPATIENT
Start: 2023-05-19

## 2023-05-19 RX ORDER — HYDROCODONE BITARTRATE AND ACETAMINOPHEN 5; 325 MG/1; MG/1
1 TABLET ORAL EVERY 4 HOURS PRN
Qty: 42 TABLET | Refills: 0 | Status: SHIPPED | OUTPATIENT
Start: 2023-05-19 | End: 2023-05-26

## 2023-05-19 RX ORDER — SODIUM CHLORIDE 0.9 % (FLUSH) 0.9 %
5-40 SYRINGE (ML) INJECTION PRN
OUTPATIENT
Start: 2023-05-19

## 2023-05-19 RX ORDER — GABAPENTIN 300 MG/1
300 CAPSULE ORAL DAILY
Qty: 7 CAPSULE | Refills: 0 | Status: CANCELLED | OUTPATIENT
Start: 2023-05-19 | End: 2023-05-26

## 2023-05-19 RX ORDER — SODIUM CHLORIDE 9 MG/ML
INJECTION, SOLUTION INTRAVENOUS PRN
OUTPATIENT
Start: 2023-05-19

## 2023-05-19 RX ORDER — ACETAMINOPHEN 325 MG/1
1000 TABLET ORAL ONCE
OUTPATIENT
Start: 2023-05-19 | End: 2023-05-19

## 2023-05-23 ENCOUNTER — ANESTHESIA EVENT (OUTPATIENT)
Dept: OPERATING ROOM | Age: 58
End: 2023-05-23
Payer: MEDICARE

## 2023-05-24 NOTE — PRE-PROCEDURE INSTRUCTIONS
Nothing to eat after midnight. Y  Are you taking any blood thinners? When was the last day? STOPPED MONDAY  Make sure to use Hibiclens prior to surgery. Y  Remove any jewelry and body piercings. Y  Do you wear glasses? If so, please bring a case to store them in. Are you having any Covid symptoms? N  Do you have any new rashes, infections, etc. that we should be aware of?N  Do you have a ride home the day of surgery? It cannot be a cab or medical transportation. Y  Verify surgery time and what time to arrive at [facility]

## 2023-05-25 ENCOUNTER — HOSPITAL ENCOUNTER (OUTPATIENT)
Age: 58
Setting detail: OUTPATIENT SURGERY
Discharge: HOME | End: 2023-05-25
Attending: ORTHOPAEDIC SURGERY | Admitting: ORTHOPAEDIC SURGERY
Payer: MEDICARE

## 2023-05-25 ENCOUNTER — ANESTHESIA (OUTPATIENT)
Dept: OPERATING ROOM | Age: 58
End: 2023-05-25
Payer: MEDICARE

## 2023-05-25 VITALS
OXYGEN SATURATION: 95 % | BODY MASS INDEX: 32.43 KG/M2 | SYSTOLIC BLOOD PRESSURE: 123 MMHG | TEMPERATURE: 97.1 F | RESPIRATION RATE: 20 BRPM | WEIGHT: 183 LBS | HEIGHT: 63 IN | DIASTOLIC BLOOD PRESSURE: 54 MMHG | HEART RATE: 65 BPM

## 2023-05-25 DIAGNOSIS — M75.101 TEAR OF RIGHT ROTATOR CUFF, UNSPECIFIED TEAR EXTENT, UNSPECIFIED WHETHER TRAUMATIC: ICD-10-CM

## 2023-05-25 LAB
GLUCOSE BLD-MCNC: 82 MG/DL (ref 65–105)
GLUCOSE BLD-MCNC: 83 MG/DL (ref 65–105)

## 2023-05-25 PROCEDURE — 7100000031 HC ASPR PHASE II RECOVERY - ADDTL 15 MIN: Performed by: ORTHOPAEDIC SURGERY

## 2023-05-25 PROCEDURE — 2720000010 HC SURG SUPPLY STERILE: Performed by: ORTHOPAEDIC SURGERY

## 2023-05-25 PROCEDURE — 2580000003 HC RX 258

## 2023-05-25 PROCEDURE — 82947 ASSAY GLUCOSE BLOOD QUANT: CPT

## 2023-05-25 PROCEDURE — 7100000030 HC ASPR PHASE II RECOVERY - FIRST 15 MIN: Performed by: ORTHOPAEDIC SURGERY

## 2023-05-25 PROCEDURE — 7100000011 HC PHASE II RECOVERY - ADDTL 15 MIN: Performed by: ORTHOPAEDIC SURGERY

## 2023-05-25 PROCEDURE — 2580000003 HC RX 258: Performed by: ANESTHESIOLOGY

## 2023-05-25 PROCEDURE — 3700000001 HC ADD 15 MINUTES (ANESTHESIA): Performed by: ORTHOPAEDIC SURGERY

## 2023-05-25 PROCEDURE — 3600000013 HC SURGERY LEVEL 3 ADDTL 15MIN: Performed by: ORTHOPAEDIC SURGERY

## 2023-05-25 PROCEDURE — 3700000000 HC ANESTHESIA ATTENDED CARE: Performed by: ORTHOPAEDIC SURGERY

## 2023-05-25 PROCEDURE — 6360000002 HC RX W HCPCS

## 2023-05-25 PROCEDURE — 7100000001 HC PACU RECOVERY - ADDTL 15 MIN: Performed by: ORTHOPAEDIC SURGERY

## 2023-05-25 PROCEDURE — 6360000002 HC RX W HCPCS: Performed by: ANESTHESIOLOGY

## 2023-05-25 PROCEDURE — 6360000002 HC RX W HCPCS: Performed by: ORTHOPAEDIC SURGERY

## 2023-05-25 PROCEDURE — 7100000010 HC PHASE II RECOVERY - FIRST 15 MIN: Performed by: ORTHOPAEDIC SURGERY

## 2023-05-25 PROCEDURE — C1713 ANCHOR/SCREW BN/BN,TIS/BN: HCPCS | Performed by: ORTHOPAEDIC SURGERY

## 2023-05-25 PROCEDURE — 7100000000 HC PACU RECOVERY - FIRST 15 MIN: Performed by: ORTHOPAEDIC SURGERY

## 2023-05-25 PROCEDURE — 2500000003 HC RX 250 WO HCPCS: Performed by: ANESTHESIOLOGY

## 2023-05-25 PROCEDURE — 6370000000 HC RX 637 (ALT 250 FOR IP): Performed by: ANESTHESIOLOGY

## 2023-05-25 PROCEDURE — 2500000003 HC RX 250 WO HCPCS

## 2023-05-25 PROCEDURE — 2709999900 HC NON-CHARGEABLE SUPPLY: Performed by: ORTHOPAEDIC SURGERY

## 2023-05-25 PROCEDURE — 64415 NJX AA&/STRD BRCH PLXS IMG: CPT | Performed by: ANESTHESIOLOGY

## 2023-05-25 PROCEDURE — 3600000003 HC SURGERY LEVEL 3 BASE: Performed by: ORTHOPAEDIC SURGERY

## 2023-05-25 DEVICE — ANCHOR SUTURE BIOCOMP 4.75X19.1 MM SWIVELOCK C: Type: IMPLANTABLE DEVICE | Site: SHOULDER | Status: FUNCTIONAL

## 2023-05-25 RX ORDER — LIDOCAINE HYDROCHLORIDE 10 MG/ML
1 INJECTION, SOLUTION EPIDURAL; INFILTRATION; INTRACAUDAL; PERINEURAL
Status: COMPLETED | OUTPATIENT
Start: 2023-05-25 | End: 2023-05-25

## 2023-05-25 RX ORDER — FENTANYL CITRATE 0.05 MG/ML
25 INJECTION, SOLUTION INTRAMUSCULAR; INTRAVENOUS EVERY 5 MIN PRN
Status: DISCONTINUED | OUTPATIENT
Start: 2023-05-25 | End: 2023-05-25 | Stop reason: HOSPADM

## 2023-05-25 RX ORDER — MIDAZOLAM HYDROCHLORIDE 1 MG/ML
INJECTION INTRAMUSCULAR; INTRAVENOUS PRN
Status: DISCONTINUED | OUTPATIENT
Start: 2023-05-25 | End: 2023-05-25 | Stop reason: SDUPTHER

## 2023-05-25 RX ORDER — METOCLOPRAMIDE HYDROCHLORIDE 5 MG/ML
10 INJECTION INTRAMUSCULAR; INTRAVENOUS
Status: DISCONTINUED | OUTPATIENT
Start: 2023-05-25 | End: 2023-05-25 | Stop reason: HOSPADM

## 2023-05-25 RX ORDER — ONDANSETRON 2 MG/ML
INJECTION INTRAMUSCULAR; INTRAVENOUS PRN
Status: DISCONTINUED | OUTPATIENT
Start: 2023-05-25 | End: 2023-05-25 | Stop reason: SDUPTHER

## 2023-05-25 RX ORDER — DEXAMETHASONE SODIUM PHOSPHATE 10 MG/ML
10 INJECTION, SOLUTION INTRAMUSCULAR; INTRAVENOUS ONCE
Status: DISCONTINUED | OUTPATIENT
Start: 2023-05-25 | End: 2023-05-25 | Stop reason: HOSPADM

## 2023-05-25 RX ORDER — POTASSIUM CHLORIDE 1500 MG/1
20 TABLET, FILM COATED, EXTENDED RELEASE ORAL DAILY
COMMUNITY
Start: 2023-03-21

## 2023-05-25 RX ORDER — PROPOFOL 10 MG/ML
INJECTION, EMULSION INTRAVENOUS PRN
Status: DISCONTINUED | OUTPATIENT
Start: 2023-05-25 | End: 2023-05-25 | Stop reason: SDUPTHER

## 2023-05-25 RX ORDER — GABAPENTIN 300 MG/1
300 CAPSULE ORAL ONCE
Status: COMPLETED | OUTPATIENT
Start: 2023-05-25 | End: 2023-05-25

## 2023-05-25 RX ORDER — ROPIVACAINE HYDROCHLORIDE 5 MG/ML
20 INJECTION, SOLUTION EPIDURAL; INFILTRATION; PERINEURAL ONCE
Status: DISCONTINUED | OUTPATIENT
Start: 2023-05-25 | End: 2023-05-25 | Stop reason: HOSPADM

## 2023-05-25 RX ORDER — LIDOCAINE HYDROCHLORIDE 20 MG/ML
INJECTION, SOLUTION EPIDURAL; INFILTRATION; INTRACAUDAL; PERINEURAL PRN
Status: DISCONTINUED | OUTPATIENT
Start: 2023-05-25 | End: 2023-05-25 | Stop reason: SDUPTHER

## 2023-05-25 RX ORDER — SODIUM CHLORIDE 0.9 % (FLUSH) 0.9 %
5-40 SYRINGE (ML) INJECTION EVERY 12 HOURS SCHEDULED
Status: DISCONTINUED | OUTPATIENT
Start: 2023-05-25 | End: 2023-05-25 | Stop reason: HOSPADM

## 2023-05-25 RX ORDER — BUDESONIDE 3 MG/1
3 CAPSULE, COATED PELLETS ORAL DAILY
COMMUNITY
Start: 2022-11-21

## 2023-05-25 RX ORDER — HYDROCODONE BITARTRATE AND ACETAMINOPHEN 5; 325 MG/1; MG/1
1 TABLET ORAL EVERY 4 HOURS PRN
Qty: 28 TABLET | Refills: 0 | Status: SHIPPED | OUTPATIENT
Start: 2023-05-25 | End: 2023-05-30

## 2023-05-25 RX ORDER — ACETAMINOPHEN 500 MG
1000 TABLET ORAL ONCE
Status: DISCONTINUED | OUTPATIENT
Start: 2023-05-25 | End: 2023-05-25 | Stop reason: SDUPTHER

## 2023-05-25 RX ORDER — PYRIDOXINE HCL (VITAMIN B6) 100 MG
1 TABLET ORAL DAILY
COMMUNITY
Start: 2023-02-10

## 2023-05-25 RX ORDER — ROCURONIUM BROMIDE 10 MG/ML
INJECTION, SOLUTION INTRAVENOUS PRN
Status: DISCONTINUED | OUTPATIENT
Start: 2023-05-25 | End: 2023-05-25 | Stop reason: SDUPTHER

## 2023-05-25 RX ORDER — FENTANYL CITRATE 50 UG/ML
INJECTION, SOLUTION INTRAMUSCULAR; INTRAVENOUS PRN
Status: DISCONTINUED | OUTPATIENT
Start: 2023-05-25 | End: 2023-05-25 | Stop reason: SDUPTHER

## 2023-05-25 RX ORDER — SODIUM CHLORIDE 9 MG/ML
INJECTION, SOLUTION INTRAVENOUS PRN
Status: DISCONTINUED | OUTPATIENT
Start: 2023-05-25 | End: 2023-05-25 | Stop reason: HOSPADM

## 2023-05-25 RX ORDER — SODIUM CHLORIDE 0.9 % (FLUSH) 0.9 %
5-40 SYRINGE (ML) INJECTION EVERY 12 HOURS SCHEDULED
Status: DISCONTINUED | OUTPATIENT
Start: 2023-05-25 | End: 2023-05-25 | Stop reason: SDUPTHER

## 2023-05-25 RX ORDER — HYOSCYAMINE SULFATE 0.125 MG
125 TABLET,DISINTEGRATING ORAL EVERY 4 HOURS PRN
COMMUNITY
Start: 2023-05-12

## 2023-05-25 RX ORDER — ACETAMINOPHEN 500 MG
1000 TABLET ORAL ONCE
Status: COMPLETED | OUTPATIENT
Start: 2023-05-25 | End: 2023-05-25

## 2023-05-25 RX ORDER — ONDANSETRON 2 MG/ML
4 INJECTION INTRAMUSCULAR; INTRAVENOUS
Status: DISCONTINUED | OUTPATIENT
Start: 2023-05-25 | End: 2023-05-25 | Stop reason: HOSPADM

## 2023-05-25 RX ORDER — IBUPROFEN 600 MG/1
600 TABLET ORAL 4 TIMES DAILY PRN
Qty: 40 TABLET | Refills: 0 | Status: SHIPPED | OUTPATIENT
Start: 2023-05-25

## 2023-05-25 RX ORDER — SODIUM CAPRYLATE
2 POWDER (GRAM) MISCELLANEOUS EVERY MORNING
COMMUNITY

## 2023-05-25 RX ORDER — SODIUM CHLORIDE 9 MG/ML
INJECTION, SOLUTION INTRAVENOUS CONTINUOUS
Status: DISCONTINUED | OUTPATIENT
Start: 2023-05-25 | End: 2023-05-25 | Stop reason: HOSPADM

## 2023-05-25 RX ORDER — SODIUM CHLORIDE 0.9 % (FLUSH) 0.9 %
5-40 SYRINGE (ML) INJECTION PRN
Status: DISCONTINUED | OUTPATIENT
Start: 2023-05-25 | End: 2023-05-25 | Stop reason: HOSPADM

## 2023-05-25 RX ORDER — SODIUM CHLORIDE 9 MG/ML
INJECTION, SOLUTION INTRAVENOUS PRN
Status: DISCONTINUED | OUTPATIENT
Start: 2023-05-25 | End: 2023-05-25 | Stop reason: SDUPTHER

## 2023-05-25 RX ORDER — DIPHENHYDRAMINE HYDROCHLORIDE 50 MG/ML
12.5 INJECTION INTRAMUSCULAR; INTRAVENOUS
Status: DISCONTINUED | OUTPATIENT
Start: 2023-05-25 | End: 2023-05-25 | Stop reason: HOSPADM

## 2023-05-25 RX ORDER — SODIUM CHLORIDE 0.9 % (FLUSH) 0.9 %
5-40 SYRINGE (ML) INJECTION PRN
Status: DISCONTINUED | OUTPATIENT
Start: 2023-05-25 | End: 2023-05-25 | Stop reason: SDUPTHER

## 2023-05-25 RX ORDER — ROPIVACAINE HYDROCHLORIDE 5 MG/ML
INJECTION, SOLUTION EPIDURAL; INFILTRATION; PERINEURAL
Status: COMPLETED | OUTPATIENT
Start: 2023-05-25 | End: 2023-05-25

## 2023-05-25 RX ORDER — DEXAMETHASONE SODIUM PHOSPHATE 4 MG/ML
4 INJECTION, SOLUTION INTRA-ARTICULAR; INTRALESIONAL; INTRAMUSCULAR; INTRAVENOUS; SOFT TISSUE ONCE
Status: DISCONTINUED | OUTPATIENT
Start: 2023-05-25 | End: 2023-05-25 | Stop reason: HOSPADM

## 2023-05-25 RX ADMIN — ROCURONIUM BROMIDE 50 MG: 10 INJECTION INTRAVENOUS at 07:05

## 2023-05-25 RX ADMIN — Medication 2000 MG: at 07:11

## 2023-05-25 RX ADMIN — PROPOFOL 130 MG: 10 INJECTION, EMULSION INTRAVENOUS at 07:04

## 2023-05-25 RX ADMIN — SODIUM CHLORIDE: 9 INJECTION, SOLUTION INTRAVENOUS at 06:15

## 2023-05-25 RX ADMIN — FENTANYL CITRATE 25 MCG: 50 INJECTION, SOLUTION INTRAMUSCULAR; INTRAVENOUS at 07:04

## 2023-05-25 RX ADMIN — PHENYLEPHRINE HYDROCHLORIDE 100 MCG: 10 INJECTION INTRAVENOUS at 07:21

## 2023-05-25 RX ADMIN — MIDAZOLAM 2 MG: 1 INJECTION INTRAMUSCULAR; INTRAVENOUS at 06:58

## 2023-05-25 RX ADMIN — PHENYLEPHRINE HYDROCHLORIDE 10 MCG/MIN: 10 INJECTION INTRAVENOUS at 07:34

## 2023-05-25 RX ADMIN — GABAPENTIN 300 MG: 300 CAPSULE ORAL at 06:13

## 2023-05-25 RX ADMIN — ONDANSETRON 4 MG: 2 INJECTION INTRAMUSCULAR; INTRAVENOUS at 08:06

## 2023-05-25 RX ADMIN — ACETAMINOPHEN 1000 MG: 500 TABLET ORAL at 06:13

## 2023-05-25 RX ADMIN — ROPIVACAINE HYDROCHLORIDE 20 ML: 5 INJECTION, SOLUTION EPIDURAL; INFILTRATION; PERINEURAL at 06:40

## 2023-05-25 RX ADMIN — PHENYLEPHRINE HYDROCHLORIDE 100 MCG: 10 INJECTION INTRAVENOUS at 07:17

## 2023-05-25 RX ADMIN — PHENYLEPHRINE HYDROCHLORIDE 100 MCG: 10 INJECTION INTRAVENOUS at 07:23

## 2023-05-25 RX ADMIN — LIDOCAINE HYDROCHLORIDE 1 ML: 10 INJECTION, SOLUTION EPIDURAL; INFILTRATION; INTRACAUDAL; PERINEURAL at 06:15

## 2023-05-25 RX ADMIN — LIDOCAINE HYDROCHLORIDE 40 MG: 20 INJECTION, SOLUTION EPIDURAL; INFILTRATION; INTRACAUDAL; PERINEURAL at 07:04

## 2023-05-25 RX ADMIN — SUGAMMADEX 200 MG: 100 INJECTION, SOLUTION INTRAVENOUS at 08:13

## 2023-05-25 ASSESSMENT — PAIN DESCRIPTION - ONSET
ONSET: ON-GOING
ONSET: ON-GOING

## 2023-05-25 ASSESSMENT — PAIN DESCRIPTION - PAIN TYPE
TYPE: SURGICAL PAIN
TYPE: SURGICAL PAIN

## 2023-05-25 ASSESSMENT — PAIN DESCRIPTION - ORIENTATION
ORIENTATION: RIGHT
ORIENTATION: RIGHT

## 2023-05-25 ASSESSMENT — PAIN SCALES - GENERAL
PAINLEVEL_OUTOF10: 8
PAINLEVEL_OUTOF10: 8
PAINLEVEL_OUTOF10: 6

## 2023-05-25 ASSESSMENT — PAIN DESCRIPTION - LOCATION: LOCATION: SHOULDER

## 2023-05-25 ASSESSMENT — PAIN DESCRIPTION - DESCRIPTORS
DESCRIPTORS: ACHING;SHOOTING
DESCRIPTORS: SHOOTING
DESCRIPTORS: SHOOTING

## 2023-05-25 ASSESSMENT — ENCOUNTER SYMPTOMS: SHORTNESS OF BREATH: 1

## 2023-05-25 NOTE — ANESTHESIA POSTPROCEDURE EVALUATION
POST- ANESTHESIA EVALUATION       Pt Name: Bereket Fuentes  MRN: [id number]  YOB: 1965  Date of evaluation: 5/25/2023  Time:  9:22 AM      /80   Pulse 62   Temp 97.5 °F (36.4 °C) (Infrared)   Resp 14   Ht 5' 3\" (1.6 m)   Wt 183 lb (83 kg)   SpO2 92%   BMI 32.42 kg/m²      Consciousness Level  Awake  Cardiopulmonary Status  Stable  Pain Adequately Treated YES  Nausea / Vomiting  NO  Adequate Hydration  YES  Anesthesia Related Complications NONE      Electronically signed by Karlee Jaimes MD on 5/25/2023 at 9:22 AM       Department of Anesthesiology  Postprocedure Note    Patient: Bereket Fuentes  MRN: [id number]  YOB: 1965  Date of evaluation: 5/25/2023      Procedure Summary     Date: 05/25/23 Room / Location: [address] [place] [address] / [address] [place] [address] [place]     Anesthesia Start: 0700 Anesthesia Stop: 2210    Procedure: SHOULDER ARTHROSCOPY ROTATOR CUFF REPAIR (Right: Shoulder) Diagnosis:       Tear of right rotator cuff, unspecified tear extent, unspecified whether traumatic      (Tear of right rotator cuff, unspecified tear extent, unspecified whether traumatic [M75.101])    Surgeons: Isiah Yun MD Responsible Provider: Karlee Jaimes MD    Anesthesia Type: general, regional ASA Status: 3          Anesthesia Type: No value filed.     Kiersten Phase I: Kiersten Score: 7    Kiersten Phase II:        Anesthesia Post Evaluation

## 2023-05-25 NOTE — ANESTHESIA PROCEDURE NOTES
Peripheral Block    Patient location during procedure: pre-op  Reason for block: post-op pain management and at surgeon's request  Start time: 5/25/2023 6:40 AM  End time: 5/25/2023 6:50 AM  Staffing  Performed: anesthesiologist   Anesthesiologist: Cruzito Keating MD  Preanesthetic Checklist  Completed: patient identified, IV checked, site marked, risks and benefits discussed, surgical/procedural consents, equipment checked, pre-op evaluation, timeout performed, anesthesia consent given, oxygen available, monitors applied/VS acknowledged, fire risk safety assessment completed and verbalized and blood product R/B/A discussed and consented  Peripheral Block   Patient position: supine  Prep: ChloraPrep  Provider prep: mask and sterile gloves  Patient monitoring: cardiac monitor, continuous pulse ox, frequent blood pressure checks, IV access and responsive to questions  Block type: Brachial plexus  Interscalene  Laterality: right  Injection technique: single-shot  Guidance: ultrasound guided  Local infiltration: lidocaine  Infiltration strength: 1 %  Local infiltration: lidocaine  Dose: 2 mL    Needle   Needle type: short-bevel   Needle gauge: 20 G  Needle localization: anatomical landmarks and paresthesias  Needle insertion depth: 2 cm  Needle length: 10 cm  Assessment   Injection assessment: negative aspiration for heme, no paresthesia on injection, local visualized surrounding nerve on ultrasound and no intravascular symptoms  Paresthesia pain: none  Slow fractionated injection: yes  Hemodynamics: stable  Real-time US image taken/store: yes  Outcomes: uncomplicated and patient tolerated procedure well    Medications Administered  dexamethasone (DECADRON) injection 4 mg/mL - Perineural   4 mg - 5/25/2023 6:40:00 AM  ropivacaine (NAROPIN) injection 0.5% - Perineural   20 mL - 5/25/2023 6:40:00 AM

## 2023-05-25 NOTE — INTERVAL H&P NOTE
Update History & Physical    The patient's History and Physical of May 15, 2023 was reviewed with the patient and I examined the patient. There was no change. The surgical site was confirmed by the patient and me. Pt undergoing for SHOULDER ARTHROSCOPY ROTATOR CUFF REPAIR-RIGHT Per Dr Mary Arnett. Pt denies fever/chills, chest pain or SOB  Pt NPO since the past midnight, no am medication today   Pt stopped taking Xarelto, the last dose was last Sunday  Patient denies any personal hx of blood clots. Pt denies any hx of MRSA infection before  Pt denies any personal or family problems with anesthesia   Physical exam remains unchanged including cardiac and pulmonary assessment  See nursing flow sheet for vital sings    Lab Results   Component Value Date    WBC 6.8 05/05/2023    HGB 12.7 05/05/2023    HCT 38.7 05/05/2023    MCV 94.2 05/05/2023     05/05/2023     Lab Results   Component Value Date/Time     05/05/2023 04:27 PM    K 3.6 05/05/2023 04:27 PM    K 3.7 01/14/2019 02:00 PM     05/05/2023 04:27 PM    CO2 28 05/05/2023 04:27 PM    BUN 14 05/05/2023 04:27 PM    CREATININE 0.94 05/05/2023 04:27 PM    GLUCOSE 96 05/05/2023 04:27 PM    CALCIUM 8.9 05/05/2023 04:27 PM    LABGLOM >60 05/05/2023 04:27 PM        Plan: The risks, benefits, expected outcome, and alternative to the recommended procedure have been discussed with the patient. Patient understands and wants to proceed with the procedure.      Electronically signed by KANG Dunham CNP on 5/25/2023 at 5:27 AM

## 2023-05-25 NOTE — PROGRESS NOTES
[phone] message left on Susan's line ( nurse at [facility])   Requesting a return phone call.

## 2023-05-25 NOTE — OP NOTE
Operative Report     Date of Procedure: 08/23/23      Preop Diagnosis: Right Partial Thickness Rotator Cuff Tear (M75.111)     Postop Diagnosis: Right Partial Thickness Rotator Cuff Tear (M75.111)     Procedure:  Right Shoulder Arthroscopic Rotator Cuff Repair (36492)     Surgeon: Karina Garcia MD      Anesthesia: Right interscalene nerve block with general anesthesia    Blood Loss: Minimal    Findings: High-grade bursal sided partial-thickness rotator cuff tear. Intact chondral surfaces with mild fraying of the superior and inferior labrum. Low-grade partial-thickness tear of the upper border subscapularis tendon. Intact biceps tendon and biceps anchor. Low-grade partial-thickness tearing of the supraspinatus. Implants: Arthrex 4.75 mm bio composite swivel lock anchor     Indications: Trell Woods is a 62 y.o. old female who presented with functionally limiting right shoulder pain despite non-operative measures. MRI confirmed the presence of a rotator cuff tear. Following a discussion with the patient regarding her treatment options, she elected to proceed with an arthroscopic right shoulder rotator cuff repair. she came to this decision after demonstrating a good understanding of our discussion. Risks, benefits, and alternatives were fully discussed. Postoperative limitations and limitations of the procedure were discussed in detail. The patient understood risks, alternatives, complications, & post-operative limitations and wishes to proceed. Operative Technique: Following appropriate identification of the patient and her operative extremity in the preoperative area, consent was reviewed with patient. Risks, benefits, and alternatives were discussed. All questions were answered. The anesthesia service administered a right interscalene nerve block. Patient was taken back to the OR and transferred onto the operative table. General anesthesia was administered and her airway was secured.  The patient

## 2023-05-25 NOTE — BRIEF OP NOTE
Brief Postoperative Note      Patient: Rasta Jordan  YOB: 1965  MRN: [id number]    Date of Procedure: 5/25/2023    Pre-Op Diagnosis Codes:     * Tear of right rotator cuff, unspecified tear extent, unspecified whether traumatic [M75.101]    Post-Op Diagnosis: Same       Procedure(s):  SHOULDER ARTHROSCOPY ROTATOR CUFF REPAIR    Surgeon(s):  Wing Willie MD    Assistant:  * No surgical staff found *    Anesthesia: General    Estimated Blood Loss (mL): Minimal    Complications: None    Specimens:   * No specimens in log *    Implants:  Implant Name Type Inv.  Item Serial No.  Lot No. LRB No. Used Action   ANCHOR SUTURE BIOCOMP 4.75X19.1 MM Zahra Reinoso [id number]  [facility] SUTURE BIOCOMP 4.75X19.1 MM [employer] [facility] [id number] Right 1 Implanted         Drains: * No LDAs found *    Findings: See operative report      Electronically signed by Wing Willie MD on 5/25/2023 at 8:27 AM

## 2023-05-25 NOTE — ANESTHESIA PRE PROCEDURE
Take 1 capsule by mouth 4 times daily as needed for Diarrhea    Historical Provider, MD   ondansetron (ZOFRAN) 4 MG tablet Take 1 tablet by mouth every 8 hours as needed for Nausea or Vomiting    Historical Provider, MD   budesonide-formoterol (SYMBICORT) 160-4.5 MCG/ACT AERO Inhale 2 puffs into the lungs 2 times daily    Historical Provider, MD   venlafaxine (EFFEXOR XR) 75 MG extended release capsule Take 1 capsule by mouth daily (with breakfast) 4/24/23   Massiel Robertson MD   furosemide (LASIX) 40 MG tablet Take 1 tablet by mouth 2 times daily 4/24/23   Massiel Robertson MD   albuterol sulfate HFA (PROVENTIL;VENTOLIN;PROAIR) 108 (90 Base) MCG/ACT inhaler Inhale 2 puffs into the lungs every 6 hours as needed for Wheezing 4/24/23   Massiel Robertson MD   baclofen (LIORESAL) 20 MG tablet Take 1 tablet by mouth 3 times daily 4/24/23   Massiel Robertson MD   carBAMazepine (TEGRETOL) 200 MG tablet Take 1 tablet by mouth 2 times daily 4/24/23   Massiel Robertson MD   carvedilol (COREG) 12.5 MG tablet Take 1 tablet by mouth 2 times daily (with meals) 4/24/23   Massiel Robertson MD   cetirizine (ZYRTEC) 10 MG tablet Take 1 tablet by mouth daily 4/24/23   Massiel Robertson MD   diclofenac sodium (VOLTAREN) 1 % GEL Apply 2 g topically 4 times daily as needed for Pain 4/24/23   Massiel Robertson MD   esomeprazole (NEXIUM) 40 MG delayed release capsule Take 1 capsule by mouth every morning (before breakfast) 4/24/23   Massiel Robertson MD   fluticasone (FLONASE) 50 MCG/ACT nasal spray 1 spray by Each Nostril route 2 times daily 4/24/23   Massiel Robertson MD   gabapentin (NEURONTIN) 600 MG tablet Take 2 tablets by mouth 3 times daily for 30 days.  4/24/23 5/25/23  Massiel Robertson MD   lidocaine (LIDODERM) 5 % Place 1 patch onto the skin daily as needed for Pain 12 hours on, 12 hours off. 4/24/23   Massiel Robertson MD   nystatin (MYCOSTATIN) 290377 UNIT/GM powder APPLY TO AFFECTED AREA(S) FOUR TIMES A DAY 4/24/23   Gaeline Im, MD

## 2023-05-25 NOTE — PROGRESS NOTES
0 Kristine was able to see patient as she was preparing for discharge. Kristine stated he would escript Norco and Motrin to [facility]. This is acceptable to patient and . ( A change from original plan).

## 2023-05-25 NOTE — DISCHARGE INSTRUCTIONS
Richie King MD  [address] in Shoulder and Elbow Surgery      POSTOPERATIVE INSTRUCTIONS  Surgery: Arthroscopic Rotator Cuff Repair    DIET  Begin with clear liquids and light foods (jellos, soups, etc.). Progress to your normal diet if you are not nauseated. WOUND CARE  Maintain your operative dressing, keeping it clean and dry. It is normal for the shoulder to bleed and swell following surgery - if blood soaks the bandage, do not become alarmed - reinforce with additional dressing. Remove surgical dressing on the second post-operative day - apply clean dressing (gauze and tape) and change daily. To avoid infection, keep surgical incisions clean and dry - you may shower by placing Seran wrap or Press and Seal over the surgical site before a shower, and afterwards, take everything off and apply clean gauze dressings - NO immersion of operative arm (i.e. bath). Alternatively, after youve changed your dressing for the first time you can place waterproof band aids over your incisions to take a shower and then a apply a clean new dressing afterwards    MEDICATIONS  Your nerve block should wear off in 8-12 hours. Start taking your prescribed pain medication before it does. Most patients will require some narcotic pain medication for a short period of time - this can be taken as per directions on the bottle. Common side effects of the pain medication are nausea, drowsiness, and  constipation - to decrease the side effects, take medication with food - if  constipation occurs, consider taking an over-the-counter laxative. If you are having problems with nausea and vomiting, take your prescribed anti-nausea medication if provided, otherwise contact the office to possibly  have your medication changed ([phone]). Do not drive a car or operate machinery while taking the narcotic medication.   If you are not allergic, Ibuprofen 200-600mg (i.e. Advil)

## 2023-06-07 ENCOUNTER — OFFICE VISIT (OUTPATIENT)
Dept: ORTHOPEDIC SURGERY | Age: 58
End: 2023-06-07

## 2023-06-07 VITALS — WEIGHT: 185.5 LBS | BODY MASS INDEX: 32.87 KG/M2 | HEIGHT: 63 IN | RESPIRATION RATE: 14 BRPM

## 2023-06-07 DIAGNOSIS — Z98.890 STATUS POST RIGHT ROTATOR CUFF REPAIR: Primary | ICD-10-CM

## 2023-06-07 DIAGNOSIS — M79.601 PAIN OF RIGHT UPPER EXTREMITY: ICD-10-CM

## 2023-06-08 ENCOUNTER — HOSPITAL ENCOUNTER (EMERGENCY)
Age: 58
Discharge: HOME | DRG: 103 | End: 2023-06-08
Attending: EMERGENCY MEDICINE
Payer: MEDICARE

## 2023-06-08 ENCOUNTER — TELEPHONE (OUTPATIENT)
Dept: ORTHOPEDIC SURGERY | Age: 58
End: 2023-06-08

## 2023-06-08 VITALS
DIASTOLIC BLOOD PRESSURE: 70 MMHG | SYSTOLIC BLOOD PRESSURE: 132 MMHG | TEMPERATURE: 98.3 F | RESPIRATION RATE: 18 BRPM | OXYGEN SATURATION: 96 % | HEART RATE: 75 BPM

## 2023-06-08 DIAGNOSIS — M79.601 PAIN OF RIGHT UPPER EXTREMITY: ICD-10-CM

## 2023-06-08 DIAGNOSIS — M79.601 PAIN OF RIGHT UPPER EXTREMITY: Primary | ICD-10-CM

## 2023-06-08 DIAGNOSIS — Z98.890 STATUS POST RIGHT ROTATOR CUFF REPAIR: Primary | ICD-10-CM

## 2023-06-08 PROCEDURE — 93971 EXTREMITY STUDY: CPT

## 2023-06-08 PROCEDURE — 99284 EMERGENCY DEPT VISIT MOD MDM: CPT

## 2023-06-08 PROCEDURE — 6370000000 HC RX 637 (ALT 250 FOR IP): Performed by: STUDENT IN AN ORGANIZED HEALTH CARE EDUCATION/TRAINING PROGRAM

## 2023-06-08 RX ORDER — CEPHALEXIN 500 MG/1
CAPSULE ORAL
Status: ON HOLD | COMMUNITY
Start: 2023-05-31 | End: 2023-06-14 | Stop reason: HOSPADM

## 2023-06-08 RX ORDER — ACETAMINOPHEN 500 MG
1000 TABLET ORAL ONCE
Status: COMPLETED | OUTPATIENT
Start: 2023-06-08 | End: 2023-06-08

## 2023-06-08 RX ADMIN — ACETAMINOPHEN 1000 MG: 500 TABLET ORAL at 16:56

## 2023-06-08 ASSESSMENT — ENCOUNTER SYMPTOMS
SHORTNESS OF BREATH: 0
BACK PAIN: 0
SORE THROAT: 0
ABDOMINAL PAIN: 0
COUGH: 0
VOMITING: 0

## 2023-06-08 NOTE — TELEPHONE ENCOUNTER
Patient was seen in office on 6/7/23 and had her sutures removed from her sx. Patient was experiencing an increase in pain in the backside of her arm since her visit yesterday and when she looked at her arm she noticed a large pool of blood/hematoma on the back side of her arm. That is when she decided to call the office. She describes it as warm to the touch, painful, and large and very purple in color. Concern for a DVT was discussed. After speaking with other clinical staff and staff speaking with Pablo Morales about concerns of a DVT since Dr. Carol Alaniz is not in office today and will be out of office until 6/12/213. A STAT doppler was ordered. Patient currently lives across the street from [facility] and she stated that she was going to walk over to the hospital to be evaluated and to see about having the doppler completed.

## 2023-06-08 NOTE — ED NOTES
Pt presents to the ER via triage ambulatory. Pt c/o increased swelling & blood pooling in upper right arm/shoulder. Pt states decreased range of motion in right shoulder as well. Pt is 2 weeks post-op from rotator cuff surgery repair in right shoulder. Pt denies any new injuries to area. Pt states she was fine last night and today noticed the swelling and bruising in right upper arm shoulder. Pt otherwise in NAD, VSS, pt A&O x4.      Susan Hartley RN  06/08/23 [id number]

## 2023-06-08 NOTE — ED PROVIDER NOTES
[facility] ED  Emergency Department Encounter  Emergency Medicine Resident     Pt Name:Beth Tejada  MRN: [id number]  [place] 1965  Date of evaluation: 6/8/23  PCP:  KANG Ngo NP  Note Started: 4:39 PM EDT      CHIEF COMPLAINT       Chief Complaint   Patient presents with    Joint Swelling     HISTORY OF PRESENT ILLNESS  (Location/Symptom, Timing/Onset, Context/Setting, Quality, Duration, Modifying Factors, Severity.)      Patricia Lino is a 62 y.o. female who presents with worsening starting yesterday. Patient is status post right-sided rotator cuff tear by Dr. Manasa Rosales around 2 weeks ago. Patient was seen by her orthopedic surgeon yesterday and appointment went well. Patient states that she is developed since then a large area of ecchymosis to the medial aspect of her right upper arm and complaining of some pain under the same area. Patient states that she was told to come to the emergency department to make sure there is a blood clot. She was on Xarelto and does have a history of previous blood clots. No shortness of breath, chest pain, fevers or chills. Patient is been compliant with her Andrey Budge.     PAST MEDICAL / SURGICAL / SOCIAL / FAMILY HISTORY      has a past medical history of Abdominal pain, epigastric, Abnormal cardiovascular stress test, Abnormal EKG, Arrhythmia, Atrial fibrillation (HCC), Bilateral edema of lower extremity, Blurred vision, Breast cancer (HCC), C. difficile colitis, Cervical spondylosis with myelopathy and radiculopathy, Cocaine use, Colitis, Colon cancer (HCC), Colovesical fistula, Compression fracture of first lumbar vertebra (HCC), Congestive heart failure (HCC), COPD (chronic obstructive pulmonary disease) ([address] COPD, moderate ([address] COVID-19, Dental disease, Depression, Diabetes mellitus ([address] Diastolic dysfunction, Diverticulitis, Diverticulitis of colon, Diverticulosis, Dizziness, Dupuytren's contracture of hand, Fibromyalgia, GERD
well as some dependent bruising, pain to the bruised area as well as pain along the inner aspect of the upper arm however no firmness or concerns for expanding hematoma or cellulitis. She came here with concerns for DVT, she does not have any swelling of the extremity however has had prior DVT, is on anticoagulation.     DVT study,    (Please note that portions of this note were completed with a voice recognition program.  Efforts were made to edit the dictations but occasionally words are mis-transcribed.)      Linda Baldwin MD,, MD  Attending Emergency Physician         Linda Baldwin MD  06/09/23 [phone]

## 2023-06-09 ENCOUNTER — HOSPITAL ENCOUNTER (INPATIENT)
Age: 58
LOS: 4 days | Discharge: HOSPICE | DRG: 103 | End: 2023-06-14
Attending: EMERGENCY MEDICINE | Admitting: PSYCHIATRY & NEUROLOGY
Payer: MEDICARE

## 2023-06-09 DIAGNOSIS — T43.215A: ICD-10-CM
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
## 2023-06-09 DIAGNOSIS — E87.6 HYPOKALEMIA: ICD-10-CM

## 2023-06-09 DIAGNOSIS — Z91.81 AT RISK FOR FALLS DUE TO MEDICATION: ICD-10-CM

## 2023-06-09 PROCEDURE — 96375 TX/PRO/DX INJ NEW DRUG ADDON: CPT

## 2023-06-09 PROCEDURE — 96376 TX/PRO/DX INJ SAME DRUG ADON: CPT

## 2023-06-09 PROCEDURE — 99285 EMERGENCY DEPT VISIT HI MDM: CPT

## 2023-06-09 PROCEDURE — 96366 THER/PROPH/DIAG IV INF ADDON: CPT

## 2023-06-09 PROCEDURE — 96365 THER/PROPH/DIAG IV INF INIT: CPT

## 2023-06-09 PROCEDURE — 96361 HYDRATE IV INFUSION ADD-ON: CPT

## 2023-06-10 ENCOUNTER — APPOINTMENT (OUTPATIENT)
Dept: GENERAL RADIOLOGY | Age: 58
DRG: 103 | End: 2023-06-10
Payer: MEDICARE

## 2023-06-10 ENCOUNTER — APPOINTMENT (OUTPATIENT)
Dept: CT IMAGING | Age: 58
DRG: 103 | End: 2023-06-10
Payer: MEDICARE

## 2023-06-10 PROBLEM — R51.9 HEADACHE: Status: ACTIVE | Noted: 2023-06-10

## 2023-06-10 LAB
ALBUMIN SERPL-MCNC: 3.3 G/DL (ref 3.5–5.2)
ALBUMIN/GLOB SERPL: 1.2 {RATIO} (ref 1–2.5)
ALP SERPL-CCNC: 69 U/L (ref 35–104)
ALT SERPL-CCNC: 14 U/L (ref 5–33)
ANION GAP SERPL CALCULATED.3IONS-SCNC: 7 MMOL/L (ref 9–17)
AST SERPL-CCNC: 17 U/L
BASOPHILS # BLD: 0.04 K/UL (ref 0–0.2)
BASOPHILS NFR BLD: 1 % (ref 0–2)
BILIRUB SERPL-MCNC: 0.2 MG/DL (ref 0.3–1.2)
BILIRUB UR QL STRIP: NEGATIVE
BUN SERPL-MCNC: 11 MG/DL (ref 6–20)
CALCIUM SERPL-MCNC: 8.6 MG/DL (ref 8.6–10.4)
CASTS #/AREA URNS LPF: NORMAL /LPF (ref 0–8)
CHLORIDE SERPL-SCNC: 98 MMOL/L (ref 98–107)
CLARITY UR: CLEAR
CO2 SERPL-SCNC: 28 MMOL/L (ref 20–31)
COLOR UR: YELLOW
CREAT SERPL-MCNC: 0.59 MG/DL (ref 0.5–0.9)
EOSINOPHIL # BLD: 0.1 K/UL (ref 0–0.44)
EOSINOPHILS RELATIVE PERCENT: 1 % (ref 1–4)
EPI CELLS #/AREA URNS HPF: NORMAL /HPF (ref 0–5)
ERYTHROCYTE [DISTWIDTH] IN BLOOD BY AUTOMATED COUNT: 13.5 % (ref 11.8–14.4)
GFR SERPL CREATININE-BSD FRML MDRD: >60 ML/MIN/1.73M2
GLUCOSE SERPL-MCNC: 138 MG/DL (ref 70–99)
GLUCOSE UR STRIP-MCNC: NEGATIVE MG/DL
HCT VFR BLD AUTO: 34 % (ref 36.3–47.1)
HGB BLD-MCNC: 11.2 G/DL (ref 11.9–15.1)
HGB UR QL STRIP.AUTO: NEGATIVE
IMM GRANULOCYTES # BLD AUTO: 0.03 K/UL (ref 0–0.3)
IMM GRANULOCYTES NFR BLD: 0 %
KETONES UR STRIP-MCNC: NEGATIVE MG/DL
LEUKOCYTE ESTERASE UR QL STRIP: NEGATIVE
LYMPHOCYTES # BLD: 20 % (ref 24–43)
LYMPHOCYTES NFR BLD: 1.7 K/UL (ref 1.1–3.7)
MAGNESIUM SERPL-MCNC: 2.1 MG/DL (ref 1.6–2.6)
MCH RBC QN AUTO: 31.6 PG (ref 25.2–33.5)
MCHC RBC AUTO-ENTMCNC: 32.9 G/DL (ref 28.4–34.8)
MCV RBC AUTO: 96 FL (ref 82.6–102.9)
MONOCYTES NFR BLD: 0.41 K/UL (ref 0.1–1.2)
MONOCYTES NFR BLD: 5 % (ref 3–12)
NEUTROPHILS NFR BLD: 73 % (ref 36–65)
NEUTS SEG NFR BLD: 6.31 K/UL (ref 1.5–8.1)
NITRITE UR QL STRIP: NEGATIVE
NRBC AUTOMATED: 0 PER 100 WBC
PH UR STRIP: 7 [PH] (ref 5–8)
PLATELET # BLD AUTO: 179 K/UL (ref 138–453)
PMV BLD AUTO: 10.3 FL (ref 8.1–13.5)
POTASSIUM SERPL-SCNC: 2.7 MMOL/L (ref 3.7–5.3)
PROT SERPL-MCNC: 6 G/DL (ref 6.4–8.3)
PROT UR STRIP-MCNC: NEGATIVE MG/DL
RBC # BLD AUTO: 3.54 M/UL (ref 3.95–5.11)
RBC #/AREA URNS HPF: NORMAL /HPF (ref 0–4)
REASON FOR REJECTION: NORMAL
SODIUM SERPL-SCNC: 133 MMOL/L (ref 135–144)
SP GR UR STRIP: 1.01 (ref 1–1.03)
SPECIMEN SOURCE: NORMAL
TROPONIN I SERPL HS-MCNC: 10 NG/L (ref 0–14)
TROPONIN I SERPL HS-MCNC: 12 NG/L (ref 0–14)
UROBILINOGEN UR STRIP-ACNC: NORMAL
WBC #/AREA URNS HPF: NORMAL /HPF (ref 0–5)
WBC OTHER # BLD: 8.6 K/UL (ref 3.5–11.3)
ZZ NTE CLEAN UP: ORDERED TEST: NORMAL

## 2023-06-10 PROCEDURE — 6360000002 HC RX W HCPCS: Performed by: HEALTH CARE PROVIDER

## 2023-06-10 PROCEDURE — 84484 ASSAY OF TROPONIN QUANT: CPT

## 2023-06-10 PROCEDURE — 2060000000 HC ICU INTERMEDIATE R&B

## 2023-06-10 PROCEDURE — 85027 COMPLETE CBC AUTOMATED: CPT

## 2023-06-10 PROCEDURE — 73030 X-RAY EXAM OF SHOULDER: CPT

## 2023-06-10 PROCEDURE — 80053 COMPREHEN METABOLIC PANEL: CPT

## 2023-06-10 PROCEDURE — 73060 X-RAY EXAM OF HUMERUS: CPT

## 2023-06-10 PROCEDURE — 93005 ELECTROCARDIOGRAM TRACING: CPT | Performed by: STUDENT IN AN ORGANIZED HEALTH CARE EDUCATION/TRAINING PROGRAM

## 2023-06-10 PROCEDURE — 70498 CT ANGIOGRAPHY NECK: CPT

## 2023-06-10 PROCEDURE — 71045 X-RAY EXAM CHEST 1 VIEW: CPT

## 2023-06-10 PROCEDURE — 87086 URINE CULTURE/COLONY COUNT: CPT

## 2023-06-10 PROCEDURE — 2580000003 HC RX 258: Performed by: HEALTH CARE PROVIDER

## 2023-06-10 PROCEDURE — 73080 X-RAY EXAM OF ELBOW: CPT

## 2023-06-10 PROCEDURE — 6370000000 HC RX 637 (ALT 250 FOR IP): Performed by: STUDENT IN AN ORGANIZED HEALTH CARE EDUCATION/TRAINING PROGRAM

## 2023-06-10 PROCEDURE — 83735 ASSAY OF MAGNESIUM: CPT

## 2023-06-10 PROCEDURE — 6360000002 HC RX W HCPCS

## 2023-06-10 PROCEDURE — 6360000002 HC RX W HCPCS: Performed by: STUDENT IN AN ORGANIZED HEALTH CARE EDUCATION/TRAINING PROGRAM

## 2023-06-10 PROCEDURE — 93005 ELECTROCARDIOGRAM TRACING: CPT | Performed by: EMERGENCY MEDICINE

## 2023-06-10 PROCEDURE — 81001 URINALYSIS AUTO W/SCOPE: CPT

## 2023-06-10 PROCEDURE — 70450 CT HEAD/BRAIN W/O DYE: CPT

## 2023-06-10 PROCEDURE — 6370000000 HC RX 637 (ALT 250 FOR IP)

## 2023-06-10 PROCEDURE — 6360000004 HC RX CONTRAST MEDICATION: Performed by: STUDENT IN AN ORGANIZED HEALTH CARE EDUCATION/TRAINING PROGRAM

## 2023-06-10 RX ORDER — ONDANSETRON 2 MG/ML
4 INJECTION INTRAMUSCULAR; INTRAVENOUS EVERY 6 HOURS PRN
Status: DISCONTINUED | OUTPATIENT
Start: 2023-06-10 | End: 2023-06-14 | Stop reason: HOSPADM

## 2023-06-10 RX ORDER — DIPHENHYDRAMINE HYDROCHLORIDE 50 MG/ML
50 INJECTION INTRAMUSCULAR; INTRAVENOUS ONCE
Status: COMPLETED | OUTPATIENT
Start: 2023-06-10 | End: 2023-06-10

## 2023-06-10 RX ORDER — GABAPENTIN 600 MG/1
1200 TABLET ORAL 3 TIMES DAILY
Status: DISCONTINUED | OUTPATIENT
Start: 2023-06-10 | End: 2023-06-14 | Stop reason: HOSPADM

## 2023-06-10 RX ORDER — DEXAMETHASONE SODIUM PHOSPHATE 10 MG/ML
10 INJECTION, SOLUTION INTRAMUSCULAR; INTRAVENOUS ONCE
Status: COMPLETED | OUTPATIENT
Start: 2023-06-10 | End: 2023-06-10

## 2023-06-10 RX ORDER — ACETAMINOPHEN 500 MG
1000 TABLET ORAL ONCE
Status: COMPLETED | OUTPATIENT
Start: 2023-06-10 | End: 2023-06-10

## 2023-06-10 RX ORDER — 0.9 % SODIUM CHLORIDE 0.9 %
1000 INTRAVENOUS SOLUTION INTRAVENOUS ONCE
Status: COMPLETED | OUTPATIENT
Start: 2023-06-10 | End: 2023-06-10

## 2023-06-10 RX ORDER — 0.9 % SODIUM CHLORIDE 0.9 %
500 INTRAVENOUS SOLUTION INTRAVENOUS ONCE
Status: COMPLETED | OUTPATIENT
Start: 2023-06-10 | End: 2023-06-10

## 2023-06-10 RX ORDER — DEXTROAMPHETAMINE SACCHARATE, AMPHETAMINE ASPARTATE, DEXTROAMPHETAMINE SULFATE AND AMPHETAMINE SULFATE 2.5; 2.5; 2.5; 2.5 MG/1; MG/1; MG/1; MG/1
10 TABLET ORAL 2 TIMES DAILY
Status: DISCONTINUED | OUTPATIENT
Start: 2023-06-11 | End: 2023-06-14 | Stop reason: HOSPADM

## 2023-06-10 RX ORDER — TRAZODONE HYDROCHLORIDE 50 MG/1
50 TABLET ORAL NIGHTLY PRN
Status: DISCONTINUED | OUTPATIENT
Start: 2023-06-11 | End: 2023-06-14 | Stop reason: HOSPADM

## 2023-06-10 RX ORDER — MAGNESIUM SULFATE 1 G/100ML
1000 INJECTION INTRAVENOUS ONCE
Status: COMPLETED | OUTPATIENT
Start: 2023-06-10 | End: 2023-06-11

## 2023-06-10 RX ORDER — POTASSIUM CHLORIDE 7.45 MG/ML
10 INJECTION INTRAVENOUS
Status: COMPLETED | OUTPATIENT
Start: 2023-06-10 | End: 2023-06-10

## 2023-06-10 RX ORDER — FLUTICASONE PROPIONATE 50 MCG
1 SPRAY, SUSPENSION (ML) NASAL 2 TIMES DAILY
Status: DISCONTINUED | OUTPATIENT
Start: 2023-06-10 | End: 2023-06-14 | Stop reason: HOSPADM

## 2023-06-10 RX ORDER — DIPHENHYDRAMINE HCL 25 MG
50 TABLET ORAL ONCE
Status: COMPLETED | OUTPATIENT
Start: 2023-06-10 | End: 2023-06-10

## 2023-06-10 RX ORDER — CETIRIZINE HYDROCHLORIDE 10 MG/1
10 TABLET ORAL DAILY
Status: DISCONTINUED | OUTPATIENT
Start: 2023-06-11 | End: 2023-06-14 | Stop reason: HOSPADM

## 2023-06-10 RX ORDER — CARVEDILOL 12.5 MG/1
12.5 TABLET ORAL 2 TIMES DAILY WITH MEALS
Status: DISCONTINUED | OUTPATIENT
Start: 2023-06-11 | End: 2023-06-14 | Stop reason: HOSPADM

## 2023-06-10 RX ORDER — BUDESONIDE 3 MG/1
3 CAPSULE, COATED PELLETS ORAL DAILY
Status: DISCONTINUED | OUTPATIENT
Start: 2023-06-11 | End: 2023-06-14 | Stop reason: HOSPADM

## 2023-06-10 RX ORDER — CEPHALEXIN 500 MG/1
500 CAPSULE ORAL ONCE
Status: COMPLETED | OUTPATIENT
Start: 2023-06-10 | End: 2023-06-10

## 2023-06-10 RX ORDER — CARBAMAZEPINE 200 MG/1
200 TABLET ORAL 2 TIMES DAILY
Status: DISCONTINUED | OUTPATIENT
Start: 2023-06-10 | End: 2023-06-14 | Stop reason: HOSPADM

## 2023-06-10 RX ORDER — METOCLOPRAMIDE HYDROCHLORIDE 5 MG/ML
10 INJECTION INTRAMUSCULAR; INTRAVENOUS ONCE
Status: COMPLETED | OUTPATIENT
Start: 2023-06-10 | End: 2023-06-11

## 2023-06-10 RX ORDER — SODIUM CHLORIDE 0.9 % (FLUSH) 0.9 %
5-40 SYRINGE (ML) INJECTION EVERY 12 HOURS SCHEDULED
Status: DISCONTINUED | OUTPATIENT
Start: 2023-06-10 | End: 2023-06-14 | Stop reason: HOSPADM

## 2023-06-10 RX ORDER — BACLOFEN 10 MG/1
20 TABLET ORAL 3 TIMES DAILY
Status: DISCONTINUED | OUTPATIENT
Start: 2023-06-10 | End: 2023-06-14 | Stop reason: HOSPADM

## 2023-06-10 RX ORDER — PANTOPRAZOLE SODIUM 40 MG/1
40 TABLET, DELAYED RELEASE ORAL
Status: DISCONTINUED | OUTPATIENT
Start: 2023-06-11 | End: 2023-06-14 | Stop reason: HOSPADM

## 2023-06-10 RX ORDER — POLYETHYLENE GLYCOL 3350 17 G/17G
17 POWDER, FOR SOLUTION ORAL DAILY PRN
Status: DISCONTINUED | OUTPATIENT
Start: 2023-06-10 | End: 2023-06-14 | Stop reason: HOSPADM

## 2023-06-10 RX ORDER — ONDANSETRON 4 MG/1
4 TABLET, ORALLY DISINTEGRATING ORAL EVERY 8 HOURS PRN
Status: DISCONTINUED | OUTPATIENT
Start: 2023-06-10 | End: 2023-06-14 | Stop reason: HOSPADM

## 2023-06-10 RX ORDER — BUDESONIDE AND FORMOTEROL FUMARATE DIHYDRATE 160; 4.5 UG/1; UG/1
2 AEROSOL RESPIRATORY (INHALATION) 2 TIMES DAILY
Status: DISCONTINUED | OUTPATIENT
Start: 2023-06-10 | End: 2023-06-14 | Stop reason: HOSPADM

## 2023-06-10 RX ORDER — ALBUTEROL SULFATE 90 UG/1
2 AEROSOL, METERED RESPIRATORY (INHALATION) EVERY 6 HOURS PRN
Status: DISCONTINUED | OUTPATIENT
Start: 2023-06-10 | End: 2023-06-14 | Stop reason: HOSPADM

## 2023-06-10 RX ORDER — POTASSIUM CHLORIDE 20 MEQ/1
40 TABLET, EXTENDED RELEASE ORAL
Status: DISCONTINUED | OUTPATIENT
Start: 2023-06-10 | End: 2023-06-14 | Stop reason: HOSPADM

## 2023-06-10 RX ORDER — LORAZEPAM 2 MG/ML
1 INJECTION INTRAMUSCULAR EVERY 5 MIN PRN
Status: DISCONTINUED | OUTPATIENT
Start: 2023-06-10 | End: 2023-06-14 | Stop reason: HOSPADM

## 2023-06-10 RX ORDER — SODIUM CHLORIDE 0.9 % (FLUSH) 0.9 %
5-40 SYRINGE (ML) INJECTION PRN
Status: DISCONTINUED | OUTPATIENT
Start: 2023-06-10 | End: 2023-06-14 | Stop reason: HOSPADM

## 2023-06-10 RX ORDER — SODIUM CHLORIDE 9 MG/ML
INJECTION, SOLUTION INTRAVENOUS PRN
Status: DISCONTINUED | OUTPATIENT
Start: 2023-06-10 | End: 2023-06-14 | Stop reason: HOSPADM

## 2023-06-10 RX ORDER — IBUPROFEN 400 MG/1
600 TABLET ORAL ONCE
Status: COMPLETED | OUTPATIENT
Start: 2023-06-10 | End: 2023-06-10

## 2023-06-10 RX ORDER — FUROSEMIDE 40 MG/1
40 TABLET ORAL 2 TIMES DAILY
Status: DISCONTINUED | OUTPATIENT
Start: 2023-06-10 | End: 2023-06-14 | Stop reason: HOSPADM

## 2023-06-10 RX ORDER — PREDNISONE 20 MG/1
50 TABLET ORAL EVERY 6 HOURS
Status: DISCONTINUED | OUTPATIENT
Start: 2023-06-10 | End: 2023-06-10

## 2023-06-10 RX ADMIN — POTASSIUM CHLORIDE 10 MEQ: 7.46 INJECTION, SOLUTION INTRAVENOUS at 07:39

## 2023-06-10 RX ADMIN — CEPHALEXIN 500 MG: 500 CAPSULE ORAL at 20:13

## 2023-06-10 RX ADMIN — IOPAMIDOL 75 ML: 755 INJECTION, SOLUTION INTRAVENOUS at 16:46

## 2023-06-10 RX ADMIN — DEXAMETHASONE SODIUM PHOSPHATE 10 MG: 10 INJECTION INTRAMUSCULAR; INTRAVENOUS at 15:56

## 2023-06-10 RX ADMIN — MAGNESIUM SULFATE HEPTAHYDRATE 1000 MG: 1 INJECTION, SOLUTION INTRAVENOUS at 23:08

## 2023-06-10 RX ADMIN — SODIUM CHLORIDE 1000 ML: 9 INJECTION, SOLUTION INTRAVENOUS at 00:43

## 2023-06-10 RX ADMIN — POTASSIUM CHLORIDE 10 MEQ: 7.46 INJECTION, SOLUTION INTRAVENOUS at 06:24

## 2023-06-10 RX ADMIN — ACETAMINOPHEN 1000 MG: 500 TABLET ORAL at 09:14

## 2023-06-10 RX ADMIN — GABAPENTIN 1200 MG: 600 TABLET ORAL at 23:39

## 2023-06-10 RX ADMIN — DEXAMETHASONE SODIUM PHOSPHATE 10 MG: 10 INJECTION INTRAMUSCULAR; INTRAVENOUS at 11:57

## 2023-06-10 RX ADMIN — RIVAROXABAN 20 MG: 20 TABLET, FILM COATED ORAL at 23:39

## 2023-06-10 RX ADMIN — FLUTICASONE PROPIONATE 1 SPRAY: 50 SPRAY, METERED NASAL at 23:40

## 2023-06-10 RX ADMIN — SODIUM CHLORIDE 500 ML: 9 INJECTION, SOLUTION INTRAVENOUS at 04:24

## 2023-06-10 RX ADMIN — CEPHALEXIN 500 MG: 500 CAPSULE ORAL at 09:10

## 2023-06-10 RX ADMIN — PREDNISONE 50 MG: 20 TABLET ORAL at 09:13

## 2023-06-10 RX ADMIN — POTASSIUM CHLORIDE 10 MEQ: 7.46 INJECTION, SOLUTION INTRAVENOUS at 04:23

## 2023-06-10 RX ADMIN — DIPHENHYDRAMINE HYDROCHLORIDE 50 MG: 50 INJECTION, SOLUTION INTRAMUSCULAR; INTRAVENOUS at 23:01

## 2023-06-10 RX ADMIN — DIPHENHYDRAMINE HYDROCHLORIDE 50 MG: 50 INJECTION, SOLUTION INTRAMUSCULAR; INTRAVENOUS at 15:09

## 2023-06-10 RX ADMIN — FUROSEMIDE 40 MG: 40 TABLET ORAL at 23:40

## 2023-06-10 RX ADMIN — DIPHENHYDRAMINE HYDROCHLORIDE 50 MG: 25 TABLET ORAL at 09:13

## 2023-06-10 RX ADMIN — POTASSIUM CHLORIDE 40 MEQ: 1500 TABLET, EXTENDED RELEASE ORAL at 08:05

## 2023-06-10 RX ADMIN — CARBAMAZEPINE 200 MG: 200 TABLET ORAL at 23:40

## 2023-06-10 RX ADMIN — BACLOFEN 20 MG: 10 TABLET ORAL at 23:40

## 2023-06-10 RX ADMIN — IBUPROFEN 600 MG: 400 TABLET, FILM COATED ORAL at 18:27

## 2023-06-10 RX ADMIN — POTASSIUM CHLORIDE 10 MEQ: 7.46 INJECTION, SOLUTION INTRAVENOUS at 05:21

## 2023-06-10 ASSESSMENT — ENCOUNTER SYMPTOMS
SHORTNESS OF BREATH: 0
ABDOMINAL PAIN: 0
CONSTIPATION: 0
SORE THROAT: 0
DIARRHEA: 0
VOMITING: 0
NAUSEA: 0

## 2023-06-10 ASSESSMENT — PAIN SCALES - GENERAL
PAINLEVEL_OUTOF10: 8
PAINLEVEL_OUTOF10: 9

## 2023-06-10 ASSESSMENT — PAIN DESCRIPTION - LOCATION
LOCATION: HEAD
LOCATION: HEAD

## 2023-06-11 ENCOUNTER — APPOINTMENT (OUTPATIENT)
Dept: MRI IMAGING | Age: 58
DRG: 103 | End: 2023-06-11
Payer: MEDICARE

## 2023-06-11 LAB
ANION GAP SERPL CALCULATED.3IONS-SCNC: 14 MMOL/L (ref 9–17)
BASOPHILS # BLD: <0.03 K/UL (ref 0–0.2)
BASOPHILS NFR BLD: 0 % (ref 0–2)
BUN SERPL-MCNC: 10 MG/DL (ref 6–20)
CALCIUM SERPL-MCNC: 8.5 MG/DL (ref 8.6–10.4)
CHLORIDE SERPL-SCNC: 106 MMOL/L (ref 98–107)
CO2 SERPL-SCNC: 21 MMOL/L (ref 20–31)
CREAT SERPL-MCNC: 0.37 MG/DL (ref 0.5–0.9)
EOSINOPHIL # BLD: <0.03 K/UL (ref 0–0.44)
EOSINOPHILS RELATIVE PERCENT: 0 % (ref 1–4)
ERYTHROCYTE [DISTWIDTH] IN BLOOD BY AUTOMATED COUNT: 13.8 % (ref 11.8–14.4)
GFR SERPL CREATININE-BSD FRML MDRD: >60 ML/MIN/1.73M2
GLUCOSE BLD-MCNC: 76 MG/DL (ref 65–105)
GLUCOSE SERPL-MCNC: 91 MG/DL (ref 70–99)
HCT VFR BLD AUTO: 35.7 % (ref 36.3–47.1)
HGB BLD-MCNC: 11.6 G/DL (ref 11.9–15.1)
IMM GRANULOCYTES # BLD AUTO: 0.03 K/UL (ref 0–0.3)
IMM GRANULOCYTES NFR BLD: 0 %
LYMPHOCYTES # BLD: 19 % (ref 24–43)
LYMPHOCYTES NFR BLD: 1.71 K/UL (ref 1.1–3.7)
MCH RBC QN AUTO: 31 PG (ref 25.2–33.5)
MCHC RBC AUTO-ENTMCNC: 32.5 G/DL (ref 28.4–34.8)
MCV RBC AUTO: 95.5 FL (ref 82.6–102.9)
MICROORGANISM SPEC CULT: NO GROWTH
MONOCYTES NFR BLD: 0.35 K/UL (ref 0.1–1.2)
MONOCYTES NFR BLD: 4 % (ref 3–12)
NEUTROPHILS NFR BLD: 77 % (ref 36–65)
NEUTS SEG NFR BLD: 6.82 K/UL (ref 1.5–8.1)
NRBC AUTOMATED: 0 PER 100 WBC
PLATELET # BLD AUTO: 238 K/UL (ref 138–453)
PMV BLD AUTO: 10.1 FL (ref 8.1–13.5)
POTASSIUM SERPL-SCNC: 3.4 MMOL/L (ref 3.7–5.3)
RBC # BLD AUTO: 3.74 M/UL (ref 3.95–5.11)
SODIUM SERPL-SCNC: 141 MMOL/L (ref 135–144)
SPECIMEN DESCRIPTION: NORMAL
WBC OTHER # BLD: 8.9 K/UL (ref 3.5–11.3)

## 2023-06-11 PROCEDURE — 94640 AIRWAY INHALATION TREATMENT: CPT

## 2023-06-11 PROCEDURE — 80048 BASIC METABOLIC PNL TOTAL CA: CPT

## 2023-06-11 PROCEDURE — 6370000000 HC RX 637 (ALT 250 FOR IP)

## 2023-06-11 PROCEDURE — 70553 MRI BRAIN STEM W/O & W/DYE: CPT

## 2023-06-11 PROCEDURE — 6370000000 HC RX 637 (ALT 250 FOR IP): Performed by: STUDENT IN AN ORGANIZED HEALTH CARE EDUCATION/TRAINING PROGRAM

## 2023-06-11 PROCEDURE — 82947 ASSAY GLUCOSE BLOOD QUANT: CPT

## 2023-06-11 PROCEDURE — 2580000003 HC RX 258

## 2023-06-11 PROCEDURE — A9576 INJ PROHANCE MULTIPACK: HCPCS

## 2023-06-11 PROCEDURE — 2060000000 HC ICU INTERMEDIATE R&B

## 2023-06-11 PROCEDURE — 6360000004 HC RX CONTRAST MEDICATION

## 2023-06-11 PROCEDURE — 85027 COMPLETE CBC AUTOMATED: CPT

## 2023-06-11 PROCEDURE — 6360000002 HC RX W HCPCS: Performed by: PSYCHIATRY & NEUROLOGY

## 2023-06-11 PROCEDURE — 94761 N-INVAS EAR/PLS OXIMETRY MLT: CPT

## 2023-06-11 PROCEDURE — 6360000002 HC RX W HCPCS

## 2023-06-11 PROCEDURE — 72156 MRI NECK SPINE W/O & W/DYE: CPT

## 2023-06-11 PROCEDURE — 36415 COLL VENOUS BLD VENIPUNCTURE: CPT

## 2023-06-11 RX ORDER — METOCLOPRAMIDE HYDROCHLORIDE 5 MG/ML
10 INJECTION INTRAMUSCULAR; INTRAVENOUS ONCE
Status: COMPLETED | OUTPATIENT
Start: 2023-06-11 | End: 2023-06-11

## 2023-06-11 RX ORDER — ACETAMINOPHEN 325 MG/1
650 TABLET ORAL EVERY 6 HOURS PRN
Status: DISCONTINUED | OUTPATIENT
Start: 2023-06-11 | End: 2023-06-13

## 2023-06-11 RX ORDER — SODIUM CHLORIDE 0.9 % (FLUSH) 0.9 %
10 SYRINGE (ML) INJECTION PRN
Status: DISCONTINUED | OUTPATIENT
Start: 2023-06-11 | End: 2023-06-14 | Stop reason: HOSPADM

## 2023-06-11 RX ORDER — DIPHENHYDRAMINE HYDROCHLORIDE 50 MG/ML
25 INJECTION INTRAMUSCULAR; INTRAVENOUS ONCE
Status: COMPLETED | OUTPATIENT
Start: 2023-06-11 | End: 2023-06-11

## 2023-06-11 RX ORDER — SODIUM CHLORIDE 9 MG/ML
INJECTION, SOLUTION INTRAVENOUS CONTINUOUS
Status: ACTIVE | OUTPATIENT
Start: 2023-06-11 | End: 2023-06-11

## 2023-06-11 RX ORDER — MAGNESIUM SULFATE 1 G/100ML
1000 INJECTION INTRAVENOUS ONCE
Status: COMPLETED | OUTPATIENT
Start: 2023-06-11 | End: 2023-06-11

## 2023-06-11 RX ADMIN — METOCLOPRAMIDE 10 MG: 5 INJECTION, SOLUTION INTRAMUSCULAR; INTRAVENOUS at 00:40

## 2023-06-11 RX ADMIN — FLUTICASONE PROPIONATE 1 SPRAY: 50 SPRAY, METERED NASAL at 08:35

## 2023-06-11 RX ADMIN — BACLOFEN 20 MG: 10 TABLET ORAL at 12:43

## 2023-06-11 RX ADMIN — BUDESONIDE AND FORMOTEROL FUMARATE DIHYDRATE 2 PUFF: 160; 4.5 AEROSOL RESPIRATORY (INHALATION) at 19:44

## 2023-06-11 RX ADMIN — SODIUM CHLORIDE, PRESERVATIVE FREE 10 ML: 5 INJECTION INTRAVENOUS at 16:49

## 2023-06-11 RX ADMIN — RIVAROXABAN 20 MG: 20 TABLET, FILM COATED ORAL at 16:39

## 2023-06-11 RX ADMIN — MAGNESIUM SULFATE HEPTAHYDRATE 1000 MG: 1 INJECTION, SOLUTION INTRAVENOUS at 21:55

## 2023-06-11 RX ADMIN — CARVEDILOL 12.5 MG: 12.5 TABLET, FILM COATED ORAL at 16:39

## 2023-06-11 RX ADMIN — DIPHENHYDRAMINE HYDROCHLORIDE 25 MG: 50 INJECTION, SOLUTION INTRAMUSCULAR; INTRAVENOUS at 21:09

## 2023-06-11 RX ADMIN — ACETAMINOPHEN 650 MG: 325 TABLET ORAL at 20:19

## 2023-06-11 RX ADMIN — GABAPENTIN 1200 MG: 600 TABLET ORAL at 14:04

## 2023-06-11 RX ADMIN — FUROSEMIDE 40 MG: 40 TABLET ORAL at 16:33

## 2023-06-11 RX ADMIN — GADOTERIDOL 16 ML: 279.3 INJECTION, SOLUTION INTRAVENOUS at 16:49

## 2023-06-11 RX ADMIN — METOCLOPRAMIDE 10 MG: 5 INJECTION, SOLUTION INTRAMUSCULAR; INTRAVENOUS at 21:58

## 2023-06-11 RX ADMIN — FUROSEMIDE 40 MG: 40 TABLET ORAL at 08:34

## 2023-06-11 RX ADMIN — BUDESONIDE AND FORMOTEROL FUMARATE DIHYDRATE 2 PUFF: 160; 4.5 AEROSOL RESPIRATORY (INHALATION) at 09:33

## 2023-06-11 RX ADMIN — SODIUM CHLORIDE, PRESERVATIVE FREE 10 ML: 5 INJECTION INTRAVENOUS at 02:46

## 2023-06-11 RX ADMIN — ACETAMINOPHEN 650 MG: 325 TABLET ORAL at 04:30

## 2023-06-11 RX ADMIN — CARVEDILOL 12.5 MG: 12.5 TABLET, FILM COATED ORAL at 08:35

## 2023-06-11 RX ADMIN — GABAPENTIN 1200 MG: 600 TABLET ORAL at 20:19

## 2023-06-11 RX ADMIN — BUDESONIDE 3 MG: 3 CAPSULE, COATED PELLETS ORAL at 08:38

## 2023-06-11 RX ADMIN — ACETAMINOPHEN 650 MG: 325 TABLET ORAL at 14:05

## 2023-06-11 RX ADMIN — CETIRIZINE HYDROCHLORIDE 10 MG: 10 TABLET ORAL at 08:35

## 2023-06-11 RX ADMIN — POTASSIUM CHLORIDE 40 MEQ: 1500 TABLET, EXTENDED RELEASE ORAL at 08:37

## 2023-06-11 RX ADMIN — FLUTICASONE PROPIONATE 1 SPRAY: 50 SPRAY, METERED NASAL at 20:19

## 2023-06-11 RX ADMIN — SODIUM CHLORIDE, PRESERVATIVE FREE 10 ML: 5 INJECTION INTRAVENOUS at 20:21

## 2023-06-11 RX ADMIN — BACLOFEN 20 MG: 10 TABLET ORAL at 20:19

## 2023-06-11 RX ADMIN — CARBAMAZEPINE 200 MG: 200 TABLET ORAL at 08:37

## 2023-06-11 RX ADMIN — GABAPENTIN 1200 MG: 600 TABLET ORAL at 08:35

## 2023-06-11 RX ADMIN — PANTOPRAZOLE SODIUM 40 MG: 40 TABLET, DELAYED RELEASE ORAL at 08:35

## 2023-06-11 RX ADMIN — BACLOFEN 20 MG: 10 TABLET ORAL at 08:35

## 2023-06-11 RX ADMIN — CARBAMAZEPINE 200 MG: 200 TABLET ORAL at 20:19

## 2023-06-11 RX ADMIN — DIPHENHYDRAMINE HYDROCHLORIDE 25 MG: 50 INJECTION, SOLUTION INTRAMUSCULAR; INTRAVENOUS at 12:42

## 2023-06-11 ASSESSMENT — ENCOUNTER SYMPTOMS
VOICE CHANGE: 0
PHOTOPHOBIA: 0
COLOR CHANGE: 0
CHEST TIGHTNESS: 0
SHORTNESS OF BREATH: 0
DIARRHEA: 0
WHEEZING: 0
NAUSEA: 0
TROUBLE SWALLOWING: 0
VOMITING: 0

## 2023-06-11 ASSESSMENT — PAIN SCALES - GENERAL
PAINLEVEL_OUTOF10: 5
PAINLEVEL_OUTOF10: 7
PAINLEVEL_OUTOF10: 6
PAINLEVEL_OUTOF10: 6
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
## 2023-06-12 LAB
ANION GAP SERPL CALCULATED.3IONS-SCNC: 11 MMOL/L (ref 9–17)
BUN SERPL-MCNC: 11 MG/DL (ref 6–20)
CALCIUM SERPL-MCNC: 8.7 MG/DL (ref 8.6–10.4)
CHLORIDE SERPL-SCNC: 104 MMOL/L (ref 98–107)
CO2 SERPL-SCNC: 25 MMOL/L (ref 20–31)
CREAT SERPL-MCNC: 0.56 MG/DL (ref 0.5–0.9)
FOLATE SERPL-MCNC: >20 NG/ML
GFR SERPL CREATININE-BSD FRML MDRD: >60 ML/MIN/1.73M2
GLUCOSE SERPL-MCNC: 135 MG/DL (ref 70–99)
MAGNESIUM SERPL-MCNC: 2.1 MG/DL (ref 1.6–2.6)
POTASSIUM SERPL-SCNC: 3.2 MMOL/L (ref 3.7–5.3)
SODIUM SERPL-SCNC: 140 MMOL/L (ref 135–144)
TSH SERPL-MCNC: 2.05 UIU/ML (ref 0.3–5)
VIT B12 SERPL-MCNC: 788 PG/ML (ref 232–1245)

## 2023-06-12 PROCEDURE — 97162 PT EVAL MOD COMPLEX 30 MIN: CPT

## 2023-06-12 PROCEDURE — 97165 OT EVAL LOW COMPLEX 30 MIN: CPT

## 2023-06-12 PROCEDURE — 94761 N-INVAS EAR/PLS OXIMETRY MLT: CPT

## 2023-06-12 PROCEDURE — 2700000000 HC OXYGEN THERAPY PER DAY

## 2023-06-12 PROCEDURE — 6370000000 HC RX 637 (ALT 250 FOR IP)

## 2023-06-12 PROCEDURE — 94640 AIRWAY INHALATION TREATMENT: CPT

## 2023-06-12 PROCEDURE — 2580000003 HC RX 258

## 2023-06-12 PROCEDURE — 80048 BASIC METABOLIC PNL TOTAL CA: CPT

## 2023-06-12 PROCEDURE — 83735 ASSAY OF MAGNESIUM: CPT

## 2023-06-12 PROCEDURE — 97535 SELF CARE MNGMENT TRAINING: CPT

## 2023-06-12 PROCEDURE — 97530 THERAPEUTIC ACTIVITIES: CPT

## 2023-06-12 PROCEDURE — 6370000000 HC RX 637 (ALT 250 FOR IP): Performed by: STUDENT IN AN ORGANIZED HEALTH CARE EDUCATION/TRAINING PROGRAM

## 2023-06-12 PROCEDURE — 2060000000 HC ICU INTERMEDIATE R&B

## 2023-06-12 PROCEDURE — 82746 ASSAY OF FOLIC ACID SERUM: CPT

## 2023-06-12 PROCEDURE — 82607 VITAMIN B-12: CPT

## 2023-06-12 PROCEDURE — 6360000002 HC RX W HCPCS

## 2023-06-12 PROCEDURE — 84443 ASSAY THYROID STIM HORMONE: CPT

## 2023-06-12 PROCEDURE — 99232 SBSQ HOSP IP/OBS MODERATE 35: CPT | Performed by: PSYCHIATRY & NEUROLOGY

## 2023-06-12 PROCEDURE — 36415 COLL VENOUS BLD VENIPUNCTURE: CPT

## 2023-06-12 RX ADMIN — POTASSIUM CHLORIDE 40 MEQ: 1500 TABLET, EXTENDED RELEASE ORAL at 08:15

## 2023-06-12 RX ADMIN — FUROSEMIDE 40 MG: 40 TABLET ORAL at 16:46

## 2023-06-12 RX ADMIN — BUDESONIDE AND FORMOTEROL FUMARATE DIHYDRATE 2 PUFF: 160; 4.5 AEROSOL RESPIRATORY (INHALATION) at 09:13

## 2023-06-12 RX ADMIN — POTASSIUM BICARBONATE 20 MEQ: 782 TABLET, EFFERVESCENT ORAL at 10:22

## 2023-06-12 RX ADMIN — CARVEDILOL 12.5 MG: 12.5 TABLET, FILM COATED ORAL at 08:14

## 2023-06-12 RX ADMIN — GABAPENTIN 1200 MG: 600 TABLET ORAL at 21:04

## 2023-06-12 RX ADMIN — BACLOFEN 20 MG: 10 TABLET ORAL at 08:14

## 2023-06-12 RX ADMIN — BUDESONIDE 3 MG: 3 CAPSULE, COATED PELLETS ORAL at 08:15

## 2023-06-12 RX ADMIN — PANTOPRAZOLE SODIUM 40 MG: 40 TABLET, DELAYED RELEASE ORAL at 08:17

## 2023-06-12 RX ADMIN — GABAPENTIN 1200 MG: 600 TABLET ORAL at 14:19

## 2023-06-12 RX ADMIN — GABAPENTIN 1200 MG: 600 TABLET ORAL at 08:15

## 2023-06-12 RX ADMIN — SODIUM CHLORIDE, PRESERVATIVE FREE 10 ML: 5 INJECTION INTRAVENOUS at 08:19

## 2023-06-12 RX ADMIN — BUDESONIDE AND FORMOTEROL FUMARATE DIHYDRATE 2 PUFF: 160; 4.5 AEROSOL RESPIRATORY (INHALATION) at 21:17

## 2023-06-12 RX ADMIN — ACETAMINOPHEN 650 MG: 325 TABLET ORAL at 08:15

## 2023-06-12 RX ADMIN — BACLOFEN 20 MG: 10 TABLET ORAL at 14:19

## 2023-06-12 RX ADMIN — FUROSEMIDE 40 MG: 40 TABLET ORAL at 08:16

## 2023-06-12 RX ADMIN — CARVEDILOL 12.5 MG: 12.5 TABLET, FILM COATED ORAL at 16:46

## 2023-06-12 RX ADMIN — CARBAMAZEPINE 200 MG: 200 TABLET ORAL at 21:03

## 2023-06-12 RX ADMIN — RIVAROXABAN 20 MG: 20 TABLET, FILM COATED ORAL at 16:46

## 2023-06-12 RX ADMIN — ONDANSETRON 4 MG: 2 INJECTION INTRAMUSCULAR; INTRAVENOUS at 17:09

## 2023-06-12 RX ADMIN — CETIRIZINE HYDROCHLORIDE 10 MG: 10 TABLET ORAL at 08:14

## 2023-06-12 RX ADMIN — BACLOFEN 20 MG: 10 TABLET ORAL at 21:04

## 2023-06-12 RX ADMIN — CARBAMAZEPINE 200 MG: 200 TABLET ORAL at 08:15

## 2023-06-12 RX ADMIN — FLUTICASONE PROPIONATE 1 SPRAY: 50 SPRAY, METERED NASAL at 21:03

## 2023-06-12 RX ADMIN — SODIUM CHLORIDE, PRESERVATIVE FREE 10 ML: 5 INJECTION INTRAVENOUS at 21:03

## 2023-06-12 RX ADMIN — FLUTICASONE PROPIONATE 1 SPRAY: 50 SPRAY, METERED NASAL at 08:16

## 2023-06-12 RX ADMIN — ACETAMINOPHEN 650 MG: 325 TABLET ORAL at 21:02

## 2023-06-12 ASSESSMENT — PAIN DESCRIPTION - ORIENTATION
ORIENTATION: RIGHT
ORIENTATION: RIGHT

## 2023-06-12 ASSESSMENT — PAIN DESCRIPTION - DESCRIPTORS
DESCRIPTORS: ACHING
DESCRIPTORS: ACHING

## 2023-06-12 ASSESSMENT — PAIN DESCRIPTION - LOCATION
LOCATION: HEAD
LOCATION: WRIST
LOCATION: HEAD
LOCATION: ARM

## 2023-06-12 ASSESSMENT — PAIN SCALES - GENERAL
PAINLEVEL_OUTOF10: 6
PAINLEVEL_OUTOF10: 5
PAINLEVEL_OUTOF10: 5
PAINLEVEL_OUTOF10: 6

## 2023-06-12 ASSESSMENT — ENCOUNTER SYMPTOMS
SHORTNESS OF BREATH: 0
WHEEZING: 0

## 2023-06-12 NOTE — PLAN OF CARE
Problem: Discharge Planning  Goal: Discharge to home or other facility with appropriate resources  Outcome: Progressing  Flowsheets (Taken 6/11/2023 2000)  Discharge to home or other facility with appropriate resources: Identify discharge learning needs (meds, wound care, etc)     Problem: Skin/Tissue Integrity  Goal: Absence of new skin breakdown  Description: 1. Monitor for areas of redness and/or skin breakdown  2. Assess vascular access sites hourly  3. Every 4-6 hours minimum:  Change oxygen saturation probe site  4. Every 4-6 hours:  If on nasal continuous positive airway pressure, respiratory therapy assess nares and determine need for appliance change or resting period.   Outcome: Progressing     Problem: ABCDS Injury Assessment  Goal: Absence of physical injury  Outcome: Progressing  Flowsheets (Taken 6/11/2023 2000)  Absence of Physical Injury: Implement safety measures based on patient assessment     Problem: Safety - Adult  Goal: Free from fall injury  Outcome: Progressing  Flowsheets (Taken 6/11/2023 2000)  Free From Fall Injury: Based on caregiver fall risk screen, instruct family/caregiver to ask for assistance with transferring infant if caregiver noted to have fall risk factors     Problem: Pain  Goal: Verbalizes/displays adequate comfort level or baseline comfort level  Outcome: Progressing     Problem: Chronic Conditions and Co-morbidities  Goal: Patient's chronic conditions and co-morbidity symptoms are monitored and maintained or improved  Outcome: Progressing  Flowsheets (Taken 6/11/2023 2000)  Care Plan - Patient's Chronic Conditions and Co-Morbidity Symptoms are Monitored and Maintained or Improved: Monitor and assess patient's chronic conditions and comorbid symptoms for stability, deterioration, or improvement     Problem: Nutrition Deficit:  Goal: Optimize nutritional status  Outcome: Progressing     Problem: Respiratory - Adult  Goal: Achieves optimal ventilation and

## 2023-06-12 NOTE — CARE COORDINATION
Consult : pt from [address] [facility] residential, unsure if she can return. Met with pt this date was very pleasant and cooperative. Pt states she has been at [facility] since April 2023 for etoh tx. Pt states she does not want to return and feels SNF is best option for her after d/c due to her medical issues. Pt states if unable to go to SNF , she will stay at her friends house. Pt sees a psychiatrist Frederick Myles at [facility]   Once a month. Pt plans to continue IOP at [facility] after d/c also. Noted CM following and SNF referrals made.

## 2023-06-12 NOTE — PLAN OF CARE
BRONCHOSPASM/BRONCHOCONSTRICTION     [x]         IMPROVE AERATION/BREATH SOUNDS  [x]   ADMINISTER BRONCHODILATOR THERAPY AS APPROPRIATE  [x]   ASSESS BREATH SOUNDS  []   IMPLEMENT AEROSOL/MDI PROTOCOL    Problem: Respiratory - Adult  Goal: Achieves optimal ventilation and oxygenation  Outcome: Progressing      PATIENT EDUCATION AS NEEDED

## 2023-06-12 NOTE — CARE COORDINATION
Transitional Planning  Spoke with patient, michael obtained  [employer] of [address]    Referrals sent. 1235 pm Call from Christa Velez at [facility], no beds available.

## 2023-06-13 ENCOUNTER — APPOINTMENT (OUTPATIENT)
Dept: GENERAL RADIOLOGY | Age: 58
DRG: 103 | End: 2023-06-13
Payer: MEDICARE

## 2023-06-13 LAB
ANION GAP SERPL CALCULATED.3IONS-SCNC: 10 MMOL/L (ref 9–17)
BUN SERPL-MCNC: 12 MG/DL (ref 6–20)
CALCIUM SERPL-MCNC: 8.9 MG/DL (ref 8.6–10.4)
CHLORIDE SERPL-SCNC: 101 MMOL/L (ref 98–107)
CO2 SERPL-SCNC: 29 MMOL/L (ref 20–31)
CREAT SERPL-MCNC: 0.51 MG/DL (ref 0.5–0.9)
EKG ATRIAL RATE: 69 BPM
EKG ATRIAL RATE: 71 BPM
EKG P AXIS: 58 DEGREES
EKG P AXIS: 70 DEGREES
EKG P-R INTERVAL: 152 MS
EKG P-R INTERVAL: 182 MS
EKG Q-T INTERVAL: 452 MS
EKG Q-T INTERVAL: 474 MS
EKG QRS DURATION: 104 MS
EKG QRS DURATION: 92 MS
EKG QTC CALCULATION (BAZETT): 491 MS
EKG QTC CALCULATION (BAZETT): 507 MS
EKG R AXIS: -4 DEGREES
EKG R AXIS: 9 DEGREES
EKG T AXIS: -161 DEGREES
EKG T AXIS: 55 DEGREES
EKG VENTRICULAR RATE: 69 BPM
EKG VENTRICULAR RATE: 71 BPM
GFR SERPL CREATININE-BSD FRML MDRD: >60 ML/MIN/1.73M2
GLUCOSE SERPL-MCNC: 80 MG/DL (ref 70–99)
POTASSIUM SERPL-SCNC: 3.8 MMOL/L (ref 3.7–5.3)
SODIUM SERPL-SCNC: 140 MMOL/L (ref 135–144)

## 2023-06-13 PROCEDURE — 6370000000 HC RX 637 (ALT 250 FOR IP)

## 2023-06-13 PROCEDURE — 2060000000 HC ICU INTERMEDIATE R&B

## 2023-06-13 PROCEDURE — 2700000000 HC OXYGEN THERAPY PER DAY

## 2023-06-13 PROCEDURE — 36415 COLL VENOUS BLD VENIPUNCTURE: CPT

## 2023-06-13 PROCEDURE — 6370000000 HC RX 637 (ALT 250 FOR IP): Performed by: STUDENT IN AN ORGANIZED HEALTH CARE EDUCATION/TRAINING PROGRAM

## 2023-06-13 PROCEDURE — 93005 ELECTROCARDIOGRAM TRACING: CPT | Performed by: PSYCHIATRY & NEUROLOGY

## 2023-06-13 PROCEDURE — 99233 SBSQ HOSP IP/OBS HIGH 50: CPT | Performed by: NURSE PRACTITIONER

## 2023-06-13 PROCEDURE — 80048 BASIC METABOLIC PNL TOTAL CA: CPT

## 2023-06-13 PROCEDURE — 6370000000 HC RX 637 (ALT 250 FOR IP): Performed by: NURSE PRACTITIONER

## 2023-06-13 PROCEDURE — 94640 AIRWAY INHALATION TREATMENT: CPT

## 2023-06-13 PROCEDURE — 73110 X-RAY EXAM OF WRIST: CPT

## 2023-06-13 PROCEDURE — 2580000003 HC RX 258

## 2023-06-13 PROCEDURE — 6360000002 HC RX W HCPCS: Performed by: NURSE PRACTITIONER

## 2023-06-13 PROCEDURE — 94761 N-INVAS EAR/PLS OXIMETRY MLT: CPT

## 2023-06-13 RX ORDER — ACETAMINOPHEN 325 MG/1
650 TABLET ORAL EVERY 4 HOURS PRN
Status: DISCONTINUED | OUTPATIENT
Start: 2023-06-13 | End: 2023-06-14 | Stop reason: HOSPADM

## 2023-06-13 RX ORDER — IBUPROFEN 400 MG/1
400 TABLET ORAL EVERY 4 HOURS PRN
Status: DISCONTINUED | OUTPATIENT
Start: 2023-06-13 | End: 2023-06-14 | Stop reason: HOSPADM

## 2023-06-13 RX ORDER — MECLIZINE HCL 12.5 MG/1
25 TABLET ORAL 3 TIMES DAILY PRN
Status: DISCONTINUED | OUTPATIENT
Start: 2023-06-13 | End: 2023-06-14 | Stop reason: HOSPADM

## 2023-06-13 RX ORDER — FLUDROCORTISONE ACETATE 0.1 MG/1
0.1 TABLET ORAL 2 TIMES DAILY
Status: DISCONTINUED | OUTPATIENT
Start: 2023-06-13 | End: 2023-06-14 | Stop reason: HOSPADM

## 2023-06-13 RX ORDER — KETOROLAC TROMETHAMINE 15 MG/ML
15 INJECTION, SOLUTION INTRAMUSCULAR; INTRAVENOUS EVERY 6 HOURS
Status: DISCONTINUED | OUTPATIENT
Start: 2023-06-13 | End: 2023-06-14 | Stop reason: HOSPADM

## 2023-06-13 RX ADMIN — SODIUM CHLORIDE, PRESERVATIVE FREE 10 ML: 5 INJECTION INTRAVENOUS at 20:42

## 2023-06-13 RX ADMIN — FLUTICASONE PROPIONATE 1 SPRAY: 50 SPRAY, METERED NASAL at 20:42

## 2023-06-13 RX ADMIN — POTASSIUM CHLORIDE 40 MEQ: 1500 TABLET, EXTENDED RELEASE ORAL at 08:28

## 2023-06-13 RX ADMIN — CARVEDILOL 12.5 MG: 12.5 TABLET, FILM COATED ORAL at 17:02

## 2023-06-13 RX ADMIN — FUROSEMIDE 40 MG: 40 TABLET ORAL at 08:29

## 2023-06-13 RX ADMIN — BACLOFEN 20 MG: 10 TABLET ORAL at 13:16

## 2023-06-13 RX ADMIN — PANTOPRAZOLE SODIUM 40 MG: 40 TABLET, DELAYED RELEASE ORAL at 08:28

## 2023-06-13 RX ADMIN — ACETAMINOPHEN 650 MG: 325 TABLET ORAL at 08:27

## 2023-06-13 RX ADMIN — BACLOFEN 20 MG: 10 TABLET ORAL at 08:29

## 2023-06-13 RX ADMIN — IBUPROFEN 400 MG: 400 TABLET, FILM COATED ORAL at 20:41

## 2023-06-13 RX ADMIN — FLUDROCORTISONE ACETATE 0.1 MG: 0.1 TABLET ORAL at 21:41

## 2023-06-13 RX ADMIN — MECLIZINE HCL 12.5 MG 25 MG: 12.5 TABLET ORAL at 21:10

## 2023-06-13 RX ADMIN — GABAPENTIN 1200 MG: 600 TABLET ORAL at 08:28

## 2023-06-13 RX ADMIN — DEXTROAMPHETAMINE SACCHARATE, AMPHETAMINE ASPARTATE, DEXTROAMPHETAMINE SULFATE, AND AMPHETAMINE SULFATE 10 MG: 2.5; 2.5; 2.5; 2.5 TABLET ORAL at 20:41

## 2023-06-13 RX ADMIN — TRAZODONE HYDROCHLORIDE 50 MG: 50 TABLET ORAL at 00:09

## 2023-06-13 RX ADMIN — FUROSEMIDE 40 MG: 40 TABLET ORAL at 17:02

## 2023-06-13 RX ADMIN — KETOROLAC TROMETHAMINE 15 MG: 15 INJECTION, SOLUTION INTRAMUSCULAR; INTRAVENOUS at 17:02

## 2023-06-13 RX ADMIN — TRAZODONE HYDROCHLORIDE 50 MG: 50 TABLET ORAL at 23:22

## 2023-06-13 RX ADMIN — SODIUM CHLORIDE, PRESERVATIVE FREE 10 ML: 5 INJECTION INTRAVENOUS at 08:29

## 2023-06-13 RX ADMIN — KETOROLAC TROMETHAMINE 15 MG: 15 INJECTION, SOLUTION INTRAMUSCULAR; INTRAVENOUS at 13:16

## 2023-06-13 RX ADMIN — BUDESONIDE AND FORMOTEROL FUMARATE DIHYDRATE 2 PUFF: 160; 4.5 AEROSOL RESPIRATORY (INHALATION) at 19:44

## 2023-06-13 RX ADMIN — IBUPROFEN 400 MG: 400 TABLET, FILM COATED ORAL at 16:40

## 2023-06-13 RX ADMIN — CARBAMAZEPINE 200 MG: 200 TABLET ORAL at 08:29

## 2023-06-13 RX ADMIN — RIVAROXABAN 20 MG: 20 TABLET, FILM COATED ORAL at 17:02

## 2023-06-13 RX ADMIN — GABAPENTIN 1200 MG: 600 TABLET ORAL at 20:42

## 2023-06-13 RX ADMIN — GABAPENTIN 1200 MG: 600 TABLET ORAL at 13:16

## 2023-06-13 RX ADMIN — CARBAMAZEPINE 200 MG: 200 TABLET ORAL at 20:41

## 2023-06-13 RX ADMIN — FLUTICASONE PROPIONATE 1 SPRAY: 50 SPRAY, METERED NASAL at 08:29

## 2023-06-13 RX ADMIN — BACLOFEN 20 MG: 10 TABLET ORAL at 20:41

## 2023-06-13 RX ADMIN — BUDESONIDE 3 MG: 3 CAPSULE, COATED PELLETS ORAL at 08:28

## 2023-06-13 RX ADMIN — BUDESONIDE AND FORMOTEROL FUMARATE DIHYDRATE 2 PUFF: 160; 4.5 AEROSOL RESPIRATORY (INHALATION) at 07:50

## 2023-06-13 RX ADMIN — CETIRIZINE HYDROCHLORIDE 10 MG: 10 TABLET ORAL at 08:29

## 2023-06-13 RX ADMIN — CARVEDILOL 12.5 MG: 12.5 TABLET, FILM COATED ORAL at 08:29

## 2023-06-13 RX ADMIN — KETOROLAC TROMETHAMINE 15 MG: 15 INJECTION, SOLUTION INTRAMUSCULAR; INTRAVENOUS at 23:24

## 2023-06-13 ASSESSMENT — PAIN DESCRIPTION - LOCATION
LOCATION: WRIST
LOCATION: WRIST
LOCATION: ARM
LOCATION: ARM
LOCATION: ARM;WRIST
LOCATION: ARM

## 2023-06-13 ASSESSMENT — PAIN SCALES - GENERAL
PAINLEVEL_OUTOF10: 8
PAINLEVEL_OUTOF10: 6
PAINLEVEL_OUTOF10: 8
PAINLEVEL_OUTOF10: 2
PAINLEVEL_OUTOF10: 6
PAINLEVEL_OUTOF10: 6
PAINLEVEL_OUTOF10: 4
PAINLEVEL_OUTOF10: 8
PAINLEVEL_OUTOF10: 5
PAINLEVEL_OUTOF10: 5
PAINLEVEL_OUTOF10: 10

## 2023-06-13 ASSESSMENT — PAIN DESCRIPTION - ORIENTATION
ORIENTATION: RIGHT

## 2023-06-13 NOTE — CARE COORDINATION
[address] at [phone], message left regarding status of referral    [facility], [address] [facility], message left on admissions voicemail regarding status of referral. Requested call back from both facilities. Patients other plan is home with a friend, does not want to return [facility].      504 pm Neuro NP, states that patient needs to be discharged. Spoke with patient, states that she does not feel like she can return home at current functioning level. States that she is getting dizzy and needs assistance, that her friend who is 80years old can not provide. Stated she needs SNF, states that she did not work with PT or OT today. Spoke with patient about IMM and appealing discharge. No dc order in as of yet. Update given to Justice Dias.

## 2023-06-14 VITALS
HEIGHT: 62 IN | RESPIRATION RATE: 15 BRPM | TEMPERATURE: 98 F | SYSTOLIC BLOOD PRESSURE: 128 MMHG | HEART RATE: 74 BPM | BODY MASS INDEX: 34.28 KG/M2 | WEIGHT: 186.29 LBS | DIASTOLIC BLOOD PRESSURE: 68 MMHG | OXYGEN SATURATION: 95 %

## 2023-06-14 LAB
ANION GAP SERPL CALCULATED.3IONS-SCNC: 11 MMOL/L (ref 9–17)
BUN SERPL-MCNC: 16 MG/DL (ref 6–20)
CALCIUM SERPL-MCNC: 8.6 MG/DL (ref 8.6–10.4)
CHLORIDE SERPL-SCNC: 100 MMOL/L (ref 98–107)
CO2 SERPL-SCNC: 27 MMOL/L (ref 20–31)
CREAT SERPL-MCNC: 0.54 MG/DL (ref 0.5–0.9)
EKG ATRIAL RATE: 64 BPM
EKG P AXIS: 65 DEGREES
EKG P-R INTERVAL: 156 MS
EKG Q-T INTERVAL: 448 MS
EKG QRS DURATION: 90 MS
EKG QTC CALCULATION (BAZETT): 462 MS
EKG R AXIS: 28 DEGREES
EKG T AXIS: 112 DEGREES
EKG VENTRICULAR RATE: 64 BPM
GFR SERPL CREATININE-BSD FRML MDRD: >60 ML/MIN/1.73M2
GLUCOSE SERPL-MCNC: 88 MG/DL (ref 70–99)
MAGNESIUM SERPL-MCNC: 2.3 MG/DL (ref 1.6–2.6)
POTASSIUM SERPL-SCNC: 3.4 MMOL/L (ref 3.7–5.3)
SODIUM SERPL-SCNC: 138 MMOL/L (ref 135–144)

## 2023-06-14 PROCEDURE — 80048 BASIC METABOLIC PNL TOTAL CA: CPT

## 2023-06-14 PROCEDURE — 83735 ASSAY OF MAGNESIUM: CPT

## 2023-06-14 PROCEDURE — 94761 N-INVAS EAR/PLS OXIMETRY MLT: CPT

## 2023-06-14 PROCEDURE — 2580000003 HC RX 258

## 2023-06-14 PROCEDURE — 93010 ELECTROCARDIOGRAM REPORT: CPT | Performed by: INTERNAL MEDICINE

## 2023-06-14 PROCEDURE — 6370000000 HC RX 637 (ALT 250 FOR IP)

## 2023-06-14 PROCEDURE — 94640 AIRWAY INHALATION TREATMENT: CPT

## 2023-06-14 PROCEDURE — 36415 COLL VENOUS BLD VENIPUNCTURE: CPT

## 2023-06-14 PROCEDURE — 6370000000 HC RX 637 (ALT 250 FOR IP): Performed by: STUDENT IN AN ORGANIZED HEALTH CARE EDUCATION/TRAINING PROGRAM

## 2023-06-14 PROCEDURE — 99239 HOSP IP/OBS DSCHRG MGMT >30: CPT | Performed by: NURSE PRACTITIONER

## 2023-06-14 PROCEDURE — 6360000002 HC RX W HCPCS: Performed by: NURSE PRACTITIONER

## 2023-06-14 PROCEDURE — 97116 GAIT TRAINING THERAPY: CPT

## 2023-06-14 PROCEDURE — 97110 THERAPEUTIC EXERCISES: CPT

## 2023-06-14 PROCEDURE — 2580000003 HC RX 258: Performed by: NURSE PRACTITIONER

## 2023-06-14 PROCEDURE — 6370000000 HC RX 637 (ALT 250 FOR IP): Performed by: NURSE PRACTITIONER

## 2023-06-14 PROCEDURE — 97530 THERAPEUTIC ACTIVITIES: CPT

## 2023-06-14 RX ORDER — 0.9 % SODIUM CHLORIDE 0.9 %
500 INTRAVENOUS SOLUTION INTRAVENOUS ONCE
Status: COMPLETED | OUTPATIENT
Start: 2023-06-14 | End: 2023-06-14

## 2023-06-14 RX ORDER — FLUDROCORTISONE ACETATE 0.1 MG/1
0.1 TABLET ORAL 2 TIMES DAILY
Qty: 60 TABLET | Refills: 0 | Status: SHIPPED | OUTPATIENT
Start: 2023-06-14 | End: 2023-07-14

## 2023-06-14 RX ORDER — CARBAMAZEPINE 200 MG/1
200 TABLET ORAL 2 TIMES DAILY
Qty: 60 TABLET | Refills: 3 | Status: SHIPPED | OUTPATIENT
Start: 2023-06-14

## 2023-06-14 RX ORDER — FUROSEMIDE 40 MG/1
40 TABLET ORAL 2 TIMES DAILY
Qty: 60 TABLET | Refills: 3 | Status: SHIPPED | OUTPATIENT
Start: 2023-06-14

## 2023-06-14 RX ORDER — MECLIZINE HCL 12.5 MG/1
25 TABLET ORAL ONCE
Status: COMPLETED | OUTPATIENT
Start: 2023-06-14 | End: 2023-06-14

## 2023-06-14 RX ORDER — GABAPENTIN 600 MG/1
1200 TABLET ORAL 3 TIMES DAILY
Qty: 180 TABLET | Refills: 3 | Status: SHIPPED | OUTPATIENT
Start: 2023-06-14 | End: 2025-07-24

## 2023-06-14 RX ORDER — BACLOFEN 20 MG/1
20 TABLET ORAL 3 TIMES DAILY
Qty: 90 TABLET | Refills: 1 | Status: SHIPPED | OUTPATIENT
Start: 2023-06-14

## 2023-06-14 RX ORDER — MECLIZINE HCL 12.5 MG/1
12.5 TABLET ORAL ONCE
Status: DISCONTINUED | OUTPATIENT
Start: 2023-06-14 | End: 2023-06-14

## 2023-06-14 RX ORDER — TRAZODONE HYDROCHLORIDE 50 MG/1
50 TABLET ORAL NIGHTLY PRN
Qty: 30 TABLET | Refills: 1 | Status: SHIPPED | OUTPATIENT
Start: 2023-06-14

## 2023-06-14 RX ADMIN — KETOROLAC TROMETHAMINE 15 MG: 15 INJECTION, SOLUTION INTRAMUSCULAR; INTRAVENOUS at 13:56

## 2023-06-14 RX ADMIN — BACLOFEN 20 MG: 10 TABLET ORAL at 14:43

## 2023-06-14 RX ADMIN — SODIUM CHLORIDE 500 ML: 9 INJECTION, SOLUTION INTRAVENOUS at 14:00

## 2023-06-14 RX ADMIN — FLUTICASONE PROPIONATE 1 SPRAY: 50 SPRAY, METERED NASAL at 08:26

## 2023-06-14 RX ADMIN — GABAPENTIN 1200 MG: 600 TABLET ORAL at 14:43

## 2023-06-14 RX ADMIN — CARVEDILOL 12.5 MG: 12.5 TABLET, FILM COATED ORAL at 08:25

## 2023-06-14 RX ADMIN — ACETAMINOPHEN 650 MG: 325 TABLET ORAL at 16:42

## 2023-06-14 RX ADMIN — CARVEDILOL 12.5 MG: 12.5 TABLET, FILM COATED ORAL at 16:42

## 2023-06-14 RX ADMIN — BUDESONIDE 3 MG: 3 CAPSULE, COATED PELLETS ORAL at 08:26

## 2023-06-14 RX ADMIN — BACLOFEN 20 MG: 10 TABLET ORAL at 08:26

## 2023-06-14 RX ADMIN — IBUPROFEN 400 MG: 400 TABLET, FILM COATED ORAL at 08:24

## 2023-06-14 RX ADMIN — RIVAROXABAN 20 MG: 20 TABLET, FILM COATED ORAL at 17:52

## 2023-06-14 RX ADMIN — POTASSIUM CHLORIDE 40 MEQ: 1500 TABLET, EXTENDED RELEASE ORAL at 08:24

## 2023-06-14 RX ADMIN — CARBAMAZEPINE 200 MG: 200 TABLET ORAL at 09:43

## 2023-06-14 RX ADMIN — FLUDROCORTISONE ACETATE 0.1 MG: 0.1 TABLET ORAL at 08:25

## 2023-06-14 RX ADMIN — MECLIZINE 25 MG: 12.5 TABLET ORAL at 17:52

## 2023-06-14 RX ADMIN — FUROSEMIDE 40 MG: 40 TABLET ORAL at 08:25

## 2023-06-14 RX ADMIN — ONDANSETRON 4 MG: 4 TABLET, ORALLY DISINTEGRATING ORAL at 14:12

## 2023-06-14 RX ADMIN — SODIUM CHLORIDE, PRESERVATIVE FREE 5 ML: 5 INJECTION INTRAVENOUS at 09:00

## 2023-06-14 RX ADMIN — CETIRIZINE HYDROCHLORIDE 10 MG: 10 TABLET ORAL at 08:25

## 2023-06-14 RX ADMIN — KETOROLAC TROMETHAMINE 15 MG: 15 INJECTION, SOLUTION INTRAMUSCULAR; INTRAVENOUS at 06:32

## 2023-06-14 RX ADMIN — PANTOPRAZOLE SODIUM 40 MG: 40 TABLET, DELAYED RELEASE ORAL at 08:25

## 2023-06-14 RX ADMIN — GABAPENTIN 1200 MG: 600 TABLET ORAL at 09:43

## 2023-06-14 RX ADMIN — BUDESONIDE AND FORMOTEROL FUMARATE DIHYDRATE 2 PUFF: 160; 4.5 AEROSOL RESPIRATORY (INHALATION) at 09:21

## 2023-06-14 ASSESSMENT — PAIN DESCRIPTION - ONSET
ONSET: ON-GOING
ONSET: ON-GOING

## 2023-06-14 ASSESSMENT — PAIN - FUNCTIONAL ASSESSMENT
PAIN_FUNCTIONAL_ASSESSMENT: PREVENTS OR INTERFERES SOME ACTIVE ACTIVITIES AND ADLS
PAIN_FUNCTIONAL_ASSESSMENT: PREVENTS OR INTERFERES SOME ACTIVE ACTIVITIES AND ADLS

## 2023-06-14 ASSESSMENT — PAIN DESCRIPTION - DESCRIPTORS
DESCRIPTORS: SHARP
DESCRIPTORS: STABBING

## 2023-06-14 ASSESSMENT — PAIN DESCRIPTION - ORIENTATION
ORIENTATION: RIGHT
ORIENTATION: RIGHT;OTHER (COMMENT)

## 2023-06-14 ASSESSMENT — PAIN DESCRIPTION - LOCATION: LOCATION: SHOULDER

## 2023-06-14 ASSESSMENT — PAIN SCALES - GENERAL
PAINLEVEL_OUTOF10: 6
PAINLEVEL_OUTOF10: 7
PAINLEVEL_OUTOF10: 7
PAINLEVEL_OUTOF10: 3

## 2023-06-14 ASSESSMENT — PAIN DESCRIPTION - PAIN TYPE
TYPE: CHRONIC PAIN;ACUTE PAIN
TYPE: CHRONIC PAIN;ACUTE PAIN

## 2023-06-14 ASSESSMENT — PAIN DESCRIPTION - FREQUENCY
FREQUENCY: CONTINUOUS
FREQUENCY: CONTINUOUS

## 2023-06-14 NOTE — PROGRESS NOTES
[address] [facility] [address] 83  PROGRESS NOTE    Shift date: 6/13/2023   Shift day: Tuesday   Shift # 1    Room # 5790/5828-65   Name: Flaquita Syed                Gnosticist: [address] of [place]: None    Referral: Routine Visit    Admit Date & Time: 6/9/2023 11:50 PM    Assessment:  Flaquita Syed is a 62 y.o. female. Upon entering the room writer observes pt awake and alert. She engaged well in conversation speaking about her health issues, her family and her linsey. She said Sunday was the one year anniversary of her fiance's death. Pt's three children live out of state. She said her fiance's mother is a very good support to her, and she has another friend who lives locally. Pt asked for a prayer book/reading material.      Intervention:  Writer introduced self and title as  Writer offered space for pt  to express feelings, needs, and concerns and provided a ministry presence.  assured her of prayers and support from spiritual care. Outcome:  Pt was receptive to visit and thanked . Plan:   will provide reading materials to pt. Chaplains will remain available to offer spiritual and emotional support as needed. Electronically signed by Obie Grimes on 6/13/2023 at 11:38 AM.  Tavon Juan  [phone]        06/13/23 1136   Encounter Summary   Service Provided For: Patient   Referral/Consult From: [facility] Family members;Friends/neighbors   Last Encounter  06/13/23   Complexity of Encounter Low   Begin Time 1112   End Time  1121   Total Time Calculated 9 min   Encounter    Type Initial Screen/Assessment   Assessment/Intervention/Outcome   Assessment Calm;Coping   Intervention Active listening;Explored/Affirmed feelings, thoughts, concerns; Discussed belief system/Catholic practices/linsey;Prayer (assurance of)/[place]   Outcome Engaged in conversation;Expressed feelings, needs,
BRONCHOSPASM/BRONCHOCONSTRICTION     [x]         IMPROVE AERATION/BREATH SOUNDS  [x]   ADMINISTER BRONCHODILATOR THERAPY AS APPROPRIATE  [x]   ASSESS BREATH SOUNDS  []   IMPLEMENT AEROSOL/MDI PROTOCOL  [x]   PATIENT EDUCATION AS NEEDED   PROVIDE ADEQUATE OXYGENATION WITH ACCEPTABLE SP02/ABG'S    [x]  IDENTIFY APPROPRIATE OXYGEN THERAPY  [x]   MONITOR SP02/ABG'S AS NEEDED   [x]   PATIENT EDUCATION AS NEEDED
Neurology Nurse Practitioner Progress Note      INTERVAL HISTORY: This is a 62 y.o.  female admitted 6/9/2023 after a fall. This is a follow-up neurology progress note. The patient was examined and the chart was reviewed. Discussed with the pt & RN. There were no acute events overnight. No new motor, sensory, visual or bulbar symptoms. Headache has improved, 3/10 now. HPI: Deepti Landeros is a 62 y.o. female with H/O seizure disorder, migraine headaches (takes [place]), prior CVA (2008), A fib (on [facility]), cervical spondylosis with radiculopathy s/p anterior fusion cervical multi-level, HTN, HLD, DM with polyneuropathy, MI, CHF, COPD, alcohol use disorder, who was admitted 6/9/2023 after a fall. Patient was at [facility] rehab for alcohol use disorder. On 6/8/2023 patient developed vertigo along with migrainous headache. Patient has history of migraine headaches (takes [place]). Headache was described as heaviness, severe in intensity, 10/10, located around the eyes, associated pain with eye movements, radiating to the forehead, associated with nausea and photophobia. Patient was in her bed almost the whole day 6/9. In the evening she attempted to ambulate, she felt lightheaded and sleepy and ended up on the floor due to a fall. She landed on her right elbow. Recently patient underwent right rotator cuff surgery, 2 weeks ago. She was brought to [facility] ER for evaluation. Patient reported longstanding history of MS and frequent falls due to tripping. She was concern for MS flareup. Patient was admitted under neurology service. HOSPITAL COURSE: Patient underwent x-ray of right shoulder, right humerus & right elbow all were reported negative. Patient was seen by orthopedic surgery; outpatient follow-up in 4 weeks was suggested. Patient underwent MRI brain and C-spine with and without contrast that showed no evidence of demyelinating lesions. CTA head and neck was negative.   Patient received migraine
Neurology Nurse Practitioner Progress Note      INTERVAL HISTORY: This is a 62 y.o.  female admitted 6/9/2023 after a fall. This is a follow-up neurology progress note. The patient was examined and the chart was reviewed. Discussed with the pt & RN. There were no acute events overnight. Pt c/o dizziness; 0.9% NaCl 500 mg IVPB x 1 ordered around 1344. Then meclizine 25 mg x 1 ordered around 1645. HPI: Goyo Heller is a 62 y.o. female with H/O seizure disorder, migraine headaches (takes [place]), prior CVA (2008), A fib (on [facility]), cervical spondylosis with radiculopathy s/p anterior fusion cervical multi-level, HTN, HLD, DM with polyneuropathy, MI, CHF, COPD, alcohol use disorder, who was admitted 6/9/2023 after a fall. Patient was at [facility] rehab for alcohol use disorder. On 6/8/2023 patient developed vertigo along with migrainous headache. Patient has history of migraine headaches (takes [place]). Headache was described as heaviness, severe in intensity, 10/10, located around the eyes, associated pain with eye movements, radiating to the forehead, associated with nausea and photophobia. Patient was in her bed almost the whole day 6/9. In the evening she attempted to ambulate, she felt lightheaded and sleepy and ended up on the floor due to a fall. She landed on her right elbow. Recently patient underwent right rotator cuff surgery, 2 weeks ago. She was brought to [facility] ER for evaluation. Patient reported longstanding history of MS and frequent falls due to tripping. She was concern for MS flareup. Patient was admitted under neurology service. HOSPITAL COURSE: Patient underwent x-ray of right shoulder, right humerus & right elbow all were reported negative. Patient was seen by orthopedic surgery; outpatient follow-up in 4 weeks was suggested. Patient underwent MRI brain and C-spine with and without contrast that showed no evidence of demyelinating lesions. CTA head and neck was negative.
Physical Therapy  Facility/Department: [address] NEURO ICU  Physical Therapy Initial Assessment    Name: Ben Henao  : 1965  MRN: [id number]  Date of Service: 2023    Discharge Recommendations: Further therapy recommended at discharge. Chief Complaint   Patient presents with    Arm Injury     Juanetta Claude landed on right elbow, recent rotator cuff surgery    Fall     Dizziness with position change, fell when attempting to walk     Patient is a 20-year-old female with past medical history of seizures on carbamazepine and gabapentin, migraines on Ubrelvy, MS, depression, hypertension, bipolar disorder, COPD, GERD, history of CVA without residual deficits, MARTHA, paroxysmal atrial fibrillation on Xarelto, type 2 diabetes, peripheral neuropathy, colon cancer s/p resection and chemotherapy in 2016 presented for fall. Her symptoms started with vertigo on Thursday, 2023, and then she experienced migrainous headache. Headache was mainly located in bilateral periorbital regions, described as heaviness, and there was associated pain with eye movements. There was also involvement of the frontal region of the head. She denies diplopia, blurry vision, changes in voice, focal motor deficits, or focal sensory deficits. Intensity of the headache was 10/10 on onset, and had minimal improvement until presenting to the ER. Patient states she was only able to get up out of bed to use the bathroom on Friday, 2023. Patient attempted to ambulate to the door on the evening of 2023, and ended up on the floor due to a fall. She had associated urinary and fecal incontinence. At that time, she was brought into [facility] ER for further evaluation. S/p R rotator cuff repair on       PT Equipment Recommendations  Equipment Needed: No      Patient Diagnosis(es): The primary encounter diagnosis was Fall from standing, initial encounter.  Diagnoses of Hypokalemia, Adverse effect of trazodone, and At risk for falls due to
Physician Progress Note      PATIENT:               Ninfa Hensley  CSN #:                  [id number]  :                       1965  ADMIT DATE:       2023 11:50 PM  DISCH DATE:  RESPONDING  PROVIDER #:        Yasmin Potter CNP          QUERY TEXT:    Patient admitted with migraine, noted to have paroxysmal atrial fibrillation   and is maintained on Xarelto. If possible, please document in progress notes   and discharge summary if you are evaluating and/or treating any of the   following: The medical record reflects the following:  Risk Factors: 61 yo F  Clinical Indicators: per neuro progress notes \"Paroxysmal Atrial fibrillation-   on Xarelto\", admitted for stroke work up which has been negative  Treatment: continue Xarelto, CT, CTA, MRI, migraine cocktail, labs, telemetry   monitoring    Thank you, Marylen Dew, [facility]  Office hours [phone]  Cell [phone]  Email [email or web address]  Options provided:  -- Secondary hypercoagulable state in a patient with atrial fibrillation  -- Other - I will add my own diagnosis  -- Disagree - Not applicable / Not valid  -- Disagree - Clinically unable to determine / Unknown  -- Refer to Clinical Documentation Reviewer    PROVIDER RESPONSE TEXT:    Provider disagreed with this query. Pt was admitted with migraine. Images were done as pt thought she had MS flare   up; images were negative. Shewas already on Xarelto for A fib.     Query created by: Otilio Lafleur on 2023 11:03 AM      Electronically signed by:  Yasmin Potter CNP 2023 4:57 PM
(06/14/23 1045)  AM-PAC Inpatient T-Scale Score : 45.44 (06/14/23 1045)  Mobility Inpatient CMS 0-100% Score: 41.77 (06/14/23 1045)  Mobility Inpatient CMS G-Code Modifier : CK (06/14/23 1045)    Goals  Short Term Goals  Time Frame for Short Term Goals: 14 visits  Short Term Goal 1: Pt. will be independent for bed mobility. Short Term Goal 2: Pt. will be independent for transfers. Short Term Goal 3: Pt. will amb 300' independently. Short Term Goal 4: Pt. will navigate 3 steps independently w/ no handrails. Short Term Goal 5: Pt. will participate in 30 mins of therapy to promote endurance.        Education  Patient Education  Education Given To: Patient  Education Provided: Role of Therapy;Plan of Care;Home Exercise Program  Education Provided Comments: gait tx with AD, fall prevention  Education Method: Verbal  Barriers to Learning: None  Education Outcome: Verbalized understanding      Therapy Time   Individual Concurrent Group Co-treatment   Time In 0857         Time Out 1005         Minutes 68         Timed Code Treatment Minutes:  60 minutes (8 non billable minutes to gather supplies and resp visit)       Susan Roca, PTA
Education Given To: Patient  Education Provided: Role of Therapy;Plan of Care;ADL Adaptive Strategies; Equipment  Education Provided Comments: Pt educated on vito-dressing technique and desensitization strategies d/t recent SRG and contusion to distal RUE. Pt also educated on benefits of quad cane for safety and balance during func mob.   Education Method: Verbal  Barriers to Learning: None  Education Outcome: Verbalized understanding           AM-PAC Score        AM-PAC Inpatient Daily Activity Raw Score: 19 (06/12/23 1706)  AM-PAC Inpatient ADL T-Scale Score : 40.22 (06/12/23 1706)  ADL Inpatient CMS 0-100% Score: 42.8 (06/12/23 1706)  ADL Inpatient CMS G-Code Modifier : CK (06/12/23 1706)      Goals  Short Term Goals  Time Frame for Short Term Goals: Pt will by d/c  Short Term Goal 1: Demo Good dynamic standing balance during ADL completion for 15+ min Mod IND  Short Term Goal 2: Identify 2 desensitization strategies for distal RUE to promote normal sensation for occupational performance w/ no vc  Short Term Goal 3: Perform UB/LB ADLs (bathing, dressing, grooming, etc.) with Mod IND using vito techniques, increased time PRN  Short Term Goal 4: IND identify 2 healthy coping strategies to utilize upon d/c when experiencing grief         Therapy Time   Individual Concurrent Group Co-treatment   Time In 1605         Time Out 1645         Minutes 40         Timed Code Treatment Minutes: [address], S/OT
GASTROINTESTINAL ENDOSCOPY  01/30/2015    UPPER GASTROINTESTINAL ENDOSCOPY      WISDOM TOOTH EXTRACTION         Allergies: Antonia Serrano is allergic to ace inhibitors, betadine swab aid [povidone-iodine], betadine [povidone iodine], iodine, lurasidone, lurasidone hcl, nitrofurantoin, shellfish allergy, shellfish-derived products, statins, bee venom, calcium, iodides, lamotrigine, macrobid [nitrofurantoin macrocrystal], other, oxycodone-acetaminophen, phenazopyridine, pyridium [phenazopyridine hcl], and topiramate.     Objective:     Medications:     potassium chloride  40 mEq Oral Daily with breakfast    amphetamine-dextroamphetamine  10 mg Oral BID    baclofen  20 mg Oral TID    budesonide  3 mg Oral Daily    budesonide-formoterol  2 puff Inhalation BID    carBAMazepine  200 mg Oral BID    carvedilol  12.5 mg Oral BID WC    cetirizine  10 mg Oral Daily    pantoprazole  40 mg Oral QAM AC    fluticasone  1 spray Each Nostril BID    furosemide  40 mg Oral BID    gabapentin  1,200 mg Oral TID    rivaroxaban  20 mg Oral Dinner    sodium chloride flush  5-40 mL IntraVENous 2 times per day     PRN Meds include: acetaminophen, sodium chloride flush, albuterol sulfate HFA, traZODone, sodium chloride flush, sodium chloride, LORazepam, ondansetron **OR** ondansetron, polyethylene glycol    NEUROLOGICAL EXAMINATION  BP (!) 149/70   Pulse 70   Temp 98.7 °F (37.1 °C) (Oral)   Resp 18   Ht 5' 2\" (1.575 m)   Wt 186 lb 4.6 oz (84.5 kg)   SpO2 98%   BMI 34.07 kg/m²     Blood pressure range: Systolic (48GXB), XNE:921 , Min:126 , FTE:202   ; Diastolic (76EPJ), KJS:86, Min:65, Max:86    Vitals:    06/11/23 2000 06/12/23 0008 06/12/23 0400 06/12/23 0737   BP: 126/66 135/75 126/65 (!) 149/70   Pulse: 73 55 76 70   Resp: 12 11 12 18   Temp: 98.2 °F (36.8 °C) 97.9 °F (36.6 °C) 97.9 °F (36.6 °C) 98.7 °F (37.1 °C)   TempSrc:    Oral   SpO2: 95% 100% 99% 98%   Weight:       Height:           Physical Exam  Constitutional:

## 2023-06-14 NOTE — CARE COORDINATION
CM called and spoke with Hernán Mondragon from [facility] [phone] and he states that they are unable to accept d/t being out of network with patient's insurance. CM called and spoke with [address] [place] [address] at [facility] at [phone] and she states that they are OON with patient's insurance. CM updated patient and requested additional SNF choices. Patient states that she will return home with a friend at discharge but is requesting to work with PT/OT once more before discharging to make sure that she feels safe. CM called and requested PT see patient this morning. 1115- Patient worked with PT and verbalizes being comfortable discharging home with her friend. Patient states that she will have transportation and she verbalizes having no additional transitional needs at this time.     Discharge 1 [facility] Case Management Department  Written by: Regla Song RN    Patient Name: Goyo Heller  Attending Provider: Bhavana Davidson,*  Admit Date: 2023 11:50 PM  MRN: [id number]  Account: [de-identified]                     : 1965  Discharge Date: 23      Disposition: Home w/ friend and outpt PT/OT    Regla Song RN

## 2023-06-14 NOTE — DISCHARGE SUMMARY
Tabs tablet  traZODone 50 MG tablet         Time Spent on discharge is 36 mins in patient examination, evaluation, counseling as well as medication reconciliation, prescriptions for required medications, discharge plan and follow up. Electronically signed by   KANG Marcus CNP  6/14/2023  12:38 PM      Thank you KANG Cyr - LORI for the opportunity to be involved in this patient's care. Please note that this note was generated using a voice recognition dictation software. Although every effort was made to ensure the accuracy of this automated transcription, some errors in transcription may have occurred.

## 2023-06-14 NOTE — DISCHARGE INSTR - COC
Continuity of Care Form    Patient Name: Goyo Heller   :  1965  MRN:  [id number]    Admit date:  2023  Discharge date:  ***    Code Status Order: Full Code   Advance Directives:     Admitting Physician:  Bhavana Davidson MD  PCP: KANG Wharton NP    Discharging Nurse: [facility] Unit/Room#: 6891/6974-10  Discharging Unit Phone Number: ***    Emergency Contact:   Extended Emergency Contact Information  Primary Emergency Contact: Saray Gerber  Address: [address] Tamea Calender of [address] Phone: [phone]  Work Phone: [phone]  Mobile Phone: [phone]  Relation: Other  Secondary Emergency Contact: AkhilharshadJill  Address: [address] [place] [address] [place] Calender of [address] Phone: [phone]  Mobile Phone: [phone]  Relation: Child    Past Surgical History:  Past Surgical History:   Procedure Laterality Date    APPENDECTOMY  1985    BACK SURGERY      neck and back screws and cage    BREAST BIOPSY  2991-7336    BREAST LUMPECTOMY      bilateral [id number],[id number]     CARDIAC SURGERY      cardiac cath negative in 2018    CARDIOVERSION      several times    CERVICAL DISCECTOMY  2020    DISKECTOMY CERVICAL FUSION ANTERIOR C6,7; Surgeon: Kandy Ganser, MD; Location: [facility]; Service: Neurosurgery;  Laterality: N/A;    [facility]    COLONOSCOPY      CYSTOSCOPY  2021    CYSTOSCOPY  2015    FRACTURE SURGERY      right wrist ORIF    HYSTERECTOMY (CERVIX STATUS UNKNOWN)      INSERTABLE CARDIAC MONITOR      loop recorder    KNEE ARTHROSCOPY Right 2020    RIGHT KNEE ARTHROSCOPY WITH PARTIAL MEDIAL MENISCECTOMY WITH DEBRIDEMENT performed by Davis Gracia DO at [address] [facility] ARTHROSCOPY Left 2020    LEFT KNEE ARTHROSCOPY WITH PARTIAL LATERAL MENISCAL TEAR REPAIR AND [address] performed by Davis Gracia DO at [address] [facility]

## 2023-06-14 NOTE — PLAN OF CARE
Problem: Respiratory - Adult  Goal: Achieves optimal ventilation and oxygenation  6/14/2023 0925 by Teetee Purdy RCP  Outcome: Progressing   BRONCHOSPASM/BRONCHOCONSTRICTION     [x]         IMPROVE AERATION/BREATH SOUNDS  [x]   ADMINISTER BRONCHODILATOR THERAPY AS APPROPRIATE  [x]   ASSESS BREATH SOUNDS  []   IMPLEMENT AEROSOL/MDI PROTOCOL  [x]   PATIENT EDUCATION AS NEEDED

## 2023-06-14 NOTE — PLAN OF CARE
Problem: Discharge Planning  Goal: Discharge to home or other facility with appropriate resources  6/14/2023 0103 by Ever Moulton RN  Outcome: Progressing  6/13/2023 1810 by Valorie Cross RN  Outcome: Progressing  Flowsheets (Taken 6/13/2023 0800)  Discharge to home or other facility with appropriate resources:   Identify barriers to discharge with patient and caregiver   Arrange for needed discharge resources and transportation as appropriate   Identify discharge learning needs (meds, wound care, etc)     Problem: Skin/Tissue Integrity  Goal: Absence of new skin breakdown  6/13/2023 1810 by Valorie Cross RN  Outcome: Progressing     Problem: ABCDS Injury Assessment  Goal: Absence of physical injury  6/13/2023 1810 by Valorie Cross RN  Outcome: Progressing     Problem: Safety - Adult  Goal: Free from fall injury  6/14/2023 0103 by Ever Moulton RN  Outcome: Progressing  6/13/2023 1810 by Valorie Cross RN  Outcome: Progressing     Problem: Pain  Goal: Verbalizes/displays adequate comfort level or baseline comfort level  6/14/2023 0103 by Ever Moulton RN  Outcome: Progressing  Flowsheets (Taken 6/13/2023 2030 by Ana Calero RN)  Verbalizes/displays adequate comfort level or baseline comfort level:   Encourage patient to monitor pain and request assistance   Assess pain using appropriate pain scale  6/13/2023 1810 by Valorie Cross RN  Outcome: Progressing  Flowsheets  Taken 6/13/2023 1600  Verbalizes/displays adequate comfort level or baseline comfort level:   Encourage patient to monitor pain and request assistance   Assess pain using appropriate pain scale   Administer analgesics based on type and severity of pain and evaluate response   Consider cultural and social influences on pain and pain management   Implement non-pharmacological measures as appropriate and evaluate response  Taken 6/13/2023 1201  Verbalizes/displays adequate comfort level or baseline

## 2023-06-15 NOTE — PLAN OF CARE
Problem: Discharge Planning  Goal: Discharge to home or other facility with appropriate resources  Outcome: Completed     Problem: Skin/Tissue Integrity  Goal: Absence of new skin breakdown  Description: 1. Monitor for areas of redness and/or skin breakdown  2. Assess vascular access sites hourly  3. Every 4-6 hours minimum:  Change oxygen saturation probe site  4. Every 4-6 hours:  If on nasal continuous positive airway pressure, respiratory therapy assess nares and determine need for appliance change or resting period.   Outcome: Completed     Problem: ABCDS Injury Assessment  Goal: Absence of physical injury  Outcome: Completed     Problem: Safety - Adult  Goal: Free from fall injury  Outcome: Completed     Problem: Pain  Goal: Verbalizes/displays adequate comfort level or baseline comfort level  Outcome: Completed     Problem: Chronic Conditions and Co-morbidities  Goal: Patient's chronic conditions and co-morbidity symptoms are monitored and maintained or improved  Outcome: Completed     Problem: Nutrition Deficit:  Goal: Optimize nutritional status  Outcome: Completed     Problem: Respiratory - Adult  Goal: Achieves optimal ventilation and oxygenation  6/14/2023 2102 by Frankey Sola, RN  Outcome: Completed  6/14/2023 0925 by Preston Ram RCP  Outcome: Progressing     Problem: Neurosensory - Adult  Goal: Achieves stable or improved neurological status  Outcome: Completed  Goal: Absence of seizures  Outcome: Completed  Goal: Remains free of injury related to seizures activity  Outcome: Completed  Goal: Achieves maximal functionality and self care  Outcome: Completed     Problem: Musculoskeletal - Adult  Goal: Return mobility to safest level of function  Outcome: Completed  Goal: Maintain proper alignment of affected body part  Outcome: Completed  Goal: Return ADL status to a safe level of function  Outcome: Completed

## 2023-06-19 ENCOUNTER — HOSPITAL ENCOUNTER (OUTPATIENT)
Dept: PHYSICAL THERAPY | Age: 58
Setting detail: THERAPIES SERIES
Discharge: HOME | End: 2023-06-19
Attending: ORTHOPAEDIC SURGERY
Payer: MEDICARE

## 2023-06-19 PROCEDURE — 97162 PT EVAL MOD COMPLEX 30 MIN: CPT

## 2023-06-19 NOTE — CONSULTS
[x] Independent  [] Assist    Toileting [x] Independent  [] Assist [x] Independent  [] Assist    Driving [x] Independent  [] Assist [] Independent  [x] Assist Has gradually started, Dr released for local driving    Housekeeping [x] Independent  [] Assist [] Independent  [x] Assist    Grocery shop/meal prep [x] Independent  [] Assist [] Independent  [x] Assist      Gait Prior level of function Current level of function    [x] Independent  [] Assist [x] Independent  [] Assist   Device: [x] Independent [x] Independent    [] Straight Cane [] Quad cane [] Straight Cane [] Quad cane    [] Standard walker [] Rolling walker   [] 4 wheeled walker [] Standard walker [] Rolling walker   [] 4 wheeled walker    [] Wheelchair [] Wheelchair     Pain:  [x] Yes  [] No Location: R UE Pain Rating: (0-10 scale) 4/10  Pain altered Tx:  [x] Yes  [] No  Action:    Symptoms:  [x] Improving [] Worsening [] Same    Sleep: [] OK    [x] Disturbed    Objective:     ROM  ° PROM R END FEEL STRENGTH TESTS (+/-) Left Right Not Tested    Left Right  Left Right Drop Arm   []   Sit Shld Flex      Sulcus Sign   []   Sit Shld Abd      Apprehension   []   Sit Shld IR      Yergasons   []   Shoulder Flex 149°p 110°p  4  Speeds   []   Ext      Neer   []   °p 109  4  La    []   ER @ R 45   L 90 74° 0°*    Painful Arc   []   IR 86° 46°    Tinel   []   Supraspinatus            Infraspinatus            Serratus Ant            Pectoralis            Lats            Mid Trap            Lower Trap            Elbow Flex. 4+ 3p       Elbow Ext.    4+ 3p       Pronation            Supination            Wrist Flex. Wrist Ext.             Radial Deviation            Ulnar Deviation                        p=pain  Per Dr precautions     OBSERVATION No Deficit Deficit Not Tested Comments   Forward Head [] [x] []    Rounded Shoulders [] [x] []    Kyphosis [x] [] []    Scapular Height/Position [x] [] []    Winging [x] [] []    Scapulohumeral

## 2023-06-21 NOTE — FLOWSHEET NOTE
[facility] Fall Risk Assessment    Patient Name:  Eliceo Davis  : 1965    Risk Factor Scale  Score   History of Falls [] Yes  [x] No-isolated incident due to dizziness, lightheadedness 25  0 0   Secondary Diagnosis [] Yes  [x] No 15  0 0   Ambulatory Aid [] Furniture  [] Crutches/cane/walker  [x] None/bedrest/wheelchair/nurse 30  15  0 0   IV/Heparin Lock [] Yes  [x] No 20  0 0   Gait/Transferring [] Impaired  [] Weak  [x] Normal/bedrest/immobile 20  10  0 0   Mental Status [] Forgets limitations  [x] Oriented to own ability 15  0 0      Total: 0     Based on the Assessment score: check the appropriate box.     [x]  No intervention needed   Low =   Score of 0-24    []  Use standard prevention interventions Moderate =  Score of 24-44   [] Give patient handout and discuss fall prevention strategies   [] Establish goal of education for patient/family RE: fall prevention strategies    []  Use high risk prevention interventions High = Score of 45 and higher   [] Give patient handout and discuss fall prevention strategies   [] Establish goal of education for patient/family Re: fall prevention strategies   [] Discuss lifeline / other resources    Electronically signed by:   Courtney Ma PT  Date: 2023

## 2023-06-23 NOTE — PRE-CERTIFICATION NOTE
..       [x] [facility] [facility]  [address]  [phone]  F: [phone] [] [address]  P: [phone]  F: [phone] [] [place]  [address] [facility]  P: [phone]  F: [phone] [] [address]  [facility]   [address]   [place]: [phone]  F: [phone]  [] [facility]   Outpatient Occupational Therapy  [address]: [phone]  F: [phone]          Therapy Pre-certification Note      6/23/2023    [facility] Justina  1965   [id number]      Insurance approval was received for Physical Therapy from Lashay Ling on 06/23/23. Approval was received for 12 visits, from 06/22/23 to 09/19/23. Authorization number [id number].     Patient was contacted to be scheduled and 06/26/23.      Merlyn Aguilar Monday  [id number] APPROVED  [address] NOT APPLICABLE      Electronically signed by Keyanna Gong on 6/23/2023 at 1:37 PM

## 2023-06-26 ENCOUNTER — HOSPITAL ENCOUNTER (OUTPATIENT)
Dept: PHYSICAL THERAPY | Age: 58
Setting detail: THERAPIES SERIES
Discharge: HOME | End: 2023-06-26
Attending: ORTHOPAEDIC SURGERY
Payer: MEDICARE

## 2023-06-26 PROCEDURE — 97016 VASOPNEUMATIC DEVICE THERAPY: CPT

## 2023-06-26 PROCEDURE — 97110 THERAPEUTIC EXERCISES: CPT

## 2023-06-26 PROCEDURE — 97530 THERAPEUTIC ACTIVITIES: CPT

## 2023-06-28 ENCOUNTER — HOSPITAL ENCOUNTER (OUTPATIENT)
Dept: PHYSICAL THERAPY | Age: 58
Setting detail: THERAPIES SERIES
Discharge: HOME | End: 2023-06-28
Attending: ORTHOPAEDIC SURGERY
Payer: MEDICARE

## 2023-06-28 PROCEDURE — 97110 THERAPEUTIC EXERCISES: CPT

## 2023-06-28 PROCEDURE — 97140 MANUAL THERAPY 1/> REGIONS: CPT

## 2023-06-30 ENCOUNTER — HOSPITAL ENCOUNTER (OUTPATIENT)
Dept: PHYSICAL THERAPY | Age: 58
Setting detail: THERAPIES SERIES
End: 2023-06-30
Attending: ORTHOPAEDIC SURGERY
Payer: MEDICARE

## 2023-07-03 ENCOUNTER — APPOINTMENT (OUTPATIENT)
Dept: PHYSICAL THERAPY | Age: 58
End: 2023-07-03
Attending: ORTHOPAEDIC SURGERY
Payer: MEDICARE

## 2023-07-05 ENCOUNTER — HOSPITAL ENCOUNTER (OUTPATIENT)
Dept: PHYSICAL THERAPY | Age: 58
Setting detail: THERAPIES SERIES
Discharge: HOME | End: 2023-07-05
Attending: ORTHOPAEDIC SURGERY
Payer: MEDICARE

## 2023-07-05 ENCOUNTER — OFFICE VISIT (OUTPATIENT)
Dept: ORTHOPEDIC SURGERY | Age: 58
End: 2023-07-05

## 2023-07-05 VITALS — RESPIRATION RATE: 14 BRPM | HEIGHT: 62 IN | BODY MASS INDEX: 34.23 KG/M2 | WEIGHT: 186 LBS

## 2023-07-05 DIAGNOSIS — Z98.890 STATUS POST RIGHT ROTATOR CUFF REPAIR: Primary | ICD-10-CM

## 2023-07-05 PROCEDURE — 97110 THERAPEUTIC EXERCISES: CPT

## 2023-07-05 PROCEDURE — 97140 MANUAL THERAPY 1/> REGIONS: CPT

## 2023-07-05 PROCEDURE — 99024 POSTOP FOLLOW-UP VISIT: CPT | Performed by: ORTHOPAEDIC SURGERY

## 2023-07-05 NOTE — FLOWSHEET NOTE
[x] [address]  [address] [facility].  [phone]  F: [phone] [] [address] [facility]  [address]   [address]  P: [phone]  F: [phone] [] [address]  [address] [facility]  P: [phone]  F: [phone] [] [facility] [place]: [phone]  F: [phone] [] [address] [facility]  [address] [facility]   [address]   P: [phone]  F: [phone]  [] [address] [facility].   P: [phone]  F: [phone] [] [address] [facility]  [address] [facility] [address]  P: [phone]  F: [phone] [] [address] [facility]  [address] [place] [address]  [place]: [phone]  F: [phone] [] [address] [place] [facility] [address]  [place]: [phone]  F: [phone]      Physical Therapy Daily Treatment Note    Date:  2023  Patient Name:  Costa Zaragoza    :  1965  MRN: [id number]  Physician: Elizabeth Warner MD                                  Insurance: [address] Replaced by [facility]  Approval was received for 12 visits, from 23 to 23. Authorization number [id number].    Medical Diagnosis: Status post right rotator cuff repair Z98.890                  Rehab Codes: N98.832, M25.611, M79.621, M79.631, R20.3, M62.511,M62.521, M62.531, R29.3  Onset Date: 2023                      Next 's appt: 2023 Dr. Uche Summers  Visit# / total visits: ; Progress note for Medicare patient due at visit #10     Cancels/No Shows: 1/0    Subjective:    Pain:  [x] Yes  [] No Location: R UE/ headache

## 2023-07-07 ENCOUNTER — HOSPITAL ENCOUNTER (OUTPATIENT)
Dept: PHYSICAL THERAPY | Age: 58
Setting detail: THERAPIES SERIES
Discharge: HOME | End: 2023-07-07
Attending: ORTHOPAEDIC SURGERY
Payer: MEDICARE

## 2023-07-07 PROCEDURE — 97110 THERAPEUTIC EXERCISES: CPT

## 2023-07-07 PROCEDURE — 97016 VASOPNEUMATIC DEVICE THERAPY: CPT

## 2023-07-07 PROCEDURE — 97140 MANUAL THERAPY 1/> REGIONS: CPT

## 2023-07-10 ENCOUNTER — HOSPITAL ENCOUNTER (OUTPATIENT)
Dept: PHYSICAL THERAPY | Age: 58
Setting detail: THERAPIES SERIES
Discharge: HOME | End: 2023-07-10
Attending: ORTHOPAEDIC SURGERY
Payer: MEDICARE

## 2023-07-10 PROCEDURE — 97110 THERAPEUTIC EXERCISES: CPT

## 2023-07-11 RX ORDER — FLUDROCORTISONE ACETATE 0.1 MG/1
TABLET ORAL
Qty: 60 TABLET | Refills: 0 | OUTPATIENT
Start: 2023-07-11

## 2023-07-11 NOTE — DISCHARGE SUMMARY
[x] [facility]) [facility]  Therapy  [address] [facility].  [phone]  F: [phone] [] [address] [facility]  [address]   [address]  P: [phone]  F: [phone] [] [address]  [address]  P: [phone]  F: [phone] [] [address] [facility]  [address]   [address]   [place]: [phone]  F: [phone]         Physical Therapy Discharge Note    Date: 2023      Patient: Jossue Butt  : 1965  MRN: [id number]    Physician: Paul Ceja MD                                  Insurance: [address] [facility]  Approval was received for 12 visits, from 23 to 23. Authorization number [id number]. Medical Diagnosis: Status post right rotator cuff repair Z98.890                  Rehab Codes: M25.511, M25.611, M79.621, M79.631, R20.3, M62.511,M62.521, M62.531, R29.3  Onset Date: 2023                      Next 's appt:   Visit# / total visits: ; Progress note for Medicare patient due at visit #10                                Cancels/No Shows:   Date of initial visit: 23                Date of final visit: 7/10/23    Subjective:    Pain:  [x] Yes  [] No   Location: R UE/ RTC  Pain Rating: (0-10 scale) 3/10  Pain altered Tx:  [x] No  [] Yes  Action:  Comments:  9 mins late. No complaint of pain, busy weekend and reports general tiredness. Objective:  Refer to initial evaluation. Assessment:  Refer to initial evaluation.       Treatment to Date:  [x] Therapeutic Exercise    [] Modalities:  [] Therapeutic Activity    [] Ultrasound  [] Electrical Stimulation  [] Gait Training     [] Massage       [] Lumbar/Cervical Traction  [] Neuromuscular Re-education [] Cold/hotpack [] Iontophoresis: 4 mg/mL  [x] Instruction in Home Exercise Program

## 2023-07-12 ENCOUNTER — APPOINTMENT (OUTPATIENT)
Dept: PHYSICAL THERAPY | Age: 58
End: 2023-07-12
Attending: ORTHOPAEDIC SURGERY
Payer: MEDICARE

## 2023-07-14 ENCOUNTER — HOSPITAL ENCOUNTER (OUTPATIENT)
Dept: PHYSICAL THERAPY | Age: 58
Setting detail: THERAPIES SERIES
End: 2023-07-14
Attending: ORTHOPAEDIC SURGERY
Payer: MEDICARE

## 2023-07-17 ENCOUNTER — APPOINTMENT (OUTPATIENT)
Dept: PHYSICAL THERAPY | Age: 58
End: 2023-07-17
Attending: ORTHOPAEDIC SURGERY
Payer: MEDICARE

## 2023-07-19 ENCOUNTER — APPOINTMENT (OUTPATIENT)
Dept: PHYSICAL THERAPY | Age: 58
End: 2023-07-19
Attending: ORTHOPAEDIC SURGERY
Payer: MEDICARE

## 2023-07-21 ENCOUNTER — APPOINTMENT (OUTPATIENT)
Dept: PHYSICAL THERAPY | Age: 58
End: 2023-07-21
Attending: ORTHOPAEDIC SURGERY
Payer: MEDICARE

## 2023-07-25 NOTE — H&P
release capsule Take 40 mg by mouth every morning (before breakfast)      ondansetron (ZOFRAN-ODT) 4 MG disintegrating tablet Take 4 mg by mouth every 8 hours as needed for Nausea or Vomiting      cetirizine (ZYRTEC) 10 MG tablet Take 10 mg by mouth daily      coenzyme Q10 100 MG CAPS capsule Take 100 mg by mouth 3 times daily      hydrOXYzine (VISTARIL) 25 MG capsule Take 25 mg by mouth 3 times daily as needed for Anxiety       magnesium oxide (MAG-OX) 400 MG tablet Take 400 mg by mouth daily      amitriptyline (ELAVIL) 10 MG tablet Take 10 mg by mouth nightly      diclofenac (CATAFLAM) 50 MG tablet Take 50 mg by mouth daily as needed (headache) May repeat in 6 hours, max 3 tablets per day      rivaroxaban (XARELTO) 20 MG TABS tablet Take 20 mg by mouth Daily with supper      SUPER B COMPLEX/C PO Take by mouth daily      loperamide (IMODIUM) 2 MG capsule Take 2 mg by mouth 4 times daily as needed for Diarrhea Takes two capsules at onset      QUEtiapine (SEROQUEL) 100 MG tablet Take 1 tablet by mouth nightly 30 tablet 0    methocarbamol (ROBAXIN) 750 MG tablet Take 750 mg by mouth 4 times daily       furosemide (LASIX) 40 MG tablet Take 40 mg by mouth daily       metFORMIN (GLUCOPHAGE) 500 MG tablet Take 500 mg by mouth 2 times daily (with meals)      lidocaine (LIDODERM) 5 % Place 1 patch onto the skin daily 12 hours on, 12 hours off.  fluticasone (FLONASE) 50 MCG/ACT nasal spray 1 spray by Each Nostril route daily                        General health:  Fairly good. No fever or chills. Skin:  No itching, redness or rash. Head, eyes, ears, nose, throat:  No headache, epistaxis, rhinorrhea hearing loss or sore throat. Neck:  No pain, stiffness or masses. Cardiovascular/Respiratory system:  No chest pain, palpitation, shortness of breath, coughing or expectoration.                Gastrointestinal tract: No abdominal pain, nausea, vomiting, dysphagia, diarrhea or Ambulatory

## 2023-08-09 RX ORDER — FLUDROCORTISONE ACETATE 0.1 MG/1
TABLET ORAL
Qty: 60 TABLET | Refills: 0 | OUTPATIENT
Start: 2023-08-09

## 2023-08-16 ENCOUNTER — OFFICE VISIT (OUTPATIENT)
Dept: ORTHOPEDIC SURGERY | Age: 58
End: 2023-08-16

## 2023-08-16 VITALS — RESPIRATION RATE: 14 BRPM | WEIGHT: 176 LBS | HEIGHT: 62 IN | BODY MASS INDEX: 32.39 KG/M2

## 2023-08-16 DIAGNOSIS — Z98.890 STATUS POST RIGHT ROTATOR CUFF REPAIR: Primary | ICD-10-CM

## 2023-12-18 DIAGNOSIS — M06.9 RHEUMATOID ARTHRITIS, INVOLVING UNSPECIFIED SITE, UNSPECIFIED WHETHER RHEUMATOID FACTOR PRESENT (HCC): ICD-10-CM

## 2023-12-18 DIAGNOSIS — I10 PRIMARY HYPERTENSION: ICD-10-CM

## 2023-12-18 DIAGNOSIS — E11.9 TYPE 2 DIABETES MELLITUS WITHOUT COMPLICATION, WITHOUT LONG-TERM CURRENT USE OF INSULIN (HCC): ICD-10-CM

## 2023-12-18 DIAGNOSIS — N39.0 RECURRENT URINARY TRACT INFECTION: ICD-10-CM

## 2023-12-18 DIAGNOSIS — E78.2 MIXED HYPERLIPIDEMIA: ICD-10-CM

## 2023-12-18 DIAGNOSIS — N31.8 FREQUENCY-URGENCY SYNDROME: ICD-10-CM

## 2023-12-18 PROBLEM — E66.01 CLASS 3 SEVERE OBESITY IN ADULT (HCC): Status: RESOLVED | Noted: 2021-08-11 | Resolved: 2023-12-18

## 2023-12-18 PROBLEM — E66.813 CLASS 3 SEVERE OBESITY IN ADULT: Status: RESOLVED | Noted: 2021-08-11 | Resolved: 2023-12-18

## 2023-12-18 PROBLEM — R15.9 INCONTINENCE OF FECES WITH FECAL URGENCY: Status: RESOLVED | Noted: 2023-05-04 | Resolved: 2023-12-18

## 2023-12-18 PROBLEM — I25.119 CHEST PAIN DUE TO CAD (HCC): Status: RESOLVED | Noted: 2022-01-06 | Resolved: 2023-12-18

## 2023-12-18 PROBLEM — R11.0 CHRONIC NAUSEA: Status: ACTIVE | Noted: 2022-12-02

## 2023-12-18 PROBLEM — D17.0 LIPOMA OF NECK: Status: RESOLVED | Noted: 2021-12-29 | Resolved: 2023-12-18

## 2023-12-18 PROBLEM — R10.2 PELVIC PAIN: Status: RESOLVED | Noted: 2021-03-26 | Resolved: 2023-12-18

## 2023-12-18 PROBLEM — M17.12 PRIMARY OSTEOARTHRITIS OF LEFT KNEE: Status: RESOLVED | Noted: 2021-08-31 | Resolved: 2023-12-18

## 2023-12-18 PROBLEM — E87.6 HYPOKALEMIA: Status: RESOLVED | Noted: 2020-01-21 | Resolved: 2023-12-18

## 2023-12-18 PROBLEM — K50.819 CROHN'S DISEASE OF SMALL AND LARGE INTESTINES WITH COMPLICATION (HCC): Status: ACTIVE | Noted: 2023-12-18

## 2023-12-18 PROBLEM — K52.831 COLLAGENOUS COLITIS: Status: ACTIVE | Noted: 2023-12-18

## 2023-12-18 PROBLEM — G89.29 CHRONIC PAIN: Status: RESOLVED | Noted: 2021-02-19 | Resolved: 2023-12-18

## 2023-12-18 PROBLEM — R07.9 CHEST PAIN: Status: RESOLVED | Noted: 2023-04-07 | Resolved: 2023-12-18

## 2023-12-18 PROBLEM — F60.3 BORDERLINE PERSONALITY DISORDER (HCC): Status: RESOLVED | Noted: 2022-01-31 | Resolved: 2023-12-18

## 2023-12-18 PROBLEM — R15.2 INCONTINENCE OF FECES WITH FECAL URGENCY: Status: RESOLVED | Noted: 2023-05-04 | Resolved: 2023-12-18

## 2023-12-18 PROBLEM — F31.9 BIPOLAR DISORDER (HCC): Status: RESOLVED | Noted: 2022-02-22 | Resolved: 2023-12-18

## 2023-12-18 PROBLEM — R55 SYNCOPE: Status: RESOLVED | Noted: 2019-06-03 | Resolved: 2023-12-18

## 2023-12-18 PROBLEM — R15.2 INCONTINENCE OF FECES WITH FECAL URGENCY: Status: ACTIVE | Noted: 2023-05-04

## 2023-12-18 PROBLEM — R15.9 INCONTINENCE OF FECES WITH FECAL URGENCY: Status: ACTIVE | Noted: 2023-05-04

## 2023-12-18 PROBLEM — R51.9 HEADACHE: Status: RESOLVED | Noted: 2023-06-10 | Resolved: 2023-12-18

## 2023-12-18 PROBLEM — R90.89 ABNORMAL BRAIN MRI: Status: RESOLVED | Noted: 2019-04-10 | Resolved: 2023-12-18
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
## 2023-12-18 PROBLEM — F33.1 RECURRENT MODERATE MAJOR DEPRESSIVE DISORDER WITH ANXIETY (HCC): Status: RESOLVED | Noted: 2020-02-24 | Resolved: 2023-12-18

## 2023-12-18 PROBLEM — M50.20 HERNIATION OF CERVICAL INTERVERTEBRAL DISC WITHOUT MYELOPATHY: Status: RESOLVED | Noted: 2019-08-08 | Resolved: 2023-12-18

## 2023-12-18 PROBLEM — M17.11 PRIMARY OSTEOARTHRITIS OF RIGHT KNEE: Status: RESOLVED | Noted: 2022-03-08 | Resolved: 2023-12-18

## 2023-12-18 PROBLEM — R09.02 POSTOPERATIVE HYPOXIA: Status: RESOLVED | Noted: 2020-08-14 | Resolved: 2023-12-18

## 2023-12-18 PROBLEM — Z98.890 POSTOPERATIVE HYPOXIA: Status: RESOLVED | Noted: 2020-08-14 | Resolved: 2023-12-18

## 2023-12-18 PROBLEM — K50.819 CROHN'S DISEASE OF SMALL AND LARGE INTESTINES WITH COMPLICATION (HCC): Status: RESOLVED | Noted: 2023-12-18 | Resolved: 2023-12-18

## 2023-12-18 PROBLEM — R45.851 DEPRESSION WITH SUICIDAL IDEATION: Status: RESOLVED | Noted: 2022-01-31 | Resolved: 2023-12-18

## 2023-12-18 PROBLEM — R41.3 MEMORY LOSS: Status: ACTIVE | Noted: 2023-08-29

## 2023-12-18 PROBLEM — C44.90 MALIGNANT NEOPLASM OF SKIN: Status: RESOLVED | Noted: 2021-12-21 | Resolved: 2023-12-18

## 2023-12-18 PROBLEM — Z86.73 HISTORY OF STROKE: Status: ACTIVE | Noted: 2023-12-18

## 2023-12-18 PROBLEM — F32.2 MDD (MAJOR DEPRESSIVE DISORDER), SEVERE (HCC): Status: RESOLVED | Noted: 2020-01-20 | Resolved: 2023-12-18

## 2023-12-18 PROBLEM — R20.0 BILATERAL LEG NUMBNESS: Status: RESOLVED | Noted: 2019-01-15 | Resolved: 2023-12-18

## 2023-12-18 PROBLEM — F41.9 RECURRENT MODERATE MAJOR DEPRESSIVE DISORDER WITH ANXIETY (HCC): Status: RESOLVED | Noted: 2020-02-24 | Resolved: 2023-12-18

## 2023-12-18 PROBLEM — I95.9 ARTERIAL HYPOTENSION: Status: RESOLVED | Noted: 2022-02-22 | Resolved: 2023-12-18

## 2023-12-18 PROBLEM — G47.419 NARCOLEPTIC SYNDROME: Status: RESOLVED | Noted: 2021-02-19 | Resolved: 2023-12-18

## 2023-12-18 PROBLEM — F32.A DEPRESSION WITH SUICIDAL IDEATION: Status: RESOLVED | Noted: 2022-01-31 | Resolved: 2023-12-18

## 2023-12-18 PROBLEM — Z90.710 HISTORY OF TOTAL HYSTERECTOMY: Status: ACTIVE | Noted: 2023-12-18

## 2023-12-18 PROBLEM — F90.9 ATTENTION-DEFICIT HYPERACTIVITY DISORDER, UNSPECIFIED TYPE: Status: ACTIVE | Noted: 2021-06-10

## 2023-12-18 PROBLEM — Z76.89 ENCOUNTER TO ESTABLISH CARE WITH NEW DOCTOR: Status: RESOLVED | Noted: 2023-12-18 | Resolved: 2023-12-18

## 2023-12-18 PROBLEM — Z76.89 ENCOUNTER TO ESTABLISH CARE WITH NEW DOCTOR: Status: ACTIVE | Noted: 2023-12-18

## 2023-12-18 PROBLEM — R20.0 LEG NUMBNESS: Status: RESOLVED | Noted: 2019-01-14 | Resolved: 2023-12-18

## 2023-12-18 PROBLEM — I51.89 DIASTOLIC DYSFUNCTION: Status: RESOLVED | Noted: 2019-03-01 | Resolved: 2023-12-18

## 2023-12-18 PROBLEM — I50.9 CONGESTIVE HEART FAILURE (HCC): Status: RESOLVED | Noted: 2020-05-06 | Resolved: 2023-12-18

## 2023-12-18 LAB
ALBUMIN SERPL-MCNC: 4.2 G/DL (ref 3.4–5)
ALBUMIN/GLOB SERPL: 1.9 {RATIO} (ref 1.1–2.2)
ALP SERPL-CCNC: 91 U/L (ref 40–129)
ALT SERPL-CCNC: 13 U/L (ref 10–40)
ANION GAP SERPL CALCULATED.3IONS-SCNC: 9 MMOL/L (ref 3–16)
AST SERPL-CCNC: 18 U/L (ref 15–37)
BILIRUB SERPL-MCNC: <0.2 MG/DL (ref 0–1)
BILIRUB UR QL STRIP.AUTO: NEGATIVE
BUN SERPL-MCNC: 11 MG/DL (ref 7–20)
CALCIUM SERPL-MCNC: 9.3 MG/DL (ref 8.3–10.6)
CHLORIDE SERPL-SCNC: 100 MMOL/L (ref 99–110)
CHOLEST SERPL-MCNC: 213 MG/DL (ref 0–199)
CLARITY UR: CLEAR
COLOR UR: YELLOW
CREAT SERPL-MCNC: 0.5 MG/DL (ref 0.6–1.1)
CREAT UR-MCNC: 11.6 MG/DL (ref 28–259)
DEPRECATED RDW RBC AUTO: 15.4 % (ref 12.4–15.4)
GFR SERPLBLD CREATININE-BSD FMLA CKD-EPI: >60 ML/MIN/{1.73_M2}
GLUCOSE SERPL-MCNC: 110 MG/DL (ref 70–99)
GLUCOSE UR STRIP.AUTO-MCNC: NEGATIVE MG/DL
HCT VFR BLD AUTO: 38 % (ref 36–48)
HCV AB SERPL QL IA: NORMAL
HDLC SERPL-MCNC: 81 MG/DL (ref 40–60)
HGB BLD-MCNC: 12.3 G/DL (ref 12–16)
HGB UR QL STRIP.AUTO: NEGATIVE
KETONES UR STRIP.AUTO-MCNC: NEGATIVE MG/DL
LDLC SERPL CALC-MCNC: 118 MG/DL
LEUKOCYTE ESTERASE UR QL STRIP.AUTO: NEGATIVE
MCH RBC QN AUTO: 28.8 PG (ref 26–34)
MCHC RBC AUTO-ENTMCNC: 32.3 G/DL (ref 31–36)
MCV RBC AUTO: 89.3 FL (ref 80–100)
MICROALBUMIN UR DL<=1MG/L-MCNC: <1.2 MG/DL
MICROALBUMIN/CREAT UR: ABNORMAL MG/G (ref 0–30)
NITRITE UR QL STRIP.AUTO: NEGATIVE
PH UR STRIP.AUTO: 6.5 [PH] (ref 5–8)
PMV BLD AUTO: 8.8 FL (ref 5–10.5)
POTASSIUM SERPL-SCNC: 4.1 MMOL/L (ref 3.5–5.1)
PROT SERPL-MCNC: 6.4 G/DL (ref 6.4–8.2)
PROT UR STRIP.AUTO-MCNC: NEGATIVE MG/DL
RBC # BLD AUTO: 4.25 M/UL (ref 4–5.2)
SODIUM SERPL-SCNC: 139 MMOL/L (ref 136–145)
SP GR UR STRIP.AUTO: 1.01 (ref 1–1.03)
TRIGL SERPL-MCNC: 69 MG/DL (ref 0–150)
TSH SERPL DL<=0.005 MIU/L-ACNC: 1.49 UIU/ML (ref 0.27–4.2)
UA COMPLETE W REFLEX CULTURE PNL UR: NORMAL
UA DIPSTICK W REFLEX MICRO PNL UR: NORMAL
URN SPEC COLLECT METH UR: NORMAL
UROBILINOGEN UR STRIP-ACNC: 0.2 E.U./DL
VLDLC SERPL CALC-MCNC: 14 MG/DL
WBC # BLD AUTO: 5.5 K/UL (ref 4–11)

## 2023-12-19 LAB
ANA SER QL IA: NEGATIVE
CCP IGG SERPL-ACNC: <0.5 U/ML (ref 0–2.9)
EST. AVERAGE GLUCOSE BLD GHB EST-MCNC: 105.4 MG/DL
HBA1C MFR BLD: 5.3 %

## 2024-01-03 ENCOUNTER — OFFICE VISIT (OUTPATIENT)
Dept: INTERNAL MEDICINE CLINIC | Age: 59
End: 2024-01-03
Payer: MEDICAID

## 2024-01-03 VITALS
OXYGEN SATURATION: 92 % | HEART RATE: 73 BPM | BODY MASS INDEX: 31.46 KG/M2 | SYSTOLIC BLOOD PRESSURE: 100 MMHG | WEIGHT: 172 LBS | DIASTOLIC BLOOD PRESSURE: 66 MMHG

## 2024-01-03 DIAGNOSIS — R30.0 DYSURIA: Primary | ICD-10-CM

## 2024-01-03 DIAGNOSIS — N20.0 RENAL STONES: ICD-10-CM

## 2024-01-03 DIAGNOSIS — F33.2 MAJOR DEPRESSIVE DISORDER, RECURRENT SEVERE WITHOUT PSYCHOTIC FEATURES (HCC): ICD-10-CM

## 2024-01-03 DIAGNOSIS — R56.9 SEIZURE (HCC): ICD-10-CM

## 2024-01-03 DIAGNOSIS — I48.0 PAF (PAROXYSMAL ATRIAL FIBRILLATION) (HCC): ICD-10-CM

## 2024-01-03 DIAGNOSIS — N31.8 FREQUENCY-URGENCY SYNDROME: ICD-10-CM

## 2024-01-03 DIAGNOSIS — E11.9 TYPE 2 DIABETES MELLITUS WITHOUT COMPLICATION, WITHOUT LONG-TERM CURRENT USE OF INSULIN (HCC): ICD-10-CM

## 2024-01-03 DIAGNOSIS — J44.9 COPD, MODERATE (HCC): ICD-10-CM

## 2024-01-03 PROBLEM — S32.010A COMPRESSION FRACTURE OF FIRST LUMBAR VERTEBRA (HCC): Status: RESOLVED | Noted: 2019-09-24 | Resolved: 2024-01-03

## 2024-01-03 PROBLEM — Z87.81 HISTORY OF COMPRESSION FRACTURE OF SPINE: Status: ACTIVE | Noted: 2024-01-03

## 2024-01-03 LAB
BILIRUBIN, POC: NORMAL
BLOOD URINE, POC: NORMAL
CLARITY, POC: CLEAR
COLOR, POC: YELLOW
GLUCOSE URINE, POC: NORMAL
KETONES, POC: NORMAL
LEUKOCYTE EST, POC: NORMAL
NITRITE, POC: NORMAL
PH, POC: 6.5
PROTEIN, POC: NORMAL
SPECIFIC GRAVITY, POC: 1.02
UROBILINOGEN, POC: 0.2

## 2024-01-03 PROCEDURE — 1036F TOBACCO NON-USER: CPT | Performed by: INTERNAL MEDICINE

## 2024-01-03 PROCEDURE — G8484 FLU IMMUNIZE NO ADMIN: HCPCS | Performed by: INTERNAL MEDICINE

## 2024-01-03 PROCEDURE — 3017F COLORECTAL CA SCREEN DOC REV: CPT | Performed by: INTERNAL MEDICINE

## 2024-01-03 PROCEDURE — 3023F SPIROM DOC REV: CPT | Performed by: INTERNAL MEDICINE

## 2024-01-03 PROCEDURE — 2022F DILAT RTA XM EVC RTNOPTHY: CPT | Performed by: INTERNAL MEDICINE

## 2024-01-03 PROCEDURE — G8417 CALC BMI ABV UP PARAM F/U: HCPCS | Performed by: INTERNAL MEDICINE

## 2024-01-03 PROCEDURE — 3074F SYST BP LT 130 MM HG: CPT | Performed by: INTERNAL MEDICINE

## 2024-01-03 PROCEDURE — 3046F HEMOGLOBIN A1C LEVEL >9.0%: CPT | Performed by: INTERNAL MEDICINE

## 2024-01-03 PROCEDURE — G8427 DOCREV CUR MEDS BY ELIG CLIN: HCPCS | Performed by: INTERNAL MEDICINE

## 2024-01-03 PROCEDURE — 81002 URINALYSIS NONAUTO W/O SCOPE: CPT | Performed by: INTERNAL MEDICINE

## 2024-01-03 PROCEDURE — 3078F DIAST BP <80 MM HG: CPT | Performed by: INTERNAL MEDICINE

## 2024-01-03 PROCEDURE — 99213 OFFICE O/P EST LOW 20 MIN: CPT | Performed by: INTERNAL MEDICINE

## 2024-01-03 RX ORDER — RIFAXIMIN 550 MG/1
TABLET ORAL
Qty: 42 TABLET | Status: CANCELLED | OUTPATIENT
Start: 2024-01-03

## 2024-01-03 ASSESSMENT — ENCOUNTER SYMPTOMS
VOMITING: 0
ABDOMINAL PAIN: 0
DIARRHEA: 0
NAUSEA: 0
BACK PAIN: 0
CONSTIPATION: 0

## 2024-01-03 NOTE — PROGRESS NOTES
ASSESSMENT/PLAN:  1. Dysuria  Assessment & Plan:    urine analysis done in office not consistent with UTI, advised patient we will check urine culture and treat accordingly, encouraged to increase water intake, continue Pyridium as needed to help with bladder spasm, will also refill Myrbetriq, she is scheduled to follow-up with urogynecology in 2 weeks.  Orders:  -     POCT Urinalysis no Micro  -     Culture, Urine  2. Renal stones  Assessment & Plan:    last CT scan done a month ago without gross findings, patient will still increase water intake and refill Myrbetriq, she is scheduled to follow-up with urogynecology in the near future  3. Frequency-urgency syndrome  -     mirabegron (MYRBETRIQ) 50 MG TB24; Take 50 mg by mouth daily, Disp-90 tablet, R-1Normal  -     Culture, Urine  4. Seizure ([facility])  Assessment & Plan:    stable on current medications, she does have upcoming appointment with neurology  5. PAF (paroxysmal atrial fibrillation) ([facility])  Assessment & Plan:    stable, she was given referral to establish with cardiology  6. Major depressive disorder, recurrent severe without psychotic features ([facility])  Assessment & Plan:    stable  7. COPD, moderate ([facility])  Assessment & Plan:    stable on current inhalation regimen  8. Type 2 diabetes mellitus without complication, without long-term current use of insulin ([facility])  Assessment & Plan:    labs recently checked and remains fairly well-controlled, continue low-carb diet and active lifestyle      No follow-ups on file.     SUBJECTIVE  HPI:   Patient here for an acute visit, reports she has been having burning on micturition for the last 4 days        Review of Systems   Gastrointestinal:  Negative for abdominal pain, constipation, diarrhea, nausea and vomiting.   Genitourinary:  Positive for dysuria, flank pain and pelvic pain. Negative for difficulty urinating, frequency, vaginal bleeding and vaginal discharge.   Musculoskeletal:  Negative for back pain.

## 2024-01-03 NOTE — ASSESSMENT & PLAN NOTE
labs recently checked and remains fairly well-controlled, continue low-carb diet and active lifestyle

## 2024-01-03 NOTE — ASSESSMENT & PLAN NOTE
urine analysis done in office not consistent with UTI, advised patient we will check urine culture and treat accordingly, encouraged to increase water intake, continue Pyridium as needed to help with bladder spasm, will also refill Myrbetriq, she is scheduled to follow-up with urogynecology in 2 weeks.

## 2024-01-03 NOTE — ASSESSMENT & PLAN NOTE
last CT scan done a month ago without gross findings, patient will still increase water intake and refill Myrbetriq, she is scheduled to follow-up with urogynecology in the near future

## 2024-01-04 LAB — BACTERIA UR CULT: NORMAL

## 2024-01-05 ENCOUNTER — TELEPHONE (OUTPATIENT)
Dept: INTERNAL MEDICINE CLINIC | Age: 59
End: 2024-01-05

## 2024-01-05 ENCOUNTER — TELEMEDICINE (OUTPATIENT)
Dept: PRIMARY CARE CLINIC | Age: 59
End: 2024-01-05
Payer: COMMERCIAL

## 2024-01-05 DIAGNOSIS — B96.89 ACUTE BACTERIAL SINUSITIS: Primary | ICD-10-CM

## 2024-01-05 DIAGNOSIS — J01.90 ACUTE BACTERIAL SINUSITIS: Primary | ICD-10-CM

## 2024-01-05 DIAGNOSIS — B37.9 YEAST INFECTION: ICD-10-CM

## 2024-01-05 PROCEDURE — G8417 CALC BMI ABV UP PARAM F/U: HCPCS | Performed by: NURSE PRACTITIONER

## 2024-01-05 PROCEDURE — G8427 DOCREV CUR MEDS BY ELIG CLIN: HCPCS | Performed by: NURSE PRACTITIONER

## 2024-01-05 PROCEDURE — 1036F TOBACCO NON-USER: CPT | Performed by: NURSE PRACTITIONER

## 2024-01-05 PROCEDURE — G8484 FLU IMMUNIZE NO ADMIN: HCPCS | Performed by: NURSE PRACTITIONER

## 2024-01-05 PROCEDURE — 3017F COLORECTAL CA SCREEN DOC REV: CPT | Performed by: NURSE PRACTITIONER

## 2024-01-05 PROCEDURE — 99213 OFFICE O/P EST LOW 20 MIN: CPT | Performed by: NURSE PRACTITIONER

## 2024-01-05 RX ORDER — AMOXICILLIN AND CLAVULANATE POTASSIUM 875; 125 MG/1; MG/1
1 TABLET, FILM COATED ORAL 2 TIMES DAILY
Qty: 20 TABLET | Refills: 0 | Status: SHIPPED | OUTPATIENT
Start: 2024-01-05 | End: 2024-01-15

## 2024-01-05 RX ORDER — FLUCONAZOLE 150 MG/1
150 TABLET ORAL ONCE
Qty: 1 TABLET | Refills: 0 | Status: SHIPPED | OUTPATIENT
Start: 2024-01-05 | End: 2024-01-05

## 2024-01-05 ASSESSMENT — ENCOUNTER SYMPTOMS
COUGH: 1
RHINORRHEA: 1
SINUS PRESSURE: 1
SINUS PAIN: 1

## 2024-01-05 NOTE — TELEPHONE ENCOUNTER
Pt would like to be seen or medication called in. Advised her she should take a covid test and there are no open appointments. Please reach out to pt to further assist.

## 2024-01-05 NOTE — PROGRESS NOTES
Beth Branch (:  1965) is a Established patient, here for evaluation of the following:    URI (Symptoms since Tuesday afternoon, pt c/o nasal congestion/drainage, green/yellow mucus, drainage down back of throat, feels like it is starting to go into her chest, chills, fatigue, body aches, but denies fever)       Assessment & Plan:  Below is the assessment and plan developed based on review of pertinent history, physical exam, labs, studies, and medications.  1. Acute bacterial sinusitis  To take antibiotic course through completion  Antihistamine of choice- Allegra, Zyrtec, Claritin  Mucinex may provide symptom relief  Sinus Flushing 2x day as able followed by nasal steroid inhalation as prescribed  Push fluids  Tylenol/Motrin for discomfort and fever      -     amoxicillin-clavulanate (AUGMENTIN) 875-125 MG per tablet; Take 1 tablet by mouth 2 times daily for 10 days, Disp-20 tablet, R-0Normal  2. Yeast infection  -     fluconazole (DIFLUCAN) 150 MG tablet; Take 1 tablet by mouth once for 1 dose, Disp-1 tablet, R-0Normal    No follow-ups on file.    Subjective:   Sinus Pain  Patient complains of congestion, cough, headaches, nasal congestion, post nasal drip, sinus pressure, and chills and fatigue. She is doing sinus rinses that are showing green and blood tinged mucus. She states the mucus is so thick it causes her to voit occasionally.Onset of symptoms was 1 week ago. Symptoms have been gradually worsening since that time. She is drinking plenty of fluids.  Past history is significant for COPD. Patient is smoker. She started mucinex last night.        Review of Systems   Constitutional:  Positive for chills and fatigue.   HENT:  Positive for congestion, postnasal drip, rhinorrhea, sinus pressure and sinus pain.    Respiratory:  Positive for cough.    Neurological:  Positive for headaches.       Objective:  Patient-Reported Vitals  No data recorded          No data to display                 Physical

## 2024-01-17 ENCOUNTER — OFFICE VISIT (OUTPATIENT)
Dept: UROGYNECOLOGY | Age: 59
End: 2024-01-17
Payer: COMMERCIAL

## 2024-01-17 VITALS
SYSTOLIC BLOOD PRESSURE: 160 MMHG | HEART RATE: 69 BPM | RESPIRATION RATE: 16 BRPM | TEMPERATURE: 97.8 F | OXYGEN SATURATION: 99 % | DIASTOLIC BLOOD PRESSURE: 91 MMHG

## 2024-01-17 DIAGNOSIS — R35.0 URINARY FREQUENCY: ICD-10-CM

## 2024-01-17 DIAGNOSIS — N81.6 RECTOCELE: ICD-10-CM

## 2024-01-17 DIAGNOSIS — N89.8 VAGINAL ITCHING: ICD-10-CM

## 2024-01-17 DIAGNOSIS — N39.3 SUI (STRESS URINARY INCONTINENCE, FEMALE): ICD-10-CM

## 2024-01-17 DIAGNOSIS — N32.81 OAB (OVERACTIVE BLADDER): Primary | ICD-10-CM

## 2024-01-17 DIAGNOSIS — K46.9 ENTEROCELE: ICD-10-CM

## 2024-01-17 DIAGNOSIS — R39.15 URINARY URGENCY: ICD-10-CM

## 2024-01-17 DIAGNOSIS — N89.0 MILD VAGINAL DYSPLASIA: ICD-10-CM

## 2024-01-17 LAB
BILIRUBIN, POC: NORMAL
BLOOD URINE, POC: NORMAL
CLARITY, POC: CLEAR
COLOR, POC: YELLOW
EMPTY COUGH STRESS TEST: POSITIVE
FIRST SENSATION: 130 CC
FULL COUGH STRESS TEST: NEGATIVE
GLUCOSE URINE, POC: NORMAL
KETONES, POC: NORMAL
LEUKOCYTE EST, POC: NORMAL
MAX SENSATION: 450 CC
NITRATE, URINE POC: NORMAL
NITRITE, POC: NORMAL
PH, POC: 7
POST VOID RESIDUAL (PVR): 20 ML
PROTEIN, POC: NORMAL
RBC URINE, POC: NORMAL
SECOND SENSATION: 280 CC
SPASM: 450
SPECIFIC GRAVITY, POC: 1.02
UROBILINOGEN, POC: 0.2
WBC URINE, POC: NORMAL

## 2024-01-17 PROCEDURE — 81002 URINALYSIS NONAUTO W/O SCOPE: CPT | Performed by: NURSE PRACTITIONER

## 2024-01-17 PROCEDURE — 3017F COLORECTAL CA SCREEN DOC REV: CPT | Performed by: NURSE PRACTITIONER

## 2024-01-17 PROCEDURE — 3078F DIAST BP <80 MM HG: CPT | Performed by: NURSE PRACTITIONER

## 2024-01-17 PROCEDURE — 51725 SIMPLE CYSTOMETROGRAM: CPT | Performed by: NURSE PRACTITIONER

## 2024-01-17 PROCEDURE — G8417 CALC BMI ABV UP PARAM F/U: HCPCS | Performed by: NURSE PRACTITIONER

## 2024-01-17 PROCEDURE — G8484 FLU IMMUNIZE NO ADMIN: HCPCS | Performed by: NURSE PRACTITIONER

## 2024-01-17 PROCEDURE — 99204 OFFICE O/P NEW MOD 45 MIN: CPT | Performed by: NURSE PRACTITIONER

## 2024-01-17 PROCEDURE — 1036F TOBACCO NON-USER: CPT | Performed by: NURSE PRACTITIONER

## 2024-01-17 PROCEDURE — 3077F SYST BP >= 140 MM HG: CPT | Performed by: NURSE PRACTITIONER

## 2024-01-17 PROCEDURE — G8427 DOCREV CUR MEDS BY ELIG CLIN: HCPCS | Performed by: NURSE PRACTITIONER

## 2024-01-17 RX ORDER — FEXOFENADINE HCL 60 MG/1
60 TABLET, FILM COATED ORAL DAILY
COMMUNITY

## 2024-01-17 RX ORDER — CYCLOSPORINE 0.5 MG/ML
EMULSION OPHTHALMIC
COMMUNITY
Start: 2023-10-04

## 2024-01-18 LAB
CANDIDA DNA VAG QL NAA+PROBE: NORMAL
G VAGINALIS DNA SPEC QL NAA+PROBE: NORMAL
T VAGINALIS DNA VAG QL NAA+PROBE: NORMAL

## 2024-01-19 ENCOUNTER — OFFICE VISIT (OUTPATIENT)
Dept: INTERNAL MEDICINE CLINIC | Age: 59
End: 2024-01-19
Payer: COMMERCIAL

## 2024-01-19 VITALS
WEIGHT: 180 LBS | SYSTOLIC BLOOD PRESSURE: 130 MMHG | BODY MASS INDEX: 32.92 KG/M2 | OXYGEN SATURATION: 96 % | HEART RATE: 77 BPM | DIASTOLIC BLOOD PRESSURE: 78 MMHG

## 2024-01-19 DIAGNOSIS — Z87.442 HISTORY OF KIDNEY STONES: ICD-10-CM

## 2024-01-19 DIAGNOSIS — R11.0 CHRONIC NAUSEA: ICD-10-CM

## 2024-01-19 DIAGNOSIS — K21.9 GASTROESOPHAGEAL REFLUX DISEASE WITHOUT ESOPHAGITIS: ICD-10-CM

## 2024-01-19 DIAGNOSIS — I10 PRIMARY HYPERTENSION: ICD-10-CM

## 2024-01-19 DIAGNOSIS — R10.12 LEFT UPPER QUADRANT ABDOMINAL PAIN: Primary | ICD-10-CM

## 2024-01-19 PROBLEM — R30.0 DYSURIA: Status: RESOLVED | Noted: 2024-01-03 | Resolved: 2024-01-19

## 2024-01-19 PROBLEM — Z87.39 HISTORY OF RHEUMATOID ARTHRITIS: Status: ACTIVE | Noted: 2024-01-19

## 2024-01-19 PROBLEM — N31.8 FREQUENCY-URGENCY SYNDROME: Status: RESOLVED | Noted: 2023-05-04 | Resolved: 2024-01-19

## 2024-01-19 PROBLEM — Z90.710 HISTORY OF TOTAL HYSTERECTOMY: Status: RESOLVED | Noted: 2023-12-18 | Resolved: 2024-01-19

## 2024-01-19 PROBLEM — N20.0 RENAL STONES: Status: RESOLVED | Noted: 2021-03-12 | Resolved: 2024-01-19

## 2024-01-19 PROCEDURE — G8427 DOCREV CUR MEDS BY ELIG CLIN: HCPCS | Performed by: INTERNAL MEDICINE

## 2024-01-19 PROCEDURE — G8484 FLU IMMUNIZE NO ADMIN: HCPCS | Performed by: INTERNAL MEDICINE

## 2024-01-19 PROCEDURE — G8417 CALC BMI ABV UP PARAM F/U: HCPCS | Performed by: INTERNAL MEDICINE

## 2024-01-19 PROCEDURE — 99214 OFFICE O/P EST MOD 30 MIN: CPT | Performed by: INTERNAL MEDICINE

## 2024-01-19 PROCEDURE — 1036F TOBACCO NON-USER: CPT | Performed by: INTERNAL MEDICINE

## 2024-01-19 PROCEDURE — 3074F SYST BP LT 130 MM HG: CPT | Performed by: INTERNAL MEDICINE

## 2024-01-19 PROCEDURE — 3078F DIAST BP <80 MM HG: CPT | Performed by: INTERNAL MEDICINE

## 2024-01-19 PROCEDURE — 3017F COLORECTAL CA SCREEN DOC REV: CPT | Performed by: INTERNAL MEDICINE

## 2024-01-19 RX ORDER — ONDANSETRON 4 MG/1
4 TABLET, FILM COATED ORAL EVERY 8 HOURS PRN
Qty: 30 TABLET | Refills: 0 | Status: SHIPPED | OUTPATIENT
Start: 2024-01-19

## 2024-01-19 ASSESSMENT — ENCOUNTER SYMPTOMS
CHEST TIGHTNESS: 0
HEMATOCHEZIA: 0
DIARRHEA: 1
SHORTNESS OF BREATH: 0
FLATUS: 0
CONSTIPATION: 0
NAUSEA: 1
BELCHING: 0
ABDOMINAL PAIN: 1
VOMITING: 0
BACK PAIN: 1

## 2024-01-19 ASSESSMENT — CROHNS DISEASE ACTIVITY INDEX (CDAI): CDAI SCORE: 0

## 2024-01-19 NOTE — PROGRESS NOTES
ASSESSMENT/PLAN:  1. Left upper quadrant abdominal pain  Assessment & Plan:    exam is negative except for subjective tenderness on palpation reported by patient, there is no rebound, she did have CT scan of abdomen and pelvis 4 weeks ago without any evidence of acute pathology or kidney stones, she had complete pelvic exam the other day by urogynecology for rectal/ pelvic prolapse but otherwise negative.  Advised patient since pain is chronic and similar in nature to previous episodes will continue with supportive measures including as needed Zofran, continue PPI, ensure adequate hydration.  Educated about alarming signs and symptoms of acute abdomen and advised if any change in pain severity or if starts throwing up and unable to keep food down then she should just go to the emergency room.  Patient verbalized understanding, she is on the waiting list for GI to see if her appointment can be moved sooner, she does have history of collagenous colitis and is s/p sigmoid resection.  2. Chronic nausea  Assessment & Plan:    will refill as needed Zofran, patient reports history of gastroparesis along with hiatal hernia in addition to GERD.  She is scheduled to see GI in the next few weeks, not sure how much of her past history she provided is accurate but she will need updated workup and will await further evaluation by GI.  Advised can try and increase Nexium dose to twice daily for 1 week to see if this will have any effect on her symptoms although unlikely current symptoms are secondary to hiatal hernia  Orders:  -     ondansetron (ZOFRAN) 4 MG tablet; Take 1 tablet by mouth every 8 hours as needed for Nausea or Vomiting, Disp-30 tablet, R-0Normal  3. History of kidney stones  Assessment & Plan:    recent CT scan abdomen and pelvis done 4 weeks ago negative for any evidence of kidney stones  4. Primary hypertension  Assessment & Plan:    blood pressure stable and well-controlled, continue current medications  5.

## 2024-01-19 NOTE — ASSESSMENT & PLAN NOTE
will refill as needed Zofran, patient reports history of gastroparesis along with hiatal hernia in addition to GERD.  She is scheduled to see GI in the next few weeks, not sure how much of her past history she provided is accurate but she will need updated workup and will await further evaluation by GI.  Advised can try and increase Nexium dose to twice daily for 1 week to see if this will have any effect on her symptoms although unlikely current symptoms are secondary to hiatal hernia

## 2024-01-19 NOTE — ASSESSMENT & PLAN NOTE
reports history of hiatal hernia ,reinforced recommendations for antireflux diet, advised can do a trial of doubling up a esomeprazole for 1 week and then go back to daily dosing until able to see GI for further evaluation and recommendation

## 2024-01-19 NOTE — RESULT ENCOUNTER NOTE
Spoke with patient over the phone. Advised her of normal vaginal swab. Patient still experiencing vaginal itching- states she will wait a few days then call the office if this does not improve. No further concerns at this time.

## 2024-01-19 NOTE — ASSESSMENT & PLAN NOTE
exam is negative except for subjective tenderness on palpation reported by patient, there is no rebound, she did have CT scan of abdomen and pelvis 4 weeks ago without any evidence of acute pathology or kidney stones, she had complete pelvic exam the other day by urogynecology for rectal/ pelvic prolapse but otherwise negative.  Advised patient since pain is chronic and similar in nature to previous episodes will continue with supportive measures including as needed Zofran, continue PPI, ensure adequate hydration.  Educated about alarming signs and symptoms of acute abdomen and advised if any change in pain severity or if starts throwing up and unable to keep food down then she should just go to the emergency room.  Patient verbalized understanding, she is on the waiting list for GI to see if her appointment can be moved sooner, she does have history of collagenous colitis and is s/p sigmoid resection.

## 2024-02-02 SDOH — ECONOMIC STABILITY: INCOME INSECURITY: HOW HARD IS IT FOR YOU TO PAY FOR THE VERY BASICS LIKE FOOD, HOUSING, MEDICAL CARE, AND HEATING?: SOMEWHAT HARD

## 2024-02-02 SDOH — ECONOMIC STABILITY: FOOD INSECURITY: WITHIN THE PAST 12 MONTHS, THE FOOD YOU BOUGHT JUST DIDN'T LAST AND YOU DIDN'T HAVE MONEY TO GET MORE.: SOMETIMES TRUE

## 2024-02-02 SDOH — ECONOMIC STABILITY: HOUSING INSECURITY
IN THE LAST 12 MONTHS, WAS THERE A TIME WHEN YOU DID NOT HAVE A STEADY PLACE TO SLEEP OR SLEPT IN A SHELTER (INCLUDING NOW)?: YES

## 2024-02-02 SDOH — ECONOMIC STABILITY: FOOD INSECURITY: WITHIN THE PAST 12 MONTHS, YOU WORRIED THAT YOUR FOOD WOULD RUN OUT BEFORE YOU GOT MONEY TO BUY MORE.: OFTEN TRUE

## 2024-02-02 SDOH — ECONOMIC STABILITY: TRANSPORTATION INSECURITY
IN THE PAST 12 MONTHS, HAS LACK OF TRANSPORTATION KEPT YOU FROM MEETINGS, WORK, OR FROM GETTING THINGS NEEDED FOR DAILY LIVING?: NO

## 2024-02-02 ASSESSMENT — PATIENT HEALTH QUESTIONNAIRE - PHQ9
8. MOVING OR SPEAKING SO SLOWLY THAT OTHER PEOPLE COULD HAVE NOTICED. OR THE OPPOSITE - BEING SO FIDGETY OR RESTLESS THAT YOU HAVE BEEN MOVING AROUND A LOT MORE THAN USUAL: NOT AT ALL
5. POOR APPETITE OR OVEREATING: SEVERAL DAYS
3. TROUBLE FALLING OR STAYING ASLEEP: 2
SUM OF ALL RESPONSES TO PHQ QUESTIONS 1-9: 7
SUM OF ALL RESPONSES TO PHQ QUESTIONS 1-9: 7
7. TROUBLE CONCENTRATING ON THINGS, SUCH AS READING THE NEWSPAPER OR WATCHING TELEVISION: 1
2. FEELING DOWN, DEPRESSED OR HOPELESS: NOT AT ALL
SUM OF ALL RESPONSES TO PHQ QUESTIONS 1-9: 7
8. MOVING OR SPEAKING SO SLOWLY THAT OTHER PEOPLE COULD HAVE NOTICED. OR THE OPPOSITE, BEING SO FIGETY OR RESTLESS THAT YOU HAVE BEEN MOVING AROUND A LOT MORE THAN USUAL: 0
1. LITTLE INTEREST OR PLEASURE IN DOING THINGS: 1
SUM OF ALL RESPONSES TO PHQ QUESTIONS 1-9: 7
SUM OF ALL RESPONSES TO PHQ9 QUESTIONS 1 & 2: 1
3. TROUBLE FALLING OR STAYING ASLEEP: MORE THAN HALF THE DAYS
10. IF YOU CHECKED OFF ANY PROBLEMS, HOW DIFFICULT HAVE THESE PROBLEMS MADE IT FOR YOU TO DO YOUR WORK, TAKE CARE OF THINGS AT HOME, OR GET ALONG WITH OTHER PEOPLE: SOMEWHAT DIFFICULT
6. FEELING BAD ABOUT YOURSELF - OR THAT YOU ARE A FAILURE OR HAVE LET YOURSELF OR YOUR FAMILY DOWN: 1
1. LITTLE INTEREST OR PLEASURE IN DOING THINGS: SEVERAL DAYS
4. FEELING TIRED OR HAVING LITTLE ENERGY: SEVERAL DAYS
4. FEELING TIRED OR HAVING LITTLE ENERGY: 1
5. POOR APPETITE OR OVEREATING: 1
9. THOUGHTS THAT YOU WOULD BE BETTER OFF DEAD, OR OF HURTING YOURSELF: NOT AT ALL
10. IF YOU CHECKED OFF ANY PROBLEMS, HOW DIFFICULT HAVE THESE PROBLEMS MADE IT FOR YOU TO DO YOUR WORK, TAKE CARE OF THINGS AT HOME, OR GET ALONG WITH OTHER PEOPLE: 1
6. FEELING BAD ABOUT YOURSELF - OR THAT YOU ARE A FAILURE OR HAVE LET YOURSELF OR YOUR FAMILY DOWN: SEVERAL DAYS
SUM OF ALL RESPONSES TO PHQ QUESTIONS 1-9: 7
2. FEELING DOWN, DEPRESSED OR HOPELESS: 0
9. THOUGHTS THAT YOU WOULD BE BETTER OFF DEAD, OR OF HURTING YOURSELF: 0
7. TROUBLE CONCENTRATING ON THINGS, SUCH AS READING THE NEWSPAPER OR WATCHING TELEVISION: SEVERAL DAYS

## 2024-02-05 ENCOUNTER — OFFICE VISIT (OUTPATIENT)
Dept: INTERNAL MEDICINE CLINIC | Age: 59
End: 2024-02-05
Payer: COMMERCIAL

## 2024-02-05 ENCOUNTER — PROCEDURE VISIT (OUTPATIENT)
Dept: UROGYNECOLOGY | Age: 59
End: 2024-02-05
Payer: COMMERCIAL

## 2024-02-05 VITALS
RESPIRATION RATE: 16 BRPM | OXYGEN SATURATION: 97 % | TEMPERATURE: 98.3 F | HEART RATE: 65 BPM | DIASTOLIC BLOOD PRESSURE: 85 MMHG | SYSTOLIC BLOOD PRESSURE: 133 MMHG

## 2024-02-05 VITALS
HEIGHT: 62 IN | HEART RATE: 65 BPM | WEIGHT: 178 LBS | SYSTOLIC BLOOD PRESSURE: 145 MMHG | DIASTOLIC BLOOD PRESSURE: 98 MMHG | BODY MASS INDEX: 32.76 KG/M2 | OXYGEN SATURATION: 98 %

## 2024-02-05 DIAGNOSIS — I48.0 PAF (PAROXYSMAL ATRIAL FIBRILLATION) (HCC): ICD-10-CM

## 2024-02-05 DIAGNOSIS — R35.0 URINARY FREQUENCY: ICD-10-CM

## 2024-02-05 DIAGNOSIS — N32.81 OAB (OVERACTIVE BLADDER): Primary | ICD-10-CM

## 2024-02-05 DIAGNOSIS — G89.4 CHRONIC PAIN SYNDROME: ICD-10-CM

## 2024-02-05 DIAGNOSIS — N39.3 SUI (STRESS URINARY INCONTINENCE, FEMALE): ICD-10-CM

## 2024-02-05 DIAGNOSIS — K46.9 ENTEROCELE: ICD-10-CM

## 2024-02-05 DIAGNOSIS — Z02.9 ADMINISTRATIVE ENCOUNTER: ICD-10-CM

## 2024-02-05 DIAGNOSIS — F19.11 HISTORY OF DRUG ABUSE (HCC): ICD-10-CM

## 2024-02-05 DIAGNOSIS — I10 PRIMARY HYPERTENSION: Primary | ICD-10-CM

## 2024-02-05 DIAGNOSIS — R39.15 URINARY URGENCY: ICD-10-CM

## 2024-02-05 DIAGNOSIS — N81.6 RECTOCELE: ICD-10-CM

## 2024-02-05 DIAGNOSIS — E11.9 TYPE 2 DIABETES MELLITUS WITHOUT COMPLICATION, WITHOUT LONG-TERM CURRENT USE OF INSULIN (HCC): ICD-10-CM

## 2024-02-05 PROBLEM — R10.12 LEFT UPPER QUADRANT ABDOMINAL PAIN: Status: RESOLVED | Noted: 2024-01-19 | Resolved: 2024-02-05

## 2024-02-05 LAB
BILIRUBIN, POC: NORMAL
BLOOD URINE, POC: NORMAL
CLARITY, POC: CLEAR
COLOR, POC: YELLOW
GLUCOSE URINE, POC: NORMAL
KETONES, POC: NORMAL
LEUKOCYTE EST, POC: NORMAL
NITRITE, POC: NORMAL
PH, POC: 6.5
PROTEIN, POC: NORMAL
SPECIFIC GRAVITY, POC: 1.01
UROBILINOGEN, POC: NORMAL

## 2024-02-05 PROCEDURE — 81002 URINALYSIS NONAUTO W/O SCOPE: CPT | Performed by: OBSTETRICS & GYNECOLOGY

## 2024-02-05 PROCEDURE — 51741 ELECTRO-UROFLOWMETRY FIRST: CPT | Performed by: OBSTETRICS & GYNECOLOGY

## 2024-02-05 PROCEDURE — 2022F DILAT RTA XM EVC RTNOPTHY: CPT | Performed by: INTERNAL MEDICINE

## 2024-02-05 PROCEDURE — 51729 CYSTOMETROGRAM W/VP&UP: CPT | Performed by: OBSTETRICS & GYNECOLOGY

## 2024-02-05 PROCEDURE — 51784 ANAL/URINARY MUSCLE STUDY: CPT | Performed by: OBSTETRICS & GYNECOLOGY

## 2024-02-05 PROCEDURE — 3080F DIAST BP >= 90 MM HG: CPT | Performed by: INTERNAL MEDICINE

## 2024-02-05 PROCEDURE — G8484 FLU IMMUNIZE NO ADMIN: HCPCS | Performed by: INTERNAL MEDICINE

## 2024-02-05 PROCEDURE — 3077F SYST BP >= 140 MM HG: CPT | Performed by: INTERNAL MEDICINE

## 2024-02-05 PROCEDURE — G8417 CALC BMI ABV UP PARAM F/U: HCPCS | Performed by: INTERNAL MEDICINE

## 2024-02-05 PROCEDURE — 99214 OFFICE O/P EST MOD 30 MIN: CPT | Performed by: INTERNAL MEDICINE

## 2024-02-05 PROCEDURE — 51797 INTRAABDOMINAL PRESSURE TEST: CPT | Performed by: OBSTETRICS & GYNECOLOGY

## 2024-02-05 PROCEDURE — G8427 DOCREV CUR MEDS BY ELIG CLIN: HCPCS | Performed by: INTERNAL MEDICINE

## 2024-02-05 PROCEDURE — 1036F TOBACCO NON-USER: CPT | Performed by: INTERNAL MEDICINE

## 2024-02-05 PROCEDURE — 3046F HEMOGLOBIN A1C LEVEL >9.0%: CPT | Performed by: INTERNAL MEDICINE

## 2024-02-05 PROCEDURE — 3017F COLORECTAL CA SCREEN DOC REV: CPT | Performed by: INTERNAL MEDICINE

## 2024-02-05 RX ORDER — AMLODIPINE BESYLATE 5 MG/1
5 TABLET ORAL DAILY
Qty: 90 TABLET | Refills: 0 | Status: SHIPPED | OUTPATIENT
Start: 2024-02-05

## 2024-02-05 RX ORDER — HYDROCODONE BITARTRATE AND ACETAMINOPHEN 5; 325 MG/1; MG/1
TABLET ORAL
COMMUNITY
Start: 2024-02-04

## 2024-02-05 RX ORDER — FAMOTIDINE 20 MG/1
TABLET, FILM COATED ORAL
COMMUNITY
Start: 2023-12-13

## 2024-02-05 RX ORDER — PREDNISOLONE ACETATE 10 MG/ML
SUSPENSION/ DROPS OPHTHALMIC
COMMUNITY
Start: 2024-01-25

## 2024-02-05 SDOH — ECONOMIC STABILITY: INCOME INSECURITY: HOW HARD IS IT FOR YOU TO PAY FOR THE VERY BASICS LIKE FOOD, HOUSING, MEDICAL CARE, AND HEATING?: SOMEWHAT HARD

## 2024-02-05 SDOH — ECONOMIC STABILITY: FOOD INSECURITY: WITHIN THE PAST 12 MONTHS, THE FOOD YOU BOUGHT JUST DIDN'T LAST AND YOU DIDN'T HAVE MONEY TO GET MORE.: SOMETIMES TRUE

## 2024-02-05 SDOH — ECONOMIC STABILITY: FOOD INSECURITY: WITHIN THE PAST 12 MONTHS, YOU WORRIED THAT YOUR FOOD WOULD RUN OUT BEFORE YOU GOT MONEY TO BUY MORE.: OFTEN TRUE

## 2024-02-05 ASSESSMENT — ENCOUNTER SYMPTOMS
DIARRHEA: 1
NAUSEA: 1
COUGH: 0
CONSTIPATION: 0
VOMITING: 0
SHORTNESS OF BREATH: 0
WHEEZING: 0
CHEST TIGHTNESS: 0
SORE THROAT: 0
BACK PAIN: 1
ABDOMINAL PAIN: 1
COLOR CHANGE: 0

## 2024-02-05 NOTE — ASSESSMENT & PLAN NOTE
last pain management evaluation and in [facility] from [place] before she moved, last dispense of gabapentin was in October, patient does have past history of cocaine abuse and aware no controlled substances will be dispensed by our office

## 2024-02-05 NOTE — ASSESSMENT & PLAN NOTE
patient did follow-up with urogynecology and scheduled for urodynamic studies this afternoon in preparation for possible surgical intervention.  Remains on Myrbetriq

## 2024-02-05 NOTE — PROGRESS NOTES
Urodynamic Procedure Note    Beth Branch  1965    Urodynamicist: Dr. Flores    Equipment: [employer]    Brief History/Indication:    Patient is a 58 y.o. female with subjective complaints of prolapse and  mixed urinary incontinence for a urodynamic evaluation. Blue catheter placed in rectum due to large prolapse. Patient reports she is taking myrbetriq at this time.     Cystometrogram     1/17/24      Ref Range & Units 1/17/24   WBC Urine, POC neg   RBC Urine, POC neg   Nitrate, UA POC neg   post void residual  ml 20   FIRST SENSATION  cc 130   SECOND SENSATION  cc 280   MAX SENSATION  cc 450   EMPTY COUGH STRESS TEST positive   FULL COUGH STRESS TEST negative   SPASM 450              Pelvic Organ Prolapse Quantification  Anterior Wall (Aa): -2   Anterior Wall (Ba): -2   Cervix or Cuff (C): -4     Genital Hiatus (gh): 3   Perineal Body (pb): 3   Total Vaginal Length (tvl): 7     Posterior Wall (Ap): 1   Posterior Wall (Bp): 1   No data recorded       Procedure:  The Patient was taken to the Urodynamics suite and a free urine flow was obtained followed by catheterization for residual urine. A double-lumen urodynamic catheter was introduced to the bladder for measuring bladder pressure, and for filling. EMG patch electrodes were placed perianally for recording activity of the anal sphincter. A vaginal catheter was inserted to record the abdominal pressure. The entire process was displayed and analyzed using the [employer] Urodynamic machine and software.    Findings:   Results for POC orders placed in visit on 02/05/24   POCT Urinalysis no Micro   Result Value Ref Range    Color, UA yellow     Clarity, UA clear     Glucose, UA POC neg     Bilirubin, UA neg     Ketones, UA neg     Spec Grav, UA 1.015     Blood, UA POC neg     pH, UA 6.5     Protein, UA POC neg     Urobilinogen, UA neg     Leukocytes, UA neg     Nitrite, UA neg        Uroflow:  Patient was able to void for Uroflow    Voided Volume: 215 ml  PVR:

## 2024-02-05 NOTE — PROGRESS NOTES
ASSESSMENT/PLAN:  1. Primary hypertension  Assessment & Plan:    blood pressure checked twice and remained elevated, patient reports she was on a higher dose of carvedilol and wants to go back to the 25 mg dose however her ophthalmologist and neuro-ophthalmologist wants her off the carvedilol due to interfering with her optic neuritis!!  I do not see any documentations in [facility] with any other ophthalmology consult however advised patient since carvedilol is being managed by cardiology she can check with cardiology increasing the dose but until then we will recommend starting amlodipine to help get blood pressure under control since unable to use any ACE inhibitor agents due to allergy.  Advised to continue monitoring blood pressure at adhere to low-salt diet.  Of note is that patient is on Adderall for ADHD which is probably contributing to elevated blood pressure reading, advised to discuss with psychiatry switching medications or lowering dose  Orders:  -     amLODIPine (NORVASC) 5 MG tablet; Take 1 tablet by mouth daily, Disp-90 tablet, R-0Normal  2. Administrative encounter  Assessment & Plan:    patient brought in a form that requires physician statement saying she is fit to work as a  and able to carry and operate firearms.  Patient currently on Social Security disability however reports she started working full-time and she is coming off the SSI.  Explained to patient with her contradictory complaints and seeing multiple specialist and still undergoing work-related wrist injury I am not able to sign a paper saying she is fit to handle firearms and work as a , advised I can refer her for physical evaluation similar to testing and evaluation done for disability to decide on her ability to perform and if she is found physically fit then can give her that clearance.  Patient reported she is not interested in completing that sort of evaluation and she will just get that paper

## 2024-02-05 NOTE — ASSESSMENT & PLAN NOTE
remains on Xarelto with good tolerance and denies any side effects, follow-up with cardiology as scheduled in April

## 2024-02-05 NOTE — ASSESSMENT & PLAN NOTE
blood pressure checked twice and remained elevated, patient reports she was on a higher dose of carvedilol and wants to go back to the 25 mg dose however her ophthalmologist and neuro-ophthalmologist wants her off the carvedilol due to interfering with her optic neuritis!!  I do not see any documentations in [facility] with any other ophthalmology consult however advised patient since carvedilol is being managed by cardiology she can check with cardiology increasing the dose but until then we will recommend starting amlodipine to help get blood pressure under control since unable to use any ACE inhibitor agents due to allergy.  Advised to continue monitoring blood pressure at adhere to low-salt diet.  Of note is that patient is on Adderall for ADHD which is probably contributing to elevated blood pressure reading, advised to discuss with psychiatry switching medications or lowering dose

## 2024-02-05 NOTE — ASSESSMENT & PLAN NOTE
patient brought in a form that requires physician statement saying she is fit to work as a  and able to carry and operate firearms.  Patient currently on Social Security disability however reports she started working full-time and she is coming off the SSI.  Explained to patient with her contradictory complaints and seeing multiple specialist and still undergoing work-related wrist injury I am not able to sign a paper saying she is fit to handle firearms and work as a , advised I can refer her for physical evaluation similar to testing and evaluation done for disability to decide on her ability to perform and if she is found physically fit then can give her that clearance.  Patient reported she is not interested in completing that sort of evaluation and she will just get that paper signed from an urgent care!!

## 2024-02-05 NOTE — ASSESSMENT & PLAN NOTE
not on any medications, reported hypoglycemic episode, advised since not on any medications this is not related to her diabetes, she needs to regulate her mealtime and continue adherence to low-carb diet, labs recently checked within acceptable range

## 2024-02-12 ENCOUNTER — TELEPHONE (OUTPATIENT)
Dept: UROGYNECOLOGY | Age: 59
End: 2024-02-12

## 2024-02-12 NOTE — TELEPHONE ENCOUNTER
Attempted to reach patient per LORI Richardson's request. Patient was referred by us to Dr. Delmy Petersen for mild vaginal dysplasia. Attempted to inquire about if patient has seen this physician, made an appointment, or found another GYN provider.     Patient did not answer. Left VM requesting a call back to the office to discuss this.

## 2024-02-29 DIAGNOSIS — R11.0 CHRONIC NAUSEA: ICD-10-CM

## 2024-03-01 RX ORDER — ONDANSETRON 4 MG/1
TABLET, FILM COATED ORAL
Qty: 30 TABLET | Refills: 0 | OUTPATIENT
Start: 2024-03-01

## 2024-03-19 ENCOUNTER — OFFICE VISIT (OUTPATIENT)
Dept: INTERNAL MEDICINE CLINIC | Age: 59
End: 2024-03-19
Payer: COMMERCIAL

## 2024-03-19 VITALS
HEIGHT: 62 IN | WEIGHT: 184 LBS | DIASTOLIC BLOOD PRESSURE: 68 MMHG | SYSTOLIC BLOOD PRESSURE: 124 MMHG | HEART RATE: 70 BPM | OXYGEN SATURATION: 97 % | BODY MASS INDEX: 33.86 KG/M2

## 2024-03-19 DIAGNOSIS — R56.9 SEIZURE (HCC): ICD-10-CM

## 2024-03-19 DIAGNOSIS — K21.9 GASTROESOPHAGEAL REFLUX DISEASE WITHOUT ESOPHAGITIS: ICD-10-CM

## 2024-03-19 DIAGNOSIS — R10.9 ACUTE LEFT FLANK PAIN: Primary | ICD-10-CM

## 2024-03-19 DIAGNOSIS — N39.3 SUI (STRESS URINARY INCONTINENCE, FEMALE): ICD-10-CM

## 2024-03-19 DIAGNOSIS — I10 PRIMARY HYPERTENSION: ICD-10-CM

## 2024-03-19 PROBLEM — Z02.9 ADMINISTRATIVE ENCOUNTER: Status: RESOLVED | Noted: 2023-12-18 | Resolved: 2024-03-19

## 2024-03-19 PROCEDURE — G8417 CALC BMI ABV UP PARAM F/U: HCPCS | Performed by: INTERNAL MEDICINE

## 2024-03-19 PROCEDURE — 3017F COLORECTAL CA SCREEN DOC REV: CPT | Performed by: INTERNAL MEDICINE

## 2024-03-19 PROCEDURE — G8427 DOCREV CUR MEDS BY ELIG CLIN: HCPCS | Performed by: INTERNAL MEDICINE

## 2024-03-19 PROCEDURE — G8484 FLU IMMUNIZE NO ADMIN: HCPCS | Performed by: INTERNAL MEDICINE

## 2024-03-19 PROCEDURE — 3074F SYST BP LT 130 MM HG: CPT | Performed by: INTERNAL MEDICINE

## 2024-03-19 PROCEDURE — 3078F DIAST BP <80 MM HG: CPT | Performed by: INTERNAL MEDICINE

## 2024-03-19 PROCEDURE — 99214 OFFICE O/P EST MOD 30 MIN: CPT | Performed by: INTERNAL MEDICINE

## 2024-03-19 PROCEDURE — 1036F TOBACCO NON-USER: CPT | Performed by: INTERNAL MEDICINE

## 2024-03-19 RX ORDER — POTASSIUM CHLORIDE 1500 MG/1
20 TABLET, EXTENDED RELEASE ORAL DAILY
Qty: 60 TABLET | Refills: 1 | Status: SHIPPED | OUTPATIENT
Start: 2024-03-19

## 2024-03-19 RX ORDER — ESOMEPRAZOLE MAGNESIUM 40 MG/1
40 CAPSULE, DELAYED RELEASE ORAL
Qty: 30 CAPSULE | Refills: 0 | Status: SHIPPED | OUTPATIENT
Start: 2024-03-19

## 2024-03-19 RX ORDER — CARBAMAZEPINE 200 MG/1
200 TABLET ORAL 2 TIMES DAILY
Qty: 60 TABLET | Refills: 1 | Status: SHIPPED | OUTPATIENT
Start: 2024-03-19

## 2024-03-19 RX ORDER — FUROSEMIDE 40 MG/1
40 TABLET ORAL 2 TIMES DAILY
Qty: 60 TABLET | Refills: 3 | Status: SHIPPED | OUTPATIENT
Start: 2024-03-19

## 2024-03-19 ASSESSMENT — ENCOUNTER SYMPTOMS
COLOR CHANGE: 0
NAUSEA: 0
VOMITING: 0
ABDOMINAL PAIN: 1
SHORTNESS OF BREATH: 0
CHEST TIGHTNESS: 0
DIARRHEA: 0

## 2024-03-19 NOTE — ASSESSMENT & PLAN NOTE
states still has not seen neurology, advised will refill only until able to follow-up with neurology.  Denies any recent seizure activities

## 2024-03-19 NOTE — ASSESSMENT & PLAN NOTE
blood pressure stable, recently saw cardiology and her amlodipine dose was increased to 10 mg and carvedilol dose increased to 12.5 mg twice a day.  Continue same along with abstinence from smoking and maintaining healthy low-salt diet with active lifestyle

## 2024-03-19 NOTE — PROGRESS NOTES
ASSESSMENT/PLAN:  1. Acute left flank pain  Assessment & Plan:    patient reports she has been in severe pain almost all week and never anything like this although previously there has been several ER visits for left side pain, she is scheduled to see GI and reestablish with Dr. Lang since she moved back to the area but her appointment is not till 2 weeks from now.  CT scan did not show any acute process and no kidney stones, there is blood in the urine, patient has been taking Azo and the ER physician told her this can mask a UTI but did not check a urine culture.  Advised we will go ahead and check urine culture to rule out UTI although symptoms do not appear to be consistent with UTI.  She does follow-up with urogynecology for her incontinence and pelvic floor prolapse and plan to have surgery in the future.  Encouraged to increase water intake until results of urine culture are available.  She is scheduled for cystoscopy with Dr. Flores in 3 weeks  Orders:  -     Culture, Urine  2. Primary hypertension  Assessment & Plan:    blood pressure stable, recently saw cardiology and her amlodipine dose was increased to 10 mg and carvedilol dose increased to 12.5 mg twice a day.  Continue same along with abstinence from smoking and maintaining healthy low-salt diet with active lifestyle  Orders:  -     furosemide (LASIX) 40 MG tablet; Take 1 tablet by mouth in the morning and 1 tablet in the evening., Disp-60 tablet, R-3Normal  -     potassium chloride (K-TAB) 20 MEQ TBCR extended release tablet; Take 1 tablet by mouth daily, Disp-60 tablet, R-1Normal  3. Seizure (HCC)  Assessment & Plan:    states still has not seen neurology, advised will refill only until able to follow-up with neurology.  Denies any recent seizure activities  Orders:  -     carBAMazepine (TEGRETOL) 200 MG tablet; Take 1 tablet by mouth 2 times daily, Disp-60 tablet, R-1Normal  4. HAMZAH (stress urinary incontinence, female)  Assessment & Plan:    she

## 2024-03-19 NOTE — ASSESSMENT & PLAN NOTE
patient reports she has been in severe pain almost all week and never anything like this although previously there has been several ER visits for left side pain, she is scheduled to see GI and reestablish with Dr. Lang since she moved back to the area but her appointment is not till 2 weeks from now.  CT scan did not show any acute process and no kidney stones, there is blood in the urine, patient has been taking Azo and the ER physician told her this can mask a UTI but did not check a urine culture.  Advised we will go ahead and check urine culture to rule out UTI although symptoms do not appear to be consistent with UTI.  She does follow-up with urogynecology for her incontinence and pelvic floor prolapse and plan to have surgery in the future.  Encouraged to increase water intake until results of urine culture are available.  She is scheduled for cystoscopy with Dr. Flores in 3 weeks

## 2024-03-20 LAB — BACTERIA UR CULT: NORMAL

## 2024-04-05 ENCOUNTER — TELEPHONE (OUTPATIENT)
Dept: INTERNAL MEDICINE CLINIC | Age: 59
End: 2024-04-05

## 2024-04-05 DIAGNOSIS — G89.4 CHRONIC PAIN SYNDROME: ICD-10-CM

## 2024-04-05 DIAGNOSIS — M47.12 CERVICAL SPONDYLOSIS WITH MYELOPATHY AND RADICULOPATHY: Primary | ICD-10-CM

## 2024-04-05 DIAGNOSIS — M47.22 CERVICAL SPONDYLOSIS WITH MYELOPATHY AND RADICULOPATHY: Primary | ICD-10-CM

## 2024-04-05 NOTE — TELEPHONE ENCOUNTER
Pt calling requesting refill of gabapentin (NEURONTIN)     Last written 6/14/23  Last OV 3/19/24  Next OV N/A    Please send to [address]     Pt states previous PCP in [place] last wrote script. Pt states she takes 900MG a day.

## 2024-04-08 NOTE — TELEPHONE ENCOUNTER
Left message for patient to call back.     She was referred to pain management on 12/18/2023. She was suppose to call and schedule with them to manage pain meds, did she not ?

## 2024-04-08 NOTE — TELEPHONE ENCOUNTER
Patient was referred to pain management at her initial visit, please verify if she did follow through has not established with pain management.

## 2024-04-09 ENCOUNTER — APPOINTMENT (OUTPATIENT)
Dept: GENERAL RADIOLOGY | Age: 59
DRG: 092 | End: 2024-04-09
Payer: COMMERCIAL

## 2024-04-09 ENCOUNTER — HOSPITAL ENCOUNTER (INPATIENT)
Age: 59
LOS: 1 days | Discharge: HOME | DRG: 092 | End: 2024-04-11
Attending: EMERGENCY MEDICINE | Admitting: INTERNAL MEDICINE
Payer: COMMERCIAL

## 2024-04-09 ENCOUNTER — APPOINTMENT (OUTPATIENT)
Dept: MRI IMAGING | Age: 59
DRG: 092 | End: 2024-04-09
Payer: COMMERCIAL

## 2024-04-09 ENCOUNTER — APPOINTMENT (OUTPATIENT)
Dept: CT IMAGING | Age: 59
DRG: 092 | End: 2024-04-09
Payer: COMMERCIAL

## 2024-04-09 DIAGNOSIS — R55 SYNCOPE AND COLLAPSE: ICD-10-CM

## 2024-04-09 DIAGNOSIS — R41.82 ALTERED MENTAL STATUS, UNSPECIFIED ALTERED MENTAL STATUS TYPE: Primary | ICD-10-CM

## 2024-04-09 DIAGNOSIS — R40.4 UNRESPONSIVE EPISODE: ICD-10-CM

## 2024-04-09 PROBLEM — G40.909 SEIZURE DISORDER (HCC): Status: ACTIVE | Noted: 2024-04-09

## 2024-04-09 PROBLEM — G93.40 ACUTE ENCEPHALOPATHY: Status: ACTIVE | Noted: 2024-04-09

## 2024-04-09 PROBLEM — G93.41 ACUTE METABOLIC ENCEPHALOPATHY: Status: ACTIVE | Noted: 2024-04-09

## 2024-04-09 PROBLEM — I25.10 ATHEROSCLEROSIS OF NATIVE CORONARY ARTERY OF NATIVE HEART: Status: ACTIVE | Noted: 2024-04-09

## 2024-04-09 PROBLEM — R40.20 LOC (LOSS OF CONSCIOUSNESS) (HCC): Status: ACTIVE | Noted: 2019-06-03

## 2024-04-09 PROBLEM — Z95.818 IMPLANTABLE LOOP RECORDER PRESENT: Status: ACTIVE | Noted: 2024-04-09

## 2024-04-09 LAB
ALBUMIN SERPL-MCNC: 3.4 G/DL (ref 3.4–5)
ALP SERPL-CCNC: 76 U/L (ref 40–129)
ALT SERPL-CCNC: 13 U/L (ref 10–40)
AMMONIA PLAS-SCNC: 34 UMOL/L (ref 11–51)
AMPHETAMINES UR QL SCN>1000 NG/ML: NORMAL
ANION GAP SERPL CALCULATED.3IONS-SCNC: 7 MMOL/L (ref 3–16)
APAP SERPL-MCNC: <5 UG/ML (ref 10–30)
AST SERPL-CCNC: 21 U/L (ref 15–37)
BARBITURATES UR QL SCN>200 NG/ML: NORMAL
BASE EXCESS BLDV CALC-SCNC: 5.4 MMOL/L (ref -3–3)
BASOPHILS # BLD: 0.1 K/UL (ref 0–0.2)
BASOPHILS NFR BLD: 1.2 %
BENZODIAZ UR QL SCN>200 NG/ML: NORMAL
BILIRUB DIRECT SERPL-MCNC: <0.2 MG/DL (ref 0–0.3)
BILIRUB INDIRECT SERPL-MCNC: NORMAL MG/DL (ref 0–1)
BILIRUB SERPL-MCNC: <0.2 MG/DL (ref 0–1)
BILIRUB UR QL STRIP.AUTO: NEGATIVE
BUN SERPL-MCNC: 12 MG/DL (ref 7–20)
CALCIUM SERPL-MCNC: 8.5 MG/DL (ref 8.3–10.6)
CANNABINOIDS UR QL SCN>50 NG/ML: NORMAL
CHLORIDE SERPL-SCNC: 107 MMOL/L (ref 99–110)
CLARITY UR: CLEAR
CO2 BLDV-SCNC: 72 MMOL/L
CO2 SERPL-SCNC: 27 MMOL/L (ref 21–32)
COCAINE UR QL SCN: NORMAL
COHGB MFR BLDV: 3.2 % (ref 0–1.5)
COLOR UR: YELLOW
CREAT SERPL-MCNC: 0.5 MG/DL (ref 0.6–1.1)
DEPRECATED RDW RBC AUTO: 15.3 % (ref 12.4–15.4)
DRUG SCREEN COMMENT UR-IMP: NORMAL
EKG ATRIAL RATE: 60 BPM
EKG DIAGNOSIS: NORMAL
EKG P AXIS: 81 DEGREES
EKG P-R INTERVAL: 154 MS
EKG Q-T INTERVAL: 470 MS
EKG QRS DURATION: 94 MS
EKG QTC CALCULATION (BAZETT): 470 MS
EKG R AXIS: 18 DEGREES
EKG T AXIS: 32 DEGREES
EKG VENTRICULAR RATE: 60 BPM
EOSINOPHIL # BLD: 0.2 K/UL (ref 0–0.6)
EOSINOPHIL NFR BLD: 3.7 %
ETHANOLAMINE SERPL-MCNC: NORMAL MG/DL (ref 0–0.08)
FENTANYL SCREEN, URINE: NORMAL
GFR SERPLBLD CREATININE-BSD FMLA CKD-EPI: >90 ML/MIN/{1.73_M2}
GLUCOSE SERPL-MCNC: 98 MG/DL (ref 70–99)
GLUCOSE UR STRIP.AUTO-MCNC: NEGATIVE MG/DL
HCO3 BLDV-SCNC: 30.5 MMOL/L (ref 23–29)
HCT VFR BLD AUTO: 34.8 % (ref 36–48)
HGB BLD-MCNC: 11.1 G/DL (ref 12–16)
HGB UR QL STRIP.AUTO: NEGATIVE
KETONES UR STRIP.AUTO-MCNC: NEGATIVE MG/DL
LEUKOCYTE ESTERASE UR QL STRIP.AUTO: NEGATIVE
LYMPHOCYTES # BLD: 1.7 K/UL (ref 1–5.1)
LYMPHOCYTES NFR BLD: 41.1 %
MAGNESIUM SERPL-MCNC: 2.2 MG/DL (ref 1.8–2.4)
MCH RBC QN AUTO: 27.7 PG (ref 26–34)
MCHC RBC AUTO-ENTMCNC: 32.1 G/DL (ref 31–36)
MCV RBC AUTO: 86.3 FL (ref 80–100)
METHADONE UR QL SCN>300 NG/ML: NORMAL
METHGB MFR BLDV: 0.1 %
MONOCYTES # BLD: 0.3 K/UL (ref 0–1.3)
MONOCYTES NFR BLD: 6.4 %
NEUTROPHILS # BLD: 2 K/UL (ref 1.7–7.7)
NEUTROPHILS NFR BLD: 47.6 %
NITRITE UR QL STRIP.AUTO: NEGATIVE
O2 CT VFR BLDV CALC: 15 VOL %
O2 THERAPY: ABNORMAL
OPIATES UR QL SCN>300 NG/ML: NORMAL
OXYCODONE UR QL SCN: NORMAL
PCO2 BLDV: 46.3 MMHG (ref 40–50)
PCP UR QL SCN>25 NG/ML: NORMAL
PH BLDV: 7.43 [PH] (ref 7.35–7.45)
PH UR STRIP.AUTO: 8 [PH] (ref 5–8)
PH UR STRIP: 6 [PH]
PHOSPHATE SERPL-MCNC: 3.2 MG/DL (ref 2.5–4.9)
PLATELET # BLD AUTO: 195 K/UL (ref 135–450)
PMV BLD AUTO: 7.7 FL (ref 5–10.5)
PO2 BLDV: 124 MMHG (ref 25–40)
POTASSIUM SERPL-SCNC: 3.8 MMOL/L (ref 3.5–5.1)
PROT SERPL-MCNC: 6.4 G/DL (ref 6.4–8.2)
PROT UR STRIP.AUTO-MCNC: NEGATIVE MG/DL
RBC # BLD AUTO: 4.03 M/UL (ref 4–5.2)
SALICYLATES SERPL-MCNC: <0.3 MG/DL (ref 15–30)
SAO2 % BLDV: 99 %
SODIUM SERPL-SCNC: 141 MMOL/L (ref 136–145)
SP GR UR STRIP.AUTO: 1.01 (ref 1–1.03)
TROPONIN, HIGH SENSITIVITY: <6 NG/L (ref 0–14)
TROPONIN, HIGH SENSITIVITY: <6 NG/L (ref 0–14)
UA COMPLETE W REFLEX CULTURE PNL UR: NORMAL
UA DIPSTICK W REFLEX MICRO PNL UR: NORMAL
URN SPEC COLLECT METH UR: NORMAL
UROBILINOGEN UR STRIP-ACNC: 0.2 E.U./DL
WBC # BLD AUTO: 4.2 K/UL (ref 4–11)

## 2024-04-09 PROCEDURE — 70450 CT HEAD/BRAIN W/O DYE: CPT

## 2024-04-09 PROCEDURE — 99223 1ST HOSP IP/OBS HIGH 75: CPT | Performed by: PSYCHIATRY & NEUROLOGY

## 2024-04-09 PROCEDURE — 80076 HEPATIC FUNCTION PANEL: CPT

## 2024-04-09 PROCEDURE — 93010 ELECTROCARDIOGRAM REPORT: CPT | Performed by: INTERNAL MEDICINE

## 2024-04-09 PROCEDURE — 5A12012 PERFORMANCE OF CARDIAC OUTPUT, SINGLE, MANUAL: ICD-10-PCS | Performed by: STUDENT IN AN ORGANIZED HEALTH CARE EDUCATION/TRAINING PROGRAM

## 2024-04-09 PROCEDURE — 82077 ASSAY SPEC XCP UR&BREATH IA: CPT

## 2024-04-09 PROCEDURE — 2500000003 HC RX 250 WO HCPCS: Performed by: INTERNAL MEDICINE

## 2024-04-09 PROCEDURE — 83735 ASSAY OF MAGNESIUM: CPT

## 2024-04-09 PROCEDURE — 71045 X-RAY EXAM CHEST 1 VIEW: CPT

## 2024-04-09 PROCEDURE — 96374 THER/PROPH/DIAG INJ IV PUSH: CPT

## 2024-04-09 PROCEDURE — 96375 TX/PRO/DX INJ NEW DRUG ADDON: CPT

## 2024-04-09 PROCEDURE — 84100 ASSAY OF PHOSPHORUS: CPT

## 2024-04-09 PROCEDURE — 6370000000 HC RX 637 (ALT 250 FOR IP): Performed by: EMERGENCY MEDICINE

## 2024-04-09 PROCEDURE — 72170 X-RAY EXAM OF PELVIS: CPT

## 2024-04-09 PROCEDURE — G0378 HOSPITAL OBSERVATION PER HR: HCPCS

## 2024-04-09 PROCEDURE — 80307 DRUG TEST PRSMV CHEM ANLYZR: CPT

## 2024-04-09 PROCEDURE — 82803 BLOOD GASES ANY COMBINATION: CPT

## 2024-04-09 PROCEDURE — 94640 AIRWAY INHALATION TREATMENT: CPT

## 2024-04-09 PROCEDURE — 80143 DRUG ASSAY ACETAMINOPHEN: CPT

## 2024-04-09 PROCEDURE — 6360000002 HC RX W HCPCS

## 2024-04-09 PROCEDURE — 2580000003 HC RX 258: Performed by: INTERNAL MEDICINE

## 2024-04-09 PROCEDURE — 85025 COMPLETE CBC W/AUTO DIFF WBC: CPT

## 2024-04-09 PROCEDURE — 99223 1ST HOSP IP/OBS HIGH 75: CPT | Performed by: INTERNAL MEDICINE

## 2024-04-09 PROCEDURE — 80048 BASIC METABOLIC PNL TOTAL CA: CPT

## 2024-04-09 PROCEDURE — 84484 ASSAY OF TROPONIN QUANT: CPT

## 2024-04-09 PROCEDURE — 99285 EMERGENCY DEPT VISIT HI MDM: CPT

## 2024-04-09 PROCEDURE — 82140 ASSAY OF AMMONIA: CPT

## 2024-04-09 PROCEDURE — 6370000000 HC RX 637 (ALT 250 FOR IP): Performed by: INTERNAL MEDICINE

## 2024-04-09 PROCEDURE — 81003 URINALYSIS AUTO W/O SCOPE: CPT

## 2024-04-09 PROCEDURE — 74018 RADEX ABDOMEN 1 VIEW: CPT

## 2024-04-09 PROCEDURE — 70551 MRI BRAIN STEM W/O DYE: CPT

## 2024-04-09 PROCEDURE — 6360000002 HC RX W HCPCS: Performed by: EMERGENCY MEDICINE

## 2024-04-09 PROCEDURE — 80179 DRUG ASSAY SALICYLATE: CPT

## 2024-04-09 PROCEDURE — 80156 ASSAY CARBAMAZEPINE TOTAL: CPT

## 2024-04-09 PROCEDURE — 93005 ELECTROCARDIOGRAM TRACING: CPT | Performed by: EMERGENCY MEDICINE

## 2024-04-09 PROCEDURE — 36415 COLL VENOUS BLD VENIPUNCTURE: CPT

## 2024-04-09 RX ORDER — MAGNESIUM SULFATE IN WATER 40 MG/ML
2000 INJECTION, SOLUTION INTRAVENOUS PRN
Status: DISCONTINUED | OUTPATIENT
Start: 2024-04-09 | End: 2024-04-11 | Stop reason: HOSPADM

## 2024-04-09 RX ORDER — CETIRIZINE HYDROCHLORIDE 10 MG/1
10 TABLET ORAL DAILY
Status: DISCONTINUED | OUTPATIENT
Start: 2024-04-09 | End: 2024-04-11 | Stop reason: HOSPADM

## 2024-04-09 RX ORDER — ASPIRIN 300 MG/1
300 SUPPOSITORY RECTAL DAILY
Status: DISCONTINUED | OUTPATIENT
Start: 2024-04-09 | End: 2024-04-11 | Stop reason: HOSPADM

## 2024-04-09 RX ORDER — DULOXETIN HYDROCHLORIDE 60 MG/1
60 CAPSULE, DELAYED RELEASE ORAL DAILY
Status: DISCONTINUED | OUTPATIENT
Start: 2024-04-09 | End: 2024-04-11 | Stop reason: HOSPADM

## 2024-04-09 RX ORDER — ACETAMINOPHEN 500 MG
1000 TABLET ORAL ONCE
Status: COMPLETED | OUTPATIENT
Start: 2024-04-09 | End: 2024-04-09

## 2024-04-09 RX ORDER — ACETAMINOPHEN 325 MG/1
650 TABLET ORAL EVERY 6 HOURS PRN
Status: DISCONTINUED | OUTPATIENT
Start: 2024-04-09 | End: 2024-04-11 | Stop reason: HOSPADM

## 2024-04-09 RX ORDER — ONDANSETRON 4 MG/1
4 TABLET, FILM COATED ORAL EVERY 8 HOURS PRN
Status: DISCONTINUED | OUTPATIENT
Start: 2024-04-09 | End: 2024-04-09

## 2024-04-09 RX ORDER — POTASSIUM CHLORIDE 20 MEQ/1
40 TABLET, EXTENDED RELEASE ORAL PRN
Status: DISCONTINUED | OUTPATIENT
Start: 2024-04-09 | End: 2024-04-11 | Stop reason: HOSPADM

## 2024-04-09 RX ORDER — ASPIRIN 81 MG/1
81 TABLET, CHEWABLE ORAL DAILY
Status: DISCONTINUED | OUTPATIENT
Start: 2024-04-09 | End: 2024-04-11 | Stop reason: HOSPADM

## 2024-04-09 RX ORDER — FLUTICASONE PROPIONATE 50 MCG
1 SPRAY, SUSPENSION (ML) NASAL 2 TIMES DAILY
Status: DISCONTINUED | OUTPATIENT
Start: 2024-04-09 | End: 2024-04-11 | Stop reason: HOSPADM

## 2024-04-09 RX ORDER — NALOXONE HYDROCHLORIDE 1 MG/ML
INJECTION INTRAMUSCULAR; INTRAVENOUS; SUBCUTANEOUS
Status: COMPLETED
Start: 2024-04-09 | End: 2024-04-09

## 2024-04-09 RX ORDER — ONDANSETRON 2 MG/ML
4 INJECTION INTRAMUSCULAR; INTRAVENOUS EVERY 6 HOURS PRN
Status: DISCONTINUED | OUTPATIENT
Start: 2024-04-09 | End: 2024-04-11 | Stop reason: HOSPADM

## 2024-04-09 RX ORDER — ALBUTEROL SULFATE 90 UG/1
2 AEROSOL, METERED RESPIRATORY (INHALATION) EVERY 6 HOURS PRN
Status: DISCONTINUED | OUTPATIENT
Start: 2024-04-09 | End: 2024-04-11 | Stop reason: HOSPADM

## 2024-04-09 RX ORDER — LIDOCAINE 4 G/G
1 PATCH TOPICAL DAILY
Status: DISCONTINUED | OUTPATIENT
Start: 2024-04-09 | End: 2024-04-11 | Stop reason: HOSPADM

## 2024-04-09 RX ORDER — SODIUM CHLORIDE 9 MG/ML
INJECTION, SOLUTION INTRAVENOUS CONTINUOUS
Status: ACTIVE | OUTPATIENT
Start: 2024-04-09 | End: 2024-04-11

## 2024-04-09 RX ORDER — CARBAMAZEPINE 200 MG/1
200 TABLET ORAL 2 TIMES DAILY
Status: DISCONTINUED | OUTPATIENT
Start: 2024-04-09 | End: 2024-04-11 | Stop reason: HOSPADM

## 2024-04-09 RX ORDER — SODIUM CHLORIDE 0.9 % (FLUSH) 0.9 %
5-40 SYRINGE (ML) INJECTION EVERY 12 HOURS SCHEDULED
Status: DISCONTINUED | OUTPATIENT
Start: 2024-04-09 | End: 2024-04-11 | Stop reason: HOSPADM

## 2024-04-09 RX ORDER — ONDANSETRON 2 MG/ML
4 INJECTION INTRAMUSCULAR; INTRAVENOUS EVERY 6 HOURS PRN
Status: DISCONTINUED | OUTPATIENT
Start: 2024-04-09 | End: 2024-04-09

## 2024-04-09 RX ORDER — SODIUM CHLORIDE 0.9 % (FLUSH) 0.9 %
5-40 SYRINGE (ML) INJECTION PRN
Status: DISCONTINUED | OUTPATIENT
Start: 2024-04-09 | End: 2024-04-11 | Stop reason: HOSPADM

## 2024-04-09 RX ORDER — CARVEDILOL 6.25 MG/1
6.25 TABLET ORAL 2 TIMES DAILY WITH MEALS
Status: DISCONTINUED | OUTPATIENT
Start: 2024-04-09 | End: 2024-04-11 | Stop reason: HOSPADM

## 2024-04-09 RX ORDER — DEXTROAMPHETAMINE SACCHARATE, AMPHETAMINE ASPARTATE, DEXTROAMPHETAMINE SULFATE AND AMPHETAMINE SULFATE 2.5; 2.5; 2.5; 2.5 MG/1; MG/1; MG/1; MG/1
10 TABLET ORAL 2 TIMES DAILY
Status: DISCONTINUED | OUTPATIENT
Start: 2024-04-09 | End: 2024-04-11 | Stop reason: HOSPADM

## 2024-04-09 RX ORDER — AMLODIPINE BESYLATE 5 MG/1
10 TABLET ORAL DAILY
Status: DISCONTINUED | OUTPATIENT
Start: 2024-04-09 | End: 2024-04-11 | Stop reason: HOSPADM

## 2024-04-09 RX ORDER — ONDANSETRON 4 MG/1
4 TABLET, ORALLY DISINTEGRATING ORAL EVERY 8 HOURS PRN
Status: DISCONTINUED | OUTPATIENT
Start: 2024-04-09 | End: 2024-04-09

## 2024-04-09 RX ORDER — M-VIT,TX,IRON,MINS/CALC/FOLIC 27MG-0.4MG
1 TABLET ORAL DAILY
Status: DISCONTINUED | OUTPATIENT
Start: 2024-04-09 | End: 2024-04-11 | Stop reason: HOSPADM

## 2024-04-09 RX ORDER — POLYETHYLENE GLYCOL 3350 17 G/17G
17 POWDER, FOR SOLUTION ORAL DAILY PRN
Status: DISCONTINUED | OUTPATIENT
Start: 2024-04-09 | End: 2024-04-11 | Stop reason: HOSPADM

## 2024-04-09 RX ORDER — GABAPENTIN 300 MG/1
900 CAPSULE ORAL 2 TIMES DAILY
Status: DISCONTINUED | OUTPATIENT
Start: 2024-04-09 | End: 2024-04-11 | Stop reason: HOSPADM

## 2024-04-09 RX ORDER — FUROSEMIDE 40 MG/1
40 TABLET ORAL 2 TIMES DAILY
Status: DISCONTINUED | OUTPATIENT
Start: 2024-04-09 | End: 2024-04-11 | Stop reason: HOSPADM

## 2024-04-09 RX ORDER — ONDANSETRON 2 MG/ML
4 INJECTION INTRAMUSCULAR; INTRAVENOUS ONCE
Status: COMPLETED | OUTPATIENT
Start: 2024-04-09 | End: 2024-04-09

## 2024-04-09 RX ORDER — POTASSIUM CHLORIDE 7.45 MG/ML
10 INJECTION INTRAVENOUS PRN
Status: DISCONTINUED | OUTPATIENT
Start: 2024-04-09 | End: 2024-04-11 | Stop reason: HOSPADM

## 2024-04-09 RX ORDER — CARBOXYMETHYLCELLULOSE SODIUM 10 MG/ML
1 GEL OPHTHALMIC 2 TIMES DAILY
Status: DISCONTINUED | OUTPATIENT
Start: 2024-04-09 | End: 2024-04-11 | Stop reason: HOSPADM

## 2024-04-09 RX ORDER — LOPERAMIDE HYDROCHLORIDE 2 MG/1
2 CAPSULE ORAL 4 TIMES DAILY PRN
Status: DISCONTINUED | OUTPATIENT
Start: 2024-04-09 | End: 2024-04-11 | Stop reason: HOSPADM

## 2024-04-09 RX ORDER — POTASSIUM CHLORIDE 750 MG/1
20 TABLET, FILM COATED, EXTENDED RELEASE ORAL DAILY
Status: DISCONTINUED | OUTPATIENT
Start: 2024-04-09 | End: 2024-04-11 | Stop reason: HOSPADM

## 2024-04-09 RX ORDER — ONDANSETRON 4 MG/1
4 TABLET, ORALLY DISINTEGRATING ORAL EVERY 8 HOURS PRN
Status: DISCONTINUED | OUTPATIENT
Start: 2024-04-09 | End: 2024-04-11 | Stop reason: HOSPADM

## 2024-04-09 RX ORDER — SODIUM CHLORIDE 9 MG/ML
INJECTION, SOLUTION INTRAVENOUS PRN
Status: DISCONTINUED | OUTPATIENT
Start: 2024-04-09 | End: 2024-04-11 | Stop reason: HOSPADM

## 2024-04-09 RX ORDER — PROCHLORPERAZINE MALEATE 5 MG/1
10 TABLET ORAL ONCE
Status: COMPLETED | OUTPATIENT
Start: 2024-04-09 | End: 2024-04-09

## 2024-04-09 RX ORDER — ACETAMINOPHEN 650 MG/1
650 SUPPOSITORY RECTAL EVERY 6 HOURS PRN
Status: DISCONTINUED | OUTPATIENT
Start: 2024-04-09 | End: 2024-04-11 | Stop reason: HOSPADM

## 2024-04-09 RX ORDER — DIPHENOXYLATE HYDROCHLORIDE AND ATROPINE SULFATE 2.5; .025 MG/1; MG/1
1 TABLET ORAL 4 TIMES DAILY PRN
Status: DISCONTINUED | OUTPATIENT
Start: 2024-04-09 | End: 2024-04-11 | Stop reason: HOSPADM

## 2024-04-09 RX ORDER — DICYCLOMINE HCL 20 MG
20 TABLET ORAL EVERY 6 HOURS PRN
COMMUNITY
Start: 2024-03-20

## 2024-04-09 RX ORDER — ACETAMINOPHEN 500 MG
500 TABLET ORAL EVERY 6 HOURS PRN
COMMUNITY

## 2024-04-09 RX ORDER — TROSPIUM CHLORIDE 20 MG/1
20 TABLET, FILM COATED ORAL NIGHTLY
Status: DISCONTINUED | OUTPATIENT
Start: 2024-04-09 | End: 2024-04-11 | Stop reason: HOSPADM

## 2024-04-09 RX ORDER — PANTOPRAZOLE SODIUM 40 MG/1
40 TABLET, DELAYED RELEASE ORAL
Status: DISCONTINUED | OUTPATIENT
Start: 2024-04-10 | End: 2024-04-11 | Stop reason: HOSPADM

## 2024-04-09 RX ADMIN — ACETAMINOPHEN 1000 MG: 500 TABLET ORAL at 11:49

## 2024-04-09 RX ADMIN — Medication 1 TABLET: at 15:41

## 2024-04-09 RX ADMIN — FUROSEMIDE 40 MG: 40 TABLET ORAL at 17:39

## 2024-04-09 RX ADMIN — RIVAROXABAN 20 MG: 20 TABLET, FILM COATED ORAL at 17:39

## 2024-04-09 RX ADMIN — POTASSIUM CHLORIDE 20 MEQ: 750 TABLET, FILM COATED, EXTENDED RELEASE ORAL at 15:41

## 2024-04-09 RX ADMIN — CETIRIZINE HYDROCHLORIDE 10 MG: 10 TABLET, FILM COATED ORAL at 15:41

## 2024-04-09 RX ADMIN — NALOXONE HYDROCHLORIDE: 1 INJECTION PARENTERAL at 05:45

## 2024-04-09 RX ADMIN — ONDANSETRON 4 MG: 2 INJECTION INTRAMUSCULAR; INTRAVENOUS at 09:36

## 2024-04-09 RX ADMIN — ACETAMINOPHEN 650 MG: 325 TABLET ORAL at 15:41

## 2024-04-09 RX ADMIN — TROSPIUM CHLORIDE 20 MG: 20 TABLET, FILM COATED ORAL at 20:53

## 2024-04-09 RX ADMIN — PROCHLORPERAZINE MALEATE 10 MG: 5 TABLET ORAL at 11:49

## 2024-04-09 RX ADMIN — DULOXETINE HYDROCHLORIDE 60 MG: 60 CAPSULE, DELAYED RELEASE ORAL at 15:41

## 2024-04-09 RX ADMIN — CARVEDILOL 6.25 MG: 6.25 TABLET, FILM COATED ORAL at 17:39

## 2024-04-09 RX ADMIN — SODIUM CHLORIDE: 9 INJECTION, SOLUTION INTRAVENOUS at 16:11

## 2024-04-09 RX ADMIN — GABAPENTIN 900 MG: 300 CAPSULE ORAL at 20:51

## 2024-04-09 RX ADMIN — SODIUM CHLORIDE: 9 INJECTION, SOLUTION INTRAVENOUS at 20:55

## 2024-04-09 RX ADMIN — Medication 2 PUFF: at 19:45

## 2024-04-09 RX ADMIN — DIPHENOXYLATE HYDROCHLORIDE AND ATROPINE SULFATE 1 TABLET: 2.5; .025 TABLET ORAL at 16:54

## 2024-04-09 RX ADMIN — CARBOXYMETHYLCELLULOSE SODIUM 1 DROP: 10 GEL OPHTHALMIC at 20:58

## 2024-04-09 RX ADMIN — MICONAZOLE NITRATE: 20 POWDER TOPICAL at 20:54

## 2024-04-09 RX ADMIN — ASPIRIN 81 MG 81 MG: 81 TABLET ORAL at 15:40

## 2024-04-09 RX ADMIN — CARBAMAZEPINE 200 MG: 200 TABLET ORAL at 20:52

## 2024-04-09 ASSESSMENT — LIFESTYLE VARIABLES
HOW MANY STANDARD DRINKS CONTAINING ALCOHOL DO YOU HAVE ON A TYPICAL DAY: 1 OR 2
HOW OFTEN DO YOU HAVE A DRINK CONTAINING ALCOHOL: MONTHLY OR LESS

## 2024-04-09 ASSESSMENT — PAIN DESCRIPTION - DESCRIPTORS: DESCRIPTORS: ACHING

## 2024-04-09 ASSESSMENT — ENCOUNTER SYMPTOMS
SHORTNESS OF BREATH: 0
ABDOMINAL PAIN: 0
VOMITING: 0
NAUSEA: 0
COUGH: 0

## 2024-04-09 ASSESSMENT — PAIN SCALES - GENERAL
PAINLEVEL_OUTOF10: 6
PAINLEVEL_OUTOF10: 5

## 2024-04-09 ASSESSMENT — PAIN - FUNCTIONAL ASSESSMENT: PAIN_FUNCTIONAL_ASSESSMENT: NONE - DENIES PAIN

## 2024-04-09 ASSESSMENT — PAIN DESCRIPTION - LOCATION
LOCATION: CHEST
LOCATION: CHEST;HEAD

## 2024-04-09 ASSESSMENT — PAIN DESCRIPTION - ORIENTATION: ORIENTATION: MID

## 2024-04-09 NOTE — ASSESSMENT & PLAN NOTE
Implanted initially 2019 for syncopal episodes, reimplanted in 2023.  Device interrogation done yesterday remotely demonstrated no arrhythmia.

## 2024-04-09 NOTE — ASSESSMENT & PLAN NOTE
Etiology of LOC is uncertain.  Described as somewhat similar to prior seizure episodes.    CT head and Brain MRI non acute.  Neurology consulted.    ILR interrogation demonstrated no arrhythmia to explain presenting symptoms.  ECG unchanged.  Serial troponin negative.      Echo is pending.    Consider outpatient sleep apnea evaluation.

## 2024-04-09 NOTE — ED PROVIDER NOTES
[facility] EMERGENCY DEPARTMENT    Name: Beth Branch : 1965 MRN: [id number] Date of Service: 2024    Initial VS: BP: (!) 151/78, Temp: 99 °F (37.2 °C), Pulse: 64, Respirations: 12, SpO2: 98 %     CC: unresponsive episode    HPI: this patient is a 58 y.o. female presenting to the ED from home via EMS. Ms. Branch tells me that yesterday evening she felt very fatigued.  She woke up sleeping about 04:00 this morning when her alarm went off to begin getting ready for work. Her significant other awoke at the same time.  She told him that she was going to rest for a few more minutes.  Soon thereafter he checked on her and found that she was unresponsive and unable to be aroused from sleep.  He called 911 for assistance.  They advised him to check to see if she had a palpable pulse.  He was unable to ascertain whether or not she had a palpable pulse so the  advised him to begin CPR. He states that Ms. Branch seemed to grimace and make some verbal noises as he was performing chest compressions.  EMS personnel arrived to their home about 3 minutes later.  They immediately found that Ms. Branch had a palpable pulse and was breathing spontaneously.  They then began transporting her here to be evaluated.  She was noted to be obtunded while in the ambulance. On arrival to the hospital Ms. Branch was given a dose of naloxone by the overnight emergency department physician but this reportedly did not seem to have any clinical effect. Ms. Branch remained minimally responsive for the first few hours while she was in the ED. Now that she is awake she reports that her chest is feeling a little bit sore and she has a mild, diffuse, pressure-like headache. She is also beginning to feel nauseous. She denies other current complaints and has not appreciated other changes from her usual state of health.  _____________________________________________________________________    Past Medical History:  Urine Negative Negative    pH, UA 8.0 5.0 - 8.0    Protein, UA Negative Negative mg/dL    Urobilinogen, Urine 0.2 <2.0 E.U./dL    Nitrite, Urine Negative Negative    Leukocyte Esterase, Urine Negative Negative    Microscopic Examination Not Indicated     Urine Type NotGiven     Urine Reflex to Culture Not Indicated    Troponin   Result Value Ref Range    Troponin, High Sensitivity <6 0 - 14 ng/L   Hepatic Function Panel   Result Value Ref Range    Total Protein 6.4 6.4 - 8.2 g/dL    Albumin 3.4 3.4 - 5.0 g/dL    Alkaline Phosphatase 76 40 - 129 U/L    ALT 13 10 - 40 U/L    AST 21 15 - 37 U/L    Total Bilirubin <0.2 0.0 - 1.0 mg/dL    Bilirubin, Direct <0.2 0.0 - 0.3 mg/dL    Bilirubin, Indirect see below 0.0 - 1.0 mg/dL   Ammonia   Result Value Ref Range    Ammonia 34 11 - 51 umol/L   Blood gas, venous   Result Value Ref Range    pH, Boaz 7.427 7.350 - 7.450    pCO2, Boaz 46.3 40.0 - 50.0 mmHg    pO2, Boaz 124.0 (H) 25.0 - 40.0 mmHg    HCO3, Venous 30.5 (H) 23.0 - 29.0 mmol/L    Base Excess, Boaz 5.4 (H) -3.0 - 3.0 mmol/L    O2 Sat, Boaz 99 Not Established %    Carboxyhemoglobin 3.2 (H) 0.0 - 1.5 %    MetHgb, Boaz 0.1 <1.5 %    TC02 (Calc), Boaz 72 Not Established mmol/L    O2 Content, Boaz 15 Not Established VOL %    O2 Therapy Unknown    Magnesium   Result Value Ref Range    Magnesium 2.20 1.80 - 2.40 mg/dL   Phosphorus   Result Value Ref Range    Phosphorus 3.2 2.5 - 4.9 mg/dL   Acetaminophen Level   Result Value Ref Range    Acetaminophen Level <5 (L) 10 - 30 ug/mL   Salicylate   Result Value Ref Range    Salicylate, Serum <0.3 (L) 15.0 - 30.0 mg/dL   EKG 12 Lead   Result Value Ref Range    Ventricular Rate 60 BPM    Atrial Rate 60 BPM    P-R Interval 154 ms    QRS Duration 94 ms    Q-T Interval 470 ms    QTc Calculation (Bazett) 470 ms    P Axis 81 degrees    R Axis 18 degrees    T Axis 32 degrees    Diagnosis       Normal sinus rhythmT wave abnormality, consider anterior ischemiaProlonged QTConfirmed by SHARRON BOURNE

## 2024-04-09 NOTE — CONSULTS
[facility]      CONSULTATION  [phone]        Inpatient consult to Cardiology  Consult performed by: Jennie Christian DO  Consult ordered by: Thai Parsons MD  Reason for consult: LOC          Chief Complaint   Patient presents with    Loss of Consciousness     Pt arrives from home by [facility]. Per EMS, pt was found unresponsive by  in her bed. Per EMS, pt was breathing and had a pulse upon arrival. Per EMS, , CO2 35-45 and NSR. Per EMS, pt is wearing a watchmen monitor for Afib. Per EMS, pt's  states that she was AxOx3 yesterday last night        History of Present Illness:  Beth Branch is a 58 y.o. female patient presenting with altered mentation.  Patient resting comfortably at bedside, alert and conversant, oriented x3.  Her  provides history taking.  States their alarm went off at 4AM to get up.  He said the patient asked to sleep another 15 minutes.  When the alarm went off again,  stated that he tried to arouse the patient but she was not responding so he got up, turned the light on and tried again but was unsuccessful so he called 911 who instructed to start CPR.  She grimaced a bit but did not arouse.  EMS found a palpable pulse and was breathing spontaneously.  Naloxone given was not effective.  Per ER notes, patient was minimally responsive in ER but hemodynamically stable.   states she has a seizure disorder and in these circumstances is usually able to be aroused but this time was different.  Incidentally, the patient states she had a concussion about a month ago and hasn't felt quite right since.  She also endorses chronic loose stool but states last week was not different than usual.  She also endorses recent episodes of nausea/vomiting.  No reported chest pain or palpitations.      Medical history reviewed, established with Dr. Holley of [facility] with recent visit 3/27/2024.  This documents a history of PAF on OAC, HTN, atypical chest  degrees    T Axis 112 degrees     ECHO: 3/21/2024  Study Conclusions     - Left ventricle: The cavity size is normal. There is mild concentric     hypertrophy. Systolic function is normal. The estimated ejection fraction     is 51-55%, by biplane method of disks. Wall motion is normal; there are no     regional wall motion abnormalities. Doppler parameters are consistent with     abnormal left ventricular relaxation (grade 1 diastolic dysfunction). The     LVOT stroke volume is 97ml.   - Mitral valve: The annulus is mildly calcified. There is mild     regurgitation.   - Left atrium: The atrium is markedly dilated.   - Right ventricle: The cavity size is mildly dilated. Systolic function is     reduced.   - Inferior vena cava: The IVC is normal-sized. Respirophasic diameter     changes are in the normal range (> 50%), consistent with normal central     venous pressure.     CCTA 4/7/2023  Left main: Zero (0).     Left anterior descending: 10     Left circumflex: 1     Right coronary artery 4     Total Agatston calcium score: 15     CTA: Limited by motion artifact and misregistration.     Left Main: Originates from left cusp.  No significant atherosclerosis or  stenosis.     LAD: Mild atherosclerotic calcification in the proximal segment.  No evidence  for hemodynamically significant stenosis.     Left circumflex:  Minimal calcific plaque.  No significant atherosclerosis or  stenosis.     RCA: Originates from right cusp.  Minimal calcific plaque.  There is no  evidence for atherosclerosis or stenosis.  Circulation is right dominant.     Cardiac findings: Mild cardiomegaly.  No pericardial effusion.  Cine images  show no wall motion abnormalities..  LVEF 63%.     Non-cardiac findings: Visualized lungs show no infiltrates.  Sleeve  gastrectomy and a small sliding hiatal hernia.  No acute bony abnormality.     IMPRESSION:  Study limited by significant motion artifacts.     Minimal atherosclerotic disease.  No evidence for

## 2024-04-09 NOTE — PROGRESS NOTES
Patient has a loop recorder which was placed in February, 2024, but no evidence of a Watchman being placed.    Patient seen in ED, room 12.  Admission completed through personal Belongings.  IP nurse will need to begin with Psychosocial Review and complete the admission including the 4 Eyes Assessment, Immunizations, Vaccines, Rights and Responsibilities, Orientation to room, Plan of Care, Education/Learning Assessment and Education Plan, white board, height and weight, pain assessment and head to toe assessment.  Patient is alert and is being admitted for Acute Encephalopathy.  Home Medications as well as Outside Sources have been verbally reviewed with patient and updated if appropriate.  Medication reconciliation is now Completed.  Message sent to Dr. REBECCA Parsons to update medications.  All questions answered.

## 2024-04-09 NOTE — ACP (ADVANCE CARE PLANNING)
Advanced Care Planning Note.    Purpose of Encounter: Advanced care planning in light of acute metabolic encephalopathy  Parties In Attendance: Patient,   Decisional Capacity: Yes  Subjective: Patient with HA and CP  Objective: Cr 0.5  Goals of Care Determination: Patient wants full support (CPR, vent, surgery, HD, trach, PEG)  Plan:  MRI Brain, EEG, Echo, Cardio and Neuro consults  Code Status: Full code   Time spent on Advanced care Plannin minutes  Advanced Care Planning Documents: Completed advanced directives on chart,  is the POA.    Thai Parsons MD  2024 12:01 PM

## 2024-04-09 NOTE — CONSULTS
In patient Neurology consult        [facility] Neurology      MD Beth Ann  1965    Date of Service: 4/9/2024    Referring Physician: Thai Parsons MD      Reason for the consult and CC: Acute acute syncope    HPI:   The patient is a 58 y.o.  years old female patient had a single episode this morning before admission.  She was asleep when her  found her poorly responsive to question for several minutes.  No witnessed convulsion or tongue biting or bladder incontinence.  No significant postictal state.  No other relieving or aggravating factors.  Degree was severe.  She came to the ED where she was admitted.  Now she is back to her baseline.  No residual deficit.  History of seizure disorder, hypertension, PAF and other medical problems.  She has a loop recorder implanted in 2019.  She denies recent change in medication or fever or chills or fall or injury.  No prior history of syncope.  She is currently on Tegretol 200 mg x 2 patient is also on Xarelto.  Other review of system was unremarkable       Constitutional:   Vitals:    04/09/24 1400 04/09/24 1401 04/09/24 1514 04/09/24 1539   BP: 139/84  123/70 134/82   Pulse: 68 71 76    Resp:  14 16    Temp:   98.1 °F (36.7 °C)    TempSrc:   Oral    SpO2: 94% 92% 95%    Weight:       Height:             I personally reviewed and updated social history, past medical history, medications, allergy, surgical history, and family history as documented in the patient's electronic health records.       ROS: 10-14 ROS reviewed with the patient/nurse/family which were unremarkable except mentioned in H&P.    General appearance:  Normal development and appear in no acute distress.   Mental Status:   Oriented to person, place, problem, and time.    Memory: Good immediate recall.  Intact remote memory  Normal attention span and concentration.  Language: intact naming, repeating and fluency   Good fund of Knowledge. Aware of current events and  clinical significance   [x] Appropriate follow-up labs were ordered    Data: reviewed   LABS:   Lab Results   Component Value Date/Time     04/09/2024 06:17 AM    K 3.8 04/09/2024 06:17 AM    K 3.7 01/14/2019 02:00 PM     04/09/2024 06:17 AM    CO2 27 04/09/2024 06:17 AM    BUN 12 04/09/2024 06:17 AM    CREATININE 0.5 04/09/2024 06:17 AM    GFRAA >60 06/26/2022 07:55 PM    LABGLOM >90 04/09/2024 06:17 AM    GLUCOSE 98 04/09/2024 06:17 AM    PHOS 3.2 04/09/2024 06:17 AM    MG 2.20 04/09/2024 06:17 AM    CALCIUM 8.5 04/09/2024 06:17 AM     Lab Results   Component Value Date/Time    WBC 4.2 04/09/2024 06:17 AM    RBC 4.03 04/09/2024 06:17 AM    HGB 11.1 04/09/2024 06:17 AM    HCT 34.8 04/09/2024 06:17 AM    MCV 86.3 04/09/2024 06:17 AM    RDW 15.3 04/09/2024 06:17 AM     04/09/2024 06:17 AM     Lab Results   Component Value Date    INR 0.9 08/31/2021    PROTIME 12.3 08/31/2021         Impression:  Acute syncope.  Less likely seizure.  So far no specific etiology.  Rule out arrhythmia, idiopathic and less likely new ischemic stroke although I agree with MRI brain.  History of seizure disorder on Tegretol  History A-fib on Xarelto  Hypertension  Hyperlipidemia      Recommendation:  MRI brain  Continue Tegretol 200 mg x 2  Check level  Continue Xarelto  Discussed the risk of CBZ and DOAC and possible interaction  Loop recorder interrogation  PT and OT  Orthostatics  Hydration  Telemetry  TFT  Insulin sliding scale  Continue Norvasc and avoid blood pressure below 140/80  Will follow      Thank you for referring such patient. If you have any questions regarding my consult note, please don't hesitate to call me.     Carolyn Reddy MD  [phone]    This dictation was generated by voice recognition computer software. Although all attempts are made to edit the dictation for accuracy, there may be errors in the  transcription that are not intended

## 2024-04-09 NOTE — ASSESSMENT & PLAN NOTE
CCTA 2023 demonstrated minimal atherosclerotic disease with CAC score 15.  Study limited by motion artifact.  Per patient, repeat CCTA is scheduled for next week as outpatient.  Continue risk modification.  Allergy to statin.

## 2024-04-09 NOTE — H&P
HOSPITALISTS HISTORY AND PHYSICAL    4/9/2024 11:54 AM    Patient Information:  BRAYDON BRANCH is a 58 y.o. female [id number]  PCP:  Aniceto Srivastava MD (Tel: [phone] )    Chief complaint:    Chief Complaint   Patient presents with    Loss of Consciousness     Pt arrives from home by [facility]. Per EMS, pt was found unresponsive by  in her bed. Per EMS, pt was breathing and had a pulse upon arrival. Per EMS, , CO2 35-45 and NSR. Per EMS, pt is wearing a watchmen monitor for Afib. Per EMS, pt's  states that she was AxOx3 yesterday last night        History of Present Illness:  Braydon Branch is a 58 y.o. female with history of DM 2, Afib on Xarelto, seizure disorder, HTN, h/o stroke, COPD, chronic pain syndrome, anxiety was brought to ER with LOC.  Patient apparently had a concussion a month ago.  She has not been feeling well since.  Went to work yesterday and felt poorly.  Patient was nauseated and felt like vomiting.  She has been following with Dr Holley (Cardio) at [facility] and has a loop recorder.  She has HA.  This morning,  found her difficult to arouse.  He called 911 and was instructed to start CPR.  She was never blue or stopped breathing from what he could tell.  Patient denies any urine or fecal incontinence.  No numbness, weakness or tingling.  Has CP now from CPR.  No fevers, chills or NS.  Denies taking any extra meds.  Otherwise complete ROS is negative unless listed above.      REVIEW OF SYSTEMS:   Pertinent positives as noted in HPI.  All other systems were reviewed and are negative.      Past Medical History:   has a past medical history of ADHD (attention deficit hyperactivity disorder), Alcohol use disorder in remission, Atrial fibrillation (HCC), Breast cancer (HCC), C. difficile colitis, Chronic kidney disease, Cocaine use disorder in remission, Colon cancer  morning (before breakfast) 30 capsule 0    carBAMazepine (TEGRETOL) 200 MG tablet Take 1 tablet by mouth 2 times daily 60 tablet 1    furosemide (LASIX) 40 MG tablet Take 1 tablet by mouth in the morning and 1 tablet in the evening. 60 tablet 3    potassium chloride (K-TAB) 20 MEQ TBCR extended release tablet Take 1 tablet by mouth daily 60 tablet 1    amLODIPine (NORVASC) 5 MG tablet Take 1 tablet by mouth daily (Patient taking differently: Take 2 tablets by mouth daily) 90 tablet 0    HYDROcodone-acetaminophen (NORCO) 5-325 MG per tablet  (Patient not taking: Reported on 3/19/2024)      prednisoLONE acetate (PRED FORTE) 1 % ophthalmic suspension       famotidine (PEPCID) 20 MG tablet  (Patient not taking: Reported on 3/19/2024)      ondansetron (ZOFRAN) 4 MG tablet Take 1 tablet by mouth every 8 hours as needed for Nausea or Vomiting 30 tablet 0    RESTASIS 0.05 % ophthalmic emulsion INSTILL 1 DROP INTO BOTH EYE TWICE A DAY, IN THE MORNING AND BEFORE BEDTIME      fexofenadine (ALLEGRA) 60 MG tablet Take 1 tablet by mouth daily      mirabegron (MYRBETRIQ) 50 MG TB24 Take 50 mg by mouth daily 90 tablet 1    XIFAXAN 550 MG tablet       DULoxetine (CYMBALTA) 60 MG extended release capsule       baclofen (LIORESAL) 20 MG tablet Take 1 tablet by mouth 2 times daily 90 tablet 1    carvedilol (COREG) 12.5 MG tablet Take 0.5 tablets by mouth 2 times daily (with meals) 60 tablet 0    rivaroxaban (XARELTO) 20 MG TABS tablet Take 1 tablet by mouth Daily with supper 30 tablet 3    gabapentin (NEURONTIN) 600 MG tablet Take 2 tablets by mouth 3 times daily. (Patient taking differently: Take 1.5 tablets by mouth 2 times daily.) 180 tablet 3    traZODone (DESYREL) 50 MG tablet Take 1 tablet by mouth nightly as needed for Sleep (Patient not taking: Reported on 3/19/2024) 30 tablet 1    SUPER B COMPLEX/C PO Take 1 tablet by mouth 2 times daily      Multiple Vitamins-Minerals (MULTIVITAMIN ADULTS) TABS Take 1 tablet by mouth daily

## 2024-04-09 NOTE — ED NOTES
ED TO INPATIENT SBAR HANDOFF    Patient Name: Beth Branch   :  1965  58 y.o.   MRN:  [id number]  Preferred Name  Beth   ED Room #:  ED-0012/12  Family/Caregiver Present yes   Restraints no   Sitter no   Sepsis Risk Score Sepsis Risk Score: 0.8    Situation  Code Status: Prior No additional code details.    Allergies: Ace inhibitors, Betadine swab aid [povidone-iodine], Betadine [povidone iodine], Iodine, Lurasidone, Lurasidone hcl, Nitrofurantoin, Shellfish allergy, Shellfish-derived products, Statins, Bee venom, Brexpiprazole, Calcium, Felbamate, Iodides, Lamotrigine, Macrobid [nitrofurantoin macrocrystal], Other, Oxycodone-acetaminophen, Phenazopyridine, Pyridium [phenazopyridine hcl], Topiramate, Naltrexone, and Venlafaxine  Weight: Patient Vitals for the past 96 hrs (Last 3 readings):   Weight   24 1151 83.9 kg (185 lb)     Arrived from: home  Chief Complaint:   Chief Complaint   Patient presents with    Loss of Consciousness     Pt arrives from home by  EMS. Per EMS, pt was found unresponsive by  in her bed. Per EMS, pt was breathing and had a pulse upon arrival. Per EMS, , CO2 35-45 and NSR. Per EMS, pt is wearing a watchmen monitor for Afib. Per EMS, pt's  states that she was AxOx3 yesterday last night     Hospital Problem/Diagnosis:  Principal Problem:    Acute encephalopathy  Active Problems:    Hypertension    Seizure (HCC)    PAF (paroxysmal atrial fibrillation) (HCC)    Chronic pain syndrome    COPD, moderate (HCC)    Type 2 diabetes mellitus without complication, without long-term current use of insulin (HCC)    History of stroke    History of drug abuse (HCC)    Acute metabolic encephalopathy  Resolved Problems:    * No resolved hospital problems. *    Imaging:   XR PELVIS (1-2 VIEWS)   Final Result   No gross fracture.         XR CHEST PORTABLE   Final Result   Chest: Low volume study, without acute process.      Head: No acute intracranial process.         CT  tab.    Recommendation    Pending orders   Plan for Discharge (if known):   Additional Comments: Pt continent, uses bedpan. Family at bedside. Able to use call light. Hasn't been out of bed to walk yet.    If any further questions, please call Sending RN at [phone]    Electronically signed by: Electronically signed by Emily Jeong RN on 4/9/2024 at 12:56 PM

## 2024-04-09 NOTE — ED NOTES
Pts family came out to nurses station and stated that the pt was throwing up. Dr. Osuna notified, orders placed.

## 2024-04-09 NOTE — PROGRESS NOTES
04/09/24 1708   RT Protocol   History Pulmonary Disease 2   Respiratory pattern 0   Breath sounds 0   Cough 0   Bronchodilator Assessment Score 2

## 2024-04-09 NOTE — ASSESSMENT & PLAN NOTE
History of PAF in medical notes.    ILR interrogated, demonstrates no arrhythmia.  To be scanned into patient medical chart.  On OAC, MARYLOU.

## 2024-04-09 NOTE — RT PROTOCOL NOTE
RT Inhaler-Nebulizer Bronchodilator Protocol Note    There is a bronchodilator order in the chart from a provider indicating to follow the RT Bronchodilator Protocol and there is an “Initiate RT Inhaler-Nebulizer Bronchodilator Protocol” order as well (see protocol at bottom of note).    CXR Findings:  XR CHEST PORTABLE    Result Date: 4/9/2024  Chest: Low volume study, without acute process. Head: No acute intracranial process.       The findings from the last RT Protocol Assessment were as follows:   History Pulmonary Disease: Chronic pulmonary disease  Respiratory Pattern: Regular pattern and RR 12-20 bpm  Breath Sounds: Clear breath sounds  Cough: Strong, spontaneous, non-productive  Indication for Bronchodilator Therapy:    Bronchodilator Assessment Score: 2    Aerosolized bronchodilator medication orders have been revised according to the RT Inhaler-Nebulizer Bronchodilator Protocol below.    Respiratory Therapist to perform RT Therapy Protocol Assessment initially then follow the protocol.  Repeat RT Therapy Protocol Assessment PRN for score 0-3 or on second treatment, BID, and PRN for scores above 3.    No Indications - adjust the frequency to every 6 hours PRN wheezing or bronchospasm, if no treatments needed after 48 hours then discontinue using Per Protocol order mode.     If indication present, adjust the RT bronchodilator orders based on the Bronchodilator Assessment Score as indicated below.  Use Inhaler orders unless patient has one or more of the following: on home nebulizer, not able to hold breath for 10 seconds, is not alert and oriented, cannot activate and use MDI correctly, or respiratory rate 25 breaths per minute or more, then use the equivalent nebulizer order(s) with same Frequency and PRN reasons based on the score.  If a patient is on this medication at home then do not decrease Frequency below that used at home.    0-3 - enter or revise RT bronchodilator order(s) to equivalent RT  Bronchodilator order with Frequency of every 4 hours PRN for wheezing or increased work of breathing using Per Protocol order mode.        4-6 - enter or revise RT Bronchodilator order(s) to two equivalent RT bronchodilator orders with one order with BID Frequency and one order with Frequency of every 4 hours PRN wheezing or increased work of breathing using Per Protocol order mode.        7-10 - enter or revise RT Bronchodilator order(s) to two equivalent RT bronchodilator orders with one order with TID Frequency and one order with Frequency of every 4 hours PRN wheezing or increased work of breathing using Per Protocol order mode.       11-13 - enter or revise RT Bronchodilator order(s) to one equivalent RT bronchodilator order with QID Frequency and an Albuterol order with Frequency of every 4 hours PRN wheezing or increased work of breathing using Per Protocol order mode.      Greater than 13 - enter or revise RT Bronchodilator order(s) to one equivalent RT bronchodilator order with every 4 hours Frequency and an Albuterol order with Frequency of every 2 hours PRN wheezing or increased work of breathing using Per Protocol order mode.     RT to enter RT Home Evaluation for COPD & MDI Assessment order using Per Protocol order mode.    Electronically signed by Karen Marr RCP on 4/9/2024 at 5:08 PM

## 2024-04-10 PROBLEM — R41.82 ALTERED MENTAL STATUS: Status: ACTIVE | Noted: 2024-04-10

## 2024-04-10 PROBLEM — Z79.01 CURRENT USE OF LONG TERM ANTICOAGULATION: Status: ACTIVE | Noted: 2024-04-10

## 2024-04-10 LAB
ANION GAP SERPL CALCULATED.3IONS-SCNC: 11 MMOL/L (ref 3–16)
BASOPHILS # BLD: 0 K/UL (ref 0–0.2)
BASOPHILS NFR BLD: 1 %
BUN SERPL-MCNC: 10 MG/DL (ref 7–20)
CALCIUM SERPL-MCNC: 8.7 MG/DL (ref 8.3–10.6)
CARBAMAZEPINE DOSE: ABNORMAL MG
CARBAMAZEPINE SERPL-MCNC: 3.4 UG/ML (ref 4–12)
CHLORIDE SERPL-SCNC: 107 MMOL/L (ref 99–110)
CO2 SERPL-SCNC: 26 MMOL/L (ref 21–32)
CREAT SERPL-MCNC: <0.5 MG/DL (ref 0.6–1.1)
DEPRECATED RDW RBC AUTO: 15.8 % (ref 12.4–15.4)
EOSINOPHIL # BLD: 0.2 K/UL (ref 0–0.6)
EOSINOPHIL NFR BLD: 3.6 %
GFR SERPLBLD CREATININE-BSD FMLA CKD-EPI: >90 ML/MIN/{1.73_M2}
GLUCOSE SERPL-MCNC: 94 MG/DL (ref 70–99)
HCT VFR BLD AUTO: 36.8 % (ref 36–48)
HGB BLD-MCNC: 12.3 G/DL (ref 12–16)
LYMPHOCYTES # BLD: 1.9 K/UL (ref 1–5.1)
LYMPHOCYTES NFR BLD: 44.7 %
MAGNESIUM SERPL-MCNC: 2.1 MG/DL (ref 1.8–2.4)
MCH RBC QN AUTO: 28.5 PG (ref 26–34)
MCHC RBC AUTO-ENTMCNC: 33.4 G/DL (ref 31–36)
MCV RBC AUTO: 85.3 FL (ref 80–100)
MONOCYTES # BLD: 0.3 K/UL (ref 0–1.3)
MONOCYTES NFR BLD: 7.5 %
NEUTROPHILS # BLD: 1.9 K/UL (ref 1.7–7.7)
NEUTROPHILS NFR BLD: 43.2 %
PLATELET # BLD AUTO: 199 K/UL (ref 135–450)
PMV BLD AUTO: 7.3 FL (ref 5–10.5)
POTASSIUM SERPL-SCNC: 3.5 MMOL/L (ref 3.5–5.1)
RBC # BLD AUTO: 4.31 M/UL (ref 4–5.2)
SODIUM SERPL-SCNC: 144 MMOL/L (ref 136–145)
TSH SERPL DL<=0.005 MIU/L-ACNC: 1.78 UIU/ML (ref 0.27–4.2)
WBC # BLD AUTO: 4.3 K/UL (ref 4–11)

## 2024-04-10 PROCEDURE — 6370000000 HC RX 637 (ALT 250 FOR IP): Performed by: INTERNAL MEDICINE

## 2024-04-10 PROCEDURE — 2580000003 HC RX 258: Performed by: INTERNAL MEDICINE

## 2024-04-10 PROCEDURE — 93306 TTE W/DOPPLER COMPLETE: CPT

## 2024-04-10 PROCEDURE — 83735 ASSAY OF MAGNESIUM: CPT

## 2024-04-10 PROCEDURE — 93880 EXTRACRANIAL BILAT STUDY: CPT

## 2024-04-10 PROCEDURE — 36415 COLL VENOUS BLD VENIPUNCTURE: CPT

## 2024-04-10 PROCEDURE — 99232 SBSQ HOSP IP/OBS MODERATE 35: CPT

## 2024-04-10 PROCEDURE — 97535 SELF CARE MNGMENT TRAINING: CPT

## 2024-04-10 PROCEDURE — 97116 GAIT TRAINING THERAPY: CPT

## 2024-04-10 PROCEDURE — 80048 BASIC METABOLIC PNL TOTAL CA: CPT

## 2024-04-10 PROCEDURE — 85025 COMPLETE CBC W/AUTO DIFF WBC: CPT

## 2024-04-10 PROCEDURE — 97165 OT EVAL LOW COMPLEX 30 MIN: CPT

## 2024-04-10 PROCEDURE — 99232 SBSQ HOSP IP/OBS MODERATE 35: CPT | Performed by: INTERNAL MEDICINE

## 2024-04-10 PROCEDURE — 92610 EVALUATE SWALLOWING FUNCTION: CPT

## 2024-04-10 PROCEDURE — 95819 EEG AWAKE AND ASLEEP: CPT

## 2024-04-10 PROCEDURE — 94640 AIRWAY INHALATION TREATMENT: CPT

## 2024-04-10 PROCEDURE — 92526 ORAL FUNCTION THERAPY: CPT

## 2024-04-10 PROCEDURE — 84443 ASSAY THYROID STIM HORMONE: CPT

## 2024-04-10 PROCEDURE — 97161 PT EVAL LOW COMPLEX 20 MIN: CPT

## 2024-04-10 PROCEDURE — 95816 EEG AWAKE AND DROWSY: CPT | Performed by: PSYCHIATRY & NEUROLOGY

## 2024-04-10 PROCEDURE — 97530 THERAPEUTIC ACTIVITIES: CPT

## 2024-04-10 PROCEDURE — G0378 HOSPITAL OBSERVATION PER HR: HCPCS

## 2024-04-10 PROCEDURE — 94761 N-INVAS EAR/PLS OXIMETRY MLT: CPT

## 2024-04-10 RX ADMIN — DEXTROAMPHETAMINE SACCHARATE, AMPHETAMINE ASPARTATE, DEXTROAMPHETAMINE SULFATE, AMPHETAMINE SULFATE TABLETS, 10 MG,CLL 10 MG: 2.5; 2.5; 2.5; 2.5 TABLET ORAL at 08:02

## 2024-04-10 RX ADMIN — FLUTICASONE PROPIONATE 1 SPRAY: 50 SPRAY, METERED NASAL at 20:50

## 2024-04-10 RX ADMIN — TROSPIUM CHLORIDE 20 MG: 20 TABLET, FILM COATED ORAL at 20:49

## 2024-04-10 RX ADMIN — ASPIRIN 81 MG 81 MG: 81 TABLET ORAL at 08:01

## 2024-04-10 RX ADMIN — RIVAROXABAN 20 MG: 20 TABLET, FILM COATED ORAL at 17:09

## 2024-04-10 RX ADMIN — POTASSIUM CHLORIDE 20 MEQ: 750 TABLET, FILM COATED, EXTENDED RELEASE ORAL at 08:01

## 2024-04-10 RX ADMIN — FUROSEMIDE 40 MG: 40 TABLET ORAL at 17:09

## 2024-04-10 RX ADMIN — SODIUM CHLORIDE: 9 INJECTION, SOLUTION INTRAVENOUS at 08:11

## 2024-04-10 RX ADMIN — ACETAMINOPHEN 650 MG: 325 TABLET ORAL at 08:02

## 2024-04-10 RX ADMIN — FUROSEMIDE 40 MG: 40 TABLET ORAL at 08:02

## 2024-04-10 RX ADMIN — MICONAZOLE NITRATE: 20 POWDER TOPICAL at 20:50

## 2024-04-10 RX ADMIN — GABAPENTIN 900 MG: 300 CAPSULE ORAL at 20:49

## 2024-04-10 RX ADMIN — CARBAMAZEPINE 200 MG: 200 TABLET ORAL at 08:02

## 2024-04-10 RX ADMIN — CARBAMAZEPINE 200 MG: 200 TABLET ORAL at 20:49

## 2024-04-10 RX ADMIN — Medication 2 PUFF: at 08:51

## 2024-04-10 RX ADMIN — Medication 2 PUFF: at 21:37

## 2024-04-10 RX ADMIN — CETIRIZINE HYDROCHLORIDE 10 MG: 10 TABLET, FILM COATED ORAL at 08:02

## 2024-04-10 RX ADMIN — DULOXETINE HYDROCHLORIDE 60 MG: 60 CAPSULE, DELAYED RELEASE ORAL at 08:02

## 2024-04-10 RX ADMIN — CARBOXYMETHYLCELLULOSE SODIUM 1 DROP: 10 GEL OPHTHALMIC at 20:49

## 2024-04-10 RX ADMIN — CARVEDILOL 6.25 MG: 6.25 TABLET, FILM COATED ORAL at 17:09

## 2024-04-10 RX ADMIN — MICONAZOLE NITRATE: 20 POWDER TOPICAL at 08:07

## 2024-04-10 RX ADMIN — GABAPENTIN 900 MG: 300 CAPSULE ORAL at 08:01

## 2024-04-10 RX ADMIN — CARVEDILOL 6.25 MG: 6.25 TABLET, FILM COATED ORAL at 08:02

## 2024-04-10 RX ADMIN — ACETAMINOPHEN 650 MG: 325 TABLET ORAL at 20:49

## 2024-04-10 RX ADMIN — PANTOPRAZOLE SODIUM 40 MG: 40 TABLET, DELAYED RELEASE ORAL at 05:04

## 2024-04-10 RX ADMIN — CARBOXYMETHYLCELLULOSE SODIUM 1 DROP: 10 GEL OPHTHALMIC at 08:08

## 2024-04-10 RX ADMIN — FLUTICASONE PROPIONATE 1 SPRAY: 50 SPRAY, METERED NASAL at 08:07

## 2024-04-10 RX ADMIN — Medication 1 TABLET: at 08:01

## 2024-04-10 ASSESSMENT — PAIN SCALES - GENERAL
PAINLEVEL_OUTOF10: 6
PAINLEVEL_OUTOF10: 6
PAINLEVEL_OUTOF10: 5

## 2024-04-10 ASSESSMENT — PAIN DESCRIPTION - ORIENTATION: ORIENTATION: MID

## 2024-04-10 ASSESSMENT — PAIN DESCRIPTION - LOCATION
LOCATION: CHEST;HEAD
LOCATION: CHEST;HEAD
LOCATION: HEAD

## 2024-04-10 NOTE — PROGRESS NOTES
Facility/Department: [address]  Speech Language Pathology  DYSPHAGIA BEDSIDE SWALLOW EVALUATION     Patient: Beth Branch   : 1965   MRN: [id number]      Evaluation Date: 4/10/2024   Admitting Diagnosis: Unresponsive episode [R40.4]  Altered mental status, unspecified altered mental status type [R41.82]  Acute metabolic encephalopathy [G93.41]  Pain: Did not state                                                       H&P: 2024  \"Beth Branch is a 58 y.o. female with history of DM 2, Afib on Xarelto, seizure disorder, HTN, h/o stroke, COPD, chronic pain syndrome, anxiety was brought to ER with LOC.  Patient apparently had a concussion a month ago.  She has not been feeling well since.  Went to work yesterday and felt poorly.  Patient was nauseated and felt like vomiting.  She has been following with Dr Holley (Cardio) at [facility] [place] and has a loop recorder.  She has HA.  This morning,  found her difficult to arouse.  He called 911 and was instructed to start CPR.  She was never blue or stopped breathing from what he could tell.  Patient denies any urine or fecal incontinence.  No numbness, weakness or tingling.  Has CP now from CPR.  No fevers, chills or NS.  Denies taking any extra meds.  Otherwise complete ROS is negative unless listed above.\"    Imaging:  Chest X-ray: 2024  IMPRESSION:  Chest: Low volume study, without acute process.  Head: No acute intracranial process.    MRI: 2024  IMPRESSION:  No acute intracranial abnormality.  Minimal chronic microvascular disease.    Prior Modified Barium Swallow Study: 10/16/2022  Recommendations  Diet Level: Level 5 Minced and Moist  Liquid Level: Level 0 Thin (PER CUP ONLY!)  Compensatory Strategies: Small sips/bites, One sip/bite at a time, No straws, Alternate liquids/solids, Double swallow, Follow aspiration precautions  Supervision/Positioning: Patient at 90 degress for all PO intake (including medication), Patient to remain  upright 15 minutes after meals  Medications: Crushed, In applesauce/puree  Discharge Recommendations:  (Continued ST services)    History/Prior Level of Function:   Living Status: Private residence  Prior Dysphagia History: Pt seen by SLP at OSH in 2022 following C3-5 ACDF, MBSS as above recommended minced and moist diet with thin liquids via cup. Pt advanced to regular diet with thin liquids by discharge 10/21/22. More recently, pt reporting sensation of liquids and solids sticking in upper throat, worse over the past 6 months. Pt planning to establish care with GI.   Reason for referral: SLP evaluation orders received due to dysphagia risk       Dysphagia Impressions/Diagnosis: Oropharyngeal Dysphagia   Pt alert and responsive, with appropriate participation in conversation. Positioned Upright in bed . On room air with RR <20/min. Oral motor exam revealed mildly reduced lingual, labial, and buccal ROM and coordination. Lingual asymmetry noted, pt reports hx weakness from previous CVA/TIA. Dentition was poor, many missing teeth and dentures not in place. Laryngeal function assessment revealed present volitional swallow, strong volitional cough, and clear vocal quality. Pt assessed with PO presentations, fed independently: thin liquids (cup, straw), puree, soft and bite-sized, and solids. Oral phase characterized by functional labial seal with no anterior spillage, prolonged oral manipulation and mastication likely impacted by missing dentition, but complete A-P transit given extended time and independent liquid washes. No overt clinical s/s aspiration across PO intake. Following regular solids, pt reporting \"slight\" sensation of solids sticking in upper throat, cleared with cued liquid wash.     Pt with clinical s/s oropharyngeal dysphagia at bedside. Dysphagia risk factors include hx of dysphagia, hx of CVA, and co-morbidities. Therefore, aspiration precautions should be in place. Educated pt on safe swallowing

## 2024-04-10 NOTE — PROGRESS NOTES
[facility] - Inpatient Rehabilitation Department   Phone: [phone]    Occupational Therapy    [x] Initial Evaluation            [] Daily Treatment Note         [x] Discharge Summary      Patient: Beth Branch   : 1965   MRN: [id number]   Date of Service:  4/10/2024    Admitting Diagnosis:  Seizure disorder (HCC)  Current Admission Summary: 58 y.o. female with history of DM 2, Afib on Xarelto, seizure disorder, HTN, h/o stroke, COPD, chronic pain syndrome, anxiety was brought to ER with LOC.  Patient apparently had a concussion a month ago.  She has not been feeling well since.  Went to work yesterday and felt poorly.  Patient was nauseated and felt like vomiting.  She has been following with Dr Holley (Cardio) at [facility] and has a loop recorder.  She has HA.  This morning,  found her difficult to arouse.  He called 911 and was instructed to start CPR.  She was never blue or stopped breathing from what he could tell.   Past Medical History:  has a past medical history of ADHD (attention deficit hyperactivity disorder), Alcohol use disorder in remission, Atrial fibrillation (HCC), Breast cancer (HCC), C. difficile colitis, Chronic kidney disease, Cocaine use disorder in remission, Colon cancer (HCC), Colovesical fistula, Congestive heart failure (HCC), Controlled substance agreement broken, COPD (chronic obstructive pulmonary disease) (HCC), Diabetes mellitus (HCC), Diverticulitis, Diverticulosis, Dupuytren's contracture of hand, GERD (gastroesophageal reflux disease), Gout, Hyperlipidemia, Hypertension, Ischemic stroke (HCC), Kidney stones, Migraines, Mitral regurgitation, Mood disorder (HCC), Multiple sclerosis (HCC), Myocardial infarction, Narcolepsy, PTSD (post-traumatic stress disorder), Pulmonary nodules, Rheumatoid arthritis (HCC), Seizure disorder, Sleep apnea, Suicide attempt (HCC), and TIA (transient ischemic attack).  Past Surgical History:  has a past surgical history that  assisted to tie gown  Dressing Comments: dons socks setup  Toileting: Independent.    Instrumental Activities of Daily Living  No IADL completed on this date.    Functional Mobility  Bed Mobility  Supine to Sit: modified independent  Scooting: Independent  Comments:  Transfers  Sit to stand transfer:Independent  Stand to sit transfer: Independent  Toilet transfer: Independent  Comments:  Functional Mobility  Functional Mobility Activity: to/from bathroom  Device Use: no device  Required Assistance: supervision initially, ambulates around room with sup > IND  Balance:  Static Sitting Balance: good: independent with functional balance in unsupported position  Dynamic Sitting Balance: good: independent with functional balance in unsupported position  Static Standing Balance: fair (+): maintains balance at SBA/supervision without use of UE support  Comments:    Other Therapeutic Interventions    Functional Outcomes  AM-PAC Inpatient Daily Activity Raw Score: 24    Cognition  WFL  Orientation:    A&O x 4  Command Following:   WFL     Education  Barriers To Learning: none  Patient Education: Patient educated on goals, OT role and benefits, plan of care, transfer training, discharge recommendations  Learning Assessment:  Patient verbalized and demonstrates understanding    Assessment  Activity Tolerance: Tolerated well, requires increased time throughout  Impairments Requiring Therapeutic Intervention: none - eval with same day discharge  Prognosis: good without need for therapy intervention  Clinical Assessment: Patient presenting at independent level for completion of required self care tasks for return to home.  Pt mainly limited by inconsistent balance and visual deficits. Pt reports she feels she has s/s of MS but has not established a primary neurologist in [place] since moving here from [place]. States she feels like she leans/loses balance a certain direction prior to actually falling, or will feel significant

## 2024-04-10 NOTE — PROGRESS NOTES
Beth HARMON Jose Carlos  Neurology Follow-up  [facility] Neu[facility]    Date of Service: 4/10/2024    Subjective:   CC: Follow up today regarding: Acute syncope    Events noted. Chart and lab reviewed.  Patient seen this morning.  She feels better today than yesterday.  We discussed MRI brain which showed no acute stroke.  Carbamazepine level was 3.4.  Loop recorder interrogation showed no arrhythmias.  Further workup is pending.  Today she denies headache, dizziness, dysarthria dysphagia.  Other review of systems unremarkable      ROS : A 10-12 system review obtained and updated today and is unremarkable except as mentioned  in my interval history.     Past medical history, social history, medication and family history reviewed.       Objective:  Exam:   Constitutional:   Vitals:    04/10/24 0015 04/10/24 0445 04/10/24 0800 04/10/24 0853   BP: 128/79 124/85 133/82    Pulse: 65 55 62 66   Resp: 16 16 16 16   Temp: 97.8 °F (36.6 °C) 98 °F (36.7 °C) 98 °F (36.7 °C)    TempSrc: Oral Oral Oral    SpO2: 97% 96% 96% 97%   Weight:   86.6 kg (191 lb)    Height:         General appearance:  Normal development and appear in no acute distress.   Mental Status:   Oriented to person, place, problem, and time.    Memory: Good immediate recall.  Intact remote memory  Normal attention span and concentration.  Language: intact naming, repeating and fluency   Good fund of Knowledge.   Cranial Nerves:   II:   Pupils: equal, round, reactive to light  III,IV,VI: Extra Ocular Movements are intact. No nystagmus  V: Facial sensation is intact  VII: Facial strength and movements: intact and symmetric  XII: Tongue movements are normal  Musculoskeletal: 5/5 in all 4 extremities.   Tone: Normal tone.   Reflexes: Symmetric 2+ in both arms and legs.  Coordination: no pronator drift, no dysmetria with FNF  Sensation: normal.  Gait/Posture: steady gait    MDM:      A. Problems (any 1)    High:    [x] Acute/Chronic Illness/injury posing threat to life or  bodily function:    [] Severe exacerbation of chronic illness:      Moderate:    []     1 or more chronic illness with exacerbation, progression or side effect of treatment or  []     2 or more stable chronic illnesses or  []     1 acute illness with systemic symptoms     ---------------------------------------------------------------------  B. Risk of Treatment (any 1)   [x] Drugs/treatments that require intensive monitoring for toxicity include: Tegretol  [] Change in code status:    [] Decision to escalate care:    [] Major surgery/procedure with associated risk factors:    [] Prescription drug management  ----------------------------------------------------------------------  [x] High (any 2)  [] Moderate (any 1)    C. Data (any 2 for high and any 1 for moderate)  [x] Discussed management of the case with: Nurse  [] Imaging personally reviewed and interpreted, includes:    [] Data Review (any 3)  [] Collateral history obtained from:    [x] All available Consultant notes from yesterday/today were reviewed  [x] All current labs were reviewed and interpreted for clinical significance   [x] Appropriate follow-up labs were ordered      Data  LABS:   Lab Results   Component Value Date/Time     04/10/2024 04:44 AM    K 3.5 04/10/2024 04:44 AM     04/10/2024 04:44 AM    CO2 26 04/10/2024 04:44 AM    BUN 10 04/10/2024 04:44 AM    CREATININE <0.5 04/10/2024 04:44 AM    GFRAA >60 06/26/2022 07:55 PM    LABGLOM >90 04/10/2024 04:44 AM    GLUCOSE 94 04/10/2024 04:44 AM    PHOS 3.2 04/09/2024 06:17 AM    MG 2.10 04/10/2024 04:44 AM    CALCIUM 8.7 04/10/2024 04:44 AM     Lab Results   Component Value Date/Time    WBC 4.3 04/10/2024 04:44 AM    RBC 4.31 04/10/2024 04:44 AM    HGB 12.3 04/10/2024 04:44 AM    HCT 36.8 04/10/2024 04:44 AM    MCV 85.3 04/10/2024 04:44 AM    RDW 15.8 04/10/2024 04:44 AM     04/10/2024 04:44 AM     Lab Results   Component Value Date    INR 0.9 08/31/2021    PROTIME 12.3 08/31/2021

## 2024-04-10 NOTE — ASSESSMENT & PLAN NOTE
Drug interaction between OAC and Carbamazpine  Patient stated she has been on both together for a long time without untoward side effects  Advised caution with use

## 2024-04-10 NOTE — PROGRESS NOTES
Occupational Therapy/Physical Therapy  Beth Branch    OT/PT orders received and appreciated. Pt currently MICHELLE upon attempt. Will hold at this time and follow up for assessment as time/schedule allows.    Thank you,  Jones Valencia, OT  Davi Howell, PT, DPT, [phone]

## 2024-04-10 NOTE — MANAGEMENT PLAN
CARDIOLOGY UPDATE:    Echo returned demonstrating normal LV and RV systolic function, no significant valve dysfunction or PHTN.    Patient has planned outpatient CCTA and follow up with her primary cardiologist.    No further inpatient workup is warranted from a Cardiac perspective.    Cardiology plans to sign off.  Please call with questions or re-consult with new concerns.    Thanks,    Jennie

## 2024-04-10 NOTE — PROGRESS NOTES
tablet Take 2 tablets by mouth 3 times daily.  Patient taking differently: Take 1.5 tablets by mouth 2 times daily. 6/14/23 7/24/25  Christiano Epperson APRN - CNP   traZODone (DESYREL) 50 MG tablet Take 1 tablet by mouth nightly as needed for Sleep 6/14/23   Christiano Epperson APRN - CNP   SUPER B COMPLEX/C PO Take 1 tablet by mouth 2 times daily    Marilyn Garcia MD   Multiple Vitamins-Minerals (MULTIVITAMIN ADULTS) TABS Take 1 tablet by mouth daily    Marilyn Garcia MD   Ubrogepant (UBRELVY) 50 MG TABS Take 1 tablet by mouth daily as needed (migraine) May take second tablet if not effective for migraine relief after 2 hours    Marilyn Garcia MD   amphetamine-dextroamphetamine (ADDERALL) 10 MG tablet Take 1 tablet by mouth 2 times daily. 7 am & 3 pm    Marilyn Garcia MD   diphenoxylate-atropine (LOMOTIL) 2.5-0.025 MG per tablet Take 1 tablet by mouth 4 times daily as needed for Diarrhea.    Marilyn Garcia MD   loperamide (IMODIUM) 2 MG capsule Take 1 capsule by mouth 4 times daily as needed for Diarrhea    Marilyn Garcia MD   budesonide-formoterol (SYMBICORT) 160-4.5 MCG/ACT AERO Inhale 2 puffs into the lungs 2 times daily    Marilyn Garcia MD   albuterol sulfate HFA (PROVENTIL;VENTOLIN;PROAIR) 108 (90 Base) MCG/ACT inhaler Inhale 2 puffs into the lungs every 6 hours as needed for Wheezing 4/24/23   Miller Crum MD   diclofenac sodium (VOLTAREN) 1 % GEL Apply 2 g topically 4 times daily as needed for Pain 4/24/23   Miller Crum MD   fluticasone (FLONASE) 50 MCG/ACT nasal spray 1 spray by Each Nostril route 2 times daily  Patient taking differently: 1 spray by Each Nostril route 2 times daily as needed for Rhinitis or Allergies 4/24/23   Miller Crum MD   lidocaine (LIDODERM) 5 % Place 1 patch onto the skin daily as needed for Pain 12 hours on, 12 hours off. 4/24/23   Miller Crum MD   nystatin (MYCOSTATIN) 518271 UNIT/GM powder APPLY TO AFFECTED AREA(S)  degrees    R Axis 18 degrees    T Axis 32 degrees    Diagnosis       Normal sinus rhythmT wave abnormality, consider anterior ischemiaProlonged QTConfirmed by SHARRON BOURNE ([id number]) on 4/9/2024 12:08:30 PM   Results for orders placed or performed during the hospital encounter of 06/09/23   EKG 12 Lead   Result Value Ref Range    Ventricular Rate 64 BPM    Atrial Rate 64 BPM    P-R Interval 156 ms    QRS Duration 90 ms    Q-T Interval 448 ms    QTc Calculation (Bazett) 462 ms    P Axis 65 degrees    R Axis 28 degrees    T Axis 112 degrees     ECHO: 3/21/2024  Study Conclusions     - Left ventricle: The cavity size is normal. There is mild concentric     hypertrophy. Systolic function is normal. The estimated ejection fraction     is 51-55%, by biplane method of disks. Wall motion is normal; there are no     regional wall motion abnormalities. Doppler parameters are consistent with     abnormal left ventricular relaxation (grade 1 diastolic dysfunction). The     LVOT stroke volume is 97ml.   - Mitral valve: The annulus is mildly calcified. There is mild     regurgitation.   - Left atrium: The atrium is markedly dilated.   - Right ventricle: The cavity size is mildly dilated. Systolic function is     reduced.   - Inferior vena cava: The IVC is normal-sized. Respirophasic diameter     changes are in the normal range (> 50%), consistent with normal central     venous pressure.     CCTA 4/7/2023  Left main: Zero (0).     Left anterior descending: 10     Left circumflex: 1     Right coronary artery 4     Total Agatston calcium score: 15     CTA: Limited by motion artifact and misregistration.     Left Main: Originates from left cusp.  No significant atherosclerosis or  stenosis.     LAD: Mild atherosclerotic calcification in the proximal segment.  No evidence  for hemodynamically significant stenosis.     Left circumflex:  Minimal calcific plaque.  No significant atherosclerosis or  stenosis.     RCA: Originates from right  cusp.  Minimal calcific plaque.  There is no  evidence for atherosclerosis or stenosis.  Circulation is right dominant.     Cardiac findings: Mild cardiomegaly.  No pericardial effusion.  Cine images  show no wall motion abnormalities..  LVEF 63%.     Non-cardiac findings: Visualized lungs show no infiltrates.  Sleeve  gastrectomy and a small sliding hiatal hernia.  No acute bony abnormality.     IMPRESSION:  Study limited by significant motion artifacts.     Minimal atherosclerotic disease.  No evidence for hemodynamically significant  stenosis.     Total calcium score of 15 places patient in the 70th percentile for the  patient's age.     Note is made of sleeve gastrectomy and a small sliding hiatal hernia.  ---------------------------------------------  Old notes reviewed  Telemetry reviewd  Ekg personally reviewed  Chest xray personally reviewed  Echo, stress, cath, and/or other cardiac testing reviewed in detail   Medications and labs reviewed  -------------------------------------------    ASSESSMENT/PLAN    LOC (loss of consciousness) (HCC)  Etiology of LOC is uncertain.  Described as somewhat similar to prior seizure episodes.    CT head and Brain MRI non acute.  Neurology consulted.    ILR interrogation demonstrated no arrhythmia to explain presenting symptoms.  ECG unchanged.  Serial troponin negative.      Echo is pending.    Consider outpatient sleep apnea evaluation.     Atherosclerosis of native coronary artery of native heart  CCTA 2023 demonstrated minimal atherosclerotic disease with CAC score 15.  Study limited by motion artifact.  Per patient, repeat CCTA is scheduled for next week as outpatient.  Continue risk modification.  Allergy to statin.    PAF (paroxysmal atrial fibrillation) (HCC)  History of PAF in medical notes.    ILR interrogated, demonstrates no arrhythmia.  To be scanned into patient medical chart.  On OAC, BB.      Implantable loop recorder present  Implanted initially 2019 for

## 2024-04-10 NOTE — PROGRESS NOTES
Admit Date: 4/9/2024  Diet: ADULT DIET; Regular    CC: LOC    Interval history:   Overnight, there were no acute events. Patient's vitals remained stable    Patient was seen this morning in bed. She endorses that she feels well and did not have any new complaints. I let her know that we are awaiting her echo and EEG results. Patient denies fevers, chills, nausea, vomiting, chest pain, shortness of breath, diarrhea, constipation, dysuria, urinary frequency or urgency.     Plan:     -Await results of echo, carotid U/S, EEG  -Continue Tegretol  -Will await any further recs from neurology    Assessment:   Beth Branch is a 58 y.o. female with PMH of Afib on Xarelto, T2DM, CVA, Seizure disorder who was admitted with AMS    Acute metabolic encephalopathy  Place in observation  Neuro checks  Telemetry to r/o arrhythmia  Serial troponin  Start ASA  Has statin allergy  Check MRI Brain  Check Echo  Check EEG  PT/OT/SLP eval  Neuro consult  Cardio consult to evaluate loop recorder  Syncope  Check serial troponin  Check orthostatics  IVF  Check Echo  Cardio consult to evaluate for arrhythmia  Seizure disorder  Continue Tegretol  Check EEG  Afib  Continue Xarelto  Continue Coreg  H/O stroke   Continue Xarelto  DM 2  SSI  Check A1C  Hypoglycemia orders    Code Status: Full Code   FEN: ADULT DIET; Regular   DVT PPX: [] Lovenox, []Heparin, [] SCDs,[] Eliquis, [x] Xarelto, [] Coumadin  DISPO: [x] GMF, [] ICU, [] CVU    Gene Burgess MD  4/10/2024,  6:19 PM    This note was likely completed using voice recognition technology and may contain unintended errors.     Objective:   Vitals:   T-max:  Patient Vitals for the past 8 hrs:   BP Temp Temp src Pulse Resp SpO2   04/10/24 1706 (!) 145/86 98 °F (36.7 °C) Oral 60 18 97 %   04/10/24 1315 (!) 147/70 97.8 °F (36.6 °C) Oral 70 16 96 %       Intake/Output Summary (Last 24 hours) at 4/10/2024 1819  Last data filed at 4/10/2024 1400  Gross per 24 hour   Intake 720 ml   Output --

## 2024-04-10 NOTE — PLAN OF CARE
Problem: Discharge Planning  Goal: Discharge to home or other facility with appropriate resources  4/10/2024 1102 by Cherelle Horne RN  Outcome: Progressing  Flowsheets (Taken 4/10/2024 0800)  Discharge to home or other facility with appropriate resources: Identify barriers to discharge with patient and caregiver     Problem: Pain  Goal: Verbalizes/displays adequate comfort level or baseline comfort level  4/10/2024 1102 by Cherelle Horne RN  Outcome: Progressing     Problem: Safety - Adult  Goal: Free from fall injury  4/10/2024 1102 by Cherelle Horne RN  Outcome: Progressing     Problem: ABCDS Injury Assessment  Goal: Absence of physical injury  4/10/2024 1102 by Cherelle Horne RN  Outcome: Progressing

## 2024-04-10 NOTE — CARE COORDINATION
Neuro and therapy pending. From home.     Patient admitted as Observation/OPIB with an anticipated short hospitalization length of stay. Chart reviewed and it appears that patient has minimal needs for discharge at this time. Discussed with patient’s nurse and requested that case management be notified if discharge needs are identified.     *Case management will continue to follow progress and update discharge plan as needed.       Kim Trujillo RN, BSN  [phone]

## 2024-04-11 VITALS
HEIGHT: 63 IN | OXYGEN SATURATION: 96 % | WEIGHT: 185.2 LBS | HEART RATE: 70 BPM | DIASTOLIC BLOOD PRESSURE: 72 MMHG | BODY MASS INDEX: 32.82 KG/M2 | RESPIRATION RATE: 17 BRPM | TEMPERATURE: 98.1 F | SYSTOLIC BLOOD PRESSURE: 122 MMHG

## 2024-04-11 LAB
ANION GAP SERPL CALCULATED.3IONS-SCNC: 10 MMOL/L (ref 3–16)
BASOPHILS # BLD: 0 K/UL (ref 0–0.2)
BASOPHILS NFR BLD: 0.9 %
BUN SERPL-MCNC: 13 MG/DL (ref 7–20)
CALCIUM SERPL-MCNC: 8.8 MG/DL (ref 8.3–10.6)
CHLORIDE SERPL-SCNC: 103 MMOL/L (ref 99–110)
CO2 SERPL-SCNC: 28 MMOL/L (ref 21–32)
CREAT SERPL-MCNC: 0.5 MG/DL (ref 0.6–1.1)
DEPRECATED RDW RBC AUTO: 15.5 % (ref 12.4–15.4)
EOSINOPHIL # BLD: 0.1 K/UL (ref 0–0.6)
EOSINOPHIL NFR BLD: 2.6 %
GFR SERPLBLD CREATININE-BSD FMLA CKD-EPI: >90 ML/MIN/{1.73_M2}
GLUCOSE SERPL-MCNC: 89 MG/DL (ref 70–99)
HCT VFR BLD AUTO: 36.4 % (ref 36–48)
HGB BLD-MCNC: 11.7 G/DL (ref 12–16)
LYMPHOCYTES # BLD: 2.1 K/UL (ref 1–5.1)
LYMPHOCYTES NFR BLD: 44.1 %
MAGNESIUM SERPL-MCNC: 2.1 MG/DL (ref 1.8–2.4)
MCH RBC QN AUTO: 27.2 PG (ref 26–34)
MCHC RBC AUTO-ENTMCNC: 32.1 G/DL (ref 31–36)
MCV RBC AUTO: 84.7 FL (ref 80–100)
MONOCYTES # BLD: 0.3 K/UL (ref 0–1.3)
MONOCYTES NFR BLD: 6.1 %
NEUTROPHILS # BLD: 2.2 K/UL (ref 1.7–7.7)
NEUTROPHILS NFR BLD: 46.3 %
PLATELET # BLD AUTO: 211 K/UL (ref 135–450)
PMV BLD AUTO: 7.9 FL (ref 5–10.5)
POTASSIUM SERPL-SCNC: 3.5 MMOL/L (ref 3.5–5.1)
RBC # BLD AUTO: 4.29 M/UL (ref 4–5.2)
SODIUM SERPL-SCNC: 141 MMOL/L (ref 136–145)
WBC # BLD AUTO: 4.7 K/UL (ref 4–11)

## 2024-04-11 PROCEDURE — 97530 THERAPEUTIC ACTIVITIES: CPT

## 2024-04-11 PROCEDURE — 80048 BASIC METABOLIC PNL TOTAL CA: CPT

## 2024-04-11 PROCEDURE — 6370000000 HC RX 637 (ALT 250 FOR IP): Performed by: INTERNAL MEDICINE

## 2024-04-11 PROCEDURE — 97116 GAIT TRAINING THERAPY: CPT

## 2024-04-11 PROCEDURE — 1200000000 HC SEMI PRIVATE

## 2024-04-11 PROCEDURE — APPNB30 APP NON BILLABLE TIME 0-30 MINS

## 2024-04-11 PROCEDURE — 2580000003 HC RX 258: Performed by: INTERNAL MEDICINE

## 2024-04-11 PROCEDURE — 99232 SBSQ HOSP IP/OBS MODERATE 35: CPT | Performed by: PSYCHIATRY & NEUROLOGY

## 2024-04-11 PROCEDURE — 94640 AIRWAY INHALATION TREATMENT: CPT

## 2024-04-11 PROCEDURE — 94761 N-INVAS EAR/PLS OXIMETRY MLT: CPT

## 2024-04-11 PROCEDURE — 85025 COMPLETE CBC W/AUTO DIFF WBC: CPT

## 2024-04-11 PROCEDURE — 36415 COLL VENOUS BLD VENIPUNCTURE: CPT

## 2024-04-11 PROCEDURE — 83735 ASSAY OF MAGNESIUM: CPT

## 2024-04-11 PROCEDURE — APPSS30 APP SPLIT SHARED TIME 16-30 MINUTES

## 2024-04-11 PROCEDURE — 97110 THERAPEUTIC EXERCISES: CPT

## 2024-04-11 RX ORDER — ASPIRIN 81 MG/1
81 TABLET, CHEWABLE ORAL DAILY
Qty: 30 TABLET | Refills: 3 | Status: SHIPPED | OUTPATIENT
Start: 2024-04-12

## 2024-04-11 RX ORDER — GABAPENTIN 600 MG/1
900 TABLET ORAL 2 TIMES DAILY
Qty: 90 TABLET | Refills: 0 | Status: CANCELLED | OUTPATIENT
Start: 2024-04-11 | End: 2024-05-11

## 2024-04-11 RX ORDER — GABAPENTIN 300 MG/1
900 CAPSULE ORAL 2 TIMES DAILY
Qty: 60 CAPSULE | Refills: 0 | Status: CANCELLED | OUTPATIENT
Start: 2024-04-11 | End: 2024-04-21

## 2024-04-11 RX ORDER — AMLODIPINE BESYLATE 10 MG/1
10 TABLET ORAL DAILY
Qty: 30 TABLET | Refills: 3 | Status: SHIPPED | OUTPATIENT
Start: 2024-04-12

## 2024-04-11 RX ADMIN — GABAPENTIN 900 MG: 300 CAPSULE ORAL at 08:50

## 2024-04-11 RX ADMIN — CARBOXYMETHYLCELLULOSE SODIUM 1 DROP: 10 GEL OPHTHALMIC at 08:50

## 2024-04-11 RX ADMIN — DULOXETINE HYDROCHLORIDE 60 MG: 60 CAPSULE, DELAYED RELEASE ORAL at 08:50

## 2024-04-11 RX ADMIN — SODIUM CHLORIDE, PRESERVATIVE FREE 10 ML: 5 INJECTION INTRAVENOUS at 08:50

## 2024-04-11 RX ADMIN — MICONAZOLE NITRATE: 20 POWDER TOPICAL at 08:50

## 2024-04-11 RX ADMIN — Medication 1 TABLET: at 08:50

## 2024-04-11 RX ADMIN — POTASSIUM CHLORIDE 20 MEQ: 750 TABLET, FILM COATED, EXTENDED RELEASE ORAL at 08:50

## 2024-04-11 RX ADMIN — CETIRIZINE HYDROCHLORIDE 10 MG: 10 TABLET, FILM COATED ORAL at 08:50

## 2024-04-11 RX ADMIN — FLUTICASONE PROPIONATE 1 SPRAY: 50 SPRAY, METERED NASAL at 08:50

## 2024-04-11 RX ADMIN — ACETAMINOPHEN 650 MG: 325 TABLET ORAL at 08:50

## 2024-04-11 RX ADMIN — DEXTROAMPHETAMINE SACCHARATE, AMPHETAMINE ASPARTATE, DEXTROAMPHETAMINE SULFATE, AMPHETAMINE SULFATE TABLETS, 10 MG,CLL 10 MG: 2.5; 2.5; 2.5; 2.5 TABLET ORAL at 08:50

## 2024-04-11 RX ADMIN — CARBAMAZEPINE 200 MG: 200 TABLET ORAL at 08:50

## 2024-04-11 RX ADMIN — Medication 2 PUFF: at 08:01

## 2024-04-11 RX ADMIN — AMLODIPINE BESYLATE 10 MG: 5 TABLET ORAL at 08:50

## 2024-04-11 RX ADMIN — FUROSEMIDE 40 MG: 40 TABLET ORAL at 08:50

## 2024-04-11 RX ADMIN — CARVEDILOL 6.25 MG: 6.25 TABLET, FILM COATED ORAL at 08:50

## 2024-04-11 RX ADMIN — PANTOPRAZOLE SODIUM 40 MG: 40 TABLET, DELAYED RELEASE ORAL at 06:09

## 2024-04-11 RX ADMIN — ASPIRIN 81 MG 81 MG: 81 TABLET ORAL at 08:50

## 2024-04-11 ASSESSMENT — PAIN DESCRIPTION - LOCATION
LOCATION: HEAD;CHEST;NECK
LOCATION: HEAD;CHEST

## 2024-04-11 ASSESSMENT — PAIN SCALES - GENERAL
PAINLEVEL_OUTOF10: 6
PAINLEVEL_OUTOF10: 6

## 2024-04-11 NOTE — PROGRESS NOTES
CLINICAL PHARMACY NOTE: MEDS TO BEDS    Total # of Prescriptions Filled: 1   The following medications were delivered to the patient:  ASPIRIN LOW DOSE 81MG CHEW    Additional Documentation: Nagi BRANNON approved to deliver medications to patient room=signed  [facility]

## 2024-04-11 NOTE — PROGRESS NOTES
Discharge instructions reviewed with pt. Answered any questions or concerns regarding discharge. IV and Tele removed without complications. Personal belongings gathered. Pt transported to lobby via wheelchair in stable condition.

## 2024-04-11 NOTE — PLAN OF CARE
Problem: Discharge Planning  Goal: Discharge to home or other facility with appropriate resources  4/11/2024 0920 by Nagi Saenz RN  Outcome: Progressing     Problem: Pain  Goal: Verbalizes/displays adequate comfort level or baseline comfort level  4/11/2024 0920 by Nagi Saenz RN  Outcome: Progressing     Problem: Safety - Adult  Goal: Free from fall injury  4/11/2024 0920 by Nagi Saenz, RN  Outcome: Progressing

## 2024-04-11 NOTE — PROGRESS NOTES
[facility] - Inpatient Rehabilitation Department   Phone: [phone]    Physical Therapy    [] Initial Evaluation            [x] Daily Treatment Note         [] Discharge Summary      Patient: Beth Branch   : 1965   MRN: [id number]   Date of Service:  2024  Admitting Diagnosis: Seizure disorder (HCC)  Current Admission Summary: Beth Branch is a 58 y.o. female with history of DM 2, Afib on Xarelto, seizure disorder, HTN, h/o stroke, COPD, chronic pain syndrome, anxiety was brought to ER with LOC.  Patient apparently had a concussion a month ago.  She has not been feeling well since.  Went to work yesterday and felt poorly.  Patient was nauseated and felt like vomiting.  She has been following with Dr Holley (Cardio) at [facility] and has a loop recorder.  She has HA.  This morning,  found her difficult to arouse.  He called 911 and was instructed to start CPR.  She was never blue or stopped breathing from what he could tell.  Patient denies any urine or fecal incontinence.  No numbness, weakness or tingling.  Has CP now from CPR.  No fevers, chills or NS.  Denies taking any extra meds.  Otherwise complete ROS is negative unless listed above.   Past Medical History:  has a past medical history of ADHD (attention deficit hyperactivity disorder), Alcohol use disorder in remission, Atrial fibrillation (HCC), Breast cancer (HCC), C. difficile colitis, Chronic kidney disease, Cocaine use disorder in remission, Colon cancer (HCC), Colovesical fistula, Congestive heart failure (HCC), Controlled substance agreement broken, COPD (chronic obstructive pulmonary disease) (HCC), Diabetes mellitus (HCC), Diverticulitis, Diverticulosis, Dupuytren's contracture of hand, GERD (gastroesophageal reflux disease), Gout, Hyperlipidemia, Hypertension, Ischemic stroke (HCC), Kidney stones, Migraines, Mitral regurgitation, Mood disorder (HCC), Multiple sclerosis (HCC), Myocardial infarction, Narcolepsy,  discharge summary.  Please see below for the latest assessment towards goals.      DME Required For Discharge: quad cane  Precautions/Restrictions: high fall risk  Weight Bearing Restrictions: no restrictions  [] Right Upper Extremity  [] Left Upper Extremity [] Right Lower Extremity  [] Left Lower Extremity     Required Braces/Orthotics: no braces required   [] Right  [] Left  Positional Restrictions:no positional restrictions    Pre-Admission Information   Lives With: significant other                Type of Home: apartment  Home Layout: one level  Home Access: level entry  Bathroom Layout: walk in shower  Bathroom Equipment: grab bars in shower, grab bars around toilet, shower chair, hand held shower head  Toilet Height: standard height  Home Equipment: single point cane, crutches  Transfer Assistance: Independent without use of device -recently has been struggling, can complete with increased time and/or assist of fiance  Ambulation Assistance:Independent without use of device  ADL Assistance: independent with all ADL's  IADL Assistance: independent with homemaking tasks  Active :        [x] Yes                 [] No  Hand Dominance: [] Left                 [x] Right  Current Employment: full time employment.  Occupation: , not sure if she would be able to return  Hobbies:   Recent Falls: 1-2 falls a month, attributed to balance issues. Sometimes get dizziness and falls too. Reports she has had PT that suggest s/s of MS, but a neuro-opthamolgist dx pt with optic neuritis     Reports dizziness onset is similar to walking and then losing balance, does not attribute to room spinning sensation    Examination   Vision:   Vision Corrective Device: wears glasses at all times - reports not see color and has had progressive reduction in peripheral vision, started with L eye and now in R eye, reports dx of optic neuritis     Able to visually track in all quadrants, but reports difficulty tracking

## 2024-04-11 NOTE — DISCHARGE INSTRUCTIONS
Your information:  Name: Beth Branch  : 1965    Your Discharge Instructions    What to do after you leave the hospital:    Read, review and familiarize yourself with the information provided below and in a separate packet on   Seizures    Please call and make an appointment with your PCP within 1 week  Please take all your medications as prescribed including any new ones on discharge    Diet: Regular    Recommended activity:   As tolerated  Avoid strenuous activity until instructed by your physician to resume; balance rest with periods of light to normal activity.     If you experience any of the following: Unusual or inadequately controlled pain; unusual transient shortness of breath; recurrent or persistent nausea, heartburn, palpitations or lightheadedness; increased swelling; increased fatigue; fever >100; please follow up with your PCP or go to the Emergency Room.      Home Health/ Outpatient Services: Outpatient PT        What symptoms or health problems do you need to look out for after you leave the hospital? Call your physician if you have any of these symptoms.     If you have sudden, severe shortness of breath or shortness of breath while resting.  Pain, tenderness, warmth or redness in your calf.  Temperature  greater than 101?.  Persistent diarrhea, nausea or vomiting.  If you are unable to eat, or unable to drink 5 glasses of fluid a day for more than one day.  If you experience the same pain or other symptoms that brought you to the hospital.  If you become lightheaded or dizzy.      If you think something is seriously wrong go to the nearest emergency room!    Call 911 for any EMERGENCY and go to the nearest hospital!

## 2024-04-11 NOTE — PLAN OF CARE
Problem: Discharge Planning  Goal: Discharge to home or other facility with appropriate resources  Outcome: Progressing     Problem: Pain  Goal: Verbalizes/displays adequate comfort level or baseline comfort level  Outcome: Progressing    Problem: Safety - Adult  Goal: Free from fall injury  Outcome: Progressing    Problem: ABCDS Injury Assessment  Goal: Absence of physical injury  Outcome: Progressing

## 2024-04-11 NOTE — PROCEDURES
Patient: Beth Branch    MR Number: [id number]  YOB: 1965  Date of Visit: 4/10/2024    Clinical History:  The patient is a 58 y.o. years old female with new onset syncope      Method:  The EEG was performed utilizing the international 10/20 of electrode placements of both referential and bipolar montages. The patient was awake and drowsy through out the recording.  Photic stimulation was performed.    Findings:  The background of the EEG showed normal alpha posterior background of 9-10- HZ and amplitude of 20-40 UV. This background was symmetric, waxing and waning, and reactive with eye opening and closure. As the patient became drowsy, generalized diffuse slowing was seen through recording at 6-7 HZ.  This generalized slowing was symmetric, non rhythmical, and continuous. No spike or sharp waves were seen.  Photic stimulation did not activate EEG.     Impression:  This EEG  is within normal limits. There is no evidence of epileptiform discharges, focal, or lateralizing abnormalities.      Carolyn Reddy MD      Board certified in clinical neurophysiology

## 2024-04-11 NOTE — PROGRESS NOTES
Beth FAUSTINO Branch  Neurology Follow-up  [facility]    Date of Service: 4/11/2024    Subjective:   CC: Follow up today regarding: Acute syncope    Events noted. Chart and lab reviewed.  Patient seen this morning.  Echocardiogram and carotid Dopplers were unremarkable.  EEG was normal.  She denies headache, dizziness, dysarthria dysphagia.  Other review of systems unremarkable      ROS : A 10-12 system review obtained and updated today and is unremarkable except as mentioned  in my interval history.     Past medical history, social history, medication and family history reviewed.       Objective:  Exam:   Constitutional:   Vitals:    04/11/24 0502 04/11/24 0715 04/11/24 0801 04/11/24 0927   BP: 130/74 (!) 159/89     Pulse: 61 65 68    Resp: 16 17 16    Temp: 97.6 °F (36.4 °C) 98.1 °F (36.7 °C)     TempSrc: Oral Oral     SpO2: 95% 95% 97%    Weight:    84 kg (185 lb 3.2 oz)   Height:         General appearance:  Normal development and appear in no acute distress.   Mental Status:   Oriented to person, place, problem, and time.    Memory: Good immediate recall.  Intact remote memory  Normal attention span and concentration.  Language: intact naming, repeating and fluency   Good fund of Knowledge.   Cranial Nerves:   II:   Pupils: equal, round, reactive to light  III,IV,VI: Extra Ocular Movements are intact. No nystagmus  V: Facial sensation is intact  VII: Facial strength and movements: intact and symmetric  XII: Tongue movements are normal  Musculoskeletal: 5/5 in all 4 extremities.   Tone: Normal tone.   Reflexes: Symmetric 2+ in both arms and legs.  Coordination: no pronator drift, no dysmetria with FNF  Sensation: normal.  Gait/Posture: steady gait    MDM:      A. Problems (any 1)    High:    [x] Acute/Chronic Illness/injury posing threat to life or bodily function:    [] Severe exacerbation of chronic illness:      Moderate:    []     1 or more chronic illness with exacerbation, progression or side  effect of treatment or  []     2 or more stable chronic illnesses or  []     1 acute illness with systemic symptoms     ---------------------------------------------------------------------  B. Risk of Treatment (any 1)   [x] Drugs/treatments that require intensive monitoring for toxicity include: Tegretol  [] Change in code status:    [] Decision to escalate care:    [] Major surgery/procedure with associated risk factors:    [] Prescription drug management  ----------------------------------------------------------------------  [x] High (any 2)  [] Moderate (any 1)    C. Data (any 2 for high and any 1 for moderate)  [x] Discussed management of the case with: Primary team  [x] Imaging personally reviewed and interpreted, includes:    [] Data Review (any 3)  [x] Collateral history obtained from: Primary team  [x] All available Consultant notes from yesterday/today were reviewed  [x] All current labs were reviewed and interpreted for clinical significance   [x] Appropriate follow-up labs were ordered      Data  LABS:   Lab Results   Component Value Date/Time     04/11/2024 05:13 AM    K 3.5 04/11/2024 05:13 AM    K 3.5 04/10/2024 04:44 AM     04/11/2024 05:13 AM    CO2 28 04/11/2024 05:13 AM    BUN 13 04/11/2024 05:13 AM    CREATININE 0.5 04/11/2024 05:13 AM    GFRAA >60 06/26/2022 07:55 PM    LABGLOM >90 04/11/2024 05:13 AM    GLUCOSE 89 04/11/2024 05:13 AM    PHOS 3.2 04/09/2024 06:17 AM    MG 2.10 04/11/2024 05:13 AM    CALCIUM 8.8 04/11/2024 05:13 AM     Lab Results   Component Value Date/Time    WBC 4.7 04/11/2024 05:13 AM    RBC 4.29 04/11/2024 05:13 AM    HGB 11.7 04/11/2024 05:13 AM    HCT 36.4 04/11/2024 05:13 AM    MCV 84.7 04/11/2024 05:13 AM    RDW 15.5 04/11/2024 05:13 AM     04/11/2024 05:13 AM     Lab Results   Component Value Date    INR 0.9 08/31/2021    PROTIME 12.3 08/31/2021       Neuroimaging was independently reviewed by me and discussed results with the patient  I reviewed

## 2024-04-11 NOTE — CARE COORDINATION
MD placing order for outpatient PT per therapy recommendations. RN aware.     Patient discharged 4/11/2024 to home.  All discharge needs met per case management.    Kim Trujillo RN, BSN  [phone]

## 2024-04-11 NOTE — CARE COORDINATION
04/11/24 1301   IMM Letter   IMM Letter given to Patient/Family/Significant other/Guardian/POA/by: Kim Trujillo RN CM   IMM Letter date given: 04/11/24   IMM Letter time given: 9361  (copy made for hard chart)     Given for upgrade to inpatient status     Kim Trujillo RN, BSN  [phone]

## 2024-04-12 ENCOUNTER — TELEPHONE (OUTPATIENT)
Dept: INTERNAL MEDICINE CLINIC | Age: 59
End: 2024-04-12

## 2024-04-12 RX ORDER — GABAPENTIN 600 MG/1
900 TABLET ORAL 2 TIMES DAILY
Qty: 90 TABLET | Refills: 0 | Status: SHIPPED | OUTPATIENT
Start: 2024-04-12 | End: 2024-05-12

## 2024-04-12 NOTE — DISCHARGE SUMMARY
V2.0  Discharge Summary    Name:  Beth Branch /Age/Sex: 1965 (58 y.o. female)   Admit Date: 2024  Discharging Provider: Gene Burgess MD   MRN & CSN:  [id number] & [id number] Attending:  No att. providers found       Brief HPI: Beth Branch is a 58 y.o. female with PMH of Afib on Xarelto, T2DM, CVA, Seizure disorder who was admitted with AMS/syncope.  Patient was seen by neurology and underwent an EEG which was negative.  Echo and carotid ultrasound were within the normal limits.  MRI was negative.  Her loop recorder interrogation did not show any arrhythmias.  The etiology was thought to be possibly secondary to several sedating medications that the patient is on.  Patient's progressed and was found fit for discharge.    Brief Problem Focused Hospital Course:     Acute metabolic encephalopathy  Place in observation  Neuro checks  Telemetry to r/o arrhythmia  Serial troponin  Start ASA  Has statin allergy  Check MRI Brain  Check Echo  Check EEG  PT/OT/SLP eval  Neuro consult  Cardio consult to evaluate loop recorder  Syncope  Check serial troponin  Check orthostatics  IVF  Check Echo  Cardio consult to evaluate for arrhythmia  Seizure disorder  Continue Tegretol  Check EEG  Afib  Continue Xarelto  Continue Coreg  H/O stroke   Continue Xarelto  DM 2  SSI  Check A1C  Hypoglycemia orders    The patient expressed appropriate understanding of, and agreement with the discharge recommendations, medications, and plan.     Consults this admission:  IP CONSULT TO CARDIOLOGY  IP CONSULT TO NEUROLOGY    Discharge Instruction:   Primary care physician: Aniceto Srivastava MD within 2 weeks  Disposition: Discharged to: [x]Home, []HHC, []SNF, []Acute Rehab, []Hospice []AMA  Condition on discharge: Stable    Physical Exam:   General appearance:  Appears comfortable. Well nourished  Eyes: Sclera clear, pupils equal  ENT: Moist mucus membranes, no thrush. Trachea midline.  Edentulous  Cardiovascular: Regular  Your Medications        These medications were sent to [facility] - [place], [place] - [address] - P [phone] - F [phone]  [address], [facility] [address]      Phone: [phone]   amLODIPine 10 MG tablet  aspirin 81 MG chewable tablet          Discharge Diagnosis:   Seizure disorder (HCC)    Labs and Imaging   VL DUP CAROTID BILATERAL    Result Date: 4/10/2024  Vascular Carotid Procedure -- PRELIMINARY SONOGRAPHER REPORT --   Demographics   Patient Name      ADILENE HARMON   Date of Study     04/10/2024        Gender             Female   Patient Number    [id number]        Date of Birth      1965   Visit Number      [id number]         Age                58 year(s)   Accession Number  [id number]        Room Number        3381   Corporate ID      [id number]           Sonographer        Katherine Murphy RVT   Ordering          Jennie Christian,  Interpreting       Chay Verduzco MD  Physician         DO                Physician  Procedure Type of Study:   Cerebral:Carotid, VL CAROTID DUPLEX BILATERAL.  Tech Comments Right The right internal carotid artery demonstrates no significant stenosis. The right vertebral artery demonstrates normal antegrade flow. The right subclavian artery is visualized and demonstrates multiphasic flow. Incidental finding in the right proximal neck, two heterogenous, vascularized, mixed echogenicity structures one measuring 4.5 mm X 5.7 mm and the other measuring 4.5 mm X 4.8 mm in the adjacent thyroid tissue. Left The left internal carotid artery demonstrates no significant stenosis based on velocity criteria. The left vertebral artery demonstrates normal, increased velocity, antegrade flow. The left subclavian artery is visualized and demonstrates multiphasic flow. Incidental finding in the left proximal neck, a heterogenous, vascularized, mixed echogenicity structure measuring 4.0 mm X 4.6 mm in the adjacent thyroid tissue. Velocities are measured in  gross fracture.       CBC:   Recent Labs     04/10/24  0444 04/11/24  0513   WBC 4.3 4.7   HGB 12.3 11.7*    211     BMP:    Recent Labs     04/10/24  0444 04/11/24  0513    141   K 3.5 3.5    103   CO2 26 28   BUN 10 13   CREATININE <0.5* 0.5*   GLUCOSE 94 89     Hepatic: No results for input(s): \"AST\", \"ALT\", \"ALB\", \"BILITOT\", \"ALKPHOS\" in the last 72 hours.  Lipids:   Lab Results   Component Value Date/Time    CHOL 213 12/18/2023 08:55 AM    HDL 81 12/18/2023 08:55 AM    TRIG 69 12/18/2023 08:55 AM     Hemoglobin A1C:   Lab Results   Component Value Date/Time    LABA1C 5.3 12/18/2023 08:55 AM     TSH:   Lab Results   Component Value Date/Time    TSH 2.05 06/12/2023 08:37 AM     Troponin:   Lab Results   Component Value Date/Time    TROPONINT NOT REPORTED 08/14/2020 09:30 PM    TROPONINT NOT REPORTED 08/14/2020 05:09 PM     Lactic Acid: No results for input(s): \"LACTA\" in the last 72 hours.  BNP: No results for input(s): \"PROBNP\" in the last 72 hours.  UA:  Lab Results   Component Value Date/Time    NITRU Negative 04/09/2024 06:17 AM    NITRU neg 01/17/2024 12:00 AM    COLORU Yellow 04/09/2024 06:17 AM    PHUR 6.0 04/09/2024 06:17 AM    PHUR 8.0 04/09/2024 06:17 AM    WBCUA None 06/10/2023 09:21 AM    RBCUA neg 01/17/2024 12:00 AM    RBCUA 0 TO 2 06/10/2023 09:21 AM    MUCUS NOT REPORTED 01/30/2022 04:50 PM    TRICHOMONAS NOT REPORTED 01/30/2022 04:50 PM    YEAST NOT REPORTED 01/30/2022 04:50 PM    BACTERIA FEW 04/19/2023 12:56 PM    CLARITYU Clear 04/09/2024 06:17 AM    SPECGRAV 1.010 04/09/2024 06:17 AM    LEUKOCYTESUR Negative 04/09/2024 06:17 AM    UROBILINOGEN 0.2 04/09/2024 06:17 AM    BILIRUBINUR Negative 04/09/2024 06:17 AM    BILIRUBINUR neg 02/05/2024 10:40 AM    BLOODU Negative 04/09/2024 06:17 AM    GLUCOSEU Negative 04/09/2024 06:17 AM    KETUA Negative 04/09/2024 06:17 AM    AMORPHOUS NOT REPORTED 01/30/2022 04:50 PM     Urine Cultures:   Lab Results   Component Value Date/Time

## 2024-04-12 NOTE — TELEPHONE ENCOUNTER
Care Transitions Initial Follow Up Call    Outreach made within 2 business days of discharge: Yes    Patient: Beth Branch Patient : 1965   MRN: [id number]  Reason for Admission: There are no discharge diagnoses documented for the most recent discharge.  Discharge Date: 24       Spoke with: Patient    Discharge department/facility: [facility]     TCM Interactive Patient Contact:  Was patient able to fill all prescriptions: Yes  Was patient instructed to bring all medications to the follow-up visit: Yes  Is patient taking all medications as directed in the discharge summary? Yes  Does patient understand their discharge instructions: Yes  Does patient have questions or concerns that need addressed prior to 7-14 day follow up office visit: no    Scheduled appointment with PCP within 7-14 days    Follow Up  Future Appointments   Date Time Provider Department Center   4/15/2024  4:00 PM Aniceto Srivastava MD [facility] [place] - DYD   2024  2:00 PM Raffi Flores MD KW UROGYN [facility]   2024  8:00 AM Douglas Zee MD IOF PSYCH [facility]   2024 12:45 PM Carolyn Duffy MD FF NEURO Neurology -       TIAGO REYES MA

## 2024-04-12 NOTE — TELEPHONE ENCOUNTER
Care Transitions Initial Follow Up Call    Outreach made within 2 business days of discharge: Yes    Patient: Beth Branch Patient : 1965   MRN: [id number]  Reason for Admission: There are no discharge diagnoses documented for the most recent discharge.  Discharge Date: 24       Spoke with: patient    Discharge department/facility: home    TCM Interactive Patient Contact:  Was patient able to fill all prescriptions: Yes  Was patient instructed to bring all medications to the follow-up visit: Yes  Is patient taking all medications as directed in the discharge summary? Yes  Does patient understand their discharge instructions: Yes  Does patient have questions or concerns that need addressed prior to 7-14 day follow up office visit: no    Scheduled appointment with PCP within 7-14 days    Follow Up  Future Appointments   Date Time Provider Department Center   4/15/2024  4:00 PM Aniceto Srivastava MD [facility] [place] - DYD   2024  2:00 PM Raffi Flores MD  UROGYN [facility]   2024  8:00 AM Douglas Zee MD IOF PSYCH [facility]   2024 12:45 PM Carolyn Duffy MD  NEURO Neurology -       Leticia Meyer

## 2024-04-12 NOTE — TELEPHONE ENCOUNTER
I can do a temporary refill however patient should schedule with pain management as I am not comfortable continuing such a high dose of gabapentin with her past history and current multiple other medications.  1 month supply is ordered for gabapentin 900 mg twice a day.

## 2024-04-12 NOTE — TELEPHONE ENCOUNTER
Called patient, she states she has called many times and they cannot schedule her, says there is a wait list to see him. Asking if you can refill her Gabapentin

## 2024-04-15 ENCOUNTER — OFFICE VISIT (OUTPATIENT)
Dept: INTERNAL MEDICINE CLINIC | Age: 59
End: 2024-04-15
Payer: COMMERCIAL

## 2024-04-15 VITALS
OXYGEN SATURATION: 96 % | WEIGHT: 190 LBS | BODY MASS INDEX: 33.66 KG/M2 | DIASTOLIC BLOOD PRESSURE: 72 MMHG | SYSTOLIC BLOOD PRESSURE: 118 MMHG | HEART RATE: 73 BPM

## 2024-04-15 DIAGNOSIS — Z09 HOSPITAL DISCHARGE FOLLOW-UP: Primary | ICD-10-CM

## 2024-04-15 DIAGNOSIS — J44.9 COPD, MODERATE (HCC): ICD-10-CM

## 2024-04-15 DIAGNOSIS — K21.9 GASTROESOPHAGEAL REFLUX DISEASE WITHOUT ESOPHAGITIS: ICD-10-CM

## 2024-04-15 DIAGNOSIS — I25.10 ATHEROSCLEROSIS OF NATIVE CORONARY ARTERY OF NATIVE HEART WITHOUT ANGINA PECTORIS: ICD-10-CM

## 2024-04-15 DIAGNOSIS — R55 SYNCOPE AND COLLAPSE: ICD-10-CM

## 2024-04-15 PROBLEM — G93.41 ACUTE METABOLIC ENCEPHALOPATHY: Status: RESOLVED | Noted: 2024-04-09 | Resolved: 2024-04-15

## 2024-04-15 PROBLEM — R41.82 ALTERED MENTAL STATUS: Status: RESOLVED | Noted: 2024-04-10 | Resolved: 2024-04-15

## 2024-04-15 PROBLEM — R10.9 ACUTE LEFT FLANK PAIN: Status: RESOLVED | Noted: 2024-03-19 | Resolved: 2024-04-15

## 2024-04-15 PROCEDURE — 99214 OFFICE O/P EST MOD 30 MIN: CPT | Performed by: INTERNAL MEDICINE

## 2024-04-15 PROCEDURE — 3023F SPIROM DOC REV: CPT | Performed by: INTERNAL MEDICINE

## 2024-04-15 PROCEDURE — 1036F TOBACCO NON-USER: CPT | Performed by: INTERNAL MEDICINE

## 2024-04-15 PROCEDURE — 3017F COLORECTAL CA SCREEN DOC REV: CPT | Performed by: INTERNAL MEDICINE

## 2024-04-15 PROCEDURE — 1111F DSCHRG MED/CURRENT MED MERGE: CPT | Performed by: INTERNAL MEDICINE

## 2024-04-15 PROCEDURE — 3078F DIAST BP <80 MM HG: CPT | Performed by: INTERNAL MEDICINE

## 2024-04-15 PROCEDURE — G8427 DOCREV CUR MEDS BY ELIG CLIN: HCPCS | Performed by: INTERNAL MEDICINE

## 2024-04-15 PROCEDURE — G8417 CALC BMI ABV UP PARAM F/U: HCPCS | Performed by: INTERNAL MEDICINE

## 2024-04-15 PROCEDURE — 3074F SYST BP LT 130 MM HG: CPT | Performed by: INTERNAL MEDICINE

## 2024-04-15 RX ORDER — SODIUM, POTASSIUM,MAG SULFATES 17.5-3.13G
SOLUTION, RECONSTITUTED, ORAL ORAL
COMMUNITY
Start: 2024-03-20

## 2024-04-15 RX ORDER — METOPROLOL TARTRATE 100 MG/1
TABLET ORAL
COMMUNITY
Start: 2024-03-27

## 2024-04-15 ASSESSMENT — ENCOUNTER SYMPTOMS
CONSTIPATION: 0
COUGH: 0
COLOR CHANGE: 0
WHEEZING: 0
ABDOMINAL PAIN: 0
BACK PAIN: 1
VOMITING: 0
SHORTNESS OF BREATH: 0
SORE THROAT: 0
NAUSEA: 0
CHEST TIGHTNESS: 0

## 2024-04-15 NOTE — ASSESSMENT & PLAN NOTE
patient does follow-up with cardiology at [facility], she is scheduled to have CT angio tomorrow and was placed on metoprolol temporary until testing is completed, she does have a loop recorder that did not show any arrhythmias, she is currently on Xarelto for A-fib, will continue care and recommendation as per cardiology

## 2024-04-15 NOTE — PROGRESS NOTES
2024     HPI:     Name: Beth Branch  YOB: 1965    CC: Beth Branch is a 58 y.o. female presenting for an evaluation of UUI, HAMZAH, Rectocele, and Enterocele.    HPI: How long have you had this problem?  5 years  Please rate the severity of your problem: moderate  Anything make it better? Myrbetriq.    Pt recovering from stoke last Tuesday.    Ob/Gyn History:    OB History    Para Term  AB Living   6 3 3   3     SAB IAB Ectopic Molar Multiple Live Births   2                # Outcome Date GA Lbr Antony/2nd Weight Sex Delivery Anes PTL Lv   6 Term            5 Term      CS-Classical      4 SAB            3 Term      Vag-Spont      2 SAB 1988           1 AB              Past Medical History:   Past Medical History:   Diagnosis Date    ADHD (attention deficit hyperactivity disorder)     Adult onset    Alcohol use disorder in remission     Atrial fibrillation (HCC)     Breast cancer (HCC)     C. difficile colitis     Chronic kidney disease     Cocaine use disorder in remission     Colon cancer (HCC)     Colovesical fistula 2016    Congestive heart failure (HCC) 2020    Controlled substance agreement broken     40 controlled substance prescriptions from 14 separate providers 2022 to 2024    COPD (chronic obstructive pulmonary disease) (HCC)     Diabetes mellitus (HCC)     Diverticulitis 2015    Diverticulosis     Dupuytren's contracture of hand 2021    R>L    GERD (gastroesophageal reflux disease)     Gout 2014    Hyperlipidemia     Hypertension     Ischemic stroke (HCC)     Kidney stones     Migraines 2014    Mitral regurgitation 2019    Mood disorder ([facility])     Multiple sclerosis (HCC)     Myocardial infarction 2021    Narcolepsy 2021    PTSD (post-traumatic stress disorder)     Pulmonary nodules     Rheumatoid arthritis (HCC)     Seizure disorder     Sleep apnea     lost weight with Gastric sleeve - no longer

## 2024-04-15 NOTE — ASSESSMENT & PLAN NOTE
still no clear etiology for patient's symptoms, she is reporting worsening tingling and numbness in her left side, and feels possible MS flareup.  Advised to keep upcoming appointment with neurology which is not till June however patient requesting a new referral outside [facility] if able to be seen sooner.  Advised can fax new referral if able to find outside neurology who will see her sooner than June 20.  Encouraged compliance with current anticonvulsant medication since her levels where subtherapeutic

## 2024-04-15 NOTE — ASSESSMENT & PLAN NOTE
occasional wheezing on exam, continue current inhalation therapy, continue avoiding smoking known irritant

## 2024-04-16 ENCOUNTER — PROCEDURE VISIT (OUTPATIENT)
Dept: UROGYNECOLOGY | Age: 59
End: 2024-04-16
Payer: COMMERCIAL

## 2024-04-16 VITALS
OXYGEN SATURATION: 97 % | HEART RATE: 57 BPM | RESPIRATION RATE: 16 BRPM | DIASTOLIC BLOOD PRESSURE: 71 MMHG | SYSTOLIC BLOOD PRESSURE: 113 MMHG | TEMPERATURE: 98.1 F

## 2024-04-16 DIAGNOSIS — N32.81 OAB (OVERACTIVE BLADDER): ICD-10-CM

## 2024-04-16 DIAGNOSIS — Z86.73 RECENT CEREBROVASCULAR ACCIDENT (CVA): ICD-10-CM

## 2024-04-16 DIAGNOSIS — N13.5 OBSTRUCTION OF URETER, UNSPECIFIED LATERALITY: ICD-10-CM

## 2024-04-16 DIAGNOSIS — K46.9 ENTEROCELE: ICD-10-CM

## 2024-04-16 DIAGNOSIS — Z12.4 CERVICAL CANCER SCREENING: Primary | ICD-10-CM

## 2024-04-16 DIAGNOSIS — N89.0 MILD VAGINAL DYSPLASIA: ICD-10-CM

## 2024-04-16 DIAGNOSIS — N81.6 RECTOCELE: ICD-10-CM

## 2024-04-16 PROCEDURE — 3074F SYST BP LT 130 MM HG: CPT | Performed by: OBSTETRICS & GYNECOLOGY

## 2024-04-16 PROCEDURE — 1111F DSCHRG MED/CURRENT MED MERGE: CPT | Performed by: OBSTETRICS & GYNECOLOGY

## 2024-04-16 PROCEDURE — 52285 CYSTOSCOPY AND TREATMENT: CPT | Performed by: OBSTETRICS & GYNECOLOGY

## 2024-04-16 PROCEDURE — 1036F TOBACCO NON-USER: CPT | Performed by: OBSTETRICS & GYNECOLOGY

## 2024-04-16 PROCEDURE — G8417 CALC BMI ABV UP PARAM F/U: HCPCS | Performed by: OBSTETRICS & GYNECOLOGY

## 2024-04-16 PROCEDURE — 99215 OFFICE O/P EST HI 40 MIN: CPT | Performed by: OBSTETRICS & GYNECOLOGY

## 2024-04-16 PROCEDURE — 3078F DIAST BP <80 MM HG: CPT | Performed by: OBSTETRICS & GYNECOLOGY

## 2024-04-16 PROCEDURE — G8427 DOCREV CUR MEDS BY ELIG CLIN: HCPCS | Performed by: OBSTETRICS & GYNECOLOGY

## 2024-04-16 PROCEDURE — 3017F COLORECTAL CA SCREEN DOC REV: CPT | Performed by: OBSTETRICS & GYNECOLOGY

## 2024-04-16 RX ORDER — CARVEDILOL 25 MG/1
TABLET ORAL
COMMUNITY
Start: 2024-03-27

## 2024-04-16 RX ORDER — ESOMEPRAZOLE MAGNESIUM 40 MG/1
40 CAPSULE, DELAYED RELEASE ORAL
Qty: 30 CAPSULE | Refills: 0 | Status: SHIPPED | OUTPATIENT
Start: 2024-04-16

## 2024-04-17 ENCOUNTER — PREP FOR PROCEDURE (OUTPATIENT)
Dept: UROGYNECOLOGY | Age: 59
End: 2024-04-17

## 2024-04-17 DIAGNOSIS — N81.5 VAGINAL ENTEROCELE: ICD-10-CM

## 2024-04-17 NOTE — PROGRESS NOTES
Physician Progress Note      PATIENT:               BRAYDON HEAD  CSN #:                  [id number]  :                       1965  ADMIT DATE:       2024 5:13 AM  DISCH DATE:        2024 4:13 PM  RESPONDING  PROVIDER #:        THERESA BORJAS MD          QUERY TEXT:    Patient admitted - with AMS/syncope secondary to several sedating   medications. If possible, please document in progress note if you are   evaluating and/or treating any of the following:    The medical record reflects the following:  Risk Factors: 58 year old female  Clinical Indicators: ED: \"results do not show any acute or emergent findings;   they also do not reveal any definitive etiology of her episode of   unresponsiveness.  She has been prescribed an abundance of different   potentially sedating medications in recent months so polypharmacy would   certainly be a consideration.  An unspecified overdose certainly possible,   especially as she has suffered from intentional overdoses previously, but if   that is the case she is certainly not being forthcoming about it at this   time.\"  Neurology : \"Acute syncope.  So far unclear etiology.  MRI brain showed no   acute stroke.  Loop recorder interrogation did not show any arrhythmias.    Based on semiology less likely seizure activity.  Other possibilities could   include polypharmacy.  Patient is on several sedating medications including   high-dose gabapentin and muscle relaxers. Discussed risk of polypharmacy and   need to avoid taking extra gabapentin with muscle relaxant\"  DC Summary: \"admitted with AMS/syncope. The etiology was thought to be   possibly secondary to several sedating medications that the patient is on.\"  Treatment: EEG, loop recorder interrogation, Neurology consult, PT/OT/SLP   eval, ECHO, MRI brain, Cardiology consult, UA, urine drug screen, Xray Abd,   CThead, CXR, XRpelvis, VL Dup Carotid Bilateral.  Options provided:  -- Adverse effect of several  sedating medications, including high-dose   gabapentin and muscle relaxers, properly administered  -- Accidental overdose of gabapentin with muscle relaxant)  -- Other - I will add my own diagnosis  -- Disagree - Not applicable / Not valid  -- Disagree - Clinically unable to determine / Unknown  -- Refer to Clinical Documentation Reviewer    PROVIDER RESPONSE TEXT:    AMS/syncope suspected to be due to an adverse effect of several sedating   medications, including high-dose gabapentin and muscle relaxers, properly   administered.    Query created by: Leticia Rojas on 4/17/2024 3:36 PM      Electronically signed by:  THERESA BORJAS MD 4/17/2024 4:57 PM

## 2024-04-22 NOTE — TELEPHONE ENCOUNTER
Pt calling requesting refill of DULoxetine (CYMBALTA) 60 MG extended release capsule     Last written 10/19/23 (historical provider)  Last OV 4/15/24  Next OV N/A    Please send to [facility] [address].     Pt states she is completely out of medication, has appt with Dr. Zee on Friday 4/26. Asks if medication can be sent to get her through to that appt. Please advise.

## 2024-04-23 ENCOUNTER — OFFICE VISIT (OUTPATIENT)
Dept: INTERNAL MEDICINE CLINIC | Age: 59
End: 2024-04-23
Payer: COMMERCIAL

## 2024-04-23 VITALS
WEIGHT: 190 LBS | BODY MASS INDEX: 33.66 KG/M2 | SYSTOLIC BLOOD PRESSURE: 120 MMHG | HEART RATE: 75 BPM | DIASTOLIC BLOOD PRESSURE: 80 MMHG | OXYGEN SATURATION: 97 %

## 2024-04-23 DIAGNOSIS — E04.1 THYROID NODULE INCIDENTALLY NOTED ON IMAGING STUDY: ICD-10-CM

## 2024-04-23 DIAGNOSIS — M47.12 CERVICAL SPONDYLOSIS WITH MYELOPATHY AND RADICULOPATHY: Primary | ICD-10-CM

## 2024-04-23 DIAGNOSIS — R11.0 CHRONIC NAUSEA: ICD-10-CM

## 2024-04-23 DIAGNOSIS — M47.22 CERVICAL SPONDYLOSIS WITH MYELOPATHY AND RADICULOPATHY: Primary | ICD-10-CM

## 2024-04-23 DIAGNOSIS — I10 HTN (HYPERTENSION), BENIGN: ICD-10-CM

## 2024-04-23 DIAGNOSIS — K31.84 GASTROPARESIS: ICD-10-CM

## 2024-04-23 PROBLEM — R40.20 LOC (LOSS OF CONSCIOUSNESS) (HCC): Status: RESOLVED | Noted: 2019-06-03 | Resolved: 2024-04-23

## 2024-04-23 PROCEDURE — G8427 DOCREV CUR MEDS BY ELIG CLIN: HCPCS | Performed by: INTERNAL MEDICINE

## 2024-04-23 PROCEDURE — 3079F DIAST BP 80-89 MM HG: CPT | Performed by: INTERNAL MEDICINE

## 2024-04-23 PROCEDURE — G8417 CALC BMI ABV UP PARAM F/U: HCPCS | Performed by: INTERNAL MEDICINE

## 2024-04-23 PROCEDURE — 3074F SYST BP LT 130 MM HG: CPT | Performed by: INTERNAL MEDICINE

## 2024-04-23 PROCEDURE — 99214 OFFICE O/P EST MOD 30 MIN: CPT | Performed by: INTERNAL MEDICINE

## 2024-04-23 PROCEDURE — 3017F COLORECTAL CA SCREEN DOC REV: CPT | Performed by: INTERNAL MEDICINE

## 2024-04-23 PROCEDURE — 1111F DSCHRG MED/CURRENT MED MERGE: CPT | Performed by: INTERNAL MEDICINE

## 2024-04-23 PROCEDURE — 1036F TOBACCO NON-USER: CPT | Performed by: INTERNAL MEDICINE

## 2024-04-23 RX ORDER — ONDANSETRON 4 MG/1
4 TABLET, FILM COATED ORAL DAILY PRN
Qty: 30 TABLET | Refills: 2 | Status: SHIPPED | OUTPATIENT
Start: 2024-04-23

## 2024-04-23 RX ORDER — DULOXETIN HYDROCHLORIDE 60 MG/1
60 CAPSULE, DELAYED RELEASE ORAL 2 TIMES DAILY
Qty: 60 CAPSULE | Refills: 2 | Status: SHIPPED | OUTPATIENT
Start: 2024-04-23 | End: 2024-04-26 | Stop reason: DRUGHIGH

## 2024-04-23 ASSESSMENT — ENCOUNTER SYMPTOMS
BACK PAIN: 1
NAUSEA: 1

## 2024-04-23 NOTE — ASSESSMENT & PLAN NOTE
patient is s/p cervical spinal surgery in the past that was done in 2 lido, she is requesting referral to [facility] due to worsening symptoms, she reports she has been having constant headaches and radiculopathy symptoms on the left along with arm weakness.  She had multiple imaging studies done over the past few months, she is already on muscle relaxers and she was referred to pain management, will place referral to [facility] at her request for further evaluation and treatment

## 2024-04-23 NOTE — PROGRESS NOTES
Physician Progress Note      PATIENT:               BRAYDON HEAD  CSN #:                  [id number]  :                       1965  ADMIT DATE:       2024 5:13 AM  DISCH DATE:        2024 4:13 PM  RESPONDING  PROVIDER #:        THERESA BORJAS MD          QUERY TEXT:    Patient admitted - with AMS/syncope secondary to several sedating   medications. Patient noted to have Paroxysmal atrial fibrillation and is   maintained on Xarelto. If possible, please document in progress note if you   are evaluating and/or treating any of the following:    The medical record reflects the following:  Risk Factors: 58 year old female with history of stroke, heart failure, HTN,   T2DM  Clinical Indicators: Per CareEverywhere office visit 5/10/2023: \"[id number]   Score: 5, 7.2% Stroke risk per year, 10.0% risk of stroke/TIA/systemic   embolism.\"  Per IM 4/10: \"Afib, Continue Xarelto, Continue Coreg.\"  Treatment: Scheduled Xarelto 20mg daily  Options provided:  -- Secondary hypercoagulable state in a patient with atrial fibrillation  -- Other - I will add my own diagnosis  -- Disagree - Not applicable / Not valid  -- Disagree - Clinically unable to determine / Unknown  -- Refer to Clinical Documentation Reviewer    PROVIDER RESPONSE TEXT:    This patient has secondary hypercoagulable state in a patient with atrial   fibrillation.    Query created by: Leticia Rojas on 2024 8:55 AM      Electronically signed by:  THERESA BORJAS MD 2024 12:59 AM

## 2024-04-23 NOTE — ASSESSMENT & PLAN NOTE
blood pressure with initial borderline elevation in diastolic however recheck is within acceptable range, she will continue care and recommendation as per cardiology

## 2024-04-23 NOTE — RESULT ENCOUNTER NOTE
I spoke with Beth, informed her of her carotid US results, she verbalizes understanding. I also updated her of abnormality of surrounding strusture, thyroid. She is aware and states that she has had \"thyroid nodule\". She is awaiting follow up with PCP, Dr. Srivastava.

## 2024-04-23 NOTE — PROGRESS NOTES
Mental Status: She is alert.      Cranial Nerves: No cranial nerve deficit.   Psychiatric:         Mood and Affect: Mood normal.           Electronically signed by Aniceto Srivastava MD on 4/23/2024 at 3:55 PM.    This dictation was generated by voice recognition computer software.  Although all attempts are made to edit the dictation for accuracy, there may be errors in the transcription that are not intended.

## 2024-04-23 NOTE — ASSESSMENT & PLAN NOTE
carotid Doppler showing echogenic masses in the thyroid area bilaterally, advised will check ultrasound soft tissue of the neck and thyroid/parathyroid for further evaluation

## 2024-04-23 NOTE — ASSESSMENT & PLAN NOTE
reports GI will not do EGD or colonoscopy until cleared by cardiology, she did not contact her cardiology for clearance, her nausea is multifactorial given history of GERD, hiatal hernia and gastroparesis, reinforced recommendations for antireflux diet and GERD lifestyle modification changes, will refill Zofran as needed, encouraged to follow-up with GI for further recommendations

## 2024-04-24 ENCOUNTER — PATIENT MESSAGE (OUTPATIENT)
Dept: INTERNAL MEDICINE CLINIC | Age: 59
End: 2024-04-24

## 2024-04-24 DIAGNOSIS — Z91.030 ALLERGY TO HONEY BEE VENOM: Primary | ICD-10-CM

## 2024-04-26 ENCOUNTER — OFFICE VISIT (OUTPATIENT)
Dept: PSYCHIATRY | Age: 59
End: 2024-04-26

## 2024-04-26 ENCOUNTER — HOSPITAL ENCOUNTER (OUTPATIENT)
Dept: ULTRASOUND IMAGING | Age: 59
Discharge: HOME | End: 2024-04-26
Payer: COMMERCIAL

## 2024-04-26 DIAGNOSIS — F41.9 ANXIETY DISORDER, UNSPECIFIED TYPE: ICD-10-CM

## 2024-04-26 DIAGNOSIS — F33.1 MODERATE EPISODE OF RECURRENT MAJOR DEPRESSIVE DISORDER (HCC): Primary | ICD-10-CM

## 2024-04-26 DIAGNOSIS — F10.21 ALCOHOL USE DISORDER, SEVERE, IN SUSTAINED REMISSION (HCC): ICD-10-CM

## 2024-04-26 DIAGNOSIS — E04.1 THYROID NODULE INCIDENTALLY NOTED ON IMAGING STUDY: ICD-10-CM

## 2024-04-26 PROCEDURE — 76536 US EXAM OF HEAD AND NECK: CPT

## 2024-04-26 RX ORDER — DULOXETIN HYDROCHLORIDE 30 MG/1
90 CAPSULE, DELAYED RELEASE ORAL DAILY
Qty: 90 CAPSULE | Refills: 2 | Status: SHIPPED | OUTPATIENT
Start: 2024-04-26

## 2024-04-26 RX ORDER — TRAZODONE HYDROCHLORIDE 50 MG/1
50 TABLET ORAL NIGHTLY PRN
Qty: 30 TABLET | Refills: 2 | Status: SHIPPED | OUTPATIENT
Start: 2024-04-26

## 2024-04-26 RX ORDER — EPINEPHRINE 0.3 MG/.3ML
0.3 INJECTION SUBCUTANEOUS ONCE
Qty: 0.3 ML | Refills: 0 | Status: SHIPPED | OUTPATIENT
Start: 2024-04-26 | End: 2024-04-26

## 2024-04-26 ASSESSMENT — ANXIETY QUESTIONNAIRES
IF YOU CHECKED OFF ANY PROBLEMS ON THIS QUESTIONNAIRE, HOW DIFFICULT HAVE THESE PROBLEMS MADE IT FOR YOU TO DO YOUR WORK, TAKE CARE OF THINGS AT HOME, OR GET ALONG WITH OTHER PEOPLE: VERY DIFFICULT
3. WORRYING TOO MUCH ABOUT DIFFERENT THINGS: NEARLY EVERY DAY
1. FEELING NERVOUS, ANXIOUS, OR ON EDGE: MORE THAN HALF THE DAYS
5. BEING SO RESTLESS THAT IT IS HARD TO SIT STILL: MORE THAN HALF THE DAYS
2. NOT BEING ABLE TO STOP OR CONTROL WORRYING: NEARLY EVERY DAY
4. TROUBLE RELAXING: MORE THAN HALF THE DAYS
6. BECOMING EASILY ANNOYED OR IRRITABLE: MORE THAN HALF THE DAYS
7. FEELING AFRAID AS IF SOMETHING AWFUL MIGHT HAPPEN: MORE THAN HALF THE DAYS
GAD7 TOTAL SCORE: 16

## 2024-04-26 ASSESSMENT — PATIENT HEALTH QUESTIONNAIRE - PHQ9
1. LITTLE INTEREST OR PLEASURE IN DOING THINGS: SEVERAL DAYS
10. IF YOU CHECKED OFF ANY PROBLEMS, HOW DIFFICULT HAVE THESE PROBLEMS MADE IT FOR YOU TO DO YOUR WORK, TAKE CARE OF THINGS AT HOME, OR GET ALONG WITH OTHER PEOPLE: EXTREMELY DIFFICULT
7. TROUBLE CONCENTRATING ON THINGS, SUCH AS READING THE NEWSPAPER OR WATCHING TELEVISION: MORE THAN HALF THE DAYS
SUM OF ALL RESPONSES TO PHQ QUESTIONS 1-9: 13
9. THOUGHTS THAT YOU WOULD BE BETTER OFF DEAD, OR OF HURTING YOURSELF: NOT AT ALL
SUM OF ALL RESPONSES TO PHQ QUESTIONS 1-9: 13
SUM OF ALL RESPONSES TO PHQ QUESTIONS 1-9: 13
2. FEELING DOWN, DEPRESSED OR HOPELESS: MORE THAN HALF THE DAYS
4. FEELING TIRED OR HAVING LITTLE ENERGY: MORE THAN HALF THE DAYS
5. POOR APPETITE OR OVEREATING: SEVERAL DAYS
3. TROUBLE FALLING OR STAYING ASLEEP: MORE THAN HALF THE DAYS
8. MOVING OR SPEAKING SO SLOWLY THAT OTHER PEOPLE COULD HAVE NOTICED. OR THE OPPOSITE, BEING SO FIGETY OR RESTLESS THAT YOU HAVE BEEN MOVING AROUND A LOT MORE THAN USUAL: NOT AT ALL
6. FEELING BAD ABOUT YOURSELF - OR THAT YOU ARE A FAILURE OR HAVE LET YOURSELF OR YOUR FAMILY DOWN: NEARLY EVERY DAY
SUM OF ALL RESPONSES TO PHQ QUESTIONS 1-9: 13
SUM OF ALL RESPONSES TO PHQ9 QUESTIONS 1 & 2: 3

## 2024-04-26 NOTE — PROGRESS NOTES
unspecified  Alcohol use disorder, severe, in sustained remission    PLAN:   1. Reduce duloxetine to 90mg daily d/t bruxism   2. Resume trazodone 50mg qhs prn for sleep.   3. D/w [place] [employer] rep, Sofía Velazquez at [phone]. Pt is is in a deflection program d/t 2 months of gabapentin filled from 2 different providers - which may have been d/t transition btw two provides. Pt is required to get an addiction assessment done as part of this program and I have recommended she go to [facility] for this. They continue to monitor for inappropriate fills through this program.  4. Given hx with addiction and cocaine abuse, a.fib, stroke, I don't intend to place patient on any stimulants for ADHD.     Case discussed with Dr. Srivastava.     Medication Monitoring:    - [place]ARRS reviewed, no issues noted      Follow-up: RTC in 6-8 weeks    I spent 90 min on this visit including face to face time, chart review, documentation and orders, care coordination with other providers.     Douglas Zee MD   Psychiatry

## 2024-04-26 NOTE — TELEPHONE ENCOUNTER
From: Beth Branch  To: Dr. Aniceto Srivastava  Sent: 4/24/2024 2:53 PM EDT  Subject: Epi Pen    Could I get Epi Pen with a refill? I had a reaction last night to something & it required me to use both of my EpiPens.   Thank you!!   Beth Gage  [phone]

## 2024-04-29 ENCOUNTER — PATIENT MESSAGE (OUTPATIENT)
Dept: INTERNAL MEDICINE CLINIC | Age: 59
End: 2024-04-29

## 2024-05-08 DIAGNOSIS — M47.12 CERVICAL SPONDYLOSIS WITH MYELOPATHY AND RADICULOPATHY: ICD-10-CM

## 2024-05-08 DIAGNOSIS — M47.22 CERVICAL SPONDYLOSIS WITH MYELOPATHY AND RADICULOPATHY: ICD-10-CM

## 2024-05-08 DIAGNOSIS — G89.4 CHRONIC PAIN SYNDROME: ICD-10-CM

## 2024-05-08 NOTE — TELEPHONE ENCOUNTER
Last OV: 4/23/2024 (acute visit)  Next OV: Visit date not found    Next appointment due:5/5/24 (multiple acute and HFU/ED since date stated in OV note)    Last fill:4/12/24  Refills:0

## 2024-05-13 ENCOUNTER — OFFICE VISIT (OUTPATIENT)
Dept: INTERNAL MEDICINE CLINIC | Age: 59
End: 2024-05-13
Payer: COMMERCIAL

## 2024-05-13 VITALS
HEART RATE: 76 BPM | OXYGEN SATURATION: 96 % | DIASTOLIC BLOOD PRESSURE: 70 MMHG | WEIGHT: 199 LBS | BODY MASS INDEX: 35.25 KG/M2 | SYSTOLIC BLOOD PRESSURE: 104 MMHG

## 2024-05-13 DIAGNOSIS — G43.111 INTRACTABLE MIGRAINE WITH AURA WITH STATUS MIGRAINOSUS: Primary | ICD-10-CM

## 2024-05-13 DIAGNOSIS — I10 HTN (HYPERTENSION), BENIGN: ICD-10-CM

## 2024-05-13 DIAGNOSIS — R63.5 WEIGHT GAIN: ICD-10-CM

## 2024-05-13 DIAGNOSIS — R59.9 LYMPH NODE ENLARGEMENT: ICD-10-CM

## 2024-05-13 DIAGNOSIS — J30.89 ENVIRONMENTAL AND SEASONAL ALLERGIES: ICD-10-CM

## 2024-05-13 DIAGNOSIS — F33.2 MAJOR DEPRESSIVE DISORDER, RECURRENT SEVERE WITHOUT PSYCHOTIC FEATURES (HCC): ICD-10-CM

## 2024-05-13 DIAGNOSIS — K21.9 GASTROESOPHAGEAL REFLUX DISEASE WITHOUT ESOPHAGITIS: ICD-10-CM

## 2024-05-13 PROBLEM — Z09 HOSPITAL DISCHARGE FOLLOW-UP: Status: RESOLVED | Noted: 2024-04-15 | Resolved: 2024-05-13

## 2024-05-13 LAB
BASOPHILS # BLD: 0.1 K/UL (ref 0–0.2)
BASOPHILS NFR BLD: 0.9 %
DEPRECATED RDW RBC AUTO: 16.4 % (ref 12.4–15.4)
EOSINOPHIL # BLD: 0.2 K/UL (ref 0–0.6)
EOSINOPHIL NFR BLD: 3.2 %
ERYTHROCYTE [SEDIMENTATION RATE] IN BLOOD BY WESTERGREN METHOD: 10 MM/HR (ref 0–30)
HCT VFR BLD AUTO: 34.7 % (ref 36–48)
HGB BLD-MCNC: 11.3 G/DL (ref 12–16)
LYMPHOCYTES # BLD: 2.1 K/UL (ref 1–5.1)
LYMPHOCYTES NFR BLD: 38.7 %
MCH RBC QN AUTO: 27.6 PG (ref 26–34)
MCHC RBC AUTO-ENTMCNC: 32.5 G/DL (ref 31–36)
MCV RBC AUTO: 85 FL (ref 80–100)
MONOCYTES # BLD: 0.4 K/UL (ref 0–1.3)
MONOCYTES NFR BLD: 7.1 %
NEUTROPHILS # BLD: 2.8 K/UL (ref 1.7–7.7)
NEUTROPHILS NFR BLD: 50.1 %
PLATELET # BLD AUTO: 237 K/UL (ref 135–450)
PMV BLD AUTO: 7.8 FL (ref 5–10.5)
RBC # BLD AUTO: 4.08 M/UL (ref 4–5.2)
TSH SERPL DL<=0.005 MIU/L-ACNC: 1.92 UIU/ML (ref 0.27–4.2)
WBC # BLD AUTO: 5.5 K/UL (ref 4–11)

## 2024-05-13 PROCEDURE — 3078F DIAST BP <80 MM HG: CPT | Performed by: INTERNAL MEDICINE

## 2024-05-13 PROCEDURE — 3074F SYST BP LT 130 MM HG: CPT | Performed by: INTERNAL MEDICINE

## 2024-05-13 PROCEDURE — 99214 OFFICE O/P EST MOD 30 MIN: CPT | Performed by: INTERNAL MEDICINE

## 2024-05-13 PROCEDURE — G8417 CALC BMI ABV UP PARAM F/U: HCPCS | Performed by: INTERNAL MEDICINE

## 2024-05-13 PROCEDURE — G8427 DOCREV CUR MEDS BY ELIG CLIN: HCPCS | Performed by: INTERNAL MEDICINE

## 2024-05-13 PROCEDURE — 1036F TOBACCO NON-USER: CPT | Performed by: INTERNAL MEDICINE

## 2024-05-13 PROCEDURE — 3017F COLORECTAL CA SCREEN DOC REV: CPT | Performed by: INTERNAL MEDICINE

## 2024-05-13 RX ORDER — GABAPENTIN 600 MG/1
TABLET ORAL
Qty: 90 TABLET | Refills: 0 | Status: SHIPPED | OUTPATIENT
Start: 2024-05-13 | End: 2024-06-12

## 2024-05-13 RX ORDER — ESOMEPRAZOLE MAGNESIUM 40 MG/1
40 CAPSULE, DELAYED RELEASE ORAL
Qty: 30 CAPSULE | Refills: 0 | Status: SHIPPED | OUTPATIENT
Start: 2024-05-13

## 2024-05-13 ASSESSMENT — ENCOUNTER SYMPTOMS
BACK PAIN: 1
SORE THROAT: 0
CHEST TIGHTNESS: 0
WHEEZING: 0
CONSTIPATION: 0
ABDOMINAL PAIN: 0
VOMITING: 0
SINUS PRESSURE: 1
COUGH: 0
COLOR CHANGE: 0
NAUSEA: 1

## 2024-05-13 NOTE — ASSESSMENT & PLAN NOTE
continue to avoid smoke and known irritants, continue Flonase, advised to start Allegra daily instead of every other day, if still no improvement can do a trial of montelukast

## 2024-05-13 NOTE — PROGRESS NOTES
ASSESSMENT/PLAN:  1. Intractable migraine with aura with status migrainosus  Assessment & Plan:    explained to patient flareup multifactorial given multiple medical problems, ongoing sinus congestion.  She does have palpable anterior cervical lymph node on the right side of the neck however no symptoms of sinus infection.  Advised ensure adequate hydration, continue as needed Ubrelvy, continue Flonase and start using Allegra daily instead of every other day, had recent CT imaging of the head that was negative, no red flag symptoms  2. HTN (hypertension), benign  Assessment & Plan:  Blood pressure stable and well-controlled on current medication regimen, she will continue same along with continuing attempts to adhere to healthy low salt diet, follow-up with cardiology as scheduled   3. Environmental and seasonal allergies  Assessment & Plan:    continue to avoid smoke and known irritants, continue Flonase, advised to start Allegra daily instead of every other day, if still no improvement can do a trial of montelukast  4. Weight gain  Assessment & Plan:    patient reporting recent weight gain despite her monitoring her diet.  She is s/p sleeve gastrectomy many years ago.  Reinforced recommendations for diet and exercise, TSH was checked recently however patient requesting repeat testing.  Orders:  -     TSH with Reflex to FT4; Future  5. Lymph node enlargement  -     Sedimentation Rate; Future  -     CBC with Auto Differential; Future  6. Major depressive disorder, recurrent severe without psychotic features (HCC)  Assessment & Plan:  Reporting flareup in symptoms specially after she lost her job, patient established with psychiatry, encouraged to keep upcoming appointment in 4 weeks       No follow-ups on file.     SUBJECTIVE  HPI:   Patient here today complaining of headache, she does have well-known history of migraines along with multiple chronic medical problems, usually presents to the office with multiple

## 2024-05-13 NOTE — ASSESSMENT & PLAN NOTE
explained to patient flareup multifactorial given multiple medical problems, ongoing sinus congestion.  She does have palpable anterior cervical lymph node on the right side of the neck however no symptoms of sinus infection.  Advised ensure adequate hydration, continue as needed Ubrelvy, continue Flonase and start using Allegra daily instead of every other day, had recent CT imaging of the head that was negative, no red flag symptoms   Attempted to help get patient scheduled for evaluation for hospice with Palliative.  Daughter declined at this time

## 2024-05-13 NOTE — ASSESSMENT & PLAN NOTE
patient reporting recent weight gain despite her monitoring her diet.  She is s/p sleeve gastrectomy many years ago.  Reinforced recommendations for diet and exercise, TSH was checked recently however patient requesting repeat testing.

## 2024-05-13 NOTE — ASSESSMENT & PLAN NOTE
Reporting flareup in symptoms specially after she lost her job, patient established with psychiatry, encouraged to keep upcoming appointment in 4 weeks

## 2024-05-13 NOTE — ASSESSMENT & PLAN NOTE
Blood pressure stable and well-controlled on current medication regimen, she will continue same along with continuing attempts to adhere to healthy low salt diet, follow-up with cardiology as scheduled

## 2024-05-14 ENCOUNTER — PATIENT MESSAGE (OUTPATIENT)
Dept: INTERNAL MEDICINE CLINIC | Age: 59
End: 2024-05-14

## 2024-05-14 NOTE — TELEPHONE ENCOUNTER
From: INOCENCIA CARRILLO  To: Beth Branch  Sent: 5/14/2024 11:18 AM EDT  Subject: LAB RESULTS    Naresh Campos,     Lab results are okay, no evidence of acute inflammation and thyroid function is within normal, mild anemia is chronic and expected given history of bariatric surgery

## 2024-05-14 NOTE — TELEPHONE ENCOUNTER
Since I already answered all her concerns at the visit yesterday and at her previous visits and she continues to feel she is not getting satisfactory answer I strongly recommend she seeks care with a different provider.

## 2024-05-21 DIAGNOSIS — R56.9 SEIZURE (HCC): ICD-10-CM

## 2024-05-21 RX ORDER — CARBAMAZEPINE 200 MG/1
200 TABLET ORAL 2 TIMES DAILY
Qty: 60 TABLET | Refills: 0 | Status: SHIPPED | OUTPATIENT
Start: 2024-05-21

## 2024-05-21 NOTE — TELEPHONE ENCOUNTER
Please advise patient I will approve 1 month supply for any refills she is going to need in the near future as she will get 30 days of emergency care only until able to establish with a different provider

## 2024-05-28 ENCOUNTER — HOSPITAL ENCOUNTER (OUTPATIENT)
Dept: GENERAL RADIOLOGY | Age: 59
Discharge: HOME | End: 2024-05-28
Payer: COMMERCIAL

## 2024-05-28 ENCOUNTER — HOSPITAL ENCOUNTER (OUTPATIENT)
Dept: MRI IMAGING | Age: 59
Discharge: HOME | End: 2024-05-28
Payer: COMMERCIAL

## 2024-05-28 ENCOUNTER — HOSPITAL ENCOUNTER (OUTPATIENT)
Age: 59
Discharge: HOME | End: 2024-05-28
Payer: COMMERCIAL

## 2024-05-28 DIAGNOSIS — M54.12 RADICULOPATHY, CERVICAL: ICD-10-CM

## 2024-05-28 DIAGNOSIS — M54.12 CERVICAL RADICULITIS: ICD-10-CM

## 2024-05-28 PROCEDURE — 72141 MRI NECK SPINE W/O DYE: CPT

## 2024-05-28 PROCEDURE — 72050 X-RAY EXAM NECK SPINE 4/5VWS: CPT

## 2024-05-30 PROBLEM — F33.2 MAJOR DEPRESSIVE DISORDER, RECURRENT SEVERE WITHOUT PSYCHOTIC FEATURES (HCC): Chronic | Status: ACTIVE | Noted: 2020-06-03

## 2024-06-03 ENCOUNTER — TELEPHONE (OUTPATIENT)
Dept: NEUROLOGY | Age: 59
End: 2024-06-03

## 2024-06-03 ENCOUNTER — TELEPHONE (OUTPATIENT)
Dept: INTERNAL MEDICINE CLINIC | Age: 59
End: 2024-06-03

## 2024-06-03 ENCOUNTER — TELEPHONE (OUTPATIENT)
Dept: UROGYNECOLOGY | Age: 59
End: 2024-06-03

## 2024-06-03 NOTE — TELEPHONE ENCOUNTER
Called and left  for patient requesting call back to discuss upcoming surgery - after review patient's clearances are not all completed, would like to discuss with patient need to possibly reschedule surgery if unable to get clearances done.    12:00pm - patient returned call and states she has seen GYN/ONC and was cleared by Dr. Parisi. Patient asking if Neuro consult is necessary, advised patient that per Dr. Flores's cysto note he is requiring Neuro consult given her history of seziures. Patient currently with yogi ton 6/26 - unable to get sooner appt - pt is going to attempt to call their office to get clearance without visit. Fax number provided to patient to have this sent to our office. Also let patient know she would need a History and Physical done prior to surgery, she verbalized understanding. Patient to call and update office once she speaks with Neuro office.

## 2024-06-03 NOTE — TELEPHONE ENCOUNTER
Pt is calling regarding getting forms for clearance for surgery.  She is wanting to know if Dr Reddy would complete.

## 2024-06-03 NOTE — TELEPHONE ENCOUNTER
Patient asking to see Dr. Dobson as her new PCP. Please call pt and confirm that this is okay. Her previous PCP was Dr. Srivastava. I've attached the message left for her request.     ----- Message from Julian Pardo sent at 6/3/2024 12:36 PM EDT -----  Regarding: [facility] Appointment Request  [facility] Appointment Request    Patient needs appointment for [facility] Appointment Type: New to Provider.    Reason for Appointment Request: Other Patient was kicked out by her PCP and she needs a new provider in the same practice. Patient needs a Pre-Op visit appointment prior to her surgery on June 18, 2024  --------------------------------------------------------------------------------------------------------------------------    Relationship to Patient: Self     Call Back Information: OK to leave message on voicemail  Preferred Call Back Number: Phone [phone] (mobile)

## 2024-06-04 ENCOUNTER — TELEPHONE (OUTPATIENT)
Dept: UROGYNECOLOGY | Age: 59
End: 2024-06-04

## 2024-06-04 NOTE — TELEPHONE ENCOUNTER
This RN called [facility] - Dr. Parisi's office requesting notes from latest visit where patient received clearance for upcoming surgery. They took information and will try to send this via the fax within 24 hours.

## 2024-06-05 ENCOUNTER — TELEPHONE (OUTPATIENT)
Dept: UROGYNECOLOGY | Age: 59
End: 2024-06-05

## 2024-06-05 NOTE — TELEPHONE ENCOUNTER
Called patient's cardiology office to inquire about bridging for upcoming surgery. Office states typically they will handle bridging, however a message was left with the nurse and she will return our call to discuss.     Call back number provided at this time.

## 2024-06-06 ENCOUNTER — TELEPHONE (OUTPATIENT)
Dept: UROGYNECOLOGY | Age: 59
End: 2024-06-06

## 2024-06-06 NOTE — TELEPHONE ENCOUNTER
Spoke with patient over the phone, patient states she saw a cardiologist today, recommended pushing surgery back at this time due to potential risks given her other health issues. Patient agreed on moving surgery to August 13th at [facility].     Date change request submitted at this time. No further questions or concerns.

## 2024-06-07 NOTE — TELEPHONE ENCOUNTER
Unable to give clearance. She is not a patient of Dr Reddy's. Dr Reddy only read an EEG.    verbal cues/1 person assist/supervision

## 2024-06-10 DIAGNOSIS — K21.9 GASTROESOPHAGEAL REFLUX DISEASE WITHOUT ESOPHAGITIS: ICD-10-CM

## 2024-06-10 NOTE — TELEPHONE ENCOUNTER
Last OV: 5/13/2024  Next OV: Visit date not found    Next appointment due: ??     Last fill: 05/13/2024  Refills: 0

## 2024-06-12 ENCOUNTER — TELEPHONE (OUTPATIENT)
Dept: UROGYNECOLOGY | Age: 59
End: 2024-06-12

## 2024-06-12 RX ORDER — ESOMEPRAZOLE MAGNESIUM 40 MG/1
40 CAPSULE, DELAYED RELEASE ORAL
Qty: 30 CAPSULE | Refills: 0 | Status: SHIPPED | OUTPATIENT
Start: 2024-06-12

## 2024-06-12 NOTE — TELEPHONE ENCOUNTER
Spoke with patient - she wanted to confirm we are planning to keep her overnight the night of her surgery. Assured her that this is Dr. Palomino request, to keep patient overnight after surgery. Patient verbalized understanding. Patient also wanted to update us on her clearances - has cards follow up tomorrow and visit with PCP scheduled for the end of the month. Will follow up after these visits are complete.

## 2024-06-17 ENCOUNTER — TELEPHONE (OUTPATIENT)
Dept: INTERNAL MEDICINE CLINIC | Age: 59
End: 2024-06-17

## 2024-06-17 ENCOUNTER — OFFICE VISIT (OUTPATIENT)
Dept: PSYCHIATRY | Age: 59
End: 2024-06-17
Payer: COMMERCIAL

## 2024-06-17 VITALS
DIASTOLIC BLOOD PRESSURE: 68 MMHG | HEIGHT: 63 IN | WEIGHT: 198 LBS | OXYGEN SATURATION: 98 % | SYSTOLIC BLOOD PRESSURE: 110 MMHG | HEART RATE: 74 BPM | BODY MASS INDEX: 35.08 KG/M2 | RESPIRATION RATE: 16 BRPM

## 2024-06-17 DIAGNOSIS — F33.1 MODERATE EPISODE OF RECURRENT MAJOR DEPRESSIVE DISORDER (HCC): Primary | ICD-10-CM

## 2024-06-17 DIAGNOSIS — F10.21 ALCOHOL USE DISORDER, SEVERE, IN SUSTAINED REMISSION (HCC): ICD-10-CM

## 2024-06-17 DIAGNOSIS — F41.9 ANXIETY DISORDER, UNSPECIFIED TYPE: ICD-10-CM

## 2024-06-17 PROCEDURE — 99214 OFFICE O/P EST MOD 30 MIN: CPT | Performed by: PSYCHIATRY & NEUROLOGY

## 2024-06-17 PROCEDURE — 3017F COLORECTAL CA SCREEN DOC REV: CPT | Performed by: PSYCHIATRY & NEUROLOGY

## 2024-06-17 PROCEDURE — 3074F SYST BP LT 130 MM HG: CPT | Performed by: PSYCHIATRY & NEUROLOGY

## 2024-06-17 PROCEDURE — 3078F DIAST BP <80 MM HG: CPT | Performed by: PSYCHIATRY & NEUROLOGY

## 2024-06-17 PROCEDURE — 1036F TOBACCO NON-USER: CPT | Performed by: PSYCHIATRY & NEUROLOGY

## 2024-06-17 PROCEDURE — G8417 CALC BMI ABV UP PARAM F/U: HCPCS | Performed by: PSYCHIATRY & NEUROLOGY

## 2024-06-17 PROCEDURE — G8427 DOCREV CUR MEDS BY ELIG CLIN: HCPCS | Performed by: PSYCHIATRY & NEUROLOGY

## 2024-06-17 RX ORDER — DULOXETIN HYDROCHLORIDE 60 MG/1
60 CAPSULE, DELAYED RELEASE ORAL 2 TIMES DAILY
Qty: 60 CAPSULE | Refills: 2 | Status: SHIPPED | OUTPATIENT
Start: 2024-06-17

## 2024-06-17 ASSESSMENT — PATIENT HEALTH QUESTIONNAIRE - PHQ9
10. IF YOU CHECKED OFF ANY PROBLEMS, HOW DIFFICULT HAVE THESE PROBLEMS MADE IT FOR YOU TO DO YOUR WORK, TAKE CARE OF THINGS AT HOME, OR GET ALONG WITH OTHER PEOPLE: SOMEWHAT DIFFICULT
6. FEELING BAD ABOUT YOURSELF - OR THAT YOU ARE A FAILURE OR HAVE LET YOURSELF OR YOUR FAMILY DOWN: MORE THAN HALF THE DAYS
9. THOUGHTS THAT YOU WOULD BE BETTER OFF DEAD, OR OF HURTING YOURSELF: NOT AT ALL
5. POOR APPETITE OR OVEREATING: MORE THAN HALF THE DAYS
2. FEELING DOWN, DEPRESSED OR HOPELESS: SEVERAL DAYS
SUM OF ALL RESPONSES TO PHQ9 QUESTIONS 1 & 2: 2
SUM OF ALL RESPONSES TO PHQ QUESTIONS 1-9: 11
SUM OF ALL RESPONSES TO PHQ QUESTIONS 1-9: 11
1. LITTLE INTEREST OR PLEASURE IN DOING THINGS: SEVERAL DAYS
3. TROUBLE FALLING OR STAYING ASLEEP: SEVERAL DAYS
SUM OF ALL RESPONSES TO PHQ QUESTIONS 1-9: 11
SUM OF ALL RESPONSES TO PHQ QUESTIONS 1-9: 11
7. TROUBLE CONCENTRATING ON THINGS, SUCH AS READING THE NEWSPAPER OR WATCHING TELEVISION: NEARLY EVERY DAY
4. FEELING TIRED OR HAVING LITTLE ENERGY: SEVERAL DAYS

## 2024-06-17 ASSESSMENT — ANXIETY QUESTIONNAIRES
GAD7 TOTAL SCORE: 9
3. WORRYING TOO MUCH ABOUT DIFFERENT THINGS: MORE THAN HALF THE DAYS
4. TROUBLE RELAXING: MORE THAN HALF THE DAYS
6. BECOMING EASILY ANNOYED OR IRRITABLE: SEVERAL DAYS
IF YOU CHECKED OFF ANY PROBLEMS ON THIS QUESTIONNAIRE, HOW DIFFICULT HAVE THESE PROBLEMS MADE IT FOR YOU TO DO YOUR WORK, TAKE CARE OF THINGS AT HOME, OR GET ALONG WITH OTHER PEOPLE: SOMEWHAT DIFFICULT
5. BEING SO RESTLESS THAT IT IS HARD TO SIT STILL: MORE THAN HALF THE DAYS
2. NOT BEING ABLE TO STOP OR CONTROL WORRYING: SEVERAL DAYS
7. FEELING AFRAID AS IF SOMETHING AWFUL MIGHT HAPPEN: NOT AT ALL
1. FEELING NERVOUS, ANXIOUS, OR ON EDGE: SEVERAL DAYS

## 2024-06-17 NOTE — PROGRESS NOTES
PSYCHIATRY PROGRESS NOTE    Beth Branch : 24  PCP: Aniceto Srivastava MD    Chief Complaint   Patient presents with    Depression       S:   Dealing with ongoing depression. Symptoms include anhedonia, depressed mood, low motivation and drive. Tends to spend a lot of time laying on the couch. Not suicidal.   Main stressors are her chronic health problems. She fell a couple days ago and fractured her back and needs to see a specialist at [place]. This is causing additional pain and stress.   Lost her dentures. They were not fitting well anyways. Doesn't notice the teeth grinding issue anymore. Willing to try to go back to a higher dose of duloxetine to see if this helps her mood since she has noticed her depression worsen.   Gets enjoyment from spending time with her grandchildren (6yo grandson, 7yo granddaughter). Also involved in some activities with fiance at [employer]. 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  Did do an assessment at [facility]. Saw a counselor there who did provide her some recommendations on activities that she may enjoy getting involved in. Denies any alcohol use or drug use.     ROS: +back pain    Current Psychiatric Medications:  Duloxetine 90mg daily  Trazodone 50mg qhs prn sleep    O:  Vitals:    24 0830   BP: 110/68   Pulse: 74   Resp: 16   SpO2: 98%     Wt Readings from Last 3 Encounters:   24 89.8 kg (198 lb)   24 90.3 kg (199 lb)   24 86.2 kg (190 lb)        Mental Status Exam:   Appearance   casually dressed, appropriately groomed  Motor: No abnormal movements, tics or mannerisms.  Speech    normal rate, rhythm, and vol  Mood/Affect    Depressed / fair motility and range  Thought Content no delusions, no suicidal ideation  Thought Process linear  Associations    logical connections  Attention/Concentration    intact  Memory    recent and

## 2024-06-17 NOTE — TELEPHONE ENCOUNTER
Patient was here for an appointment this morning to see Dr. Zee. She currently is a Dr. Srivastava patient. A message was sent back 06/03/24 to change providers. She was never told anything regarding this and wonders if she is able to switch. She states that she would like to stay in the practice due to needing to see Dr. Zee and she does not have any transportation and lives in the apartments next door so this location is very convenient for her. Please advise.

## 2024-06-18 NOTE — TELEPHONE ENCOUNTER
Dr Dobson did not approve the switch.  Patient informed but stated she did not want the [phone] number.    DISPLAY PLAN FREE TEXT DISPLAY PLAN FREE TEXT DISPLAY PLAN FREE TEXT DISPLAY PLAN FREE TEXT DISPLAY PLAN FREE TEXT DISPLAY PLAN FREE TEXT DISPLAY PLAN FREE TEXT DISPLAY PLAN FREE TEXT DISPLAY PLAN FREE TEXT DISPLAY PLAN FREE TEXT DISPLAY PLAN FREE TEXT

## 2024-06-18 NOTE — TELEPHONE ENCOUNTER
Dr Dobson did not approve the switch.  Patient informed but stated she did not want the [phone] number.

## 2024-06-26 ENCOUNTER — OFFICE VISIT (OUTPATIENT)
Dept: NEUROLOGY | Age: 59
End: 2024-06-26
Payer: COMMERCIAL

## 2024-06-26 VITALS — BODY MASS INDEX: 35.08 KG/M2 | WEIGHT: 198 LBS | HEIGHT: 63 IN

## 2024-06-26 DIAGNOSIS — G37.9 DEMYELINATING DISEASE (HCC): ICD-10-CM

## 2024-06-26 DIAGNOSIS — G40.909 SEIZURE DISORDER (HCC): Primary | ICD-10-CM
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
## 2024-06-26 PROCEDURE — G8427 DOCREV CUR MEDS BY ELIG CLIN: HCPCS | Performed by: PSYCHIATRY & NEUROLOGY

## 2024-06-26 PROCEDURE — 99213 OFFICE O/P EST LOW 20 MIN: CPT | Performed by: PSYCHIATRY & NEUROLOGY

## 2024-06-26 PROCEDURE — 3017F COLORECTAL CA SCREEN DOC REV: CPT | Performed by: PSYCHIATRY & NEUROLOGY

## 2024-06-26 PROCEDURE — 1036F TOBACCO NON-USER: CPT | Performed by: PSYCHIATRY & NEUROLOGY

## 2024-06-26 PROCEDURE — G8417 CALC BMI ABV UP PARAM F/U: HCPCS | Performed by: PSYCHIATRY & NEUROLOGY

## 2024-06-26 NOTE — PROGRESS NOTES
The patient came today for follow up regarding: hospital follow up , recurrent falling and hx of seizure.     The patient was seen at [facility] in April.  She came in with recurrent syncope and falling.  Further imaging with MRI showed no acute findings.  She was discharged home on Tegretol in addition to her blood thinners  She sustained some compression of thoracic fracture from recent fall.  She is waiting thoracic brace.  No lightheadedness or dizziness.  She continues to feel unsteady.  She takes Xarelto.  She is on Tegretol 200 mg x 2.  Last seen neurology year ago.  She was diagnosed by  at some point according to the patient with MS about 7 to 8 years ago.  Last MRI brain however showed nonspecific subcortical small vessel disease.  She is on the same dose of aspirin, Norvasc.  She takes baclofen as needed.  She checked her blood pressure at home which was unremarkable.  Recent loop recorder interrogation was unremarkable.        Exam:   Constitutional:   Vitals:    06/26/24 1300   Weight: 89.8 kg (198 lb)   Height: 1.6 m (5' 2.99\")       General appearance:  Normal development and appear in no acute distress.   Mental Status:   Oriented to person, place, problem, and time.    Memory: Good immediate recall.  Intact remote memory  Normal attention span and concentration.  Language: intact naming, repeating and fluency   Good fund of Knowledge. Aware of current events and vocabulary   Cranial Nerves: CN 2-12 wnl   Musculoskeletal: no focal weakness in UE or LL.   Reflexes: Symmetric 2+ in the arms and legs.  Coordination:no abnormal movements   Sensation: normal.  Gait/Posture: steady gait     ROS : A 10-14 system review of constitutional, cardiovascular, respiratory, GI, eyes, , ENT, musculoskeletal, endocrine, hematological, skin, SHEENT, genitourinary, psychiatric and neurologic systems was obtained and updated today which is unremarkable except as mentioned in my HPI      Medical decision

## 2024-06-26 NOTE — PATIENT INSTRUCTIONS
YOU MUST CONFIRM YOUR APPOINTMENT 1 DAY PRIOR OR IT WILL BE CANCELLED!!   Our office will call you 3 times the day prior to your appointment in an attempt to confirm.  Please return our call ASAP or confirm your appt through [facility] no later than 3 pm the day before your appointment.  If we do not hear back from you by 3 pm to confirm, your appointment will be cancelled & someone will be added into that slot from our wait list.         Refer to  MS clinic

## 2024-06-30 DIAGNOSIS — N31.8 FREQUENCY-URGENCY SYNDROME: ICD-10-CM

## 2024-07-01 DIAGNOSIS — G37.9 DEMYELINATING DISEASE (HCC): Primary | ICD-10-CM

## 2024-07-01 RX ORDER — MIRABEGRON 50 MG/1
50 TABLET, FILM COATED, EXTENDED RELEASE ORAL DAILY
Qty: 90 TABLET | Refills: 1 | OUTPATIENT
Start: 2024-07-01

## 2024-07-13 DIAGNOSIS — K21.9 GASTROESOPHAGEAL REFLUX DISEASE WITHOUT ESOPHAGITIS: ICD-10-CM

## 2024-07-14 DIAGNOSIS — I10 PRIMARY HYPERTENSION: ICD-10-CM

## 2024-07-15 RX ORDER — ESOMEPRAZOLE MAGNESIUM 40 MG/1
40 CAPSULE, DELAYED RELEASE ORAL
Qty: 30 CAPSULE | Refills: 0 | OUTPATIENT
Start: 2024-07-15

## 2024-07-15 RX ORDER — POTASSIUM CHLORIDE 1500 MG/1
20 TABLET, EXTENDED RELEASE ORAL DAILY
Qty: 60 TABLET | Refills: 1 | OUTPATIENT
Start: 2024-07-15

## 2024-07-17 ENCOUNTER — TELEPHONE (OUTPATIENT)
Dept: UROGYNECOLOGY | Age: 59
End: 2024-07-17

## 2024-07-17 DIAGNOSIS — Z01.818 PRE-OP TESTING: Primary | ICD-10-CM

## 2024-07-17 NOTE — TELEPHONE ENCOUNTER
Called patient to review clearances still needed for upcoming surgery - let patient know that since we are within 30 days of her upcoming procedure we will need updated clearances from her doctors. Patient seeing Cardiology, Neurology, Pulmonology, and her PCP - patient plans to call offices and have them send an updated clearance over to our office. Let patient know she would need to complete the IVP testing that Dr. Flores had requested - gave patient central scheduling number to have this done. Reminded patient she would need updated CBC and BMP done prior to surgery, patient requested we order labs so she can get this done with her other labs that are already ordered. Sent order per request. Will await results or testing and clearances and will reach out to patient next week to follow up. Patient thankful.

## 2024-07-23 RX ORDER — TRAZODONE HYDROCHLORIDE 50 MG/1
50 TABLET ORAL NIGHTLY PRN
Qty: 30 TABLET | Refills: 2 | Status: SHIPPED | OUTPATIENT
Start: 2024-07-23

## 2024-07-23 NOTE — TELEPHONE ENCOUNTER
Medication:   Requested Prescriptions     Pending Prescriptions Disp Refills    traZODone (DESYREL) 50 MG tablet [Pharmacy Med Name: TRAZODONE 50 MG TABLET] 30 tablet 2     Sig: TAKE ONE TABLET BY MOUTH ONCE NIGHTLY AS NEEDED FOR SLEEP       Last Filled:  4/26/24    Patient Phone Number: [phone] (home)     Last appt: 6/17/2024   Next appt: 7/29/2024

## 2024-07-26 ENCOUNTER — TELEPHONE (OUTPATIENT)
Dept: UROGYNECOLOGY | Age: 59
End: 2024-07-26

## 2024-07-26 NOTE — TELEPHONE ENCOUNTER
Called patient to touch base on status of clearances for upcoming surgery - per patient she has not made any progress over the last week due to some outside distractions. Encouraged patient to contact these doctors and let her know the importance of getting these clearances prior to upcoming surgery. Patient verbalized understanding and said she would be contacting them all today. Let patient know she would need to schedule an appointment with her PCP to have her H/P completed - patient verbalized understanding. Will await update from patient.

## 2024-07-29 ENCOUNTER — HOSPITAL ENCOUNTER (OUTPATIENT)
Age: 59
Discharge: HOME | End: 2024-07-29
Attending: OBSTETRICS & GYNECOLOGY
Payer: COMMERCIAL

## 2024-07-29 ENCOUNTER — HOSPITAL ENCOUNTER (OUTPATIENT)
Dept: GENERAL RADIOLOGY | Age: 59
Discharge: HOME | End: 2024-07-29
Attending: OBSTETRICS & GYNECOLOGY
Payer: COMMERCIAL

## 2024-07-29 DIAGNOSIS — N13.5 OBSTRUCTION OF URETER, UNSPECIFIED LATERALITY: ICD-10-CM

## 2024-07-29 LAB
ANION GAP SERPL CALCULATED.3IONS-SCNC: 11 MMOL/L (ref 3–16)
BASOPHILS # BLD: 0.1 K/UL (ref 0–0.2)
BASOPHILS NFR BLD: 1.2 %
BUN SERPL-MCNC: 12 MG/DL (ref 7–20)
CALCIUM SERPL-MCNC: 9.1 MG/DL (ref 8.3–10.6)
CHLORIDE SERPL-SCNC: 93 MMOL/L (ref 99–110)
CO2 SERPL-SCNC: 32 MMOL/L (ref 21–32)
CREAT SERPL-MCNC: 0.9 MG/DL (ref 0.6–1.1)
DEPRECATED RDW RBC AUTO: 15.6 % (ref 12.4–15.4)
EOSINOPHIL # BLD: 0.1 K/UL (ref 0–0.6)
EOSINOPHIL NFR BLD: 1.3 %
GFR SERPLBLD CREATININE-BSD FMLA CKD-EPI: 74 ML/MIN/{1.73_M2}
GLUCOSE SERPL-MCNC: 121 MG/DL (ref 70–99)
HCT VFR BLD AUTO: 36.1 % (ref 36–48)
HGB BLD-MCNC: 11.9 G/DL (ref 12–16)
LYMPHOCYTES # BLD: 1.9 K/UL (ref 1–5.1)
LYMPHOCYTES NFR BLD: 40.9 %
MCH RBC QN AUTO: 27.5 PG (ref 26–34)
MCHC RBC AUTO-ENTMCNC: 33.1 G/DL (ref 31–36)
MCV RBC AUTO: 83 FL (ref 80–100)
MONOCYTES # BLD: 0.3 K/UL (ref 0–1.3)
MONOCYTES NFR BLD: 6.4 %
NEUTROPHILS # BLD: 2.4 K/UL (ref 1.7–7.7)
NEUTROPHILS NFR BLD: 50.2 %
PLATELET # BLD AUTO: 255 K/UL (ref 135–450)
PMV BLD AUTO: 7.5 FL (ref 5–10.5)
POTASSIUM SERPL-SCNC: 3.3 MMOL/L (ref 3.5–5.1)
RBC # BLD AUTO: 4.34 M/UL (ref 4–5.2)
SODIUM SERPL-SCNC: 136 MMOL/L (ref 136–145)
WBC # BLD AUTO: 4.7 K/UL (ref 4–11)

## 2024-07-29 PROCEDURE — 74400 UROGRAPHY IV +-KUB TOMOG: CPT

## 2024-07-29 PROCEDURE — 85025 COMPLETE CBC W/AUTO DIFF WBC: CPT

## 2024-07-29 PROCEDURE — 80048 BASIC METABOLIC PNL TOTAL CA: CPT

## 2024-07-29 PROCEDURE — 36415 COLL VENOUS BLD VENIPUNCTURE: CPT

## 2024-07-29 PROCEDURE — 6360000004 HC RX CONTRAST MEDICATION

## 2024-07-29 RX ORDER — SODIUM CHLORIDE 9 MG/ML
INJECTION, SOLUTION INTRAVENOUS
Status: DISCONTINUED
Start: 2024-07-29 | End: 2024-07-29 | Stop reason: HOSPADM

## 2024-07-29 RX ADMIN — IOPAMIDOL 50 ML: 755 INJECTION, SOLUTION INTRAVENOUS at 13:16

## 2024-07-30 NOTE — RESULT ENCOUNTER NOTE
Informed patient of results. Patient verbalizes understanding of all of the above, and has no further questions or concerns at this time. Encouraged to call back the office should any questions/concerns arise. 7/30/2024 at 11:07 AM.

## 2024-07-31 ENCOUNTER — TELEPHONE (OUTPATIENT)
Dept: UROGYNECOLOGY | Age: 59
End: 2024-07-31

## 2024-07-31 NOTE — TELEPHONE ENCOUNTER
Called to inquire about progress patient's has made with getting clearances for upcoming surgery. Patient informed me of her upcoming appointments - PCP (8/5), Pulm (8/7), Neuro (8/9). Patient to follow up with cardiology because she has not heard back from them yet after reaching out last week. Fax number of our office provided. Patient aware if clearances are not obtain by Friday 8/9 end of day then we will have to reschedule her surgery date. Will follow up then.

## 2024-08-06 RX ORDER — TORSEMIDE 100 MG/1
100 TABLET ORAL DAILY
COMMUNITY
Start: 2024-07-03

## 2024-08-06 RX ORDER — SPIRONOLACTONE 25 MG/1
25 TABLET ORAL
COMMUNITY
Start: 2024-07-09

## 2024-08-06 RX ORDER — POTASSIUM CHLORIDE 1500 MG/1
20 TABLET, EXTENDED RELEASE ORAL 2 TIMES DAILY
COMMUNITY
Start: 2024-07-31

## 2024-08-06 RX ORDER — ROSUVASTATIN CALCIUM 20 MG/1
20 TABLET, COATED ORAL EVERY EVENING
COMMUNITY
Start: 2024-08-02

## 2024-08-07 DIAGNOSIS — Z01.818 PRE-OP TESTING: ICD-10-CM

## 2024-08-07 LAB
BASOPHILS # BLD: 0 K/UL (ref 0–0.2)
BASOPHILS NFR BLD: 0.6 %
DEPRECATED RDW RBC AUTO: 16.5 % (ref 12.4–15.4)
EOSINOPHIL # BLD: 0.1 K/UL (ref 0–0.6)
EOSINOPHIL NFR BLD: 2.2 %
HCT VFR BLD AUTO: 33.8 % (ref 36–48)
HGB BLD-MCNC: 11 G/DL (ref 12–16)
LYMPHOCYTES # BLD: 2.2 K/UL (ref 1–5.1)
LYMPHOCYTES NFR BLD: 42 %
MCH RBC QN AUTO: 27.4 PG (ref 26–34)
MCHC RBC AUTO-ENTMCNC: 32.5 G/DL (ref 31–36)
MCV RBC AUTO: 84.4 FL (ref 80–100)
MONOCYTES # BLD: 0.3 K/UL (ref 0–1.3)
MONOCYTES NFR BLD: 5.6 %
NEUTROPHILS # BLD: 2.6 K/UL (ref 1.7–7.7)
NEUTROPHILS NFR BLD: 49.6 %
PLATELET # BLD AUTO: 214 K/UL (ref 135–450)
PMV BLD AUTO: 8.4 FL (ref 5–10.5)
RBC # BLD AUTO: 4.01 M/UL (ref 4–5.2)
WBC # BLD AUTO: 5.3 K/UL (ref 4–11)

## 2024-08-08 LAB
ANION GAP SERPL CALCULATED.3IONS-SCNC: 11 MMOL/L (ref 3–16)
BUN SERPL-MCNC: 21 MG/DL (ref 7–20)
CALCIUM SERPL-MCNC: 8.6 MG/DL (ref 8.3–10.6)
CHLORIDE SERPL-SCNC: 94 MMOL/L (ref 99–110)
CO2 SERPL-SCNC: 32 MMOL/L (ref 21–32)
CREAT SERPL-MCNC: 1.1 MG/DL (ref 0.6–1.1)
GFR SERPLBLD CREATININE-BSD FMLA CKD-EPI: 58 ML/MIN/{1.73_M2}
GLUCOSE SERPL-MCNC: 129 MG/DL (ref 70–99)
POTASSIUM SERPL-SCNC: 3.2 MMOL/L (ref 3.5–5.1)
SODIUM SERPL-SCNC: 137 MMOL/L (ref 136–145)

## 2024-08-13 ENCOUNTER — HOSPITAL ENCOUNTER (OUTPATIENT)
Age: 59
Setting detail: SURGERY ADMIT
Discharge: HOME | End: 2024-08-13
Attending: OBSTETRICS & GYNECOLOGY | Admitting: OBSTETRICS & GYNECOLOGY
Payer: COMMERCIAL

## 2024-08-13 ENCOUNTER — ANESTHESIA EVENT (OUTPATIENT)
Dept: OPERATING ROOM | Age: 59
End: 2024-08-13
Payer: COMMERCIAL

## 2024-08-13 ENCOUNTER — ANESTHESIA (OUTPATIENT)
Dept: OPERATING ROOM | Age: 59
End: 2024-08-13
Payer: COMMERCIAL

## 2024-08-13 VITALS
OXYGEN SATURATION: 99 % | DIASTOLIC BLOOD PRESSURE: 62 MMHG | BODY MASS INDEX: 37.21 KG/M2 | SYSTOLIC BLOOD PRESSURE: 115 MMHG | RESPIRATION RATE: 16 BRPM | WEIGHT: 210 LBS | TEMPERATURE: 98.5 F | HEART RATE: 74 BPM | HEIGHT: 63 IN

## 2024-08-13 DIAGNOSIS — N81.6 RECTOCELE: Primary | ICD-10-CM

## 2024-08-13 LAB
GLUCOSE BLD-MCNC: 63 MG/DL (ref 70–99)
PERFORMED ON: ABNORMAL

## 2024-08-13 PROCEDURE — 2580000003 HC RX 258: Performed by: OBSTETRICS & GYNECOLOGY

## 2024-08-13 PROCEDURE — 3600000004 HC SURGERY LEVEL 4 BASE: Performed by: OBSTETRICS & GYNECOLOGY

## 2024-08-13 PROCEDURE — 6360000002 HC RX W HCPCS: Performed by: ANESTHESIOLOGY

## 2024-08-13 PROCEDURE — 57250 REPAIR RECTUM & VAGINA: CPT | Performed by: OBSTETRICS & GYNECOLOGY

## 2024-08-13 PROCEDURE — 7100000000 HC PACU RECOVERY - FIRST 15 MIN: Performed by: OBSTETRICS & GYNECOLOGY

## 2024-08-13 PROCEDURE — 7100000011 HC PHASE II RECOVERY - ADDTL 15 MIN: Performed by: OBSTETRICS & GYNECOLOGY

## 2024-08-13 PROCEDURE — 2720000010 HC SURG SUPPLY STERILE: Performed by: OBSTETRICS & GYNECOLOGY

## 2024-08-13 PROCEDURE — 3700000000 HC ANESTHESIA ATTENDED CARE: Performed by: OBSTETRICS & GYNECOLOGY

## 2024-08-13 PROCEDURE — 7100000010 HC PHASE II RECOVERY - FIRST 15 MIN: Performed by: OBSTETRICS & GYNECOLOGY

## 2024-08-13 PROCEDURE — 6370000000 HC RX 637 (ALT 250 FOR IP): Performed by: ANESTHESIOLOGY

## 2024-08-13 PROCEDURE — 6360000002 HC RX W HCPCS

## 2024-08-13 PROCEDURE — 7100000001 HC PACU RECOVERY - ADDTL 15 MIN: Performed by: OBSTETRICS & GYNECOLOGY

## 2024-08-13 PROCEDURE — 6360000002 HC RX W HCPCS: Performed by: OBSTETRICS & GYNECOLOGY

## 2024-08-13 PROCEDURE — 2500000003 HC RX 250 WO HCPCS

## 2024-08-13 PROCEDURE — 2709999900 HC NON-CHARGEABLE SUPPLY: Performed by: OBSTETRICS & GYNECOLOGY

## 2024-08-13 PROCEDURE — 3600000014 HC SURGERY LEVEL 4 ADDTL 15MIN: Performed by: OBSTETRICS & GYNECOLOGY

## 2024-08-13 PROCEDURE — 2500000003 HC RX 250 WO HCPCS: Performed by: OBSTETRICS & GYNECOLOGY

## 2024-08-13 PROCEDURE — 3700000001 HC ADD 15 MINUTES (ANESTHESIA): Performed by: OBSTETRICS & GYNECOLOGY

## 2024-08-13 RX ORDER — METHOCARBAMOL 100 MG/ML
1000 INJECTION, SOLUTION INTRAMUSCULAR; INTRAVENOUS ONCE
Status: DISCONTINUED | OUTPATIENT
Start: 2024-08-13 | End: 2024-08-13 | Stop reason: HOSPADM

## 2024-08-13 RX ORDER — HYDROCODONE BITARTRATE AND ACETAMINOPHEN 5; 325 MG/1; MG/1
1 TABLET ORAL
Status: DISCONTINUED | OUTPATIENT
Start: 2024-08-13 | End: 2024-08-13 | Stop reason: HOSPADM

## 2024-08-13 RX ORDER — PROPOFOL 10 MG/ML
INJECTION, EMULSION INTRAVENOUS PRN
Status: DISCONTINUED | OUTPATIENT
Start: 2024-08-13 | End: 2024-08-13 | Stop reason: SDUPTHER

## 2024-08-13 RX ORDER — SODIUM CHLORIDE 9 MG/ML
INJECTION, SOLUTION INTRAVENOUS CONTINUOUS
Status: DISCONTINUED | OUTPATIENT
Start: 2024-08-13 | End: 2024-08-13 | Stop reason: HOSPADM

## 2024-08-13 RX ORDER — NALOXONE HYDROCHLORIDE 0.4 MG/ML
INJECTION, SOLUTION INTRAMUSCULAR; INTRAVENOUS; SUBCUTANEOUS PRN
Status: DISCONTINUED | OUTPATIENT
Start: 2024-08-13 | End: 2024-08-13 | Stop reason: HOSPADM

## 2024-08-13 RX ORDER — MIDAZOLAM HYDROCHLORIDE 1 MG/ML
INJECTION INTRAMUSCULAR; INTRAVENOUS PRN
Status: DISCONTINUED | OUTPATIENT
Start: 2024-08-13 | End: 2024-08-13 | Stop reason: SDUPTHER

## 2024-08-13 RX ORDER — HYDROMORPHONE HYDROCHLORIDE 1 MG/ML
1 INJECTION, SOLUTION INTRAMUSCULAR; INTRAVENOUS; SUBCUTANEOUS EVERY 5 MIN PRN
Status: DISCONTINUED | OUTPATIENT
Start: 2024-08-13 | End: 2024-08-13 | Stop reason: HOSPADM

## 2024-08-13 RX ORDER — DEXAMETHASONE SODIUM PHOSPHATE 4 MG/ML
INJECTION, SOLUTION INTRA-ARTICULAR; INTRALESIONAL; INTRAMUSCULAR; INTRAVENOUS; SOFT TISSUE PRN
Status: DISCONTINUED | OUTPATIENT
Start: 2024-08-13 | End: 2024-08-13 | Stop reason: SDUPTHER

## 2024-08-13 RX ORDER — KETOROLAC TROMETHAMINE 30 MG/ML
INJECTION, SOLUTION INTRAMUSCULAR; INTRAVENOUS PRN
Status: DISCONTINUED | OUTPATIENT
Start: 2024-08-13 | End: 2024-08-13 | Stop reason: SDUPTHER

## 2024-08-13 RX ORDER — HYDROCODONE BITARTRATE AND ACETAMINOPHEN 5; 325 MG/1; MG/1
1 TABLET ORAL EVERY 4 HOURS PRN
Qty: 18 TABLET | Refills: 0 | Status: SHIPPED | OUTPATIENT
Start: 2024-08-13 | End: 2024-08-16

## 2024-08-13 RX ORDER — DIPHENHYDRAMINE HYDROCHLORIDE 50 MG/ML
12.5 INJECTION INTRAMUSCULAR; INTRAVENOUS
Status: DISCONTINUED | OUTPATIENT
Start: 2024-08-13 | End: 2024-08-13 | Stop reason: HOSPADM

## 2024-08-13 RX ORDER — LIDOCAINE HYDROCHLORIDE 20 MG/ML
INJECTION, SOLUTION INTRAVENOUS PRN
Status: DISCONTINUED | OUTPATIENT
Start: 2024-08-13 | End: 2024-08-13 | Stop reason: SDUPTHER

## 2024-08-13 RX ORDER — SODIUM CHLORIDE 0.9 % (FLUSH) 0.9 %
5-40 SYRINGE (ML) INJECTION EVERY 12 HOURS SCHEDULED
Status: DISCONTINUED | OUTPATIENT
Start: 2024-08-13 | End: 2024-08-13 | Stop reason: HOSPADM

## 2024-08-13 RX ORDER — MORPHINE SULFATE 4 MG/ML
2 INJECTION INTRAVENOUS EVERY 5 MIN PRN
Status: DISCONTINUED | OUTPATIENT
Start: 2024-08-13 | End: 2024-08-13 | Stop reason: HOSPADM

## 2024-08-13 RX ORDER — APREPITANT 40 MG/1
40 CAPSULE ORAL ONCE
Status: COMPLETED | OUTPATIENT
Start: 2024-08-13 | End: 2024-08-13

## 2024-08-13 RX ORDER — ONDANSETRON 2 MG/ML
4 INJECTION INTRAMUSCULAR; INTRAVENOUS
Status: DISCONTINUED | OUTPATIENT
Start: 2024-08-13 | End: 2024-08-13 | Stop reason: HOSPADM

## 2024-08-13 RX ORDER — GLYCOPYRROLATE 0.2 MG/ML
INJECTION INTRAMUSCULAR; INTRAVENOUS PRN
Status: DISCONTINUED | OUTPATIENT
Start: 2024-08-13 | End: 2024-08-13 | Stop reason: SDUPTHER

## 2024-08-13 RX ORDER — IBUPROFEN 600 MG/1
600 TABLET ORAL
Qty: 15 TABLET | Refills: 0 | Status: SHIPPED | OUTPATIENT
Start: 2024-08-13 | End: 2024-08-18

## 2024-08-13 RX ORDER — MEPERIDINE HYDROCHLORIDE 25 MG/ML
12.5 INJECTION INTRAMUSCULAR; INTRAVENOUS; SUBCUTANEOUS EVERY 5 MIN PRN
Status: DISCONTINUED | OUTPATIENT
Start: 2024-08-13 | End: 2024-08-13 | Stop reason: HOSPADM

## 2024-08-13 RX ORDER — ONDANSETRON 2 MG/ML
INJECTION INTRAMUSCULAR; INTRAVENOUS PRN
Status: DISCONTINUED | OUTPATIENT
Start: 2024-08-13 | End: 2024-08-13 | Stop reason: SDUPTHER

## 2024-08-13 RX ORDER — SUCCINYLCHOLINE/SOD CL,ISO/PF 200MG/10ML
SYRINGE (ML) INTRAVENOUS PRN
Status: DISCONTINUED | OUTPATIENT
Start: 2024-08-13 | End: 2024-08-13 | Stop reason: SDUPTHER

## 2024-08-13 RX ORDER — SCOLOPAMINE TRANSDERMAL SYSTEM 1 MG/1
1 PATCH, EXTENDED RELEASE TRANSDERMAL ONCE
Status: DISCONTINUED | OUTPATIENT
Start: 2024-08-13 | End: 2024-08-13 | Stop reason: HOSPADM

## 2024-08-13 RX ORDER — ROCURONIUM BROMIDE 10 MG/ML
INJECTION, SOLUTION INTRAVENOUS PRN
Status: DISCONTINUED | OUTPATIENT
Start: 2024-08-13 | End: 2024-08-13 | Stop reason: SDUPTHER

## 2024-08-13 RX ORDER — SODIUM CHLORIDE 0.9 % (FLUSH) 0.9 %
5-40 SYRINGE (ML) INJECTION PRN
Status: DISCONTINUED | OUTPATIENT
Start: 2024-08-13 | End: 2024-08-13 | Stop reason: HOSPADM

## 2024-08-13 RX ORDER — FENTANYL CITRATE 50 UG/ML
INJECTION, SOLUTION INTRAMUSCULAR; INTRAVENOUS PRN
Status: DISCONTINUED | OUTPATIENT
Start: 2024-08-13 | End: 2024-08-13 | Stop reason: SDUPTHER

## 2024-08-13 RX ORDER — SODIUM CHLORIDE 9 MG/ML
INJECTION, SOLUTION INTRAVENOUS PRN
Status: DISCONTINUED | OUTPATIENT
Start: 2024-08-13 | End: 2024-08-13 | Stop reason: HOSPADM

## 2024-08-13 RX ADMIN — MORPHINE SULFATE 2 MG: 4 INJECTION INTRAVENOUS at 09:58

## 2024-08-13 RX ADMIN — Medication 140 MG: at 07:41

## 2024-08-13 RX ADMIN — PROPOFOL 120 MG: 10 INJECTION, EMULSION INTRAVENOUS at 07:41

## 2024-08-13 RX ADMIN — HYDROMORPHONE HYDROCHLORIDE 1 MG: 1 INJECTION, SOLUTION INTRAMUSCULAR; INTRAVENOUS; SUBCUTANEOUS at 08:51

## 2024-08-13 RX ADMIN — LIDOCAINE HYDROCHLORIDE 100 MG: 20 INJECTION, SOLUTION INTRAVENOUS at 07:41

## 2024-08-13 RX ADMIN — SODIUM CHLORIDE 2000 MG: 900 INJECTION INTRAVENOUS at 07:45

## 2024-08-13 RX ADMIN — MIDAZOLAM HYDROCHLORIDE 2 MG: 2 INJECTION, SOLUTION INTRAMUSCULAR; INTRAVENOUS at 07:28

## 2024-08-13 RX ADMIN — SODIUM CHLORIDE: 9 INJECTION, SOLUTION INTRAVENOUS at 08:32

## 2024-08-13 RX ADMIN — SUGAMMADEX 200 MG: 100 INJECTION, SOLUTION INTRAVENOUS at 08:28

## 2024-08-13 RX ADMIN — ONDANSETRON 4 MG: 2 INJECTION INTRAMUSCULAR; INTRAVENOUS at 07:48

## 2024-08-13 RX ADMIN — KETOROLAC TROMETHAMINE 30 MG: 30 INJECTION, SOLUTION INTRAMUSCULAR; INTRAVENOUS at 08:16

## 2024-08-13 RX ADMIN — APREPITANT 40 MG: 40 CAPSULE ORAL at 07:19

## 2024-08-13 RX ADMIN — DEXAMETHASONE SODIUM PHOSPHATE 8 MG: 4 INJECTION INTRA-ARTICULAR; INTRALESIONAL; INTRAMUSCULAR; INTRAVENOUS; SOFT TISSUE at 07:48

## 2024-08-13 RX ADMIN — GLYCOPYRROLATE 0.2 MG: 0.2 INJECTION INTRAMUSCULAR; INTRAVENOUS at 08:06

## 2024-08-13 RX ADMIN — FENTANYL CITRATE 50 MCG: 50 INJECTION, SOLUTION INTRAMUSCULAR; INTRAVENOUS at 07:55

## 2024-08-13 RX ADMIN — ROCURONIUM BROMIDE 40 MG: 10 INJECTION, SOLUTION INTRAVENOUS at 07:48

## 2024-08-13 RX ADMIN — SODIUM CHLORIDE: 9 INJECTION, SOLUTION INTRAVENOUS at 07:13

## 2024-08-13 ASSESSMENT — PAIN - FUNCTIONAL ASSESSMENT
PAIN_FUNCTIONAL_ASSESSMENT: 0-10
PAIN_FUNCTIONAL_ASSESSMENT: 0-10
PAIN_FUNCTIONAL_ASSESSMENT: ACTIVITIES ARE NOT PREVENTED
PAIN_FUNCTIONAL_ASSESSMENT: PREVENTS OR INTERFERES SOME ACTIVE ACTIVITIES AND ADLS
PAIN_FUNCTIONAL_ASSESSMENT: 0-10

## 2024-08-13 ASSESSMENT — PAIN DESCRIPTION - DESCRIPTORS
DESCRIPTORS: ACHING;BURNING
DESCRIPTORS: ACHING
DESCRIPTORS: SHARP
DESCRIPTORS: ACHING;BURNING

## 2024-08-13 ASSESSMENT — PAIN DESCRIPTION - LOCATION
LOCATION: ABDOMEN
LOCATION: ABDOMEN

## 2024-08-13 ASSESSMENT — PAIN SCALES - GENERAL: PAINLEVEL_OUTOF10: 10

## 2024-08-13 ASSESSMENT — PAIN DESCRIPTION - ORIENTATION: ORIENTATION: ANTERIOR;LOWER

## 2024-08-13 NOTE — PROGRESS NOTES
8/12/24 @ 0854 Xiao in surgeon office notified pt needs pre op orders and consent.  I also notified Dr Pastrana that pt has potassium of 3.2 and was started on potassium by PCP.  No further orders at this time.  /NR    
8/6/2024 0852 AM:    SURGEON TO ENTER CONSENT/ORDERS/TS     8/6/2024 0947 AM:    TI COMPLETED/TS    EKG TRACING IN EPIC 4/9/2024, LABS IN EIC 7/29/24 AND CE 8/2/2024, H&P IN CE 8/5/2024/TS    IN H&P \"MEDICALLY CLEARED\"-REQUIRING ADDITIONAL CLEARANCES/TS    PT SCHEDULED FOR PULMONARY CLEARANCE ON 8/7/24 KARLO SPARROW AT [facility]  [phone], CARDIAC CLEARANCE ON 8/8/2024 JARRET BOWENS AT [facility] [phone],  NEUROLOGY CLEARANCE ON 8/9/24 AT  [facility] [phone]/TS    HAS A PREVIOUS  CARDIAC CLEARANCE LETTER FROM 5/22/2024 IN MEDIA 6/3/2024/TS    
Consulted Dr Flores about pain control masseuses used and therapy's given 1 mg of dilaudid and 2 mg Morphine. Dr Flores ordered vaginal packing removed now.   
Family updated X 2   
PT A and O. IV started and infusing normal saline  
Patient when ask about SpO2 status at home pt says she measures it at home with results in the Low 80's at times   
Pt arrived restless CO of sevre pain at surgical site report received report form CRNA and RN circulator   Panel 1  Log ID: [id number]  Obstetrics and Gynecology  POSTERIOR REPAIR;   General     
Pt sleeping   
Vaginal packing removed in PACU   
Vitals:    08/13/24 1120   BP: (!) 97/49   Pulse: 81   Resp: 14   Temp:    SpO2: 92%         Intake/Output Summary (Last 24 hours) at 8/13/2024 1133  Last data filed at 8/13/2024 0832  Gross per 24 hour   Intake 1000 ml   Output --   Net 1000 ml       Pain assessment:  present - adequately treated      Patient transferred to care of hospitals RN.  Pt sleeping off and on. O2 sats 92% on RA. Using IS. All belongings w/pt. Ready to go to hospitals. Terry w/clear yellow urine.    8/13/2024 11:33 AM  
the  at the surgical information desk on your LEFT.   Bring your insurance card and photo ID to register      4. DO NOT EAT ANYTHING 8 hours prior to arrival for surgery.  You may have up to 8 ounces of water 4 hours prior to your arrival for surgery.   NOTE: ALL Gastric, Bariatric & Bowel surgery patients - you MUST follow your surgeon's instructions regarding eating/ drinking as you will have very specific instructions to follow.  If you did not receive these, call your surgeon's office immediately.     5. MEDICATIONS:  The Day of procedure ONLY Take the following medications with a SMALL sip of water: TEGRETOL, COREG, MAY TAKE CYMBALTA, NEURONTIN, MYRBETRIQ, NEXIUM  Use your usual dose of inhalers the morning of surgery. BRING your rescue inhaler with you to hospital.   Anesthesia does NOT want you to take insulin the morning of surgery. They will control your blood sugar while you are at the hospital. Please contact your ordering physician for instructions regarding your insulin the night before your procedure. If you have an insulin pump, please keep it set on basal rate.   Bariatric patient's call your surgeon if on diabetic medications as some may need to be stopped 1 week prior to surgery    6. Do not swallow additional water when brushing teeth. No gum, candy, mints, or ice chips. Refrain from smoking or at least decrease the amount on day of surgery.    7. Morning of surgery:   Take a shower with an antibacterial soap (i.e., Safeguard or Dial) OR your physician may have instructed you to use Hibiclens.  Dress in loose, comfortable clothing appropriate for redressing after your procedure.   Do not wear jewelry (including body piercings), make-up (especially NO eye make-up), fingernail polish (NO toenail polish if foot/leg surgery), lotion, powders, or metal hairclips.   Do not shave or wax for 72 hours prior to procedure near your operative site. Shaving with a razor can irritate your skin and

## 2024-08-13 NOTE — ANESTHESIA POSTPROCEDURE EVALUATION
Department of Anesthesiology  Postprocedure Note    Patient: Beth Branch  MRN: [id number]  YOB: 1965  Date of evaluation: 8/13/2024    Procedure Summary       Date: 08/13/24 Room / Location: [address]    Anesthesia Start: 0730 Anesthesia Stop: 0836    Procedure: POSTERIOR REPAIR; Diagnosis:       Rectocele      Vaginal enterocele      (Rectocele [N81.6])      (Vaginal enterocele [N81.5])    Surgeons: Raffi Flores MD Responsible Provider: Guru Perez MD    Anesthesia Type: general ASA Status: 3            Anesthesia Type: No value filed.    Kiersten Phase I: Kiersten Score: 10    Kiersten Phase II:      Anesthesia Post Evaluation    Patient location during evaluation: PACU  Patient participation: complete - patient participated  Level of consciousness: awake and alert  Pain score: 3  Airway patency: patent  Nausea & Vomiting: no nausea and no vomiting  Cardiovascular status: hemodynamically stable  Respiratory status: acceptable  Hydration status: euvolemic  Pain management: adequate    No notable events documented.

## 2024-08-13 NOTE — OP NOTE
Operative Note      Patient: Beth Branch  YOB: 1965  MRN: [id number]    Date of Procedure: 8/13/2024    Pre-Op Diagnosis: Rectocele [N81.6]  Vaginal enterocele [N81.5]    Post-Op Diagnosis: Same       Procedures: Procedure(s):  POSTERIOR REPAIR;     Surgeon: Surgeon(s):  Raffi Flores MD    Anesthesia: General    Assistant: Surgical Assistant: Lynsey Patiño; Adalid Hanks    Estimated Blood Loss (mL): 20    IV FLUIDS: NA milliliter(s) No intake/output data recorded.    URINE OUTPUT: NA milliters(s)   Output by Drain (mL) 08/11/24 0701 - 08/11/24 1900 08/11/24 1901 - 08/12/24 0700 08/12/24 0701 - 08/12/24 1900 08/12/24 1901 - 08/13/24 0700 08/13/24 0701 - 08/13/24 0823   Requested LDAs do not have output data documented.        Complications: None    Specimens: * No specimens in log *    Implants: * No implants in log *      Drains:   Urinary Catheter 08/13/24 2 Way (Active)       FINDINGS: Stage 2 prolapse.    INDICATION FOR PROCEDURE: 58 y.o. year old patient who presented with symptomatic pelvic organ prolapse . On exam she had stage 2  prolapse.  She desired to proceed with appropriate surgical repairs.  She was consented to the risks, including bleeding, risk of blood transfusion, infection, injury to surrounding organs such as bowel, bladder, ureters, urethra, the risk of postoperative voiding dysfunction or defecatory dysfunction, risk of nerve injury or re-exploration, the risk of recurrent prolapse or incontinence,   .  Patient was counseled regarding prophylactic salpingectomy, reviewed benefits and ACOG recommendations to remove if able and doesn't alter surgical approach. The patient understood all of these risks and desired to proceed.    OPERATIVE NOTE:   The patient was taken to the operating room and general anesthesia was found to be adequate.  She was prepped and draped in the normal sterile fashion and placed in Oni stirrups.   Preoperative antibiotics were

## 2024-08-13 NOTE — BRIEF OP NOTE
Brief Postoperative Note      Patient: Beth Branch  YOB: 1965  MRN: [id number]    Date of Procedure: 8/13/2024    Pre-Op Diagnosis Codes:      * Rectocele [N81.6]     * Vaginal enterocele [N81.5]    Post-Op Diagnosis: Same       Procedure(s):  POSTERIOR REPAIR;    Surgeon(s):  Armond Flores MD    Assistant:  Surgical Assistant: Vannesa Patiño Derek    Anesthesia: General    Estimated Blood Loss (mL): 20    Complications: None    Specimens:   * No specimens in log *    Implants:  * No implants in log *      Drains:   Urinary Catheter 08/13/24 2 Way (Active)       Findings:  Infection Present At Time Of Surgery (PATOS) (choose all levels that have infection present):  No infection present  Other Findings: large rectocele    Electronically signed by ARMOND FLORES MD on 8/13/2024 at 8:21 AM

## 2024-08-13 NOTE — ANESTHESIA PRE PROCEDURE
\"PHART\", \"PO2ART\", \"DHI5WFM\", \"VLK6IIV\", \"BEART\", \"Y7UUDYDO\"     Type & Screen (If Applicable):  No results found for: \"LABABO\"    Drug/Infectious Status (If Applicable):  No results found for: \"HIV\", \"HEPCAB\"    COVID-19 Screening (If Applicable):   Lab Results   Component Value Date/Time    COVID19 Not Detected 04/07/2023 08:37 PM    COVID19 Not Detected 11/02/2020 11:00 AM    COVID19 Not Detected 08/10/2020 10:30 AM           Anesthesia Evaluation  Patient summary reviewed and Nursing notes reviewed   history of anesthetic complications: PONV.  Airway: Mallampati: II  TM distance: >3 FB   Neck ROM: full  Mouth opening: > = 3 FB   Dental:          Pulmonary:   (+)  COPD:    sleep apnea:                                  Cardiovascular:    (+) hypertension:, past MI:, CAD:, dysrhythmias: atrial fibrillation, CHF:                  Neuro/Psych:   (+) CVA:, neuromuscular disease:, headaches:, psychiatric history:            GI/Hepatic/Renal:   (+) GERD:          Endo/Other:    (+) Diabetes, : arthritis:..                 Abdominal:             Vascular:          Other Findings:             Anesthesia Plan      general     ASA 3     (58-year-old female presents for VAGINAL VAULT SUSPENSION; POSTERIOR REPAIR; CYSTOSCOPY.  Plan general anesthesia with ASA standard monitors.  Questions answered.  Patient agreeable with anesthetic plan.  )  Induction: intravenous.      Anesthetic plan and risks discussed with patient.      Plan discussed with CRNA.    Attending anesthesiologist reviewed and agrees with Preprocedure content                CHAPARRITA SUMNER MD   8/13/2024

## 2024-08-13 NOTE — FLOWSHEET NOTE
Ambulatory Surgery/Procedure Discharge Note    Vitals:    08/13/24 1145   BP: 115/62   Pulse: 74   Resp: 16   Temp:    SpO2:        In: 2150 [P.O.:1150; I.V.:1000]  Out: 650 [Urine:650]    Pt. Taking fluids without difficulties. Terry catheter in place and retrograde bladder fill (300ml sterile water) as ordered. Terry catheter discontinued and Pt. Was able to urinate 350ml. Terry catheter left out as ordered. Encouraged to drink lots of fluids and call Dr. Flores if difficulties urinating.after discharge    Restroom use offered before discharge.  Yes    Pain assessment:  level of pain (1-10, 10 severe)   Pain Level: 2 (denies need for pain medication)      Patient discharged to home/self care. Patient discharged via wheel chair by transporter to waiting family/S.O.       8/13/2024 1:17 PM

## 2024-08-13 NOTE — H&P
Date of Surgery Update:  Beth Branch was seen, history and physical examination reviewed, and patient examined by me today. There have been no significant clinical changes since the completion of the previous history and physical. The surgical site was confirmed by the patient and me.     I have presented reasonable alternatives to the patient's proposed care, treatment, and services. The discussion I have done encompassed risks, benefits, and side effects related to the alternatives and the risks related to not receiving the proposed care, treatment, and services.     All questions answered. Patient wishes to proceed.     Electronically signed by: ARMOND DAY MD,8/13/2024,7:25 AM

## 2024-08-13 NOTE — DISCHARGE INSTRUCTIONS
constipated or if it has been more than a day since your last bowel movement, it is acceptable to try the following:  Prunes/apple/raisins or other high-fiber foods   Metamucil or Citrucel: one heaping tablespoon in 8oz of water twice per day  Milk of magnesia, to be taken at bedtime following the directions on the label   Be sure to drink at least 6-8 glasses of fluids per day.  If you have problems with constipation and no bowel movement for 5-7 days after surgery and you are uncomfortable or concerned-- please call the office.  AVOID the use of rectal suppositories or enemas.    Medications -   After surgery you may resume all your normal medications. Please follow specific instructions for any kind of blood thinner.  WHAT ABOUT PAIN MEDICATION?  You will be sent home with strong pain medications containing small amounts of narcotic painkillers. Some things you may want to remember:  You should use naproxyn (Naprosyn), ibuprofen (Motrin or Advil), Acetominophen (Tylenol), or other non-steroidal anti-inflammatory medications for your pain in addition to the strong narcotic pain medications, which you should use \"as needed\"   Take your pain medications when your first begin feeling discomfort. Don't wait until your pain is intense to take your medications. When used as directed, you will not become \"addicted\" to the pain medications.   The narcotic pain medications we send you home with may cause nausea or constipation. As your surgery heals, you may find that you feel better when you don't take those medications. If Tylenol or Motrin relieves the pain, use those medications instead.   Don't drive while taking narcotic pain medications.   Incision Care -   Showers (NOT tub baths) are preferred for the first 2 weeks after surgery. If you have an abdominal incision, it is ok for this to get wet. If the incision appears dirty or caked, you may clean it with hydrogen peroxide on a cotton swab. You may have small

## 2024-08-15 ENCOUNTER — TELEPHONE (OUTPATIENT)
Dept: UROGYNECOLOGY | Age: 59
End: 2024-08-15

## 2024-08-15 NOTE — TELEPHONE ENCOUNTER
pt is experiencing symptoms after surgery:    - severe constipation and extreme pain in pelvic (from her ureter to her rectum 10 out of 10 pain)

## 2024-08-15 NOTE — TELEPHONE ENCOUNTER
Called patient and per Dr. Flores, continue taking Norco and alternate with Tylenol or Ibuprofen. Encouraged patient to try Milk of Mag to promote a bowel movement as patient hasn't had a stool since Saturday. Patient also taking Colace twice daily, and this RN encouraged her to start Miralax up to twice daily as well. Patient verbalized understanding. Let patient know if he pain is not improved with pain medication and bowel meds to let us know.

## 2024-08-26 ENCOUNTER — OFFICE VISIT (OUTPATIENT)
Dept: UROGYNECOLOGY | Age: 59
End: 2024-08-26
Payer: COMMERCIAL

## 2024-08-26 VITALS
TEMPERATURE: 98 F | DIASTOLIC BLOOD PRESSURE: 79 MMHG | RESPIRATION RATE: 16 BRPM | HEART RATE: 72 BPM | OXYGEN SATURATION: 95 % | SYSTOLIC BLOOD PRESSURE: 117 MMHG

## 2024-08-26 DIAGNOSIS — R30.0 BURNING WITH URINATION: Primary | ICD-10-CM

## 2024-08-26 DIAGNOSIS — Z09 POSTOP CHECK: ICD-10-CM

## 2024-08-26 DIAGNOSIS — R35.0 URINARY FREQUENCY: ICD-10-CM

## 2024-08-26 LAB
BILIRUBIN, POC: NORMAL
BLOOD URINE, POC: NORMAL
CLARITY, POC: CLEAR
COLOR, POC: YELLOW
GLUCOSE URINE, POC: NORMAL
KETONES, POC: NORMAL
LEUKOCYTE EST, POC: NORMAL
NITRITE, POC: NORMAL
PH, POC: 6
PROTEIN, POC: NORMAL
SPECIFIC GRAVITY, POC: 1.01
UROBILINOGEN, POC: NORMAL

## 2024-08-26 PROCEDURE — 99024 POSTOP FOLLOW-UP VISIT: CPT | Performed by: NURSE PRACTITIONER

## 2024-08-26 PROCEDURE — 81002 URINALYSIS NONAUTO W/O SCOPE: CPT | Performed by: NURSE PRACTITIONER

## 2024-08-26 NOTE — PROGRESS NOTES
8/26/2024       HPI:     Name: Beth Branch  YOB: 1965    CC: Beth Branch is a 58 y.o. female who is here for a 2 week post op evaluation.   Patient suspicious of a UTI today - reports burning with urination and urinary frequency x 3 days    HPI: Recently underwent POSTERIOR REPAIR on 8/13/2024.     Voiding difficulty: No  Initiating stream: No  Force stream: No  Lean forward to empty: No  Slow/intermittent stream: No  Unable to empty bladder: No  Incontinence: urge present prior to surgery  Urgency without incontinence: No   Frequency without incontinence: No   Bowel functions: normal   Pain Controlled: Yes    Past Medical History:   Past Medical History:   Diagnosis Date    ADHD (attention deficit hyperactivity disorder)     Adult onset    Alcohol use disorder in remission     Atrial fibrillation ([facility])     Breast cancer ([facility])     C. difficile colitis     Chronic kidney disease     Cocaine use disorder in remission     Colon cancer ([facility])     Colovesical fistula 02/09/2016    Congestive heart failure ([facility]) 05/06/2020    Controlled substance agreement broken     40 controlled substance prescriptions from 14 separate providers 04/2022 to 04/2024    COPD (chronic obstructive pulmonary disease) ([facility])     INHALER    Diabetes mellitus ([facility])     Diverticulitis 11/17/2015    Diverticulosis     Dupuytren's contracture of hand 04/16/2021    R>L    GERD (gastroesophageal reflux disease)     Gout 06/04/2014    History of home oxygen therapy     RESOLVED SINCE WEIGHT LOSS, SLEEP IN RECLINER    [place] (hard of hearing)     Hx of smoking     Hyperlipidemia     Hypertension     Ischemic stroke ([facility])     Kidney stones     Migraines 06/04/2014    Mitral regurgitation 03/01/2019    Mood disorder ([facility])     Multiple sclerosis ([facility])     Myocardial infarction 12/2021    Narcolepsy 02/19/2021    PONV (postoperative nausea and vomiting)     \"USES PATCH\"    PTSD (post-traumatic stress disorder)     Pulmonary nodules      the Last Year: Yes     Number of Places Lived in the Last Year: 4     Unstable Housing in the Last Year: Yes     Family History:   Family History   Adopted: Yes   Problem Relation Age of Onset    Other Mother         Guillain-Barré    Heart Disease Father     Depression Father     Suicide Father        Objective:     Vitals  Vitals:    08/26/24 0923   BP: 117/79   Pulse: 72   Resp: 16   Temp: 98 °F (36.7 °C)   SpO2: 95%     Physical Exam  Physical Exam  All surgical incisions healing well. No erythema. No evidence of hematoma or abscess.    Results for POC orders placed in visit on 08/26/24   POCT Urinalysis no Micro   Result Value Ref Range    Color, UA yellow     Clarity, UA clear     Glucose, UA POC neg     Bilirubin, UA neg     Ketones, UA neg     Spec Grav, UA 1.015     Blood, UA POC pos, small     pH, UA 6.0     Protein, UA POC neg     Urobilinogen, UA neg     Leukocytes, UA pos, trace     Nitrite, UA neg    Straight urinary catheterization performed by sterile procedure for urine specimen per Debra Perez NP.  90 ml of clear, yellow urine drained via catheter. Patient tolerated well. Debra Perez NP made aware.      Assessnent/Plan:     Beth Branch is a 58 y.o. female with   1. Burning with urination    2. Urinary frequency    3. Postop check      Patient is doing well 2 weeks.  Perineorrhaphy discomfort:  Ok for sitz bath  Restrictions reviewed   Patient is to follow up in 4 weeks .     Orders Placed This Encounter   Procedures    Culture, Urine    VT CATHETERIZE FOR URINE SPEC    POCT Urinalysis no Micro     No orders of the defined types were placed in this encounter.      Debra Perez, KANG - CNP

## 2024-08-29 LAB — BACTERIA UR CULT: NORMAL

## 2024-08-29 NOTE — RESULT ENCOUNTER NOTE
Advised patient of normal urine culture. She verbalized understanding. Inquired what she can do about her vaginal odor- believes it is old blood. Denies abnormal drainage or fever. Advised her she should not put anything down there except water at this time.

## 2024-09-18 DIAGNOSIS — R11.0 CHRONIC NAUSEA: ICD-10-CM

## 2024-09-18 DIAGNOSIS — K21.9 GASTROESOPHAGEAL REFLUX DISEASE WITHOUT ESOPHAGITIS: ICD-10-CM

## 2024-09-18 DIAGNOSIS — K31.84 GASTROPARESIS: ICD-10-CM

## 2024-09-18 RX ORDER — DULOXETIN HYDROCHLORIDE 60 MG/1
60 CAPSULE, DELAYED RELEASE ORAL 2 TIMES DAILY
Qty: 60 CAPSULE | Refills: 2 | Status: SHIPPED | OUTPATIENT
Start: 2024-09-18

## 2024-09-18 RX ORDER — ESOMEPRAZOLE MAGNESIUM 40 MG/1
40 CAPSULE, DELAYED RELEASE ORAL
Qty: 30 CAPSULE | Refills: 0 | OUTPATIENT
Start: 2024-09-18

## 2024-09-18 RX ORDER — ONDANSETRON 4 MG/1
4 TABLET, FILM COATED ORAL DAILY PRN
Qty: 30 TABLET | Refills: 2 | OUTPATIENT
Start: 2024-09-18

## 2024-09-25 ENCOUNTER — OFFICE VISIT (OUTPATIENT)
Dept: UROGYNECOLOGY | Age: 59
End: 2024-09-25
Payer: MEDICARE

## 2024-09-25 VITALS
SYSTOLIC BLOOD PRESSURE: 105 MMHG | TEMPERATURE: 98.5 F | HEART RATE: 75 BPM | OXYGEN SATURATION: 96 % | RESPIRATION RATE: 16 BRPM | DIASTOLIC BLOOD PRESSURE: 71 MMHG

## 2024-09-25 DIAGNOSIS — R35.0 URINARY FREQUENCY: Primary | ICD-10-CM

## 2024-09-25 DIAGNOSIS — N31.8 FREQUENCY-URGENCY SYNDROME: ICD-10-CM

## 2024-09-25 LAB
BILIRUBIN, POC: NORMAL
BLOOD URINE, POC: NORMAL
CLARITY, POC: CLEAR
COLOR, POC: YELLOW
GLUCOSE URINE, POC: NORMAL MG/DL
KETONES, POC: NORMAL MG/DL
LEUKOCYTE EST, POC: NORMAL
NITRITE, POC: NORMAL
PH, POC: 5
PROTEIN, POC: NORMAL MG/DL
SPECIFIC GRAVITY, POC: 1.02
UROBILINOGEN, POC: NORMAL MG/DL

## 2024-09-25 PROCEDURE — 99024 POSTOP FOLLOW-UP VISIT: CPT | Performed by: NURSE PRACTITIONER

## 2024-09-25 PROCEDURE — 81002 URINALYSIS NONAUTO W/O SCOPE: CPT | Performed by: NURSE PRACTITIONER

## 2024-09-25 RX ORDER — MIRABEGRON 50 MG/1
50 TABLET, EXTENDED RELEASE ORAL DAILY
Qty: 90 TABLET | Refills: 3 | Status: SHIPPED | OUTPATIENT
Start: 2024-09-25

## 2024-10-08 ENCOUNTER — TELEPHONE (OUTPATIENT)
Dept: ADMINISTRATIVE | Age: 59
End: 2024-10-08

## 2024-10-08 NOTE — TELEPHONE ENCOUNTER
Submitted PA for Mirabegron ER 50MG er tablets  Via [facility] [id number]  STATUS: PENDING.    Follow up done daily; if no decision with in three days we will refax.  If another three days goes by with no decision will call the insurance for status.

## 2024-10-09 NOTE — TELEPHONE ENCOUNTER
The medication was DENIED; DENIAL letter is uploaded to MEDIA.    General Denial Reasoning:    ___  Patient must try ** medication before the insurance will cover this drug.  Unless there is a contraindication that you can provide.    ___  Drug is not covered for off label usage.  This drug is FDA approved for **.    _X__  Drug is not covered by the plan ( Plan Exclusion)    ___  Other; please see Denial Letter.    DIABETIC MEDICATION DENIALS:    ___ Must have a A1C greater the 7.0.    ___ Hypoglycemic episodes or 3 or more injections of insulin daily for Continuous Monitors.    ___   Drug is not covered by the plan ( Plan Exclusion)    ___  Other; please see Denial Letter.    WEIGHT LOSS MEDICATIONS DENIALS:    ___  Must have Tried and Failed Diet and Exercise.    ___  Insurance requesting Weight Loss Diary.    ___   Drug is not covered by the plan ( Plan Exclusion)    ___  Other; please see Denial Letter.    Note :        If you want an APPEAL; please note in this encounter what new information you would like to APPEAL with.  Once complete route back to PA POOL.    If this requires a response please respond to the pool ( P [facility] MEDICATION PRE-AUTH).      Thank you please advise patient.

## 2024-10-09 NOTE — TELEPHONE ENCOUNTER
Submitted PA for Mirabegron ER 50MG er tablets  Via [facility] [id number]  STATUS: PENDING.    Follow up done daily; if no decision with in three days we will refax.  If another three days goes by with no decision will call the insurance for status.

## 2024-10-15 RX ORDER — VIBEGRON 75 MG/1
75 TABLET, FILM COATED ORAL DAILY
Qty: 30 TABLET | Refills: 0 | Status: SHIPPED | OUTPATIENT
Start: 2024-10-15

## 2024-10-15 NOTE — TELEPHONE ENCOUNTER
The medication was DENIED; DENIAL letter is uploaded to MEDIA.    Generic Denial: Patient must complete step therapy, Unless there is a contraindication that you can provide.         Note :  The drug you asked for is not listed in your preferred drug list (formulary). The preferred drug(s), you may not have tried, are:  fesoterodine ER tablet extended release 24 hr  Gemtesa tablet  oxybutynin chloride ER tablet extended release 24 hr  tolterodine ER capsule extended release 24 hr      If you want an APPEAL; please note in this encounter what new information you would like to APPEAL with.  Once complete route back to PA POOL.    If this requires a response please respond to the pool ( P [facility] MEDICATION PRE-AUTH).      Thank you please advise patient.

## 2024-10-15 NOTE — TELEPHONE ENCOUNTER
Called and LM requesting call back.    Patient had specifically requested Myrbetriq, insurance will not cover. 10/15/2024 at 12:47 PM

## 2024-10-22 RX ORDER — TRAZODONE HYDROCHLORIDE 50 MG/1
50 TABLET, FILM COATED ORAL NIGHTLY PRN
Qty: 30 TABLET | Refills: 2 | OUTPATIENT
Start: 2024-10-22

## 2024-11-18 RX ORDER — VIBEGRON 75 MG/1
75 TABLET, FILM COATED ORAL DAILY
Qty: 30 TABLET | Refills: 0 | Status: SHIPPED | OUTPATIENT
Start: 2024-11-18

## (undated) DEVICE — 2108 SERIES SAGITTAL BLADE FLARED, GROUND  (29.0 X 1.32 X 84.0MM)

## (undated) DEVICE — DRESSING ALGINATE POST OPERATIVE 12X3.5 IN RECT PRIMASEAL

## (undated) DEVICE — GOWN,AURORA,NON-REINFORCED,2XL: Brand: MEDLINE

## (undated) DEVICE — GLOVE SURG SZ 8 CRM LTX FREE POLYISOPRENE POLYMER BEAD ANTI

## (undated) DEVICE — Device

## (undated) DEVICE — GLOVE ORTHO 8   MSG9480

## (undated) DEVICE — GENERAL: Brand: MEDLINE INDUSTRIES, INC.

## (undated) DEVICE — SOLUTION IRRIG 3000ML STRL H2O USP UROMATIC PLAS CONT

## (undated) DEVICE — SEALER ENDOSCP NANO COAT OPN DIV CRV L JAW LIGASURE IMPACT

## (undated) DEVICE — GLOVE ORANGE PI 7   MSG9070

## (undated) DEVICE — 450 ML BOTTLE OF 0.05% CHLORHEXIDINE GLUCONATE IN 99.95% STERILE WATER FOR IRRIGATION, USP AND APPLICATOR.: Brand: IRRISEPT ANTIMICROBIAL WOUND LAVAGE

## (undated) DEVICE — GLOVE SURG SZ 75 CRM LTX FREE POLYISOPRENE POLYMER BEAD ANTI

## (undated) DEVICE — 4-PORT MANIFOLD: Brand: NEPTUNE 2

## (undated) DEVICE — COOLER THER 20-31IN L CRYO COMB W/ PD KNEE TB GRAV FLO

## (undated) DEVICE — BLANKET WRM W29.9XL79.1IN UP BODY FORC AIR MISTRAL-AIR

## (undated) DEVICE — [AGGRESSIVE PLUS CUTTER, ARTHROSCOPIC SHAVER BLADE,  DO NOT RESTERILIZE,  DO NOT USE IF PACKAGE IS DAMAGED,  KEEP DRY,  KEEP AWAY FROM SUNLIGHT]: Brand: FORMULA

## (undated) DEVICE — CEMENT MIXING SYSTEM WITH FEMORAL BREAKWAY NOZZLE: Brand: REVOLUTION

## (undated) DEVICE — SUTURE TIGERTAPE TIGERWIRE SZ 2-0 L30IN NONABSORBABLE AR72377T

## (undated) DEVICE — SYRINGE,CONTROL,LL,FINGER,GRIP: Brand: MEDLINE INDUSTRIES, INC.

## (undated) DEVICE — SUTURE VCRL SZ 0 L27IN ABSRB UD L36MM CP-1 1/2 CIR REV CUT J267H

## (undated) DEVICE — SOLUTION IRRIG 1000ML 0.9% SOD CHL USP POUR PLAS BTL

## (undated) DEVICE — GOWN,SIRUS,NON REINFRCD,LARGE,SET IN SL: Brand: MEDLINE

## (undated) DEVICE — PACK,LAPARASCOPY,PELVISCOPY,AURORA: Brand: MEDLINE

## (undated) DEVICE — DRESSING PETRO W3XL8IN OIL EMUL N ADH GZ KNIT IMPREG CELOS

## (undated) DEVICE — HOOK RETRCT L5MM E SHRP SELF RET SYS LONE STAR

## (undated) DEVICE — 1LYRTR 16FR10ML100%SIL UMS SNP: Brand: MEDLINE INDUSTRIES, INC.

## (undated) DEVICE — SYRINGE MED 10ML LUERLOCK TIP W/O SFTY DISP

## (undated) DEVICE — NEEDLE,22GX1.5",REG,BEVEL: Brand: MEDLINE

## (undated) DEVICE — DRESSING,GAUZE,XEROFORM,CURAD,1"X8",ST: Brand: CURAD

## (undated) DEVICE — COVER,MAYO STAND,STERILE: Brand: MEDLINE

## (undated) DEVICE — BLANKET WRM W40.2XL55.9IN IORT LO BODY + MISTRAL AIR

## (undated) DEVICE — GOWN,SIRUS,NONRNF,SETINSLV,XL,20/CS: Brand: MEDLINE

## (undated) DEVICE — MERCY HEALTH ST CHARLES: Brand: MEDLINE INDUSTRIES, INC.

## (undated) DEVICE — DISC SUCT FLR DLX QUICKSUITE

## (undated) DEVICE — SUTURE STRATAFIX SPRL MCRYL + SZ 2 0 L27IN ABSRB UD W NDL SXMP1B419

## (undated) DEVICE — GLOVE SURG SZ 7 L12IN FNGR THK79MIL GRN LTX FREE

## (undated) DEVICE — GLOVE SURG SZ 65 CRM LTX FREE POLYISOPRENE POLYMER BEAD ANTI

## (undated) DEVICE — NEEDLE, QUINCKE, 18GX3.5": Brand: MEDLINE

## (undated) DEVICE — TUBING FLD MGMT Y DBL SPIK DUALWAVE

## (undated) DEVICE — INTENDED TO SUPPORT AND MAINTAIN THE POSITION OF AN ANESTHETIZED PATIENT DURING SURGERY: Brand: HERMANTOR XL PINK KNEE POSITIONING PAD

## (undated) DEVICE — SOLUTION IRRIG 1000ML STRL H2O USP PLAS POUR BTL

## (undated) DEVICE — GLOVE SURG SZ 7 CRM LTX FREE POLYISOPRENE POLYMER BEAD ANTI

## (undated) DEVICE — MASTISOL ADHESIVE LIQ 2/3ML

## (undated) DEVICE — GLOVE SURG SZ 85 L12IN FNGR THK13MIL WHT ISOLEX POLYISOPRENE

## (undated) DEVICE — PADDING CAST W6INXL4YD POLY POR SPUN DACRON SYN VERSATILE

## (undated) DEVICE — Z INACTIVE USE 2660664 SOLUTION IRRIG 3000ML 0.9% SOD CHL USP UROMATIC PLAS CONT

## (undated) DEVICE — ZIMMER® STERILE DISPOSABLE TOURNIQUET CUFF WITH PLC, DUAL PORT, SINGLE BLADDER, 30 IN. (76 CM)

## (undated) DEVICE — APPLICATOR MEDICATED 26 CC SOLUTION HI LT ORNG CHLORAPREP

## (undated) DEVICE — GLOVE SURG SZ 75 L12IN FNGR THK79MIL GRN LTX FREE

## (undated) DEVICE — SUTURE PROL SZ 1 L30IN NONABSORBABLE BLU L36MM CT-1 1/2 CIR 8425H

## (undated) DEVICE — KIT POS ULT SHLDR PT CARE REHAB SLNG

## (undated) DEVICE — SUTURE STRATAFIX SYMMETRIC PDS + SZ 1 L18IN ABSRB VLT L48MM SXPP1A400

## (undated) DEVICE — SPONGE,LAP,18"X18",DLX,XR,ST,5/PK,40/PK: Brand: MEDLINE

## (undated) DEVICE — STRAP RESTRN W3.5XL18IN STD ALL PURP DISP W/ SLNG RNG

## (undated) DEVICE — TRAP FLUID

## (undated) DEVICE — BAG,DRAINAGE,UROLOGY,2000ML,ANTI REFLUX: Brand: MEDLINE

## (undated) DEVICE — SUTURE VICRYL + SZ 2-0 L27IN ABSRB UD CT-2 L26MM 1/2 CIR TAPR VCP269H

## (undated) DEVICE — ZIMMER® STERILE DISPOSABLE TOURNIQUET CUFF WITH PLC, DUAL PORT, SINGLE BLADDER, 34 IN. (86 CM)

## (undated) DEVICE — COVER,MAYO STAND,XL,STERILE: Brand: MEDLINE

## (undated) DEVICE — TUBING PMP L16FT MAIN DISP FOR AR-6400 AR-6475

## (undated) DEVICE — BANDAGE COBAN 4 IN COMPR W4INXL5YD FOAM COHESIVE QUIK STK SELF ADH SFT

## (undated) DEVICE — GLOVE SURG SZ 85 L12IN FNGR ORTHO 126MIL CRM LTX FREE

## (undated) DEVICE — SUTURE PDS + SZ 2 0 L27IN ABSRB VLT L26MM CT 2 1 2 CIR PDP333H

## (undated) DEVICE — DUAL CUT SAGITTAL BLADE

## (undated) DEVICE — SYRINGE MED 20ML STD CLR PLAS LUERLOCK TIP N CTRL DISP

## (undated) DEVICE — PROBE ABLAT XL 90DEG ASPIR BPLR RF 1 PC ELECTRD ERGO HNDL

## (undated) DEVICE — SHEET,DRAPE,53X77,STERILE: Brand: MEDLINE

## (undated) DEVICE — STRIP,CLOSURE,WOUND,MEDI-STRIP,1/2X4: Brand: MEDLINE

## (undated) DEVICE — PADDING CAST W4INXL4YD SPUN DACRON POLY POR SYN VERSATILE

## (undated) DEVICE — SUTURE MCRYL SZ 3-0 L27IN ABSRB UD L24MM PS-1 3/8 CIR PRIM Y936H

## (undated) DEVICE — CYSTO/BLADDER IRRIGATION SET, REGULATING CLAMP

## (undated) DEVICE — SUTURE SUTTAPE FIBERLINK 1.3MM WHT BLU CLS LOOP AR7535

## (undated) DEVICE — SOLUTION IRRIG 3000ML 0.9% SOD CHL USP UROMATIC PLAS CONT

## (undated) DEVICE — GLOVE ORANGE PI 8 1/2   MSG9085

## (undated) DEVICE — KIT AUTOTRNS APPL AERO 2 SET SYR 2 TIP FOR PLT SEP SYS GPS

## (undated) DEVICE — 90-S, SUCTION PROBE, NON-BENDABLE, MAX CUT LEVEL 11: Brand: SERFAS ENERGY

## (undated) DEVICE — GAUZE,PACKING STRIP,IODOFORM,2"X5YD,STRL: Brand: CURAD

## (undated) DEVICE — SUTURE VICRYL + SZ 0 L18IN ABSRB VLT L26MM CT-2 1/2 CIR VCP727D

## (undated) DEVICE — SUTURE VICRYL + SZ 3-0 L27IN ABSRB UD L26MM SH 1/2 CIR VCP416H

## (undated) DEVICE — SPONGE,LAP,4"X18",XR,ST,5/PK,40PK/CS: Brand: MEDLINE INDUSTRIES, INC.

## (undated) DEVICE — RETRACTOR SURG W18.3XL32.5CM PLAS SELF RET W/ 2 CATH CLP

## (undated) DEVICE — CYSTOSCOPY: Brand: MEDLINE INDUSTRIES, INC.

## (undated) DEVICE — NEEDLE SUT PASS FOR ROT CUF LABRAL REP MULTFI SCORPION

## (undated) DEVICE — SUTURE ETHIBOND EXCEL 2-0 L30IN NONABSORBABLE GRN L26MM SH MX563

## (undated) DEVICE — SUTURE PROL SZ 3-0 L18IN NONABSORBABLE BLU L24MM FS-1 3/8 8684G

## (undated) DEVICE — SUTURE VCRL SZ 0 L36IN ABSRB UD CT-1 L36MM 1/2 CIR TAPR PNT VCP946H

## (undated) DEVICE — SUTURE VCRL SZ 0 L36IN ABSRB UD L36MM CT-1 1/2 CIR J946H

## (undated) DEVICE — SPONGE LAP W18XL18IN WHT COT 4 PLY FLD STRUNG RADPQ DISP ST

## (undated) DEVICE — DRAPE,U/ SHT,SPLIT,PLAS,STERIL: Brand: MEDLINE

## (undated) DEVICE — BLADE SHV L13CM DIA4MM BNE CUT AGG DEB COOLCUT

## (undated) DEVICE — KIT CHAIR TRIMANO FOAM W/ SUPP ARM DRP ERGONOMICALLY DESIGNED

## (undated) DEVICE — GLOVE SURG SZ 7.5 L11.73IN FNGR THK9.8MIL STRW LTX POLYMER

## (undated) DEVICE — KIT SEP W/ BLD DRAW TB SYR NDL TRNQT PD

## (undated) DEVICE — ELECTRODE ELECSURG L 10.2 CM PTFE COAT MONOPOLAR BLADE OPN

## (undated) DEVICE — CLOSURE SKIN FLX NONINVASIVE PRELOC TECHNOLOGY FOR 24IN

## (undated) DEVICE — SURG GL, SENSICARE PI ORTHO LT,LF,PF,8.5: Brand: MEDLINE

## (undated) DEVICE — PAD COOL W10.9XL11.3IN UNIV REG HOSE WRP ON THER CLD

## (undated) DEVICE — TOWEL,OR,DSP,ST,BLUE,DLX,XR,4/PK,20PK/CS: Brand: MEDLINE

## (undated) DEVICE — PREMIUM WET SKIN PREP TRAY: Brand: MEDLINE INDUSTRIES, INC.

## (undated) DEVICE — SUTURE ETHIBOND EXCEL SZ 2-0 L36IN NONABSORBABLE GRN SH-2 X559H

## (undated) DEVICE — SUTURE NDL MAYO CATGUT 824S5

## (undated) DEVICE — HANDPIECE SET WITH BONE CLEANING TIP AND SUCTION TUBE: Brand: INTERPULSE

## (undated) DEVICE — POUCH FLD 36X29IN SHLDR HVY LO GLARE MATTE FINISH FLM 2 SUCT

## (undated) DEVICE — GAUZE,SPONGE,FLUFF,6"X6.75",STRL,5/TRAY: Brand: MEDLINE

## (undated) DEVICE — DISC ABSRB YEL FLR POLYETH MGMT SUCT QUICKSUITE

## (undated) DEVICE — NEPTUNE E-SEP SMOKE EVACUATION PENCIL, COATED, 70MM BLADE, PUSH BUTTON SWITCH: Brand: NEPTUNE E-SEP

## (undated) DEVICE — TOTAL TRAY, DB, 100% SILI FOLEY, 16FR 10: Brand: MEDLINE

## (undated) DEVICE — TOURNIQUET CUF BLD PRESSURE 4X18 IN 2 PRT SINGLE BLDR RED

## (undated) DEVICE — SUTURE VCRL SZ 2-0 L27IN ABSRB UD L36MM CP-1 1/2 CIR REV J266H

## (undated) DEVICE — CUFF REPROCESS BP TOURN 2 PORT SING BLDR 4X30IN

## (undated) DEVICE — SOLUTION SCRB 4OZ 7.5% POVIDONE IOD ANTIMIC BTL

## (undated) DEVICE — CANNULA ARTHSCP L3CM ID8MM DBL DAM 1 PC MOLD LO PROF FLNG

## (undated) DEVICE — TUBING, SUCTION, 1/4" X 12', STRAIGHT: Brand: MEDLINE

## (undated) DEVICE — SUTURE VICRYL + SZ 2-0 L18IN ABSRB VLT L26MM SH 1/2 CIR VCP775D

## (undated) DEVICE — UNDERPANTS INCONT XL 45-70IN KNIT SEAMLESS DSGN COLOR-CODED

## (undated) DEVICE — SUTURE ETHLN SZ 4-0 L18IN NONABSORBABLE BLK L19MM PS-2 3/8 1667H

## (undated) DEVICE — SOLUTION IV IRRIG LACTATED RINGERS 3000ML 2B7487

## (undated) DEVICE — DRESSING TRNSPAR W5XL4.5IN FLM SHT SEMIPERMEABLE WIND